# Patient Record
Sex: FEMALE | Race: WHITE | NOT HISPANIC OR LATINO | Employment: OTHER | URBAN - METROPOLITAN AREA
[De-identification: names, ages, dates, MRNs, and addresses within clinical notes are randomized per-mention and may not be internally consistent; named-entity substitution may affect disease eponyms.]

---

## 2017-01-04 ENCOUNTER — LAB REQUISITION (OUTPATIENT)
Dept: LAB | Facility: HOSPITAL | Age: 77
End: 2017-01-04

## 2017-01-04 DIAGNOSIS — N18.9 CHRONIC KIDNEY DISEASE: ICD-10-CM

## 2017-01-04 DIAGNOSIS — Z79.899 OTHER LONG TERM (CURRENT) DRUG THERAPY: ICD-10-CM

## 2017-01-04 LAB
ANION GAP SERPL CALCULATED.3IONS-SCNC: 12.9 MMOL/L
BUN BLD-MCNC: 30 MG/DL (ref 8–23)
BUN/CREAT SERPL: 25.4 (ref 7–25)
CALCIUM SPEC-SCNC: 9.6 MG/DL (ref 8.8–10.5)
CHLORIDE SERPL-SCNC: 102 MMOL/L (ref 98–107)
CO2 SERPL-SCNC: 27.1 MMOL/L (ref 22–29)
CREAT BLD-MCNC: 1.18 MG/DL (ref 0.57–1)
GFR SERPL CREATININE-BSD FRML MDRD: 45 ML/MIN/1.73
GLUCOSE BLD-MCNC: 89 MG/DL (ref 65–99)
POTASSIUM BLD-SCNC: 4.9 MMOL/L (ref 3.5–5.2)
SODIUM BLD-SCNC: 142 MMOL/L (ref 136–145)

## 2017-01-04 PROCEDURE — 80048 BASIC METABOLIC PNL TOTAL CA: CPT | Performed by: FAMILY MEDICINE

## 2017-01-04 PROCEDURE — 36415 COLL VENOUS BLD VENIPUNCTURE: CPT | Performed by: FAMILY MEDICINE

## 2017-01-12 ENCOUNTER — OFFICE VISIT (OUTPATIENT)
Dept: PAIN MEDICINE | Facility: CLINIC | Age: 77
End: 2017-01-12

## 2017-01-12 VITALS
HEART RATE: 78 BPM | TEMPERATURE: 96 F | DIASTOLIC BLOOD PRESSURE: 75 MMHG | BODY MASS INDEX: 24.99 KG/M2 | OXYGEN SATURATION: 96 % | HEIGHT: 65 IN | WEIGHT: 150 LBS | SYSTOLIC BLOOD PRESSURE: 140 MMHG | RESPIRATION RATE: 16 BRPM

## 2017-01-12 DIAGNOSIS — R52 NONVERBAL SIGNS OF PAIN: ICD-10-CM

## 2017-01-12 DIAGNOSIS — G89.29 OTHER CHRONIC PAIN: Primary | ICD-10-CM

## 2017-01-12 DIAGNOSIS — M41.9 SCOLIOSIS, UNSPECIFIED SCOLIOSIS TYPE, UNSPECIFIED SPINAL REGION: ICD-10-CM

## 2017-01-12 PROCEDURE — 99212 OFFICE O/P EST SF 10 MIN: CPT | Performed by: NURSE PRACTITIONER

## 2017-01-12 RX ORDER — TRAMADOL HYDROCHLORIDE 300 MG/1
1 CAPSULE ORAL DAILY
Qty: 30 CAPSULE | Refills: 2 | Status: SHIPPED | OUTPATIENT
Start: 2017-01-12 | End: 2017-02-21 | Stop reason: DRUGHIGH

## 2017-01-12 RX ORDER — OMEPRAZOLE 20 MG/1
40 CAPSULE, DELAYED RELEASE ORAL DAILY
COMMUNITY

## 2017-01-12 NOTE — PROGRESS NOTES
"CHIEF COMPLAINT    Pt's caregiver with her and states pt is doing well. Seems to be handling the gabapentin trial well. No changes.    Subjective   Erica Crockett is a 76 y.o. female  who presents to the office for follow-up.She has a history of chronic pain due to scoliosis and PHN/Shingles. She is a non-verbal resident of [facility]. At her last office visit, She was trialed on gabapentin 100 mg HS.  Her rash under her breast is improved. Patient is unable to verbalize a pain score today.  Her behavior is improved since last office visit.  Continues with Conzip 300 mg daily and gabapentin 100 mg HS. No obvious side effects noted from staff. It appears the regimen helps decrease her pain. Unable to perform ADL's by self.    History of Present Illness     The following portions of the patient's history were reviewed and updated as appropriate: allergies, current medications, past family history, past medical history, past social history, past surgical history and problem list.    Review of Systems   Constitutional: Negative for activity change, appetite change, chills and fever.   HENT: Negative for ear pain.    Eyes: Negative for pain.   Respiratory: Negative for cough and shortness of breath.    Cardiovascular: Negative for chest pain and palpitations.   Gastrointestinal: Negative for abdominal pain, constipation, diarrhea and vomiting.   Genitourinary: Negative for difficulty urinating.   Musculoskeletal: Positive for back pain.   Neurological: Negative for dizziness, weakness, light-headedness, numbness and headaches.   Psychiatric/Behavioral: Negative for agitation, confusion, sleep disturbance and suicidal ideas. The patient is not nervous/anxious.      Vitals:    01/12/17 0913   BP: 140/75   Pulse: 78   Resp: 16   Temp: 96 °F (35.6 °C)   SpO2: 96%   Weight: 150 lb (68 kg)   Height: 65\" (165.1 cm)   PainSc: Comment: pt nonverbal   PainLoc: Back     Objective   Physical Exam   Constitutional: She appears " well-developed and well-nourished.   HENT:   Head: Normocephalic and atraumatic.   Nose: Nose normal.   Eyes: Conjunctivae are normal.   Cardiovascular: Normal rate, regular rhythm and normal heart sounds.    Pulmonary/Chest: Effort normal and breath sounds normal. No respiratory distress.   Musculoskeletal:        Lumbar back: She exhibits tenderness.   Slight left posturing while sitting in wheelchair noted.   Neurological: She is alert. Gait (in wheelchair) abnormal.   Psychiatric: She has a normal mood and affect. Her behavior is normal. She is noncommunicative.   Mood/affect/behavior normal for patient. Patient is able to follow simple commands.       Assessment/Plan   Erica was seen today for back pain.    Diagnoses and all orders for this visit:    Other chronic pain    Nonverbal signs of pain    Scoliosis, unspecified scoliosis type, unspecified spinal region    Other orders  -     TraMADol HCl ER (CONZIP) 300 MG capsule sustained-release 24 hr; Take 1 tablet by mouth Daily.      --- The oral drug screen confirmation from 6-2-16 has been reviewed and the result is appropriate based on patient history and JOSE report  --- Refill Conzip ER with 2 additional refills. Patient appears stable with current regimen. No adverse effects. Regarding continuation of opioids, there is no evidence of aberrant behavior or any red flags.  The patient continues with appropriate response to opioid therapy.   --- Continue with gabapentin.   --- Follow-up 3 months or sooner if needed.       JOSE REPORT    As part of the patient's treatment plan, I am prescribing controlled substances. The patient has been made aware of appropriate use of such medications, including potential risk of somnolence, limited ability to drive and/or work safely, and the potential for dependence or overdose. It has also bee made clear that these medications are for use by this patient only, without concomitant use of alcohol or other substances  unless prescribed.     Patient has completed prescribing agreement detailing terms of continued prescribing of controlled substances, including monitoring JOSE reports, urine drug screening, and pill counts if necessary. The patient is aware that inappropriate use will results in cessation of prescribing such medications.    JOSE report has been reviewed and scanned into the patient's chart.    Date of last JOSE : 1-9-17.    History and physical exam exhibit continued safe and appropriate use of controlled substances.      EMR Dragon/Transcription disclaimer:   Much of this encounter note is an electronic transcription/translation of spoken language to printed text. The electronic translation of spoken language may permit erroneous, or at times, nonsensical words or phrases to be inadvertently transcribed; Although I have reviewed the note for such errors, some may still exist.

## 2017-01-12 NOTE — MR AVS SNAPSHOT
Erica OLGA Bon   1/12/2017 9:00 AM   Office Visit    Dept Phone:  [phone]   Encounter #:  [id number]    Provider:  LASHAE Garnica   Department:  [facility] PAIN MANAGEMENT                Your Full Care Plan              Where to Get Your Medications      You can get these medications from any pharmacy     Bring a paper prescription for each of these medications     TraMADol HCl  MG capsule sustained-release 24 hr            Your Updated Medication List          This list is accurate as of: 1/12/17  9:37 AM.  Always use your most recent med list.                acetaminophen 325 MG tablet   Commonly known as:  TYLENOL       alendronate 70 MG tablet   Commonly known as:  FOSAMAX       BENEFIBER powder       bisacodyl 10 MG suppository   Commonly known as:  DULCOLAX       cetirizine 10 MG tablet   Commonly known as:  zyrTEC       dextromethorphan-guaifenesin  MG/5ML syrup   Commonly known as:  ROBITUSSIN-DM       diazepam 20 MG rectal kit   Commonly known as:  DIASTAT ACUDIAL       diltiazem  MG 24 hr capsule   Commonly known as:  DILACOR XR       diphenhydrAMINE 25 mg capsule   Commonly known as:  BENADRYL       gabapentin 100 MG capsule   Commonly known as:  NEURONTIN   Take 1 capsule by mouth Every Night.       lamoTRIgine 200 MG tablet   Commonly known as:  LaMICtal       levETIRAcetam 500 MG tablet   Commonly known as:  KEPPRA       LORazepam 0.5 MG tablet   Commonly known as:  ATIVAN       lubiprostone 8 MCG capsule   Commonly known as:  AMITIZA       magnesium hydroxide 400 MG/5ML suspension   Commonly known as:  MILK OF MAGNESIA       MULTI VITAMIN DAILY PO       omeprazole 20 MG capsule   Commonly known as:  priLOSEC       polyethylene glycol powder   Commonly known as:  MIRALAX       TraMADol HCl  MG capsule sustained-release 24 hr   Commonly known as:  CONZIP   Take 1 tablet by mouth Daily.       Vitamin D (Cholecalciferol)  "1000 UNITS tablet               You Were Diagnosed With        Codes Comments    Other chronic pain    -  Primary ICD-10-CM: G89.29  ICD-9-CM: 338.29     Nonverbal signs of pain     ICD-10-CM: Z78.9  ICD-9-CM: V49.89     Scoliosis, unspecified scoliosis type, unspecified spinal region     ICD-10-CM: M41.9  ICD-9-CM: 737.30       Instructions     None    Patient Instructions History      Upcoming Appointments     Visit Type Date Time Department    OFFICE VISIT 1/12/2017  9:00 AM [facility]      [employer] Signup     Our records indicate that your [employer] account has been deactivated. If you would like to reactivate your account, please email [email or web address] or call [phone] to talk to our [employer] staff.             Other Info from Your Visit           Allergies     Bee Venom      Iodinated Diagnostic Agents      Nsaids        Reason for Visit     Back Pain           Vital Signs     Blood Pressure Pulse Temperature Respirations Height Weight    140/75 78 96 °F (35.6 °C) 16 65\" (165.1 cm) 150 lb (68 kg)    Oxygen Saturation Body Mass Index Smoking Status             96% 24.96 kg/m2 Never Smoker         Problems and Diagnoses Noted     Chronic pain    Nonverbal signs of pain    Scoliosis        "

## 2017-01-27 ENCOUNTER — LAB REQUISITION (OUTPATIENT)
Dept: LAB | Facility: HOSPITAL | Age: 77
End: 2017-01-27

## 2017-01-27 DIAGNOSIS — N39.0 URINARY TRACT INFECTION: ICD-10-CM

## 2017-01-27 LAB
BACTERIA UR QL AUTO: ABNORMAL /HPF
BILIRUB UR QL STRIP: NEGATIVE
CLARITY UR: CLEAR
COLOR UR: YELLOW
GLUCOSE UR STRIP-MCNC: NEGATIVE MG/DL
HGB UR QL STRIP.AUTO: NEGATIVE
HYALINE CASTS UR QL AUTO: ABNORMAL /LPF
KETONES UR QL STRIP: NEGATIVE
LEUKOCYTE ESTERASE UR QL STRIP.AUTO: ABNORMAL
NITRITE UR QL STRIP: POSITIVE
PH UR STRIP.AUTO: 7 [PH] (ref 4.5–8)
PROT UR QL STRIP: NEGATIVE
RBC # UR: ABNORMAL /HPF
REF LAB TEST METHOD: ABNORMAL
SP GR UR STRIP: 1.01 (ref 1–1.03)
SQUAMOUS #/AREA URNS HPF: ABNORMAL /HPF
UROBILINOGEN UR QL STRIP: ABNORMAL
WBC CLUMPS # UR AUTO: ABNORMAL /HPF
WBC UR QL AUTO: ABNORMAL /HPF

## 2017-01-27 PROCEDURE — 87186 SC STD MICRODIL/AGAR DIL: CPT | Performed by: FAMILY MEDICINE

## 2017-01-27 PROCEDURE — 87086 URINE CULTURE/COLONY COUNT: CPT | Performed by: FAMILY MEDICINE

## 2017-01-27 PROCEDURE — 81001 URINALYSIS AUTO W/SCOPE: CPT | Performed by: FAMILY MEDICINE

## 2017-01-29 LAB — BACTERIA SPEC AEROBE CULT: ABNORMAL

## 2017-02-01 ENCOUNTER — LAB REQUISITION (OUTPATIENT)
Dept: LAB | Facility: HOSPITAL | Age: 77
End: 2017-02-01

## 2017-02-01 DIAGNOSIS — R94.5 ABNORMAL RESULTS OF LIVER FUNCTION STUDIES: ICD-10-CM

## 2017-02-01 DIAGNOSIS — R79.89 OTHER SPECIFIED ABNORMAL FINDINGS OF BLOOD CHEMISTRY: ICD-10-CM

## 2017-02-01 DIAGNOSIS — Z79.899 OTHER LONG TERM (CURRENT) DRUG THERAPY: ICD-10-CM

## 2017-02-01 LAB
ANION GAP SERPL CALCULATED.3IONS-SCNC: 7.3 MMOL/L
BUN BLD-MCNC: 40 MG/DL (ref 8–23)
BUN/CREAT SERPL: 27.2 (ref 7–25)
CALCIUM SPEC-SCNC: 9.3 MG/DL (ref 8.8–10.5)
CHLORIDE SERPL-SCNC: 107 MMOL/L (ref 98–107)
CO2 SERPL-SCNC: 27.7 MMOL/L (ref 22–29)
CREAT BLD-MCNC: 1.47 MG/DL (ref 0.57–1)
GFR SERPL CREATININE-BSD FRML MDRD: 35 ML/MIN/1.73
GLUCOSE BLD-MCNC: 92 MG/DL (ref 65–99)
POTASSIUM BLD-SCNC: 5.3 MMOL/L (ref 3.5–5.2)
SODIUM BLD-SCNC: 142 MMOL/L (ref 136–145)

## 2017-02-01 PROCEDURE — 36415 COLL VENOUS BLD VENIPUNCTURE: CPT | Performed by: FAMILY MEDICINE

## 2017-02-01 PROCEDURE — 80048 BASIC METABOLIC PNL TOTAL CA: CPT | Performed by: FAMILY MEDICINE

## 2017-02-21 ENCOUNTER — OFFICE VISIT (OUTPATIENT)
Dept: PAIN MEDICINE | Facility: CLINIC | Age: 77
End: 2017-02-21

## 2017-02-21 VITALS
BODY MASS INDEX: 25.16 KG/M2 | HEIGHT: 65 IN | OXYGEN SATURATION: 96 % | SYSTOLIC BLOOD PRESSURE: 124 MMHG | TEMPERATURE: 96.4 F | WEIGHT: 151 LBS | DIASTOLIC BLOOD PRESSURE: 73 MMHG | HEART RATE: 69 BPM | RESPIRATION RATE: 16 BRPM

## 2017-02-21 DIAGNOSIS — M41.9 SCOLIOSIS, UNSPECIFIED SCOLIOSIS TYPE, UNSPECIFIED SPINAL REGION: ICD-10-CM

## 2017-02-21 DIAGNOSIS — R52 NONVERBAL SIGNS OF PAIN: ICD-10-CM

## 2017-02-21 DIAGNOSIS — M16.9 OSTEOARTHRITIS OF HIP, UNSPECIFIED LATERALITY, UNSPECIFIED OSTEOARTHRITIS TYPE: ICD-10-CM

## 2017-02-21 DIAGNOSIS — M70.72 BURSITIS OF HIP, LEFT: ICD-10-CM

## 2017-02-21 DIAGNOSIS — G89.29 OTHER CHRONIC PAIN: Primary | ICD-10-CM

## 2017-02-21 DIAGNOSIS — M51.36 DEGENERATION OF INTERVERTEBRAL DISC OF LUMBAR REGION: ICD-10-CM

## 2017-02-21 PROCEDURE — 99214 OFFICE O/P EST MOD 30 MIN: CPT | Performed by: NURSE PRACTITIONER

## 2017-02-21 RX ORDER — TRAMADOL HYDROCHLORIDE 50 MG/1
100 TABLET ORAL EVERY 8 HOURS
Qty: 180 TABLET | Refills: 2 | Status: SHIPPED | OUTPATIENT
Start: 2017-02-21 | End: 2017-05-24 | Stop reason: SDUPTHER

## 2017-02-21 NOTE — PROGRESS NOTES
CHIEF COMPLAINT  Since 1/12/17 office visit, Pt reportedly is having significant increase of L hip pain    Subjective   Erica Crockett is a 76 y.o. female  who presents to the office for follow-up.She has a history of low back and hip pain. Is a resident from [facility]. Has difficulty with verbal skills.  Her nurse from [facility] is here today. Is noticing increased pain in her left hip. Having difficulty with walking. Had PCP changed orders to Tylenol 1000 mg BID. Also receives Voltaren gel as well.  Has been having increased combativeness as well. They notice her holding her left hip as well. Denies any recent injury.    Her nurse reports that the pain seems to be coming from her left hip. Back pain seems stable. Continues with ConZip 300 mg daily and Tylenol 1000 mg BID. No known side effects from the regimen.     Cannot tolerate Tylox, Hydrocodone. Has had side effects from this previously.     She is non-verbal but is able to vocalize painful palpations.  Unable to verbalize a pain level in office today.      History of Present Illness     PEG Assessment   What number best describes your pain on average in the past week?5  What number best describes how, during the past week, pain has interfered with your enjoyment of life?6  What number best describes how, during the past week, pain has interfered with your general activity?  6      The following portions of the patient's history were reviewed and updated as appropriate: allergies, current medications, past family history, past medical history, past social history, past surgical history and problem list.    Review of Systems   Constitutional: Negative for activity change and appetite change.   Respiratory: Negative for apnea, chest tightness and shortness of breath.    Cardiovascular: Negative for chest pain.   Gastrointestinal: Positive for constipation. Negative for diarrhea and nausea.   Genitourinary: Negative for difficulty urinating and dysuria.  "  Musculoskeletal: Positive for back pain.   Neurological: Negative for dizziness, light-headedness and numbness.   Psychiatric/Behavioral: Positive for sleep disturbance (unchanged). Negative for suicidal ideas. The patient is nervous/anxious.        Vitals:    02/21/17 0853   BP: 124/73   Pulse: 69   Resp: 16   Temp: 96.4 °F (35.8 °C)   SpO2: 96%   Weight: 151 lb (68.5 kg)   Height: 65\" (165.1 cm)   PainSc: 4  Comment: L hip pain reportedly ranges from 4-8/10   PainLoc: Hip     Objective   Physical Exam   Constitutional: She appears well-developed and well-nourished.   HENT:   Head: Normocephalic and atraumatic.   Nose: Nose normal.   Eyes: Conjunctivae are normal.   Cardiovascular: Normal rate, regular rhythm and normal heart sounds.    Pulmonary/Chest: Effort normal and breath sounds normal. No respiratory distress.   Musculoskeletal:        Lumbar back: She exhibits tenderness.   Slight left posturing while sitting in wheelchair noted.    Moderate tenderness to palpation of left greater trochanteric bursa   Neurological: She is alert. Gait (in wheelchair) abnormal.   Psychiatric: She has a normal mood and affect. Her behavior is normal. She is noncommunicative.   Mood/affect/behavior normal for patient. Patient is able to follow simple commands.       Assessment/Plan   Erica was seen today for hip pain.    Diagnoses and all orders for this visit:    Other chronic pain    Scoliosis, unspecified scoliosis type, unspecified spinal region    Degeneration of intervertebral disc of lumbar region    Nonverbal signs of pain    Osteoarthritis of hip, unspecified laterality, unspecified osteoarthritis type    Bursitis of hip, left  -     Case Request    Other orders  -     traMADol (ULTRAM) 50 MG tablet; Take 2 tablets by mouth Every 8 (Eight) Hours.      --- The oral drug screen confirmation from 6-2-16 has been reviewed and the result is appropriate based on patient history and JOSE report  --- Discontinue to " ConZip.  --- Tramadol 50 mg 2 q8hrs. Discussed medication with the patient/caregivers.  Included in this discussion was the potential for side effects and adverse events.  Patient verbalized understanding and wished to proceed.  Prescription will be given to nurse.  --- Take with Tylenol.  --- Considered NSAIDS but patient cannot tolerate due to CRI.  --- Left hip bursa injection in surgery center with Dr. Agosto. Will obtain POA.  Discussed at length with caregivers. Will need to obtain consent for procedure from POA. Also need to consider sedation. The caregivers do not believe she would tolerate the procedure in office.   --- Follow-up after procedure.          This was an extended office visit. Over 25 minutes was spent in face to face contact with the patient. Over half of the time was spent in education and counseling. The education and counseling was discussed with caregiver/nurse of patient. DIscussed not increasing opioid at this time. Discussed returning to previous regimen of Tramadol 50 mg 2 q8hrs. Also addition of Tylenol with Tramadol dosing. Reviewed left hip bursa injection. Reviewed the procedure at length with the caregiver/nurse.  Included in the review was expectations, complications, risk and benefits.The procedure was described in detail and the risks, benefits and alternatives were discussed with the patient (including but not limited to: bleeding, infection, nerve damage, worsening of pain, inability to perform injection, paralysis, seizures, and death) who agreed to proceed.  Discussed the potential for sedation if warranted/wanted.   This intervention will be ordered. Will obtain consent from POA.         JOSE REPORT    As part of the patient's treatment plan, I am prescribing controlled substances. The patient has been made aware of appropriate use of such medications, including potential risk of somnolence, limited ability to drive and/or work safely, and the potential for dependence  or overdose. It has also bee made clear that these medications are for use by this patient only, without concomitant use of alcohol or other substances unless prescribed.     Patient has completed prescribing agreement detailing terms of continued prescribing of controlled substances, including monitoring JOSE reports, urine drug screening, and pill counts if necessary. The patient is aware that inappropriate use will results in cessation of prescribing such medications.    JOSE report has been reviewed and scanned into the patient's chart.    Date of last JOSE : 2-17-17    History and physical exam exhibit continued safe and appropriate use of controlled substances.      EMR Dragon/Transcription disclaimer:   Much of this encounter note is an electronic transcription/translation of spoken language to printed text. The electronic translation of spoken language may permit erroneous, or at times, nonsensical words or phrases to be inadvertently transcribed; Although I have reviewed the note for such errors, some may still exist.

## 2017-02-24 ENCOUNTER — TRANSCRIBE ORDERS (OUTPATIENT)
Dept: OBSTETRICS AND GYNECOLOGY | Facility: CLINIC | Age: 77
End: 2017-02-24

## 2017-02-24 DIAGNOSIS — M85.80 OSTEOPENIA: Primary | ICD-10-CM

## 2017-02-24 DIAGNOSIS — Z13.9 SCREENING: Primary | ICD-10-CM

## 2017-03-01 ENCOUNTER — LAB REQUISITION (OUTPATIENT)
Dept: LAB | Facility: HOSPITAL | Age: 77
End: 2017-03-01

## 2017-03-01 DIAGNOSIS — N18.9 CHRONIC KIDNEY DISEASE: ICD-10-CM

## 2017-03-01 DIAGNOSIS — Z79.899 OTHER LONG TERM (CURRENT) DRUG THERAPY: ICD-10-CM

## 2017-03-01 LAB
ANION GAP SERPL CALCULATED.3IONS-SCNC: 12.2 MMOL/L
BUN BLD-MCNC: 41 MG/DL (ref 8–23)
BUN/CREAT SERPL: 33.9 (ref 7–25)
CALCIUM SPEC-SCNC: 9.7 MG/DL (ref 8.8–10.5)
CHLORIDE SERPL-SCNC: 102 MMOL/L (ref 98–107)
CO2 SERPL-SCNC: 25.8 MMOL/L (ref 22–29)
CREAT BLD-MCNC: 1.21 MG/DL (ref 0.57–1)
GFR SERPL CREATININE-BSD FRML MDRD: 43 ML/MIN/1.73
GLUCOSE BLD-MCNC: 101 MG/DL (ref 65–99)
POTASSIUM BLD-SCNC: 4.4 MMOL/L (ref 3.5–5.2)
SODIUM BLD-SCNC: 140 MMOL/L (ref 136–145)

## 2017-03-01 PROCEDURE — 36415 COLL VENOUS BLD VENIPUNCTURE: CPT | Performed by: FAMILY MEDICINE

## 2017-03-01 PROCEDURE — 80048 BASIC METABOLIC PNL TOTAL CA: CPT | Performed by: FAMILY MEDICINE

## 2017-03-03 ENCOUNTER — APPOINTMENT (OUTPATIENT)
Dept: BONE DENSITY | Facility: HOSPITAL | Age: 77
End: 2017-03-03
Attending: OBSTETRICS & GYNECOLOGY

## 2017-03-12 ENCOUNTER — LAB REQUISITION (OUTPATIENT)
Dept: LAB | Facility: HOSPITAL | Age: 77
End: 2017-03-12

## 2017-03-12 DIAGNOSIS — N39.0 URINARY TRACT INFECTION: ICD-10-CM

## 2017-03-12 PROCEDURE — 87086 URINE CULTURE/COLONY COUNT: CPT | Performed by: FAMILY MEDICINE

## 2017-03-12 PROCEDURE — 87186 SC STD MICRODIL/AGAR DIL: CPT | Performed by: FAMILY MEDICINE

## 2017-03-15 LAB — BACTERIA SPEC AEROBE CULT: ABNORMAL

## 2017-03-21 ENCOUNTER — OUTSIDE FACILITY SERVICE (OUTPATIENT)
Dept: PAIN MEDICINE | Facility: CLINIC | Age: 77
End: 2017-03-21

## 2017-03-21 PROCEDURE — 77002 NEEDLE LOCALIZATION BY XRAY: CPT | Performed by: ANESTHESIOLOGY

## 2017-03-21 PROCEDURE — 20610 DRAIN/INJ JOINT/BURSA W/O US: CPT | Performed by: ANESTHESIOLOGY

## 2017-04-05 ENCOUNTER — LAB REQUISITION (OUTPATIENT)
Dept: LAB | Facility: HOSPITAL | Age: 77
End: 2017-04-05

## 2017-04-05 DIAGNOSIS — R79.89 OTHER SPECIFIED ABNORMAL FINDINGS OF BLOOD CHEMISTRY: ICD-10-CM

## 2017-04-05 DIAGNOSIS — I10 ESSENTIAL (PRIMARY) HYPERTENSION: ICD-10-CM

## 2017-04-05 DIAGNOSIS — D72.819 DECREASED WHITE BLOOD CELL COUNT: ICD-10-CM

## 2017-04-05 DIAGNOSIS — Z79.899 OTHER LONG TERM (CURRENT) DRUG THERAPY: ICD-10-CM

## 2017-04-05 DIAGNOSIS — E55.9 VITAMIN D DEFICIENCY: ICD-10-CM

## 2017-04-05 DIAGNOSIS — E03.9 HYPOTHYROIDISM: ICD-10-CM

## 2017-04-05 DIAGNOSIS — R56.9 CONVULSIONS (HCC): ICD-10-CM

## 2017-04-05 DIAGNOSIS — R73.09 OTHER ABNORMAL GLUCOSE: ICD-10-CM

## 2017-04-05 LAB
25(OH)D3 SERPL-MCNC: 59.1 NG/ML
ALBUMIN SERPL-MCNC: 3.7 G/DL (ref 3.5–5.2)
ALBUMIN/GLOB SERPL: 1.2 G/DL
ALP SERPL-CCNC: 66 U/L (ref 40–129)
ALT SERPL W P-5'-P-CCNC: 12 U/L (ref 5–33)
ANION GAP SERPL CALCULATED.3IONS-SCNC: 9.4 MMOL/L
AST SERPL-CCNC: 15 U/L (ref 5–32)
BASOPHILS # BLD AUTO: 0.03 10*3/MM3 (ref 0–0.2)
BASOPHILS NFR BLD AUTO: 0.5 % (ref 0–2)
BILIRUB SERPL-MCNC: 0.2 MG/DL (ref 0.2–1.2)
BUN BLD-MCNC: 35 MG/DL (ref 8–23)
BUN/CREAT SERPL: 26.3 (ref 7–25)
CALCIUM SPEC-SCNC: 9.7 MG/DL (ref 8.8–10.5)
CHLORIDE SERPL-SCNC: 101 MMOL/L (ref 98–107)
CHOLEST SERPL-MCNC: 211 MG/DL (ref 0–200)
CO2 SERPL-SCNC: 28.6 MMOL/L (ref 22–29)
CREAT BLD-MCNC: 1.33 MG/DL (ref 0.57–1)
DEPRECATED RDW RBC AUTO: 44.2 FL (ref 37–54)
EOSINOPHIL # BLD AUTO: 0.15 10*3/MM3 (ref 0.1–0.3)
EOSINOPHIL NFR BLD AUTO: 2.3 % (ref 0–4)
ERYTHROCYTE [DISTWIDTH] IN BLOOD BY AUTOMATED COUNT: 12.8 % (ref 11.5–14.5)
GFR SERPL CREATININE-BSD FRML MDRD: 39 ML/MIN/1.73
GLOBULIN UR ELPH-MCNC: 3.1 GM/DL
GLUCOSE BLD-MCNC: 89 MG/DL (ref 65–99)
HCT VFR BLD AUTO: 39.6 % (ref 37–47)
HDLC SERPL-MCNC: 99 MG/DL (ref 40–60)
HGB BLD-MCNC: 12.5 G/DL (ref 12–16)
IMM GRANULOCYTES # BLD: 0.02 10*3/MM3 (ref 0–0.03)
IMM GRANULOCYTES NFR BLD: 0.3 % (ref 0–0.5)
LDLC SERPL CALC-MCNC: 100 MG/DL (ref 0–100)
LDLC/HDLC SERPL: 1.01 {RATIO}
LYMPHOCYTES # BLD AUTO: 2.08 10*3/MM3 (ref 0.6–4.8)
LYMPHOCYTES NFR BLD AUTO: 31.3 % (ref 20–45)
MCH RBC QN AUTO: 29.7 PG (ref 27–31)
MCHC RBC AUTO-ENTMCNC: 31.6 G/DL (ref 31–37)
MCV RBC AUTO: 94.1 FL (ref 81–99)
MONOCYTES # BLD AUTO: 0.43 10*3/MM3 (ref 0–1)
MONOCYTES NFR BLD AUTO: 6.5 % (ref 3–8)
NEUTROPHILS # BLD AUTO: 3.94 10*3/MM3 (ref 1.5–8.3)
NEUTROPHILS NFR BLD AUTO: 59.1 % (ref 45–70)
NRBC BLD MANUAL-RTO: 0 /100 WBC (ref 0–0)
PLATELET # BLD AUTO: 319 10*3/MM3 (ref 140–500)
PMV BLD AUTO: 10 FL (ref 7.4–10.4)
POTASSIUM BLD-SCNC: 5.1 MMOL/L (ref 3.5–5.2)
PROT SERPL-MCNC: 6.8 G/DL (ref 6–8.5)
RBC # BLD AUTO: 4.21 10*6/MM3 (ref 4.2–5.4)
SODIUM BLD-SCNC: 139 MMOL/L (ref 136–145)
T4 FREE SERPL-MCNC: 0.98 NG/DL (ref 0.93–1.7)
TRIGL SERPL-MCNC: 58 MG/DL (ref 0–150)
TSH SERPL DL<=0.05 MIU/L-ACNC: 3.37 MIU/ML (ref 0.27–4.2)
VLDLC SERPL-MCNC: 11.6 MG/DL (ref 7–27)
WBC NRBC COR # BLD: 6.65 10*3/MM3 (ref 4.8–10.8)

## 2017-04-05 PROCEDURE — 85025 COMPLETE CBC W/AUTO DIFF WBC: CPT | Performed by: FAMILY MEDICINE

## 2017-04-05 PROCEDURE — 80061 LIPID PANEL: CPT | Performed by: FAMILY MEDICINE

## 2017-04-05 PROCEDURE — 80053 COMPREHEN METABOLIC PANEL: CPT | Performed by: FAMILY MEDICINE

## 2017-04-05 PROCEDURE — 84439 ASSAY OF FREE THYROXINE: CPT | Performed by: FAMILY MEDICINE

## 2017-04-05 PROCEDURE — 36415 COLL VENOUS BLD VENIPUNCTURE: CPT | Performed by: FAMILY MEDICINE

## 2017-04-05 PROCEDURE — 80177 DRUG SCRN QUAN LEVETIRACETAM: CPT | Performed by: FAMILY MEDICINE

## 2017-04-05 PROCEDURE — 84443 ASSAY THYROID STIM HORMONE: CPT | Performed by: FAMILY MEDICINE

## 2017-04-05 PROCEDURE — 82306 VITAMIN D 25 HYDROXY: CPT | Performed by: FAMILY MEDICINE

## 2017-04-05 PROCEDURE — 80175 DRUG SCREEN QUAN LAMOTRIGINE: CPT | Performed by: FAMILY MEDICINE

## 2017-04-07 LAB
LAMOTRIGINE SERPL-MCNC: 5.8 UG/ML (ref 2–20)
LEVETIRACETAM SERPL-MCNC: 34.3 UG/ML (ref 10–40)

## 2017-04-12 ENCOUNTER — OFFICE VISIT (OUTPATIENT)
Dept: PAIN MEDICINE | Facility: CLINIC | Age: 77
End: 2017-04-12

## 2017-04-12 VITALS
OXYGEN SATURATION: 96 % | WEIGHT: 151 LBS | HEART RATE: 76 BPM | TEMPERATURE: 97.2 F | HEIGHT: 65 IN | RESPIRATION RATE: 16 BRPM | BODY MASS INDEX: 25.16 KG/M2 | DIASTOLIC BLOOD PRESSURE: 85 MMHG | SYSTOLIC BLOOD PRESSURE: 134 MMHG

## 2017-04-12 DIAGNOSIS — R52 NONVERBAL SIGNS OF PAIN: ICD-10-CM

## 2017-04-12 DIAGNOSIS — G89.29 OTHER CHRONIC PAIN: Primary | ICD-10-CM

## 2017-04-12 DIAGNOSIS — M70.72 BURSITIS OF LEFT HIP: ICD-10-CM

## 2017-04-12 DIAGNOSIS — M41.9 SCOLIOSIS, UNSPECIFIED SCOLIOSIS TYPE, UNSPECIFIED SPINAL REGION: ICD-10-CM

## 2017-04-12 PROCEDURE — 99212 OFFICE O/P EST SF 10 MIN: CPT | Performed by: NURSE PRACTITIONER

## 2017-04-12 NOTE — PROGRESS NOTES
"CHIEF COMPLAINT    Pt had left hip bursa injection for left hip pain on 3/21/17. Pt is nonverbal from [facility], however caregivers state the nurse has said the injection has helped the pt. She is less agitated as well.     Subjective   Erica Crockett is a 76 y.o. female  who presents to the office for follow-up of procedure.  She completed a left hip bursa injection   on  3-21-17 performed by Dr. Agosto for management of hip pain. Patient is unable to verbalize the amount of relief she received from the injection. Her caregiver is present and they have noticed an improvement in her general signs of pain and less agitation. \"She's doing great, so much better.\"    AT her last office visit, her medication regimen was changed. It was changed from Tramadol  mg daily to Tramadol 50 mg 2 q8hrs with Tylenol. Cannot tolerate NSAIDS due to CRI.    Patient is not agitated in office today.     Continues with current medication regimen. No obvious side effects noted by caregivers. Unable to perform ADL.s      History of Present Illness     The following portions of the patient's history were reviewed and updated as appropriate: allergies, current medications, past family history, past medical history, past social history, past surgical history and problem list.    Review of Systems   Constitutional: Negative for activity change, appetite change, chills and fever.   Respiratory: Negative for apnea, chest tightness and shortness of breath.    Cardiovascular: Negative for chest pain.   Gastrointestinal: Negative for constipation, diarrhea and nausea.   Genitourinary: Negative for difficulty urinating and dysuria.   Musculoskeletal: Positive for back pain.   Neurological: Negative for dizziness, weakness, light-headedness and numbness.   Psychiatric/Behavioral: Negative for agitation, sleep disturbance and suicidal ideas. The patient is nervous/anxious.        Vitals:    04/12/17 1027   BP: 134/85   Pulse: 76 " "  Resp: 16   Temp: 97.2 °F (36.2 °C)   SpO2: 96%   Weight: 151 lb (68.5 kg)   Height: 65\" (165.1 cm)   PainSc: Comment: [place] nonvberbal [facility] [place] [facility]     Objective   Physical Exam   Constitutional: She appears well-developed and well-nourished.   HENT:   Head: Normocephalic and atraumatic.   Nose: Nose normal.   Eyes: Conjunctivae are normal.   Cardiovascular: Normal rate, regular rhythm and normal heart sounds.    Pulmonary/Chest: Effort normal and breath sounds normal. No respiratory distress.   Musculoskeletal:   Slight left posturing while sitting in wheelchair noted.   Neurological: She is alert. Gait (in wheelchair) abnormal.   Non-focal   Psychiatric: She has a normal mood and affect. Her behavior is normal. She is noncommunicative.   Mood/affect/behavior normal for patient. Patient is able to follow simple commands.       Assessment/Plan   Erica was seen today for pain.    Diagnoses and all orders for this visit:    Other chronic pain    Scoliosis, unspecified scoliosis type, unspecified spinal region    Nonverbal signs of pain    Bursitis of left hip        --- The oral drug screen confirmation from 6-2-16 has been reviewed and the result is appropriate based on patient history and JOSE report  --- Continue with regimen. No changes at this time.   --- Can repeat Left greater trochanteric bursa injection after 6-21-17 PRN.  --- Follow-up 3 months or sooner if needed.         JOSE REPORT    As part of the patient's treatment plan, I am prescribing controlled substances. The patient has been made aware of appropriate use of such medications, including potential risk of somnolence, limited ability to drive and/or work safely, and the potential for dependence or overdose. It has also bee made clear that these medications are for use by this patient only, without concomitant use of alcohol or other substances unless prescribed.     Patient has completed prescribing agreement detailing terms of continued " prescribing of controlled substances, including monitoring JOSE reports, urine drug screening, and pill counts if necessary. The patient is aware that inappropriate use will results in cessation of prescribing such medications.    JOSE report has been reviewed and scanned into the patient's chart.    Date of last JOSE : 4-10-17    History and physical exam exhibit continued safe and appropriate use of controlled substances.       EMR Dragon/Transcription disclaimer:   Much of this encounter note is an electronic transcription/translation of spoken language to printed text. The electronic translation of spoken language may permit erroneous, or at times, nonsensical words or phrases to be inadvertently transcribed; Although I have reviewed the note for such errors, some may still exist.

## 2017-05-03 ENCOUNTER — LAB REQUISITION (OUTPATIENT)
Dept: LAB | Facility: HOSPITAL | Age: 77
End: 2017-05-03

## 2017-05-03 DIAGNOSIS — Z79.899 OTHER LONG TERM (CURRENT) DRUG THERAPY: ICD-10-CM

## 2017-05-03 DIAGNOSIS — R94.5 ABNORMAL RESULTS OF LIVER FUNCTION STUDIES: ICD-10-CM

## 2017-05-03 LAB
ANION GAP SERPL CALCULATED.3IONS-SCNC: 10.3 MMOL/L
BUN BLD-MCNC: 28 MG/DL (ref 8–23)
BUN/CREAT SERPL: 26.7 (ref 7–25)
CALCIUM SPEC-SCNC: 9.1 MG/DL (ref 8.8–10.5)
CHLORIDE SERPL-SCNC: 103 MMOL/L (ref 98–107)
CO2 SERPL-SCNC: 28.7 MMOL/L (ref 22–29)
CREAT BLD-MCNC: 1.05 MG/DL (ref 0.57–1)
GFR SERPL CREATININE-BSD FRML MDRD: 51 ML/MIN/1.73
GLUCOSE BLD-MCNC: 102 MG/DL (ref 65–99)
POTASSIUM BLD-SCNC: 4.7 MMOL/L (ref 3.5–5.2)
SODIUM BLD-SCNC: 142 MMOL/L (ref 136–145)

## 2017-05-03 PROCEDURE — 80048 BASIC METABOLIC PNL TOTAL CA: CPT | Performed by: FAMILY MEDICINE

## 2017-05-03 PROCEDURE — 36415 COLL VENOUS BLD VENIPUNCTURE: CPT | Performed by: FAMILY MEDICINE

## 2017-05-24 RX ORDER — TRAMADOL HYDROCHLORIDE 50 MG/1
100 TABLET ORAL EVERY 8 HOURS
Qty: 60 TABLET | Refills: 1 | OUTPATIENT
Start: 2017-05-24 | End: 2017-06-15 | Stop reason: SDUPTHER

## 2017-05-30 ENCOUNTER — HOSPITAL ENCOUNTER (OUTPATIENT)
Dept: MAMMOGRAPHY | Facility: HOSPITAL | Age: 77
Discharge: HOME | End: 2017-05-30
Attending: OBSTETRICS & GYNECOLOGY | Admitting: OBSTETRICS & GYNECOLOGY

## 2017-05-30 DIAGNOSIS — Z12.31 ENCOUNTER FOR SCREENING MAMMOGRAM FOR BREAST CANCER: ICD-10-CM

## 2017-05-30 DIAGNOSIS — Z13.9 SCREENING: ICD-10-CM

## 2017-05-30 PROCEDURE — G0202 SCR MAMMO BI INCL CAD: HCPCS

## 2017-05-31 ENCOUNTER — LAB REQUISITION (OUTPATIENT)
Dept: LAB | Facility: HOSPITAL | Age: 77
End: 2017-05-31

## 2017-05-31 DIAGNOSIS — N18.9 CHRONIC KIDNEY DISEASE: ICD-10-CM

## 2017-05-31 DIAGNOSIS — R73.09 OTHER ABNORMAL GLUCOSE: ICD-10-CM

## 2017-05-31 DIAGNOSIS — Z79.899 OTHER LONG TERM (CURRENT) DRUG THERAPY: ICD-10-CM

## 2017-05-31 DIAGNOSIS — E03.9 HYPOTHYROIDISM: ICD-10-CM

## 2017-05-31 LAB
ANION GAP SERPL CALCULATED.3IONS-SCNC: 8.4 MMOL/L
BUN BLD-MCNC: 30 MG/DL (ref 8–23)
BUN/CREAT SERPL: 30 (ref 7–25)
CALCIUM SPEC-SCNC: 9.6 MG/DL (ref 8.8–10.5)
CHLORIDE SERPL-SCNC: 103 MMOL/L (ref 98–107)
CO2 SERPL-SCNC: 29.6 MMOL/L (ref 22–29)
CREAT BLD-MCNC: 1 MG/DL (ref 0.57–1)
GFR SERPL CREATININE-BSD FRML MDRD: 54 ML/MIN/1.73
GLUCOSE BLD-MCNC: 95 MG/DL (ref 65–99)
HBA1C MFR BLD: 5.5 % (ref 4.8–5.6)
POTASSIUM BLD-SCNC: 4.8 MMOL/L (ref 3.5–5.2)
SODIUM BLD-SCNC: 141 MMOL/L (ref 136–145)
TSH SERPL DL<=0.05 MIU/L-ACNC: 2.6 MIU/ML (ref 0.27–4.2)

## 2017-05-31 PROCEDURE — 36415 COLL VENOUS BLD VENIPUNCTURE: CPT | Performed by: FAMILY MEDICINE

## 2017-05-31 PROCEDURE — 83036 HEMOGLOBIN GLYCOSYLATED A1C: CPT | Performed by: FAMILY MEDICINE

## 2017-05-31 PROCEDURE — 84443 ASSAY THYROID STIM HORMONE: CPT | Performed by: FAMILY MEDICINE

## 2017-05-31 PROCEDURE — 80048 BASIC METABOLIC PNL TOTAL CA: CPT | Performed by: FAMILY MEDICINE

## 2017-06-15 ENCOUNTER — OFFICE VISIT (OUTPATIENT)
Dept: PAIN MEDICINE | Facility: CLINIC | Age: 77
End: 2017-06-15

## 2017-06-15 VITALS
DIASTOLIC BLOOD PRESSURE: 81 MMHG | TEMPERATURE: 98.4 F | BODY MASS INDEX: 25.16 KG/M2 | OXYGEN SATURATION: 97 % | HEIGHT: 65 IN | HEART RATE: 94 BPM | WEIGHT: 151 LBS | SYSTOLIC BLOOD PRESSURE: 132 MMHG | RESPIRATION RATE: 18 BRPM

## 2017-06-15 DIAGNOSIS — M41.9 SCOLIOSIS, UNSPECIFIED SCOLIOSIS TYPE, UNSPECIFIED SPINAL REGION: ICD-10-CM

## 2017-06-15 DIAGNOSIS — G89.29 OTHER CHRONIC PAIN: Primary | ICD-10-CM

## 2017-06-15 DIAGNOSIS — R52 NONVERBAL SIGNS OF PAIN: ICD-10-CM

## 2017-06-15 DIAGNOSIS — M16.9 OSTEOARTHRITIS OF HIP, UNSPECIFIED LATERALITY, UNSPECIFIED OSTEOARTHRITIS TYPE: ICD-10-CM

## 2017-06-15 PROCEDURE — 99212 OFFICE O/P EST SF 10 MIN: CPT | Performed by: NURSE PRACTITIONER

## 2017-06-15 RX ORDER — TRAMADOL HYDROCHLORIDE 50 MG/1
100 TABLET ORAL EVERY 8 HOURS
Qty: 180 TABLET | Refills: 2 | Status: SHIPPED | OUTPATIENT
Start: 2017-06-15 | End: 2017-08-21 | Stop reason: SDUPTHER

## 2017-06-15 RX ORDER — TRAMADOL HYDROCHLORIDE 50 MG/1
100 TABLET ORAL EVERY 8 HOURS
Qty: 60 TABLET | Refills: 2 | Status: SHIPPED | OUTPATIENT
Start: 2017-06-15 | End: 2017-06-15 | Stop reason: SDUPTHER

## 2017-06-15 NOTE — PROGRESS NOTES
CHIEF COMPLAINT  [place] [facility] patient here today with left hip pain that is unchanged from last visit per caregiver.    Subjective   Erica Crockett is a 76 y.o. female  who presents to the office for follow-up.She has a history of chronic hip pain. She completed a left hip bursa injection on 3-21-17 performed by Dr. Agosto for management of hip pain.  Also has a history of scoliosis.    Continue with Tramadol 50 mg 2 q8hrs with Tylenol. Cannot tolerate NSAIDS due to CRI. No noted side effects from the regimen.      Patient is not agitated in office today.      Continues with current medication regimen. No obvious side effects noted by caregivers. Unable to perform ADL.s      History of Present Illness     PEG Assessment   What number best describes your pain on average in the past week?5  What number best describes how, during the past week, pain has interfered with your enjoyment of life?4  What number best describes how, during the past week, pain has interfered with your general activity?  4      The following portions of the patient's history were reviewed and updated as appropriate: allergies, current medications, past family history, past medical history, past social history, past surgical history and problem list.    Review of Systems   Constitutional: Negative for chills and fever.   Respiratory: Negative for shortness of breath.    Cardiovascular: Negative for chest pain.   Gastrointestinal: Negative for constipation, diarrhea, nausea and vomiting.   Genitourinary: Negative for difficulty urinating and dyspareunia.   Musculoskeletal:        Left hip   Neurological: Negative for dizziness, weakness, light-headedness, numbness and headaches.   Psychiatric/Behavioral: Negative for confusion, hallucinations, self-injury, sleep disturbance and suicidal ideas. The patient is not nervous/anxious.        Vitals:    06/15/17 0922   BP: 132/81   Pulse: 94   Resp: 18   Temp: 98.4 °F (36.9 °C)   SpO2: 97%  "  Weight: 151 lb (68.5 kg)   Height: 65\" (165.1 cm)   PainSc:   3   PainLoc: Hip     Objective   Physical Exam   Constitutional: She appears well-developed and well-nourished.   HENT:   Head: Normocephalic and atraumatic.   Nose: Nose normal.   Eyes: Conjunctivae are normal.   Cardiovascular: Normal rate, regular rhythm and normal heart sounds.    Pulmonary/Chest: Effort normal and breath sounds normal. No respiratory distress.   Musculoskeletal:   Slight left posturing while sitting in wheelchair noted.   Neurological: She is alert. Gait (in wheelchair) abnormal.   Non-focal   Psychiatric: She has a normal mood and affect. Her behavior is normal. She is noncommunicative.   Mood/affect/behavior normal for patient. Patient is able to follow simple commands.     Assessment/Plan   Erica was seen today for hip pain.    Diagnoses and all orders for this visit:    Other chronic pain    Nonverbal signs of pain    Scoliosis, unspecified scoliosis type, unspecified spinal region    Osteoarthritis of hip, unspecified laterality, unspecified osteoarthritis type    Other orders  -     Discontinue: traMADol (ULTRAM) 50 MG tablet; Take 2 tablets by mouth Every 8 (Eight) Hours.  -     traMADol (ULTRAM) 50 MG tablet; Take 2 tablets by mouth Every 8 (Eight) Hours.      --- The oraldrug screen confirmation from 6-2-16 has been reviewed and the result is appropriate based on patient history and JOSE report  --- Refill Tramadol with 2 additional refills. Patient appears stable with current regimen. No adverse effects. Regarding continuation of opioids, there is no evidence of aberrant behavior or any red flags.  The patient continues with appropriate response to opioid therapy.   --- Can repeat hip bursa injection PRN.  --- Follow-up 3 months or sooner if needed.         JOSE REPORT    As part of the patient's treatment plan, I am prescribing controlled substances. The patient has been made aware of appropriate use of such " medications, including potential risk of somnolence, limited ability to drive and/or work safely, and the potential for dependence or overdose. It has also bee made clear that these medications are for use by this patient only, without concomitant use of alcohol or other substances unless prescribed.     Patient has completed prescribing agreement detailing terms of continued prescribing of controlled substances, including monitoring JOSE reports, urine drug screening, and pill counts if necessary. The patient is aware that inappropriate use will results in cessation of prescribing such medications.    JOSE report has been reviewed and scanned into the patient's chart.    Date of last JOSE : 6-14-17    History and physical exam exhibit continued safe and appropriate use of controlled substances.      EMR Dragon/Transcription disclaimer:   Much of this encounter note is an electronic transcription/translation of spoken language to printed text. The electronic translation of spoken language may permit erroneous, or at times, nonsensical words or phrases to be inadvertently transcribed; Although I have reviewed the note for such errors, some may still exist.

## 2017-06-21 RX ORDER — GABAPENTIN 100 MG/1
100 CAPSULE ORAL NIGHTLY
Qty: 30 CAPSULE | Refills: 2 | OUTPATIENT
Start: 2017-06-21 | End: 2017-08-21 | Stop reason: SDUPTHER

## 2017-07-05 ENCOUNTER — LAB REQUISITION (OUTPATIENT)
Dept: LAB | Facility: HOSPITAL | Age: 77
End: 2017-07-05

## 2017-07-05 DIAGNOSIS — N39.0 URINARY TRACT INFECTION: ICD-10-CM

## 2017-07-05 LAB
BACTERIA UR QL AUTO: ABNORMAL /HPF
BILIRUB UR QL STRIP: NEGATIVE
CLARITY UR: CLEAR
COLOR UR: YELLOW
GLUCOSE UR STRIP-MCNC: NEGATIVE MG/DL
HGB UR QL STRIP.AUTO: NEGATIVE
HYALINE CASTS UR QL AUTO: ABNORMAL /LPF
KETONES UR QL STRIP: NEGATIVE
LEUKOCYTE ESTERASE UR QL STRIP.AUTO: NEGATIVE
NITRITE UR QL STRIP: POSITIVE
PH UR STRIP.AUTO: 5.5 [PH] (ref 4.5–8)
PROT UR QL STRIP: NEGATIVE
RBC # UR: ABNORMAL /HPF
REF LAB TEST METHOD: ABNORMAL
SP GR UR STRIP: 1.02 (ref 1–1.03)
SQUAMOUS #/AREA URNS HPF: ABNORMAL /HPF
UROBILINOGEN UR QL STRIP: ABNORMAL
WBC UR QL AUTO: ABNORMAL /HPF

## 2017-07-05 PROCEDURE — 87147 CULTURE TYPE IMMUNOLOGIC: CPT | Performed by: FAMILY MEDICINE

## 2017-07-05 PROCEDURE — 81001 URINALYSIS AUTO W/SCOPE: CPT | Performed by: FAMILY MEDICINE

## 2017-07-05 PROCEDURE — 87186 SC STD MICRODIL/AGAR DIL: CPT | Performed by: FAMILY MEDICINE

## 2017-07-05 PROCEDURE — 87086 URINE CULTURE/COLONY COUNT: CPT | Performed by: FAMILY MEDICINE

## 2017-07-09 LAB
BACTERIA SPEC AEROBE CULT: ABNORMAL
BACTERIA SPEC AEROBE CULT: ABNORMAL
STREP GROUPING: ABNORMAL

## 2017-07-12 ENCOUNTER — LAB REQUISITION (OUTPATIENT)
Dept: LAB | Facility: HOSPITAL | Age: 77
End: 2017-07-12

## 2017-07-12 DIAGNOSIS — R60.9 EDEMA: ICD-10-CM

## 2017-07-12 DIAGNOSIS — Z79.899 OTHER LONG TERM (CURRENT) DRUG THERAPY: ICD-10-CM

## 2017-07-12 DIAGNOSIS — N18.9 CHRONIC KIDNEY DISEASE: ICD-10-CM

## 2017-07-12 LAB
ANION GAP SERPL CALCULATED.3IONS-SCNC: 12.2 MMOL/L
BUN BLD-MCNC: 37 MG/DL (ref 8–23)
BUN/CREAT SERPL: 28.7 (ref 7–25)
CALCIUM SPEC-SCNC: 9.5 MG/DL (ref 8.8–10.5)
CHLORIDE SERPL-SCNC: 105 MMOL/L (ref 98–107)
CO2 SERPL-SCNC: 26.8 MMOL/L (ref 22–29)
CREAT BLD-MCNC: 1.29 MG/DL (ref 0.57–1)
GFR SERPL CREATININE-BSD FRML MDRD: 40 ML/MIN/1.73
GLUCOSE BLD-MCNC: 82 MG/DL (ref 65–99)
POTASSIUM BLD-SCNC: 4.7 MMOL/L (ref 3.5–5.2)
SODIUM BLD-SCNC: 144 MMOL/L (ref 136–145)

## 2017-07-12 PROCEDURE — 80048 BASIC METABOLIC PNL TOTAL CA: CPT | Performed by: FAMILY MEDICINE

## 2017-07-12 PROCEDURE — 36415 COLL VENOUS BLD VENIPUNCTURE: CPT | Performed by: FAMILY MEDICINE

## 2017-08-16 ENCOUNTER — LAB REQUISITION (OUTPATIENT)
Dept: LAB | Facility: HOSPITAL | Age: 77
End: 2017-08-16

## 2017-08-16 DIAGNOSIS — Z79.899 OTHER LONG TERM (CURRENT) DRUG THERAPY: ICD-10-CM

## 2017-08-16 DIAGNOSIS — R79.89 OTHER SPECIFIED ABNORMAL FINDINGS OF BLOOD CHEMISTRY: ICD-10-CM

## 2017-08-16 DIAGNOSIS — N18.9 CHRONIC KIDNEY DISEASE: ICD-10-CM

## 2017-08-16 LAB
ANION GAP SERPL CALCULATED.3IONS-SCNC: 11.6 MMOL/L
BUN BLD-MCNC: 39 MG/DL (ref 8–23)
BUN/CREAT SERPL: 33.6 (ref 7–25)
CALCIUM SPEC-SCNC: 9.9 MG/DL (ref 8.8–10.5)
CHLORIDE SERPL-SCNC: 102 MMOL/L (ref 98–107)
CO2 SERPL-SCNC: 27.4 MMOL/L (ref 22–29)
CREAT BLD-MCNC: 1.16 MG/DL (ref 0.57–1)
GFR SERPL CREATININE-BSD FRML MDRD: 45 ML/MIN/1.73
GLUCOSE BLD-MCNC: 93 MG/DL (ref 65–99)
POTASSIUM BLD-SCNC: 4.7 MMOL/L (ref 3.5–5.2)
SODIUM BLD-SCNC: 141 MMOL/L (ref 136–145)

## 2017-08-16 PROCEDURE — 36415 COLL VENOUS BLD VENIPUNCTURE: CPT | Performed by: FAMILY MEDICINE

## 2017-08-16 PROCEDURE — 80048 BASIC METABOLIC PNL TOTAL CA: CPT | Performed by: FAMILY MEDICINE

## 2017-08-21 RX ORDER — GABAPENTIN 100 MG/1
100 CAPSULE ORAL NIGHTLY
Qty: 30 CAPSULE | Refills: 3 | OUTPATIENT
Start: 2017-08-21 | End: 2018-01-03 | Stop reason: SDUPTHER

## 2017-08-21 RX ORDER — TRAMADOL HYDROCHLORIDE 50 MG/1
100 TABLET ORAL EVERY 8 HOURS
Qty: 180 TABLET | Refills: 1 | OUTPATIENT
Start: 2017-08-21 | End: 2017-09-14 | Stop reason: SDUPTHER

## 2017-09-06 ENCOUNTER — LAB REQUISITION (OUTPATIENT)
Dept: LAB | Facility: HOSPITAL | Age: 77
End: 2017-09-06

## 2017-09-06 DIAGNOSIS — R79.89 OTHER SPECIFIED ABNORMAL FINDINGS OF BLOOD CHEMISTRY: ICD-10-CM

## 2017-09-06 DIAGNOSIS — Z79.899 OTHER LONG TERM (CURRENT) DRUG THERAPY: ICD-10-CM

## 2017-09-06 DIAGNOSIS — N18.9 CHRONIC KIDNEY DISEASE: ICD-10-CM

## 2017-09-06 LAB
ANION GAP SERPL CALCULATED.3IONS-SCNC: 11.4 MMOL/L
BUN BLD-MCNC: 30 MG/DL (ref 8–23)
BUN/CREAT SERPL: 26.3 (ref 7–25)
CALCIUM SPEC-SCNC: 9.5 MG/DL (ref 8.8–10.5)
CHLORIDE SERPL-SCNC: 106 MMOL/L (ref 98–107)
CO2 SERPL-SCNC: 26.6 MMOL/L (ref 22–29)
CREAT BLD-MCNC: 1.14 MG/DL (ref 0.57–1)
GFR SERPL CREATININE-BSD FRML MDRD: 46 ML/MIN/1.73
GLUCOSE BLD-MCNC: 96 MG/DL (ref 65–99)
POTASSIUM BLD-SCNC: 4.8 MMOL/L (ref 3.5–5.2)
SODIUM BLD-SCNC: 144 MMOL/L (ref 136–145)

## 2017-09-06 PROCEDURE — 36415 COLL VENOUS BLD VENIPUNCTURE: CPT | Performed by: FAMILY MEDICINE

## 2017-09-06 PROCEDURE — 80048 BASIC METABOLIC PNL TOTAL CA: CPT | Performed by: FAMILY MEDICINE

## 2017-09-14 ENCOUNTER — OFFICE VISIT (OUTPATIENT)
Dept: PAIN MEDICINE | Facility: CLINIC | Age: 77
End: 2017-09-14

## 2017-09-14 VITALS
TEMPERATURE: 97.8 F | DIASTOLIC BLOOD PRESSURE: 103 MMHG | SYSTOLIC BLOOD PRESSURE: 154 MMHG | HEIGHT: 65 IN | RESPIRATION RATE: 20 BRPM | HEART RATE: 106 BPM | OXYGEN SATURATION: 94 %

## 2017-09-14 DIAGNOSIS — M51.36 DEGENERATION OF INTERVERTEBRAL DISC OF LUMBAR REGION: ICD-10-CM

## 2017-09-14 DIAGNOSIS — R52 NONVERBAL SIGNS OF PAIN: ICD-10-CM

## 2017-09-14 DIAGNOSIS — M70.72 BURSITIS OF LEFT HIP: ICD-10-CM

## 2017-09-14 DIAGNOSIS — G89.29 OTHER CHRONIC PAIN: Primary | ICD-10-CM

## 2017-09-14 DIAGNOSIS — M41.9 SCOLIOSIS, UNSPECIFIED SCOLIOSIS TYPE, UNSPECIFIED SPINAL REGION: ICD-10-CM

## 2017-09-14 PROCEDURE — 99214 OFFICE O/P EST MOD 30 MIN: CPT | Performed by: NURSE PRACTITIONER

## 2017-09-14 RX ORDER — TRAMADOL HYDROCHLORIDE 50 MG/1
100 TABLET ORAL EVERY 8 HOURS
Qty: 180 TABLET | Refills: 1 | Status: SHIPPED | OUTPATIENT
Start: 2017-09-14 | End: 2017-10-26 | Stop reason: SDUPTHER

## 2017-09-14 NOTE — PROGRESS NOTES
CHIEF COMPLAINT   Follow-up for hip pain.    Subjective   Erica Crockett is a 76 y.o. female  who presents to the office for follow-up.She has a history of chronic pain. She has a history of scoliosis and back pain, as well as hip pain.  She previously had a left hip bursa injection performed by Dr Agosto on 3-21-17. The staff at [facility] had noticed significant improvement with this. She is now starting to show more signs of pain and the nursing staff are wondering if she can have another shot. They are noticing symptoms of discomfort at around 12 noon.  Caregiver notices she favors left hip and will not walk as far as usual. Previously had to obtain consent from [facility] for patient to receive injection.    Continue with Tramadol 50 mg 2 q8hrs with Tylenol. Cannot tolerate NSAIDS due to CRI. No noted side effects from the regimen. Continues with current medication regimen. No obvious side effects noted by caregivers. Unable to perform ADLs.    Presented with caregiver. Able to help with current history.     In terms of the left hip burisits, this is a chronic problem with an acute exacerbation. It has worsened since last office visit. ACcording to caregiver, her pain appears to be worse at around 12 noon.  Caregiver also notes it impacts her activity and walking.  When it is bothering her, the patient will sit down.  History of Present Illness     The following portions of the patient's history were reviewed and updated as appropriate: allergies, current medications, past family history, past medical history, past social history, past surgical history and problem list.    Review of Systems   Constitutional: Negative for fever.   HENT: Negative for congestion.    Eyes: Negative for redness.   Respiratory: Negative for cough.    Cardiovascular: Negative for leg swelling.   Genitourinary:        Incontinent   Musculoskeletal: Positive for arthralgias (hip pain).   Psychiatric/Behavioral: Positive for sleep disturbance.  "      Vitals:    09/14/17 0953   BP: (!) 154/103   Pulse: 106   Resp: 20   Temp: 97.8 °F (36.6 °C)   TempSrc: Axillary   SpO2: 94%   Height: 65\" (165.1 cm)     Objective   Physical Exam   Constitutional: She appears well-developed and well-nourished.   HENT:   Head: Normocephalic and atraumatic.   Nose: Nose normal.   Eyes: Conjunctivae are normal.   Cardiovascular: Normal rate, regular rhythm and normal heart sounds.    Pulmonary/Chest: Effort normal and breath sounds normal. No respiratory distress.   Musculoskeletal:        Lumbar back: She exhibits tenderness.   Slight left posturing while sitting in wheelchair noted.    Moderate tenderness to palpation of left greater trochanteric bursa   Neurological: She is alert. Gait (in wheelchair) abnormal.   Psychiatric: She has a normal mood and affect. She is agitated. She is noncommunicative.   Mood/affect normal for patient. Patient is able to follow simple commands.    Moderately agitated in office today       Assessment/Plan   Erica was seen today for hip pain.    Diagnoses and all orders for this visit:    Other chronic pain  -     Oral Fluid Drug Screen; Future    Nonverbal signs of pain  -     Oral Fluid Drug Screen; Future    Bursitis of left hip  -     Case Request  -     Oral Fluid Drug Screen; Future    Degeneration of intervertebral disc of lumbar region  -     Oral Fluid Drug Screen; Future    Scoliosis, unspecified scoliosis type, unspecified spinal region  -     Oral Fluid Drug Screen; Future    Other orders  -     traMADol (ULTRAM) 50 MG tablet; Take 2 tablets by mouth Every 8 (Eight) Hours.      --- Routine oral drug screen in office today as part of monitoring requirements for controlled substances.   This specimen will be sent to [facility] for confirmation.     --- Refill Tramadol. Patient appears stable with current regimen. No adverse effects. Regarding continuation of opioids, there is no evidence of aberrant behavior or any red " flags.  The patient continues with appropriate response to opioid therapy.   --- Discussed with care-giver and after reviewing nurse's notes. Plan will be to repeat left hip bursa injection as previously performed. Will need to obtain consent from POA.  Case request placed. Unable to have patient consent. Will require sedation, as previously performed.  --- Discussed if no improvement after injection, will re-evaluate medication regimen. Patient caregiver verbalized understanding.  --- Follow-up 3 months or sooner if needed.       JOSE REPORT    As part of the patient's treatment plan, I am prescribing controlled substances. The patient has been made aware of appropriate use of such medications, including potential risk of somnolence, limited ability to drive and/or work safely, and the potential for dependence or overdose. It has also bee made clear that these medications are for use by this patient only, without concomitant use of alcohol or other substances unless prescribed.     Patient has completed prescribing agreement detailing terms of continued prescribing of controlled substances, including monitoring JOSE reports, urine drug screening, and pill counts if necessary. The patient is aware that inappropriate use will results in cessation of prescribing such medications.    JOSE report has been reviewed and scanned into the patient's chart.    Date of last JOSE : 9-12-17    History and physical exam exhibit continued safe and appropriate use of controlled substances.      EMR Dragon/Transcription disclaimer:   Much of this encounter note is an electronic transcription/translation of spoken language to printed text. The electronic translation of spoken language may permit erroneous, or at times, nonsensical words or phrases to be inadvertently transcribed; Although I have reviewed the note for such errors, some may still exist.

## 2017-09-26 RX ORDER — DIAZEPAM 20 MG/4ML
20 GEL RECTAL AS NEEDED
Qty: 20 MG | Refills: 3 | Status: SHIPPED | OUTPATIENT
Start: 2017-09-26 | End: 2017-10-26 | Stop reason: SDUPTHER

## 2017-10-11 ENCOUNTER — LAB REQUISITION (OUTPATIENT)
Dept: LAB | Facility: HOSPITAL | Age: 77
End: 2017-10-11

## 2017-10-11 DIAGNOSIS — D72.819 DECREASED WHITE BLOOD CELL COUNT: ICD-10-CM

## 2017-10-11 DIAGNOSIS — E03.9 HYPOTHYROIDISM: ICD-10-CM

## 2017-10-11 DIAGNOSIS — E55.9 VITAMIN D DEFICIENCY: ICD-10-CM

## 2017-10-11 DIAGNOSIS — R79.89 OTHER SPECIFIED ABNORMAL FINDINGS OF BLOOD CHEMISTRY: ICD-10-CM

## 2017-10-11 DIAGNOSIS — Z79.899 OTHER LONG TERM (CURRENT) DRUG THERAPY: ICD-10-CM

## 2017-10-11 DIAGNOSIS — R73.09 OTHER ABNORMAL GLUCOSE: ICD-10-CM

## 2017-10-11 DIAGNOSIS — R56.9 CONVULSIONS (HCC): ICD-10-CM

## 2017-10-11 LAB
25(OH)D3 SERPL-MCNC: 52 NG/ML
ALBUMIN SERPL-MCNC: 3.6 G/DL (ref 3.5–5.2)
ALBUMIN/GLOB SERPL: 1 G/DL
ALP SERPL-CCNC: 82 U/L (ref 40–129)
ALT SERPL W P-5'-P-CCNC: 13 U/L (ref 5–33)
ANION GAP SERPL CALCULATED.3IONS-SCNC: 10 MMOL/L
AST SERPL-CCNC: 19 U/L (ref 5–32)
BASOPHILS # BLD AUTO: 0.03 10*3/MM3 (ref 0–0.2)
BASOPHILS NFR BLD AUTO: 0.5 % (ref 0–2)
BILIRUB SERPL-MCNC: 0.2 MG/DL (ref 0.2–1.2)
BUN BLD-MCNC: 34 MG/DL (ref 8–23)
BUN/CREAT SERPL: 30.1 (ref 7–25)
CALCIUM SPEC-SCNC: 9.7 MG/DL (ref 8.8–10.5)
CHLORIDE SERPL-SCNC: 103 MMOL/L (ref 98–107)
CHOLEST SERPL-MCNC: 205 MG/DL (ref 0–200)
CO2 SERPL-SCNC: 28 MMOL/L (ref 22–29)
CREAT BLD-MCNC: 1.13 MG/DL (ref 0.57–1)
DEPRECATED RDW RBC AUTO: 42.4 FL (ref 37–54)
EOSINOPHIL # BLD AUTO: 0.27 10*3/MM3 (ref 0.1–0.3)
EOSINOPHIL NFR BLD AUTO: 4.5 % (ref 0–4)
ERYTHROCYTE [DISTWIDTH] IN BLOOD BY AUTOMATED COUNT: 12.1 % (ref 11.5–14.5)
GFR SERPL CREATININE-BSD FRML MDRD: 47 ML/MIN/1.73
GLOBULIN UR ELPH-MCNC: 3.5 GM/DL
GLUCOSE BLD-MCNC: 90 MG/DL (ref 65–99)
HBA1C MFR BLD: 5.5 % (ref 4.8–5.6)
HCT VFR BLD AUTO: 37.7 % (ref 37–47)
HDLC SERPL-MCNC: 84 MG/DL (ref 40–60)
HGB BLD-MCNC: 12.1 G/DL (ref 12–16)
IMM GRANULOCYTES # BLD: 0.01 10*3/MM3 (ref 0–0.03)
IMM GRANULOCYTES NFR BLD: 0.2 % (ref 0–0.5)
LDLC SERPL CALC-MCNC: 110 MG/DL (ref 0–100)
LDLC/HDLC SERPL: 1.31 {RATIO}
LYMPHOCYTES # BLD AUTO: 2.45 10*3/MM3 (ref 0.6–4.8)
LYMPHOCYTES NFR BLD AUTO: 41.2 % (ref 20–45)
MCH RBC QN AUTO: 30.6 PG (ref 27–31)
MCHC RBC AUTO-ENTMCNC: 32.1 G/DL (ref 31–37)
MCV RBC AUTO: 95.2 FL (ref 81–99)
MONOCYTES # BLD AUTO: 0.45 10*3/MM3 (ref 0–1)
MONOCYTES NFR BLD AUTO: 7.6 % (ref 3–8)
NEUTROPHILS # BLD AUTO: 2.74 10*3/MM3 (ref 1.5–8.3)
NEUTROPHILS NFR BLD AUTO: 46 % (ref 45–70)
NRBC BLD MANUAL-RTO: 0 /100 WBC (ref 0–0)
PLATELET # BLD AUTO: 310 10*3/MM3 (ref 140–500)
PMV BLD AUTO: 10.2 FL (ref 7.4–10.4)
POTASSIUM BLD-SCNC: 5 MMOL/L (ref 3.5–5.2)
PROT SERPL-MCNC: 7.1 G/DL (ref 6–8.5)
RBC # BLD AUTO: 3.96 10*6/MM3 (ref 4.2–5.4)
SODIUM BLD-SCNC: 141 MMOL/L (ref 136–145)
T4 FREE SERPL-MCNC: 1.01 NG/DL (ref 0.93–1.7)
TRIGL SERPL-MCNC: 56 MG/DL (ref 0–150)
TSH SERPL DL<=0.05 MIU/L-ACNC: 3.16 MIU/ML (ref 0.27–4.2)
VLDLC SERPL-MCNC: 11.2 MG/DL (ref 7–27)
WBC NRBC COR # BLD: 5.95 10*3/MM3 (ref 4.8–10.8)

## 2017-10-11 PROCEDURE — 80061 LIPID PANEL: CPT | Performed by: FAMILY MEDICINE

## 2017-10-11 PROCEDURE — 80053 COMPREHEN METABOLIC PANEL: CPT | Performed by: FAMILY MEDICINE

## 2017-10-11 PROCEDURE — 82306 VITAMIN D 25 HYDROXY: CPT | Performed by: FAMILY MEDICINE

## 2017-10-11 PROCEDURE — 84439 ASSAY OF FREE THYROXINE: CPT | Performed by: FAMILY MEDICINE

## 2017-10-11 PROCEDURE — 36415 COLL VENOUS BLD VENIPUNCTURE: CPT | Performed by: FAMILY MEDICINE

## 2017-10-11 PROCEDURE — 83036 HEMOGLOBIN GLYCOSYLATED A1C: CPT | Performed by: FAMILY MEDICINE

## 2017-10-11 PROCEDURE — 80175 DRUG SCREEN QUAN LAMOTRIGINE: CPT | Performed by: FAMILY MEDICINE

## 2017-10-11 PROCEDURE — 80177 DRUG SCRN QUAN LEVETIRACETAM: CPT | Performed by: FAMILY MEDICINE

## 2017-10-11 PROCEDURE — 84443 ASSAY THYROID STIM HORMONE: CPT | Performed by: FAMILY MEDICINE

## 2017-10-11 PROCEDURE — 85025 COMPLETE CBC W/AUTO DIFF WBC: CPT | Performed by: FAMILY MEDICINE

## 2017-10-12 ENCOUNTER — DOCUMENTATION (OUTPATIENT)
Dept: PAIN MEDICINE | Facility: CLINIC | Age: 77
End: 2017-10-12

## 2017-10-12 ENCOUNTER — OUTSIDE FACILITY SERVICE (OUTPATIENT)
Dept: PAIN MEDICINE | Facility: CLINIC | Age: 77
End: 2017-10-12

## 2017-10-12 PROCEDURE — 20610 DRAIN/INJ JOINT/BURSA W/O US: CPT | Performed by: ANESTHESIOLOGY

## 2017-10-12 PROCEDURE — 77002 NEEDLE LOCALIZATION BY XRAY: CPT | Performed by: ANESTHESIOLOGY

## 2017-10-14 LAB
LAMOTRIGINE SERPL-MCNC: 5.3 UG/ML (ref 2–20)
LEVETIRACETAM SERPL-MCNC: 34.5 UG/ML (ref 10–40)

## 2017-10-16 NOTE — PROGRESS NOTES
Left greater trochanteric bursa injection with sedation and fluoro guidance    [facility]      PREOPERATIVE DIAGNOSIS:  Left hip GT bursitis.  Nonverbal signs of pain    POSTOPERATIVE DIAGNOSIS:  Same as preop diagnosis    PROCEDURE:   Left greater trochanteric bursa injection with fluroscopic guidance      PRE-PROCEDURE DISCUSSION WITH PATIENT:    Risks and complications were discussed with the patient prior to starting the procedure and informed consent was obtained.  We discussed various topics including but not limited to bleeding, infection, injury, nerve injury, paralysis, coma, death, postprocedural painful flare-up, postprocedural site soreness, and a lack of pain relief.        SURGEON:  Benedict Agosto MD    REASON FOR PROCEDURE:    Previous therapeutic benefit was realised... She had less outward behaviors / acting out from pain, and this was sustained for months.    SEDATION:  Versed 4mg & Fentanyl 100 mcg IV  ANESTHETIC:  Marcaine 0.5%  STEROID:  Methylprednisolone (DEPO MEDROL) 40mg/ml    DESCRIPTON OF PROCEDURE:  After obtaining informed consent, an I.V. was started in the preoperative area. The patient taken to the operating room and was placed in the supine  position.  All pressure points were well padded.  EKG, blood pressure, and pulse oximeter were monitored.  The appropriate area was prepped with Chloraprep and draped in a sterile fashion.     Area over the left greater trochanter was palpated and marked on the skin and then cleansed with Hibiclens ×2. A 25-gauge needle was inserted about 1 & 1/2 inches in to the skin and into the bursa, with periosteum contact over the greater trochanter and the needle was withdrawn about 2 mm into the bursa. Aspiration was negative, and then 1ml of 1:4 diluted omnipaque was injected and spread was in the distribution of the bursa, and slowly the contents of the injectate syringe were injected into the hip bursa.      The needle was  removed intact and bleeding was minimal. The insertion site was dressed with a Band-Aid.  There were no apparent complications.     Vital signs stable throughout    ESTIMATED BLOOD LOSS:  <5 mL  SPECIMENS:  none    COMPLICATIONS:   No complications were noted., There was no indication of vascular uptake on live injection of contrast dye. and of note, she tolerated small amt of contrast on prior injection with no difficulty. We are avoiding large doses of IV injection.    TOLERANCE & DISCHARGE CONDITION:    The patient tolerated the procedure well.  The patient was transported to the recovery area without difficulties.  The patient was discharged to home under the care of family in stable and satisfactory condition.    PLAN OF CARE:  1. The patient was given our standard instruction sheet.  2. The patient will Return to clinic in 8 wks.  3. The patient will resume all medications as per the medication reconciliation sheet.

## 2017-10-26 RX ORDER — TRAMADOL HYDROCHLORIDE 50 MG/1
100 TABLET ORAL EVERY 8 HOURS
Qty: 180 TABLET | Refills: 1 | Status: SHIPPED | OUTPATIENT
Start: 2017-10-26 | End: 2018-01-11 | Stop reason: SDUPTHER

## 2017-10-26 RX ORDER — DIAZEPAM 20 MG/4ML
20 GEL RECTAL AS NEEDED
Qty: 20 MG | Refills: 5 | Status: SHIPPED | OUTPATIENT
Start: 2017-10-26 | End: 2018-02-28

## 2017-10-26 NOTE — TELEPHONE ENCOUNTER
Medication Refill Request    Date of phone call: 10/26/17    Medication being requested: Tramadol HCL 50 mg si tablets q8hrs  Qty: 180    Date of last visit: 17    Date of last refill: 17    JOSE up to date?: 17    Next Follow up?: 17    Any new pertinent information? (i.e, new medication allergies, new use of medications, change in patient's health or condition, non-compliance or inconsistency with prescribing agreement?): n/a

## 2017-11-08 ENCOUNTER — LAB REQUISITION (OUTPATIENT)
Dept: LAB | Facility: HOSPITAL | Age: 77
End: 2017-11-08

## 2017-11-08 DIAGNOSIS — R94.5 ABNORMAL RESULTS OF LIVER FUNCTION STUDIES: ICD-10-CM

## 2017-11-08 DIAGNOSIS — R79.89 OTHER SPECIFIED ABNORMAL FINDINGS OF BLOOD CHEMISTRY: ICD-10-CM

## 2017-11-08 DIAGNOSIS — Z79.899 OTHER LONG TERM (CURRENT) DRUG THERAPY: ICD-10-CM

## 2017-11-08 LAB
ANION GAP SERPL CALCULATED.3IONS-SCNC: 6.7 MMOL/L
BUN BLD-MCNC: 32 MG/DL (ref 8–23)
BUN/CREAT SERPL: 28.6 (ref 7–25)
CALCIUM SPEC-SCNC: 9.5 MG/DL (ref 8.8–10.5)
CHLORIDE SERPL-SCNC: 102 MMOL/L (ref 98–107)
CO2 SERPL-SCNC: 31.3 MMOL/L (ref 22–29)
CREAT BLD-MCNC: 1.12 MG/DL (ref 0.57–1)
GFR SERPL CREATININE-BSD FRML MDRD: 47 ML/MIN/1.73
GLUCOSE BLD-MCNC: 94 MG/DL (ref 65–99)
POTASSIUM BLD-SCNC: 4.7 MMOL/L (ref 3.5–5.2)
SODIUM BLD-SCNC: 140 MMOL/L (ref 136–145)

## 2017-11-08 PROCEDURE — 80048 BASIC METABOLIC PNL TOTAL CA: CPT | Performed by: FAMILY MEDICINE

## 2017-11-08 PROCEDURE — 36415 COLL VENOUS BLD VENIPUNCTURE: CPT | Performed by: FAMILY MEDICINE

## 2017-11-14 ENCOUNTER — TRANSCRIBE ORDERS (OUTPATIENT)
Dept: ADMINISTRATIVE | Facility: HOSPITAL | Age: 77
End: 2017-11-14

## 2017-11-14 DIAGNOSIS — M89.9 BONE DISEASE: Primary | ICD-10-CM

## 2017-12-06 ENCOUNTER — LAB REQUISITION (OUTPATIENT)
Dept: LAB | Facility: HOSPITAL | Age: 77
End: 2017-12-06

## 2017-12-06 DIAGNOSIS — N18.9 CHRONIC KIDNEY DISEASE: ICD-10-CM

## 2017-12-06 DIAGNOSIS — Z79.899 OTHER LONG TERM (CURRENT) DRUG THERAPY: ICD-10-CM

## 2017-12-06 DIAGNOSIS — R79.89 OTHER SPECIFIED ABNORMAL FINDINGS OF BLOOD CHEMISTRY: ICD-10-CM

## 2017-12-06 LAB
ANION GAP SERPL CALCULATED.3IONS-SCNC: 11.5 MMOL/L
BUN BLD-MCNC: 32 MG/DL (ref 8–23)
BUN/CREAT SERPL: 28.6 (ref 7–25)
CALCIUM SPEC-SCNC: 9.5 MG/DL (ref 8.8–10.5)
CHLORIDE SERPL-SCNC: 103 MMOL/L (ref 98–107)
CO2 SERPL-SCNC: 28.5 MMOL/L (ref 22–29)
CREAT BLD-MCNC: 1.12 MG/DL (ref 0.57–1)
GFR SERPL CREATININE-BSD FRML MDRD: 47 ML/MIN/1.73
GLUCOSE BLD-MCNC: 91 MG/DL (ref 65–99)
POTASSIUM BLD-SCNC: 5.3 MMOL/L (ref 3.5–5.2)
SODIUM BLD-SCNC: 143 MMOL/L (ref 136–145)

## 2017-12-06 PROCEDURE — 80048 BASIC METABOLIC PNL TOTAL CA: CPT | Performed by: FAMILY MEDICINE

## 2017-12-06 PROCEDURE — 36415 COLL VENOUS BLD VENIPUNCTURE: CPT | Performed by: FAMILY MEDICINE

## 2017-12-12 ENCOUNTER — OFFICE VISIT (OUTPATIENT)
Dept: PAIN MEDICINE | Facility: CLINIC | Age: 77
End: 2017-12-12

## 2017-12-12 VITALS
HEIGHT: 65 IN | OXYGEN SATURATION: 97 % | RESPIRATION RATE: 14 BRPM | WEIGHT: 168.1 LBS | SYSTOLIC BLOOD PRESSURE: 144 MMHG | DIASTOLIC BLOOD PRESSURE: 83 MMHG | HEART RATE: 68 BPM | BODY MASS INDEX: 28.01 KG/M2 | TEMPERATURE: 95.8 F

## 2017-12-12 DIAGNOSIS — G89.29 OTHER CHRONIC PAIN: Primary | ICD-10-CM

## 2017-12-12 DIAGNOSIS — M70.72 BURSITIS OF LEFT HIP, UNSPECIFIED BURSA: ICD-10-CM

## 2017-12-12 DIAGNOSIS — R52 NONVERBAL SIGNS OF PAIN: ICD-10-CM

## 2017-12-12 DIAGNOSIS — M41.9 SCOLIOSIS, UNSPECIFIED SCOLIOSIS TYPE, UNSPECIFIED SPINAL REGION: ICD-10-CM

## 2017-12-12 PROCEDURE — 99213 OFFICE O/P EST LOW 20 MIN: CPT | Performed by: NURSE PRACTITIONER

## 2017-12-12 RX ORDER — ATORVASTATIN CALCIUM 10 MG/1
10 TABLET, FILM COATED ORAL DAILY
COMMUNITY
End: 2022-02-10 | Stop reason: ALTCHOICE

## 2017-12-12 NOTE — PROGRESS NOTES
"CHIEF COMPLAINT    F/U hip pain. Pt is from [facility] and here with caregiver. She states the nurse has told her the pt is having more breakthrough pain, and since the tylenol is PRN some nurses have not been giving it. They are asking the tylenol to be scheduled and not PRN.     Subjective   Erica Crockett is a 77 y.o. female  who presents to the office for follow-up.She has a history of chronic pain. Had a LEFT greater trochanteric bursa injection with Dr. Agosto on 10-12-17. The staff did notice that it seemed to improve her pain.    Caregiver and nurse are reporting that patient is having more \"break through pain.\" Seems more uncomfortable at [facility]. Unable to give pain score. Is scheduled for Tramadol 50 mg q8hrs with Tylenol 650 mg q8hrs. Also has PRN dosing of 650 mg NTE-3000mg/day. Wondering about increasing Tylenol. Only one nurse is really giving the PRN dosing.  NO obvious side effects from the regimen. Caregiver reports they can tell her pain is less after taking medication because she is less agitated.     Presents with caregiver who helps with history.      History of Present Illness     The following portions of the patient's history were reviewed and updated as appropriate: allergies, current medications, past family history, past medical history, past social history, past surgical history and problem list.    Review of Systems   Constitutional: Negative for fever.   HENT: Negative for congestion.    Eyes: Negative for redness.   Respiratory: Negative for cough.    Cardiovascular: Negative for chest pain and leg swelling.   Gastrointestinal: Negative for constipation, nausea and vomiting.   Genitourinary: Difficulty urinating: pt is incontinent.        Incontinent   Musculoskeletal: Positive for arthralgias (hip pain).   Neurological: Negative for dizziness.   Psychiatric/Behavioral: Positive for agitation and sleep disturbance.       Vitals:    12/12/17 1008   BP: 144/83   Pulse: 68 " "  Resp: 14   Temp: 95.8 °F (35.4 °C)   SpO2: 97%   Weight: 76.2 kg (168 lb 1.6 oz)   Height: 165.1 cm (65\")   PainLoc: Comment: pt nonverbal [place], appears agitated     Objective   Physical Exam   Constitutional: She appears well-developed and well-nourished.   HENT:   Head: Normocephalic and atraumatic.   Nose: Nose normal.   Eyes: Conjunctivae are normal.   Cardiovascular: Normal rate.    Pulmonary/Chest: Effort normal. No respiratory distress.   Musculoskeletal:        Lumbar back: She exhibits tenderness.   Slight left posturing while sitting in wheelchair noted.    MILD tenderness to palpation of left greater trochanteric bursa   Neurological: She is alert. Gait (in wheelchair) abnormal.   Psychiatric: She has a normal mood and affect. She is agitated. She is noncommunicative.   Mood/affect normal for patient. Patient is able to follow simple commands.    Moderately agitated in office today       Assessment/Plan   Erica was seen today for pain.    Diagnoses and all orders for this visit:    Other chronic pain    Scoliosis, unspecified scoliosis type, unspecified spinal region    Bursitis of left hip, unspecified bursa    Nonverbal signs of pain      --- The urine drug screen confirmation from 9-14-17 has been reviewed and the result is appropriate based on patient history and JOSE report  --- Continue with tramadol as previously prescribed. NO changes at this time.   --- Change Tylenol to 650mg q6hrs. Orders noted.  --- Follow-up 3 months or sooner if needed.       JOSE REPORT    As part of the patient's treatment plan, I am prescribing controlled substances. The patient has been made aware of appropriate use of such medications, including potential risk of somnolence, limited ability to drive and/or work safely, and the potential for dependence or overdose. It has also bee made clear that these medications are for use by this patient only, without concomitant use of alcohol or other " substances unless prescribed.     Patient has completed prescribing agreement detailing terms of continued prescribing of controlled substances, including monitoring JOSE reports, urine drug screening, and pill counts if necessary. The patient is aware that inappropriate use will results in cessation of prescribing such medications.    JOSE report has been reviewed and scanned into the patient's chart.    Date of last JOSE : 12-11-17    History and physical exam exhibit continued safe and appropriate use of controlled substances.      EMR Dragon/Transcription disclaimer:   Much of this encounter note is an electronic transcription/translation of spoken language to printed text. The electronic translation of spoken language may permit erroneous, or at times, nonsensical words or phrases to be inadvertently transcribed; Although I have reviewed the note for such errors, some may still exist.

## 2018-01-03 RX ORDER — GABAPENTIN 100 MG/1
100 CAPSULE ORAL NIGHTLY
Qty: 30 CAPSULE | Refills: 2 | OUTPATIENT
Start: 2018-01-03 | End: 2018-04-04 | Stop reason: SDUPTHER

## 2018-01-03 NOTE — TELEPHONE ENCOUNTER
Medication Refill Request    Date of phone call: 1/3/18    Medication being requested: Gabapentin 100 mg si at night  Qty: 30    Date of last visit: 17    Date of last refill: 12/3/17    JOSE up to date?: 17    Next Follow up?: 3/8/18    Any new pertinent information? (i.e, new medication allergies, new use of medications, change in patient's health or condition, non-compliance or inconsistency with prescribing agreement?): Per [place] patient doesn't have any medication for tonight at bedtime and wants this called in ASAP

## 2018-01-11 RX ORDER — TRAMADOL HYDROCHLORIDE 50 MG/1
100 TABLET ORAL EVERY 8 HOURS
Qty: 180 TABLET | Refills: 0 | OUTPATIENT
Start: 2018-01-11 | End: 2018-01-11 | Stop reason: SDUPTHER

## 2018-01-11 RX ORDER — TRAMADOL HYDROCHLORIDE 50 MG/1
100 TABLET ORAL EVERY 8 HOURS
Qty: 180 TABLET | Refills: 1 | OUTPATIENT
Start: 2018-01-11 | End: 2018-02-21 | Stop reason: SDUPTHER

## 2018-01-24 ENCOUNTER — LAB REQUISITION (OUTPATIENT)
Dept: LAB | Facility: HOSPITAL | Age: 78
End: 2018-01-24

## 2018-01-24 DIAGNOSIS — Z79.899 OTHER LONG TERM (CURRENT) DRUG THERAPY: ICD-10-CM

## 2018-01-24 DIAGNOSIS — N18.9 CHRONIC KIDNEY DISEASE: ICD-10-CM

## 2018-01-24 DIAGNOSIS — R79.89 OTHER SPECIFIED ABNORMAL FINDINGS OF BLOOD CHEMISTRY: ICD-10-CM

## 2018-01-24 LAB
ALBUMIN SERPL-MCNC: 3.8 G/DL (ref 3.5–5.2)
ALBUMIN/GLOB SERPL: 1.3 G/DL
ALP SERPL-CCNC: 81 U/L (ref 40–129)
ALT SERPL W P-5'-P-CCNC: 16 U/L (ref 5–33)
ANION GAP SERPL CALCULATED.3IONS-SCNC: 9.5 MMOL/L
AST SERPL-CCNC: 20 U/L (ref 5–32)
BILIRUB SERPL-MCNC: <0.2 MG/DL (ref 0.2–1.2)
BUN BLD-MCNC: 35 MG/DL (ref 8–23)
BUN/CREAT SERPL: 29.2 (ref 7–25)
CALCIUM SPEC-SCNC: 9.5 MG/DL (ref 8.8–10.5)
CHLORIDE SERPL-SCNC: 107 MMOL/L (ref 98–107)
CHOLEST SERPL-MCNC: 202 MG/DL (ref 0–200)
CO2 SERPL-SCNC: 29.5 MMOL/L (ref 22–29)
CREAT BLD-MCNC: 1.2 MG/DL (ref 0.57–1)
GFR SERPL CREATININE-BSD FRML MDRD: 44 ML/MIN/1.73
GLOBULIN UR ELPH-MCNC: 3 GM/DL
GLUCOSE BLD-MCNC: 96 MG/DL (ref 65–99)
HDLC SERPL-MCNC: 74 MG/DL (ref 40–60)
LDLC SERPL CALC-MCNC: 116 MG/DL (ref 0–100)
LDLC/HDLC SERPL: 1.56 {RATIO}
POTASSIUM BLD-SCNC: 5 MMOL/L (ref 3.5–5.2)
PROT SERPL-MCNC: 6.8 G/DL (ref 6–8.5)
SODIUM BLD-SCNC: 146 MMOL/L (ref 136–145)
TRIGL SERPL-MCNC: 61 MG/DL (ref 0–150)
VLDLC SERPL-MCNC: 12.2 MG/DL (ref 7–27)

## 2018-01-24 PROCEDURE — 80053 COMPREHEN METABOLIC PANEL: CPT | Performed by: FAMILY MEDICINE

## 2018-01-24 PROCEDURE — 36415 COLL VENOUS BLD VENIPUNCTURE: CPT | Performed by: FAMILY MEDICINE

## 2018-01-24 PROCEDURE — 80061 LIPID PANEL: CPT | Performed by: FAMILY MEDICINE

## 2018-02-14 ENCOUNTER — LAB REQUISITION (OUTPATIENT)
Dept: LAB | Facility: HOSPITAL | Age: 78
End: 2018-02-14

## 2018-02-14 DIAGNOSIS — R79.89 OTHER SPECIFIED ABNORMAL FINDINGS OF BLOOD CHEMISTRY: ICD-10-CM

## 2018-02-14 DIAGNOSIS — N18.9 CHRONIC KIDNEY DISEASE: ICD-10-CM

## 2018-02-14 DIAGNOSIS — Z79.899 OTHER LONG TERM (CURRENT) DRUG THERAPY: ICD-10-CM

## 2018-02-21 ENCOUNTER — LAB REQUISITION (OUTPATIENT)
Dept: LAB | Facility: HOSPITAL | Age: 78
End: 2018-02-21

## 2018-02-21 DIAGNOSIS — R79.89 OTHER SPECIFIED ABNORMAL FINDINGS OF BLOOD CHEMISTRY: ICD-10-CM

## 2018-02-21 DIAGNOSIS — N18.9 CHRONIC KIDNEY DISEASE: ICD-10-CM

## 2018-02-21 LAB
ANION GAP SERPL CALCULATED.3IONS-SCNC: 9.1 MMOL/L
BUN BLD-MCNC: 35 MG/DL (ref 8–23)
BUN/CREAT SERPL: 31.8 (ref 7–25)
CALCIUM SPEC-SCNC: 9.2 MG/DL (ref 8.8–10.5)
CHLORIDE SERPL-SCNC: 106 MMOL/L (ref 98–107)
CO2 SERPL-SCNC: 27.9 MMOL/L (ref 22–29)
CREAT BLD-MCNC: 1.1 MG/DL (ref 0.57–1)
GFR SERPL CREATININE-BSD FRML MDRD: 48 ML/MIN/1.73
GLUCOSE BLD-MCNC: 93 MG/DL (ref 65–99)
POTASSIUM BLD-SCNC: 4.9 MMOL/L (ref 3.5–5.2)
SODIUM BLD-SCNC: 143 MMOL/L (ref 136–145)

## 2018-02-21 PROCEDURE — 36415 COLL VENOUS BLD VENIPUNCTURE: CPT | Performed by: FAMILY MEDICINE

## 2018-02-21 PROCEDURE — 80048 BASIC METABOLIC PNL TOTAL CA: CPT | Performed by: FAMILY MEDICINE

## 2018-02-21 RX ORDER — TRAMADOL HYDROCHLORIDE 50 MG/1
100 TABLET ORAL EVERY 8 HOURS
Qty: 180 TABLET | Refills: 1 | OUTPATIENT
Start: 2018-02-21 | End: 2018-04-27 | Stop reason: SDUPTHER

## 2018-02-21 NOTE — TELEPHONE ENCOUNTER
Medication Refill Request    Date of phone call: 18    Medication being requested: Tramadol 50 mg si tablets q8hrs  Qty: 180    Date of last visit: 17    Date of last refill: 18    JOSE up to date?: 17    Next Follow up?: 3/8/18    Any new pertinent information? (i.e, new medication allergies, new use of medications, change in patient's health or condition, non-compliance or inconsistency with prescribing agreement?): [facility]

## 2018-02-28 ENCOUNTER — TELEPHONE (OUTPATIENT)
Dept: NEUROLOGY | Facility: CLINIC | Age: 78
End: 2018-02-28

## 2018-02-28 ENCOUNTER — HOSPITAL ENCOUNTER (EMERGENCY)
Facility: HOSPITAL | Age: 78
Discharge: HOME | End: 2018-02-28
Attending: EMERGENCY MEDICINE | Admitting: EMERGENCY MEDICINE

## 2018-02-28 ENCOUNTER — APPOINTMENT (OUTPATIENT)
Dept: CT IMAGING | Facility: HOSPITAL | Age: 78
End: 2018-02-28
Attending: EMERGENCY MEDICINE

## 2018-02-28 VITALS
WEIGHT: 167.25 LBS | HEART RATE: 53 BPM | TEMPERATURE: 97.3 F | BODY MASS INDEX: 26.88 KG/M2 | DIASTOLIC BLOOD PRESSURE: 49 MMHG | OXYGEN SATURATION: 97 % | RESPIRATION RATE: 16 BRPM | SYSTOLIC BLOOD PRESSURE: 103 MMHG | HEIGHT: 66 IN

## 2018-02-28 DIAGNOSIS — G40.909 SEIZURE DISORDER (HCC): ICD-10-CM

## 2018-02-28 DIAGNOSIS — N39.0 URINARY TRACT INFECTION IN FEMALE: Primary | ICD-10-CM

## 2018-02-28 DIAGNOSIS — N18.9 CHRONIC RENAL FAILURE, UNSPECIFIED CKD STAGE: ICD-10-CM

## 2018-02-28 LAB
ALBUMIN SERPL-MCNC: 3.9 G/DL (ref 3.5–5.2)
ALBUMIN/GLOB SERPL: 1.1 G/DL
ALP SERPL-CCNC: 85 U/L (ref 40–129)
ALT SERPL W P-5'-P-CCNC: 19 U/L (ref 5–33)
ANION GAP SERPL CALCULATED.3IONS-SCNC: 11.7 MMOL/L
AST SERPL-CCNC: 19 U/L (ref 5–32)
BACTERIA UR QL AUTO: ABNORMAL /HPF
BASOPHILS # BLD AUTO: 0.05 10*3/MM3 (ref 0–0.2)
BASOPHILS NFR BLD AUTO: 1 % (ref 0–2)
BILIRUB SERPL-MCNC: 0.2 MG/DL (ref 0.2–1.2)
BILIRUB UR QL STRIP: NEGATIVE
BUN BLD-MCNC: 29 MG/DL (ref 8–23)
BUN/CREAT SERPL: 25.4 (ref 7–25)
CALCIUM SPEC-SCNC: 9.4 MG/DL (ref 8.8–10.5)
CHLORIDE SERPL-SCNC: 101 MMOL/L (ref 98–107)
CLARITY UR: CLEAR
CO2 SERPL-SCNC: 27.3 MMOL/L (ref 22–29)
COLOR UR: YELLOW
CREAT BLD-MCNC: 1.14 MG/DL (ref 0.57–1)
DEPRECATED RDW RBC AUTO: 41.8 FL (ref 37–54)
EOSINOPHIL # BLD AUTO: 0.39 10*3/MM3 (ref 0.1–0.3)
EOSINOPHIL NFR BLD AUTO: 7.9 % (ref 0–4)
ERYTHROCYTE [DISTWIDTH] IN BLOOD BY AUTOMATED COUNT: 12 % (ref 11.5–14.5)
GFR SERPL CREATININE-BSD FRML MDRD: 46 ML/MIN/1.73
GLOBULIN UR ELPH-MCNC: 3.5 GM/DL
GLUCOSE BLD-MCNC: 106 MG/DL (ref 65–99)
GLUCOSE UR STRIP-MCNC: NEGATIVE MG/DL
HCT VFR BLD AUTO: 38.7 % (ref 37–47)
HGB BLD-MCNC: 12.5 G/DL (ref 12–16)
HGB UR QL STRIP.AUTO: NEGATIVE
HYALINE CASTS UR QL AUTO: ABNORMAL /LPF
IMM GRANULOCYTES # BLD: 0.01 10*3/MM3 (ref 0–0.03)
IMM GRANULOCYTES NFR BLD: 0.2 % (ref 0–0.5)
KETONES UR QL STRIP: NEGATIVE
LEUKOCYTE ESTERASE UR QL STRIP.AUTO: NEGATIVE
LYMPHOCYTES # BLD AUTO: 1.24 10*3/MM3 (ref 0.6–4.8)
LYMPHOCYTES NFR BLD AUTO: 25 % (ref 20–45)
MCH RBC QN AUTO: 30.3 PG (ref 27–31)
MCHC RBC AUTO-ENTMCNC: 32.3 G/DL (ref 31–37)
MCV RBC AUTO: 93.7 FL (ref 81–99)
MONOCYTES # BLD AUTO: 0.39 10*3/MM3 (ref 0–1)
MONOCYTES NFR BLD AUTO: 7.9 % (ref 3–8)
NEUTROPHILS # BLD AUTO: 2.88 10*3/MM3 (ref 1.5–8.3)
NEUTROPHILS NFR BLD AUTO: 58 % (ref 45–70)
NITRITE UR QL STRIP: POSITIVE
NRBC BLD MANUAL-RTO: 0 /100 WBC (ref 0–0)
PH UR STRIP.AUTO: 7 [PH] (ref 4.5–8)
PLATELET # BLD AUTO: 310 10*3/MM3 (ref 140–500)
PMV BLD AUTO: 10.8 FL (ref 7.4–10.4)
POTASSIUM BLD-SCNC: 4.4 MMOL/L (ref 3.5–5.2)
PROT SERPL-MCNC: 7.4 G/DL (ref 6–8.5)
PROT UR QL STRIP: NEGATIVE
RBC # BLD AUTO: 4.13 10*6/MM3 (ref 4.2–5.4)
RBC # UR: ABNORMAL /HPF
REF LAB TEST METHOD: ABNORMAL
SODIUM BLD-SCNC: 140 MMOL/L (ref 136–145)
SP GR UR STRIP: 1.01 (ref 1–1.03)
SQUAMOUS #/AREA URNS HPF: ABNORMAL /HPF
UROBILINOGEN UR QL STRIP: ABNORMAL
WBC NRBC COR # BLD: 4.96 10*3/MM3 (ref 4.8–10.8)
WBC UR QL AUTO: ABNORMAL /HPF

## 2018-02-28 PROCEDURE — 87086 URINE CULTURE/COLONY COUNT: CPT | Performed by: EMERGENCY MEDICINE

## 2018-02-28 PROCEDURE — 99285 EMERGENCY DEPT VISIT HI MDM: CPT

## 2018-02-28 PROCEDURE — 70450 CT HEAD/BRAIN W/O DYE: CPT

## 2018-02-28 PROCEDURE — 99284 EMERGENCY DEPT VISIT MOD MDM: CPT | Performed by: EMERGENCY MEDICINE

## 2018-02-28 PROCEDURE — 87186 SC STD MICRODIL/AGAR DIL: CPT | Performed by: EMERGENCY MEDICINE

## 2018-02-28 PROCEDURE — 80053 COMPREHEN METABOLIC PANEL: CPT | Performed by: EMERGENCY MEDICINE

## 2018-02-28 PROCEDURE — 85025 COMPLETE CBC W/AUTO DIFF WBC: CPT | Performed by: EMERGENCY MEDICINE

## 2018-02-28 PROCEDURE — 81001 URINALYSIS AUTO W/SCOPE: CPT | Performed by: EMERGENCY MEDICINE

## 2018-02-28 RX ORDER — LORAZEPAM 2 MG/ML
2 INJECTION INTRAMUSCULAR EVERY 6 HOURS PRN
COMMUNITY
End: 2018-03-12 | Stop reason: SDUPTHER

## 2018-02-28 RX ORDER — CEFUROXIME AXETIL 250 MG/1
500 TABLET ORAL ONCE
Status: COMPLETED | OUTPATIENT
Start: 2018-02-28 | End: 2018-02-28

## 2018-02-28 RX ORDER — LEVETIRACETAM 500 MG/1
750 TABLET ORAL EVERY 12 HOURS SCHEDULED
Qty: 90 TABLET | Refills: 0 | Status: SHIPPED | OUTPATIENT
Start: 2018-02-28 | End: 2018-03-30

## 2018-02-28 RX ORDER — DIAZEPAM 20 MG/4ML
20 GEL RECTAL AS NEEDED
Qty: 20 MG | Refills: 5 | Status: ON HOLD | OUTPATIENT
Start: 2018-02-28 | End: 2019-12-12 | Stop reason: SDUPTHER

## 2018-02-28 RX ORDER — PRENATAL VIT 91/IRON/FOLIC/DHA 28-975-200
COMBINATION PACKAGE (EA) ORAL 3 TIMES DAILY
COMMUNITY
End: 2019-03-08

## 2018-02-28 RX ORDER — LEVETIRACETAM 500 MG/1
500 TABLET ORAL ONCE
Status: COMPLETED | OUTPATIENT
Start: 2018-02-28 | End: 2018-02-28

## 2018-02-28 RX ORDER — SODIUM CHLORIDE 0.9 % (FLUSH) 0.9 %
10 SYRINGE (ML) INJECTION AS NEEDED
Status: DISCONTINUED | OUTPATIENT
Start: 2018-02-28 | End: 2018-02-28 | Stop reason: HOSPADM

## 2018-02-28 RX ORDER — CEFUROXIME AXETIL 500 MG/1
500 TABLET ORAL 2 TIMES DAILY
Qty: 10 TABLET | Refills: 0 | Status: SHIPPED | OUTPATIENT
Start: 2018-02-28 | End: 2018-03-05

## 2018-02-28 RX ORDER — UREA 40 %
1 CREAM (GRAM) TOPICAL
COMMUNITY
End: 2019-03-08

## 2018-02-28 RX ADMIN — CEFUROXIME AXETIL 500 MG: 250 TABLET ORAL at 10:57

## 2018-02-28 RX ADMIN — LEVETIRACETAM 500 MG: 500 TABLET ORAL at 10:56

## 2018-02-28 NOTE — TELEPHONE ENCOUNTER
No increase needed. The sz was due to UTI.  Continue same dose of keppra.   I sent in new rx for diastat, which they asked for last night.

## 2018-02-28 NOTE — TELEPHONE ENCOUNTER
----- Message from Kerlien Gonzalez sent at 2/28/2018  1:30 PM EST -----  Contact: [phone]  Patient has returned back to [place] from the hospital. They want to know if you want to increase patients Keppra at all.

## 2018-03-02 LAB
BACTERIA SPEC AEROBE CULT: ABNORMAL
BACTERIA SPEC AEROBE CULT: ABNORMAL

## 2018-03-07 ENCOUNTER — LAB REQUISITION (OUTPATIENT)
Dept: LAB | Facility: HOSPITAL | Age: 78
End: 2018-03-07

## 2018-03-07 DIAGNOSIS — N18.9 CHRONIC KIDNEY DISEASE: ICD-10-CM

## 2018-03-07 DIAGNOSIS — R79.89 OTHER SPECIFIED ABNORMAL FINDINGS OF BLOOD CHEMISTRY: ICD-10-CM

## 2018-03-07 LAB
ANION GAP SERPL CALCULATED.3IONS-SCNC: 10.2 MMOL/L
BUN BLD-MCNC: 35 MG/DL (ref 8–23)
BUN/CREAT SERPL: 28.9 (ref 7–25)
CALCIUM SPEC-SCNC: 10.2 MG/DL (ref 8.8–10.5)
CHLORIDE SERPL-SCNC: 103 MMOL/L (ref 98–107)
CO2 SERPL-SCNC: 28.8 MMOL/L (ref 22–29)
CREAT BLD-MCNC: 1.21 MG/DL (ref 0.57–1)
GFR SERPL CREATININE-BSD FRML MDRD: 43 ML/MIN/1.73
GLUCOSE BLD-MCNC: 96 MG/DL (ref 65–99)
POTASSIUM BLD-SCNC: 5.5 MMOL/L (ref 3.5–5.2)
SODIUM BLD-SCNC: 142 MMOL/L (ref 136–145)

## 2018-03-07 PROCEDURE — 80048 BASIC METABOLIC PNL TOTAL CA: CPT | Performed by: FAMILY MEDICINE

## 2018-03-07 PROCEDURE — 36415 COLL VENOUS BLD VENIPUNCTURE: CPT | Performed by: FAMILY MEDICINE

## 2018-03-08 ENCOUNTER — OFFICE VISIT (OUTPATIENT)
Dept: PAIN MEDICINE | Facility: CLINIC | Age: 78
End: 2018-03-08

## 2018-03-08 VITALS
OXYGEN SATURATION: 95 % | WEIGHT: 168 LBS | TEMPERATURE: 98.3 F | SYSTOLIC BLOOD PRESSURE: 125 MMHG | BODY MASS INDEX: 27.99 KG/M2 | RESPIRATION RATE: 18 BRPM | HEIGHT: 65 IN | DIASTOLIC BLOOD PRESSURE: 67 MMHG | HEART RATE: 60 BPM

## 2018-03-08 DIAGNOSIS — G89.29 OTHER CHRONIC PAIN: Primary | ICD-10-CM

## 2018-03-08 DIAGNOSIS — M70.72 BURSITIS OF LEFT HIP, UNSPECIFIED BURSA: ICD-10-CM

## 2018-03-08 DIAGNOSIS — M16.9 OSTEOARTHRITIS OF HIP, UNSPECIFIED LATERALITY, UNSPECIFIED OSTEOARTHRITIS TYPE: ICD-10-CM

## 2018-03-08 DIAGNOSIS — M41.9 SCOLIOSIS, UNSPECIFIED SCOLIOSIS TYPE, UNSPECIFIED SPINAL REGION: ICD-10-CM

## 2018-03-08 DIAGNOSIS — M51.36 DEGENERATION OF INTERVERTEBRAL DISC OF LUMBAR REGION: ICD-10-CM

## 2018-03-08 PROCEDURE — 99213 OFFICE O/P EST LOW 20 MIN: CPT | Performed by: NURSE PRACTITIONER

## 2018-03-08 NOTE — PROGRESS NOTES
CHIEF COMPLAINT  Hip pain is unchanged [facility] elast visit.    Subjective   Erica Crockett is a 77 y.o. female  who presents to the office for follow-up.She has a history of chronic hip pain. She remains a long-term resident of [facility]. Presents with caregiver who provides history. ALso has Nurse Report which reports no new symptoms related to pain.  Reports that patient appears stable.  No complaints of increased hip pain(signs of pain). Staff has not noticed her favoring her hip at this time. Is mobile and walks between rooms at [facility].  Continues with Tramadol 50 mg q8hrs and Tylenol 650 mg q6hrs. No obvious signs effects. Still able to participate in activity in [facility].    Her pain score is given as 5/10VAS.      Reviewed labs from [facility]--- CMP 12-6-17-- ALT-16, AST-20, Alk Phos-81,bilirubin <.2  History of Present Illness     PEG Assessment   What number best describes your pain on average in the past week?5  What number best describes how, during the past week, pain has interfered with your enjoyment of life?6  What number best describes how, during the past week, pain has interfered with your general activity?  6    The following portions of the patient's history were reviewed and updated as appropriate: allergies, current medications, past family history, past medical history, past social history, past surgical history and problem list.    Review of Systems   Constitutional: Negative for chills and fever.   Respiratory: Negative for shortness of breath.    Cardiovascular: Negative for chest pain.   Gastrointestinal: Negative for constipation, diarrhea, nausea and vomiting.   Genitourinary: Negative for difficulty urinating, dyspareunia and dysuria.   Musculoskeletal: Positive for back pain.   Neurological: Negative for dizziness, weakness, light-headedness, numbness and headaches.   Psychiatric/Behavioral: Negative for confusion, hallucinations, self-injury, sleep disturbance and suicidal ideas. The patient is not  "nervous/anxious.        Vitals:    03/08/18 1035   BP: 125/67   Pulse: 60   Resp: 18   Temp: 98.3 °F (36.8 °C)   SpO2: 95%   Weight: 76.2 kg (168 lb)   Height: 165.1 cm (65\")   PainSc:   5   PainLoc: Hip     Objective   Physical Exam   Constitutional: She appears well-developed and well-nourished.   HENT:   Head: Normocephalic and atraumatic.   Nose: Nose normal.   Eyes: Conjunctivae are normal.   Cardiovascular: Normal rate.    Pulmonary/Chest: Effort normal.   Musculoskeletal:        Lumbar back: She exhibits tenderness.   Slight left posturing while sitting in wheelchair noted.    MILD tenderness to palpation of left greater trochanteric bursa   Neurological: She is alert. Gait (in wheelchair) abnormal.   Psychiatric: She has a normal mood and affect. Her behavior is normal. She is noncommunicative.   Mood/affect normal for patient. Patient is able to follow simple commands.    No agitation in office today       Assessment/Plan   Erica was seen today for hip pain.    Diagnoses and all orders for this visit:    Other chronic pain    Degeneration of intervertebral disc of lumbar region    Bursitis of left hip, unspecified bursa    Osteoarthritis of hip, unspecified laterality, unspecified osteoarthritis type    Scoliosis, unspecified scoliosis type, unspecified spinal region    --- The urine drug screen confirmation from 9-14-17 has been reviewed and the result is APPROPRIATE based on patient history and JOSE report  --- Continue with regimen. No changes at this time. Patient appears stable with current regimen. No adverse effects. Regarding continuation of opioids, there is no evidence of aberrant behavior or any red flags.  The patient continues with appropriate response to opioid therapy.   --- Can repeat GTB injections in future PRN.  --- Follow-up 3 months or sooner if needed.       JOSE REPORT    As part of the patient's treatment plan, I am prescribing controlled substances. The patient has been made " aware of appropriate use of such medications, including potential risk of somnolence, limited ability to drive and/or work safely, and the potential for dependence or overdose. It has also bee made clear that these medications are for use by this patient only, without concomitant use of alcohol or other substances unless prescribed.     Patient has completed prescribing agreement detailing terms of continued prescribing of controlled substances, including monitoring JOSE reports, urine drug screening, and pill counts if necessary. The patient is aware that inappropriate use will results in cessation of prescribing such medications.    JOSE report has been reviewed and scanned into the patient's chart.    Date of last JOSE : 3-7-18    History and physical exam exhibit continued safe and appropriate use of controlled substances.      EMR Dragon/Transcription disclaimer:   Much of this encounter note is an electronic transcription/translation of spoken language to printed text. The electronic translation of spoken language may permit erroneous, or at times, nonsensical words or phrases to be inadvertently transcribed; Although I have reviewed the note for such errors, some may still exist.

## 2018-03-12 RX ORDER — LORAZEPAM 2 MG/ML
2 INJECTION INTRAMUSCULAR EVERY 6 HOURS PRN
Qty: 10 ML | Refills: 5 | Status: SHIPPED | OUTPATIENT
Start: 2018-03-12 | End: 2019-02-25 | Stop reason: SDUPTHER

## 2018-03-28 ENCOUNTER — LAB REQUISITION (OUTPATIENT)
Dept: LAB | Facility: HOSPITAL | Age: 78
End: 2018-03-28

## 2018-03-28 DIAGNOSIS — Z79.899 OTHER LONG TERM (CURRENT) DRUG THERAPY: ICD-10-CM

## 2018-03-28 DIAGNOSIS — R79.89 OTHER SPECIFIED ABNORMAL FINDINGS OF BLOOD CHEMISTRY: ICD-10-CM

## 2018-03-28 DIAGNOSIS — N18.9 CHRONIC KIDNEY DISEASE: ICD-10-CM

## 2018-03-28 LAB
ANION GAP SERPL CALCULATED.3IONS-SCNC: 7 MMOL/L
BUN BLD-MCNC: 32 MG/DL (ref 8–23)
BUN/CREAT SERPL: 25.2 (ref 7–25)
CALCIUM SPEC-SCNC: 9.4 MG/DL (ref 8.8–10.5)
CHLORIDE SERPL-SCNC: 106 MMOL/L (ref 98–107)
CO2 SERPL-SCNC: 30 MMOL/L (ref 22–29)
CREAT BLD-MCNC: 1.27 MG/DL (ref 0.57–1)
GFR SERPL CREATININE-BSD FRML MDRD: 41 ML/MIN/1.73
GLUCOSE BLD-MCNC: 95 MG/DL (ref 65–99)
POTASSIUM BLD-SCNC: 5.7 MMOL/L (ref 3.5–5.2)
SODIUM BLD-SCNC: 143 MMOL/L (ref 136–145)

## 2018-03-28 PROCEDURE — 36415 COLL VENOUS BLD VENIPUNCTURE: CPT | Performed by: FAMILY MEDICINE

## 2018-03-28 PROCEDURE — 80048 BASIC METABOLIC PNL TOTAL CA: CPT | Performed by: FAMILY MEDICINE

## 2018-03-29 NOTE — PROGRESS NOTES
Wt and VSS    [facility] performed a dental exam and cleaning 2-7-18    Lab Results   Component Value Date    GLUCOSE 95 03/28/2018    CALCIUM 9.4 03/28/2018     03/28/2018    K 5.7 (H) 03/28/2018    CO2 30.0 (H) 03/28/2018     03/28/2018    BUN 32 (H) 03/28/2018    CREATININE 1.27 (H) 03/28/2018    EGFRIFAFRI  09/21/2016      Comment:      <15 Indicative of kidney failure.    EGFRIFNONA 41 (L) 03/28/2018    BCR 25.2 (H) 03/28/2018    ANIONGAP 7.0 03/28/2018     Sees Dr EDMUND Farmer MD for nephrology care.  Labs to monitor renal functions monitored closely   Had BMP repeated 3-9-18 d/t elevated K+---Dr Farmer sent results.      Dr. Harris monitors Epilepsy, medications, and care closely.    Sent to ER [facility] 2-28-18     Narrative: The patient is a 77-year-old white female who presents from [facility] as noted above.  The caregiver states that the patient had 2 seizures yesterday and at least 2 seizures this morning.  Foul-smelling urine has been noted at the institution.  Reported history of seizures in the past that the patient's MAR is not show any antiepileptics.  Patient is non-verbal and cannot help with ROS or history of present illness     Case and findings discussed with the neurologist, Dr. Negrete, who suggested an extra 500 mg of Keppra now and increase the dose to 750 mg twice a day.  Urinary tract infection will also be treated. Jose Antonio Robb MD 02/28 1027   all findings discussed with the caregiver and specific instructions will be written for the nursing staff. Jose Antonio Robb MD 02/28 1043    Dr. Harris contacted and resumed prior dose Keppra.    Also ER Dx UTI and Rx Ceftin.  Here is culture:      Culture           >100,000 CFU/mL Klebsiella pneumoniae ssp pneumoniae        Resulting Agency: [facility]   Susceptibility      Klebsiella pneumoniae ssp pneumoniae     MONICA     Ampicillin >=32 ug/ml Resistant     Ampicillin + Sulbactam 8 ug/ml Susceptible     Cefazolin  "<=4 ug/ml\"><=4 ug/ml Susceptible     Cefepime <=1 ug/ml\"><=1 ug/ml Susceptible     Ceftriaxone <=1 ug/ml\"><=1 ug/ml Susceptible     Ciprofloxacin <=0.25 ug/ml\"><=0.25 ug/ml Susceptible     Ertapenem <=0.5 ug/ml\"><=0.5 ug/ml Susceptible     Gentamicin <=1 ug/ml\"><=1 ug/ml Susceptible     Levofloxacin <=0.12 ug/ml\"><=0.12 ug/ml Susceptible     Nitrofurantoin 32 ug/ml Susceptible     Piperacillin + Tazobactam 8 ug/ml Susceptible     Tetracycline <=1 ug/ml\"><=1 ug/ml Susceptible     Trimethoprim + Sulfamethoxazole                 This resolved with Ceftin.    Dr. Sanchez, psychiatrist, monitors behaviors and medications closely and reviewed orders 3-22-18    Working on sleep log per nursing measure d/t lethargy and daytime sleeping.    Sees LASHAE Rodriguez Q 3 mos for pain management.  Saw her 3-8-18.    Enjoys [facility] outings often and visits with guardian.  "

## 2018-04-02 ENCOUNTER — LAB REQUISITION (OUTPATIENT)
Dept: LAB | Facility: HOSPITAL | Age: 78
End: 2018-04-02

## 2018-04-02 DIAGNOSIS — E87.5 HYPERKALEMIA: ICD-10-CM

## 2018-04-02 LAB
ANION GAP SERPL CALCULATED.3IONS-SCNC: 10.8 MMOL/L
BUN BLD-MCNC: 30 MG/DL (ref 8–23)
BUN/CREAT SERPL: 28 (ref 7–25)
CALCIUM SPEC-SCNC: 10.3 MG/DL (ref 8.8–10.5)
CHLORIDE SERPL-SCNC: 104 MMOL/L (ref 98–107)
CO2 SERPL-SCNC: 30.2 MMOL/L (ref 22–29)
CREAT BLD-MCNC: 1.07 MG/DL (ref 0.57–1)
GFR SERPL CREATININE-BSD FRML MDRD: 50 ML/MIN/1.73
GLUCOSE BLD-MCNC: 89 MG/DL (ref 65–99)
POTASSIUM BLD-SCNC: 4.5 MMOL/L (ref 3.5–5.2)
SODIUM BLD-SCNC: 145 MMOL/L (ref 136–145)

## 2018-04-02 PROCEDURE — 80048 BASIC METABOLIC PNL TOTAL CA: CPT | Performed by: FAMILY MEDICINE

## 2018-04-04 RX ORDER — GABAPENTIN 100 MG/1
100 CAPSULE ORAL NIGHTLY
Qty: 30 CAPSULE | Refills: 5 | OUTPATIENT
Start: 2018-04-04 | End: 2018-10-01 | Stop reason: SDUPTHER

## 2018-04-10 ENCOUNTER — TRANSCRIBE ORDERS (OUTPATIENT)
Dept: FAMILY MEDICINE CLINIC | Facility: CLINIC | Age: 78
End: 2018-04-10

## 2018-04-10 DIAGNOSIS — M89.9 BONE DISEASE: ICD-10-CM

## 2018-04-10 DIAGNOSIS — M81.0 OSTEOPOROSIS, UNSPECIFIED OSTEOPOROSIS TYPE, UNSPECIFIED PATHOLOGICAL FRACTURE PRESENCE: Primary | ICD-10-CM

## 2018-04-11 ENCOUNTER — LAB REQUISITION (OUTPATIENT)
Dept: LAB | Facility: HOSPITAL | Age: 78
End: 2018-04-11

## 2018-04-11 DIAGNOSIS — R73.09 OTHER ABNORMAL GLUCOSE: ICD-10-CM

## 2018-04-11 DIAGNOSIS — R79.89 OTHER SPECIFIED ABNORMAL FINDINGS OF BLOOD CHEMISTRY: ICD-10-CM

## 2018-04-11 DIAGNOSIS — E55.9 VITAMIN D DEFICIENCY: ICD-10-CM

## 2018-04-11 DIAGNOSIS — D72.819 DECREASED WHITE BLOOD CELL COUNT: ICD-10-CM

## 2018-04-11 DIAGNOSIS — Z79.899 OTHER LONG TERM (CURRENT) DRUG THERAPY: ICD-10-CM

## 2018-04-11 DIAGNOSIS — R94.6 ABNORMAL RESULTS OF THYROID FUNCTION STUDIES: ICD-10-CM

## 2018-04-11 DIAGNOSIS — R56.9 CONVULSIONS (HCC): ICD-10-CM

## 2018-04-11 DIAGNOSIS — E03.9 HYPOTHYROIDISM: ICD-10-CM

## 2018-04-11 LAB
25(OH)D3 SERPL-MCNC: 57.6 NG/ML
ALBUMIN SERPL-MCNC: 3.8 G/DL (ref 3.5–5.2)
ALBUMIN/GLOB SERPL: 1 G/DL
ALP SERPL-CCNC: 78 U/L (ref 40–129)
ALT SERPL W P-5'-P-CCNC: 15 U/L (ref 5–33)
ANION GAP SERPL CALCULATED.3IONS-SCNC: 10.1 MMOL/L
AST SERPL-CCNC: 16 U/L (ref 5–32)
BASOPHILS # BLD AUTO: 0.05 10*3/MM3 (ref 0–0.2)
BASOPHILS NFR BLD AUTO: 0.8 % (ref 0–2)
BILIRUB SERPL-MCNC: <0.2 MG/DL (ref 0.2–1.2)
BUN BLD-MCNC: 32 MG/DL (ref 8–23)
BUN/CREAT SERPL: 25.4 (ref 7–25)
CALCIUM SPEC-SCNC: 10 MG/DL (ref 8.8–10.5)
CHLORIDE SERPL-SCNC: 104 MMOL/L (ref 98–107)
CHOLEST SERPL-MCNC: 206 MG/DL (ref 0–200)
CO2 SERPL-SCNC: 29.9 MMOL/L (ref 22–29)
CREAT BLD-MCNC: 1.26 MG/DL (ref 0.57–1)
DEPRECATED RDW RBC AUTO: 43.3 FL (ref 37–54)
EOSINOPHIL # BLD AUTO: 0.59 10*3/MM3 (ref 0.1–0.3)
EOSINOPHIL NFR BLD AUTO: 9.4 % (ref 0–4)
ERYTHROCYTE [DISTWIDTH] IN BLOOD BY AUTOMATED COUNT: 12.4 % (ref 11.5–14.5)
GFR SERPL CREATININE-BSD FRML MDRD: 41 ML/MIN/1.73
GLOBULIN UR ELPH-MCNC: 3.7 GM/DL
GLUCOSE BLD-MCNC: 95 MG/DL (ref 65–99)
HBA1C MFR BLD: 5.8 % (ref 4.8–5.6)
HCT VFR BLD AUTO: 39.1 % (ref 37–47)
HDLC SERPL-MCNC: 76 MG/DL (ref 40–60)
HGB BLD-MCNC: 12.4 G/DL (ref 12–16)
IMM GRANULOCYTES # BLD: 0.02 10*3/MM3 (ref 0–0.03)
IMM GRANULOCYTES NFR BLD: 0.3 % (ref 0–0.5)
LDLC SERPL CALC-MCNC: 117 MG/DL (ref 0–100)
LDLC/HDLC SERPL: 1.54 {RATIO}
LYMPHOCYTES # BLD AUTO: 2.02 10*3/MM3 (ref 0.6–4.8)
LYMPHOCYTES NFR BLD AUTO: 32.2 % (ref 20–45)
MCH RBC QN AUTO: 30.3 PG (ref 27–31)
MCHC RBC AUTO-ENTMCNC: 31.7 G/DL (ref 31–37)
MCV RBC AUTO: 95.6 FL (ref 81–99)
MONOCYTES # BLD AUTO: 0.63 10*3/MM3 (ref 0–1)
MONOCYTES NFR BLD AUTO: 10 % (ref 3–8)
NEUTROPHILS # BLD AUTO: 2.96 10*3/MM3 (ref 1.5–8.3)
NEUTROPHILS NFR BLD AUTO: 47.3 % (ref 45–70)
NRBC BLD MANUAL-RTO: 0 /100 WBC (ref 0–0)
PLATELET # BLD AUTO: 305 10*3/MM3 (ref 140–500)
PMV BLD AUTO: 10.4 FL (ref 7.4–10.4)
POTASSIUM BLD-SCNC: 4.5 MMOL/L (ref 3.5–5.2)
PROT SERPL-MCNC: 7.5 G/DL (ref 6–8.5)
RBC # BLD AUTO: 4.09 10*6/MM3 (ref 4.2–5.4)
SODIUM BLD-SCNC: 144 MMOL/L (ref 136–145)
T4 FREE SERPL-MCNC: 1.04 NG/DL (ref 0.93–1.7)
TRIGL SERPL-MCNC: 64 MG/DL (ref 0–150)
TSH SERPL DL<=0.05 MIU/L-ACNC: 2.88 MIU/ML (ref 0.27–4.2)
VLDLC SERPL-MCNC: 12.8 MG/DL (ref 7–27)
WBC NRBC COR # BLD: 6.27 10*3/MM3 (ref 4.8–10.8)

## 2018-04-11 PROCEDURE — 83036 HEMOGLOBIN GLYCOSYLATED A1C: CPT | Performed by: FAMILY MEDICINE

## 2018-04-11 PROCEDURE — 80053 COMPREHEN METABOLIC PANEL: CPT | Performed by: FAMILY MEDICINE

## 2018-04-11 PROCEDURE — 84439 ASSAY OF FREE THYROXINE: CPT | Performed by: FAMILY MEDICINE

## 2018-04-11 PROCEDURE — 82306 VITAMIN D 25 HYDROXY: CPT | Performed by: FAMILY MEDICINE

## 2018-04-11 PROCEDURE — 80175 DRUG SCREEN QUAN LAMOTRIGINE: CPT | Performed by: FAMILY MEDICINE

## 2018-04-11 PROCEDURE — 85025 COMPLETE CBC W/AUTO DIFF WBC: CPT | Performed by: FAMILY MEDICINE

## 2018-04-11 PROCEDURE — 84443 ASSAY THYROID STIM HORMONE: CPT | Performed by: FAMILY MEDICINE

## 2018-04-11 PROCEDURE — 80177 DRUG SCRN QUAN LEVETIRACETAM: CPT | Performed by: FAMILY MEDICINE

## 2018-04-11 PROCEDURE — 80061 LIPID PANEL: CPT | Performed by: FAMILY MEDICINE

## 2018-04-11 PROCEDURE — 36415 COLL VENOUS BLD VENIPUNCTURE: CPT | Performed by: FAMILY MEDICINE

## 2018-04-13 LAB
LAMOTRIGINE SERPL-MCNC: 4.1 UG/ML (ref 2–20)
LEVETIRACETAM SERPL-MCNC: 35.8 UG/ML (ref 10–40)

## 2018-04-23 ENCOUNTER — TRANSCRIBE ORDERS (OUTPATIENT)
Dept: OBSTETRICS AND GYNECOLOGY | Facility: CLINIC | Age: 78
End: 2018-04-23

## 2018-04-23 DIAGNOSIS — Z12.31 SCREENING MAMMOGRAM, ENCOUNTER FOR: Primary | ICD-10-CM

## 2018-04-27 RX ORDER — TRAMADOL HYDROCHLORIDE 50 MG/1
100 TABLET ORAL EVERY 8 HOURS
Qty: 180 TABLET | Refills: 1 | Status: SHIPPED | OUTPATIENT
Start: 2018-04-27 | End: 2018-06-12 | Stop reason: SDUPTHER

## 2018-04-27 NOTE — TELEPHONE ENCOUNTER
Medication Refill Request    Date of phone call: 18    Medication being requested: Tramadol 50 mg HCL si tablets every 8 hours  Qty: 180    Date of last visit: 3/8/18    Date of last refill: 3/27/18    JOSE up to date?: 3/7/18    Next Follow up?: 18    Any new pertinent information? (i.e, new medication allergies, new use of medications, change in patient's health or condition, non-compliance or inconsistency with prescribing agreement?): n/a

## 2018-05-09 ENCOUNTER — LAB REQUISITION (OUTPATIENT)
Dept: LAB | Facility: HOSPITAL | Age: 78
End: 2018-05-09

## 2018-05-09 DIAGNOSIS — Z79.899 OTHER LONG TERM (CURRENT) DRUG THERAPY: ICD-10-CM

## 2018-05-09 DIAGNOSIS — R94.5 ABNORMAL RESULTS OF LIVER FUNCTION STUDIES: ICD-10-CM

## 2018-05-09 DIAGNOSIS — R79.89 OTHER SPECIFIED ABNORMAL FINDINGS OF BLOOD CHEMISTRY: ICD-10-CM

## 2018-05-09 LAB
ANION GAP SERPL CALCULATED.3IONS-SCNC: 8.8 MMOL/L
BUN BLD-MCNC: 32 MG/DL (ref 8–23)
BUN/CREAT SERPL: 27.4 (ref 7–25)
CALCIUM SPEC-SCNC: 9.6 MG/DL (ref 8.8–10.5)
CHLORIDE SERPL-SCNC: 103 MMOL/L (ref 98–107)
CO2 SERPL-SCNC: 30.2 MMOL/L (ref 22–29)
CREAT BLD-MCNC: 1.17 MG/DL (ref 0.57–1)
GFR SERPL CREATININE-BSD FRML MDRD: 45 ML/MIN/1.73
GLUCOSE BLD-MCNC: 91 MG/DL (ref 65–99)
POTASSIUM BLD-SCNC: 5.3 MMOL/L (ref 3.5–5.2)
SODIUM BLD-SCNC: 142 MMOL/L (ref 136–145)

## 2018-05-09 PROCEDURE — 80048 BASIC METABOLIC PNL TOTAL CA: CPT | Performed by: FAMILY MEDICINE

## 2018-05-09 PROCEDURE — 36415 COLL VENOUS BLD VENIPUNCTURE: CPT | Performed by: FAMILY MEDICINE

## 2018-05-23 ENCOUNTER — LAB REQUISITION (OUTPATIENT)
Dept: LAB | Facility: HOSPITAL | Age: 78
End: 2018-05-23

## 2018-05-23 DIAGNOSIS — E87.5 HYPERKALEMIA: ICD-10-CM

## 2018-05-23 DIAGNOSIS — R79.89 OTHER SPECIFIED ABNORMAL FINDINGS OF BLOOD CHEMISTRY: ICD-10-CM

## 2018-05-23 DIAGNOSIS — N18.9 CHRONIC KIDNEY DISEASE: ICD-10-CM

## 2018-05-23 DIAGNOSIS — Z79.899 OTHER LONG TERM (CURRENT) DRUG THERAPY: ICD-10-CM

## 2018-05-23 LAB — POTASSIUM BLD-SCNC: 5.2 MMOL/L (ref 3.5–5.2)

## 2018-05-23 PROCEDURE — 84132 ASSAY OF SERUM POTASSIUM: CPT | Performed by: FAMILY MEDICINE

## 2018-05-23 PROCEDURE — 36415 COLL VENOUS BLD VENIPUNCTURE: CPT | Performed by: FAMILY MEDICINE

## 2018-05-24 NOTE — PROGRESS NOTES
Wt and VSS    [facility] performed a dental exam and cleaning 4-11-18    Lab Results   Component Value Date    CHOL 206 (H) 04/11/2018    CHLPL 238 (H) 11/18/2015    TRIG 64 04/11/2018    HDL 76 (H) 04/11/2018     (H) 04/11/2018     Started Atorvastatin 20mg daily  And f/u labs ordered    Dr. Sanchez, psychiatrist, monitors behaviors and medications closely and reviewed her 4-19-18    Annual eye exam Dr Aceves 4-30-18 and f/u one year    Saw nephrologist, Dr Sullivan 5-16-18 and ordered repeat K+ one week    Dr. Cruz, podiatrist, trimmed toenails 5-21-18    Dr. Sanchez, psychiatrist, monitors behaviors and medications closely.    Enjoys [facility] outings often

## 2018-06-01 ENCOUNTER — APPOINTMENT (OUTPATIENT)
Dept: MAMMOGRAPHY | Facility: HOSPITAL | Age: 78
End: 2018-06-01
Attending: OBSTETRICS & GYNECOLOGY

## 2018-06-07 ENCOUNTER — LAB REQUISITION (OUTPATIENT)
Dept: LAB | Facility: HOSPITAL | Age: 78
End: 2018-06-07

## 2018-06-07 DIAGNOSIS — N39.0 URINARY TRACT INFECTION: ICD-10-CM

## 2018-06-07 PROCEDURE — 87186 SC STD MICRODIL/AGAR DIL: CPT | Performed by: FAMILY MEDICINE

## 2018-06-07 PROCEDURE — 87086 URINE CULTURE/COLONY COUNT: CPT | Performed by: FAMILY MEDICINE

## 2018-06-08 ENCOUNTER — HOSPITAL ENCOUNTER (OUTPATIENT)
Dept: MAMMOGRAPHY | Facility: HOSPITAL | Age: 78
Discharge: HOME | End: 2018-06-08
Attending: OBSTETRICS & GYNECOLOGY | Admitting: OBSTETRICS & GYNECOLOGY

## 2018-06-08 DIAGNOSIS — Z12.31 SCREENING MAMMOGRAM, ENCOUNTER FOR: ICD-10-CM

## 2018-06-08 PROCEDURE — 77067 SCR MAMMO BI INCL CAD: CPT

## 2018-06-09 LAB — BACTERIA SPEC AEROBE CULT: ABNORMAL

## 2018-06-12 ENCOUNTER — OFFICE VISIT (OUTPATIENT)
Dept: PAIN MEDICINE | Facility: CLINIC | Age: 78
End: 2018-06-12

## 2018-06-12 VITALS
HEART RATE: 75 BPM | BODY MASS INDEX: 27.77 KG/M2 | RESPIRATION RATE: 16 BRPM | TEMPERATURE: 96 F | DIASTOLIC BLOOD PRESSURE: 57 MMHG | SYSTOLIC BLOOD PRESSURE: 145 MMHG | OXYGEN SATURATION: 93 % | WEIGHT: 166.7 LBS | HEIGHT: 65 IN

## 2018-06-12 DIAGNOSIS — Z79.891 ENCOUNTER FOR MONITORING OPIOID MAINTENANCE THERAPY: ICD-10-CM

## 2018-06-12 DIAGNOSIS — G89.29 OTHER CHRONIC PAIN: Primary | ICD-10-CM

## 2018-06-12 DIAGNOSIS — M16.9 OSTEOARTHRITIS OF HIP, UNSPECIFIED LATERALITY, UNSPECIFIED OSTEOARTHRITIS TYPE: ICD-10-CM

## 2018-06-12 DIAGNOSIS — Z51.81 ENCOUNTER FOR MONITORING OPIOID MAINTENANCE THERAPY: ICD-10-CM

## 2018-06-12 DIAGNOSIS — M51.36 DEGENERATION OF INTERVERTEBRAL DISC OF LUMBAR REGION: ICD-10-CM

## 2018-06-12 PROCEDURE — 99213 OFFICE O/P EST LOW 20 MIN: CPT | Performed by: NURSE PRACTITIONER

## 2018-06-12 RX ORDER — TRAMADOL HYDROCHLORIDE 50 MG/1
100 TABLET ORAL EVERY 8 HOURS
Qty: 180 TABLET | Refills: 1 | Status: SHIPPED | OUTPATIENT
Start: 2018-06-12 | End: 2018-08-22 | Stop reason: SDUPTHER

## 2018-06-12 RX ORDER — LEVETIRACETAM 250 MG/1
750 TABLET ORAL 2 TIMES DAILY
COMMUNITY
End: 2021-09-14

## 2018-06-12 RX ORDER — DOXYCYCLINE HYCLATE 100 MG/1
100 CAPSULE ORAL 2 TIMES DAILY
COMMUNITY
End: 2019-03-08

## 2018-06-12 NOTE — PROGRESS NOTES
"CHIEF COMPLAINT    F/U hip pain. Pt is a [employer] pt. Caregiver states current pain medicine regimen is working well for the pt and pt's pain seems to be controlled.     Subjective   Erica Crockett is a 77 y.o. female  who presents to the office for follow-up.She has a history of chronic back and hip pain.    Patient presents today with caregiver from [facility].  Report that her pain seems to be well controlled with current medication regimen in addition to intermittent hip bursa injections . She continues with Tramadol 100 mg TID and Gabapentin 100 mg HS.  They do not report any side effects or issues with the medication. She seems comfortable with no evidence of distress or discomfort.      History of Present Illness       The following portions of the patient's history were reviewed and updated as appropriate: allergies, current medications, past family history, past medical history, past social history, past surgical history and problem list.    Review of Systems   Constitutional: Negative for activity change, chills and fever.   Respiratory: Negative for shortness of breath.    Cardiovascular: Negative for chest pain.   Gastrointestinal: Negative for constipation, diarrhea, nausea and vomiting.   Genitourinary: Negative for difficulty urinating, dyspareunia and dysuria.   Musculoskeletal: Positive for back pain.   Skin: Negative for color change.   Neurological: Negative for dizziness, weakness, light-headedness, numbness and headaches.   Psychiatric/Behavioral: Negative for agitation, self-injury, sleep disturbance and suicidal ideas. The patient is not nervous/anxious.        Vitals:    06/12/18 0830   BP: 145/57   Pulse: 75   Resp: 16   Temp: 96 °F (35.6 °C)   SpO2: 93%   Weight: 75.6 kg (166 lb 11.2 oz)   Height: 165.1 cm (65\")   PainSc: 0-No pain   PainLoc: Hip  Comment: Pt nonverbal [place], appears stable.         Objective   Physical Exam   Constitutional: She appears well-developed and " well-nourished.   HENT:   Head: Normocephalic.   Eyes: Conjunctivae and EOM are normal.   Cardiovascular: Normal rate.    Pulmonary/Chest: Effort normal. No respiratory distress.   Musculoskeletal:        Right hip: She exhibits no tenderness.        Left hip: She exhibits no tenderness.        Lumbar back: She exhibits no tenderness.   Neurological: She is alert. She exhibits abnormal muscle tone. Gait (wheelchair) abnormal.   Skin: Skin is warm and dry.   Psychiatric: She is agitated (mild). She is noncommunicative.   Non verbal    Nursing note and vitals reviewed.    Assessment/Plan   Erica was seen today for hip pain.    Diagnoses and all orders for this visit:    Other chronic pain    Degeneration of intervertebral disc of lumbar region    Osteoarthritis of hip, unspecified laterality, unspecified osteoarthritis type    Encounter for monitoring opioid maintenance therapy      --- Continue Tramadol. Dated refill sent to [facility]. Patient appears stable with current regimen. No adverse effects. Regarding continuation of opioids, there is no evidence of aberrant behavior or any red flags.  The patient continues with appropriate response to opioid therapy. ADL's remain intact by self.   --- The urine drug screen confirmation from 9/14/17 has been reviewed and the result is appropriate based on patient history and JOSE report  --- Follow-up 3 months          JOSE REPORT    As part of the patient's treatment plan, I am prescribing controlled substances. The patient has been made aware of appropriate use of such medications, including potential risk of somnolence, limited ability to drive and/or work safely, and the potential for dependence or overdose. It has also bee made clear that these medications are for use by this patient only, without concomitant use of alcohol or other substances unless prescribed.     Patient has completed prescribing agreement detailing terms of continued prescribing of  controlled substances, including monitoring JOSE reports, urine drug screening, and pill counts if necessary. The patient is aware that inappropriate use will results in cessation of prescribing such medications.    JOSE report has been reviewed and scanned into the patient's chart.    As the clinician, I personally reviewed the JOSE from 6/11/2018 while the patient was in the office today.    History and physical exam exhibit continued safe and appropriate use of controlled substances.    EMR Dragon/Transcription disclaimer:   Much of this encounter note is an electronic transcription/translation of spoken language to printed text. The electronic translation of spoken language may permit erroneous, or at times, nonsensical words or phrases to be inadvertently transcribed; Although I have reviewed the note for such errors, some may still exist.

## 2018-06-13 ENCOUNTER — LAB REQUISITION (OUTPATIENT)
Dept: LAB | Facility: HOSPITAL | Age: 78
End: 2018-06-13

## 2018-06-13 DIAGNOSIS — Z79.899 OTHER LONG TERM (CURRENT) DRUG THERAPY: ICD-10-CM

## 2018-06-13 DIAGNOSIS — R79.89 OTHER SPECIFIED ABNORMAL FINDINGS OF BLOOD CHEMISTRY: ICD-10-CM

## 2018-06-13 DIAGNOSIS — N18.9 CHRONIC KIDNEY DISEASE: ICD-10-CM

## 2018-06-13 LAB
ANION GAP SERPL CALCULATED.3IONS-SCNC: 9.3 MMOL/L
BUN BLD-MCNC: 29 MG/DL (ref 8–23)
BUN/CREAT SERPL: 27.1 (ref 7–25)
CALCIUM SPEC-SCNC: 10 MG/DL (ref 8.8–10.5)
CHLORIDE SERPL-SCNC: 102 MMOL/L (ref 98–107)
CO2 SERPL-SCNC: 30.7 MMOL/L (ref 22–29)
CREAT BLD-MCNC: 1.07 MG/DL (ref 0.57–1)
GFR SERPL CREATININE-BSD FRML MDRD: 50 ML/MIN/1.73
GLUCOSE BLD-MCNC: 91 MG/DL (ref 65–99)
POTASSIUM BLD-SCNC: 5.1 MMOL/L (ref 3.5–5.2)
SODIUM BLD-SCNC: 142 MMOL/L (ref 136–145)

## 2018-06-13 PROCEDURE — 36415 COLL VENOUS BLD VENIPUNCTURE: CPT | Performed by: FAMILY MEDICINE

## 2018-06-13 PROCEDURE — 80048 BASIC METABOLIC PNL TOTAL CA: CPT | Performed by: FAMILY MEDICINE

## 2018-07-03 ENCOUNTER — LAB REQUISITION (OUTPATIENT)
Dept: LAB | Facility: HOSPITAL | Age: 78
End: 2018-07-03

## 2018-07-03 DIAGNOSIS — R79.89 OTHER SPECIFIED ABNORMAL FINDINGS OF BLOOD CHEMISTRY: ICD-10-CM

## 2018-07-03 DIAGNOSIS — E78.00 PURE HYPERCHOLESTEROLEMIA: ICD-10-CM

## 2018-07-03 DIAGNOSIS — N18.9 CHRONIC KIDNEY DISEASE: ICD-10-CM

## 2018-07-03 DIAGNOSIS — Z79.899 OTHER LONG TERM (CURRENT) DRUG THERAPY: ICD-10-CM

## 2018-07-03 LAB
ALBUMIN SERPL-MCNC: 3.9 G/DL (ref 3.5–5.2)
ALBUMIN/GLOB SERPL: 1.4 G/DL
ALP SERPL-CCNC: 87 U/L (ref 40–129)
ALT SERPL W P-5'-P-CCNC: 15 U/L (ref 5–33)
ANION GAP SERPL CALCULATED.3IONS-SCNC: 11.4 MMOL/L
AST SERPL-CCNC: 20 U/L (ref 5–32)
BILIRUB SERPL-MCNC: <0.2 MG/DL (ref 0.2–1.2)
BUN BLD-MCNC: 29 MG/DL (ref 8–23)
BUN/CREAT SERPL: 24.6 (ref 7–25)
CALCIUM SPEC-SCNC: 9.5 MG/DL (ref 8.8–10.5)
CHLORIDE SERPL-SCNC: 103 MMOL/L (ref 98–107)
CHOLEST SERPL-MCNC: 169 MG/DL (ref 0–200)
CO2 SERPL-SCNC: 28.6 MMOL/L (ref 22–29)
CREAT BLD-MCNC: 1.18 MG/DL (ref 0.57–1)
GFR SERPL CREATININE-BSD FRML MDRD: 44 ML/MIN/1.73
GLOBULIN UR ELPH-MCNC: 2.7 GM/DL
GLUCOSE BLD-MCNC: 94 MG/DL (ref 65–99)
HDLC SERPL-MCNC: 85 MG/DL (ref 40–60)
LDLC SERPL CALC-MCNC: 73 MG/DL (ref 0–100)
LDLC/HDLC SERPL: 0.85 {RATIO}
POTASSIUM BLD-SCNC: 4.8 MMOL/L (ref 3.5–5.2)
PROT SERPL-MCNC: 6.6 G/DL (ref 6–8.5)
SODIUM BLD-SCNC: 143 MMOL/L (ref 136–145)
TRIGL SERPL-MCNC: 57 MG/DL (ref 0–150)
VLDLC SERPL-MCNC: 11.4 MG/DL (ref 7–27)

## 2018-07-03 PROCEDURE — 80053 COMPREHEN METABOLIC PANEL: CPT | Performed by: FAMILY MEDICINE

## 2018-07-03 PROCEDURE — 80061 LIPID PANEL: CPT | Performed by: FAMILY MEDICINE

## 2018-08-08 ENCOUNTER — LAB REQUISITION (OUTPATIENT)
Dept: LAB | Facility: HOSPITAL | Age: 78
End: 2018-08-08

## 2018-08-08 DIAGNOSIS — R94.5 ABNORMAL RESULTS OF LIVER FUNCTION STUDIES: ICD-10-CM

## 2018-08-08 DIAGNOSIS — N18.9 CHRONIC KIDNEY DISEASE: ICD-10-CM

## 2018-08-08 DIAGNOSIS — R79.89 OTHER SPECIFIED ABNORMAL FINDINGS OF BLOOD CHEMISTRY: ICD-10-CM

## 2018-08-08 PROCEDURE — 36415 COLL VENOUS BLD VENIPUNCTURE: CPT | Performed by: FAMILY MEDICINE

## 2018-08-15 ENCOUNTER — LAB REQUISITION (OUTPATIENT)
Dept: LAB | Facility: HOSPITAL | Age: 78
End: 2018-08-15

## 2018-08-15 DIAGNOSIS — R79.89 OTHER SPECIFIED ABNORMAL FINDINGS OF BLOOD CHEMISTRY: ICD-10-CM

## 2018-08-15 DIAGNOSIS — R94.5 ABNORMAL RESULTS OF LIVER FUNCTION STUDIES: ICD-10-CM

## 2018-08-15 LAB
ANION GAP SERPL CALCULATED.3IONS-SCNC: 7.9 MMOL/L
BUN BLD-MCNC: 32 MG/DL (ref 8–23)
BUN/CREAT SERPL: 29.4 (ref 7–25)
CALCIUM SPEC-SCNC: 9.6 MG/DL (ref 8.8–10.5)
CHLORIDE SERPL-SCNC: 104 MMOL/L (ref 98–107)
CO2 SERPL-SCNC: 30.1 MMOL/L (ref 22–29)
CREAT BLD-MCNC: 1.09 MG/DL (ref 0.57–1)
GFR SERPL CREATININE-BSD FRML MDRD: 49 ML/MIN/1.73
GLUCOSE BLD-MCNC: 107 MG/DL (ref 65–99)
POTASSIUM BLD-SCNC: 5.5 MMOL/L (ref 3.5–5.2)
SODIUM BLD-SCNC: 142 MMOL/L (ref 136–145)

## 2018-08-15 PROCEDURE — 36415 COLL VENOUS BLD VENIPUNCTURE: CPT | Performed by: FAMILY MEDICINE

## 2018-08-15 PROCEDURE — 80048 BASIC METABOLIC PNL TOTAL CA: CPT | Performed by: FAMILY MEDICINE

## 2018-08-21 ENCOUNTER — TRANSCRIBE ORDERS (OUTPATIENT)
Dept: FAMILY MEDICINE CLINIC | Facility: CLINIC | Age: 78
End: 2018-08-21

## 2018-08-21 DIAGNOSIS — M79.606 PAIN OF LOWER EXTREMITY, UNSPECIFIED LATERALITY: Primary | ICD-10-CM

## 2018-08-22 ENCOUNTER — HOSPITAL ENCOUNTER (OUTPATIENT)
Dept: GENERAL RADIOLOGY | Facility: HOSPITAL | Age: 78
Discharge: HOME | End: 2018-08-22

## 2018-08-22 ENCOUNTER — HOSPITAL ENCOUNTER (OUTPATIENT)
Dept: ULTRASOUND IMAGING | Facility: HOSPITAL | Age: 78
Discharge: HOME | End: 2018-08-22
Attending: FAMILY MEDICINE | Admitting: FAMILY MEDICINE

## 2018-08-22 DIAGNOSIS — M79.606 PAIN OF LOWER EXTREMITY, UNSPECIFIED LATERALITY: ICD-10-CM

## 2018-08-22 PROCEDURE — 73560 X-RAY EXAM OF KNEE 1 OR 2: CPT

## 2018-08-22 PROCEDURE — 73502 X-RAY EXAM HIP UNI 2-3 VIEWS: CPT

## 2018-08-22 PROCEDURE — 93971 EXTREMITY STUDY: CPT

## 2018-08-22 RX ORDER — TRAMADOL HYDROCHLORIDE 50 MG/1
100 TABLET ORAL EVERY 8 HOURS
Qty: 180 TABLET | Refills: 1 | Status: SHIPPED | OUTPATIENT
Start: 2018-08-22 | End: 2018-09-12 | Stop reason: SDUPTHER

## 2018-08-22 NOTE — TELEPHONE ENCOUNTER
Medication Refill Request    Date of phone call: 18    Medication being requested: tramadol 50 mg si tabs q 8 hrs  Qty: 180    Date of last visit: 18    Date of last refill: 18    JOSE up to date?: yes    Next Follow up?: 18    Any new pertinent information? (i.e, new medication allergies, new use of medications, change in patient's health or condition, non-compliance or inconsistency with prescribing agreement?):

## 2018-09-12 ENCOUNTER — LAB REQUISITION (OUTPATIENT)
Dept: LAB | Facility: HOSPITAL | Age: 78
End: 2018-09-12

## 2018-09-12 ENCOUNTER — OFFICE VISIT (OUTPATIENT)
Dept: PAIN MEDICINE | Facility: CLINIC | Age: 78
End: 2018-09-12

## 2018-09-12 VITALS
SYSTOLIC BLOOD PRESSURE: 128 MMHG | TEMPERATURE: 97.8 F | WEIGHT: 166.5 LBS | OXYGEN SATURATION: 93 % | DIASTOLIC BLOOD PRESSURE: 74 MMHG | HEIGHT: 60 IN | BODY MASS INDEX: 32.69 KG/M2 | RESPIRATION RATE: 18 BRPM | HEART RATE: 82 BPM

## 2018-09-12 DIAGNOSIS — G89.29 OTHER CHRONIC PAIN: Primary | ICD-10-CM

## 2018-09-12 DIAGNOSIS — R79.89 OTHER SPECIFIED ABNORMAL FINDINGS OF BLOOD CHEMISTRY: ICD-10-CM

## 2018-09-12 DIAGNOSIS — Z79.891 ENCOUNTER FOR MONITORING OPIOID MAINTENANCE THERAPY: ICD-10-CM

## 2018-09-12 DIAGNOSIS — M16.9 OSTEOARTHRITIS OF HIP, UNSPECIFIED LATERALITY, UNSPECIFIED OSTEOARTHRITIS TYPE: ICD-10-CM

## 2018-09-12 DIAGNOSIS — R94.5 ABNORMAL RESULTS OF LIVER FUNCTION STUDIES: ICD-10-CM

## 2018-09-12 DIAGNOSIS — Z51.81 ENCOUNTER FOR MONITORING OPIOID MAINTENANCE THERAPY: ICD-10-CM

## 2018-09-12 DIAGNOSIS — M51.36 DEGENERATION OF INTERVERTEBRAL DISC OF LUMBAR REGION: ICD-10-CM

## 2018-09-12 LAB
ANION GAP SERPL CALCULATED.3IONS-SCNC: 11.2 MMOL/L
BUN BLD-MCNC: 35 MG/DL (ref 8–23)
BUN/CREAT SERPL: 30.2 (ref 7–25)
CALCIUM SPEC-SCNC: 9.6 MG/DL (ref 8.8–10.5)
CHLORIDE SERPL-SCNC: 106 MMOL/L (ref 98–107)
CO2 SERPL-SCNC: 27.8 MMOL/L (ref 22–29)
CREAT BLD-MCNC: 1.16 MG/DL (ref 0.57–1)
GFR SERPL CREATININE-BSD FRML MDRD: 45 ML/MIN/1.73
GLUCOSE BLD-MCNC: 99 MG/DL (ref 65–99)
POTASSIUM BLD-SCNC: 5.3 MMOL/L (ref 3.5–5.2)
SODIUM BLD-SCNC: 145 MMOL/L (ref 136–145)

## 2018-09-12 PROCEDURE — 36415 COLL VENOUS BLD VENIPUNCTURE: CPT | Performed by: FAMILY MEDICINE

## 2018-09-12 PROCEDURE — 80048 BASIC METABOLIC PNL TOTAL CA: CPT | Performed by: FAMILY MEDICINE

## 2018-09-12 PROCEDURE — 99213 OFFICE O/P EST LOW 20 MIN: CPT | Performed by: NURSE PRACTITIONER

## 2018-09-12 RX ORDER — TRAMADOL HYDROCHLORIDE 50 MG/1
100 TABLET ORAL EVERY 8 HOURS
Qty: 180 TABLET | Refills: 1 | Status: SHIPPED | OUTPATIENT
Start: 2018-09-12 | End: 2018-10-30 | Stop reason: SDUPTHER

## 2018-09-12 NOTE — PROGRESS NOTES
"CHIEF COMPLAINT  Per caregiver with patient from [facility] her pain is unchanged.    Subjective   Erica Crockett is a 77 y.o. female  who presents to the office for follow-up.She has a history of hip and back pain.    Patient presents today with caregiver from [facility].  Report that her pain seems to be well controlled with current medication regimen in addition to intermittent hip bursa injections . She continues with Tramadol 100 mg TID and Gabapentin 100 mg HS.  They do not report any side effects or issues with the medication. She seems comfortable with no evidence of distress or discomfort.      Last hip (left) injection 10/2017 and did seem to help.      History of Present Illness     The following portions of the patient's history were reviewed and updated as appropriate: allergies, current medications, past family history, past medical history, past social history, past surgical history and problem list.    Review of Systems   Constitutional: Negative for chills and fever.   Respiratory: Negative for shortness of breath.    Cardiovascular: Negative for chest pain.   Gastrointestinal: Negative for constipation, diarrhea, nausea and vomiting.   Genitourinary: Negative for difficulty urinating, dyspareunia and dysuria.   Musculoskeletal:        Hip   Neurological: Negative for dizziness, weakness, light-headedness, numbness and headaches.   Psychiatric/Behavioral: Negative for confusion, hallucinations, self-injury, sleep disturbance and suicidal ideas. The patient is not nervous/anxious.        Vitals:    09/12/18 1253   BP: 128/74   Pulse: 82   Resp: 18   Temp: 97.8 °F (36.6 °C)   SpO2: 93%   Weight: 75.5 kg (166 lb 8 oz)   Height: 152.4 cm (60\")       Objective   Physical Exam   Constitutional: She appears well-developed and well-nourished.   HENT:   Head: Normocephalic.   Eyes: Conjunctivae and EOM are normal.   Cardiovascular: Normal rate.    Pulmonary/Chest: Effort normal. No respiratory distress. "   Musculoskeletal:        Right hip: She exhibits no tenderness.        Left hip: She exhibits no tenderness.        Lumbar back: She exhibits no tenderness.   Neurological: She is alert. She exhibits abnormal muscle tone. Gait (wheelchair) abnormal.   Skin: Skin is warm and dry.   Psychiatric: She is agitated (mild). She is noncommunicative.   Non verbal    Nursing note and vitals reviewed.      I HAVE PERFORMED THE PHYSICAL EXAMINATION AS DOCUMENTED ABOVE TODAY, 09/12/2018.  THE EXAM IS UNCHANGED FROM PREVIOUS VISIT.      Assessment/Plan   Erica was seen today for hip pain.    Diagnoses and all orders for this visit:    Other chronic pain    Osteoarthritis of hip, unspecified laterality, unspecified osteoarthritis type    Degeneration of intervertebral disc of lumbar region    Encounter for monitoring opioid maintenance therapy      --- Refill Tramadol (dated). Patient appears stable with current regimen. No adverse effects. Regarding continuation of opioids, there is no evidence of aberrant behavior or any red flags.  The patient continues with appropriate response to opioid therapy. ADL's remain intact by self.   --- The urine drug screen confirmation from 9/14/17 has been reviewed and the result is appropriate based on patient history and JOSE report  --- Could repeat left hip bursa injection as needed   --- Follow-up 3 months          JOSE REPORT  As part of the patient's treatment plan, I am prescribing controlled substances. The patient has been made aware of appropriate use of such medications, including potential risk of somnolence, limited ability to drive and/or work safely, and the potential for dependence or overdose. It has also bee made clear that these medications are for use by this patient only, without concomitant use of alcohol or other substances unless prescribed.     Patient has completed prescribing agreement detailing terms of continued prescribing of controlled substances,  including monitoring JOSE reports, urine drug screening, and pill counts if necessary. The patient is aware that inappropriate use will results in cessation of prescribing such medications.    JOSE report has been reviewed and scanned into the patient's chart.    As the clinician, I personally reviewed the JOSE from 9/11/2018 while the patient was in the office today.    History and physical exam exhibit continued safe and appropriate use of controlled substances.    EMR Dragon/Transcription disclaimer:   Much of this encounter note is an electronic transcription/translation of spoken language to printed text. The electronic translation of spoken language may permit erroneous, or at times, nonsensical words or phrases to be inadvertently transcribed; Although I have reviewed the note for such errors, some may still exist.

## 2018-10-01 NOTE — TELEPHONE ENCOUNTER
Medication Refill Request    Date of phone call: 10/1/18    Medication being requested: Gabapentin 100 mg si at bedtime  Qty: 30    Date of last visit: 18    Date of last refill: 18    JOSE up to date?: 18    Next Follow up?: 18    Any new pertinent information? (i.e, new medication allergies, new use of medications, change in patient's health or condition, non-compliance or inconsistency with prescribing agreement?): n/a

## 2018-10-02 ENCOUNTER — TELEPHONE (OUTPATIENT)
Dept: PAIN MEDICINE | Facility: CLINIC | Age: 78
End: 2018-10-02

## 2018-10-02 RX ORDER — GABAPENTIN 100 MG/1
100 CAPSULE ORAL NIGHTLY
Qty: 30 CAPSULE | Refills: 5 | OUTPATIENT
Start: 2018-10-02 | End: 2018-10-02 | Stop reason: SDUPTHER

## 2018-10-02 RX ORDER — GABAPENTIN 100 MG/1
100 CAPSULE ORAL NIGHTLY
Qty: 30 CAPSULE | Refills: 5 | Status: SHIPPED | OUTPATIENT
Start: 2018-10-02 | End: 2019-01-29 | Stop reason: SDUPTHER

## 2018-10-02 NOTE — TELEPHONE ENCOUNTER
[facility] called and states that they would like to have a compound cream for the patient for her hips, knees and leg pain. It would have to be sent to [facility]

## 2018-10-03 ENCOUNTER — LAB REQUISITION (OUTPATIENT)
Dept: LAB | Facility: HOSPITAL | Age: 78
End: 2018-10-03

## 2018-10-03 DIAGNOSIS — Z79.899 OTHER LONG TERM (CURRENT) DRUG THERAPY: ICD-10-CM

## 2018-10-03 DIAGNOSIS — R79.89 OTHER SPECIFIED ABNORMAL FINDINGS OF BLOOD CHEMISTRY: ICD-10-CM

## 2018-10-03 DIAGNOSIS — E55.9 VITAMIN D DEFICIENCY: ICD-10-CM

## 2018-10-03 DIAGNOSIS — R73.09 OTHER ABNORMAL GLUCOSE: ICD-10-CM

## 2018-10-03 DIAGNOSIS — R94.6 ABNORMAL RESULTS OF THYROID FUNCTION STUDIES: ICD-10-CM

## 2018-10-03 DIAGNOSIS — R56.9 CONVULSIONS (HCC): ICD-10-CM

## 2018-10-03 DIAGNOSIS — D72.819 DECREASED WHITE BLOOD CELL COUNT: ICD-10-CM

## 2018-10-03 LAB
25(OH)D3 SERPL-MCNC: >60 NG/ML
ALBUMIN SERPL-MCNC: 3.8 G/DL (ref 3.5–5.2)
ALBUMIN/GLOB SERPL: 1.1 G/DL
ALP SERPL-CCNC: 84 U/L (ref 40–129)
ALT SERPL W P-5'-P-CCNC: 14 U/L (ref 5–33)
ANION GAP SERPL CALCULATED.3IONS-SCNC: 9.1 MMOL/L
AST SERPL-CCNC: 17 U/L (ref 5–32)
BASOPHILS # BLD AUTO: 0.05 10*3/MM3 (ref 0–0.2)
BASOPHILS NFR BLD AUTO: 0.7 % (ref 0–2)
BILIRUB SERPL-MCNC: <0.2 MG/DL (ref 0.2–1.2)
BUN BLD-MCNC: 30 MG/DL (ref 8–23)
BUN/CREAT SERPL: 25 (ref 7–25)
CALCIUM SPEC-SCNC: 9.6 MG/DL (ref 8.8–10.5)
CHLORIDE SERPL-SCNC: 109 MMOL/L (ref 98–107)
CHOLEST SERPL-MCNC: 151 MG/DL (ref 0–200)
CO2 SERPL-SCNC: 27.9 MMOL/L (ref 22–29)
CREAT BLD-MCNC: 1.2 MG/DL (ref 0.57–1)
DEPRECATED RDW RBC AUTO: 44.3 FL (ref 37–54)
EOSINOPHIL # BLD AUTO: 0.43 10*3/MM3 (ref 0.1–0.3)
EOSINOPHIL NFR BLD AUTO: 6 % (ref 0–4)
ERYTHROCYTE [DISTWIDTH] IN BLOOD BY AUTOMATED COUNT: 12.4 % (ref 11.5–14.5)
GFR SERPL CREATININE-BSD FRML MDRD: 43 ML/MIN/1.73
GLOBULIN UR ELPH-MCNC: 3.5 GM/DL
GLUCOSE BLD-MCNC: 97 MG/DL (ref 65–99)
HBA1C MFR BLD: 5.8 % (ref 4.8–5.6)
HCT VFR BLD AUTO: 37.8 % (ref 37–47)
HDLC SERPL-MCNC: 87 MG/DL (ref 40–60)
HGB BLD-MCNC: 11.9 G/DL (ref 12–16)
IMM GRANULOCYTES # BLD: 0.02 10*3/MM3 (ref 0–0.03)
IMM GRANULOCYTES NFR BLD: 0.3 % (ref 0–0.5)
LDLC SERPL CALC-MCNC: 55 MG/DL (ref 0–100)
LDLC/HDLC SERPL: 0.64 {RATIO}
LYMPHOCYTES # BLD AUTO: 2.24 10*3/MM3 (ref 0.6–4.8)
LYMPHOCYTES NFR BLD AUTO: 31.1 % (ref 20–45)
MCH RBC QN AUTO: 30.5 PG (ref 27–31)
MCHC RBC AUTO-ENTMCNC: 31.5 G/DL (ref 31–37)
MCV RBC AUTO: 96.9 FL (ref 81–99)
MONOCYTES # BLD AUTO: 0.68 10*3/MM3 (ref 0–1)
MONOCYTES NFR BLD AUTO: 9.4 % (ref 3–8)
NEUTROPHILS # BLD AUTO: 3.78 10*3/MM3 (ref 1.5–8.3)
NEUTROPHILS NFR BLD AUTO: 52.5 % (ref 45–70)
NRBC BLD MANUAL-RTO: 0 /100 WBC (ref 0–0)
PLATELET # BLD AUTO: 304 10*3/MM3 (ref 140–500)
PMV BLD AUTO: 10.1 FL (ref 7.4–10.4)
POTASSIUM BLD-SCNC: 4.9 MMOL/L (ref 3.5–5.2)
PROT SERPL-MCNC: 7.3 G/DL (ref 6–8.5)
RBC # BLD AUTO: 3.9 10*6/MM3 (ref 4.2–5.4)
SODIUM BLD-SCNC: 146 MMOL/L (ref 136–145)
TRIGL SERPL-MCNC: 43 MG/DL (ref 0–150)
TSH SERPL DL<=0.05 MIU/L-ACNC: 3.26 MIU/ML (ref 0.27–4.2)
VLDLC SERPL-MCNC: 8.6 MG/DL (ref 7–27)
WBC NRBC COR # BLD: 7.2 10*3/MM3 (ref 4.8–10.8)

## 2018-10-03 PROCEDURE — 80053 COMPREHEN METABOLIC PANEL: CPT | Performed by: FAMILY MEDICINE

## 2018-10-03 PROCEDURE — 85025 COMPLETE CBC W/AUTO DIFF WBC: CPT | Performed by: FAMILY MEDICINE

## 2018-10-03 PROCEDURE — 82306 VITAMIN D 25 HYDROXY: CPT | Performed by: FAMILY MEDICINE

## 2018-10-03 PROCEDURE — 80175 DRUG SCREEN QUAN LAMOTRIGINE: CPT | Performed by: FAMILY MEDICINE

## 2018-10-03 PROCEDURE — 80177 DRUG SCRN QUAN LEVETIRACETAM: CPT | Performed by: FAMILY MEDICINE

## 2018-10-03 PROCEDURE — 83036 HEMOGLOBIN GLYCOSYLATED A1C: CPT | Performed by: FAMILY MEDICINE

## 2018-10-03 PROCEDURE — 84443 ASSAY THYROID STIM HORMONE: CPT | Performed by: FAMILY MEDICINE

## 2018-10-03 PROCEDURE — 80061 LIPID PANEL: CPT | Performed by: FAMILY MEDICINE

## 2018-10-03 PROCEDURE — 36415 COLL VENOUS BLD VENIPUNCTURE: CPT | Performed by: FAMILY MEDICINE

## 2018-10-05 LAB
LAMOTRIGINE SERPL-MCNC: 4.9 UG/ML (ref 2–20)
LEVETIRACETAM SERPL-MCNC: 28.4 UG/ML (ref 10–40)

## 2018-10-17 ENCOUNTER — LAB REQUISITION (OUTPATIENT)
Dept: LAB | Facility: HOSPITAL | Age: 78
End: 2018-10-17

## 2018-10-17 DIAGNOSIS — R79.89 OTHER SPECIFIED ABNORMAL FINDINGS OF BLOOD CHEMISTRY: ICD-10-CM

## 2018-10-17 DIAGNOSIS — N18.9 CHRONIC KIDNEY DISEASE: ICD-10-CM

## 2018-10-17 LAB
ANION GAP SERPL CALCULATED.3IONS-SCNC: 6.7 MMOL/L
BUN BLD-MCNC: 35 MG/DL (ref 8–23)
BUN/CREAT SERPL: 28.5 (ref 7–25)
CALCIUM SPEC-SCNC: 9.7 MG/DL (ref 8.8–10.5)
CHLORIDE SERPL-SCNC: 104 MMOL/L (ref 98–107)
CO2 SERPL-SCNC: 31.3 MMOL/L (ref 22–29)
CREAT BLD-MCNC: 1.23 MG/DL (ref 0.57–1)
GFR SERPL CREATININE-BSD FRML MDRD: 42 ML/MIN/1.73
GLUCOSE BLD-MCNC: 92 MG/DL (ref 65–99)
POTASSIUM BLD-SCNC: 5.5 MMOL/L (ref 3.5–5.2)
SODIUM BLD-SCNC: 142 MMOL/L (ref 136–145)

## 2018-10-17 PROCEDURE — 36415 COLL VENOUS BLD VENIPUNCTURE: CPT | Performed by: FAMILY MEDICINE

## 2018-10-17 PROCEDURE — 84439 ASSAY OF FREE THYROXINE: CPT | Performed by: FAMILY MEDICINE

## 2018-10-17 PROCEDURE — 80048 BASIC METABOLIC PNL TOTAL CA: CPT | Performed by: FAMILY MEDICINE

## 2018-10-22 LAB — T4 FREE SERPL-MCNC: 1.04 NG/DL (ref 0.93–1.7)

## 2018-10-24 ENCOUNTER — LAB REQUISITION (OUTPATIENT)
Dept: LAB | Facility: HOSPITAL | Age: 78
End: 2018-10-24

## 2018-10-24 DIAGNOSIS — R79.89 OTHER SPECIFIED ABNORMAL FINDINGS OF BLOOD CHEMISTRY: ICD-10-CM

## 2018-10-24 DIAGNOSIS — N18.9 CHRONIC KIDNEY DISEASE: ICD-10-CM

## 2018-10-24 DIAGNOSIS — Z79.899 OTHER LONG TERM (CURRENT) DRUG THERAPY: ICD-10-CM

## 2018-10-24 LAB
ANION GAP SERPL CALCULATED.3IONS-SCNC: 7.6 MMOL/L
BUN BLD-MCNC: 34 MG/DL (ref 8–23)
BUN/CREAT SERPL: 28.1 (ref 7–25)
CALCIUM SPEC-SCNC: 10.1 MG/DL (ref 8.8–10.5)
CHLORIDE SERPL-SCNC: 105 MMOL/L (ref 98–107)
CO2 SERPL-SCNC: 30.4 MMOL/L (ref 22–29)
CREAT BLD-MCNC: 1.21 MG/DL (ref 0.57–1)
GFR SERPL CREATININE-BSD FRML MDRD: 43 ML/MIN/1.73
GLUCOSE BLD-MCNC: 88 MG/DL (ref 65–99)
POTASSIUM BLD-SCNC: 5.5 MMOL/L (ref 3.5–5.2)
SODIUM BLD-SCNC: 143 MMOL/L (ref 136–145)

## 2018-10-24 PROCEDURE — 80048 BASIC METABOLIC PNL TOTAL CA: CPT | Performed by: FAMILY MEDICINE

## 2018-10-24 PROCEDURE — 36415 COLL VENOUS BLD VENIPUNCTURE: CPT | Performed by: FAMILY MEDICINE

## 2018-10-30 RX ORDER — TRAMADOL HYDROCHLORIDE 50 MG/1
100 TABLET ORAL EVERY 8 HOURS
Qty: 180 TABLET | Refills: 1 | Status: SHIPPED | OUTPATIENT
Start: 2018-10-30 | End: 2018-12-11 | Stop reason: SDUPTHER

## 2018-10-30 NOTE — TELEPHONE ENCOUNTER
Medication Refill Request    Date of phone call: 10-29-18    Medication being requested: Tramadol 50 mg sig: Take 2 tablets by mouth every 8 hours  Qty: 180 tablets    Date of last visit: 9-12-18    Date of last refill: 9-21-18    JOSE up to date?: 9-12-18    Next Follow up?: 12-11-18    Any new pertinent information? (i.e, new medication allergies, new use of medications, change in patient's health or condition, non-compliance or inconsistency with prescribing agreement?): n/a

## 2018-11-14 ENCOUNTER — LAB REQUISITION (OUTPATIENT)
Dept: LAB | Facility: HOSPITAL | Age: 78
End: 2018-11-14

## 2018-11-14 DIAGNOSIS — N18.9 CHRONIC KIDNEY DISEASE: ICD-10-CM

## 2018-11-14 DIAGNOSIS — R79.89 OTHER SPECIFIED ABNORMAL FINDINGS OF BLOOD CHEMISTRY: ICD-10-CM

## 2018-11-14 LAB
ANION GAP SERPL CALCULATED.3IONS-SCNC: 8 MMOL/L
BUN BLD-MCNC: 32 MG/DL (ref 8–23)
BUN/CREAT SERPL: 25.8 (ref 7–25)
CALCIUM SPEC-SCNC: 9.7 MG/DL (ref 8.8–10.5)
CHLORIDE SERPL-SCNC: 104 MMOL/L (ref 98–107)
CO2 SERPL-SCNC: 30 MMOL/L (ref 22–29)
CREAT BLD-MCNC: 1.24 MG/DL (ref 0.57–1)
GFR SERPL CREATININE-BSD FRML MDRD: 42 ML/MIN/1.73
GLUCOSE BLD-MCNC: 90 MG/DL (ref 65–99)
POTASSIUM BLD-SCNC: 5.4 MMOL/L (ref 3.5–5.2)
SODIUM BLD-SCNC: 142 MMOL/L (ref 136–145)

## 2018-11-14 PROCEDURE — 36415 COLL VENOUS BLD VENIPUNCTURE: CPT | Performed by: FAMILY MEDICINE

## 2018-11-14 PROCEDURE — 80048 BASIC METABOLIC PNL TOTAL CA: CPT | Performed by: FAMILY MEDICINE

## 2018-12-05 ENCOUNTER — LAB REQUISITION (OUTPATIENT)
Dept: LAB | Facility: HOSPITAL | Age: 78
End: 2018-12-05

## 2018-12-05 DIAGNOSIS — R79.9 ABNORMAL FINDING OF BLOOD CHEMISTRY: ICD-10-CM

## 2018-12-05 DIAGNOSIS — Z79.899 OTHER LONG TERM (CURRENT) DRUG THERAPY: ICD-10-CM

## 2018-12-05 DIAGNOSIS — N19 KIDNEY FAILURE: ICD-10-CM

## 2018-12-05 DIAGNOSIS — N18.30 CHRONIC KIDNEY DISEASE, STAGE III (MODERATE) (HCC): ICD-10-CM

## 2018-12-05 LAB
ANION GAP SERPL CALCULATED.3IONS-SCNC: 10.2 MMOL/L
BUN BLD-MCNC: 30 MG/DL (ref 8–23)
BUN/CREAT SERPL: 22.6 (ref 7–25)
CALCIUM SPEC-SCNC: 9.7 MG/DL (ref 8.8–10.5)
CHLORIDE SERPL-SCNC: 102 MMOL/L (ref 98–107)
CO2 SERPL-SCNC: 29.8 MMOL/L (ref 22–29)
CREAT BLD-MCNC: 1.33 MG/DL (ref 0.57–1)
GFR SERPL CREATININE-BSD FRML MDRD: 39 ML/MIN/1.73
GLUCOSE BLD-MCNC: 88 MG/DL (ref 65–99)
POTASSIUM BLD-SCNC: 5.2 MMOL/L (ref 3.5–5.2)
SODIUM BLD-SCNC: 142 MMOL/L (ref 136–145)

## 2018-12-05 PROCEDURE — 36415 COLL VENOUS BLD VENIPUNCTURE: CPT | Performed by: FAMILY MEDICINE

## 2018-12-05 PROCEDURE — 80048 BASIC METABOLIC PNL TOTAL CA: CPT | Performed by: FAMILY MEDICINE

## 2018-12-11 ENCOUNTER — RESULTS ENCOUNTER (OUTPATIENT)
Dept: PAIN MEDICINE | Facility: CLINIC | Age: 78
End: 2018-12-11

## 2018-12-11 ENCOUNTER — OFFICE VISIT (OUTPATIENT)
Dept: PAIN MEDICINE | Facility: CLINIC | Age: 78
End: 2018-12-11

## 2018-12-11 VITALS
BODY MASS INDEX: 33.22 KG/M2 | HEART RATE: 91 BPM | HEIGHT: 60 IN | SYSTOLIC BLOOD PRESSURE: 168 MMHG | WEIGHT: 169.2 LBS | DIASTOLIC BLOOD PRESSURE: 88 MMHG | RESPIRATION RATE: 15 BRPM | TEMPERATURE: 98 F | OXYGEN SATURATION: 92 %

## 2018-12-11 DIAGNOSIS — M51.36 DEGENERATION OF INTERVERTEBRAL DISC OF LUMBAR REGION: ICD-10-CM

## 2018-12-11 DIAGNOSIS — Z51.81 ENCOUNTER FOR MONITORING OPIOID MAINTENANCE THERAPY: ICD-10-CM

## 2018-12-11 DIAGNOSIS — Z79.891 ENCOUNTER FOR MONITORING OPIOID MAINTENANCE THERAPY: ICD-10-CM

## 2018-12-11 DIAGNOSIS — M16.9 OSTEOARTHRITIS OF HIP, UNSPECIFIED LATERALITY, UNSPECIFIED OSTEOARTHRITIS TYPE: ICD-10-CM

## 2018-12-11 DIAGNOSIS — G89.29 OTHER CHRONIC PAIN: Primary | ICD-10-CM

## 2018-12-11 DIAGNOSIS — G89.29 OTHER CHRONIC PAIN: ICD-10-CM

## 2018-12-11 PROCEDURE — 99213 OFFICE O/P EST LOW 20 MIN: CPT | Performed by: NURSE PRACTITIONER

## 2018-12-11 RX ORDER — TRAMADOL HYDROCHLORIDE 50 MG/1
100 TABLET ORAL EVERY 8 HOURS
Qty: 180 TABLET | Refills: 1 | Status: SHIPPED | OUTPATIENT
Start: 2018-12-11 | End: 2019-03-04 | Stop reason: SDUPTHER

## 2018-12-11 NOTE — PROGRESS NOTES
"CHIEF COMPLAINT    F/U hip pain, pt from [facility] and care giver states pain is being controlled.    Subjective   Erica Crockett is a 78 y.o. female  who presents to the office for follow-up.She has a history of back and hip pain.    Patient presents today with caregiver from [facility].  Report that her pain seems to be well controlled with current medication regimen in addition to intermittent hip bursa injections . She continues with Tramadol 100 mg TID and Gabapentin 100 mg HS.  They do not report any side effects or issues with the medication. She seems comfortable with no evidence of distress or discomfort.       Last hip (left) injection 10/2017 and did seem to help.      History of Present Illness     The following portions of the patient's history were reviewed and updated as appropriate: allergies, current medications, past family history, past medical history, past social history, past surgical history and problem list.    Review of Systems   Constitutional: Negative for chills and fever.   Respiratory: Negative for shortness of breath.    Cardiovascular: Negative for chest pain.   Gastrointestinal: Negative for constipation, diarrhea, nausea and vomiting.   Genitourinary: Negative for difficulty urinating, dyspareunia and dysuria.   Musculoskeletal:        Hip   Neurological: Negative for dizziness, weakness, light-headedness, numbness and headaches.   Psychiatric/Behavioral: Positive for agitation. Negative for confusion, hallucinations, self-injury, sleep disturbance and suicidal ideas. The patient is nervous/anxious.        Vitals:    12/11/18 1258   BP: 168/88   Pulse: 91   Resp: 15   Temp: 98 °F (36.7 °C)   SpO2: 92%   Weight: 76.7 kg (169 lb 3.2 oz)   Height: 152.4 cm (60\")   PainSc: Comment: Pt nonverbal [facility] pt         Objective   Physical Exam   Constitutional: She appears well-developed and well-nourished.   HENT:   Head: Normocephalic.   Eyes: Conjunctivae and EOM are normal. "   Cardiovascular: Normal rate.   Pulmonary/Chest: Effort normal. No respiratory distress.   Musculoskeletal:        Right hip: She exhibits tenderness.        Left hip: She exhibits tenderness.        Lumbar back: She exhibits tenderness.   Neurological: She is alert. She exhibits abnormal muscle tone. Gait (wheelchair) abnormal.   Skin: Skin is warm and dry.   Psychiatric: She is agitated. She is noncommunicative.   Non verbal   Tearful/upset today    Nursing note and vitals reviewed.      Assessment/Plan   Erica was seen today for hip pain.    Diagnoses and all orders for this visit:    Other chronic pain    Degeneration of intervertebral disc of lumbar region    Osteoarthritis of hip, unspecified laterality, unspecified osteoarthritis type    Encounter for monitoring opioid maintenance therapy    Other orders  -     traMADol (ULTRAM) 50 MG tablet; Take 2 tablets by mouth Every 8 (Eight) Hours.      --- Continue Tramadol. Patient appears stable with current regimen. No adverse effects. Regarding continuation of opioids, there is no evidence of aberrant behavior or any red flags.  The patient continues with appropriate response to opioid therapy. ADL's remain intact by self.   --- The urine drug screen confirmation from 9/14/17 has been reviewed and the result is appropriate based on patient history and JOSE report  --- Routine ODT in office today as part of monitoring requirements for controlled substances.  This specimen will be sent to [facility] laboratory for confirmation.     --- Follow-up 3 months          JOSE REPORT  As part of the patient's treatment plan, I am prescribing controlled substances. The patient has been made aware of appropriate use of such medications, including potential risk of somnolence, limited ability to drive and/or work safely, and the potential for dependence or overdose. It has also bee made clear that these medications are for use by this patient only, without concomitant  use of alcohol or other substances unless prescribed.     Patient has completed prescribing agreement detailing terms of continued prescribing of controlled substances, including monitoring JOSE reports, urine drug screening, and pill counts if necessary. The patient is aware that inappropriate use will results in cessation of prescribing such medications.    JOSE report has been reviewed and scanned into the patient's chart.    As the clinician, I personally reviewed the JOSE from 12/10/2018 while the patient was in the office today.    History and physical exam exhibit continued safe and appropriate use of controlled substances.    EMR Dragon/Transcription disclaimer:   Much of this encounter note is an electronic transcription/translation of spoken language to printed text. The electronic translation of spoken language may permit erroneous, or at times, nonsensical words or phrases to be inadvertently transcribed; Although I have reviewed the note for such errors, some may still exist.

## 2018-12-12 ENCOUNTER — APPOINTMENT (OUTPATIENT)
Dept: BONE DENSITY | Facility: HOSPITAL | Age: 78
End: 2018-12-12
Attending: FAMILY MEDICINE

## 2018-12-20 ENCOUNTER — LAB REQUISITION (OUTPATIENT)
Dept: LAB | Facility: HOSPITAL | Age: 78
End: 2018-12-20

## 2018-12-20 DIAGNOSIS — N39.0 URINARY TRACT INFECTION: ICD-10-CM

## 2018-12-20 LAB
BACTERIA UR QL AUTO: ABNORMAL /HPF
BILIRUB UR QL STRIP: NEGATIVE
CLARITY UR: ABNORMAL
COLOR UR: YELLOW
GLUCOSE UR STRIP-MCNC: NEGATIVE MG/DL
HGB UR QL STRIP.AUTO: NEGATIVE
HYALINE CASTS UR QL AUTO: ABNORMAL /LPF
KETONES UR QL STRIP: NEGATIVE
LEUKOCYTE ESTERASE UR QL STRIP.AUTO: ABNORMAL
NITRITE UR QL STRIP: POSITIVE
PH UR STRIP.AUTO: 6.5 [PH] (ref 4.5–8)
PROT UR QL STRIP: NEGATIVE
RBC # UR: ABNORMAL /HPF
REF LAB TEST METHOD: ABNORMAL
SP GR UR STRIP: 1.01 (ref 1–1.03)
SQUAMOUS #/AREA URNS HPF: ABNORMAL /HPF
UROBILINOGEN UR QL STRIP: ABNORMAL
WBC UR QL AUTO: ABNORMAL /HPF

## 2018-12-20 PROCEDURE — 87086 URINE CULTURE/COLONY COUNT: CPT | Performed by: FAMILY MEDICINE

## 2018-12-20 PROCEDURE — 87077 CULTURE AEROBIC IDENTIFY: CPT | Performed by: FAMILY MEDICINE

## 2018-12-20 PROCEDURE — 81001 URINALYSIS AUTO W/SCOPE: CPT | Performed by: FAMILY MEDICINE

## 2018-12-20 PROCEDURE — 87186 SC STD MICRODIL/AGAR DIL: CPT | Performed by: FAMILY MEDICINE

## 2018-12-23 LAB — BACTERIA SPEC AEROBE CULT: ABNORMAL

## 2018-12-28 ENCOUNTER — TRANSCRIBE ORDERS (OUTPATIENT)
Dept: ADMINISTRATIVE | Facility: HOSPITAL | Age: 78
End: 2018-12-28

## 2018-12-28 DIAGNOSIS — M89.9 BONE DISORDER: Primary | ICD-10-CM

## 2019-01-10 ENCOUNTER — APPOINTMENT (OUTPATIENT)
Dept: BONE DENSITY | Facility: HOSPITAL | Age: 79
End: 2019-01-10
Attending: FAMILY MEDICINE

## 2019-01-29 RX ORDER — GABAPENTIN 100 MG/1
100 CAPSULE ORAL NIGHTLY
Qty: 30 CAPSULE | Refills: 5 | Status: SHIPPED | OUTPATIENT
Start: 2019-01-29 | End: 2021-03-22 | Stop reason: SDUPTHER

## 2019-02-14 ENCOUNTER — OUTSIDE FACILITY SERVICE (OUTPATIENT)
Dept: PAIN MEDICINE | Facility: CLINIC | Age: 79
End: 2019-02-14

## 2019-02-14 ENCOUNTER — DOCUMENTATION (OUTPATIENT)
Dept: PAIN MEDICINE | Facility: CLINIC | Age: 79
End: 2019-02-14

## 2019-02-14 PROCEDURE — 20610 DRAIN/INJ JOINT/BURSA W/O US: CPT | Performed by: ANESTHESIOLOGY

## 2019-02-14 PROCEDURE — 77002 NEEDLE LOCALIZATION BY XRAY: CPT | Performed by: ANESTHESIOLOGY

## 2019-02-15 NOTE — PROGRESS NOTES
Left greater trochanteric bursa injection with sedation and fluoro guidance     [facility]        PREOPERATIVE DIAGNOSIS:  Left hip GT bursitis.  Nonverbal signs of pain     POSTOPERATIVE DIAGNOSIS:  Same as preop diagnosis     PROCEDURE:   Left greater trochanteric bursa injection with fluroscopic guidance        PRE-PROCEDURE DISCUSSION WITH PATIENT:    Risks and complications were discussed with the patient prior to starting the procedure and informed consent was obtained.  We discussed various topics including but not limited to bleeding, infection, injury, nerve injury, paralysis, coma, death, postprocedural painful flare-up, postprocedural site soreness, and a lack of pain relief.          SURGEON:  Benedict Agosto MD     REASON FOR PROCEDURE:    Previous therapeutic benefit was realized on multiple occasions... She had less outward behaviors / acting out from pain, and this was sustained for months.     SEDATION:  Versed 4mg & Fentanyl 50 mcg IV  ANESTHETIC:  Marcaine 0.5%  STEROID:  Methylprednisolone (DEPO MEDROL) 40mg/ml     DESCRIPTON OF PROCEDURE:  After obtaining informed consent, an I.V. was started in the preoperative area. The patient taken to the operating room and was placed in the right lateral decubitus position.  All pressure points were well padded.  EKG, blood pressure, and pulse oximeter were monitored.  The appropriate area was prepped with Chloraprep and draped in a sterile fashion.      Area over the left greater trochanter was palpated and marked on the skin and then cleansed with Hibiclens ×2. A 25-gauge needle was inserted about 1 & 1/2 inches in to the skin and into the bursa, with periosteum contact over the greater trochanter and the needle was withdrawn about 2 mm into the bursa. Aspiration was negative, and then 1ml of 1:4 diluted omnipaque was injected and spread was in the distribution of the bursa, and slowly the contents of the injectate syringe were  injected into the hip bursa.       The needle was removed intact and bleeding was minimal. The insertion site was dressed with a Band-Aid.  There were no apparent complications.      Vital signs stable throughout     ESTIMATED BLOOD LOSS:  <5 mL  SPECIMENS:  none     COMPLICATIONS:   No complications were noted., There was no indication of vascular uptake on live injection of contrast dye. and of note, she tolerated small amt of contrast on prior injection with no difficulty. We are avoiding large doses of IV injection.     TOLERANCE & DISCHARGE CONDITION:    The patient tolerated the procedure well.  The patient was transported to the recovery area without difficulties.  The patient was discharged to home under the care of family in stable and satisfactory condition.     PLAN OF CARE:  1. The patient was given our standard instruction sheet.  2. The patient will Return to clinic in 4 wks.  3. The patient will resume all medications as per the medication reconciliation sheet.

## 2019-02-20 ENCOUNTER — LAB REQUISITION (OUTPATIENT)
Dept: LAB | Facility: HOSPITAL | Age: 79
End: 2019-02-20

## 2019-02-20 DIAGNOSIS — Z79.899 OTHER LONG TERM (CURRENT) DRUG THERAPY: ICD-10-CM

## 2019-02-20 DIAGNOSIS — R79.9 ABNORMAL FINDING OF BLOOD CHEMISTRY: ICD-10-CM

## 2019-02-20 LAB
ANION GAP SERPL CALCULATED.3IONS-SCNC: 9.8 MMOL/L
BUN BLD-MCNC: 29 MG/DL (ref 8–23)
BUN/CREAT SERPL: 25.4 (ref 7–25)
CALCIUM SPEC-SCNC: 9.6 MG/DL (ref 8.8–10.5)
CHLORIDE SERPL-SCNC: 102 MMOL/L (ref 98–107)
CO2 SERPL-SCNC: 29.2 MMOL/L (ref 22–29)
CREAT BLD-MCNC: 1.14 MG/DL (ref 0.57–1)
GFR SERPL CREATININE-BSD FRML MDRD: 46 ML/MIN/1.73
GLUCOSE BLD-MCNC: 89 MG/DL (ref 65–99)
POTASSIUM BLD-SCNC: 4.7 MMOL/L (ref 3.5–5.2)
SODIUM BLD-SCNC: 141 MMOL/L (ref 136–145)

## 2019-02-20 PROCEDURE — 80048 BASIC METABOLIC PNL TOTAL CA: CPT | Performed by: FAMILY MEDICINE

## 2019-02-25 ENCOUNTER — LAB REQUISITION (OUTPATIENT)
Dept: LAB | Facility: HOSPITAL | Age: 79
End: 2019-02-25

## 2019-02-25 DIAGNOSIS — R33.0 DRUG-INDUCED RETENTION OF URINE: ICD-10-CM

## 2019-02-25 DIAGNOSIS — R82.998 OTHER ABNORMAL FINDINGS IN URINE: ICD-10-CM

## 2019-02-25 DIAGNOSIS — R45.1 RESTLESSNESS AND AGITATION: ICD-10-CM

## 2019-02-25 LAB
AMORPH URATE CRY URNS QL MICRO: ABNORMAL /HPF
BACTERIA UR QL AUTO: ABNORMAL /HPF
BILIRUB UR QL STRIP: NEGATIVE
CLARITY UR: ABNORMAL
COLOR UR: YELLOW
GLUCOSE UR STRIP-MCNC: NEGATIVE MG/DL
HGB UR QL STRIP.AUTO: NEGATIVE
HYALINE CASTS UR QL AUTO: ABNORMAL /LPF
KETONES UR QL STRIP: ABNORMAL
LEUKOCYTE ESTERASE UR QL STRIP.AUTO: ABNORMAL
NITRITE UR QL STRIP: NEGATIVE
PH UR STRIP.AUTO: 5.5 [PH] (ref 4.5–8)
PROT UR QL STRIP: ABNORMAL
RBC # UR: ABNORMAL /HPF
REF LAB TEST METHOD: ABNORMAL
SP GR UR STRIP: 1.02 (ref 1–1.03)
SQUAMOUS #/AREA URNS HPF: ABNORMAL /HPF
UROBILINOGEN UR QL STRIP: ABNORMAL
WBC UR QL AUTO: ABNORMAL /HPF

## 2019-02-25 PROCEDURE — 87086 URINE CULTURE/COLONY COUNT: CPT | Performed by: FAMILY MEDICINE

## 2019-02-25 PROCEDURE — 81001 URINALYSIS AUTO W/SCOPE: CPT | Performed by: FAMILY MEDICINE

## 2019-02-25 RX ORDER — LORAZEPAM 2 MG/ML
2 INJECTION INTRAMUSCULAR EVERY 6 HOURS PRN
Qty: 10 ML | Refills: 5 | Status: ON HOLD | OUTPATIENT
Start: 2019-02-25 | End: 2022-02-10

## 2019-02-25 RX ORDER — LORAZEPAM 2 MG/ML
2 INJECTION INTRAMUSCULAR EVERY 6 HOURS PRN
Qty: 10 ML | Refills: 5 | Status: SHIPPED | OUTPATIENT
Start: 2019-02-25 | End: 2019-02-25 | Stop reason: SDUPTHER

## 2019-02-25 NOTE — TELEPHONE ENCOUNTER
----- Message from Kerline Gonzalez sent at 2019  4:06 PM EST -----  Contact: [phone]  Patient needs a new script for Lorazepam2 MG/ML injection, its about to .

## 2019-02-28 LAB — BACTERIA SPEC AEROBE CULT: ABNORMAL

## 2019-03-04 ENCOUNTER — TELEPHONE (OUTPATIENT)
Dept: PAIN MEDICINE | Facility: CLINIC | Age: 79
End: 2019-03-04

## 2019-03-04 RX ORDER — TRAMADOL HYDROCHLORIDE 50 MG/1
TABLET ORAL
Qty: 180 TABLET | Refills: 0 | Status: SHIPPED | OUTPATIENT
Start: 2019-03-04 | End: 2019-03-08 | Stop reason: ALTCHOICE

## 2019-03-04 NOTE — TELEPHONE ENCOUNTER
Refill request    Please fill since EVERARDO out of office and [facility] states pt is going to run out of medicine today.. They just requested this.

## 2019-03-05 NOTE — TELEPHONE ENCOUNTER
It says the pharmacy received the prescription yesterday.  I do not understand why they cannot get her the medication.  Tell them to call the pharmacy.

## 2019-03-05 NOTE — TELEPHONE ENCOUNTER
Dr. Agosto refilled Tramadol yesterday. [place] has not received yet, wants to know what she can take till meds are delivered.

## 2019-03-08 ENCOUNTER — OFFICE VISIT (OUTPATIENT)
Dept: PAIN MEDICINE | Facility: CLINIC | Age: 79
End: 2019-03-08

## 2019-03-08 VITALS
HEIGHT: 60 IN | HEART RATE: 100 BPM | RESPIRATION RATE: 16 BRPM | WEIGHT: 166.5 LBS | DIASTOLIC BLOOD PRESSURE: 95 MMHG | TEMPERATURE: 97.6 F | OXYGEN SATURATION: 94 % | SYSTOLIC BLOOD PRESSURE: 164 MMHG | BODY MASS INDEX: 32.69 KG/M2

## 2019-03-08 DIAGNOSIS — G89.29 OTHER CHRONIC PAIN: Primary | ICD-10-CM

## 2019-03-08 DIAGNOSIS — Z79.891 ENCOUNTER FOR MONITORING OPIOID MAINTENANCE THERAPY: ICD-10-CM

## 2019-03-08 DIAGNOSIS — Z51.81 ENCOUNTER FOR MONITORING OPIOID MAINTENANCE THERAPY: ICD-10-CM

## 2019-03-08 DIAGNOSIS — M51.36 DEGENERATION OF INTERVERTEBRAL DISC OF LUMBAR REGION: ICD-10-CM

## 2019-03-08 DIAGNOSIS — M16.9 OSTEOARTHRITIS OF HIP, UNSPECIFIED LATERALITY, UNSPECIFIED OSTEOARTHRITIS TYPE: ICD-10-CM

## 2019-03-08 PROCEDURE — 99214 OFFICE O/P EST MOD 30 MIN: CPT | Performed by: NURSE PRACTITIONER

## 2019-03-08 RX ORDER — HYDROCODONE BITARTRATE AND ACETAMINOPHEN 5; 325 MG/1; MG/1
1 TABLET ORAL EVERY 6 HOURS
Qty: 120 TABLET | Refills: 0 | Status: SHIPPED | OUTPATIENT
Start: 2019-03-08 | End: 2019-04-02 | Stop reason: ALTCHOICE

## 2019-03-08 NOTE — PROGRESS NOTES
"CHIEF COMPLAINT    F/U hip pain. Pt [facility] patient, nonverbal. Nurse has given us a note stating patient has been showing moments of increased pain. She is asking for something PRN pain or increase in tramadol.     Subjective   Erica Crockett is a 78 y.o. female  who presents to the office for follow-up.She has a history of chronic back and hip pain.    Left GT bursa injection 2/14/19 - did not seem to help    Nurse note reviewed - patient exhibiting symptoms of increased pain -says patient will be walking and then act as if she is in pain and cannot take another step and will sit on the floor.  Frequent agitation, tearfulness, and grabbing at her hip and legs.  Bending over while toileting and grabbing/rubbing/smacking her leg and lower extremity.  She is requesting a change in her medication as the Tylenol as needed in addition to tramadol do not seem to be effective.    History of Present Illness     The following portions of the patient's history were reviewed and updated as appropriate: allergies, current medications, past family history, past medical history, past social history, past surgical history and problem list.    Review of Systems   Constitutional: Negative for chills and fever.   Respiratory: Negative for shortness of breath.    Cardiovascular: Negative for chest pain.   Gastrointestinal: Negative for constipation, diarrhea, nausea and vomiting.   Genitourinary: Negative for difficulty urinating, dyspareunia and dysuria.   Musculoskeletal:        Hip   Neurological: Negative for dizziness, weakness, light-headedness, numbness and headaches.   Psychiatric/Behavioral: Positive for agitation. Negative for confusion, hallucinations, self-injury, sleep disturbance and suicidal ideas. The patient is nervous/anxious.      Vitals:    03/08/19 1311   BP: 164/95   Pulse: 100   Resp: 16   Temp: 97.6 °F (36.4 °C)   SpO2: 94%   Weight: 75.5 kg (166 lb 8 oz)   Height: 152.4 cm (60\")   PainLoc: Comment: Pt is " nonverbal [facility] patient     Objective   Physical Exam   Constitutional: She appears well-developed and well-nourished. No distress.   HENT:   Head: Normocephalic.   Eyes: Conjunctivae and EOM are normal.   Cardiovascular: Normal rate.   Pulmonary/Chest: Effort normal. No respiratory distress.   Musculoskeletal:        Right hip: She exhibits tenderness.        Left hip: She exhibits tenderness.        Lumbar back: She exhibits tenderness.   Neurological: She is alert. She exhibits abnormal muscle tone. Gait (wheelchair) abnormal.   Skin: Skin is warm and dry. She is not diaphoretic.   Psychiatric: She is agitated. She is noncommunicative.   Non verbal    Nursing note and vitals reviewed.    Assessment/Plan   Erica was seen today for hip pain.    Diagnoses and all orders for this visit:    Other chronic pain    Degeneration of intervertebral disc of lumbar region    Osteoarthritis of hip, unspecified laterality, unspecified osteoarthritis type    Encounter for monitoring opioid maintenance therapy    Other orders  -     HYDROcodone-acetaminophen (NORCO) 5-325 MG per tablet; Take 1 tablet by mouth Every 6 (Six) Hours.      --- D/c Tramadol  --- Trial Hydrocodone 5/325 qid  --- The urine drug screen confirmation from 12/11/18 has been reviewed and the result is appropriate based on patient history and JOSE report  --- Follow-up 1 month          JOSE REPORT    As part of the patient's treatment plan, I am prescribing controlled substances. The patient has been made aware of appropriate use of such medications, including potential risk of somnolence, limited ability to drive and/or work safely, and the potential for dependence or overdose. It has also bee made clear that these medications are for use by this patient only, without concomitant use of alcohol or other substances unless prescribed.     Patient has completed prescribing agreement detailing terms of continued prescribing of controlled substances,  including monitoring JOSE reports, urine drug screening, and pill counts if necessary. The patient is aware that inappropriate use will results in cessation of prescribing such medications.    JOSE report has been reviewed and scanned into the patient's chart.    As the clinician, I personally reviewed the JOSE from 3/6/19 while the patient was in the office today.    History and physical exam exhibit continued safe and appropriate use of controlled substances.      EMR Dragon/Transcription disclaimer:   Much of this encounter note is an electronic transcription/translation of spoken language to printed text. The electronic translation of spoken language may permit erroneous, or at times, nonsensical words or phrases to be inadvertently transcribed; Although I have reviewed the note for such errors, some may still exist.

## 2019-03-20 ENCOUNTER — LAB REQUISITION (OUTPATIENT)
Dept: LAB | Facility: HOSPITAL | Age: 79
End: 2019-03-20

## 2019-03-20 DIAGNOSIS — R79.9 ABNORMAL FINDING OF BLOOD CHEMISTRY: ICD-10-CM

## 2019-03-20 DIAGNOSIS — N18.30 CHRONIC KIDNEY DISEASE, STAGE III (MODERATE) (HCC): ICD-10-CM

## 2019-03-20 DIAGNOSIS — Z79.899 OTHER LONG TERM (CURRENT) DRUG THERAPY: ICD-10-CM

## 2019-03-20 LAB
ANION GAP SERPL CALCULATED.3IONS-SCNC: 11.1 MMOL/L
BUN BLD-MCNC: 31 MG/DL (ref 8–23)
BUN/CREAT SERPL: 27.2 (ref 7–25)
CALCIUM SPEC-SCNC: 9.7 MG/DL (ref 8.6–10.5)
CHLORIDE SERPL-SCNC: 103 MMOL/L (ref 98–107)
CO2 SERPL-SCNC: 29.9 MMOL/L (ref 22–29)
CREAT BLD-MCNC: 1.14 MG/DL (ref 0.57–1)
GFR SERPL CREATININE-BSD FRML MDRD: 46 ML/MIN/1.73
GLUCOSE BLD-MCNC: 90 MG/DL (ref 65–99)
POTASSIUM BLD-SCNC: 5 MMOL/L (ref 3.5–5.2)
SODIUM BLD-SCNC: 144 MMOL/L (ref 136–145)

## 2019-03-20 PROCEDURE — 80048 BASIC METABOLIC PNL TOTAL CA: CPT | Performed by: FAMILY MEDICINE

## 2019-04-01 NOTE — TELEPHONE ENCOUNTER
Patient was rescheduled from 4/9 to 4/16 and will need a refill on NORCO. They also asked if we could prescribe her something for her breakthrough pain.

## 2019-04-02 RX ORDER — TRAMADOL HYDROCHLORIDE 50 MG/1
100 TABLET ORAL EVERY 8 HOURS
Qty: 180 TABLET | Refills: 0 | Status: SHIPPED | OUTPATIENT
Start: 2019-04-02 | End: 2019-05-02 | Stop reason: SDUPTHER

## 2019-04-02 RX ORDER — HYDROCODONE BITARTRATE AND ACETAMINOPHEN 5; 325 MG/1; MG/1
1 TABLET ORAL EVERY 6 HOURS
Qty: 120 TABLET | Refills: 0 | OUTPATIENT
Start: 2019-04-02

## 2019-04-02 NOTE — TELEPHONE ENCOUNTER
Medication Refill Request    Date of phone call: 19    Medication being requested: hydro/apap 5-325 mg si tab q 6 hrs  Qty: 120    Date of last visit: 3/8/19    Date of last refill: 3/8/19    JOSE up to date?: yes    Next Follow up?: 19    Any new pertinent information? (i.e, new medication allergies, new use of medications, change in patient's health or condition, non-compliance or inconsistency with prescribing agreement?):

## 2019-04-02 NOTE — TELEPHONE ENCOUNTER
They stated that the patient is agitated and staying awake all night. They are wanting to try a different medication so they will keep the appt 4/16. But they do need a refill for the med until her next appt.

## 2019-04-02 NOTE — TELEPHONE ENCOUNTER
Medication changed last visit. Can we please call CLL to see how she is doing?  If she is stable/doing well we can go ahead and see her at the regular 3 month interval. Otherwise, have her keep appointment.

## 2019-04-10 ENCOUNTER — OFFICE (AMBULATORY)
Dept: URBAN - METROPOLITAN AREA CLINIC 6 | Facility: CLINIC | Age: 79
End: 2019-04-10
Payer: MEDICAID

## 2019-04-10 DIAGNOSIS — D12.4 BENIGN NEOPLASM OF DESCENDING COLON: ICD-10-CM

## 2019-04-10 DIAGNOSIS — K57.30 DIVERTICULOSIS OF LARGE INTESTINE WITHOUT PERFORATION OR ABS: ICD-10-CM

## 2019-04-10 DIAGNOSIS — R19.4 CHANGE IN BOWEL HABIT: ICD-10-CM

## 2019-04-10 PROCEDURE — 99213 OFFICE O/P EST LOW 20 MIN: CPT | Performed by: INTERNAL MEDICINE

## 2019-04-14 VITALS
HEART RATE: 77 BPM | WEIGHT: 166 LBS | RESPIRATION RATE: 18 BRPM | DIASTOLIC BLOOD PRESSURE: 71 MMHG | HEIGHT: 65 IN | SYSTOLIC BLOOD PRESSURE: 124 MMHG

## 2019-04-16 ENCOUNTER — OFFICE VISIT (OUTPATIENT)
Dept: PAIN MEDICINE | Facility: CLINIC | Age: 79
End: 2019-04-16

## 2019-04-16 VITALS
TEMPERATURE: 98.4 F | RESPIRATION RATE: 18 BRPM | SYSTOLIC BLOOD PRESSURE: 134 MMHG | HEART RATE: 75 BPM | OXYGEN SATURATION: 92 % | DIASTOLIC BLOOD PRESSURE: 82 MMHG

## 2019-04-16 DIAGNOSIS — M16.9 OSTEOARTHRITIS OF HIP, UNSPECIFIED LATERALITY, UNSPECIFIED OSTEOARTHRITIS TYPE: ICD-10-CM

## 2019-04-16 DIAGNOSIS — G89.29 OTHER CHRONIC PAIN: Primary | ICD-10-CM

## 2019-04-16 DIAGNOSIS — R52 NONVERBAL SIGNS OF PAIN: ICD-10-CM

## 2019-04-16 DIAGNOSIS — M70.72 BURSITIS OF LEFT HIP, UNSPECIFIED BURSA: ICD-10-CM

## 2019-04-16 DIAGNOSIS — M51.36 DEGENERATION OF INTERVERTEBRAL DISC OF LUMBAR REGION: ICD-10-CM

## 2019-04-16 DIAGNOSIS — Z51.81 ENCOUNTER FOR MONITORING OPIOID MAINTENANCE THERAPY: ICD-10-CM

## 2019-04-16 DIAGNOSIS — Z79.891 ENCOUNTER FOR MONITORING OPIOID MAINTENANCE THERAPY: ICD-10-CM

## 2019-04-16 PROCEDURE — 99214 OFFICE O/P EST MOD 30 MIN: CPT | Performed by: NURSE PRACTITIONER

## 2019-04-16 NOTE — PROGRESS NOTES
CHIEF COMPLAINT  F/U hip pain. Ginnaetn non verbal. [facility] Patient. No new notes from nurse. Nurse would like to know when her next set of injections should be scheduled?     Subjective   Erica Crockett is a 78 y.o. female  who presents to the office for follow-up.She has a history of chronic pain. The staff called office and reported her pain is worse. Also request PGT. However, after discussion with caregiver, her pain is now somewhat improved. At her last office visit, they had stated she was having increased pain. Her regimen was changed from Tramadol 50 mg 2 TID to Hydrocodone. The staff states she did not tolerate the Hydrocodone well. The Hydrocodone was discontinued and the patient was returned to previous regimen of Tramadol. SInce she has returned to that regimen, they report her pain is better controlled since then. They do want to know when her next hip injection can be scheduled as well. Patient remains non-verbal long-term resident of [facility]. Overall, since switching back, they report her pain is better controlled and have noticed improvement in the patient.  Patient unable to give pain score. She is not moaning or groaning. She is smiling and responds positively with her name is spoken. She remains wheelchair bound but is sitting appropriately.  Presents with Mateo, caregiver from [facility]. NO other changes to report.    History of Present Illness     The following portions of the patient's history were reviewed and updated as appropriate: allergies, current medications, past family history, past medical history, past social history, past surgical history and problem list.    Review of Systems   Constitutional: Negative for chills and fever.   Respiratory: Negative for shortness of breath.    Cardiovascular: Negative for chest pain.   Gastrointestinal: Negative for constipation, diarrhea, nausea and vomiting.   Genitourinary: Negative for difficulty urinating, dyspareunia and dysuria.    Musculoskeletal:        Hip   Neurological: Negative for dizziness, weakness, light-headedness, numbness and headaches.   Psychiatric/Behavioral: Positive for agitation. Negative for confusion, hallucinations, self-injury, sleep disturbance and suicidal ideas. The patient is nervous/anxious.        Vitals:    04/16/19 1314   BP: 134/82   Pulse: 75   Resp: 18   Temp: 98.4 °F (36.9 °C)   SpO2: 92%       Objective   Physical Exam   Constitutional: She appears well-developed and well-nourished. No distress.   HENT:   Head: Normocephalic.   Eyes: Conjunctivae are normal.   Cardiovascular: Normal rate.   Pulmonary/Chest: Effort normal.   Musculoskeletal:        Right hip: She exhibits tenderness.        Left hip: She exhibits tenderness.        Lumbar back: She exhibits tenderness.   Neurological: She is alert. She exhibits abnormal muscle tone. Gait (wheelchair) abnormal.   Skin: Skin is warm and dry.   Psychiatric: She has a normal mood and affect. Her behavior is normal. She is noncommunicative.   Non verbal   Behavior and mood are normal for patient   Nursing note and vitals reviewed.      Assessment/Plan   Erica was seen today for hip pain.    Diagnoses and all orders for this visit:    Other chronic pain    Nonverbal signs of pain    Degeneration of intervertebral disc of lumbar region    Osteoarthritis of hip, unspecified laterality, unspecified osteoarthritis type  -     Case Request    Encounter for monitoring opioid maintenance therapy    Bursitis of left hip, unspecified bursa  -     Case Request      --- The oral drug screen confirmation from 12-11-18 has been reviewed and the result is APPROPRIATE based on patient history and JOSE report  --- PGT in office today. Will await results.   --- Repeat left GTB injection with Dr Agosto after 5-14-19. Reviewed the procedure at length with the patient.  Included in the review was expectations, complications, risk and benefits.The procedure was described in  detail and the risks, benefits and alternatives were discussed with the patient (including but not limited to: bleeding, infection, nerve damage, worsening of pain, inability to perform injection, paralysis, seizures, and death) who agreed to proceed.  Discussed the potential for sedation if warranted/wanted.  The procedure will plan to be performed at [facility] with fluoroscopic guidance(unless ultrasound is indicated). Questions were answered and in a way the patient could understand.  Patient verbalized understanding and wishes to proceed.  This intervention will be ordered.  --- Continue with Tramadol in place of hydrocodone. Patient appears stable with current regimen. No adverse effects. Regarding continuation of opioids, there is no evidence of aberrant behavior or any red flags.  The patient continues with appropriate response to opioid therapy as per nursing report.   --- Follow-up 3 months or sooner if needed       JOSE REPORT    As part of the patient's treatment plan, I am prescribing controlled substances. The patient has been made aware of appropriate use of such medications, including potential risk of somnolence, limited ability to drive and/or work safely, and the potential for dependence or overdose. It has also bee made clear that these medications are for use by this patient only, without concomitant use of alcohol or other substances unless prescribed.     Patient has completed prescribing agreement detailing terms of continued prescribing of controlled substances, including monitoring JOSE reports, urine drug screening, and pill counts if necessary. The patient is aware that inappropriate use will results in cessation of prescribing such medications.    JOSE report has been reviewed and scanned into the patient's chart.    As the clinician, I personally reviewed the JOSE from 4-12-19 while the patient was in the office today.    History and physical exam exhibit  continued safe and appropriate use of controlled substances.      EMR Dragon/Transcription disclaimer:   Much of this encounter note is an electronic transcription/translation of spoken language to printed text. The electronic translation of spoken language may permit erroneous, or at times, nonsensical words or phrases to be inadvertently transcribed; Although I have reviewed the note for such errors, some may still exist.

## 2019-04-17 ENCOUNTER — LAB REQUISITION (OUTPATIENT)
Dept: LAB | Facility: HOSPITAL | Age: 79
End: 2019-04-17

## 2019-04-17 DIAGNOSIS — Z79.899 OTHER LONG TERM (CURRENT) DRUG THERAPY: ICD-10-CM

## 2019-04-17 LAB
ANION GAP SERPL CALCULATED.3IONS-SCNC: 10.5 MMOL/L
BUN BLD-MCNC: 31 MG/DL (ref 8–23)
BUN/CREAT SERPL: 28.2 (ref 7–25)
CALCIUM SPEC-SCNC: 9.5 MG/DL (ref 8.6–10.5)
CHLORIDE SERPL-SCNC: 105 MMOL/L (ref 98–107)
CO2 SERPL-SCNC: 28.5 MMOL/L (ref 22–29)
CREAT BLD-MCNC: 1.1 MG/DL (ref 0.57–1)
GFR SERPL CREATININE-BSD FRML MDRD: 48 ML/MIN/1.73
GLUCOSE BLD-MCNC: 96 MG/DL (ref 65–99)
POTASSIUM BLD-SCNC: 4.6 MMOL/L (ref 3.5–5.2)
SODIUM BLD-SCNC: 144 MMOL/L (ref 136–145)

## 2019-04-17 PROCEDURE — 80048 BASIC METABOLIC PNL TOTAL CA: CPT | Performed by: FAMILY MEDICINE

## 2019-04-24 ENCOUNTER — APPOINTMENT (OUTPATIENT)
Dept: BONE DENSITY | Facility: HOSPITAL | Age: 79
End: 2019-04-24
Attending: FAMILY MEDICINE

## 2019-04-25 ENCOUNTER — TELEPHONE (OUTPATIENT)
Dept: PAIN MEDICINE | Facility: CLINIC | Age: 79
End: 2019-04-25

## 2019-04-29 ENCOUNTER — APPOINTMENT (OUTPATIENT)
Dept: BONE DENSITY | Facility: HOSPITAL | Age: 79
End: 2019-04-29
Attending: FAMILY MEDICINE

## 2019-04-29 DIAGNOSIS — M89.9 BONE DISORDER: ICD-10-CM

## 2019-04-29 PROCEDURE — 77080 DXA BONE DENSITY AXIAL: CPT

## 2019-05-01 ENCOUNTER — TRANSCRIBE ORDERS (OUTPATIENT)
Dept: ADMINISTRATIVE | Facility: HOSPITAL | Age: 79
End: 2019-05-01

## 2019-05-01 DIAGNOSIS — Z12.39 SCREENING BREAST EXAMINATION: Primary | ICD-10-CM

## 2019-05-02 RX ORDER — TRAMADOL HYDROCHLORIDE 50 MG/1
100 TABLET ORAL EVERY 8 HOURS
Qty: 30 TABLET | Refills: 0 | Status: SHIPPED | OUTPATIENT
Start: 2019-05-02 | End: 2019-05-06 | Stop reason: SDUPTHER

## 2019-05-03 ENCOUNTER — LAB REQUISITION (OUTPATIENT)
Dept: LAB | Facility: HOSPITAL | Age: 79
End: 2019-05-03

## 2019-05-03 DIAGNOSIS — R82.90 ABNORMAL FINDING IN URINE: ICD-10-CM

## 2019-05-03 LAB
AMORPH URATE CRY URNS QL MICRO: ABNORMAL /HPF
BACTERIA UR QL AUTO: ABNORMAL /HPF
BILIRUB UR QL STRIP: NEGATIVE
CLARITY UR: ABNORMAL
COLOR UR: ABNORMAL
GLUCOSE UR STRIP-MCNC: NEGATIVE MG/DL
HGB UR QL STRIP.AUTO: NEGATIVE
HYALINE CASTS UR QL AUTO: ABNORMAL /LPF
KETONES UR QL STRIP: NEGATIVE
LEUKOCYTE ESTERASE UR QL STRIP.AUTO: ABNORMAL
NITRITE UR QL STRIP: NEGATIVE
PH UR STRIP.AUTO: 7 [PH] (ref 4.5–8)
PROT UR QL STRIP: ABNORMAL
RBC # UR: ABNORMAL /HPF
REF LAB TEST METHOD: ABNORMAL
SP GR UR STRIP: 1.02 (ref 1–1.03)
SQUAMOUS #/AREA URNS HPF: ABNORMAL /HPF
UROBILINOGEN UR QL STRIP: ABNORMAL
WBC UR QL AUTO: ABNORMAL /HPF

## 2019-05-03 PROCEDURE — 87086 URINE CULTURE/COLONY COUNT: CPT | Performed by: FAMILY MEDICINE

## 2019-05-03 PROCEDURE — 81001 URINALYSIS AUTO W/SCOPE: CPT | Performed by: FAMILY MEDICINE

## 2019-05-04 LAB — BACTERIA SPEC AEROBE CULT: NORMAL

## 2019-05-06 ENCOUNTER — LAB REQUISITION (OUTPATIENT)
Dept: LAB | Facility: HOSPITAL | Age: 79
End: 2019-05-06

## 2019-05-06 DIAGNOSIS — N39.0 URINARY TRACT INFECTION: ICD-10-CM

## 2019-05-06 LAB

## 2019-05-06 PROCEDURE — 81001 URINALYSIS AUTO W/SCOPE: CPT | Performed by: FAMILY MEDICINE

## 2019-05-06 RX ORDER — TRAMADOL HYDROCHLORIDE 50 MG/1
100 TABLET ORAL EVERY 8 HOURS
Qty: 180 TABLET | Refills: 1 | Status: SHIPPED | OUTPATIENT
Start: 2019-05-06 | End: 2019-07-07 | Stop reason: SDUPTHER

## 2019-05-06 RX ORDER — TRAMADOL HYDROCHLORIDE 50 MG/1
TABLET ORAL
Qty: 30 TABLET | Refills: 0 | OUTPATIENT
Start: 2019-05-06

## 2019-05-16 ENCOUNTER — LAB REQUISITION (OUTPATIENT)
Dept: LAB | Facility: HOSPITAL | Age: 79
End: 2019-05-16

## 2019-05-16 DIAGNOSIS — N39.0 URINARY TRACT INFECTION: ICD-10-CM

## 2019-05-16 LAB
BACTERIA UR QL AUTO: ABNORMAL /HPF
BILIRUB UR QL STRIP: NEGATIVE
CLARITY UR: ABNORMAL
COLOR UR: YELLOW
GLUCOSE UR STRIP-MCNC: NEGATIVE MG/DL
HGB UR QL STRIP.AUTO: ABNORMAL
HYALINE CASTS UR QL AUTO: ABNORMAL /LPF
KETONES UR QL STRIP: NEGATIVE
LEUKOCYTE ESTERASE UR QL STRIP.AUTO: ABNORMAL
NITRITE UR QL STRIP: NEGATIVE
PH UR STRIP.AUTO: 6.5 [PH] (ref 4.5–8)
PROT UR QL STRIP: ABNORMAL
RBC # UR: ABNORMAL /HPF
REF LAB TEST METHOD: ABNORMAL
SP GR UR STRIP: 1.01 (ref 1–1.03)
SQUAMOUS #/AREA URNS HPF: ABNORMAL /HPF
UROBILINOGEN UR QL STRIP: ABNORMAL
WBC UR QL AUTO: ABNORMAL /HPF

## 2019-05-16 PROCEDURE — 87086 URINE CULTURE/COLONY COUNT: CPT | Performed by: FAMILY MEDICINE

## 2019-05-16 PROCEDURE — 87088 URINE BACTERIA CULTURE: CPT | Performed by: FAMILY MEDICINE

## 2019-05-16 PROCEDURE — 81001 URINALYSIS AUTO W/SCOPE: CPT | Performed by: FAMILY MEDICINE

## 2019-05-16 PROCEDURE — 87186 SC STD MICRODIL/AGAR DIL: CPT | Performed by: FAMILY MEDICINE

## 2019-05-18 LAB — BACTERIA SPEC AEROBE CULT: ABNORMAL

## 2019-05-22 ENCOUNTER — LAB REQUISITION (OUTPATIENT)
Dept: LAB | Facility: HOSPITAL | Age: 79
End: 2019-05-22

## 2019-05-22 DIAGNOSIS — Z79.899 OTHER LONG TERM (CURRENT) DRUG THERAPY: ICD-10-CM

## 2019-05-22 LAB
ANION GAP SERPL CALCULATED.3IONS-SCNC: 10.5 MMOL/L
BUN BLD-MCNC: 31 MG/DL (ref 8–23)
BUN/CREAT SERPL: 24 (ref 7–25)
CALCIUM SPEC-SCNC: 10.1 MG/DL (ref 8.6–10.5)
CHLORIDE SERPL-SCNC: 104 MMOL/L (ref 98–107)
CO2 SERPL-SCNC: 25.5 MMOL/L (ref 22–29)
CREAT BLD-MCNC: 1.29 MG/DL (ref 0.57–1)
GFR SERPL CREATININE-BSD FRML MDRD: 40 ML/MIN/1.73
GLUCOSE BLD-MCNC: 83 MG/DL (ref 65–99)
POTASSIUM BLD-SCNC: 5 MMOL/L (ref 3.5–5.2)
SODIUM BLD-SCNC: 140 MMOL/L (ref 136–145)

## 2019-05-22 PROCEDURE — 80048 BASIC METABOLIC PNL TOTAL CA: CPT | Performed by: FAMILY MEDICINE

## 2019-05-23 ENCOUNTER — DOCUMENTATION (OUTPATIENT)
Dept: PAIN MEDICINE | Facility: CLINIC | Age: 79
End: 2019-05-23

## 2019-05-23 ENCOUNTER — OUTSIDE FACILITY SERVICE (OUTPATIENT)
Dept: PAIN MEDICINE | Facility: CLINIC | Age: 79
End: 2019-05-23

## 2019-05-23 PROCEDURE — 77002 NEEDLE LOCALIZATION BY XRAY: CPT | Performed by: ANESTHESIOLOGY

## 2019-05-23 PROCEDURE — 20610 DRAIN/INJ JOINT/BURSA W/O US: CPT | Performed by: ANESTHESIOLOGY

## 2019-05-24 NOTE — PROGRESS NOTES
Greater Trochanteric Hip Bursa injection under fluoroscopic guidance    [facility]        PREOPERATIVE DIAGNOSIS:   Greater trochanteric hip bursitis on the Left     POSTOPERATIVE DIAGNOSIS:  Same as preop     PROCEDURE:  Greater trochanteric bursa injection, under fluoroscopic guidance on the aforementioned laterality      PRE-PROCEDURE DISCUSSION WITH PATIENT:    Risks and complications were discussed with the patient prior to starting the procedure and informed consent was obtained.  We discussed various topics including but not limited to bleeding, infection, injury, postprocedural site soreness, painful flareup, worsening of clinical picture, paralysis, coma, and death.      SURGEON:  Benedict Agosto MD     REASON FOR PROCEDURE:     Hip bursitis is flared up with tenderness of the GT Bursa on the aforementioned side.       SEDATION:  Versed 2mg & Fentanyl 50 mcg IV  ANESTHETIC AGENT:  Marcaine 0.5%  STEROID AGENT:  Methylprednisolone (DEPO MEDROL) 80mg/ml  Total volume of injected solution:   4 mL     DESCRIPTON OF PROCEDURE:    After obtaining informed consent, IV access was obtained in the preoperative area.  The patient was transported to the operative suite and placed in the prone position with a pillow under the pelvic area. EKG, blood pressure, and pulse oximeter were monitored.  Injectate solution prepared as above    Area over the greater trochanter on the aforementioned side was palpated and marked on the skin and then cleansed with Hibiclens ×2. A sterile needle was inserted about 1 & 1/2 inches in to the skin and into the bursa, under fluoro guidance, with periosteum contact over the greater trochanter and the needle was withdrawn about 2 mm into the bursa. Aspiration was negative and slowly after confirming bursa spread with omnipaque on each side, the rest of the injectate syringe was injected into the hip bursa.  The needle was removed intact on each side and bleeding was  minimal. The insertion site was dressed with a Band-Aid.  There were no apparent complications.         ESTIMATED BLOOD LOSS:  None  SPECIMENS:  None  COMPLICATIONS:   No complications were noted. and The patient did not have any signs of postprocedure numbness nor weakness.       TOLERANCE & DISCHARGE CONDITION:    The patient tolerated the procedure well.  The patient was transported to the recovery area without difficulties.  The patient was discharged to home under the care of family in stable and satisfactory condition.     PLAN OF CARE:  1. The patient was given our standard instruction sheet and will resume all medications as per the medication reconciliation sheet.  2. The patient will Repeat injection in 3 months PRN  3. The patient is instructed to keep a pain log hourly for 8 hours after the procedure.

## 2019-05-29 ENCOUNTER — LAB REQUISITION (OUTPATIENT)
Dept: LAB | Facility: HOSPITAL | Age: 79
End: 2019-05-29

## 2019-05-29 DIAGNOSIS — Z79.899 OTHER LONG TERM (CURRENT) DRUG THERAPY: ICD-10-CM

## 2019-05-29 DIAGNOSIS — R79.9 ABNORMAL FINDING OF BLOOD CHEMISTRY: ICD-10-CM

## 2019-05-29 LAB
ALBUMIN SERPL-MCNC: 3.7 G/DL (ref 3.5–5.2)
ALBUMIN/GLOB SERPL: 1.1 G/DL
ALP SERPL-CCNC: 89 U/L (ref 39–117)
ALT SERPL W P-5'-P-CCNC: 19 U/L (ref 1–33)
ANION GAP SERPL CALCULATED.3IONS-SCNC: 11 MMOL/L
AST SERPL-CCNC: 17 U/L (ref 1–32)
BILIRUB SERPL-MCNC: 0.2 MG/DL (ref 0.2–1.2)
BUN BLD-MCNC: 31 MG/DL (ref 8–23)
BUN/CREAT SERPL: 29.2 (ref 7–25)
CALCIUM SPEC-SCNC: 9.3 MG/DL (ref 8.6–10.5)
CHLORIDE SERPL-SCNC: 103 MMOL/L (ref 98–107)
CO2 SERPL-SCNC: 27 MMOL/L (ref 22–29)
CREAT BLD-MCNC: 1.06 MG/DL (ref 0.57–1)
GFR SERPL CREATININE-BSD FRML MDRD: 50 ML/MIN/1.73
GLOBULIN UR ELPH-MCNC: 3.3 GM/DL
GLUCOSE BLD-MCNC: 96 MG/DL (ref 65–99)
MAGNESIUM SERPL-MCNC: 2.5 MG/DL (ref 1.6–2.4)
PHOSPHATE SERPL-MCNC: 2.7 MG/DL (ref 2.5–4.5)
POTASSIUM BLD-SCNC: 5.3 MMOL/L (ref 3.5–5.2)
PROT SERPL-MCNC: 7 G/DL (ref 6–8.5)
SODIUM BLD-SCNC: 141 MMOL/L (ref 136–145)

## 2019-05-29 PROCEDURE — 83735 ASSAY OF MAGNESIUM: CPT | Performed by: FAMILY MEDICINE

## 2019-05-29 PROCEDURE — 84100 ASSAY OF PHOSPHORUS: CPT | Performed by: FAMILY MEDICINE

## 2019-05-29 PROCEDURE — 80053 COMPREHEN METABOLIC PANEL: CPT | Performed by: FAMILY MEDICINE

## 2019-05-31 VITALS
HEART RATE: 71 BPM | DIASTOLIC BLOOD PRESSURE: 56 MMHG | DIASTOLIC BLOOD PRESSURE: 73 MMHG | SYSTOLIC BLOOD PRESSURE: 143 MMHG | SYSTOLIC BLOOD PRESSURE: 158 MMHG | DIASTOLIC BLOOD PRESSURE: 54 MMHG | RESPIRATION RATE: 18 BRPM | OXYGEN SATURATION: 97 % | SYSTOLIC BLOOD PRESSURE: 109 MMHG | OXYGEN SATURATION: 95 % | OXYGEN SATURATION: 98 % | SYSTOLIC BLOOD PRESSURE: 200 MMHG | DIASTOLIC BLOOD PRESSURE: 76 MMHG | HEART RATE: 85 BPM | SYSTOLIC BLOOD PRESSURE: 122 MMHG | OXYGEN SATURATION: 93 % | OXYGEN SATURATION: 92 % | SYSTOLIC BLOOD PRESSURE: 133 MMHG | SYSTOLIC BLOOD PRESSURE: 127 MMHG | OXYGEN SATURATION: 94 % | DIASTOLIC BLOOD PRESSURE: 59 MMHG | RESPIRATION RATE: 16 BRPM | HEART RATE: 78 BPM | HEART RATE: 88 BPM | HEART RATE: 73 BPM | HEART RATE: 94 BPM | HEART RATE: 95 BPM | DIASTOLIC BLOOD PRESSURE: 70 MMHG | OXYGEN SATURATION: 96 % | DIASTOLIC BLOOD PRESSURE: 64 MMHG | SYSTOLIC BLOOD PRESSURE: 132 MMHG | DIASTOLIC BLOOD PRESSURE: 53 MMHG | SYSTOLIC BLOOD PRESSURE: 119 MMHG | SYSTOLIC BLOOD PRESSURE: 120 MMHG | HEART RATE: 81 BPM | HEART RATE: 84 BPM | DIASTOLIC BLOOD PRESSURE: 81 MMHG | HEART RATE: 77 BPM | HEART RATE: 70 BPM | HEART RATE: 75 BPM | DIASTOLIC BLOOD PRESSURE: 90 MMHG | WEIGHT: 166.5 LBS | DIASTOLIC BLOOD PRESSURE: 48 MMHG | SYSTOLIC BLOOD PRESSURE: 107 MMHG | SYSTOLIC BLOOD PRESSURE: 113 MMHG | DIASTOLIC BLOOD PRESSURE: 67 MMHG | TEMPERATURE: 96.6 F | TEMPERATURE: 97.5 F | DIASTOLIC BLOOD PRESSURE: 45 MMHG | HEIGHT: 65 IN

## 2019-06-01 ENCOUNTER — LAB REQUISITION (OUTPATIENT)
Dept: LAB | Facility: HOSPITAL | Age: 79
End: 2019-06-01

## 2019-06-01 DIAGNOSIS — R79.9 ABNORMAL FINDING OF BLOOD CHEMISTRY: ICD-10-CM

## 2019-06-01 DIAGNOSIS — Z79.899 OTHER LONG TERM (CURRENT) DRUG THERAPY: ICD-10-CM

## 2019-06-01 LAB
ALBUMIN SERPL-MCNC: 4.2 G/DL (ref 3.5–5.2)
ALBUMIN/GLOB SERPL: 1.3 G/DL
ALP SERPL-CCNC: 88 U/L (ref 39–117)
ALT SERPL W P-5'-P-CCNC: 20 U/L (ref 1–33)
ANION GAP SERPL CALCULATED.3IONS-SCNC: 12.6 MMOL/L
AST SERPL-CCNC: 19 U/L (ref 1–32)
BASOPHILS # BLD AUTO: 0.03 10*3/MM3 (ref 0–0.2)
BASOPHILS NFR BLD AUTO: 0.3 % (ref 0–1.5)
BILIRUB SERPL-MCNC: 0.2 MG/DL (ref 0.2–1.2)
BUN BLD-MCNC: 30 MG/DL (ref 8–23)
BUN/CREAT SERPL: 21.9 (ref 7–25)
CALCIUM SPEC-SCNC: 10.8 MG/DL (ref 8.6–10.5)
CHLORIDE SERPL-SCNC: 99 MMOL/L (ref 98–107)
CHOLEST SERPL-MCNC: 147 MG/DL (ref 0–200)
CO2 SERPL-SCNC: 30.4 MMOL/L (ref 22–29)
CREAT BLD-MCNC: 1.37 MG/DL (ref 0.57–1)
DEPRECATED RDW RBC AUTO: 42.1 FL (ref 37–54)
EOSINOPHIL # BLD AUTO: 0.13 10*3/MM3 (ref 0–0.4)
EOSINOPHIL NFR BLD AUTO: 1.4 % (ref 0.3–6.2)
ERYTHROCYTE [DISTWIDTH] IN BLOOD BY AUTOMATED COUNT: 12.1 % (ref 12.3–15.4)
GFR SERPL CREATININE-BSD FRML MDRD: 37 ML/MIN/1.73
GLOBULIN UR ELPH-MCNC: 3.3 GM/DL
GLUCOSE BLD-MCNC: 109 MG/DL (ref 65–99)
HCT VFR BLD AUTO: 39.3 % (ref 34–46.6)
HDLC SERPL-MCNC: 77 MG/DL (ref 40–60)
HGB BLD-MCNC: 12.7 G/DL (ref 12–15.9)
IMM GRANULOCYTES # BLD AUTO: 0.07 10*3/MM3 (ref 0–0.05)
IMM GRANULOCYTES NFR BLD AUTO: 0.8 % (ref 0–0.5)
LDLC SERPL CALC-MCNC: 59 MG/DL (ref 0–100)
LDLC/HDLC SERPL: 0.76 {RATIO}
LYMPHOCYTES # BLD AUTO: 1.81 10*3/MM3 (ref 0.7–3.1)
LYMPHOCYTES NFR BLD AUTO: 19.9 % (ref 19.6–45.3)
MCH RBC QN AUTO: 30.7 PG (ref 26.6–33)
MCHC RBC AUTO-ENTMCNC: 32.3 G/DL (ref 31.5–35.7)
MCV RBC AUTO: 94.9 FL (ref 79–97)
MONOCYTES # BLD AUTO: 0.65 10*3/MM3 (ref 0.1–0.9)
MONOCYTES NFR BLD AUTO: 7.2 % (ref 5–12)
NEUTROPHILS # BLD AUTO: 6.4 10*3/MM3 (ref 1.7–7)
NEUTROPHILS NFR BLD AUTO: 70.4 % (ref 42.7–76)
NRBC BLD AUTO-RTO: 0 /100 WBC (ref 0–0.2)
PLAT MORPH BLD: NORMAL
PLATELET # BLD AUTO: 385 10*3/MM3 (ref 140–450)
PMV BLD AUTO: 10.4 FL (ref 6–12)
POTASSIUM BLD-SCNC: 4.6 MMOL/L (ref 3.5–5.2)
PROT SERPL-MCNC: 7.5 G/DL (ref 6–8.5)
RBC # BLD AUTO: 4.14 10*6/MM3 (ref 3.77–5.28)
RBC MORPH BLD: NORMAL
SODIUM BLD-SCNC: 142 MMOL/L (ref 136–145)
TRIGL SERPL-MCNC: 56 MG/DL (ref 0–150)
TSH SERPL DL<=0.05 MIU/L-ACNC: 1.65 MIU/ML (ref 0.27–4.2)
VLDLC SERPL-MCNC: 11.2 MG/DL (ref 7–27)
WBC MORPH BLD: NORMAL
WBC NRBC COR # BLD: 9.09 10*3/MM3 (ref 3.4–10.8)

## 2019-06-01 PROCEDURE — 85007 BL SMEAR W/DIFF WBC COUNT: CPT | Performed by: FAMILY MEDICINE

## 2019-06-01 PROCEDURE — 84443 ASSAY THYROID STIM HORMONE: CPT | Performed by: FAMILY MEDICINE

## 2019-06-01 PROCEDURE — 80061 LIPID PANEL: CPT | Performed by: FAMILY MEDICINE

## 2019-06-01 PROCEDURE — 85025 COMPLETE CBC W/AUTO DIFF WBC: CPT | Performed by: FAMILY MEDICINE

## 2019-06-01 PROCEDURE — 80053 COMPREHEN METABOLIC PANEL: CPT | Performed by: FAMILY MEDICINE

## 2019-06-02 ENCOUNTER — LAB REQUISITION (OUTPATIENT)
Dept: LAB | Facility: HOSPITAL | Age: 79
End: 2019-06-02

## 2019-06-02 DIAGNOSIS — Z79.899 OTHER LONG TERM (CURRENT) DRUG THERAPY: ICD-10-CM

## 2019-06-02 DIAGNOSIS — R79.9 ABNORMAL FINDING OF BLOOD CHEMISTRY: ICD-10-CM

## 2019-06-02 PROCEDURE — 80177 DRUG SCRN QUAN LEVETIRACETAM: CPT | Performed by: FAMILY MEDICINE

## 2019-06-03 ENCOUNTER — AMBULATORY SURGICAL CENTER (AMBULATORY)
Dept: URBAN - METROPOLITAN AREA SURGERY 17 | Facility: SURGERY | Age: 79
End: 2019-06-03

## 2019-06-03 ENCOUNTER — LAB REQUISITION (OUTPATIENT)
Dept: LAB | Facility: HOSPITAL | Age: 79
End: 2019-06-03

## 2019-06-03 DIAGNOSIS — Z79.899 OTHER LONG TERM (CURRENT) DRUG THERAPY: ICD-10-CM

## 2019-06-03 DIAGNOSIS — K57.30 DIVERTICULOSIS OF LARGE INTESTINE WITHOUT PERFORATION OR ABS: ICD-10-CM

## 2019-06-03 DIAGNOSIS — R79.9 ABNORMAL FINDING OF BLOOD CHEMISTRY: ICD-10-CM

## 2019-06-03 DIAGNOSIS — Z86.010 PERSONAL HISTORY OF COLONIC POLYPS: ICD-10-CM

## 2019-06-03 DIAGNOSIS — R19.4 CHANGE IN BOWEL HABIT: ICD-10-CM

## 2019-06-03 PROCEDURE — 80175 DRUG SCREEN QUAN LAMOTRIGINE: CPT | Performed by: FAMILY MEDICINE

## 2019-06-03 PROCEDURE — 45378 DIAGNOSTIC COLONOSCOPY: CPT | Performed by: INTERNAL MEDICINE

## 2019-06-05 LAB — LAMOTRIGINE SERPL-MCNC: 10.4 UG/ML (ref 2–20)

## 2019-06-07 LAB — LEVETIRACETAM SERPL-MCNC: 24.8 UG/ML (ref 10–40)

## 2019-06-18 ENCOUNTER — HOSPITAL ENCOUNTER (OUTPATIENT)
Dept: MAMMOGRAPHY | Facility: HOSPITAL | Age: 79
Discharge: HOME | End: 2019-06-18
Admitting: OBSTETRICS & GYNECOLOGY

## 2019-06-18 DIAGNOSIS — Z12.39 SCREENING BREAST EXAMINATION: ICD-10-CM

## 2019-06-18 PROCEDURE — 77067 SCR MAMMO BI INCL CAD: CPT

## 2019-06-18 PROCEDURE — 77063 BREAST TOMOSYNTHESIS BI: CPT

## 2019-06-19 ENCOUNTER — LAB REQUISITION (OUTPATIENT)
Dept: LAB | Facility: HOSPITAL | Age: 79
End: 2019-06-19

## 2019-06-19 DIAGNOSIS — Z79.899 OTHER LONG TERM (CURRENT) DRUG THERAPY: ICD-10-CM

## 2019-06-19 DIAGNOSIS — R79.9 ABNORMAL FINDING OF BLOOD CHEMISTRY: ICD-10-CM

## 2019-06-19 LAB
ANION GAP SERPL CALCULATED.3IONS-SCNC: 10.5 MMOL/L
BUN BLD-MCNC: 24 MG/DL (ref 8–23)
BUN/CREAT SERPL: 21.6 (ref 7–25)
CALCIUM SPEC-SCNC: 9.1 MG/DL (ref 8.6–10.5)
CHLORIDE SERPL-SCNC: 108 MMOL/L (ref 98–107)
CO2 SERPL-SCNC: 24.5 MMOL/L (ref 22–29)
CREAT BLD-MCNC: 1.11 MG/DL (ref 0.57–1)
GFR SERPL CREATININE-BSD FRML MDRD: 48 ML/MIN/1.73
GLUCOSE BLD-MCNC: 88 MG/DL (ref 65–99)
POTASSIUM BLD-SCNC: 4.9 MMOL/L (ref 3.5–5.2)
SODIUM BLD-SCNC: 143 MMOL/L (ref 136–145)

## 2019-06-19 PROCEDURE — 80048 BASIC METABOLIC PNL TOTAL CA: CPT | Performed by: FAMILY MEDICINE

## 2019-07-07 RX ORDER — TRAMADOL HYDROCHLORIDE 50 MG/1
100 TABLET ORAL EVERY 8 HOURS
Qty: 180 TABLET | Refills: 0 | Status: SHIPPED | OUTPATIENT
Start: 2019-07-07 | End: 2020-06-08 | Stop reason: SDUPTHER

## 2019-07-17 ENCOUNTER — LAB REQUISITION (OUTPATIENT)
Dept: LAB | Facility: HOSPITAL | Age: 79
End: 2019-07-17

## 2019-07-17 DIAGNOSIS — R79.9 ABNORMAL FINDING OF BLOOD CHEMISTRY: ICD-10-CM

## 2019-07-17 DIAGNOSIS — Z79.899 OTHER LONG TERM (CURRENT) DRUG THERAPY: ICD-10-CM

## 2019-07-17 LAB
ANION GAP SERPL CALCULATED.3IONS-SCNC: 11.5 MMOL/L (ref 5–15)
BUN BLD-MCNC: 26 MG/DL (ref 8–23)
BUN/CREAT SERPL: 21.1 (ref 7–25)
CALCIUM SPEC-SCNC: 9.1 MG/DL (ref 8.6–10.5)
CHLORIDE SERPL-SCNC: 103 MMOL/L (ref 98–107)
CO2 SERPL-SCNC: 26.5 MMOL/L (ref 22–29)
CREAT BLD-MCNC: 1.23 MG/DL (ref 0.57–1)
GFR SERPL CREATININE-BSD FRML MDRD: 42 ML/MIN/1.73
GLUCOSE BLD-MCNC: 85 MG/DL (ref 65–99)
POTASSIUM BLD-SCNC: 4.8 MMOL/L (ref 3.5–5.2)
SODIUM BLD-SCNC: 141 MMOL/L (ref 136–145)

## 2019-07-17 PROCEDURE — 80048 BASIC METABOLIC PNL TOTAL CA: CPT | Performed by: FAMILY MEDICINE

## 2019-07-24 ENCOUNTER — LAB REQUISITION (OUTPATIENT)
Dept: LAB | Facility: HOSPITAL | Age: 79
End: 2019-07-24

## 2019-07-24 DIAGNOSIS — N39.0 URINARY TRACT INFECTION: ICD-10-CM

## 2019-07-24 LAB
BACTERIA UR QL AUTO: ABNORMAL /HPF
BILIRUB UR QL STRIP: NEGATIVE
CLARITY UR: ABNORMAL
COLOR UR: YELLOW
GLUCOSE UR STRIP-MCNC: NEGATIVE MG/DL
HGB UR QL STRIP.AUTO: ABNORMAL
HYALINE CASTS UR QL AUTO: ABNORMAL /LPF
KETONES UR QL STRIP: ABNORMAL
LEUKOCYTE ESTERASE UR QL STRIP.AUTO: ABNORMAL
NITRITE UR QL STRIP: POSITIVE
PH UR STRIP.AUTO: 6 [PH] (ref 4.5–8)
PROT UR QL STRIP: ABNORMAL
RBC # UR: ABNORMAL /HPF
REF LAB TEST METHOD: ABNORMAL
SP GR UR STRIP: 1.02 (ref 1–1.03)
SQUAMOUS #/AREA URNS HPF: ABNORMAL /HPF
UROBILINOGEN UR QL STRIP: ABNORMAL
WBC UR QL AUTO: ABNORMAL /HPF

## 2019-07-24 PROCEDURE — 87186 SC STD MICRODIL/AGAR DIL: CPT | Performed by: FAMILY MEDICINE

## 2019-07-24 PROCEDURE — 81001 URINALYSIS AUTO W/SCOPE: CPT | Performed by: FAMILY MEDICINE

## 2019-07-24 PROCEDURE — 87077 CULTURE AEROBIC IDENTIFY: CPT | Performed by: FAMILY MEDICINE

## 2019-07-24 PROCEDURE — 87086 URINE CULTURE/COLONY COUNT: CPT | Performed by: FAMILY MEDICINE

## 2019-07-26 LAB — BACTERIA SPEC AEROBE CULT: ABNORMAL

## 2019-08-14 ENCOUNTER — LAB REQUISITION (OUTPATIENT)
Dept: LAB | Facility: HOSPITAL | Age: 79
End: 2019-08-14

## 2019-08-14 DIAGNOSIS — Z79.899 OTHER LONG TERM (CURRENT) DRUG THERAPY: ICD-10-CM

## 2019-08-14 DIAGNOSIS — R79.9 ABNORMAL FINDING OF BLOOD CHEMISTRY: ICD-10-CM

## 2019-08-14 LAB
ANION GAP SERPL CALCULATED.3IONS-SCNC: 13.6 MMOL/L (ref 5–15)
BUN BLD-MCNC: 25 MG/DL (ref 8–23)
BUN/CREAT SERPL: 23.8 (ref 7–25)
CALCIUM SPEC-SCNC: 9.2 MG/DL (ref 8.6–10.5)
CHLORIDE SERPL-SCNC: 103 MMOL/L (ref 98–107)
CO2 SERPL-SCNC: 24.4 MMOL/L (ref 22–29)
CREAT BLD-MCNC: 1.05 MG/DL (ref 0.57–1)
GFR SERPL CREATININE-BSD FRML MDRD: 51 ML/MIN/1.73
GLUCOSE BLD-MCNC: 88 MG/DL (ref 65–99)
POTASSIUM BLD-SCNC: 5 MMOL/L (ref 3.5–5.2)
SODIUM BLD-SCNC: 141 MMOL/L (ref 136–145)

## 2019-08-14 PROCEDURE — 80048 BASIC METABOLIC PNL TOTAL CA: CPT | Performed by: FAMILY MEDICINE

## 2019-08-20 RX ORDER — GABAPENTIN 100 MG/1
CAPSULE ORAL
Qty: 30 CAPSULE | Refills: 0 | OUTPATIENT
Start: 2019-08-20

## 2019-09-12 ENCOUNTER — LAB REQUISITION (OUTPATIENT)
Dept: LAB | Facility: HOSPITAL | Age: 79
End: 2019-09-12

## 2019-09-12 DIAGNOSIS — R79.9 ABNORMAL FINDING OF BLOOD CHEMISTRY: ICD-10-CM

## 2019-09-12 DIAGNOSIS — Z79.899 OTHER LONG TERM (CURRENT) DRUG THERAPY: ICD-10-CM

## 2019-09-12 LAB
ALBUMIN SERPL-MCNC: 3.9 G/DL (ref 3.5–5.2)
ALBUMIN/GLOB SERPL: 1.1 G/DL
ALP SERPL-CCNC: 92 U/L (ref 39–117)
ALT SERPL W P-5'-P-CCNC: 14 U/L (ref 1–33)
ANION GAP SERPL CALCULATED.3IONS-SCNC: 12.6 MMOL/L (ref 5–15)
AST SERPL-CCNC: 18 U/L (ref 1–32)
BASOPHILS # BLD AUTO: 0.01 10*3/MM3 (ref 0–0.2)
BASOPHILS NFR BLD AUTO: 0.1 % (ref 0–1.5)
BILIRUB SERPL-MCNC: 0.2 MG/DL (ref 0.2–1.2)
BUN BLD-MCNC: 24 MG/DL (ref 8–23)
BUN/CREAT SERPL: 25.5 (ref 7–25)
CALCIUM SPEC-SCNC: 10 MG/DL (ref 8.6–10.5)
CHLORIDE SERPL-SCNC: 107 MMOL/L (ref 98–107)
CHOLEST SERPL-MCNC: 171 MG/DL (ref 0–200)
CO2 SERPL-SCNC: 23.4 MMOL/L (ref 22–29)
CREAT BLD-MCNC: 0.94 MG/DL (ref 0.57–1)
DEPRECATED RDW RBC AUTO: 43.1 FL (ref 37–54)
EOSINOPHIL # BLD AUTO: 0 10*3/MM3 (ref 0–0.4)
EOSINOPHIL NFR BLD AUTO: 0 % (ref 0.3–6.2)
ERYTHROCYTE [DISTWIDTH] IN BLOOD BY AUTOMATED COUNT: 12.5 % (ref 12.3–15.4)
GFR SERPL CREATININE-BSD FRML MDRD: 58 ML/MIN/1.73
GLOBULIN UR ELPH-MCNC: 3.4 GM/DL
GLUCOSE BLD-MCNC: 126 MG/DL (ref 65–99)
HCT VFR BLD AUTO: 39.2 % (ref 34–46.6)
HDLC SERPL-MCNC: 80 MG/DL (ref 40–60)
HGB BLD-MCNC: 12.4 G/DL (ref 12–15.9)
IMM GRANULOCYTES # BLD AUTO: 0.03 10*3/MM3 (ref 0–0.05)
IMM GRANULOCYTES NFR BLD AUTO: 0.4 % (ref 0–0.5)
LDLC SERPL CALC-MCNC: 83 MG/DL (ref 0–100)
LDLC/HDLC SERPL: 1.04 {RATIO}
LYMPHOCYTES # BLD AUTO: 0.97 10*3/MM3 (ref 0.7–3.1)
LYMPHOCYTES NFR BLD AUTO: 13.9 % (ref 19.6–45.3)
MCH RBC QN AUTO: 29.7 PG (ref 26.6–33)
MCHC RBC AUTO-ENTMCNC: 31.6 G/DL (ref 31.5–35.7)
MCV RBC AUTO: 93.8 FL (ref 79–97)
MONOCYTES # BLD AUTO: 0.42 10*3/MM3 (ref 0.1–0.9)
MONOCYTES NFR BLD AUTO: 6 % (ref 5–12)
NEUTROPHILS # BLD AUTO: 5.55 10*3/MM3 (ref 1.7–7)
NEUTROPHILS NFR BLD AUTO: 79.6 % (ref 42.7–76)
NRBC BLD AUTO-RTO: 0 /100 WBC (ref 0–0.2)
PLATELET # BLD AUTO: 397 10*3/MM3 (ref 140–450)
PMV BLD AUTO: 10.6 FL (ref 6–12)
POTASSIUM BLD-SCNC: 4.5 MMOL/L (ref 3.5–5.2)
PROLACTIN SERPL-MCNC: 13.5 NG/ML (ref 4.79–23.3)
PROT SERPL-MCNC: 7.3 G/DL (ref 6–8.5)
RBC # BLD AUTO: 4.18 10*6/MM3 (ref 3.77–5.28)
SODIUM BLD-SCNC: 143 MMOL/L (ref 136–145)
TRIGL SERPL-MCNC: 39 MG/DL (ref 0–150)
TSH SERPL DL<=0.05 MIU/L-ACNC: 0.62 UIU/ML (ref 0.27–4.2)
VLDLC SERPL-MCNC: 7.8 MG/DL (ref 7–27)
WBC NRBC COR # BLD: 6.98 10*3/MM3 (ref 3.4–10.8)

## 2019-09-12 PROCEDURE — 80061 LIPID PANEL: CPT | Performed by: FAMILY MEDICINE

## 2019-09-12 PROCEDURE — 85025 COMPLETE CBC W/AUTO DIFF WBC: CPT | Performed by: FAMILY MEDICINE

## 2019-09-12 PROCEDURE — 80053 COMPREHEN METABOLIC PANEL: CPT | Performed by: FAMILY MEDICINE

## 2019-09-12 PROCEDURE — 84146 ASSAY OF PROLACTIN: CPT | Performed by: FAMILY MEDICINE

## 2019-09-12 PROCEDURE — 84443 ASSAY THYROID STIM HORMONE: CPT | Performed by: FAMILY MEDICINE

## 2019-09-12 PROCEDURE — 80175 DRUG SCREEN QUAN LAMOTRIGINE: CPT | Performed by: FAMILY MEDICINE

## 2019-09-16 LAB — LAMOTRIGINE SERPL-MCNC: 5.4 UG/ML (ref 2–20)

## 2019-10-22 ENCOUNTER — DOCUMENTATION (OUTPATIENT)
Dept: NEUROLOGY | Facility: CLINIC | Age: 79
End: 2019-10-22

## 2019-10-22 ENCOUNTER — TREATMENT (OUTPATIENT)
Dept: NEUROLOGY | Facility: CLINIC | Age: 79
End: 2019-10-22

## 2019-10-22 DIAGNOSIS — F41.9 ANXIETY: ICD-10-CM

## 2019-10-22 DIAGNOSIS — G40.909 NON-REFRACTORY EPILEPSY (HCC): ICD-10-CM

## 2019-10-22 DIAGNOSIS — F88 DEVELOPMENTAL MENTAL DISORDER: Primary | ICD-10-CM

## 2019-10-22 PROBLEM — F73: Status: RESOLVED | Noted: 2019-10-22 | Resolved: 2019-10-22

## 2019-10-22 PROBLEM — M81.0 OSTEOPOROSIS: Status: ACTIVE | Noted: 2019-10-22

## 2019-10-22 PROBLEM — F63.9 IMPULSE CONTROL DISORDER: Status: ACTIVE | Noted: 2019-10-22

## 2019-10-22 PROBLEM — E55.9 VITAMIN D DEFICIENCY: Status: ACTIVE | Noted: 2019-10-22

## 2019-10-22 PROBLEM — E83.52 HYPERCALCEMIA: Status: ACTIVE | Noted: 2019-10-22

## 2019-10-22 PROBLEM — F73: Status: ACTIVE | Noted: 2019-10-22

## 2019-10-22 PROCEDURE — 99308 SBSQ NF CARE LOW MDM 20: CPT | Performed by: PSYCHIATRY & NEUROLOGY

## 2019-10-22 NOTE — PROGRESS NOTES
Subjective:      Patient ID: Erica Crockett is a 79 y.o. female.     History of Present Illness  This is a 79-year-old woman with chronic intellectual disability anxiety and epilepsy.  She was admitted to [facility] about 4 years ago on Keppra Lamictal lorazepam as needed.  She continues to do well on this combination having had no recent seizures.    She has a medical history of allergic rhinitis bursitis and cataract surgery.  She is being evaluated by PCP intermittently for osteoarthritis and scoliosis.    There is a family history of diabetes and renal failure    She does not smoke or use excessive alcohol  Neuro Hx: Typically no seizures.     Seizure Count  July - 0 August - 0  September - 0        Review of Systems           Allergies   Allergen Reactions   • Bee Venom     • Iodinated Diagnostic Agents     • Nsaids           Objective:     Neurologic Exam  Daniel  Is able to ambulate  Strength appears normal throughout with mild increased tone in all 4 limbs.  She does not follow commands.  She has severe cognitive dysfunction.  Memory cannot be tested  Cranial nerve II is normal  Eye movements are full  I see no facial droop but she does not allow facial sensory testing  Cranial nerve XI appears normal by observation  Reflexes are grade 1 for the upper extremities  She uses a gait belt to help with ambulation but did not seem significantly ataxic today  Psychiatric exam shows abnormal mood and affect due to her underlying congenital disorder  She is well-developed well-nourished with normal range of motion of the neck.  Heart rate is normal.  Respiratory rate is normal.  There are no limb lesions noted.    Physical Exam   Constitutional: She appears well-developed and well-nourished.   Vitals reviewed.  Wt: 163.8 lb  BP: 150/86  T: 97.2F  R: 18  O2 Sat: 96%  HR: 80     Assessment: Her seizure control is excellent.  She has profound intellectual disability which is also stable.  No medication changes  are needed at this time

## 2019-10-30 ENCOUNTER — LAB REQUISITION (OUTPATIENT)
Dept: LAB | Facility: HOSPITAL | Age: 79
End: 2019-10-30

## 2019-10-30 DIAGNOSIS — R79.9 ABNORMAL FINDING OF BLOOD CHEMISTRY, UNSPECIFIED: ICD-10-CM

## 2019-10-30 DIAGNOSIS — N39.0 URINARY TRACT INFECTION, SITE NOT SPECIFIED: ICD-10-CM

## 2019-10-30 DIAGNOSIS — Z79.899 OTHER LONG TERM (CURRENT) DRUG THERAPY: ICD-10-CM

## 2019-10-30 LAB
ALBUMIN SERPL-MCNC: 3.8 G/DL (ref 3.5–5.2)
ALBUMIN/GLOB SERPL: 1.4 G/DL
ALP SERPL-CCNC: 89 U/L (ref 39–117)
ALT SERPL W P-5'-P-CCNC: 14 U/L (ref 1–33)
ANION GAP SERPL CALCULATED.3IONS-SCNC: 9.5 MMOL/L (ref 5–15)
AST SERPL-CCNC: 17 U/L (ref 1–32)
BACTERIA UR QL AUTO: ABNORMAL /HPF
BASOPHILS # BLD AUTO: 0.05 10*3/MM3 (ref 0–0.2)
BASOPHILS NFR BLD AUTO: 0.8 % (ref 0–1.5)
BILIRUB SERPL-MCNC: 0.2 MG/DL (ref 0.2–1.2)
BILIRUB UR QL STRIP: NEGATIVE
BUN BLD-MCNC: 28 MG/DL (ref 8–23)
BUN/CREAT SERPL: 21.9 (ref 7–25)
CALCIUM SPEC-SCNC: 9.6 MG/DL (ref 8.6–10.5)
CHLORIDE SERPL-SCNC: 106 MMOL/L (ref 98–107)
CLARITY UR: ABNORMAL
CO2 SERPL-SCNC: 28.5 MMOL/L (ref 22–29)
COLOR UR: YELLOW
CREAT BLD-MCNC: 1.28 MG/DL (ref 0.57–1)
DEPRECATED RDW RBC AUTO: 44.1 FL (ref 37–54)
EOSINOPHIL # BLD AUTO: 0.2 10*3/MM3 (ref 0–0.4)
EOSINOPHIL NFR BLD AUTO: 3.3 % (ref 0.3–6.2)
ERYTHROCYTE [DISTWIDTH] IN BLOOD BY AUTOMATED COUNT: 12.8 % (ref 12.3–15.4)
GFR SERPL CREATININE-BSD FRML MDRD: 40 ML/MIN/1.73
GLOBULIN UR ELPH-MCNC: 2.7 GM/DL
GLUCOSE BLD-MCNC: 95 MG/DL (ref 65–99)
GLUCOSE UR STRIP-MCNC: NEGATIVE MG/DL
HCT VFR BLD AUTO: 37.5 % (ref 34–46.6)
HGB BLD-MCNC: 11.9 G/DL (ref 12–15.9)
HGB UR QL STRIP.AUTO: NEGATIVE
HYALINE CASTS UR QL AUTO: ABNORMAL /LPF
IMM GRANULOCYTES # BLD AUTO: 0.02 10*3/MM3 (ref 0–0.05)
IMM GRANULOCYTES NFR BLD AUTO: 0.3 % (ref 0–0.5)
KETONES UR QL STRIP: NEGATIVE
LEUKOCYTE ESTERASE UR QL STRIP.AUTO: ABNORMAL
LYMPHOCYTES # BLD AUTO: 2.23 10*3/MM3 (ref 0.7–3.1)
LYMPHOCYTES NFR BLD AUTO: 37.1 % (ref 19.6–45.3)
MAGNESIUM SERPL-MCNC: 2.4 MG/DL (ref 1.6–2.4)
MCH RBC QN AUTO: 30 PG (ref 26.6–33)
MCHC RBC AUTO-ENTMCNC: 31.7 G/DL (ref 31.5–35.7)
MCV RBC AUTO: 94.5 FL (ref 79–97)
MONOCYTES # BLD AUTO: 0.56 10*3/MM3 (ref 0.1–0.9)
MONOCYTES NFR BLD AUTO: 9.3 % (ref 5–12)
NEUTROPHILS # BLD AUTO: 2.95 10*3/MM3 (ref 1.7–7)
NEUTROPHILS NFR BLD AUTO: 49.2 % (ref 42.7–76)
NITRITE UR QL STRIP: NEGATIVE
NRBC BLD AUTO-RTO: 0 /100 WBC (ref 0–0.2)
PH UR STRIP.AUTO: 7 [PH] (ref 4.5–8)
PHOSPHATE SERPL-MCNC: 4.1 MG/DL (ref 2.5–4.5)
PLATELET # BLD AUTO: 330 10*3/MM3 (ref 140–450)
PMV BLD AUTO: 10.8 FL (ref 6–12)
POTASSIUM BLD-SCNC: 4.6 MMOL/L (ref 3.5–5.2)
PROT SERPL-MCNC: 6.5 G/DL (ref 6–8.5)
PROT UR QL STRIP: NEGATIVE
RBC # BLD AUTO: 3.97 10*6/MM3 (ref 3.77–5.28)
RBC # UR: ABNORMAL /HPF
REF LAB TEST METHOD: ABNORMAL
SODIUM BLD-SCNC: 144 MMOL/L (ref 136–145)
SP GR UR STRIP: 1.01 (ref 1–1.03)
SQUAMOUS #/AREA URNS HPF: ABNORMAL /HPF
UROBILINOGEN UR QL STRIP: ABNORMAL
WBC NRBC COR # BLD: 6.01 10*3/MM3 (ref 3.4–10.8)
WBC UR QL AUTO: ABNORMAL /HPF

## 2019-10-30 PROCEDURE — 81001 URINALYSIS AUTO W/SCOPE: CPT | Performed by: FAMILY MEDICINE

## 2019-10-30 PROCEDURE — 87186 SC STD MICRODIL/AGAR DIL: CPT | Performed by: FAMILY MEDICINE

## 2019-10-30 PROCEDURE — 85025 COMPLETE CBC W/AUTO DIFF WBC: CPT | Performed by: FAMILY MEDICINE

## 2019-10-30 PROCEDURE — 87086 URINE CULTURE/COLONY COUNT: CPT | Performed by: FAMILY MEDICINE

## 2019-10-30 PROCEDURE — 80053 COMPREHEN METABOLIC PANEL: CPT | Performed by: FAMILY MEDICINE

## 2019-10-30 PROCEDURE — 84100 ASSAY OF PHOSPHORUS: CPT | Performed by: FAMILY MEDICINE

## 2019-10-30 PROCEDURE — 83735 ASSAY OF MAGNESIUM: CPT | Performed by: FAMILY MEDICINE

## 2019-10-30 PROCEDURE — 87077 CULTURE AEROBIC IDENTIFY: CPT | Performed by: FAMILY MEDICINE

## 2019-11-01 LAB — BACTERIA SPEC AEROBE CULT: ABNORMAL

## 2019-11-22 ENCOUNTER — LAB REQUISITION (OUTPATIENT)
Dept: LAB | Facility: HOSPITAL | Age: 79
End: 2019-11-22

## 2019-11-22 DIAGNOSIS — R79.9 ABNORMAL FINDING OF BLOOD CHEMISTRY, UNSPECIFIED: ICD-10-CM

## 2019-11-22 DIAGNOSIS — Z79.899 OTHER LONG TERM (CURRENT) DRUG THERAPY: ICD-10-CM

## 2019-11-22 LAB
ANION GAP SERPL CALCULATED.3IONS-SCNC: 8.9 MMOL/L (ref 5–15)
BUN BLD-MCNC: 23 MG/DL (ref 8–23)
BUN/CREAT SERPL: 19.7 (ref 7–25)
CALCIUM SPEC-SCNC: 9.7 MG/DL (ref 8.6–10.5)
CHLORIDE SERPL-SCNC: 104 MMOL/L (ref 98–107)
CO2 SERPL-SCNC: 29.1 MMOL/L (ref 22–29)
CREAT BLD-MCNC: 1.17 MG/DL (ref 0.57–1)
GFR SERPL CREATININE-BSD FRML MDRD: 45 ML/MIN/1.73
GLUCOSE BLD-MCNC: 88 MG/DL (ref 65–99)
POTASSIUM BLD-SCNC: 5.1 MMOL/L (ref 3.5–5.2)
SODIUM BLD-SCNC: 142 MMOL/L (ref 136–145)

## 2019-11-22 PROCEDURE — 80048 BASIC METABOLIC PNL TOTAL CA: CPT | Performed by: FAMILY MEDICINE

## 2019-12-11 ENCOUNTER — APPOINTMENT (OUTPATIENT)
Dept: GENERAL RADIOLOGY | Facility: HOSPITAL | Age: 79
End: 2019-12-11

## 2019-12-11 ENCOUNTER — HOSPITAL ENCOUNTER (INPATIENT)
Facility: HOSPITAL | Age: 79
LOS: 6 days | Discharge: NURSING, REHAB OR LONG-TERM CARE | End: 2019-12-17
Attending: EMERGENCY MEDICINE | Admitting: HOSPITALIST

## 2019-12-11 DIAGNOSIS — J96.01 SEPSIS WITH ACUTE HYPOXIC RESPIRATORY FAILURE WITHOUT SEPTIC SHOCK, DUE TO UNSPECIFIED ORGANISM (HCC): ICD-10-CM

## 2019-12-11 DIAGNOSIS — J18.9 PNEUMONIA OF BOTH LOWER LOBES DUE TO INFECTIOUS ORGANISM: Primary | ICD-10-CM

## 2019-12-11 DIAGNOSIS — N17.9 AKI (ACUTE KIDNEY INJURY) (HCC): ICD-10-CM

## 2019-12-11 DIAGNOSIS — M41.34 THORACOGENIC SCOLIOSIS OF THORACIC REGION: ICD-10-CM

## 2019-12-11 DIAGNOSIS — A41.9 SEPSIS WITH ACUTE HYPOXIC RESPIRATORY FAILURE WITHOUT SEPTIC SHOCK, DUE TO UNSPECIFIED ORGANISM (HCC): ICD-10-CM

## 2019-12-11 DIAGNOSIS — R65.20 SEPSIS WITH ACUTE HYPOXIC RESPIRATORY FAILURE WITHOUT SEPTIC SHOCK, DUE TO UNSPECIFIED ORGANISM (HCC): ICD-10-CM

## 2019-12-11 LAB
ALBUMIN SERPL-MCNC: 3.8 G/DL (ref 3.5–5.2)
ALBUMIN/GLOB SERPL: 1.1 G/DL
ALP SERPL-CCNC: 68 U/L (ref 39–117)
ALT SERPL W P-5'-P-CCNC: 14 U/L (ref 1–33)
ANION GAP SERPL CALCULATED.3IONS-SCNC: 11.9 MMOL/L (ref 5–15)
AST SERPL-CCNC: 21 U/L (ref 1–32)
B PARAPERT DNA SPEC QL NAA+PROBE: NOT DETECTED
B PERT DNA SPEC QL NAA+PROBE: NOT DETECTED
BASOPHILS # BLD AUTO: 0.06 10*3/MM3 (ref 0–0.2)
BASOPHILS NFR BLD AUTO: 0.3 % (ref 0–1.5)
BILIRUB SERPL-MCNC: 0.3 MG/DL (ref 0.2–1.2)
BILIRUB UR QL STRIP: NEGATIVE
BUN BLD-MCNC: 43 MG/DL (ref 8–23)
BUN/CREAT SERPL: 26.7 (ref 7–25)
C PNEUM DNA NPH QL NAA+NON-PROBE: NOT DETECTED
CALCIUM SPEC-SCNC: 9.4 MG/DL (ref 8.6–10.5)
CHLORIDE SERPL-SCNC: 99 MMOL/L (ref 98–107)
CLARITY UR: CLEAR
CO2 SERPL-SCNC: 26.1 MMOL/L (ref 22–29)
COLOR UR: YELLOW
CREAT BLD-MCNC: 1.61 MG/DL (ref 0.57–1)
D-LACTATE SERPL-SCNC: 1.3 MMOL/L (ref 0.5–2)
D-LACTATE SERPL-SCNC: 2.3 MMOL/L (ref 0.5–2)
DEPRECATED RDW RBC AUTO: 46.2 FL (ref 37–54)
EOSINOPHIL # BLD AUTO: 0.02 10*3/MM3 (ref 0–0.4)
EOSINOPHIL NFR BLD AUTO: 0.1 % (ref 0.3–6.2)
ERYTHROCYTE [DISTWIDTH] IN BLOOD BY AUTOMATED COUNT: 13.2 % (ref 12.3–15.4)
FLUAV H1 2009 PAND RNA NPH QL NAA+PROBE: NOT DETECTED
FLUAV H1 HA GENE NPH QL NAA+PROBE: NOT DETECTED
FLUAV H3 RNA NPH QL NAA+PROBE: NOT DETECTED
FLUAV SUBTYP SPEC NAA+PROBE: NOT DETECTED
FLUBV RNA ISLT QL NAA+PROBE: NOT DETECTED
GFR SERPL CREATININE-BSD FRML MDRD: 31 ML/MIN/1.73
GLOBULIN UR ELPH-MCNC: 3.4 GM/DL
GLUCOSE BLD-MCNC: 147 MG/DL (ref 65–99)
GLUCOSE UR STRIP-MCNC: NEGATIVE MG/DL
HADV DNA SPEC NAA+PROBE: NOT DETECTED
HCOV 229E RNA SPEC QL NAA+PROBE: NOT DETECTED
HCOV HKU1 RNA SPEC QL NAA+PROBE: NOT DETECTED
HCOV NL63 RNA SPEC QL NAA+PROBE: NOT DETECTED
HCOV OC43 RNA SPEC QL NAA+PROBE: NOT DETECTED
HCT VFR BLD AUTO: 36.1 % (ref 34–46.6)
HGB BLD-MCNC: 11.6 G/DL (ref 12–15.9)
HGB UR QL STRIP.AUTO: NEGATIVE
HMPV RNA NPH QL NAA+NON-PROBE: NOT DETECTED
HOLD SPECIMEN: NORMAL
HPIV1 RNA SPEC QL NAA+PROBE: NOT DETECTED
HPIV2 RNA SPEC QL NAA+PROBE: NOT DETECTED
HPIV3 RNA NPH QL NAA+PROBE: NOT DETECTED
HPIV4 P GENE NPH QL NAA+PROBE: NOT DETECTED
IMM GRANULOCYTES # BLD AUTO: 0.24 10*3/MM3 (ref 0–0.05)
IMM GRANULOCYTES NFR BLD AUTO: 1.2 % (ref 0–0.5)
KETONES UR QL STRIP: NEGATIVE
L PNEUMO1 AG UR QL IA: NEGATIVE
LEUKOCYTE ESTERASE UR QL STRIP.AUTO: NEGATIVE
LYMPHOCYTES # BLD AUTO: 1.27 10*3/MM3 (ref 0.7–3.1)
LYMPHOCYTES NFR BLD AUTO: 6.2 % (ref 19.6–45.3)
M PNEUMO IGG SER IA-ACNC: NOT DETECTED
MAGNESIUM SERPL-MCNC: 1.9 MG/DL (ref 1.6–2.4)
MAGNESIUM SERPL-MCNC: 2 MG/DL (ref 1.6–2.4)
MCH RBC QN AUTO: 30.5 PG (ref 26.6–33)
MCHC RBC AUTO-ENTMCNC: 32.1 G/DL (ref 31.5–35.7)
MCV RBC AUTO: 95 FL (ref 79–97)
MONOCYTES # BLD AUTO: 0.88 10*3/MM3 (ref 0.1–0.9)
MONOCYTES NFR BLD AUTO: 4.3 % (ref 5–12)
NEUTROPHILS # BLD AUTO: 18.13 10*3/MM3 (ref 1.7–7)
NEUTROPHILS NFR BLD AUTO: 87.9 % (ref 42.7–76)
NITRITE UR QL STRIP: NEGATIVE
NRBC BLD AUTO-RTO: 0 /100 WBC (ref 0–0.2)
PH UR STRIP.AUTO: 5.5 [PH] (ref 4.5–8)
PHOSPHATE SERPL-MCNC: 2.7 MG/DL (ref 2.5–4.5)
PLAT MORPH BLD: NORMAL
PLATELET # BLD AUTO: 288 10*3/MM3 (ref 140–450)
PMV BLD AUTO: 11 FL (ref 6–12)
POTASSIUM BLD-SCNC: 3.9 MMOL/L (ref 3.5–5.2)
PROCALCITONIN SERPL-MCNC: 32.54 NG/ML (ref 0.1–0.25)
PROT SERPL-MCNC: 7.2 G/DL (ref 6–8.5)
PROT UR QL STRIP: NEGATIVE
RBC # BLD AUTO: 3.8 10*6/MM3 (ref 3.77–5.28)
RBC MORPH BLD: NORMAL
RHINOVIRUS RNA SPEC NAA+PROBE: NOT DETECTED
RSV RNA NPH QL NAA+NON-PROBE: NOT DETECTED
S PNEUM AG SPEC QL LA: NEGATIVE
SODIUM BLD-SCNC: 137 MMOL/L (ref 136–145)
SP GR UR STRIP: 1.02 (ref 1–1.03)
TROPONIN T SERPL-MCNC: <0.01 NG/ML (ref 0–0.03)
UROBILINOGEN UR QL STRIP: NORMAL
WBC MORPH BLD: NORMAL
WBC NRBC COR # BLD: 20.6 10*3/MM3 (ref 3.4–10.8)

## 2019-12-11 PROCEDURE — 93005 ELECTROCARDIOGRAM TRACING: CPT | Performed by: PHYSICIAN ASSISTANT

## 2019-12-11 PROCEDURE — 83735 ASSAY OF MAGNESIUM: CPT | Performed by: HOSPITALIST

## 2019-12-11 PROCEDURE — 25010000002 ENOXAPARIN PER 10 MG: Performed by: HOSPITALIST

## 2019-12-11 PROCEDURE — 25010000002 CEFEPIME PER 500 MG: Performed by: PHYSICIAN ASSISTANT

## 2019-12-11 PROCEDURE — 93010 ELECTROCARDIOGRAM REPORT: CPT | Performed by: INTERNAL MEDICINE

## 2019-12-11 PROCEDURE — 84145 PROCALCITONIN (PCT): CPT | Performed by: PHYSICIAN ASSISTANT

## 2019-12-11 PROCEDURE — 80053 COMPREHEN METABOLIC PANEL: CPT | Performed by: PHYSICIAN ASSISTANT

## 2019-12-11 PROCEDURE — 81003 URINALYSIS AUTO W/O SCOPE: CPT | Performed by: PHYSICIAN ASSISTANT

## 2019-12-11 PROCEDURE — 84484 ASSAY OF TROPONIN QUANT: CPT | Performed by: PHYSICIAN ASSISTANT

## 2019-12-11 PROCEDURE — 99285 EMERGENCY DEPT VISIT HI MDM: CPT | Performed by: PHYSICIAN ASSISTANT

## 2019-12-11 PROCEDURE — 36415 COLL VENOUS BLD VENIPUNCTURE: CPT

## 2019-12-11 PROCEDURE — 83735 ASSAY OF MAGNESIUM: CPT | Performed by: PHYSICIAN ASSISTANT

## 2019-12-11 PROCEDURE — 0100U HC BIOFIRE FILMARRAY RESP PANEL 2: CPT | Performed by: PHYSICIAN ASSISTANT

## 2019-12-11 PROCEDURE — 87040 BLOOD CULTURE FOR BACTERIA: CPT | Performed by: PHYSICIAN ASSISTANT

## 2019-12-11 PROCEDURE — 25010000002 AZITHROMYCIN: Performed by: HOSPITALIST

## 2019-12-11 PROCEDURE — 71045 X-RAY EXAM CHEST 1 VIEW: CPT

## 2019-12-11 PROCEDURE — 25010000002 AZITHROMYCIN PER 500 MG: Performed by: HOSPITALIST

## 2019-12-11 PROCEDURE — 83605 ASSAY OF LACTIC ACID: CPT | Performed by: PHYSICIAN ASSISTANT

## 2019-12-11 PROCEDURE — 84100 ASSAY OF PHOSPHORUS: CPT | Performed by: HOSPITALIST

## 2019-12-11 PROCEDURE — 87899 AGENT NOS ASSAY W/OPTIC: CPT | Performed by: HOSPITALIST

## 2019-12-11 PROCEDURE — 99285 EMERGENCY DEPT VISIT HI MDM: CPT

## 2019-12-11 PROCEDURE — P9612 CATHETERIZE FOR URINE SPEC: HCPCS

## 2019-12-11 PROCEDURE — 85007 BL SMEAR W/DIFF WBC COUNT: CPT | Performed by: PHYSICIAN ASSISTANT

## 2019-12-11 PROCEDURE — 85025 COMPLETE CBC W/AUTO DIFF WBC: CPT | Performed by: PHYSICIAN ASSISTANT

## 2019-12-11 PROCEDURE — 99223 1ST HOSP IP/OBS HIGH 75: CPT | Performed by: HOSPITALIST

## 2019-12-11 RX ORDER — CEFTRIAXONE 1 G/50ML
1 INJECTION, SOLUTION INTRAVENOUS ONCE
Status: DISCONTINUED | OUTPATIENT
Start: 2019-12-11 | End: 2019-12-11

## 2019-12-11 RX ORDER — ATORVASTATIN CALCIUM 10 MG/1
10 TABLET, FILM COATED ORAL DAILY
Status: DISCONTINUED | OUTPATIENT
Start: 2019-12-12 | End: 2019-12-17 | Stop reason: HOSPADM

## 2019-12-11 RX ORDER — AZITHROMYCIN 500 MG/1
INJECTION, POWDER, LYOPHILIZED, FOR SOLUTION INTRAVENOUS
Status: DISPENSED
Start: 2019-12-11 | End: 2019-12-12

## 2019-12-11 RX ORDER — NITROGLYCERIN 0.4 MG/1
0.4 TABLET SUBLINGUAL
Status: DISCONTINUED | OUTPATIENT
Start: 2019-12-11 | End: 2019-12-17

## 2019-12-11 RX ORDER — MAGNESIUM HYDROXIDE/ALUMINUM HYDROXICE/SIMETHICONE 120; 1200; 1200 MG/30ML; MG/30ML; MG/30ML
10 SUSPENSION ORAL DAILY PRN
Status: DISCONTINUED | OUTPATIENT
Start: 2019-12-11 | End: 2019-12-17 | Stop reason: HOSPADM

## 2019-12-11 RX ORDER — LORAZEPAM 0.5 MG/1
0.5 TABLET ORAL 3 TIMES DAILY
Status: DISCONTINUED | OUTPATIENT
Start: 2019-12-11 | End: 2019-12-17 | Stop reason: HOSPADM

## 2019-12-11 RX ORDER — SODIUM CHLORIDE 0.9 % (FLUSH) 0.9 %
10 SYRINGE (ML) INJECTION EVERY 12 HOURS SCHEDULED
Status: DISCONTINUED | OUTPATIENT
Start: 2019-12-11 | End: 2019-12-17 | Stop reason: HOSPADM

## 2019-12-11 RX ORDER — L.ACID,PARA/B.BIFIDUM/S.THERM 8B CELL
1 CAPSULE ORAL DAILY
Status: DISCONTINUED | OUTPATIENT
Start: 2019-12-12 | End: 2019-12-17 | Stop reason: HOSPADM

## 2019-12-11 RX ORDER — ACETAMINOPHEN 160 MG/5ML
650 SOLUTION ORAL EVERY 4 HOURS PRN
Status: DISCONTINUED | OUTPATIENT
Start: 2019-12-11 | End: 2019-12-11

## 2019-12-11 RX ORDER — TRAMADOL HYDROCHLORIDE 50 MG/1
100 TABLET ORAL EVERY 8 HOURS
Status: DISCONTINUED | OUTPATIENT
Start: 2019-12-11 | End: 2019-12-17 | Stop reason: HOSPADM

## 2019-12-11 RX ORDER — DILTIAZEM HYDROCHLORIDE 120 MG/1
240 CAPSULE, COATED, EXTENDED RELEASE ORAL
Status: DISCONTINUED | OUTPATIENT
Start: 2019-12-12 | End: 2019-12-11

## 2019-12-11 RX ORDER — ACETAMINOPHEN 650 MG/1
650 SUPPOSITORY RECTAL EVERY 4 HOURS PRN
Status: DISCONTINUED | OUTPATIENT
Start: 2019-12-11 | End: 2019-12-11

## 2019-12-11 RX ORDER — DIPHENHYDRAMINE HCL 25 MG
25 CAPSULE ORAL EVERY 6 HOURS PRN
Status: DISCONTINUED | OUTPATIENT
Start: 2019-12-11 | End: 2019-12-17 | Stop reason: HOSPADM

## 2019-12-11 RX ORDER — GABAPENTIN 100 MG/1
100 CAPSULE ORAL NIGHTLY
Status: DISCONTINUED | OUTPATIENT
Start: 2019-12-11 | End: 2019-12-17 | Stop reason: HOSPADM

## 2019-12-11 RX ORDER — MULTIPLE VITAMINS W/ MINERALS TAB 9MG-400MCG
1 TAB ORAL DAILY
Status: DISCONTINUED | OUTPATIENT
Start: 2019-12-12 | End: 2019-12-17 | Stop reason: HOSPADM

## 2019-12-11 RX ORDER — SODIUM CHLORIDE 9 MG/ML
INJECTION, SOLUTION INTRAVENOUS
Status: DISPENSED
Start: 2019-12-11 | End: 2019-12-12

## 2019-12-11 RX ORDER — ONDANSETRON 2 MG/ML
4 INJECTION INTRAMUSCULAR; INTRAVENOUS EVERY 6 HOURS PRN
Status: DISCONTINUED | OUTPATIENT
Start: 2019-12-11 | End: 2019-12-17 | Stop reason: HOSPADM

## 2019-12-11 RX ORDER — LEVETIRACETAM 500 MG/1
750 TABLET ORAL EVERY 12 HOURS SCHEDULED
Status: DISCONTINUED | OUTPATIENT
Start: 2019-12-11 | End: 2019-12-17 | Stop reason: HOSPADM

## 2019-12-11 RX ORDER — LAMOTRIGINE 100 MG/1
200 TABLET ORAL EVERY 12 HOURS SCHEDULED
Status: DISCONTINUED | OUTPATIENT
Start: 2019-12-11 | End: 2019-12-17 | Stop reason: HOSPADM

## 2019-12-11 RX ORDER — SODIUM CHLORIDE 0.9 % (FLUSH) 0.9 %
10 SYRINGE (ML) INJECTION AS NEEDED
Status: DISCONTINUED | OUTPATIENT
Start: 2019-12-11 | End: 2019-12-17 | Stop reason: HOSPADM

## 2019-12-11 RX ORDER — SODIUM CHLORIDE 9 MG/ML
100 INJECTION, SOLUTION INTRAVENOUS CONTINUOUS
Status: DISCONTINUED | OUTPATIENT
Start: 2019-12-11 | End: 2019-12-17

## 2019-12-11 RX ORDER — ACETAMINOPHEN 325 MG/1
650 TABLET ORAL EVERY 4 HOURS PRN
Status: DISCONTINUED | OUTPATIENT
Start: 2019-12-11 | End: 2019-12-11

## 2019-12-11 RX ORDER — PANTOPRAZOLE SODIUM 40 MG/1
40 TABLET, DELAYED RELEASE ORAL EVERY MORNING
Status: DISCONTINUED | OUTPATIENT
Start: 2019-12-12 | End: 2019-12-17 | Stop reason: HOSPADM

## 2019-12-11 RX ORDER — SODIUM CHLORIDE 9 MG/ML
40 INJECTION, SOLUTION INTRAVENOUS AS NEEDED
Status: DISCONTINUED | OUTPATIENT
Start: 2019-12-11 | End: 2019-12-17 | Stop reason: HOSPADM

## 2019-12-11 RX ORDER — LUBIPROSTONE 8 UG/1
8 CAPSULE ORAL 2 TIMES DAILY WITH MEALS
Status: DISCONTINUED | OUTPATIENT
Start: 2019-12-11 | End: 2019-12-17 | Stop reason: HOSPADM

## 2019-12-11 RX ORDER — ACETAMINOPHEN 325 MG/1
650 TABLET ORAL EVERY 6 HOURS PRN
Status: DISCONTINUED | OUTPATIENT
Start: 2019-12-11 | End: 2019-12-17 | Stop reason: HOSPADM

## 2019-12-11 RX ORDER — CETIRIZINE HYDROCHLORIDE 10 MG/1
10 TABLET ORAL DAILY
Status: DISCONTINUED | OUTPATIENT
Start: 2019-12-12 | End: 2019-12-17 | Stop reason: HOSPADM

## 2019-12-11 RX ORDER — BISACODYL 10 MG
10 SUPPOSITORY, RECTAL RECTAL DAILY PRN
Status: DISCONTINUED | OUTPATIENT
Start: 2019-12-11 | End: 2019-12-17 | Stop reason: HOSPADM

## 2019-12-11 RX ADMIN — SODIUM CHLORIDE, PRESERVATIVE FREE 10 ML: 5 INJECTION INTRAVENOUS at 21:50

## 2019-12-11 RX ADMIN — ENOXAPARIN SODIUM 30 MG: 30 INJECTION SUBCUTANEOUS at 22:20

## 2019-12-11 RX ADMIN — MAGNESIUM HYDROXIDE 5 ML: 400 SUSPENSION ORAL at 22:21

## 2019-12-11 RX ADMIN — TRAMADOL HYDROCHLORIDE 100 MG: 50 TABLET, FILM COATED ORAL at 22:19

## 2019-12-11 RX ADMIN — LORAZEPAM 0.5 MG: 0.5 TABLET ORAL at 22:19

## 2019-12-11 RX ADMIN — GABAPENTIN 100 MG: 100 CAPSULE ORAL at 22:20

## 2019-12-11 RX ADMIN — SODIUM CHLORIDE, POTASSIUM CHLORIDE, SODIUM LACTATE AND CALCIUM CHLORIDE 1000 ML: 600; 310; 30; 20 INJECTION, SOLUTION INTRAVENOUS at 15:32

## 2019-12-11 RX ADMIN — LAMOTRIGINE 200 MG: 100 TABLET ORAL at 22:46

## 2019-12-11 RX ADMIN — AZITHROMYCIN MONOHYDRATE 500 MG: 500 INJECTION, POWDER, LYOPHILIZED, FOR SOLUTION INTRAVENOUS at 22:18

## 2019-12-11 RX ADMIN — SODIUM CHLORIDE 100 ML/HR: 9 INJECTION, SOLUTION INTRAVENOUS at 21:50

## 2019-12-11 RX ADMIN — CEFEPIME 2 G: 2 INJECTION, POWDER, FOR SOLUTION INTRAVENOUS at 16:59

## 2019-12-11 RX ADMIN — LEVETIRACETAM 750 MG: 500 TABLET ORAL at 22:19

## 2019-12-11 NOTE — ED PROVIDER NOTES
"Subjective   History of Present Illness  History of Present Illness    Chief complaint: Hypotension    Location: Generalized    Quality/Severity: 90/60.  Moderate    Timing/Duration: Just prior to arrival    Modifying Factors: Nothing makes worse or better    Associated Symptoms: Positive diarrhea x1 day.  Denies fevers or chills.  Denies cough.  Positive seizure yesterday.  Denies nausea or vomiting. Positive \"not acting right.\"     Narrative: 79-year-old female presents from [facility] for hypotension as above.  She is accompanied by a caregiver who has limited history.  EMS is also helping provide limited history.  Denies injury during sz yesterday.  History is extremely limited, as patient is nonverbal and caregiver information is very limited. In reviewing records that came with the patient, she did receive 2 doses of MiraLAX yesterday, but it was held this morning.    Review of Systems   Constitutional: Negative.  Negative for chills and fever.   HENT: Negative.  Negative for drooling and rhinorrhea.    Respiratory: Negative.  Negative for cough.    Cardiovascular: Negative.    Gastrointestinal: Positive for diarrhea. Negative for nausea and vomiting.   Genitourinary: Negative.  Negative for difficulty urinating.   Musculoskeletal: Negative.    Skin: Negative.    Neurological: Positive for seizures.   Psychiatric/Behavioral: Positive for behavioral problems.   All other systems reviewed and are negative.      Past Medical History:   Diagnosis Date   • Anemia    • Anxiety    • Bursitis    • Bursitis    • DJD (degenerative joint disease), lumbar    • Hyperkalemia    • Hypoglycemia    • Intellectual disability    • Low back pain    • Osteoarthritis    • Osteopenia    • Pneumonia    • Renal disorder    • Renal insufficiency    • Scoliosis    • Seizure disorder (CMS/[facility])        Allergies   Allergen Reactions   • Bee Venom    • Iodinated Diagnostic Agents    • Nsaids        No past surgical history on " file.    Family History   Family history unknown: Yes       Social History     Socioeconomic History   • Marital status: Single     Spouse name: Not on file   • Number of children: Not on file   • Years of education: Not on file   • Highest education level: Not on file   Tobacco Use   • Smoking status: Never Smoker   Substance and Sexual Activity   • Alcohol use: No   • Drug use: No   • Sexual activity: Defer       Current Facility-Administered Medications:   •  lactated ringers bolus 1,000 mL, 1,000 mL, Intravenous, Once, Eileen Kelly PA-C  •  [COMPLETED] Insert peripheral IV, , , Once **AND** sodium chloride 0.9 % flush 10 mL, 10 mL, Intravenous, PRN, Eileen Kelly PA-C    Current Outpatient Medications:   •  acetaminophen (TYLENOL) 325 MG tablet, Take  by mouth daily., Disp: , Rfl:   •  atorvastatin (LIPITOR) 10 MG tablet, Take 10 mg by mouth Daily., Disp: , Rfl:   •  bisacodyl (DULCOLAX) 10 MG suppository, Dulcolax 10 MG Rectal Suppository; Patient Sig: Dulcolax 10 MG Rectal Suppository ; 0; 04-Feb-2014; Active, Disp: , Rfl:   •  Calcium & Magnesium Carbonates (MYLANTA PO), Take  by mouth Daily As Needed., Disp: , Rfl:   •  cetirizine (ZyrTEC) 10 MG tablet, Take  by mouth., Disp: , Rfl:   •  denosumab (PROLIA) 60 MG/ML solution syringe, Inject  under the skin into the appropriate area as directed 1 (One) Time., Disp: , Rfl:   •  diazepam (DIASTAT ACUDIAL) 20 MG rectal kit, Insert 20 mg into the rectum As Needed for Seizures. Seizure Lasting longer than 5 minutes, or 2 seizures within 12 hours, Disp: 20 mg, Rfl: 5  •  diclofenac (VOLTAREN) 1 % gel gel, Apply 4 g topically 4 (Four) Times a Day As Needed., Disp: , Rfl:   •  diltiazem XR (DILACOR XR) 240 MG 24 hr capsule, Take  by mouth., Disp: , Rfl:   •  diphenhydrAMINE (BENADRYL) 25 mg capsule, Take 25 mg by mouth every 6 (six) hours as needed for itching., Disp: , Rfl:   •  gabapentin (NEURONTIN) 100 MG capsule, Take 1 capsule by mouth Every Night.,  Disp: 30 capsule, Rfl: 5  •  lamoTRIgine (LaMICtal) 200 MG tablet, Take  by mouth 2 (two) times a day., Disp: , Rfl:   •  levETIRAcetam (KEPPRA) 250 MG tablet, Take 750 mg by mouth 2 (Two) Times a Day., Disp: , Rfl:   •  Loperamide HCl (IMODIUM PO), Take  by mouth., Disp: , Rfl:   •  LORazepam (ATIVAN) 0.5 MG tablet, Take  by mouth 3 (three) times a day., Disp: , Rfl:   •  LORazepam (ATIVAN) 2 MG/ML injection, Inject 1 mL into the appropriate muscle as directed by prescriber Every 6 (Six) Hours As Needed for Anxiety., Disp: 10 mL, Rfl: 5  •  lubiprostone (AMITIZA) 8 MCG capsule, Take 8 mcg by mouth 2 (two) times a day with meals., Disp: , Rfl:   •  magnesium hydroxide (MILK OF MAGNESIA) 400 MG/5ML suspension, Take  by mouth 2 (two) times a day., Disp: , Rfl:   •  Multiple Vitamin (MULTI VITAMIN DAILY PO), Take  by mouth., Disp: , Rfl:   •  omeprazole (priLOSEC) 20 MG capsule, Take 20 mg by mouth 2 (Two) Times a Day., Disp: , Rfl:   •  polyethylene glycol (MIRALAX) powder, Take 17 g by mouth 2 (Two) Times a Day., Disp: , Rfl:   •  Probiotic Product (PROBIOTIC PO), Take  by mouth., Disp: , Rfl:   •  traMADol (ULTRAM) 50 MG tablet, Take 2 tablets by mouth Every 8 (Eight) Hours., Disp: 180 tablet, Rfl: 0  •  Wheat Dextrin (BENEFIBER) powder, Take  by mouth daily., Disp: , Rfl:         Objective   Physical Exam  Vitals:    12/11/19 1506   BP: 131/64   Pulse: 95   Resp: 22   Temp: 98 °F (36.7 °C)   SpO2: 91%     GENERAL:NAD. Non verbal.  SKIN: Warm pink and dry   HEENT:  PERRLA, EOM intact, conjunctiva normal, sclera clear  NECK: supple  LUNGS: Clear to auscultation bilaterally without wheezes, rales or rhonchi.  No accessory muscle use and no nasal flaring.  CARDIAC:  Regular rate and rhythm, S1-S2.  No murmurs, rubs or gallops.  No peripheral edema.  Equal pulses bilaterally.  ABDOMEN: Soft, nontender, nondistended.  No guarding or rebound tenderness.  Normal bowel sounds.  MUSCULOSKELETAL: Moves all extremities well.   No deformity.  NEURO: Cranial nerves II through XII grossly intact.  No gross focal deficits.  Alert.  Normal  motor.  PSYCH: Normal mood and affect      Procedures           ED Course  ED Course as of Dec 11 1703   Wed Dec 11, 2019   1543 2 weeks ago was 1.17   Creatinine(!): 1.61 [[place]]   1619 RN informed me that pt has oxygen 85%, placed on 2 L nc.  ABG ordered.    [KY]   1703 RT unable to get ABG. Pt fighting    [KY]      ED Course User Index  [KY] Eileen Kelly, ELOINA      1 L LR given here.     EKG         EKG time / Interpretation time: 1544 / 1547  Rhythm/Rate: NSR 95   NV: 216  QRS, axis: -17   QTc 472  ST and T waves: artifact.  No elevation or depression   EKG Tracing Interpreted Contemporaneously by me, independently viewed by me and MD.  No prior      CONSULT  Time 1708  Discussed case with Dr Viveros  Reviewed history, exam, results and treatments.  Discussed concerns and plan of care. Dr SOLIS accepts pt to be admitted to [facility].      Results for orders placed or performed during the hospital encounter of 12/11/19   Comprehensive Metabolic Panel   Result Value Ref Range    Glucose 147 (H) 65 - 99 mg/dL    BUN 43 (H) 8 - 23 mg/dL    Creatinine 1.61 (H) 0.57 - 1.00 mg/dL    Sodium 137 136 - 145 mmol/L    Potassium 3.9 3.5 - 5.2 mmol/L    Chloride 99 98 - 107 mmol/L    CO2 26.1 22.0 - 29.0 mmol/L    Calcium 9.4 8.6 - 10.5 mg/dL    Total Protein 7.2 6.0 - 8.5 g/dL    Albumin 3.80 3.50 - 5.20 g/dL    ALT (SGPT) 14 1 - 33 U/L    AST (SGOT) 21 1 - 32 U/L    Alkaline Phosphatase 68 39 - 117 U/L    Total Bilirubin 0.3 0.2 - 1.2 mg/dL    eGFR Non African Amer 31 (L) >60 mL/min/1.73    Globulin 3.4 gm/dL    A/G Ratio 1.1 g/dL    BUN/Creatinine Ratio 26.7 (H) 7.0 - 25.0    Anion Gap 11.9 5.0 - 15.0 mmol/L   Urinalysis With Culture If Indicated - Urine, Catheter   Result Value Ref Range    Color, UA Yellow Yellow, Straw    Appearance, UA Clear Clear    pH, UA 5.5 4.5 - 8.0    Specific Gravity, UA 1.020 1.003 - 1.030     Glucose, UA Negative Negative    Ketones, UA Negative Negative    Bilirubin, UA Negative Negative    Blood, UA Negative Negative    Protein, UA Negative Negative    Leuk Esterase, UA Negative Negative    Nitrite, UA Negative Negative    Urobilinogen, UA 0.2 E.U./dL 0.2 - 1.0 E.U./dL   Magnesium   Result Value Ref Range    Magnesium 2.0 1.6 - 2.4 mg/dL   Troponin   Result Value Ref Range    Troponin T <0.010 0.000-<0.030 ng/mL   CBC Auto Differential   Result Value Ref Range    WBC 20.60 (H) 3.40 - 10.80 10*3/mm3    RBC 3.80 3.77 - 5.28 10*6/mm3    Hemoglobin 11.6 (L) 12.0 - 15.9 g/dL    Hematocrit 36.1 34.0 - 46.6 %    MCV 95.0 79.0 - 97.0 fL    MCH 30.5 26.6 - 33.0 pg    MCHC 32.1 31.5 - 35.7 g/dL    RDW 13.2 12.3 - 15.4 %    RDW-SD 46.2 37.0 - 54.0 fl    MPV 11.0 6.0 - 12.0 fL    Platelets 288 140 - 450 10*3/mm3    Neutrophil % 87.9 (H) 42.7 - 76.0 %    Lymphocyte % 6.2 (L) 19.6 - 45.3 %    Monocyte % 4.3 (L) 5.0 - 12.0 %    Eosinophil % 0.1 (L) 0.3 - 6.2 %    Basophil % 0.3 0.0 - 1.5 %    Immature Grans % 1.2 (H) 0.0 - 0.5 %    Neutrophils, Absolute 18.13 (H) 1.70 - 7.00 10*3/mm3    Lymphocytes, Absolute 1.27 0.70 - 3.10 10*3/mm3    Monocytes, Absolute 0.88 0.10 - 0.90 10*3/mm3    Eosinophils, Absolute 0.02 0.00 - 0.40 10*3/mm3    Basophils, Absolute 0.06 0.00 - 0.20 10*3/mm3    Immature Grans, Absolute 0.24 (H) 0.00 - 0.05 10*3/mm3    nRBC 0.0 0.0 - 0.2 /100 WBC   Lactic Acid, Plasma   Result Value Ref Range    Lactate 2.3 (C) 0.5 - 2.0 mmol/L   Scan Slide   Result Value Ref Range    RBC Morphology Normal Normal    WBC Morphology Normal Normal    Platelet Morphology Normal Normal   Procalcitonin   Result Value Ref Range    Procalcitonin 32.54 (H) 0.10 - 0.25 ng/mL     Reviewed CXR. Independently viewed by me. Interpreted by radiologist. Discussed with pt and caregiver.  Xr Chest 1 View    Result Date: 12/11/2019  Narrative: CHEST X-RAY, 12/11/2019     HISTORY: 79-year-old female [facility] resident in  the ED status post seizure. Low blood pressure.  TECHNIQUE: AP portable chest x-ray.  COMPARISON: *  Chest x-ray, 03/20/2014.  FINDINGS: Mild patchy infiltrates in both lung bases. These are nonspecific, but early aspiration pneumonitis is a consideration in the current clinical setting.  There is no dense airspace consolidation or pleural effusion. Lung volumes are chronically low. Heart size and pulmonary vascularity are within normal limits.      Impression: Mild patchy bibasilar pulmonary infiltrates. See above.  This report was finalized on 12/11/2019 4:10 PM by Dr. Lionel Ye MD.                          No data recorded                        MDM  Number of Diagnoses or Management Options  KEILY (acute kidney injury) (CMS/HCC): new and requires workup  Pneumonia of both lower lobes due to infectious organism (CMS/HCC): new and requires workup  Sepsis with acute hypoxic respiratory failure without septic shock, due to unspecified organism (CMS/HCC): new and requires workup     Amount and/or Complexity of Data Reviewed  Clinical lab tests: reviewed and ordered  Tests in the radiology section of CPT®: reviewed and ordered  Tests in the medicine section of CPT®: reviewed and ordered  Decide to obtain previous medical records or to obtain history from someone other than the patient: yes  Obtain history from someone other than the patient: yes  Review and summarize past medical records: yes  Discuss the patient with other providers: yes  Independent visualization of images, tracings, or specimens: yes    Risk of Complications, Morbidity, and/or Mortality  Presenting problems: high  Diagnostic procedures: high  Management options: high    Patient Progress  Patient progress: improved      Final diagnoses:   Pneumonia of both lower lobes due to infectious organism (CMS/HCC)   Sepsis with acute hypoxic respiratory failure without septic shock, due to unspecified organism (CMS/HCC)   KEILY (acute kidney injury)  (CMS/[facility])     Dictated utilizing Dragon dictation         Eileen Kelly, ELOINA  12/11/19 9401

## 2019-12-12 DIAGNOSIS — G40.909 NON-REFRACTORY EPILEPSY (HCC): Primary | ICD-10-CM

## 2019-12-12 DIAGNOSIS — G40.909 NON-REFRACTORY EPILEPSY (HCC): ICD-10-CM

## 2019-12-12 LAB
ANION GAP SERPL CALCULATED.3IONS-SCNC: 11.1 MMOL/L (ref 5–15)
BASOPHILS # BLD AUTO: 0.03 10*3/MM3 (ref 0–0.2)
BASOPHILS NFR BLD AUTO: 0.2 % (ref 0–1.5)
BUN BLD-MCNC: 39 MG/DL (ref 8–23)
BUN/CREAT SERPL: 29.1 (ref 7–25)
CALCIUM SPEC-SCNC: 8.5 MG/DL (ref 8.6–10.5)
CHLORIDE SERPL-SCNC: 106 MMOL/L (ref 98–107)
CO2 SERPL-SCNC: 22.9 MMOL/L (ref 22–29)
CREAT BLD-MCNC: 1.34 MG/DL (ref 0.57–1)
DEPRECATED RDW RBC AUTO: 47.4 FL (ref 37–54)
EOSINOPHIL # BLD AUTO: 0.02 10*3/MM3 (ref 0–0.4)
EOSINOPHIL NFR BLD AUTO: 0.1 % (ref 0.3–6.2)
ERYTHROCYTE [DISTWIDTH] IN BLOOD BY AUTOMATED COUNT: 13.4 % (ref 12.3–15.4)
GFR SERPL CREATININE-BSD FRML MDRD: 38 ML/MIN/1.73
GLUCOSE BLD-MCNC: 111 MG/DL (ref 65–99)
HCT VFR BLD AUTO: 31.1 % (ref 34–46.6)
HGB BLD-MCNC: 9.8 G/DL (ref 12–15.9)
IMM GRANULOCYTES # BLD AUTO: 0.24 10*3/MM3 (ref 0–0.05)
IMM GRANULOCYTES NFR BLD AUTO: 1.6 % (ref 0–0.5)
LYMPHOCYTES # BLD AUTO: 0.84 10*3/MM3 (ref 0.7–3.1)
LYMPHOCYTES NFR BLD AUTO: 5.6 % (ref 19.6–45.3)
MAGNESIUM SERPL-MCNC: 2 MG/DL (ref 1.6–2.4)
MCH RBC QN AUTO: 30.2 PG (ref 26.6–33)
MCHC RBC AUTO-ENTMCNC: 31.5 G/DL (ref 31.5–35.7)
MCV RBC AUTO: 95.7 FL (ref 79–97)
MONOCYTES # BLD AUTO: 0.7 10*3/MM3 (ref 0.1–0.9)
MONOCYTES NFR BLD AUTO: 4.7 % (ref 5–12)
NEUTROPHILS # BLD AUTO: 13.22 10*3/MM3 (ref 1.7–7)
NEUTROPHILS NFR BLD AUTO: 87.8 % (ref 42.7–76)
NRBC BLD AUTO-RTO: 0 /100 WBC (ref 0–0.2)
PHOSPHATE SERPL-MCNC: 2.9 MG/DL (ref 2.5–4.5)
PLATELET # BLD AUTO: 253 10*3/MM3 (ref 140–450)
PMV BLD AUTO: 11.5 FL (ref 6–12)
POTASSIUM BLD-SCNC: 3.9 MMOL/L (ref 3.5–5.2)
PROCALCITONIN SERPL-MCNC: 17.28 NG/ML (ref 0.1–0.25)
RBC # BLD AUTO: 3.25 10*6/MM3 (ref 3.77–5.28)
SODIUM BLD-SCNC: 140 MMOL/L (ref 136–145)
TROPONIN T SERPL-MCNC: <0.01 NG/ML (ref 0–0.03)
WBC NRBC COR # BLD: 15.05 10*3/MM3 (ref 3.4–10.8)

## 2019-12-12 PROCEDURE — 94799 UNLISTED PULMONARY SVC/PX: CPT

## 2019-12-12 PROCEDURE — 99232 SBSQ HOSP IP/OBS MODERATE 35: CPT | Performed by: HOSPITALIST

## 2019-12-12 PROCEDURE — 25010000002 AZITHROMYCIN PER 500 MG: Performed by: HOSPITALIST

## 2019-12-12 PROCEDURE — 87081 CULTURE SCREEN ONLY: CPT | Performed by: HOSPITALIST

## 2019-12-12 PROCEDURE — 83735 ASSAY OF MAGNESIUM: CPT | Performed by: HOSPITALIST

## 2019-12-12 PROCEDURE — 84484 ASSAY OF TROPONIN QUANT: CPT | Performed by: HOSPITALIST

## 2019-12-12 PROCEDURE — 25010000002 ENOXAPARIN PER 10 MG: Performed by: HOSPITALIST

## 2019-12-12 PROCEDURE — 92610 EVALUATE SWALLOWING FUNCTION: CPT

## 2019-12-12 PROCEDURE — 85025 COMPLETE CBC W/AUTO DIFF WBC: CPT | Performed by: HOSPITALIST

## 2019-12-12 PROCEDURE — 84100 ASSAY OF PHOSPHORUS: CPT | Performed by: HOSPITALIST

## 2019-12-12 PROCEDURE — 63710000001 DIPHENHYDRAMINE PER 50 MG: Performed by: HOSPITALIST

## 2019-12-12 PROCEDURE — 25010000002 CEFEPIME PER 500 MG: Performed by: HOSPITALIST

## 2019-12-12 PROCEDURE — 84145 PROCALCITONIN (PCT): CPT | Performed by: HOSPITALIST

## 2019-12-12 PROCEDURE — 80048 BASIC METABOLIC PNL TOTAL CA: CPT | Performed by: HOSPITALIST

## 2019-12-12 RX ORDER — GUAIFENESIN AND DEXTROMETHORPHAN HYDROBROMIDE 100; 10 MG/5ML; MG/5ML
10 SOLUTION ORAL EVERY 4 HOURS PRN
COMMUNITY

## 2019-12-12 RX ORDER — DIAZEPAM 20 MG/4ML
20 GEL RECTAL AS NEEDED
Qty: 20 MG | Refills: 5 | Status: SHIPPED | OUTPATIENT
Start: 2019-12-12 | End: 2019-12-13 | Stop reason: SDUPTHER

## 2019-12-12 RX ORDER — PROMETHAZINE HYDROCHLORIDE 25 MG/1
25 TABLET ORAL EVERY 6 HOURS PRN
COMMUNITY
Start: 2019-12-10 | End: 2022-12-26 | Stop reason: HOSPADM

## 2019-12-12 RX ORDER — DIAZEPAM 20 MG/4ML
20 GEL RECTAL AS NEEDED
Qty: 20 MG | Refills: 5 | Status: SHIPPED | OUTPATIENT
Start: 2019-12-12 | End: 2019-12-12 | Stop reason: SDUPTHER

## 2019-12-12 RX ADMIN — TRAMADOL HYDROCHLORIDE 100 MG: 50 TABLET, FILM COATED ORAL at 20:32

## 2019-12-12 RX ADMIN — Medication 1 CAPSULE: at 10:38

## 2019-12-12 RX ADMIN — TRAMADOL HYDROCHLORIDE 100 MG: 50 TABLET, FILM COATED ORAL at 14:06

## 2019-12-12 RX ADMIN — LAMOTRIGINE 200 MG: 100 TABLET ORAL at 10:38

## 2019-12-12 RX ADMIN — SODIUM CHLORIDE 100 ML/HR: 9 INJECTION, SOLUTION INTRAVENOUS at 06:54

## 2019-12-12 RX ADMIN — LEVETIRACETAM 750 MG: 500 TABLET ORAL at 21:27

## 2019-12-12 RX ADMIN — SODIUM CHLORIDE, PRESERVATIVE FREE 10 ML: 5 INJECTION INTRAVENOUS at 10:40

## 2019-12-12 RX ADMIN — DIPHENHYDRAMINE HYDROCHLORIDE 25 MG: 25 CAPSULE ORAL at 20:36

## 2019-12-12 RX ADMIN — LORAZEPAM 0.5 MG: 0.5 TABLET ORAL at 10:36

## 2019-12-12 RX ADMIN — GABAPENTIN 100 MG: 100 CAPSULE ORAL at 20:32

## 2019-12-12 RX ADMIN — LAMOTRIGINE 200 MG: 100 TABLET ORAL at 21:52

## 2019-12-12 RX ADMIN — Medication 1 TABLET: at 10:37

## 2019-12-12 RX ADMIN — ATORVASTATIN CALCIUM 10 MG: 10 TABLET, FILM COATED ORAL at 10:36

## 2019-12-12 RX ADMIN — CEFEPIME 2 G: 2 INJECTION, POWDER, FOR SOLUTION INTRAVENOUS at 17:20

## 2019-12-12 RX ADMIN — AZITHROMYCIN MONOHYDRATE 500 MG: 500 INJECTION, POWDER, LYOPHILIZED, FOR SOLUTION INTRAVENOUS at 20:32

## 2019-12-12 RX ADMIN — TRAMADOL HYDROCHLORIDE 100 MG: 50 TABLET, FILM COATED ORAL at 06:50

## 2019-12-12 RX ADMIN — ENOXAPARIN SODIUM 30 MG: 30 INJECTION SUBCUTANEOUS at 20:32

## 2019-12-12 RX ADMIN — CETIRIZINE HYDROCHLORIDE 10 MG: 10 TABLET, FILM COATED ORAL at 10:36

## 2019-12-12 RX ADMIN — LORAZEPAM 0.5 MG: 0.5 TABLET ORAL at 20:32

## 2019-12-12 RX ADMIN — PSYLLIUM HUSK 1 PACKET: 3.4 POWDER ORAL at 10:39

## 2019-12-12 RX ADMIN — LEVETIRACETAM 750 MG: 500 TABLET ORAL at 10:36

## 2019-12-12 RX ADMIN — PANTOPRAZOLE SODIUM 40 MG: 40 TABLET, DELAYED RELEASE ORAL at 06:50

## 2019-12-12 RX ADMIN — LORAZEPAM 0.5 MG: 0.5 TABLET ORAL at 17:19

## 2019-12-12 NOTE — PROGRESS NOTES
"Hospitalist Team      Patient Care Team:  Tulio Carlson MD as PCP - General (Family Medicine)  Arianne Harkins APRN as PCP - Claims Attributed  Aleksandra Kent APRN as Nurse Practitioner (Pain Medicine)  Ja Harris MD as Consulting Physician (Neurology)  Magen Farmer MD as Consulting Physician (Nephrology)  Mikey Sanchez MD (Psychiatry)  Nelsy Butcher DPM as Consulting Physician (Podiatry)        Chief Complaint:  Pneumonia and suspect aspiration/ with sepsis/ hypotension    Subjective    Interval History and ROS:     Patient with profound intellectual disability and is non verbal/ resident of [facility].  Upon entering the room her caretaker from [facility] is helping her eat.  Her appetite is good and she is excepting her food.  She does not talk but she does make grunting noises to let you know she hears what you are saying.  The caregiver says this is typical of what she usually does.  She is diagnosed with pneumonia and on proper antibiotics.  She was hypotensive on admission and hypoxic and diagnosed with sepsis.  She is improved today.  Speech saw the patient today and is advised us to order a modified barium swallow for tomorrow.        Objective    Vital Signs  Temp:  [97.4 °F (36.3 °C)-99.5 °F (37.5 °C)] 99.5 °F (37.5 °C)  Heart Rate:  [] 94  Resp:  [20-24] 22  BP: (110-149)/(60-88) 110/62  Oxygen Therapy  SpO2: 91 %  Pulse Oximetry Type: Continuous  Device (Oxygen Therapy): nasal cannula  Flow (L/min): 4}  Flowsheet Rows      First Filed Value   Admission Height  152.4 cm (60\") Documented at 12/11/2019 1815   Admission Weight  78.7 kg (173 lb 8 oz) Documented at 12/11/2019 1506        Body mass index is 32.99 kg/m².      Physical Exam:    Physical Exam   Constitutional:   Grunting noises. Eating her dinner. Fed by [facility] care giver.     Cardiovascular: Normal rate and regular rhythm.   Pulmonary/Chest: Effort normal and breath sounds normal. "   Abdominal: Soft. Bowel sounds are normal.   Musculoskeletal:   Contractures and confined to bed or chair with assistance   Neurological: She is alert.   Profound intellectual disability   Skin: Skin is warm and dry.   Nursing note and vitals reviewed.      Results Review:     I reviewed the patient's new clinical results.    Lab Results (last 24 hours)     Procedure Component Value Units Date/Time    Procalcitonin [[id number]]  (Abnormal) Collected:  12/12/19 0312    Specimen:  Blood Updated:  12/12/19 0615     Procalcitonin 17.28 ng/mL     Basic Metabolic Panel [[id number]]  (Abnormal) Collected:  12/12/19 0312    Specimen:  Blood Updated:  12/12/19 0559     Glucose 111 mg/dL      BUN 39 mg/dL      Creatinine 1.34 mg/dL      Sodium 140 mmol/L      Potassium 3.9 mmol/L      Chloride 106 mmol/L      CO2 22.9 mmol/L      Calcium 8.5 mg/dL      eGFR Non African Amer 38 mL/min/1.73      BUN/Creatinine Ratio 29.1     Anion Gap 11.1 mmol/L     Narrative:       GFR Normal >60  Chronic Kidney Disease <60  Kidney Failure <15      Phosphorus [[id number]]  (Normal) Collected:  12/12/19 0312    Specimen:  Blood Updated:  12/12/19 0558     Phosphorus 2.9 mg/dL     Magnesium [[id number]]  (Normal) Collected:  12/12/19 0312    Specimen:  Blood Updated:  12/12/19 0558     Magnesium 2.0 mg/dL     CBC Auto Differential [[id number]]  (Abnormal) Collected:  12/12/19 0312    Specimen:  Blood Updated:  12/12/19 0538     WBC 15.05 10*3/mm3      RBC 3.25 10*6/mm3      Hemoglobin 9.8 g/dL      Hematocrit 31.1 %      MCV 95.7 fL      MCH 30.2 pg      MCHC 31.5 g/dL      RDW 13.4 %      RDW-SD 47.4 fl      MPV 11.5 fL      Platelets 253 10*3/mm3      Neutrophil % 87.8 %      Lymphocyte % 5.6 %      Monocyte % 4.7 %      Eosinophil % 0.1 %      Basophil % 0.2 %      Immature Grans % 1.6 %      Neutrophils, Absolute 13.22 10*3/mm3      Lymphocytes, Absolute 0.84 10*3/mm3      Monocytes, Absolute 0.70 10*3/mm3      Eosinophils, Absolute 0.02  10*3/mm3      Basophils, Absolute 0.03 10*3/mm3      Immature Grans, Absolute 0.24 10*3/mm3      nRBC 0.0 /100 WBC     MRSA Screen Culture - Swab, Nares [[id number]] Collected:  12/12/19 0022    Specimen:  Swab from Nares Updated:  12/12/19 0028    Legionella Antigen, Urine - Urine, Urine, Clean Catch [[id number]]  (Normal) Collected:  12/11/19 1559    Specimen:  Urine, Clean Catch Updated:  12/11/19 2212     LEGIONELLA ANTIGEN, URINE Negative    S. Pneumo Ag Urine or CSF - Urine, Urine, Clean Catch [[id number]]  (Normal) Collected:  12/11/19 1559    Specimen:  Urine, Clean Catch Updated:  12/11/19 2212     Strep Pneumo Ag Negative    Respiratory Panel, PCR - Swab, Nasopharynx [[id number]]  (Normal) Collected:  12/11/19 1715    Specimen:  Swab from Nasopharynx Updated:  12/11/19 2115     ADENOVIRUS, PCR Not Detected     Coronavirus 229E Not Detected     Coronavirus HKU1 Not Detected     Coronavirus NL63 Not Detected     Coronavirus OC43 Not Detected     Human Metapneumovirus Not Detected     Human Rhinovirus/Enterovirus Not Detected     Influenza B PCR Not Detected     Parainfluenza Virus 1 Not Detected     Parainfluenza Virus 2 Not Detected     Parainfluenza Virus 3 Not Detected     Parainfluenza Virus 4 Not Detected     Bordetella pertussis pcr Not Detected     Influenza A H1 2009 PCR Not Detected     Chlamydophila pneumoniae PCR Not Detected     Mycoplasma pneumo by PCR Not Detected     Influenza A PCR Not Detected     Influenza A H3 Not Detected     Influenza A H1 Not Detected     RSV, PCR Not Detected     Bordetella parapertussis PCR Not Detected    Phosphorus [[id number]]  (Normal) Collected:  12/1940    Specimen:  Blood Updated:  12/11/19 2039     Phosphorus 2.7 mg/dL     Magnesium [[id number]]  (Normal) Collected:  12/1940    Specimen:  Blood Updated:  12/11/19 2039     Magnesium 1.9 mg/dL     Lactic Acid, Reflex [[id number]]  (Normal) Collected:  12/1940    Specimen:  Blood Updated:  12/11/19  2000     Lactate 1.3 mmol/L     Lactic Acid, Reflex Timer (This will reflex a repeat order 3-3:15 hours after ordered.) [[id number]] Collected:  12/11/19 1559    Specimen:  Blood Updated:  12/11/19 1930     Extra Tube Hold for add-ons.     Comment: Auto resulted.       Procalcitonin [[id number]]  (Abnormal) Collected:  12/11/19 1512    Specimen:  Blood Updated:  12/11/19 1713     Procalcitonin 32.54 ng/mL     Lactic Acid, Plasma [[id number]]  (Abnormal) Collected:  12/11/19 1559    Specimen:  Blood Updated:  12/11/19 1625     Lactate 2.3 mmol/L     Scan Slide [[id number]]  (Normal) Collected:  12/11/19 1512    Specimen:  Blood Updated:  12/11/19 1613     RBC Morphology Normal     WBC Morphology Normal     Platelet Morphology Normal    Blood Culture - Blood, Arm, Right [[id number]] Collected:  12/11/19 1559    Specimen:  Blood from Arm, Right Updated:  12/11/19 1608    Blood Culture - Blood, Arm, Left [[id number]] Collected:  12/11/19 1559    Specimen:  Blood from Arm, Left Updated:  12/11/19 1608    Urinalysis With Culture If Indicated - Urine, Catheter [[id number]]  (Normal) Collected:  12/11/19 1559    Specimen:  Urine, Catheter Updated:  12/11/19 1605     Color, UA Yellow     Appearance, UA Clear     pH, UA 5.5     Specific Gravity, UA 1.020     Glucose, UA Negative     Ketones, UA Negative     Bilirubin, UA Negative     Blood, UA Negative     Protein, UA Negative     Leuk Esterase, UA Negative     Nitrite, UA Negative     Urobilinogen, UA 0.2 E.U./dL    Narrative:       Urine microscopic not indicated.    Comprehensive Metabolic Panel [[id number]]  (Abnormal) Collected:  12/11/19 1512    Specimen:  Blood Updated:  12/11/19 1542     Glucose 147 mg/dL      BUN 43 mg/dL      Creatinine 1.61 mg/dL      Sodium 137 mmol/L      Potassium 3.9 mmol/L      Chloride 99 mmol/L      CO2 26.1 mmol/L      Calcium 9.4 mg/dL      Total Protein 7.2 g/dL      Albumin 3.80 g/dL      ALT (SGPT) 14 U/L      AST (SGOT) 21 U/L      Alkaline  Phosphatase 68 U/L      Total Bilirubin 0.3 mg/dL      eGFR Non African Amer 31 mL/min/1.73      Globulin 3.4 gm/dL      A/G Ratio 1.1 g/dL      BUN/Creatinine Ratio 26.7     Anion Gap 11.9 mmol/L     Narrative:       GFR Normal >60  Chronic Kidney Disease <60  Kidney Failure <15      Magnesium [[id number]]  (Normal) Collected:  12/11/19 1512    Specimen:  Blood Updated:  12/11/19 1542     Magnesium 2.0 mg/dL     Troponin [[id number]]  (Normal) Collected:  12/11/19 1512    Specimen:  Blood Updated:  12/11/19 1540     Troponin T <0.010 ng/mL     Narrative:       Troponin T Reference Range:  <= 0.03 ng/mL-   Negative for AMI  >0.03 ng/mL-     Abnormal for myocardial necrosis.  Clinicians would have to utilize clinical acumen, EKG, Troponin and serial changes to determine if it is an Acute Myocardial Infarction or myocardial injury due to an underlying chronic condition.     CBC & Differential [[id number]] Collected:  12/11/19 1512    Specimen:  Blood Updated:  12/11/19 1525    Narrative:       The following orders were created for panel order CBC & Differential.  Procedure                               Abnormality         Status                     ---------                               -----------         ------                     CBC Auto Differential[[id number]]        Abnormal            Final result                 Please view results for these tests on the individual orders.    CBC Auto Differential [[id number]]  (Abnormal) Collected:  12/11/19 1512    Specimen:  Blood Updated:  12/11/19 1525     WBC 20.60 10*3/mm3      RBC 3.80 10*6/mm3      Hemoglobin 11.6 g/dL      Hematocrit 36.1 %      MCV 95.0 fL      MCH 30.5 pg      MCHC 32.1 g/dL      RDW 13.2 %      RDW-SD 46.2 fl      MPV 11.0 fL      Platelets 288 10*3/mm3      Neutrophil % 87.9 %      Lymphocyte % 6.2 %      Monocyte % 4.3 %      Eosinophil % 0.1 %      Basophil % 0.3 %      Immature Grans % 1.2 %      Neutrophils, Absolute 18.13 10*3/mm3       Lymphocytes, Absolute 1.27 10*3/mm3      Monocytes, Absolute 0.88 10*3/mm3      Eosinophils, Absolute 0.02 10*3/mm3      Basophils, Absolute 0.06 10*3/mm3      Immature Grans, Absolute 0.24 10*3/mm3      nRBC 0.0 /100 WBC           Imaging Results (Last 24 Hours)     Procedure Component Value Units Date/Time    XR Chest 1 View [[id number]] Collected:  12/11/19 1608     Updated:  12/11/19 1612    Narrative:       CHEST X-RAY, 12/11/2019         HISTORY:  79-year-old female [facility] resident in the ED status post  seizure. Low blood pressure.     TECHNIQUE:  AP portable chest x-ray.     COMPARISON:  *  Chest x-ray, 03/20/2014.     FINDINGS:  Mild patchy infiltrates in both lung bases. These are nonspecific, but  early aspiration pneumonitis is a consideration in the current clinical  setting.     There is no dense airspace consolidation or pleural effusion. Lung  volumes are chronically low. Heart size and pulmonary vascularity are  within normal limits.       Impression:       Mild patchy bibasilar pulmonary infiltrates. See above.     This report was finalized on 12/11/2019 4:10 PM by Dr. Lionel Ye MD.             Xray not reviewed personally by physician.      ECG not reviewed personally by physician  ECG/EMG Results (most recent)     Procedure Component Value Units Date/Time    ECG 12 Lead [[id number]] Collected:  12/11/19 1544     Updated:  12/11/19 1839    Narrative:       HEART RATE= 95  bpm  RR Interval= 632  ms  IN Interval= 216  ms  P Horizontal Axis= -2  deg  P Front Axis= 34  deg  QRSD Interval= 97  ms  QT Interval= 375  ms  QRS Axis= -17  deg  T Wave Axis= 40  deg  - ABNORMAL ECG -  Sinus rhythm  Prolonged IN interval  Borderline left axis deviation  NO PRIOR TRACING AVAILABLE FOR COMPARISON  Electronically Signed By: [facility]) 11-Dec-2019 18:39:02  Date and Time of Study: 2019-12-11 15:44:50          Medication Review:   I have reviewed the patient's current medication  list    Current Facility-Administered Medications:   •  acetaminophen (TYLENOL) tablet 650 mg, 650 mg, Oral, Q6H PRN, Cinthya Santos MD  •  aluminum-magnesium hydroxide-simethicone (MAALOX/MYLANTA) suspension 10 mL, 10 mL, Oral, Daily PRN, Cinthya Santos MD  •  atorvastatin (LIPITOR) tablet 10 mg, 10 mg, Oral, Daily, Cinthya Santos MD  •  azithromycin (ZITHROMAX) 500 MG injection  - [facility], , , ,   •  azithromycin 500 MG/250 ML 0.9% NS IVPB (MBP), 500 mg, Intravenous, Q24H, Cinthya Santos MD, 500 mg at 12/11/19 2218  •  bisacodyl (DULCOLAX) suppository 10 mg, 10 mg, Rectal, Daily PRN, Cinthya Santos MD  •  cefepime 2 g in 100 mL NS, 2 g, Intravenous, Q24H, Cinthya Santos MD  •  cetirizine (zyrTEC) tablet 10 mg, 10 mg, Oral, Daily, Cinthya Santos MD  •  diclofenac (VOLTAREN) 1 % gel 4 g, 4 g, Topical, 4x Daily PRN, Cinthya Santos MD  •  diphenhydrAMINE (BENADRYL) capsule 25 mg, 25 mg, Oral, Q6H PRN, Cinthya Santos MD  •  enoxaparin (LOVENOX) syringe 30 mg, 30 mg, Subcutaneous, Q24H, Cinthya Santos MD, 30 mg at 12/11/19 2220  •  gabapentin (NEURONTIN) capsule 100 mg, 100 mg, Oral, Nightly, Cinthya Santos MD, 100 mg at 12/11/19 2220  •  lactobacillus acidophilus (RISAQUAD) capsule 1 capsule, 1 capsule, Oral, Daily, Cinthya Santos MD  •  lamoTRIgine (LaMICtal) tablet 200 mg, 200 mg, Oral, Q12H, Cinthya Santos MD, 200 mg at 12/11/19 2246  •  levETIRAcetam (KEPPRA) tablet 750 mg, 750 mg, Oral, Q12H, Cinthya Santos MD, 750 mg at 12/11/19 2219  •  LORazepam (ATIVAN) tablet 0.5 mg, 0.5 mg, Oral, TID, Cinthya Santos MD, 0.5 mg at 12/11/19 2219  •  lubiprostone (AMITIZA) capsule 8 mcg, 8 mcg, Oral, BID With Meals, Cinthya Santos MD  •  magnesium hydroxide (MILK OF MAGNESIA) 400  MG/5ML suspension 5 mL, 5 mL, Oral, BID PRN, Cinthya Santos MD  •  multivitamin with minerals 1 tablet, 1 tablet, Oral, Daily, Cinthya Santos MD  •  nitroglycerin (NITROSTAT) SL tablet 0.4 mg, 0.4 mg, Sublingual, Q5 Min PRN, Cinthya Santos MD  •  ondansetron (ZOFRAN) injection 4 mg, 4 mg, Intravenous, Q6H PRN, Cinthya Santos MD  •  pantoprazole (PROTONIX) EC tablet 40 mg, 40 mg, Oral, QAM, Cinthya Santos MD, 40 mg at 12/12/19 0650  •  Pharmacy Consult - Pharmacy to dose, , Does not apply, Continuous PRN, Cinthya Santos MD  •  polyethylene glycol 3350 powder (packet), 17 g, Oral, BID, Cinthya Santos MD  •  psyllium (METAMUCIL MULTIHEALTH FIBER) 58.12 % packet 1 packet, 1 packet, Oral, Daily, Cinthya Santos MD  •  [COMPLETED] Insert peripheral IV, , , Once **AND** sodium chloride 0.9 % flush 10 mL, 10 mL, Intravenous, PRN, Cinthya Santos MD  •  sodium chloride 0.9 % flush 10 mL, 10 mL, Intravenous, PRN, Cinthya Santos MD  •  sodium chloride 0.9 % flush 10 mL, 10 mL, Intravenous, Q12H, Cinthya Santos MD, 10 mL at 12/11/19 2150  •  sodium chloride 0.9 % infusion 40 mL, 40 mL, Intravenous, PRN, Cinthya Santos MD  •  sodium chloride 0.9 % infusion, 100 mL/hr, Intravenous, Continuous, Cinthya Santos MD, Last Rate: 100 mL/hr at 12/12/19 0654, 100 mL/hr at 12/12/19 0654  •  traMADol (ULTRAM) tablet 100 mg, 100 mg, Oral, Q8H, Cinthya Santos MD, 100 mg at 12/12/19 0650      Assessment/Plan     Sepsis secondary to bibasilar pneumonia with acute hypoxic respiratory failure concerned about aspiration.   Speech evaluation:  Recommend mechanical soft diet with chopped meats and thins. Recommend supervision with all meals, meds whole in puree as per [place], and may benefit from MBS tomorrow to furthr assess pharyngeal  phase of swallowing.    Acute kidney injury on chronic kidney disease stage III    Profound intellectual disability    Chronic back pain with degenerative disc disease and scoliosis    History of seizure disorder and reportedly had a seizure the day before admission with no injuries    Essential hypertension    Osteoporosis    Chronic constipation    DVT prophylaxis with Lovenox and SCDs        Plan for disposition:  Back to [facility] when able.    Angelic Viveros DO  12/12/19  7:38 AM      Time: 30 min

## 2019-12-12 NOTE — PLAN OF CARE
Pt with personal sitter from [facility]. Pt with am labs elevated, but trending down, see results. Pt was visibly agitated with hospital staff, seemed calmed by more familiar [facility] staff, as evidenced by HR decrease overnight; HR at 11pm 114, HR at 4am 87. Several labs pending. Pt now in isolation pending MRSA nasal swab results.

## 2019-12-12 NOTE — NURSING NOTE
Discharge Planning Assessment   [place]     Patient Name: Erica Crockett  MRN: [id number]  Today's Date: 12/12/2019    Admit Date: 12/11/2019    Discharge Needs Assessment     Row Name 12/12/19 1601       Living Environment    Lives With  facility resident [facility]    Provides Primary Care For  no one, unable/limited ability to care for self    Able to Return to Prior Arrangements  yes       Transition Planning    Transportation Anticipated  agency       Discharge Needs Assessment    Readmission Within the Last 30 Days  no previous admission in last 30 days        Discharge Plan     Row Name 12/12/19 1608       Plan    Plan  plan return to [facility]    Plan Comments  Spoke with patients care giver, Jannette, at bedside. Patient is sleeping. Face sheet verified. Patient is a resident of [facility]. She is non verbal at baseline. She ambulates with CGA/gait belt and recently has needed assist of 2. She also has a wc to use if needed. She does not use oxygen or additional DME at the facility. Patients Nicola gonzalez is her contact (unsure if he is guardian or POA - no documents noted in chart.)  Plan for patient to return to [facility] to [facility]. Call placed to patients Nicola gonzalez [facility] for return call to discuss IMM and plan of care. CM will continue to follow.        Destination      Coordination has not been started for this encounter.      Durable Medical Equipment      Coordination has not been started for this encounter.      Dialysis/Infusion      Coordination has not been started for this encounter.      Home Medical Care      Coordination has not been started for this encounter.      Therapy      Coordination has not been started for this encounter.      Community Resources      Coordination has not been started for this encounter.          Demographic Summary     Row Name 12/12/19 1601       General Information     Arrived From  long term care    Referral Source  admission list    Reason for Consult  discharge planning     Used During This Interaction  no       Contact Information    Permission Granted to Share Info With          Functional Status    No documentation.       Psychosocial    No documentation.       Abuse/Neglect    No documentation.       Legal    No documentation.       Substance Abuse    No documentation.       Patient Forms    No documentation.           Lee Us RN

## 2019-12-12 NOTE — PLAN OF CARE
Problem: Patient Care Overview  Goal: Plan of Care Review  Outcome: Ongoing (interventions implemented as appropriate)  Flowsheets (Taken 12/12/2019 7971)  Plan of Care Reviewed With: caregiver ([facility] caregiver)  Outcome Summary: Clinical Swallow Eval: Pt with mildl oropharyngeal dysphagia with some increased prep time on solids due to limited dentition. Pt with mild oral holding of puree and delay of swallow. Immediate swallow on thins. No clinical s/s of aspiration on thins from straw, puree or solids. Mild bite reflex noted. Able to clear solid with puree or liquid wash. Previously on bite size, regular diet with use of Phil cup at the [place]. Recommend mechanical soft diet with chopped meats and thins. Recommend supervision with all meals, meds whole in puree as per [place], and may benefit from MBS tomorrow to furthr assess pharyngeal phase of swallowing.

## 2019-12-12 NOTE — THERAPY EVALUATION
Acute Care - Speech Language Pathology   Swallow Initial Evaluation [place]     Patient Name: Erica Crockett  : 1940  MRN: [id number]  Today's Date: 2019               Admit Date: 2019    Visit Dx:     ICD-10-CM ICD-9-CM   1. Pneumonia of both lower lobes due to infectious organism (CMS/Formerly [facility]) J18.1 483.8   2. Sepsis with acute hypoxic respiratory failure without septic shock, due to unspecified organism (CMS/[facility]) A41.9 038.9    R65.20 995.91    J96.01 518.81   3. KEILY (acute kidney injury) (CMS/[facility]) N17.9 584.9     Patient Active Problem List   Diagnosis   • Degeneration of intervertebral disc of lumbar region   • Developmental mental disorder   • Osteoarthritis of hip   • Scoliosis   • Chronic pain   • Nonverbal signs of pain   • Bursitis of left hip   • Encounter for monitoring opioid maintenance therapy   • Anxiety   • Constipation   • Hypercalcemia   • Non-refractory epilepsy (CMS/HCC)   • Osteoporosis   • Impulse control disorder   • Vitamin D deficiency   • Pneumonia of both lower lobes due to infectious organism (CMS/HCC)     Past Medical History:   Diagnosis Date   • Anemia    • Anxiety    • Bursitis    • Bursitis    • DJD (degenerative joint disease), lumbar    • Hyperkalemia    • Hypoglycemia    • Intellectual disability    • Low back pain    • Osteoarthritis    • Osteopenia    • Pneumonia    • Renal disorder    • Renal insufficiency    • Scoliosis    • Seizure disorder (CMS/HCC)      History reviewed. No pertinent surgical history.     SWALLOW EVALUATION (last 72 hours)      SLP Adult Swallow Evaluation     Row Name 19 0952       Rehab Evaluation    Document Type  evaluation  -AD    Total Evaluation Minutes, SLP  47  -AD    Subjective Information  -- nonverbal  -AD    Patient Observations  alert;cooperative;unable to respond nonverbal  -AD    Patient/Family Observations  PT seen upright in bed at 90 degrees with caregiver present. Alert and willing to take po from this  therapist.   -AD    Patient Effort  adequate  -AD    Symptoms Noted During/After Treatment  none  -AD       General Information    Patient Profile Reviewed  yes  -AD    Pertinent History Of Current Problem  Pt is a 78 y/o resident of [facility] who presents to the hospital with diarrhea, seizure 12/10/19 and not acting like herself. Diagnosed with bilateral LL pneumonia, spesis, Acute hypoxic respiratory failure and KEILY. Pt has a history of seizure d/o and intellectual disability. She is reported to be on a regular, bite size diet with thin liquids at the [facility] Currently NPO. Swallow eval ordered due to concerns for early aspiration pneumonitis.   -AD    Current Method of Nutrition  NPO  -AD    Precautions/Limitations, Vision  WFL;for purposes of eval  -AD    Precautions/Limitations, Hearing  other (see comments) appears WFL as will localize to voice  -AD    Prior Level of Function-Communication  cognitive-linguistic impairment;motor speech impairment  -AD    Prior Level of Function-Swallowing  no diet consistency restrictions;safe, efficient swallowing in all situations;regular textures;thin liquids;other (see comments) bite size pieces; assisted by staff; use of Phil cup  -AD    Plans/Goals Discussed with  other (see comments) caregiver and RN  -AD    Barriers to Rehab  cognitive status  -AD    Patient's Goals for Discharge  patient could not state  -AD    Family Goals for Discharge  other (see comments) to return to the [facility] when medically stable  -AD       Pain Assessment    Additional Documentation  -- no pain indicated  -AD       Oral Motor and Function    Oral Lesions or Structural Abnormalities and/or variants  none  -AD    Dentition Assessment  missing teeth;other (see comments) multiple missing teeth both upper and lower  -AD    Secretion Management  WNL/WFL  -AD    Mucosal Quality  moist, healthy  -AD    Volitional Swallow  unable to elicit  -AD    Volitional Cough  unable to elicit  -AD       Oral  Musculature and Cranial Nerve Assessment    Oral Motor General Assessment  WFL  -AD    Oral Motor, Comment  Grossly WFL with no deviations or asymmetry noted. Unable to assess otherwise as pt unable to follow directions for the exam.  -AD       General Eating/Swallowing Observations    Respiratory Support Currently in Use  nasal cannula  -AD    Eating/Swallowing Skills  fed by SLP  -AD    Positioning During Eating  upright 90 degree;upright in bed  -AD    Utensils Used  spoon;straw  -AD    Consistencies Trialed  regular textures;pureed;thin liquids  -AD       Respiratory    Respiratory Status  WFL;during swallowing/eating  -AD       Clinical Swallow Eval    Oral Prep Phase  impaired  -AD    Oral Transit  impaired  -AD    Oral Residue  impaired  -AD    Pharyngeal Phase  no overt signs/symptoms of pharyngeal impairment  -AD    Esophageal Phase  unremarkable  -AD    Clinical Swallow Evaluation Summary  Pt presents with a mild oral dysphagia with decreased oral prep  and manipulation of solid bolus. Pt with limited dentition and caregiver reports her food si cut into smaller bite size pieces. Pt with increased prep and increased transit of solids. Mild lingual and right lateral residue. Able to clear w/bite of applesauce and/or drink of liquid. No clinical s/s of aspiration noted on thins, puree, or solids. Some concerns for silent aspiration given hx and nonverbal. Recommend to f/u with MBS to further assess pharyngeal phase of swallowing.   -AD       Oral Prep Concerns    Oral Prep Concerns  prolonged mastication;inefficient mastication;increased prep time  -AD    Prolonged Mastication  regular consistencies  -AD    Inefficient Mastication  regular consistencies  -AD    Increased Prep Time  regular consistencies  -AD       Oral Transit Concerns    Oral Transit Concerns  increased oral transit time  -AD    Increased Oral Transit Time  regular consistencies  -AD       Oral Residue Concerns    Oral Residue Concerns   lateral sulcus residue, right;other (see comments) on the lingual surface  -AD    Lateral Sulcus Residue, Right  regular consistencies  -AD    Oral Residue Concerns, Comment  Able to clear w/liquid or puree wash.   -AD       Clinical Impression    SLP Swallowing Diagnosis  mild;oral dysfunction;functional pharyngeal phase;other (see comments) r/o silent aspiration given nonverbal and bilateral LL PNA  -AD    Functional Impact  risk of aspiration/pneumonia  -AD       Recommendations    SLP Diet Recommendation  soft textures;ground;thin liquids  -AD    Recommended Diagnostics  VFSS (MBS)  -AD    Recommended Precautions and Strategies  upright posture during/after eating;small bites of food and sips of liquid;other (see comments) ok to use straw  -AD    SLP Rec. for Method of Medication Administration  meds whole;with pudding or applesauce;as tolerated  -AD    Monitor for Signs of Aspiration  no;notify SLP if any concerns  -AD    Anticipated Dischage Disposition  [facility] ([facility])  -AD      User Key  (r) = Recorded By, (t) = Taken By, (c) = Cosigned By    Initials Name Effective Dates    Nelsy Marques MS CCC-SLP 02/28/19 -           EDUCATION  The patient has been educated in the following areas:   Dysphagia (Swallowing Impairment) Modified Diet Instruction. Caregiver from [facility] and RN verbalize understanding of recommendations and diet.     SLP Recommendation and Plan  SLP Swallowing Diagnosis: mild, oral dysfunction, functional pharyngeal phase, other (see comments)(r/o silent aspiration given nonverbal and bilateral LL PNA)  SLP Diet Recommendation: soft textures, ground, thin liquids  Recommended Precautions and Strategies: upright posture during/after eating, small bites of food and sips of liquid, other (see comments)(ok to use straw)  SLP Rec. for Method of Medication Administration: meds whole, with pudding or applesauce, as tolerated  Monitor for Signs of Aspiration: no, notify  SLP if any concerns  Recommended Diagnostics: VFSS (MBS)  Anticipated Dischage Disposition: community-based residential facility ([facility])      Plan of Care Reviewed With: caregiver([facility] caregiver)  Outcome Summary: Clinical Swallow Eval: Pt with mildl oropharyngeal dysphagia with some increased prep time on solids due to limited dentition. Pt with mild oral holding of puree and delay of swallow. Immediate swallow on thins. No clinical s/s of aspiration on thins from straw, puree or solids. Mild bite reflex noted. Able to clear solid with puree or liquid wash. Previously on bite size, regular diet with use of Phil cup at the [facility]. Recommend mechanical soft diet with chopped meats and thins. Recommend supervision with all meals, meds whole in puree as per [place], and may benefit from MBS tomorrow to furthr assess pharyngeal phase of swallowing.           Time Calculation:   Time Calculation- SLP     Row Name 12/12/19 1700             Time Calculation- SLP    SLP Start Time  0905  -AD      SLP Stop Time  0952  -AD      SLP Time Calculation (min)  47 min  -AD      Total Timed Code Minutes- SLP  0 minute(s)  -AD      SLP Non-Billable Time (min)  0 min  -AD      TCU Minutes- SLP  0 min  -AD      SLP Received On  12/12/19  -AD        User Key  (r) = Recorded By, (t) = Taken By, (c) = Cosigned By    Initials Name Provider Type    Nelsy Marques MS CCC-SLP Speech and Language Pathologist          Therapy Charges for Today     Code Description Service Date Service Provider Modifiers Qty    [id number] HC ST EVAL ORAL PHARYNG SWALLOW 3 12/12/2019 Nelsy Morrell MS CCC-SLP GN 1        Nelsy Morrell MS CCC-SLP  12/12/2019

## 2019-12-12 NOTE — NURSING NOTE
Pt received to floor at 1830. Pt vss stable. O2 sats between %, pt on 4L NC. [place] personal care giver to remain at bedside. Began admission assessment, handed off to next oncoming staff.

## 2019-12-12 NOTE — H&P
"[facility] HOSPITALIST     Tulio Carlson MD    CHIEF COMPLAINT: Low BP (sent from [facility])    HISTORY OF PRESENT ILLNESS:    79-year-old  female with profound intellectual disability, non-verbal (resident of [facility]), Anxiety, Chronic back pain with DDDz and scoliosis, OA of the hip with bursitis left hip, seizure d/o, hypertension, CKD stage II/III, Osteoporosis, Vitamin D deficiency, h/o aspiration PNA in the past and h/o hyperkalemia was sent to the ER from [facility] today secondary to low blood pressure (90s/60s) at their facility. She was also noted as \"not acting like herself\". The patient is non-verbal an unable to provide any Hx and the patient's caregiver from [facility] was not in her room at the time of my interview so her Hx was obtained via review of records in our chart and from [facility]. The patient apparently had an episode of diarrhea yesterday and also had a seizure yesterday at [facility] (unclear how frequently she has seizures). The patient is very restless and agitated at the time of my exam. Patient's BP here has been WNL. Tmax since admission is 99.5. She was noted to have low O2 sats in the ER and was placed on NC. CXR c/w PNA.      Past Medical History:   Diagnosis Date   • Anemia    • Anxiety    • Bursitis    • Bursitis    • DJD (degenerative joint disease), lumbar    • Hyperkalemia    • Hypoglycemia    • Intellectual disability    • Low back pain    • Osteoarthritis    • Osteopenia    • Pneumonia    • Renal disorder    • Renal insufficiency    • Scoliosis    • Seizure disorder (CMS/[facility])      History reviewed. No pertinent surgical history.  Family History   Family history unknown: Yes     Social History     Tobacco Use   • Smoking status: Never Smoker   Substance Use Topics   • Alcohol use: No   • Drug use: No     Medications Prior to Admission   Medication Sig Dispense Refill Last Dose   • acetaminophen (TYLENOL) " 325 MG tablet Take  by mouth daily.   Taking   • atorvastatin (LIPITOR) 10 MG tablet Take 10 mg by mouth Daily.   Taking   • bisacodyl (DULCOLAX) 10 MG suppository Dulcolax 10 MG Rectal Suppository; Patient Sig: Dulcolax 10 MG Rectal Suppository ; 0; 04-Feb-2014; Active   Taking   • Calcium & Magnesium Carbonates (MYLANTA PO) Take  by mouth Daily As Needed.   Taking   • cetirizine (ZyrTEC) 10 MG tablet Take  by mouth.   Taking   • denosumab (PROLIA) 60 MG/ML solution syringe Inject  under the skin into the appropriate area as directed 1 (One) Time.   Taking   • diazepam (DIASTAT ACUDIAL) 20 MG rectal kit Insert 20 mg into the rectum As Needed for Seizures. Seizure Lasting longer than 5 minutes, or 2 seizures within 12 hours 20 mg 5 Taking   • diclofenac (VOLTAREN) 1 % gel gel Apply 4 g topically 4 (Four) Times a Day As Needed.   Taking   • diltiazem XR (DILACOR XR) 240 MG 24 hr capsule Take  by mouth.   Taking   • diphenhydrAMINE (BENADRYL) 25 mg capsule Take 25 mg by mouth every 6 (six) hours as needed for itching.   Taking   • gabapentin (NEURONTIN) 100 MG capsule Take 1 capsule by mouth Every Night. 30 capsule 5 Taking   • lamoTRIgine (LaMICtal) 200 MG tablet Take  by mouth 2 (two) times a day.   Taking   • levETIRAcetam (KEPPRA) 250 MG tablet Take 750 mg by mouth 2 (Two) Times a Day.   Taking   • Loperamide HCl (IMODIUM PO) Take  by mouth.   Taking   • LORazepam (ATIVAN) 0.5 MG tablet Take  by mouth 3 (three) times a day.   Taking   • LORazepam (ATIVAN) 2 MG/ML injection Inject 1 mL into the appropriate muscle as directed by prescriber Every 6 (Six) Hours As Needed for Anxiety. 10 mL 5 Taking   • lubiprostone (AMITIZA) 8 MCG capsule Take 8 mcg by mouth 2 (two) times a day with meals.   Taking   • magnesium hydroxide (MILK OF MAGNESIA) 400 MG/5ML suspension Take  by mouth 2 (two) times a day.   Taking   • Multiple Vitamin (MULTI VITAMIN DAILY PO) Take  by mouth.   Taking   • omeprazole (priLOSEC) 20 MG capsule  Take 20 mg by mouth 2 (Two) Times a Day.   Taking   • polyethylene glycol (MIRALAX) powder Take 17 g by mouth 2 (Two) Times a Day.   Taking   • Probiotic Product (PROBIOTIC PO) Take  by mouth.   Taking   • traMADol (ULTRAM) 50 MG tablet Take 2 tablets by mouth Every 8 (Eight) Hours. 180 tablet 0    • Wheat Dextrin (BENEFIBER) powder Take  by mouth daily.   Taking     Allergies:  Bee venom; Iodinated diagnostic agents; and Nsaids      There is no immunization history on file for this patient.    REVIEW OF SYSTEMS:  Please see the above history of present illness for pertinent positives and negatives that were obtained via chart review.  A full review of systems was not obtainable as the patient is completely nonverbal.    Vital Signs  Temp:  [97.4 °F (36.3 °C)-99.5 °F (37.5 °C)] 98.5 °F (36.9 °C)  Heart Rate:  [] 114  Resp:  [20-24] 24  BP: (112-149)/(60-88) 128/61   Body mass index is 32.99 kg/m².     Flowsheet Rows      First Filed Value   Admission Height  --   Admission Weight  78.7 kg (173 lb 8 oz) Documented at 12/11/2019 1506           Physical Exam   Constitutional: She appears well-developed. No distress.   Obese   HENT:   Head: Normocephalic and atraumatic.   Poor dentition, dry mucus membranes   Eyes: Pupils are equal, round, and reactive to light. Conjunctivae and EOM are normal. No scleral icterus.   Neck: Neck supple. No JVD present.   Cardiovascular: Normal rate, regular rhythm, normal heart sounds and intact distal pulses. Exam reveals no gallop and no friction rub.   No murmur heard.  Pulmonary/Chest: Effort normal. No respiratory distress. She has no wheezes. She has no rales.   Diminished breath sounds at the bilateral bases   Abdominal: Soft. Bowel sounds are normal. She exhibits no mass. There is no tenderness.   Musculoskeletal: She exhibits no edema.   Decreased muscle bulk   Lymphadenopathy:     She has no cervical adenopathy.   Neurological:   Non-verbal, moves all extremities, unable  to fully assess cranial nerves due to patient's inability to cooperate with exam, no focal deficits noted.   Skin: Skin is warm and dry. No rash noted.   Psychiatric:   Appears restless and agitated at the time of my exam.    Vitals reviewed.      Emotional Behavior:    Judgement and Insight: Unable to assess   Mental Status:  Alertness at baseline   Memory: Unable to assess   Mood and Affect:         Depression able to assess               Anxiety present    Debilities:   Physical Weakness able to assess   Handicaps profound intellectual disability   Disabilities profound intellectual disability   Agitation present       Results Review:    I reviewed the patient's new clinical results.  Lab Results (most recent)     Procedure Component Value Units Date/Time    Lactic Acid, Reflex [[id number]]  (Normal) Collected:  12/1940    Specimen:  Blood Updated:  12/11/19 2000     Lactate 1.3 mmol/L     Lactic Acid, Reflex Timer (This will reflex a repeat order 3-3:15 hours after ordered.) [[id number]] Collected:  12/11/19 1559    Specimen:  Blood Updated:  12/11/19 1930     Extra Tube Hold for add-ons.     Comment: Auto resulted.       Respiratory Panel, PCR - Swab, Nasopharynx [[id number]] Collected:  12/11/19 1715    Specimen:  Swab from Nasopharynx Updated:  12/11/19 1726    Procalcitonin [[id number]]  (Abnormal) Collected:  12/11/19 1512    Specimen:  Blood Updated:  12/11/19 1713     Procalcitonin 32.54 ng/mL     Lactic Acid, Plasma [[id number]]  (Abnormal) Collected:  12/11/19 1559    Specimen:  Blood Updated:  12/11/19 1625     Lactate 2.3 mmol/L     Scan Slide [[id number]]  (Normal) Collected:  12/11/19 1512    Specimen:  Blood Updated:  12/11/19 1613     RBC Morphology Normal     WBC Morphology Normal     Platelet Morphology Normal    Blood Culture - Blood, Arm, Right [[id number]] Collected:  12/11/19 1559    Specimen:  Blood from Arm, Right Updated:  12/11/19 1608    Blood Culture - Blood, Arm, Left [[id number]]  Collected:  12/11/19 1559    Specimen:  Blood from Arm, Left Updated:  12/11/19 1608    Urinalysis With Culture If Indicated - Urine, Catheter [[id number]]  (Normal) Collected:  12/11/19 1559    Specimen:  Urine, Catheter Updated:  12/11/19 1605     Color, UA Yellow     Appearance, UA Clear     pH, UA 5.5     Specific Gravity, UA 1.020     Glucose, UA Negative     Ketones, UA Negative     Bilirubin, UA Negative     Blood, UA Negative     Protein, UA Negative     Leuk Esterase, UA Negative     Nitrite, UA Negative     Urobilinogen, UA 0.2 E.U./dL    Narrative:       Urine microscopic not indicated.    Comprehensive Metabolic Panel [[id number]]  (Abnormal) Collected:  12/11/19 1512    Specimen:  Blood Updated:  12/11/19 1542     Glucose 147 mg/dL      BUN 43 mg/dL      Creatinine 1.61 mg/dL      Sodium 137 mmol/L      Potassium 3.9 mmol/L      Chloride 99 mmol/L      CO2 26.1 mmol/L      Calcium 9.4 mg/dL      Total Protein 7.2 g/dL      Albumin 3.80 g/dL      ALT (SGPT) 14 U/L      AST (SGOT) 21 U/L      Alkaline Phosphatase 68 U/L      Total Bilirubin 0.3 mg/dL      eGFR Non African Amer 31 mL/min/1.73      Globulin 3.4 gm/dL      A/G Ratio 1.1 g/dL      BUN/Creatinine Ratio 26.7     Anion Gap 11.9 mmol/L     Narrative:       GFR Normal >60  Chronic Kidney Disease <60  Kidney Failure <15      Magnesium [[id number]]  (Normal) Collected:  12/11/19 1512    Specimen:  Blood Updated:  12/11/19 1542     Magnesium 2.0 mg/dL     Troponin [[id number]]  (Normal) Collected:  12/11/19 1512    Specimen:  Blood Updated:  12/11/19 1540     Troponin T <0.010 ng/mL     Narrative:       Troponin T Reference Range:  <= 0.03 ng/mL-   Negative for AMI  >0.03 ng/mL-     Abnormal for myocardial necrosis.  Clinicians would have to utilize clinical acumen, EKG, Troponin and serial changes to determine if it is an Acute Myocardial Infarction or myocardial injury due to an underlying chronic condition.     CBC & Differential [[id number]]  Collected:  12/11/19 1512    Specimen:  Blood Updated:  12/11/19 1525    Narrative:       The following orders were created for panel order CBC & Differential.  Procedure                               Abnormality         Status                     ---------                               -----------         ------                     CBC Auto Differential[[id number]]        Abnormal            Final result                 Please view results for these tests on the individual orders.    CBC Auto Differential [[id number]]  (Abnormal) Collected:  12/11/19 1512    Specimen:  Blood Updated:  12/11/19 1525     WBC 20.60 10*3/mm3      RBC 3.80 10*6/mm3      Hemoglobin 11.6 g/dL      Hematocrit 36.1 %      MCV 95.0 fL      MCH 30.5 pg      MCHC 32.1 g/dL      RDW 13.2 %      RDW-SD 46.2 fl      MPV 11.0 fL      Platelets 288 10*3/mm3      Neutrophil % 87.9 %      Lymphocyte % 6.2 %      Monocyte % 4.3 %      Eosinophil % 0.1 %      Basophil % 0.3 %      Immature Grans % 1.2 %      Neutrophils, Absolute 18.13 10*3/mm3      Lymphocytes, Absolute 1.27 10*3/mm3      Monocytes, Absolute 0.88 10*3/mm3      Eosinophils, Absolute 0.02 10*3/mm3      Basophils, Absolute 0.06 10*3/mm3      Immature Grans, Absolute 0.24 10*3/mm3      nRBC 0.0 /100 WBC           Imaging Results (Most Recent)     Procedure Component Value Units Date/Time    XR Chest 1 View [[id number]] Collected:  12/11/19 1608     Updated:  12/11/19 1612    Narrative:       CHEST X-RAY, 12/11/2019         HISTORY:  79-year-old female [facility] resident in the ED status post  seizure. Low blood pressure.     TECHNIQUE:  AP portable chest x-ray.     COMPARISON:  *  Chest x-ray, 03/20/2014.     FINDINGS:  Mild patchy infiltrates in both lung bases. These are nonspecific, but  early aspiration pneumonitis is a consideration in the current clinical  setting.     There is no dense airspace consolidation or pleural effusion. Lung  volumes are chronically low. Heart size and  pulmonary vascularity are  within normal limits.       Impression:       Mild patchy bibasilar pulmonary infiltrates. See above.     This report was finalized on 12/11/2019 4:10 PM by Dr. Lionel Ye MD.             ECG/EMG Results (most recent)     Procedure Component Value Units Date/Time    ECG 12 Lead [[id number]] Collected:  12/11/19 1544     Updated:  12/11/19 1839    Narrative:       HEART RATE= 95  bpm  RR Interval= 632  ms  NC Interval= 216  ms  P Horizontal Axis= -2  deg  P Front Axis= 34  deg  QRSD Interval= 97  ms  QT Interval= 375  ms  QRS Axis= -17  deg  T Wave Axis= 40  deg  - ABNORMAL ECG -  Sinus rhythm  Prolonged NC interval  Borderline left axis deviation  NO PRIOR TRACING AVAILABLE FOR COMPARISON  Electronically Signed By: Obie Diop ([facility]) 11-Dec-2019 18:39:02  Date and Time of Study: 2019-12-11 15:44:50          Assessment/Plan     -Acute hypoxic respiratory failure:  Secondary to pneumonia.  Patient now on 4 L nasal cannula, will titrate down as tolerated.    -Sepsis secondary to bibasilar pneumonia (?  Aspiration):  Patient has a significant leukocytosis and her procalcitonin and lactic acid are also elevated.  With IV fluids and antibiotics the patient's lactate has returned to within normal limits.  The patient here is afebrile and blood pressure currently is within normal limits.  Blood cultures were sent in the emergency department.  I will send a viral panel, sputum culture Legionella and strep antigens.  Given the patient's history of aspiration pneumonia I will ask speech therapy to see the patient here and keep her n.p.o. tonight and on IV fluids.  Patient received a dose of IV cefepime in the emergency department.  I am going to continue this for now and add azithromycin as it is unclear if this is aspiration versus community-acquired infection.  An MRSA screen as the patient is a resident of a local long-term facility.  Will repeat CBC and procalcitonin in the a.m.    -Acute  kidney injury on chronic kidney disease stage II/III:  The patient follows with Dr. Patircia as an outpatient.  Her baseline creatinine is approximately 1.1-1.2.  Creatinine is 1.61 here on admission.  This is likely secondary to dehydration and sepsis.  The patient was given a 1 L bolus in the emergency department and I will continue IV fluids here.  We will repeat a BMP in the a.m.    -Profound intellectual disability:  -Non-verbal (resident of [facility]):  -Anxiety:  Going to continue the patient's home dose of Ativan here.  She has a caregiver from [facility] who is staying with her during this hospitalization.    -Chronic back pain with DDDz and scoliosis:  -OA of the hip with bursitis left hip:  I am going to continue the patient's home scheduled dose of tramadol and Neurontin at night.  I will also continue Tylenol PRN.  No acute issues here currently.    -Seizure d/o:  The patient reportedly had a seizure yesterday with no injuries.  It is unclear how frequently she has seizures as an outpatient.  I will continue her home doses of Keppra and Lamictal.  As an outpatient she uses Diastat rectal PRN for seizures.  This is not on formulary at this facility so we will monitor and if needed could use Ativan IV for any acute seizure activity during this hospital stay.  Currently no acute issues here.      Hypertension:   Patient's blood pressure is within normal limits here currently.  She reportedly was hypotensive her to admission today.  I will hold her home dose of diltiazem CD for now.  Continue to monitor.      -Osteoporosis:  Patient receives Prolia as an outpatient.  No acute issues here currently.    -Chronic constipation:  There was a report that the patient had one episode of diarrhea yesterday.  She has had no stools today.  For now I am going to continue the patient's home bowel regimen which includes Amitiza, MiraLAX, and fiber supplementation.  Will monitor.    -DVT prophylaxis: Lovenox and  SCDs.    I discussed the patients findings and my recommendations with patient and her nurse at bedside.     Cinthya Santos MD  12/11/19  11:58 PM

## 2019-12-12 NOTE — SIGNIFICANT NOTE
12/12/19 1320   Rehab Treatment   Discipline speech language pathologist   Reason Treatment Not Performed other (see comments)  (New order for Swallow Eval. Clinical Swallow Eval completed this am. See Care Plan Note for brief summary and recommendations. Full evaluation report to follow.)

## 2019-12-13 ENCOUNTER — APPOINTMENT (OUTPATIENT)
Dept: GENERAL RADIOLOGY | Facility: HOSPITAL | Age: 79
End: 2019-12-13

## 2019-12-13 ENCOUNTER — APPOINTMENT (OUTPATIENT)
Dept: CT IMAGING | Facility: HOSPITAL | Age: 79
End: 2019-12-13

## 2019-12-13 DIAGNOSIS — G40.909 NON-REFRACTORY EPILEPSY (HCC): ICD-10-CM

## 2019-12-13 LAB
ALBUMIN SERPL-MCNC: 3 G/DL (ref 3.5–5.2)
ALBUMIN/GLOB SERPL: 0.9 G/DL
ALP SERPL-CCNC: 70 U/L (ref 39–117)
ALT SERPL W P-5'-P-CCNC: 7 U/L (ref 1–33)
ANION GAP SERPL CALCULATED.3IONS-SCNC: 9.1 MMOL/L (ref 5–15)
AST SERPL-CCNC: 21 U/L (ref 1–32)
BASOPHILS # BLD AUTO: 0.03 10*3/MM3 (ref 0–0.2)
BASOPHILS NFR BLD AUTO: 0.2 % (ref 0–1.5)
BILIRUB SERPL-MCNC: 0.2 MG/DL (ref 0.2–1.2)
BUN BLD-MCNC: 24 MG/DL (ref 8–23)
BUN/CREAT SERPL: 24 (ref 7–25)
CALCIUM SPEC-SCNC: 8.1 MG/DL (ref 8.6–10.5)
CHLORIDE SERPL-SCNC: 110 MMOL/L (ref 98–107)
CO2 SERPL-SCNC: 21.9 MMOL/L (ref 22–29)
CREAT BLD-MCNC: 1 MG/DL (ref 0.57–1)
DEPRECATED RDW RBC AUTO: 46.5 FL (ref 37–54)
EOSINOPHIL # BLD AUTO: 0.23 10*3/MM3 (ref 0–0.4)
EOSINOPHIL NFR BLD AUTO: 1.8 % (ref 0.3–6.2)
ERYTHROCYTE [DISTWIDTH] IN BLOOD BY AUTOMATED COUNT: 13.1 % (ref 12.3–15.4)
GFR SERPL CREATININE-BSD FRML MDRD: 53 ML/MIN/1.73
GLOBULIN UR ELPH-MCNC: 3.2 GM/DL
GLUCOSE BLD-MCNC: 105 MG/DL (ref 65–99)
HCT VFR BLD AUTO: 31.8 % (ref 34–46.6)
HGB BLD-MCNC: 10.1 G/DL (ref 12–15.9)
IMM GRANULOCYTES # BLD AUTO: 0.07 10*3/MM3 (ref 0–0.05)
IMM GRANULOCYTES NFR BLD AUTO: 0.6 % (ref 0–0.5)
LYMPHOCYTES # BLD AUTO: 1.4 10*3/MM3 (ref 0.7–3.1)
LYMPHOCYTES NFR BLD AUTO: 11 % (ref 19.6–45.3)
MCH RBC QN AUTO: 30.6 PG (ref 26.6–33)
MCHC RBC AUTO-ENTMCNC: 31.8 G/DL (ref 31.5–35.7)
MCV RBC AUTO: 96.4 FL (ref 79–97)
MONOCYTES # BLD AUTO: 0.61 10*3/MM3 (ref 0.1–0.9)
MONOCYTES NFR BLD AUTO: 4.8 % (ref 5–12)
MRSA SPEC QL CULT: NORMAL
NEUTROPHILS # BLD AUTO: 10.37 10*3/MM3 (ref 1.7–7)
NEUTROPHILS NFR BLD AUTO: 81.6 % (ref 42.7–76)
NRBC BLD AUTO-RTO: 0 /100 WBC (ref 0–0.2)
PLATELET # BLD AUTO: 237 10*3/MM3 (ref 140–450)
PMV BLD AUTO: 10.6 FL (ref 6–12)
POTASSIUM BLD-SCNC: 3.9 MMOL/L (ref 3.5–5.2)
PROT SERPL-MCNC: 6.2 G/DL (ref 6–8.5)
RBC # BLD AUTO: 3.3 10*6/MM3 (ref 3.77–5.28)
SODIUM BLD-SCNC: 141 MMOL/L (ref 136–145)
WBC NRBC COR # BLD: 12.71 10*3/MM3 (ref 3.4–10.8)

## 2019-12-13 PROCEDURE — 25010000002 ENOXAPARIN PER 10 MG: Performed by: HOSPITALIST

## 2019-12-13 PROCEDURE — 94799 UNLISTED PULMONARY SVC/PX: CPT

## 2019-12-13 PROCEDURE — 71250 CT THORAX DX C-: CPT

## 2019-12-13 PROCEDURE — 85025 COMPLETE CBC W/AUTO DIFF WBC: CPT | Performed by: HOSPITALIST

## 2019-12-13 PROCEDURE — 99232 SBSQ HOSP IP/OBS MODERATE 35: CPT | Performed by: HOSPITALIST

## 2019-12-13 PROCEDURE — 80053 COMPREHEN METABOLIC PANEL: CPT | Performed by: HOSPITALIST

## 2019-12-13 PROCEDURE — 92611 MOTION FLUOROSCOPY/SWALLOW: CPT

## 2019-12-13 PROCEDURE — 74230 X-RAY XM SWLNG FUNCJ C+: CPT

## 2019-12-13 PROCEDURE — 25010000002 PIPERACILLIN-TAZOBACTAM: Performed by: HOSPITALIST

## 2019-12-13 RX ORDER — DIAZEPAM 20 MG/4ML
20 GEL RECTAL AS NEEDED
Qty: 20 MG | Refills: 5 | Status: SHIPPED | OUTPATIENT
Start: 2019-12-13 | End: 2022-04-15 | Stop reason: SDUPTHER

## 2019-12-13 RX ADMIN — SODIUM CHLORIDE, PRESERVATIVE FREE 10 ML: 5 INJECTION INTRAVENOUS at 21:04

## 2019-12-13 RX ADMIN — ENOXAPARIN SODIUM 30 MG: 30 INJECTION SUBCUTANEOUS at 21:05

## 2019-12-13 RX ADMIN — PSYLLIUM HUSK 1 PACKET: 3.4 POWDER ORAL at 09:19

## 2019-12-13 RX ADMIN — Medication 1 TABLET: at 09:18

## 2019-12-13 RX ADMIN — Medication 1 CAPSULE: at 09:18

## 2019-12-13 RX ADMIN — ATORVASTATIN CALCIUM 10 MG: 10 TABLET, FILM COATED ORAL at 09:19

## 2019-12-13 RX ADMIN — POLYETHYLENE GLYCOL 3350 17 G: 17 POWDER, FOR SOLUTION ORAL at 21:06

## 2019-12-13 RX ADMIN — LEVETIRACETAM 750 MG: 500 TABLET ORAL at 09:17

## 2019-12-13 RX ADMIN — LAMOTRIGINE 200 MG: 100 TABLET ORAL at 09:18

## 2019-12-13 RX ADMIN — SODIUM CHLORIDE, PRESERVATIVE FREE 10 ML: 5 INJECTION INTRAVENOUS at 09:19

## 2019-12-13 RX ADMIN — PANTOPRAZOLE SODIUM 40 MG: 40 TABLET, DELAYED RELEASE ORAL at 05:05

## 2019-12-13 RX ADMIN — LEVETIRACETAM 750 MG: 500 TABLET ORAL at 21:05

## 2019-12-13 RX ADMIN — SODIUM CHLORIDE 100 ML/HR: 9 INJECTION, SOLUTION INTRAVENOUS at 21:02

## 2019-12-13 RX ADMIN — TRAMADOL HYDROCHLORIDE 100 MG: 50 TABLET, FILM COATED ORAL at 05:05

## 2019-12-13 RX ADMIN — ACETAMINOPHEN 650 MG: 325 TABLET, FILM COATED ORAL at 03:26

## 2019-12-13 RX ADMIN — GABAPENTIN 100 MG: 100 CAPSULE ORAL at 21:11

## 2019-12-13 RX ADMIN — LAMOTRIGINE 200 MG: 100 TABLET ORAL at 21:06

## 2019-12-13 RX ADMIN — CETIRIZINE HYDROCHLORIDE 10 MG: 10 TABLET, FILM COATED ORAL at 09:18

## 2019-12-13 RX ADMIN — PIPERACILLIN SODIUM,TAZOBACTAM SODIUM 3.38 G: 3; .375 INJECTION, POWDER, FOR SOLUTION INTRAVENOUS at 17:05

## 2019-12-13 RX ADMIN — TRAMADOL HYDROCHLORIDE 100 MG: 50 TABLET, FILM COATED ORAL at 13:44

## 2019-12-13 RX ADMIN — LORAZEPAM 0.5 MG: 0.5 TABLET ORAL at 09:17

## 2019-12-13 RX ADMIN — TRAMADOL HYDROCHLORIDE 100 MG: 50 TABLET, FILM COATED ORAL at 21:11

## 2019-12-13 RX ADMIN — LORAZEPAM 0.5 MG: 0.5 TABLET ORAL at 21:11

## 2019-12-13 RX ADMIN — LORAZEPAM 0.5 MG: 0.5 TABLET ORAL at 17:03

## 2019-12-13 RX ADMIN — POLYETHYLENE GLYCOL 3350 17 G: 17 POWDER, FOR SOLUTION ORAL at 09:18

## 2019-12-13 NOTE — PROGRESS NOTES
"Hospitalist Team      Patient Care Team:  Tulio Carlson MD as PCP - General (Family Medicine)  Arianne Harkins APRN as PCP - Claims Attributed  Aleksandra Kent APRN as Nurse Practitioner (Pain Medicine)  Ja Harris MD as Consulting Physician (Neurology)  Magen Farmer MD as Consulting Physician (Nephrology)  Mikey Sanchez MD (Psychiatry)  Nelsy Butcher DPM as Consulting Physician (Podiatry)        Chief Complaint: Follow-up Acute Hypoxic Respiratory Failure    Subjective    No acute events overnight.  Nursing reports she had to increase supplemental O2 this morning.  [facility] caregiver at bedside reports Ms. Crockett took her breakfast w/o choking or coughing.    Objective    Vital Signs  Temp:  [98.3 °F (36.8 °C)-100.5 °F (38.1 °C)] 98.3 °F (36.8 °C)  Heart Rate:  [] 84  Resp:  [18-22] 20  BP: (127-152)/(63-94) 141/75  Oxygen Therapy  SpO2: 91 %  Pulse Oximetry Type: Continuous  Device (Oxygen Therapy): nasal cannula, humidified  Flow (L/min): 6}    Flowsheet Rows      First Filed Value   Admission Height  152.4 cm (60\") Documented at 12/11/2019 1815   Admission Weight  78.7 kg (173 lb 8 oz) Documented at 12/11/2019 1506          Physical Exam:    General: Appears stated age in NAD  Lungs: Diminished at the bases, otherwise clear.  CV: Regular w/o murmur.  Radial pulses are 2+ and symmetric.  Abdomen: Soft and non-tender w/ active bowel sounds.  MSK: (Not cooperative w/ exam, but no asymmetric edema noted.  Neuro: CN II-XII grossly intact  Psych: Non-verbal    Results Review:     I reviewed the patient's new clinical results.    Lab Results (last 24 hours)     Procedure Component Value Units Date/Time    Comprehensive Metabolic Panel [[id number]]  (Abnormal) Collected:  12/13/19 0855    Specimen:  Blood Updated:  12/13/19 0919     Glucose 105 mg/dL      BUN 24 mg/dL      Creatinine 1.00 mg/dL      Sodium 141 mmol/L      Potassium 3.9 mmol/L      Chloride 110 mmol/L      CO2 21.9 " mmol/L      Calcium 8.1 mg/dL      Total Protein 6.2 g/dL      Albumin 3.00 g/dL      ALT (SGPT) 7 U/L      AST (SGOT) 21 U/L      Alkaline Phosphatase 70 U/L      Total Bilirubin 0.2 mg/dL      eGFR Non African Amer 53 mL/min/1.73      Globulin 3.2 gm/dL      A/G Ratio 0.9 g/dL      BUN/Creatinine Ratio 24.0     Anion Gap 9.1 mmol/L     Narrative:       GFR Normal >60  Chronic Kidney Disease <60  Kidney Failure <15      CBC & Differential [[id number]] Collected:  12/13/19 0855    Specimen:  Blood Updated:  12/13/19 0903    Narrative:       The following orders were created for panel order CBC & Differential.  Procedure                               Abnormality         Status                     ---------                               -----------         ------                     CBC Auto Differential[[id number]]        Abnormal            Final result                 Please view results for these tests on the individual orders.    CBC Auto Differential [[id number]]  (Abnormal) Collected:  12/13/19 0855    Specimen:  Blood Updated:  12/13/19 0903     WBC 12.71 10*3/mm3      RBC 3.30 10*6/mm3      Hemoglobin 10.1 g/dL      Hematocrit 31.8 %      MCV 96.4 fL      MCH 30.6 pg      MCHC 31.8 g/dL      RDW 13.1 %      RDW-SD 46.5 fl      MPV 10.6 fL      Platelets 237 10*3/mm3      Neutrophil % 81.6 %      Lymphocyte % 11.0 %      Monocyte % 4.8 %      Eosinophil % 1.8 %      Basophil % 0.2 %      Immature Grans % 0.6 %      Neutrophils, Absolute 10.37 10*3/mm3      Lymphocytes, Absolute 1.40 10*3/mm3      Monocytes, Absolute 0.61 10*3/mm3      Eosinophils, Absolute 0.23 10*3/mm3      Basophils, Absolute 0.03 10*3/mm3      Immature Grans, Absolute 0.07 10*3/mm3      nRBC 0.0 /100 WBC     Blood Culture - Blood, Arm, Left [[id number]] Collected:  12/11/19 1559    Specimen:  Blood from Arm, Left Updated:  12/12/19 1612     Blood Culture No growth at 24 hours    Blood Culture - Blood, Arm, Right [[id number]] Collected:  12/11/19  1559    Specimen:  Blood from Arm, Right Updated:  12/12/19 1615     Blood Culture No growth at 24 hours          Imaging Results (Last 24 Hours)     Procedure Component Value Units Date/Time    FL Video Swallow With Speech [[id number]] Collected:  12/13/19 1134     Updated:  12/13/19 1137    Narrative:       VIDEO SWALLOWING STUDY, 12/13/2019     HISTORY:  Abnormal chest x-ray demonstrating patchy infiltrates in the lung bases  concerning for aspiration pneumonia.     TECHNIQUE:  Video swallowing study was conducted by the speech pathologist in my  presence and recorded on digital video disc.     Fluoroscopy time 1.7 minutes. A single spot image was recorded for  documentation purposes.     FINDINGS:  There is premature spillage and pooling of all barium coated  consistencies. However, no distinct penetration or aspiration  identified. Please see the full report of the speech pathologist for  further details.       Impression:       1. No penetration or aspiration. Significant premature spillage and  pooling as described.     This report was finalized on 12/13/2019 11:35 AM by Dr. Anthony Hsu MD.               Medication Review:   I have reviewed the patient's current medication list    Current Facility-Administered Medications:   •  acetaminophen (TYLENOL) tablet 650 mg, 650 mg, Oral, Q6H PRN, Cinthya Santos MD, 650 mg at 12/13/19 0326  •  aluminum-magnesium hydroxide-simethicone (MAALOX/MYLANTA) suspension 10 mL, 10 mL, Oral, Daily PRN, Cinthya Santos MD  •  atorvastatin (LIPITOR) tablet 10 mg, 10 mg, Oral, Daily, Cinthya Santos MD, 10 mg at 12/13/19 0919  •  azithromycin 500 MG/250 ML 0.9% NS IVPB (MBP), 500 mg, Intravenous, Q24H, Cinthya Santos MD, 500 mg at 12/12/19 2032  •  bisacodyl (DULCOLAX) suppository 10 mg, 10 mg, Rectal, Daily PRN, Cinthya Santos MD  •  cefepime 2 g in 100 mL NS, 2 g, Intravenous, Q24H, Cinthya Santos  MD Karolina, 2 g at 12/12/19 1720  •  cetirizine (zyrTEC) tablet 10 mg, 10 mg, Oral, Daily, Cinthya Santos MD, 10 mg at 12/13/19 0918  •  diclofenac (VOLTAREN) 1 % gel 4 g, 4 g, Topical, 4x Daily PRN, Cinthya Santos MD  •  diphenhydrAMINE (BENADRYL) capsule 25 mg, 25 mg, Oral, Q6H PRN, Cinthya Santos MD, 25 mg at 12/12/19 2036  •  enoxaparin (LOVENOX) syringe 30 mg, 30 mg, Subcutaneous, Q24H, Cinthya Santos MD, 30 mg at 12/12/19 2032  •  gabapentin (NEURONTIN) capsule 100 mg, 100 mg, Oral, Nightly, Cinthya Santos MD, 100 mg at 12/12/19 2032  •  lactobacillus acidophilus (RISAQUAD) capsule 1 capsule, 1 capsule, Oral, Daily, Cinthya Santos MD, 1 capsule at 12/13/19 0918  •  lamoTRIgine (LaMICtal) tablet 200 mg, 200 mg, Oral, Q12H, Cinthya Santos MD, 200 mg at 12/13/19 0918  •  levETIRAcetam (KEPPRA) tablet 750 mg, 750 mg, Oral, Q12H, Cinthya Santos MD, 750 mg at 12/13/19 0917  •  LORazepam (ATIVAN) tablet 0.5 mg, 0.5 mg, Oral, TID, Cinthya Santos MD, 0.5 mg at 12/13/19 0917  •  lubiprostone (AMITIZA) capsule 8 mcg, 8 mcg, Oral, BID With Meals, Cinthya Santos MD  •  magnesium hydroxide (MILK OF MAGNESIA) 400 MG/5ML suspension 5 mL, 5 mL, Oral, BID PRN, Cinthya Santos MD  •  multivitamin with minerals 1 tablet, 1 tablet, Oral, Daily, Cinthya Santos MD, 1 tablet at 12/13/19 0904  •  nitroglycerin (NITROSTAT) SL tablet 0.4 mg, 0.4 mg, Sublingual, Q5 Min PRN, Cinthya Santos MD  •  ondansetron (ZOFRAN) injection 4 mg, 4 mg, Intravenous, Q6H PRN, Cinthya Santos MD  •  pantoprazole (PROTONIX) EC tablet 40 mg, 40 mg, Oral, QAM, Cinthya Santos MD, 40 mg at 12/13/19 0502  •  Pharmacy Consult - Pharmacy to dose, , Does not apply, Continuous PRN, Cinthya Santos MD  •  polyethylene glycol 3350  powder (packet), 17 g, Oral, BID, Cinthya Santos MD, 17 g at 12/13/19 0918  •  psyllium (METAMUCIL MULTIHEALTH FIBER) 58.12 % packet 1 packet, 1 packet, Oral, Daily, Cinthya Santos MD, 1 packet at 12/13/19 0919  •  [COMPLETED] Insert peripheral IV, , , Once **AND** sodium chloride 0.9 % flush 10 mL, 10 mL, Intravenous, PRN, Cinthya Santos MD  •  sodium chloride 0.9 % flush 10 mL, 10 mL, Intravenous, PRN, Cinthya Santos MD  •  sodium chloride 0.9 % flush 10 mL, 10 mL, Intravenous, Q12H, Cinthya Santos MD, 10 mL at 12/13/19 0919  •  sodium chloride 0.9 % infusion 40 mL, 40 mL, Intravenous, PRN, Cinthya Santos MD  •  sodium chloride 0.9 % infusion, 100 mL/hr, Intravenous, Continuous, Cinthya Santos MD, Last Rate: 100 mL/hr at 12/12/19 0654, 100 mL/hr at 12/12/19 0654  •  traMADol (ULTRAM) tablet 100 mg, 100 mg, Oral, Q8H, Cinthya Santos MD, 100 mg at 12/13/19 0505      Assessment/Plan     1.  Acute Hypoxic Respiratory Failure: SaO2 noted 85% on RA in ER.  Asked staff to place a mask as patient is mouth breathing.  Chest x-ray and exam do not explain degree of hypoxia.  Will check CT chest.    2.  Pneumonia: Culture data negative.  Procalcitonin trending down as is leukocytosis.  Continue to monitor on current regimen.  No signs of aspiration noted on study.    3.  Seizure D/O: No further events.  Continue current regimen.    4.  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  Continue to monitor.    5.  HTN: Near goal on exam, but trend at goal.  Continue to monitor off of Diltiazem as she was hypotensive in the ER.    6.  Osteoporosis: No acute issues.  On Prolia as an outpatient.    Plan for disposition: Return [facility]    Carmelo Van MD  12/13/19  11:43 AM

## 2019-12-13 NOTE — PROGRESS NOTES
CT report reviewed.  I verified drug allergies w/ [facility] as I'm going to transition her to Zosyn for broader coverage of aspiration injury described on CT.

## 2019-12-13 NOTE — PLAN OF CARE
Problem: Patient Care Overview  Goal: Plan of Care Review  Outcome: Ongoing (interventions implemented as appropriate)  Flowsheets (Taken 12/13/2019 1442)  Plan of Care Reviewed With: patient  Outcome Summary: MBS: PT with moderate oropharyngeal dysphagia with significant weakness in AP transit and bolus control. Pt with SEVERE spill of greater than half the bolus of all consistencies to the valleculae and thins/nectar to the pyriforms. Bolus size did not improve spill on thins or nectar. Pt w/no penetration or aspiration viewed during the study but felt to be at high risk on thins and nectar due to SEVERE spill. Pt also noted to take small piece of raya cracker and swallow whole with little effort to masticate. Recommend honey thick liquids by spoon size trials and soft ground diet for all solids. Supervision with meals and crush pills in applesauce.

## 2019-12-13 NOTE — NURSING NOTE
Continued Stay Note  MILANA Medel     Patient Name: Erica Crockett  MRN: [id number]  Today's Date: 12/13/2019    Admit Date: 12/11/2019    Discharge Plan     Row Name 12/13/19 0720       Plan    Plan Comments  Updated IMM with patient's uncle Nicola Kapadia, legal guardian. Nicola states that patient has been at [facility] for 10 years and will return there when stable. Nicola had no other questions or concerns regarding discharge plans at this time. CM will continue to follow for needs.        Discharge Codes    No documentation.             Viviana Baez RN

## 2019-12-13 NOTE — MBS/VFSS/FEES
Acute Care - Speech Language Pathology   Swallow Modified Barium Swallow Study MILANA Medel     Patient Name: Erica Crockett  : 1940  MRN: [id number]  Today's Date: 2019               Admit Date: 2019    Visit Dx:     ICD-10-CM ICD-9-CM   1. Pneumonia of both lower lobes due to infectious organism (CMS/[facility]) J18.1 483.8   2. Sepsis with acute hypoxic respiratory failure without septic shock, due to unspecified organism (CMS/[facility]) A41.9 038.9    R65.20 995.91    J96.01 518.81   3. KEILY (acute kidney injury) (CMS/[facility]) N17.9 584.9     Patient Active Problem List   Diagnosis   • Degeneration of intervertebral disc of lumbar region   • Developmental mental disorder   • Osteoarthritis of hip   • Scoliosis   • Chronic pain   • Nonverbal signs of pain   • Bursitis of left hip   • Encounter for monitoring opioid maintenance therapy   • Anxiety   • Constipation   • Hypercalcemia   • Non-refractory epilepsy (CMS/HCC)   • Osteoporosis   • Impulse control disorder   • Vitamin D deficiency   • Pneumonia of both lower lobes due to infectious organism (CMS/HCC)     Past Medical History:   Diagnosis Date   • Anemia    • Anxiety    • Bursitis    • Bursitis    • DJD (degenerative joint disease), lumbar    • Hyperkalemia    • Hypoglycemia    • Intellectual disability    • Low back pain    • Osteoarthritis    • Osteopenia    • Pneumonia    • Renal disorder    • Renal insufficiency    • Scoliosis    • Seizure disorder (CMS/HCC)      History reviewed. No pertinent surgical history.     SWALLOW EVALUATION (last 72 hours)      SLP Adult Swallow Evaluation     Row Name 19 1000       Rehab Evaluation    Document Type  evaluation  -AD    Subjective Information  -- moans at times  -AD    Patient Observations  alert;cooperative;unable to respond non verbal  -AD    Patient/Family Observations  Pt seen sitting upright for MBS in video imaging chair. Difficulty maintaining upright position due to pt fidgeting.   -AD     Patient Effort  adequate  -AD    Symptoms Noted During/After Treatment  none  -AD       General Information    Patient Profile Reviewed  yes  -AD    Pertinent History Of Current Problem  MBS orderded due to concerns for possible silent aspiration per imaging results and nonverbal. Hx per initial clinical eval as follows: Pt is a 78 y/o resident of [facility] who presents to the hospital with diarrhea, seizure 12/10/19 and not acting like herself. Diagnosed with bilateral LL pneumonia, spesis, Acute hypoxic respiratory failure and KEILY. Pt has a history of seizure d/o and intellectual disability. She is reported to be on a regular, bite size diet with thin liquids at the [facility] Currently NPO. Swallow eval ordered due to concerns for early aspiration pneumonitis.  -AD    Current Method of Nutrition  regular textures;thin liquids;other (see comments) soft, ground with thins recommended  -AD    Precautions/Limitations, Vision  WFL;for purposes of eval  -AD    Precautions/Limitations, Hearing  other (see comments) appears WFL as localizes and tracks voices  -AD    Prior Level of Function-Communication  cognitive-linguistic impairment;motor speech impairment  -AD    Prior Level of Function-Swallowing  no diet consistency restrictions;safe, efficient swallowing in all situations;regular textures;thin liquids;other (see comments) bite size pieces; assisted by staff; use of Phil cup  -AD    Plans/Goals Discussed with  other (see comments) caregiver and RN  -AD    Barriers to Rehab  cognitive status  -AD    Patient's Goals for Discharge  patient could not state  -AD    Family Goals for Discharge  -- return to previous level of care at [facility]  -AD       Pain Assessment    Additional Documentation  -- no pain indicated  -AD       MBS/VFSS    Utensils Used  spoon;straw  -AD    Consistencies Trialed  regular textures;pureed;thin liquids;nectar/syrup-thick liquids;honey-thick liquids  -AD       MBS/VFSS Interpretation    Oral Prep Phase   impaired oral phase of swallowing  -AD    Oral Transit Phase  impaired  -AD    Oral Residue  WFL  -AD    VFSS Summary  PT with moderate oropharyngeal dysphagia with significant weakness in AP transit and bolus control. Pt with SEVERE spill of greater than half the bolus of all consistencies to the valleculae and thins/nectar to the pyriforms. Bolus size did not improve spill on thins or nectar. Pt w/no penetration or aspiration viewed during the study but felt to be at high risk on thins and nectar due to SEVERE spill. Pt also noted to take small piece of raya cracker and swallow whole with little effort to masticate.Caregiver reports she may have thought it was medicine that she swallows whole in pudding as raya cracker had a barium coating on it. Deficits of decreased mastication, decreased AP transit, piecemeal swallow, decreased base of tongue retraction noted resulting in decreased bolus control, transit and SEVERE premature spill.   -AD    Oral Phase, Comment  Pt with moderate oral phase deficit with decreased mastication of solids, lingual weakness resulting in increased AP transit, tongue pumping, piecemeal swallow and premature spill.   -AD       Oral Preparatory Phase    Oral Preparatory Phase  inadequate manipulation;poor rotary chew  -AD    Inadequate Manipulation  regular textures;secondary to impaired cognitive status  -AD    Poor Rotary Chew  regular textures;secondary to impaired cognitive status  -AD       Oral Transit Phase    Impaired Oral Transit Phase  increased A-P transit time;tongue pumping;piecemeal oral transit;premature spillage of liquids into pharynx  -AD    Increased A-P Transit Time  thin liquids;nectar-thick liquids;honey-thick liquids;pudding/puree;secondary to reduced lingual control;secondary to impaired cognitive status  -AD    Tongue Pumping  thin liquids;nectar-thick liquids;honey-thick liquids;pudding/puree;regular textures;all consistencies tested;secondary to reduced  lingual control;secondary to impaired cognitive status  -AD    Piecemeal Oral Transit  honey-thick liquids;pudding/puree;secondary to reduced lingual control;secondary to impaired cognitive status  -AD    Premature Spillage of Liquids into Pharynx  thin liquids;nectar-thick liquids;honey-thick liquids;pudding/puree;secondary to reduced lingual control;secondary to impaired cognitive status  -AD       Initiation of Pharyngeal Swallow    Initiation of Pharyngeal Swallow  bolus in valleculae;bolus in pyriform sinuses  -AD    Pharyngeal Phase  impaired pharyngeal phase of swallowing  -AD    Rosenbek's Scale  thin:;nectar:;honey:;pudding/puree:;regular textures:;1--->level 1  -AD    Pharyngeal Phase, Comment  Pt with moderate pharyngeal dysphagia with severe spill of greater than half of the thin and nectar thick liquids given by straw and nectar from spoon to the level of the pyriforms. Spill to the valleculae noted on honey thick, puree and soilds. No penetration or aspiration was viewed; however, pt at risk of aspiration due to the severity and amount of spill before the swallow is triggered filling both the valleculae and pyriforms with straw drinks.   -AD       Clinical Impression    SLP Swallowing Diagnosis  moderate;oral dysfunction;pharyngeal dysfunction  -AD    Functional Impact  risk of aspiration/pneumonia  -AD    Rehab Potential/Prognosis, Swallowing  re-evaluate goals as necessary  -AD    Swallow Criteria for Skilled Therapeutic Interventions Met  demonstrates skilled criteria  -AD       Recommendations    Therapy Frequency (Swallow)  PRN  -AD    Predicted Duration Therapy Intervention (Days)  until discharge  -AD    SLP Diet Recommendation  soft textures;ground;honey thick liquids;other (see comments) all foods ground  -AD    Recommended Diagnostics  reassess via VFSS (MBS) as outpatient when pt is back to baseline status  -AD    Recommended Precautions and Strategies  upright posture during/after  eating;small bites of food and sips of liquid;other (see comments) spoon size trials, no greater  -AD    SLP Rec. for Method of Medication Administration  meds whole;meds crushed;with pudding or applesauce;as tolerated  -AD    Monitor for Signs of Aspiration  no;notify SLP if any concerns  -AD    [place]  [facility]  -AD       Swallow Goals (SLP)    Oral Nutrition/Hydration Goal Selection (SLP)  oral nutrition/hydration, SLP goal 1  -AD       Oral Nutrition/Hydration Goal 1 (SLP)    Oral Nutrition/Hydration Goal 1, SLP  Pt will demonstrate tolerance of a ground diet with honey thick liquids without complications such as aspiration pneumonia by discharge.   -AD    Time Frame (Oral Nutrition/Hydration Goal 1, SLP)  short term goal (STG);by discharge  -AD    Barriers (Oral Nutrition/Hydration Goal 1, SLP)  intellectual disability  -AD    Progress/Outcomes (Oral Nutrition/Hydration Goal 1, SLP)  other (see comments) new  -AD      User Key  (r) = Recorded By, (t) = Taken By, (c) = Cosigned By    Initials Name Effective Dates    Nelsy Marques, MS CCC-SLP 02/28/19 -           EDUCATION  The patient has been educated in the following areas:   Dysphagia (Swallowing Impairment) Modified Diet Instruction. Caregiver from [facility] verbalizes understanding of MBS results, recommendations and modified diet.     SLP Recommendation and Plan  SLP Swallowing Diagnosis: moderate, oral dysfunction, pharyngeal dysfunction  SLP Diet Recommendation: soft textures, ground, honey thick liquids, other (see comments)(all foods ground)  Recommended Precautions and Strategies: upright posture during/after eating, small bites of food and sips of liquid, other (see comments)(spoon size trials, no greater)  SLP Rec. for Method of Medication Administration: meds whole, meds crushed, with pudding or applesauce, as tolerated  Monitor for Signs of Aspiration: no, notify SLP if any  concerns  Recommended Diagnostics: reassess via VFSS (MBS)(as outpatient when pt is back to baseline status)  Swallow Criteria for Skilled Therapeutic Interventions Met: demonstrates skilled criteria  Anticipated Dischage Disposition: community-based residential facility ([facility])  Rehab Potential/Prognosis, Swallowing: re-evaluate goals as necessary  Therapy Frequency (Swallow): PRN  Predicted Duration Therapy Intervention (Days): until discharge       Plan of Care Reviewed With: patient  Outcome Summary: MBS: PT with moderate oropharyngeal dysphagia with significant weakness in AP transit and bolus control. Pt with SEVERE spill of greater than half the bolus of all consistencies to the valleculae and thins/nectar to the pyriforms. Bolus size did not improve spill on thins or nectar. Pt w/no penetration or aspiration viewed during the study but felt to be at high risk on thins and nectar due to SEVERE spill. Pt also noted to take small piece of raya cracker and swallow whole with little effort to masticate. Recommend honey thick liquids by spoon size trials and soft ground diet for all solids. Supervision with meals and crush pills in applesauce.    SLP GOALS     Row Name 12/13/19 1000             Oral Nutrition/Hydration Goal 1 (SLP)    Oral Nutrition/Hydration Goal 1, SLP  Pt will demonstrate tolerance of a ground diet with honey thick liquids without complications such as aspiration pneumonia by discharge.   -AD      Time Frame (Oral Nutrition/Hydration Goal 1, SLP)  short term goal (STG);by discharge  -AD      Barriers (Oral Nutrition/Hydration Goal 1, SLP)  intellectual disability  -AD      Progress/Outcomes (Oral Nutrition/Hydration Goal 1, SLP)  other (see comments) new  -AD        User Key  (r) = Recorded By, (t) = Taken By, (c) = Cosigned By    Initials Name Provider Type    AD Nelsy Morrell, MS CCC-SLP Speech and Language Pathologist             Time Calculation:   Time Calculation- SLP     Row Name  12/13/19 1817             Time Calculation- SLP    SLP Start Time  1000  -AD      Total Timed Code Minutes- SLP  0 minute(s)  -AD      SLP Received On  12/13/19  -AD        User Key  (r) = Recorded By, (t) = Taken By, (c) = Cosigned By    Initials Name Provider Type    AD Nelsy Morrell, MS CCC-SLP Speech and Language Pathologist          Therapy Charges for Today     Code Description Service Date Service Provider Modifiers Qty    [id number] HC ST MOTION FLUORO EVAL SWALLOW 4 12/13/2019 Nelsy Morrell, MS CCC-SLP GN 1        Nelsy Morrell, MS CCC-SLP  12/13/2019

## 2019-12-14 LAB
ALBUMIN SERPL-MCNC: 2.8 G/DL (ref 3.5–5.2)
ALBUMIN/GLOB SERPL: 0.8 G/DL
ALP SERPL-CCNC: 77 U/L (ref 39–117)
ALT SERPL W P-5'-P-CCNC: 16 U/L (ref 1–33)
ANION GAP SERPL CALCULATED.3IONS-SCNC: 10.4 MMOL/L (ref 5–15)
AST SERPL-CCNC: 23 U/L (ref 1–32)
BASOPHILS # BLD AUTO: 0.03 10*3/MM3 (ref 0–0.2)
BASOPHILS NFR BLD AUTO: 0.3 % (ref 0–1.5)
BILIRUB SERPL-MCNC: 0.3 MG/DL (ref 0.2–1.2)
BUN BLD-MCNC: 18 MG/DL (ref 8–23)
BUN/CREAT SERPL: 20.7 (ref 7–25)
CALCIUM SPEC-SCNC: 8.4 MG/DL (ref 8.6–10.5)
CHLORIDE SERPL-SCNC: 108 MMOL/L (ref 98–107)
CO2 SERPL-SCNC: 21.6 MMOL/L (ref 22–29)
CREAT BLD-MCNC: 0.87 MG/DL (ref 0.57–1)
DEPRECATED RDW RBC AUTO: 45.5 FL (ref 37–54)
EOSINOPHIL # BLD AUTO: 0.37 10*3/MM3 (ref 0–0.4)
EOSINOPHIL NFR BLD AUTO: 3.7 % (ref 0.3–6.2)
ERYTHROCYTE [DISTWIDTH] IN BLOOD BY AUTOMATED COUNT: 13.1 % (ref 12.3–15.4)
GFR SERPL CREATININE-BSD FRML MDRD: 63 ML/MIN/1.73
GLOBULIN UR ELPH-MCNC: 3.6 GM/DL
GLUCOSE BLD-MCNC: 97 MG/DL (ref 65–99)
HCT VFR BLD AUTO: 31.2 % (ref 34–46.6)
HGB BLD-MCNC: 9.9 G/DL (ref 12–15.9)
IMM GRANULOCYTES # BLD AUTO: 0.08 10*3/MM3 (ref 0–0.05)
IMM GRANULOCYTES NFR BLD AUTO: 0.8 % (ref 0–0.5)
LYMPHOCYTES # BLD AUTO: 1.76 10*3/MM3 (ref 0.7–3.1)
LYMPHOCYTES NFR BLD AUTO: 17.8 % (ref 19.6–45.3)
MCH RBC QN AUTO: 30.1 PG (ref 26.6–33)
MCHC RBC AUTO-ENTMCNC: 31.7 G/DL (ref 31.5–35.7)
MCV RBC AUTO: 94.8 FL (ref 79–97)
MONOCYTES # BLD AUTO: 0.62 10*3/MM3 (ref 0.1–0.9)
MONOCYTES NFR BLD AUTO: 6.3 % (ref 5–12)
NEUTROPHILS # BLD AUTO: 7.04 10*3/MM3 (ref 1.7–7)
NEUTROPHILS NFR BLD AUTO: 71.1 % (ref 42.7–76)
NRBC BLD AUTO-RTO: 0 /100 WBC (ref 0–0.2)
PLAT MORPH BLD: NORMAL
PLATELET # BLD AUTO: 211 10*3/MM3 (ref 140–450)
PMV BLD AUTO: 11.5 FL (ref 6–12)
POTASSIUM BLD-SCNC: 3.9 MMOL/L (ref 3.5–5.2)
PROCALCITONIN SERPL-MCNC: 2.98 NG/ML (ref 0.1–0.25)
PROT SERPL-MCNC: 6.4 G/DL (ref 6–8.5)
RBC # BLD AUTO: 3.29 10*6/MM3 (ref 3.77–5.28)
RBC MORPH BLD: NORMAL
SODIUM BLD-SCNC: 140 MMOL/L (ref 136–145)
WBC MORPH BLD: NORMAL
WBC NRBC COR # BLD: 9.9 10*3/MM3 (ref 3.4–10.8)

## 2019-12-14 PROCEDURE — 25010000002 PIPERACILLIN-TAZOBACTAM: Performed by: HOSPITALIST

## 2019-12-14 PROCEDURE — 25010000002 ENOXAPARIN PER 10 MG: Performed by: HOSPITALIST

## 2019-12-14 PROCEDURE — 25010000002 PIPERACILLIN SOD-TAZOBACTAM PER 1 G: Performed by: HOSPITALIST

## 2019-12-14 PROCEDURE — 85025 COMPLETE CBC W/AUTO DIFF WBC: CPT | Performed by: HOSPITALIST

## 2019-12-14 PROCEDURE — 99231 SBSQ HOSP IP/OBS SF/LOW 25: CPT | Performed by: HOSPITALIST

## 2019-12-14 PROCEDURE — 84145 PROCALCITONIN (PCT): CPT | Performed by: HOSPITALIST

## 2019-12-14 PROCEDURE — 80053 COMPREHEN METABOLIC PANEL: CPT | Performed by: HOSPITALIST

## 2019-12-14 PROCEDURE — 85007 BL SMEAR W/DIFF WBC COUNT: CPT | Performed by: HOSPITALIST

## 2019-12-14 RX ADMIN — PSYLLIUM HUSK 1 PACKET: 3.4 POWDER ORAL at 09:24

## 2019-12-14 RX ADMIN — LORAZEPAM 0.5 MG: 0.5 TABLET ORAL at 21:38

## 2019-12-14 RX ADMIN — TRAMADOL HYDROCHLORIDE 100 MG: 50 TABLET, FILM COATED ORAL at 21:38

## 2019-12-14 RX ADMIN — ATORVASTATIN CALCIUM 10 MG: 10 TABLET, FILM COATED ORAL at 09:23

## 2019-12-14 RX ADMIN — PIPERACILLIN SODIUM AND TAZOBACTAM SODIUM 3.38 G: 3; .375 INJECTION, POWDER, LYOPHILIZED, FOR SOLUTION INTRAVENOUS at 02:15

## 2019-12-14 RX ADMIN — PANTOPRAZOLE SODIUM 40 MG: 40 TABLET, DELAYED RELEASE ORAL at 06:36

## 2019-12-14 RX ADMIN — SODIUM CHLORIDE, PRESERVATIVE FREE 10 ML: 5 INJECTION INTRAVENOUS at 21:39

## 2019-12-14 RX ADMIN — LEVETIRACETAM 750 MG: 500 TABLET ORAL at 21:38

## 2019-12-14 RX ADMIN — LAMOTRIGINE 200 MG: 100 TABLET ORAL at 21:38

## 2019-12-14 RX ADMIN — Medication 1 TABLET: at 09:23

## 2019-12-14 RX ADMIN — ENOXAPARIN SODIUM 30 MG: 30 INJECTION SUBCUTANEOUS at 21:38

## 2019-12-14 RX ADMIN — GABAPENTIN 100 MG: 100 CAPSULE ORAL at 21:38

## 2019-12-14 RX ADMIN — Medication 1 CAPSULE: at 09:22

## 2019-12-14 RX ADMIN — LORAZEPAM 0.5 MG: 0.5 TABLET ORAL at 09:20

## 2019-12-14 RX ADMIN — TRAMADOL HYDROCHLORIDE 100 MG: 50 TABLET, FILM COATED ORAL at 06:36

## 2019-12-14 RX ADMIN — CETIRIZINE HYDROCHLORIDE 10 MG: 10 TABLET, FILM COATED ORAL at 09:21

## 2019-12-14 RX ADMIN — LORAZEPAM 0.5 MG: 0.5 TABLET ORAL at 17:56

## 2019-12-14 RX ADMIN — POLYETHYLENE GLYCOL 3350 17 G: 17 POWDER, FOR SOLUTION ORAL at 09:21

## 2019-12-14 RX ADMIN — LAMOTRIGINE 200 MG: 100 TABLET ORAL at 09:23

## 2019-12-14 RX ADMIN — TRAMADOL HYDROCHLORIDE 100 MG: 50 TABLET, FILM COATED ORAL at 13:00

## 2019-12-14 RX ADMIN — LEVETIRACETAM 750 MG: 500 TABLET ORAL at 09:20

## 2019-12-14 RX ADMIN — PIPERACILLIN SODIUM AND TAZOBACTAM SODIUM 3.38 G: 3; .375 INJECTION, POWDER, LYOPHILIZED, FOR SOLUTION INTRAVENOUS at 16:45

## 2019-12-14 RX ADMIN — PIPERACILLIN SODIUM AND TAZOBACTAM SODIUM 3.38 G: 3; .375 INJECTION, POWDER, LYOPHILIZED, FOR SOLUTION INTRAVENOUS at 06:36

## 2019-12-14 RX ADMIN — SODIUM CHLORIDE, PRESERVATIVE FREE 10 ML: 5 INJECTION INTRAVENOUS at 09:24

## 2019-12-14 RX ADMIN — PIPERACILLIN SODIUM AND TAZOBACTAM SODIUM 3.38 G: 3; .375 INJECTION, POWDER, LYOPHILIZED, FOR SOLUTION INTRAVENOUS at 22:56

## 2019-12-14 NOTE — PLAN OF CARE
Pt had uneventful night, sitter from [facility] facility at bedside. Pt takes medication in applesauce, nonverbal. Will continue to monitor

## 2019-12-14 NOTE — PROGRESS NOTES
"Hospitalist Team      Patient Care Team:  Tulio Carlson MD as PCP - General (Family Medicine)  Arianne Harkins APRN as PCP - Claims Attributed  Aleksandra Kent APRN as Nurse Practitioner (Pain Medicine)  Ja Harris MD as Consulting Physician (Neurology)  Magen Farmer MD as Consulting Physician (Nephrology)  Mikey Sanchez MD (Psychiatry)  Nelsy Butcher DPM as Consulting Physician (Podiatry)        Chief Complaint:  Acute hypoxic respiratory failure and aspiration pneumonia.     Subjective    Interval History and ROS:     [facility]  On Cefapime and Azithromycin. No positive cultures at this time.         Objective    Vital Signs  Temp:  [97.9 °F (36.6 °C)-99.2 °F (37.3 °C)] 98.6 °F (37 °C)  Heart Rate:  [] 100  Resp:  [20] 20  BP: (141-158)/(75-88) 146/83  Oxygen Therapy  SpO2: 92 %  Pulse Oximetry Type: Continuous  Device (Oxygen Therapy): nasal cannula  Flow (L/min): 4}  Flowsheet Rows      First Filed Value   Admission Height  152.4 cm (60\") Documented at 12/11/2019 1815   Admission Weight  78.7 kg (173 lb 8 oz) Documented at 12/11/2019 1506            Physical Exam:    Physical Exam    Results Review:     I reviewed the patient's new clinical results.    Lab Results (last 24 hours)     Procedure Component Value Units Date/Time    Procalcitonin [[id number]]  (Abnormal) Collected:  12/14/19 0536    Specimen:  Blood Updated:  12/14/19 0634     Procalcitonin 2.98 ng/mL     Comprehensive Metabolic Panel [[id number]]  (Abnormal) Collected:  12/14/19 0536    Specimen:  Blood Updated:  12/14/19 0624     Glucose 97 mg/dL      BUN 18 mg/dL      Creatinine 0.87 mg/dL      Sodium 140 mmol/L      Potassium 3.9 mmol/L      Chloride 108 mmol/L      CO2 21.6 mmol/L      Calcium 8.4 mg/dL      Total Protein 6.4 g/dL      Albumin 2.80 g/dL      ALT (SGPT) 16 U/L      AST (SGOT) 23 U/L      Alkaline Phosphatase 77 U/L      Total Bilirubin 0.3 mg/dL      eGFR Non  " Amer 63 mL/min/1.73      Globulin 3.6 gm/dL      A/G Ratio 0.8 g/dL      BUN/Creatinine Ratio 20.7     Anion Gap 10.4 mmol/L     Narrative:       GFR Normal >60  Chronic Kidney Disease <60  Kidney Failure <15      Scan Slide [[id number]]  (Normal) Collected:  12/14/19 0536    Specimen:  Blood Updated:  12/14/19 0615     RBC Morphology Normal     WBC Morphology Normal     Platelet Morphology Normal    CBC & Differential [[id number]] Collected:  12/14/19 0536    Specimen:  Blood Updated:  12/14/19 0603    Narrative:       The following orders were created for panel order CBC & Differential.  Procedure                               Abnormality         Status                     ---------                               -----------         ------                     CBC Auto Differential[[id number]]        Abnormal            Final result                 Please view results for these tests on the individual orders.    CBC Auto Differential [[id number]]  (Abnormal) Collected:  12/14/19 0536    Specimen:  Blood Updated:  12/14/19 0603     WBC 9.90 10*3/mm3      RBC 3.29 10*6/mm3      Hemoglobin 9.9 g/dL      Hematocrit 31.2 %      MCV 94.8 fL      MCH 30.1 pg      MCHC 31.7 g/dL      RDW 13.1 %      RDW-SD 45.5 fl      MPV 11.5 fL      Platelets 211 10*3/mm3      Neutrophil % 71.1 %      Lymphocyte % 17.8 %      Monocyte % 6.3 %      Eosinophil % 3.7 %      Basophil % 0.3 %      Immature Grans % 0.8 %      Neutrophils, Absolute 7.04 10*3/mm3      Lymphocytes, Absolute 1.76 10*3/mm3      Monocytes, Absolute 0.62 10*3/mm3      Eosinophils, Absolute 0.37 10*3/mm3      Basophils, Absolute 0.03 10*3/mm3      Immature Grans, Absolute 0.08 10*3/mm3      nRBC 0.0 /100 WBC     Blood Culture - Blood, Arm, Left [[id number]] Collected:  12/11/19 1559    Specimen:  Blood from Arm, Left Updated:  12/13/19 1615     Blood Culture No growth at 2 days    Blood Culture - Blood, Arm, Right [[id number]] Collected:  12/11/19 1559    Specimen:  Blood  from Arm, Right Updated:  12/13/19 1615     Blood Culture No growth at 2 days    MRSA Screen Culture - Swab, Nares [[id number]]  (Normal) Collected:  12/12/19 0022    Specimen:  Swab from Nares Updated:  12/13/19 1303     MRSA SCREEN CX No Methicillin Resistant Staphylococcus aureus isolated    Comprehensive Metabolic Panel [[id number]]  (Abnormal) Collected:  12/13/19 0855    Specimen:  Blood Updated:  12/13/19 0919     Glucose 105 mg/dL      BUN 24 mg/dL      Creatinine 1.00 mg/dL      Sodium 141 mmol/L      Potassium 3.9 mmol/L      Chloride 110 mmol/L      CO2 21.9 mmol/L      Calcium 8.1 mg/dL      Total Protein 6.2 g/dL      Albumin 3.00 g/dL      ALT (SGPT) 7 U/L      AST (SGOT) 21 U/L      Alkaline Phosphatase 70 U/L      Total Bilirubin 0.2 mg/dL      eGFR Non African Amer 53 mL/min/1.73      Globulin 3.2 gm/dL      A/G Ratio 0.9 g/dL      BUN/Creatinine Ratio 24.0     Anion Gap 9.1 mmol/L     Narrative:       GFR Normal >60  Chronic Kidney Disease <60  Kidney Failure <15            Imaging Results (Last 24 Hours)     Procedure Component Value Units Date/Time    CT Chest Without Contrast [[id number]] Collected:  12/13/19 1536     Updated:  12/13/19 1543    Narrative:       NONCONTRAST CT CHEST 12/19/2019     HISTORY:  Lower lobe pneumonia bilaterally. Coughing and choking episodes when  eating and drinking. Had a barium swallow study this morning.     TECHNIQUE:    Noncontrast CT chest was performed. No comparisons. Radiation dose  reduction techniques included automated exposure control or exposure  modulation based on body size. Radiation audit for CT and nuclear  cardiology exams in the last 12 months: 0.      FINDINGS:    There is motion degradation and these were the best images possible.  Trace amount of pleural fluid left greater than right. There is patchy  consolidation in the right lower lobe and more confluent consolidation  involving the left lower lobe extending into the superior segment.  There  is also patchy consolidation in the posterior right upper lobe and to a  lesser extent the posterior left upper lobe. Imaging findings are most  characteristic of multifocal bilateral pneumonia. Follow-up to clearing  is recommended. Although some of the areas of consolidation has somewhat  nodular features, there is no suspicious pulmonary nodule. Incidental 5  mm nodule in the left lung apex may be inflammatory or infectious as  well. A follow-up CT chest in one year can be performed to reassess  stability.     There is no pneumothorax. No pericardial effusion and no threshold  adenopathy. Aorta demonstrates normal caliber and mild atherosclerotic  change. Included upper abdomen demonstrates dense barium within the  stomach related to prior video speech swallow earlier today and there  are granulomatous calcifications in the spleen. Equivocal  cholelithiasis. No suspicious bone lesion.         Impression:       1. Imaging findings most characteristic of multifocal bilateral  pneumonia with a lower lobe predominance and posterior segment  predominance. These findings can be associated with aspiration  pneumonia. Trace effusions. Follow-up to clearing recommended.  2. 5 mm noncalcified nodule in the left upper lobe may also be  inflammatory or infectious. CT chest follow-up in one year recommended  to reassess stability.  3. Equivocal uncomplicated cholelithiasis.     This report was finalized on 12/13/2019 3:41 PM by Dr. Anthony Hsu MD.       FL Video Swallow With Speech [[id number]] Collected:  12/13/19 1134     Updated:  12/13/19 1137    Narrative:       VIDEO SWALLOWING STUDY, 12/13/2019     HISTORY:  Abnormal chest x-ray demonstrating patchy infiltrates in the lung bases  concerning for aspiration pneumonia.     TECHNIQUE:  Video swallowing study was conducted by the speech pathologist in my  presence and recorded on digital video disc.     Fluoroscopy time 1.7 minutes. A single spot image was recorded  for  documentation purposes.     FINDINGS:  There is premature spillage and pooling of all barium coated  consistencies. However, no distinct penetration or aspiration  identified. Please see the full report of the speech pathologist for  further details.       Impression:       1. No penetration or aspiration. Significant premature spillage and  pooling as described.     This report was finalized on 12/13/2019 11:35 AM by Dr. Anthony Hsu MD.             Xray not reviewed personally by physician.      ECG not reviewed personally by physician  ECG/EMG Results (most recent)     Procedure Component Value Units Date/Time    ECG 12 Lead [[id number]] Collected:  12/11/19 1544     Updated:  12/11/19 1839    Narrative:       HEART RATE= 95  bpm  RR Interval= 632  ms  [place] Interval= 216  ms  P Horizontal Axis= -2  deg  P Front Axis= 34  deg  QRSD Interval= 97  ms  QT Interval= 375  ms  QRS Axis= -17  deg  T Wave Axis= 40  deg  - ABNORMAL ECG -  Sinus rhythm  Prolonged [place] interval  Borderline left axis deviation  NO PRIOR TRACING AVAILABLE FOR COMPARISON  Electronically Signed By: Obie Diop ([facility]) 11-Dec-2019 18:39:02  Date and Time of Study: 2019-12-11 15:44:50          Medication Review:   I have reviewed the patient's current medication list    Current Facility-Administered Medications:   •  acetaminophen (TYLENOL) tablet 650 mg, 650 mg, Oral, Q6H PRN, Cinthya Santos MD, 650 mg at 12/13/19 0326  •  aluminum-magnesium hydroxide-simethicone (MAALOX/MYLANTA) suspension 10 mL, 10 mL, Oral, Daily PRN, Cinthya Santos MD  •  atorvastatin (LIPITOR) tablet 10 mg, 10 mg, Oral, Daily, Cinthya Santos MD, 10 mg at 12/13/19 0919  •  bisacodyl (DULCOLAX) suppository 10 mg, 10 mg, Rectal, Daily PRN, Cinthya Santos MD  •  cetirizine (zyrTEC) tablet 10 mg, 10 mg, Oral, Daily, Cinthya Santos MD, 10 mg at 12/13/19 0918  •  diclofenac (VOLTAREN) 1 % gel 4 g, 4 g,  Topical, 4x Daily PRN, Cinthya Santos MD  •  diphenhydrAMINE (BENADRYL) capsule 25 mg, 25 mg, Oral, Q6H PRN, Cinthya Santos MD, 25 mg at 12/12/19 2036  •  enoxaparin (LOVENOX) syringe 30 mg, 30 mg, Subcutaneous, Q24H, Cinthya Santos MD, 30 mg at 12/13/19 2105  •  gabapentin (NEURONTIN) capsule 100 mg, 100 mg, Oral, Nightly, Cinthya Santos MD, 100 mg at 12/13/19 2111  •  lactobacillus acidophilus (RISAQUAD) capsule 1 capsule, 1 capsule, Oral, Daily, Cinthya Santos MD, 1 capsule at 12/13/19 0918  •  lamoTRIgine (LaMICtal) tablet 200 mg, 200 mg, Oral, Q12H, Cinthya Santos MD, 200 mg at 12/13/19 2106  •  levETIRAcetam (KEPPRA) tablet 750 mg, 750 mg, Oral, Q12H, Cinthya Santos MD, 750 mg at 12/13/19 2105  •  LORazepam (ATIVAN) tablet 0.5 mg, 0.5 mg, Oral, TID, Cinthya Santos MD, 0.5 mg at 12/13/19 2111  •  lubiprostone (AMITIZA) capsule 8 mcg, 8 mcg, Oral, BID With Meals, Cinthya Santos MD  •  magnesium hydroxide (MILK OF MAGNESIA) 400 MG/5ML suspension 5 mL, 5 mL, Oral, BID PRN, Cinthya Santos MD  •  multivitamin with minerals 1 tablet, 1 tablet, Oral, Daily, Cinthya Santos MD, 1 tablet at 12/13/19 0918  •  nitroglycerin (NITROSTAT) SL tablet 0.4 mg, 0.4 mg, Sublingual, Q5 Min PRN, Cinthya Santos MD  •  ondansetron (ZOFRAN) injection 4 mg, 4 mg, Intravenous, Q6H PRN, Cinthya Santos MD  •  pantoprazole (PROTONIX) EC tablet 40 mg, 40 mg, Oral, QAM, Cinthya Santos MD, 40 mg at 12/14/19 0636  •  Pharmacy Consult - Pharmacy to dose, , Does not apply, Continuous PRN, Cinthya Santos MD  •  piperacillin-tazobactam (ZOSYN) 3.375 g/100 mL 0.9% NS IVPB (mbp), 3.375 g, Intravenous, Q8H, Carmelo Van MD, 3.375 g at 12/14/19 0636  •  polyethylene glycol 3350 powder (packet), 17 g, Oral, BID, Tom,  Cinthya Turcios MD, 17 g at 12/13/19 2106  •  psyllium (METAMUCIL MULTIHEALTH FIBER) 58.12 % packet 1 packet, 1 packet, Oral, Daily, Cinthya Santos MD, 1 packet at 12/13/19 0919  •  [COMPLETED] Insert peripheral IV, , , Once **AND** sodium chloride 0.9 % flush 10 mL, 10 mL, Intravenous, PRN, Cinhtya Santos MD  •  sodium chloride 0.9 % flush 10 mL, 10 mL, Intravenous, PRN, Cinthya Santos MD  •  sodium chloride 0.9 % flush 10 mL, 10 mL, Intravenous, Q12H, Cinthya Santos MD, 10 mL at 12/13/19 2104  •  sodium chloride 0.9 % infusion 40 mL, 40 mL, Intravenous, PRN, Cinthya Santos MD  •  sodium chloride 0.9 % infusion, 100 mL/hr, Intravenous, Continuous, Cinthya Santos MD, Last Rate: 100 mL/hr at 12/13/19 2102, 100 mL/hr at 12/13/19 2102  •  traMADol (ULTRAM) tablet 100 mg, 100 mg, Oral, Q8H, Cinthya Santos MD, 100 mg at 12/14/19 0636      Assessment/Plan     Sepsis secondary to basilar pneumonia with acute hypoxic respiratory failure    Probable aspiration with dysphasia   Speech evaluation:   SLP Swallowing Diagnosis: moderate, oral dysfunction, pharyngeal dysfunction  SLP Diet Recommendation: soft textures, ground, honey thick liquids, other (see comments)(all foods ground)  Recommended Precautions and Strategies: upright posture during/after eating, small bites of food and sips of liquid, other (see comments)(spoon size trials, no greater)  SLP Rec. for Method of Medication Administration: meds whole, meds crushed, with pudding or applesauce, as tolerated  Monitor for Signs of Aspiration: no, notify SLP if any concerns  Recommended Diagnostics: reassess via VFSS (MBS)(as outpatient when pt is back to baseline status)  Swallow Criteria for Skilled Therapeutic Interventions Met: demonstrates skilled criteria  Anticipated Dischage Disposition: community-based residential facility (CBRF)  Rehab Potential/Prognosis,  Swallowing: re-evaluate goals as necessary  Therapy Frequency (Swallow): PRN  Predicted Duration Therapy Intervention (Days): until discharge    Acute kidney injury on chronic kidney disease stage III    Found intellectual disability    Back pain with degenerative disc disease and scoliosis    History of seizure disorder and reportedly had a seizure the day before admission with no injuries    Essential hypertension    Osteoporosis    Chronic constipation    DVT prophylaxis with Lovenox and SCDs        Plan for disposition:Home to [facility] is the plan.    Angelic Viveros DO  12/14/19  9:17 AM      Time: 15 min

## 2019-12-15 ENCOUNTER — APPOINTMENT (OUTPATIENT)
Dept: GENERAL RADIOLOGY | Facility: HOSPITAL | Age: 79
End: 2019-12-15

## 2019-12-15 PROCEDURE — 25010000002 ENOXAPARIN PER 10 MG: Performed by: HOSPITALIST

## 2019-12-15 PROCEDURE — 71045 X-RAY EXAM CHEST 1 VIEW: CPT

## 2019-12-15 PROCEDURE — 99232 SBSQ HOSP IP/OBS MODERATE 35: CPT | Performed by: HOSPITALIST

## 2019-12-15 PROCEDURE — 25010000002 PIPERACILLIN-TAZOBACTAM: Performed by: HOSPITALIST

## 2019-12-15 PROCEDURE — 25010000002 PIPERACILLIN SOD-TAZOBACTAM PER 1 G: Performed by: HOSPITALIST

## 2019-12-15 RX ADMIN — PIPERACILLIN SODIUM AND TAZOBACTAM SODIUM 3.38 G: 3; .375 INJECTION, POWDER, LYOPHILIZED, FOR SOLUTION INTRAVENOUS at 06:30

## 2019-12-15 RX ADMIN — TRAMADOL HYDROCHLORIDE 100 MG: 50 TABLET, FILM COATED ORAL at 06:30

## 2019-12-15 RX ADMIN — SODIUM CHLORIDE, PRESERVATIVE FREE 10 ML: 5 INJECTION INTRAVENOUS at 09:22

## 2019-12-15 RX ADMIN — PIPERACILLIN SODIUM AND TAZOBACTAM SODIUM 3.38 G: 3; .375 INJECTION, POWDER, LYOPHILIZED, FOR SOLUTION INTRAVENOUS at 16:01

## 2019-12-15 RX ADMIN — LEVETIRACETAM 750 MG: 500 TABLET ORAL at 09:10

## 2019-12-15 RX ADMIN — ENOXAPARIN SODIUM 30 MG: 30 INJECTION SUBCUTANEOUS at 21:48

## 2019-12-15 RX ADMIN — TRAMADOL HYDROCHLORIDE 100 MG: 50 TABLET, FILM COATED ORAL at 14:14

## 2019-12-15 RX ADMIN — LORAZEPAM 0.5 MG: 0.5 TABLET ORAL at 09:16

## 2019-12-15 RX ADMIN — LORAZEPAM 0.5 MG: 0.5 TABLET ORAL at 21:48

## 2019-12-15 RX ADMIN — Medication 1 TABLET: at 09:10

## 2019-12-15 RX ADMIN — ATORVASTATIN CALCIUM 10 MG: 10 TABLET, FILM COATED ORAL at 09:10

## 2019-12-15 RX ADMIN — POLYETHYLENE GLYCOL 3350 17 G: 17 POWDER, FOR SOLUTION ORAL at 21:49

## 2019-12-15 RX ADMIN — TRAMADOL HYDROCHLORIDE 100 MG: 50 TABLET, FILM COATED ORAL at 21:48

## 2019-12-15 RX ADMIN — LAMOTRIGINE 200 MG: 100 TABLET ORAL at 21:47

## 2019-12-15 RX ADMIN — PSYLLIUM HUSK 1 PACKET: 3.4 POWDER ORAL at 09:12

## 2019-12-15 RX ADMIN — PANTOPRAZOLE SODIUM 40 MG: 40 TABLET, DELAYED RELEASE ORAL at 06:30

## 2019-12-15 RX ADMIN — SODIUM CHLORIDE, PRESERVATIVE FREE 10 ML: 5 INJECTION INTRAVENOUS at 21:49

## 2019-12-15 RX ADMIN — GABAPENTIN 100 MG: 100 CAPSULE ORAL at 21:48

## 2019-12-15 RX ADMIN — CETIRIZINE HYDROCHLORIDE 10 MG: 10 TABLET, FILM COATED ORAL at 09:11

## 2019-12-15 RX ADMIN — LAMOTRIGINE 200 MG: 100 TABLET ORAL at 09:10

## 2019-12-15 RX ADMIN — PIPERACILLIN SODIUM AND TAZOBACTAM SODIUM 3.38 G: 3; .375 INJECTION, POWDER, LYOPHILIZED, FOR SOLUTION INTRAVENOUS at 23:16

## 2019-12-15 RX ADMIN — LORAZEPAM 0.5 MG: 0.5 TABLET ORAL at 16:01

## 2019-12-15 RX ADMIN — POLYETHYLENE GLYCOL 3350 17 G: 17 POWDER, FOR SOLUTION ORAL at 09:10

## 2019-12-15 RX ADMIN — LEVETIRACETAM 750 MG: 500 TABLET ORAL at 21:48

## 2019-12-15 RX ADMIN — Medication 1 CAPSULE: at 09:10

## 2019-12-15 NOTE — PROGRESS NOTES
"Hospitalist Team      Patient Care Team:  Tulio Carlson MD as PCP - General (Family Medicine)  Arianne Harkins APRN as PCP - Claims Attributed  Aleksandra Kent APRN as Nurse Practitioner (Pain Medicine)  Ja Harris MD as Consulting Physician (Neurology)  Magen Farmer MD as Consulting Physician (Nephrology)  Mikey Sanchez MD (Psychiatry)  Nelsy Butcher DPM as Consulting Physician (Podiatry)        Chief Complaint:  Follow up for  Pneumonia worsening/ aspiration    Subjective    Interval History and ROS:       Patient is from [facility] and has acute hypoxic respiratory failure and aspiration pneumonia she saw the speech therapist and was evaluated and was placed on what we thought was the appropriate diet.  Last night she had another choking episode so this morning she was found to be hypoxic and another chest x-ray was performed.  She is now on oxygen and the chest x-ray is found to be worse.  We have changed her to a puréed diet and she seems to be managing that better and we have asked speech to see her again tomorrow.  I have explained all this to the [facility] sitter that is with her today.    Objective    Vital Signs  Temp:  [97.4 °F (36.3 °C)-99.1 °F (37.3 °C)] 97.4 °F (36.3 °C)  Heart Rate:  [] 103  Resp:  [20-22] 22  BP: (153-173)/(77-92) 173/92  Oxygen Therapy  SpO2: (!) 89 %(rn notified)  Pulse Oximetry Type: Intermittent  Device (Oxygen Therapy): nasal cannula  Flow (L/min): 5}  Flowsheet Rows      First Filed Value   Admission Height  152.4 cm (60\") Documented at 12/11/2019 1815   Admission Weight  78.7 kg (173 lb 8 oz) Documented at 12/11/2019 1506        Body mass index is 32.99 kg/m².      Physical Exam:    Physical Exam   Cardiovascular: Normal rate and regular rhythm.   Pulmonary/Chest:   Scattered rhonchi today and on oxygen   Abdominal: Soft. Bowel sounds are normal.   Skin: Skin is warm and dry.   Nursing note and vitals reviewed.      Results " Review:     I reviewed the patient's new clinical results.    Lab Results (last 24 hours)     Procedure Component Value Units Date/Time    Blood Culture - Blood, Arm, Left [[id number]] Collected:  12/11/19 1559    Specimen:  Blood from Arm, Left Updated:  12/14/19 1615     Blood Culture No growth at 3 days    Blood Culture - Blood, Arm, Right [[id number]] Collected:  12/11/19 1559    Specimen:  Blood from Arm, Right Updated:  12/14/19 1615     Blood Culture No growth at 3 days          Imaging Results (Last 24 Hours)     Procedure Component Value Units Date/Time    XR Chest 1 View [[id number]] Collected:  12/15/19 0929     Updated:  12/15/19 0931    Narrative:       CR Chest 1 Vw    INDICATION:   Patient coughing with her dinner. Concern for aspiration. Increasing O2 requirements.     COMPARISON:    Chest x-ray 12/11/2019    FINDINGS:  3 portable AP view(s) of the chest.    Cardiac silhouette size is within normal limits. Lung volumes are low with persistent elevation of the right hemidiaphragm. There is patchy bilateral lower lobe airspace disease which is similar to prior. There is new airspace disease in the right mid  lung and possibly right upper lung. Please correlate for clinical concern for aspiration in light of history. Follow-up to clearing recommended. There is mild increase in interstitial markings in general. Please correlate for clinical evidence of mild  volume overload    There is no evidence for pneumothorax. No definite pleural effusion.       Impression:         1. Interval worsening in the appearance the chest with new airspace disease right mid and likely upper lung. In light of history, please correlate for clinical evidence of aspiration. Follow-up to clearing is recommended.  2. There is mild increase in interstitial markings in general. This correlate for any clinical concern for mild volume overload as well.    Signer Name: Beth Gates MD   Signed: 12/15/2019 9:29 AM   Workstation Name:  [id number]    [facility]          Xray reviewed personally by physician.  Agree with worsening      ECG not reviewed personally by physician  ECG/EMG Results (most recent)     Procedure Component Value Units Date/Time    ECG 12 Lead [[id number]] Collected:  12/11/19 1544     Updated:  12/11/19 1839    Narrative:       HEART RATE= 95  bpm  RR Interval= 632  ms  [place] Interval= 216  ms  P Horizontal Axis= -2  deg  P Front Axis= 34  deg  QRSD Interval= 97  ms  QT Interval= 375  ms  QRS Axis= -17  deg  T Wave Axis= 40  deg  - ABNORMAL ECG -  Sinus rhythm  Prolonged [place] interval  Borderline left axis deviation  NO PRIOR TRACING AVAILABLE FOR COMPARISON  Electronically Signed By: Obie Diop ([facility]) 11-Dec-2019 18:39:02  Date and Time of Study: 2019-12-11 15:44:50          Medication Review:   I have reviewed the patient's current medication list    Current Facility-Administered Medications:   •  acetaminophen (TYLENOL) tablet 650 mg, 650 mg, Oral, Q6H PRN, Cinthya Santos MD, 650 mg at 12/13/19 0326  •  aluminum-magnesium hydroxide-simethicone (MAALOX/MYLANTA) suspension 10 mL, 10 mL, Oral, Daily PRN, Cinthya Santos MD  •  atorvastatin (LIPITOR) tablet 10 mg, 10 mg, Oral, Daily, Cinthya Santos MD, 10 mg at 12/15/19 0910  •  bisacodyl (DULCOLAX) suppository 10 mg, 10 mg, Rectal, Daily PRN, Cinthya Santos MD  •  cetirizine (zyrTEC) tablet 10 mg, 10 mg, Oral, Daily, Cinthya Santos MD, 10 mg at 12/15/19 0911  •  diclofenac (VOLTAREN) 1 % gel 4 g, 4 g, Topical, 4x Daily PRN, Cinthya Santos MD  •  diphenhydrAMINE (BENADRYL) capsule 25 mg, 25 mg, Oral, Q6H PRN, Cinthya Santos MD, 25 mg at 12/12/19 2036  •  enoxaparin (LOVENOX) syringe 30 mg, 30 mg, Subcutaneous, Q24H, Cinthya Santos MD, 30 mg at 12/14/19 2138  •  gabapentin (NEURONTIN) capsule 100 mg, 100 mg, Oral, Nightly, Cinthya Santos  MD Karolina, 100 mg at 12/14/19 2138  •  lactobacillus acidophilus (RISAQUAD) capsule 1 capsule, 1 capsule, Oral, Daily, Cinthya Santos MD, 1 capsule at 12/15/19 0910  •  lamoTRIgine (LaMICtal) tablet 200 mg, 200 mg, Oral, Q12H, Cinthya Santos MD, 200 mg at 12/15/19 0910  •  levETIRAcetam (KEPPRA) tablet 750 mg, 750 mg, Oral, Q12H, Cinthya Santos MD, 750 mg at 12/15/19 0910  •  LORazepam (ATIVAN) tablet 0.5 mg, 0.5 mg, Oral, TID, Cinthya Santos MD, 0.5 mg at 12/15/19 0916  •  lubiprostone (AMITIZA) capsule 8 mcg, 8 mcg, Oral, BID With Meals, Cinthya Santos MD, Stopped at 12/15/19 0831  •  magnesium hydroxide (MILK OF MAGNESIA) 400 MG/5ML suspension 5 mL, 5 mL, Oral, BID PRN, Cinthya Santos MD  •  multivitamin with minerals 1 tablet, 1 tablet, Oral, Daily, Cinthya Santos MD, 1 tablet at 12/15/19 0910  •  nitroglycerin (NITROSTAT) SL tablet 0.4 mg, 0.4 mg, Sublingual, Q5 Min PRN, Cinthya Santos MD  •  ondansetron (ZOFRAN) injection 4 mg, 4 mg, Intravenous, Q6H PRN, Cinthya Santos MD  •  pantoprazole (PROTONIX) EC tablet 40 mg, 40 mg, Oral, QAM, Cinthya Santos MD, 40 mg at 12/15/19 0630  •  Pharmacy Consult - Pharmacy to dose, , Does not apply, Continuous PRN, Cinthya Santos MD  •  piperacillin-tazobactam (ZOSYN) 3.375 g/100 mL 0.9% NS IVPB (mbp), 3.375 g, Intravenous, Q8H, Carmelo Van MD, Last Rate: 0 mL/hr at 12/15/19 0300, 3.375 g at 12/15/19 0630  •  polyethylene glycol 3350 powder (packet), 17 g, Oral, BID, Cinthya Santos MD, 17 g at 12/15/19 0910  •  psyllium (METAMUCIL MULTIHEALTH FIBER) 58.12 % packet 1 packet, 1 packet, Oral, Daily, Cinthya Santos MD, 1 packet at 12/15/19 0912  •  [COMPLETED] Insert peripheral IV, , , Once **AND** sodium chloride 0.9 % flush 10 mL, 10 mL, Intravenous, PRN, Cinthya Santos  MD Karolina  •  sodium chloride 0.9 % flush 10 mL, 10 mL, Intravenous, PRN, Cinthya Santos MD  •  sodium chloride 0.9 % flush 10 mL, 10 mL, Intravenous, Q12H, Cinthya Santos MD, 10 mL at 12/15/19 0922  •  sodium chloride 0.9 % infusion 40 mL, 40 mL, Intravenous, PRN, Cinthya Santos MD  •  sodium chloride 0.9 % infusion, 100 mL/hr, Intravenous, Continuous, Cinthya Santos MD, Last Rate: 100 mL/hr at 12/13/19 2102, 100 mL/hr at 12/13/19 2102  •  traMADol (ULTRAM) tablet 100 mg, 100 mg, Oral, Q8H, Cinthya Santos MD, 100 mg at 12/15/19 1414      Assessment/Plan     Sepsis secondary to basilar pneumonia with acute hypoxic respiratory failure     Probable aspiration with dysphasia              Speech evaluation:              SLP Swallowing Diagnosis: moderate, oral dysfunction, pharyngeal dysfunction  SLP Diet Recommendation: soft textures, ground, honey thick liquids, other (see comments)(all foods ground)  Recommended Precautions and Strategies: upright posture during/after eating, small bites of food and sips of liquid, other (see comments)(spoon size trials, no greater)  SLP Rec. for Method of Medication Administration: meds whole, meds crushed, with pudding or applesauce, as tolerated  Monitor for Signs of Aspiration: no, notify SLP if any concerns  Recommended Diagnostics: reassess via VFSS (MBS)(as outpatient when pt is back to baseline status)  Swallow Criteria for Skilled Therapeutic Interventions Met: demonstrates skilled criteria  Anticipated Dischage Disposition: community-based residential facility (CBRF)  Rehab Potential/Prognosis, Swallowing: re-evaluate goals as necessary  Therapy Frequency (Swallow): PRN  Predicted Duration Therapy Intervention (Days): until discharge    She choked last night on chopped consistency and hypoxic this am.  We have changed to pureed and consulted speech again for tomorrow.   CXR is worse in right mid  and upper lung///probably due to aspiration.      Acute kidney injury on chronic kidney disease stage III     Found intellectual disability     Back pain with degenerative disc disease and scoliosis     History of seizure disorder and reportedly had a seizure the day before admission with no injuries     Essential hypertension     Osteoporosis     Chronic constipation     DVT prophylaxis with Lovenox and SCDs       Plan for disposition:Home to [facility] when well    Angelic Viveros,   12/15/19  2:33 PM      Time: 30 min

## 2019-12-15 NOTE — NURSING NOTE
Patient's caregiver notified this RN that the patient began coughing on her dinner- ground beef, with noticed desaturation to 82% after coughing.  She was repositioned and her dinner was held oxygen did come up to 92%. This morning her oxygen requirements have increased to 4 to 5 NC.

## 2019-12-16 ENCOUNTER — APPOINTMENT (OUTPATIENT)
Dept: CT IMAGING | Facility: HOSPITAL | Age: 79
End: 2019-12-16

## 2019-12-16 LAB
ALBUMIN SERPL-MCNC: 2.8 G/DL (ref 3.5–5.2)
ANION GAP SERPL CALCULATED.3IONS-SCNC: 13 MMOL/L (ref 5–15)
BACTERIA SPEC AEROBE CULT: NORMAL
BACTERIA SPEC AEROBE CULT: NORMAL
BASOPHILS # BLD AUTO: 0.03 10*3/MM3 (ref 0–0.2)
BASOPHILS NFR BLD AUTO: 0.4 % (ref 0–1.5)
BUN BLD-MCNC: 17 MG/DL (ref 8–23)
BUN/CREAT SERPL: 20.5 (ref 7–25)
CALCIUM SPEC-SCNC: 8.6 MG/DL (ref 8.6–10.5)
CHLORIDE SERPL-SCNC: 102 MMOL/L (ref 98–107)
CO2 SERPL-SCNC: 20 MMOL/L (ref 22–29)
CREAT BLD-MCNC: 0.83 MG/DL (ref 0.57–1)
DEPRECATED RDW RBC AUTO: 43.2 FL (ref 37–54)
EOSINOPHIL # BLD AUTO: 0.45 10*3/MM3 (ref 0–0.4)
EOSINOPHIL NFR BLD AUTO: 5.5 % (ref 0.3–6.2)
ERYTHROCYTE [DISTWIDTH] IN BLOOD BY AUTOMATED COUNT: 12.6 % (ref 12.3–15.4)
GFR SERPL CREATININE-BSD FRML MDRD: 66 ML/MIN/1.73
GLUCOSE BLD-MCNC: 97 MG/DL (ref 65–99)
HCT VFR BLD AUTO: 34.2 % (ref 34–46.6)
HGB BLD-MCNC: 10.9 G/DL (ref 12–15.9)
IMM GRANULOCYTES # BLD AUTO: 0.18 10*3/MM3 (ref 0–0.05)
IMM GRANULOCYTES NFR BLD AUTO: 2.2 % (ref 0–0.5)
LYMPHOCYTES # BLD AUTO: 1.99 10*3/MM3 (ref 0.7–3.1)
LYMPHOCYTES NFR BLD AUTO: 24.3 % (ref 19.6–45.3)
MCH RBC QN AUTO: 29.7 PG (ref 26.6–33)
MCHC RBC AUTO-ENTMCNC: 31.9 G/DL (ref 31.5–35.7)
MCV RBC AUTO: 93.2 FL (ref 79–97)
MONOCYTES # BLD AUTO: 0.89 10*3/MM3 (ref 0.1–0.9)
MONOCYTES NFR BLD AUTO: 10.9 % (ref 5–12)
NEUTROPHILS # BLD AUTO: 4.66 10*3/MM3 (ref 1.7–7)
NEUTROPHILS NFR BLD AUTO: 56.7 % (ref 42.7–76)
NRBC BLD AUTO-RTO: 0 /100 WBC (ref 0–0.2)
PHOSPHATE SERPL-MCNC: 3.2 MG/DL (ref 2.5–4.5)
PLATELET # BLD AUTO: 329 10*3/MM3 (ref 140–450)
PMV BLD AUTO: 10 FL (ref 6–12)
POTASSIUM BLD-SCNC: 4.2 MMOL/L (ref 3.5–5.2)
PROCALCITONIN SERPL-MCNC: 1.14 NG/ML (ref 0.1–0.25)
RBC # BLD AUTO: 3.67 10*6/MM3 (ref 3.77–5.28)
SODIUM BLD-SCNC: 135 MMOL/L (ref 136–145)
WBC NRBC COR # BLD: 8.2 10*3/MM3 (ref 3.4–10.8)

## 2019-12-16 PROCEDURE — 25010000002 PIPERACILLIN-TAZOBACTAM: Performed by: HOSPITALIST

## 2019-12-16 PROCEDURE — 71275 CT ANGIOGRAPHY CHEST: CPT

## 2019-12-16 PROCEDURE — 0 IOPAMIDOL PER 1 ML: Performed by: HOSPITALIST

## 2019-12-16 PROCEDURE — 84145 PROCALCITONIN (PCT): CPT | Performed by: INTERNAL MEDICINE

## 2019-12-16 PROCEDURE — 25010000002 PIPERACILLIN SOD-TAZOBACTAM PER 1 G: Performed by: HOSPITALIST

## 2019-12-16 PROCEDURE — 25010000002 ENOXAPARIN PER 10 MG: Performed by: HOSPITALIST

## 2019-12-16 PROCEDURE — 85025 COMPLETE CBC W/AUTO DIFF WBC: CPT | Performed by: INTERNAL MEDICINE

## 2019-12-16 PROCEDURE — 94799 UNLISTED PULMONARY SVC/PX: CPT

## 2019-12-16 PROCEDURE — 80069 RENAL FUNCTION PANEL: CPT | Performed by: INTERNAL MEDICINE

## 2019-12-16 PROCEDURE — 99233 SBSQ HOSP IP/OBS HIGH 50: CPT | Performed by: INTERNAL MEDICINE

## 2019-12-16 PROCEDURE — 92526 ORAL FUNCTION THERAPY: CPT

## 2019-12-16 RX ADMIN — PIPERACILLIN SODIUM AND TAZOBACTAM SODIUM 3.38 G: 3; .375 INJECTION, POWDER, LYOPHILIZED, FOR SOLUTION INTRAVENOUS at 16:05

## 2019-12-16 RX ADMIN — POLYETHYLENE GLYCOL 3350 17 G: 17 POWDER, FOR SOLUTION ORAL at 08:22

## 2019-12-16 RX ADMIN — TRAMADOL HYDROCHLORIDE 100 MG: 50 TABLET, FILM COATED ORAL at 06:01

## 2019-12-16 RX ADMIN — ENOXAPARIN SODIUM 30 MG: 30 INJECTION SUBCUTANEOUS at 23:01

## 2019-12-16 RX ADMIN — CETIRIZINE HYDROCHLORIDE 10 MG: 10 TABLET, FILM COATED ORAL at 08:21

## 2019-12-16 RX ADMIN — PSYLLIUM HUSK 1 PACKET: 3.4 POWDER ORAL at 08:23

## 2019-12-16 RX ADMIN — ATORVASTATIN CALCIUM 10 MG: 10 TABLET, FILM COATED ORAL at 08:21

## 2019-12-16 RX ADMIN — Medication 1 CAPSULE: at 08:21

## 2019-12-16 RX ADMIN — LORAZEPAM 0.5 MG: 0.5 TABLET ORAL at 08:30

## 2019-12-16 RX ADMIN — LAMOTRIGINE 200 MG: 100 TABLET ORAL at 08:21

## 2019-12-16 RX ADMIN — SODIUM CHLORIDE 100 ML/HR: 9 INJECTION, SOLUTION INTRAVENOUS at 08:17

## 2019-12-16 RX ADMIN — LEVETIRACETAM 750 MG: 500 TABLET ORAL at 08:21

## 2019-12-16 RX ADMIN — LAMOTRIGINE 200 MG: 100 TABLET ORAL at 23:00

## 2019-12-16 RX ADMIN — PIPERACILLIN SODIUM AND TAZOBACTAM SODIUM 3.38 G: 3; .375 INJECTION, POWDER, LYOPHILIZED, FOR SOLUTION INTRAVENOUS at 08:17

## 2019-12-16 RX ADMIN — TRAMADOL HYDROCHLORIDE 100 MG: 50 TABLET, FILM COATED ORAL at 23:01

## 2019-12-16 RX ADMIN — GABAPENTIN 100 MG: 100 CAPSULE ORAL at 23:00

## 2019-12-16 RX ADMIN — PIPERACILLIN SODIUM AND TAZOBACTAM SODIUM 3.38 G: 3; .375 INJECTION, POWDER, LYOPHILIZED, FOR SOLUTION INTRAVENOUS at 23:02

## 2019-12-16 RX ADMIN — PANTOPRAZOLE SODIUM 40 MG: 40 TABLET, DELAYED RELEASE ORAL at 06:01

## 2019-12-16 RX ADMIN — IOPAMIDOL 100 ML: 755 INJECTION, SOLUTION INTRAVENOUS at 14:30

## 2019-12-16 RX ADMIN — Medication 1 TABLET: at 08:21

## 2019-12-16 RX ADMIN — TRAMADOL HYDROCHLORIDE 100 MG: 50 TABLET, FILM COATED ORAL at 12:38

## 2019-12-16 RX ADMIN — LORAZEPAM 0.5 MG: 0.5 TABLET ORAL at 16:05

## 2019-12-16 RX ADMIN — LEVETIRACETAM 750 MG: 500 TABLET ORAL at 23:01

## 2019-12-16 RX ADMIN — SODIUM CHLORIDE 100 ML/HR: 9 INJECTION, SOLUTION INTRAVENOUS at 20:40

## 2019-12-16 RX ADMIN — LORAZEPAM 0.5 MG: 0.5 TABLET ORAL at 23:00

## 2019-12-16 RX ADMIN — SODIUM CHLORIDE, PRESERVATIVE FREE 10 ML: 5 INJECTION INTRAVENOUS at 21:00

## 2019-12-16 NOTE — PROGRESS NOTES
"SERVICE: [facility] HOSPITALIST      CHIEF COMPLAINT: Hypoxic Resp Failure    SUBJECTIVE:     Audie feels pt is more agitated than she was on Friday.    Pt has pretty much remained on 5L NC. Has good appetite, is sometimes attempting to get out of bed.    Pt got CT PE protocol today due to hypoxia and tachycardia, which was negative.    History limited due to pt's congential baseline MS , obtained form chart, caregiver and nursing, unable to get ROS    OBJECTIVE:    /86 (BP Location: Right arm, Patient Position: Lying)   Pulse 112   Temp 99 °F (37.2 °C) (Oral)   Resp 16   Ht 152.4 cm (60\")   Wt 76.6 kg (168 lb 14.4 oz)   SpO2 90%   BMI 32.99 kg/m²     MEDS/LABS REVIEWED AND ORDERED      atorvastatin 10 mg Oral Daily   cetirizine 10 mg Oral Daily   enoxaparin 30 mg Subcutaneous Q24H   gabapentin 100 mg Oral Nightly   lactobacillus acidophilus 1 capsule Oral Daily   lamoTRIgine 200 mg Oral Q12H   levETIRAcetam 750 mg Oral Q12H   LORazepam 0.5 mg Oral TID   lubiprostone 8 mcg Oral BID With Meals   multivitamin with minerals 1 tablet Oral Daily   pantoprazole 40 mg Oral QAM   piperacillin-tazobactam 3.375 g Intravenous Q8H   polyethylene glycol 17 g Oral BID   psyllium 1 packet Oral Daily   sodium chloride 10 mL Intravenous Q12H   traMADol 100 mg Oral Q8H       Physical Exam   Constitutional: No distress.   Pt interacting with caregiver, making verbalizations, eye contact, and gestures that seemed to indicate the desire for more pudding.   Eyes: Pupils are equal, round, and reactive to light.   Cardiovascular: Regular rhythm and normal heart sounds. Exam reveals no friction rub.   No murmur heard.  Tachycardiac   Pulmonary/Chest: No stridor. No respiratory distress. She has no wheezes. She has no rales.   Diminished in all fields   Abdominal: Soft. Bowel sounds are normal. She exhibits no distension. There is no tenderness.   Musculoskeletal: She exhibits no edema.   Neurological: She " is alert.   Baseline abnormal tone and cogniative defects   Skin: She is not diaphoretic. No erythema. No pallor.   Psychiatric: She has a normal mood and affect. Her behavior is normal.   Nursing note and vitals reviewed.      LAB/DIAGNOSTICS:    Lab Results (last 24 hours)     Procedure Component Value Units Date/Time    Procalcitonin [[id number]]  (Abnormal) Collected:  12/16/19 0310    Specimen:  Blood Updated:  12/16/19 0413     Procalcitonin 1.14 ng/mL     Renal Function Panel [[id number]]  (Abnormal) Collected:  12/16/19 0310    Specimen:  Blood Updated:  12/16/19 0409     Glucose 97 mg/dL      BUN 17 mg/dL      Creatinine 0.83 mg/dL      Sodium 135 mmol/L      Potassium 4.2 mmol/L      Chloride 102 mmol/L      CO2 20.0 mmol/L      Calcium 8.6 mg/dL      Albumin 2.80 g/dL      Phosphorus 3.2 mg/dL      Anion Gap 13.0 mmol/L      BUN/Creatinine Ratio 20.5     eGFR Non African Amer 66 mL/min/1.73     Narrative:       GFR Normal >60  Chronic Kidney Disease <60  Kidney Failure <15      CBC & Differential [[id number]] Collected:  12/16/19 0310    Specimen:  Blood Updated:  12/16/19 0340    Narrative:       The following orders were created for panel order CBC & Differential.  Procedure                               Abnormality         Status                     ---------                               -----------         ------                     CBC Auto Differential[[id number]]        Abnormal            Final result                 Please view results for these tests on the individual orders.    CBC Auto Differential [[id number]]  (Abnormal) Collected:  12/16/19 0310    Specimen:  Blood Updated:  12/16/19 0340     WBC 8.20 10*3/mm3      RBC 3.67 10*6/mm3      Hemoglobin 10.9 g/dL      Hematocrit 34.2 %      MCV 93.2 fL      MCH 29.7 pg      MCHC 31.9 g/dL      RDW 12.6 %      RDW-SD 43.2 fl      MPV 10.0 fL      Platelets 329 10*3/mm3      Neutrophil % 56.7 %      Lymphocyte % 24.3 %      Monocyte % 10.9 %       Eosinophil % 5.5 %      Basophil % 0.4 %      Immature Grans % 2.2 %      Neutrophils, Absolute 4.66 10*3/mm3      Lymphocytes, Absolute 1.99 10*3/mm3      Monocytes, Absolute 0.89 10*3/mm3      Eosinophils, Absolute 0.45 10*3/mm3      Basophils, Absolute 0.03 10*3/mm3      Immature Grans, Absolute 0.18 10*3/mm3      nRBC 0.0 /100 WBC     Blood Culture - Blood, Arm, Left [[id number]] Collected:  12/11/19 1559    Specimen:  Blood from Arm, Left Updated:  12/15/19 1615     Blood Culture No growth at 4 days    Blood Culture - Blood, Arm, Right [[id number]] Collected:  12/11/19 1559    Specimen:  Blood from Arm, Right Updated:  12/15/19 1615     Blood Culture No growth at 4 days           Xr Chest 1 View    Result Date: 12/15/2019  1. Interval worsening in the appearance the chest with new airspace disease right mid and likely upper lung. In light of history, please correlate for clinical evidence of aspiration. Follow-up to clearing is recommended. 2. There is mild increase in interstitial markings in general. This correlate for any clinical concern for mild volume overload as well. Signer Name: Beth Gates MD  Signed: 12/15/2019 9:29 AM  Workstation Name: [id number]  [facility]        ASSESSMENT/PLAN:    80 yo [place], Seizures, HTN, CKDII/III, who p/w low bp x1 after seizure, found to have acute hypoxic resp failure due to asp, seen by SLP w/ diet modifications and was moving towards DC, however on 12/14 evening had repeat choking episode w/ ?worsening of hypoxia. Placed on pureed diet yesterday, speech to see today      Acute hypoxic Resp Failure due to aspiration pna  - SLP placed pt on ground / honey thick initially, now on pureed and honey thick after repeat choking episode on chopped  - CXR worse yesterday AM, new vs evolving from prior episodes?, ?Hypoxia worsened last night  - Already on Zosyn, started 12/13, 10 - 14days? ID feels pt should be changed to augmentin 875mg, po in  "1-2 days for 7 days after that.  - SLP's note today: Will continue for diet tolerance. Pt tolerating soft diet with puree meats and honey thick liquids w/o clinical s/s of aspiration. Caregiver able to demonstrate use of spoon size drinks given by spoon after verbal instruction. 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 Believe pt is at high risk for readmission due to aspiration    "

## 2019-12-16 NOTE — PLAN OF CARE
Problem: Patient Care Overview  Goal: Plan of Care Review  Outcome: Ongoing (interventions implemented as appropriate)  Flowsheets (Taken 12/16/2019 8326)  Progress: improving  Plan of Care Reviewed With: caregiver  Note:   Zosyn IV continues; diminished breath sounds; walk ox ordered for am; ON 5l nc; Pulmonary consult ordered; non-verbal indication of pain absent; sitter at bedside

## 2019-12-16 NOTE — CONSULTS
"     LOS: 5 days   Patient Care Team:  Tulio Carlson MD as PCP - General (Family Medicine)  Arianne Harkins APRN as PCP - Claims Attributed  Aleksandra Kent APRN as Nurse Practitioner (Pain Medicine)  Ja Harris MD as Consulting Physician (Neurology)  Magen Farmer MD as Consulting Physician (Nephrology)  Mikey Sanchez MD (Psychiatry)  Nelsy Butcher DPM as Consulting Physician (Podiatry)        Subjective       Attending MD : Angelic Viveros DO    Patient Complaints: SOB         History of Present Illness  :79-year-old  female with profound intellectual disability, non-verbal (resident of [facility]), Anxiety, Chronic back pain with DDDz and scoliosis, OA of the hip with bursitis left hip, seizure d/o, hypertension, CKD stage II/III, Osteoporosis, Vitamin D deficiency, h/o aspiration PNA in the past and h/o hyperkalemia was sent to the ER from [facility] today secondary to low blood pressure (90s/60s) at their facility. She was also noted as \"not acting like herself\". The patient is non-verbal an unable to provide any Hx and the patient's caregiver from [facility] was not in her room at the time of my interview so her Hx was obtained via review of records in our chart and from [facility]. The patient apparently had an episode of diarrhea yesterday and also had a seizure yesterday at [facility] (unclear how frequently she has seizures). The patient is very restless and agitated at the time of my exam. Patient's BP here has been WNL. Tmax since admission is 99.5. She was noted to have low O2 sats in the ER and was placed on NC. CXR c/w PNA.        Patient Denies:  NV    PMH :   Pneumonia of both lower lobes due to infectious organism (CMS/HCC)      Review of Systems:    SOB    Objective     Vital Signs  Temp:  [98.7 °F (37.1 °C)-100 °F (37.8 °C)] 99 °F (37.2 °C)  Heart Rate:  [] 112  Resp:  [16-18] 16  BP: (135-153)/(74-94) " 136/86    Physical Exam:     General Appearance:    Alert, cooperative, in no acute distress   Head:    Normocephalic, without obvious abnormality, atraumatic   Eyes:            Lids and lashes normal, conjunctivae and sclerae normal, no   icterus, no pallor, corneas clear, PERRLA   Ears:    Ears appear intact with no abnormalities noted   Throat:   No oral lesions, no thrush, oral mucosa moist   Neck:   No adenopathy, supple, trachea midline, no thyromegaly, no     carotid bruit, no JVD   Back:     No kyphosis present, no scoliosis present, no skin lesions,       erythema or scars, no tenderness to percussion or                   palpation,   range of motion normal   Lungs:     Clear to auscultation,respirations regular, even and                   unlabored    Heart:    Regular rhythm and normal rate, normal S1 and S2, no            murmur, no gallop, no rub, no click   Breast Exam:    Deferred   Abdomen:     Normal bowel sounds, no masses, no organomegaly, soft        non-tender, non-distended, no guarding, no rebound                 tenderness   Genitalia:    Deferred   Extremities:   Moves all extremities well, no edema, no cyanosis, no              redness   Pulses:   Pulses palpable and equal bilaterally   Skin:   No bleeding, bruising or rash   Lymph nodes:   No palpable adenopathy   Neurologic:   Cranial nerves 2 - 12 grossly intact, sensation intact, DTR        present and equal bilaterally          Results Review:    Lab Results (last 72 hours)     Procedure Component Value Units Date/Time    Procalcitonin [[id number]]  (Abnormal) Collected:  12/16/19 0310    Specimen:  Blood Updated:  12/16/19 0413     Procalcitonin 1.14 ng/mL     Renal Function Panel [[id number]]  (Abnormal) Collected:  12/16/19 0310    Specimen:  Blood Updated:  12/16/19 0409     Glucose 97 mg/dL      BUN 17 mg/dL      Creatinine 0.83 mg/dL      Sodium 135 mmol/L      Potassium 4.2 mmol/L      Chloride 102 mmol/L      CO2 20.0 mmol/L       Calcium 8.6 mg/dL      Albumin 2.80 g/dL      Phosphorus 3.2 mg/dL      Anion Gap 13.0 mmol/L      BUN/Creatinine Ratio 20.5     eGFR Non African Amer 66 mL/min/1.73     Narrative:       GFR Normal >60  Chronic Kidney Disease <60  Kidney Failure <15      CBC & Differential [[id number]] Collected:  12/16/19 0310    Specimen:  Blood Updated:  12/16/19 0340    Narrative:       The following orders were created for panel order CBC & Differential.  Procedure                               Abnormality         Status                     ---------                               -----------         ------                     CBC Auto Differential[[id number]]        Abnormal            Final result                 Please view results for these tests on the individual orders.    CBC Auto Differential [[id number]]  (Abnormal) Collected:  12/16/19 0310    Specimen:  Blood Updated:  12/16/19 0340     WBC 8.20 10*3/mm3      RBC 3.67 10*6/mm3      Hemoglobin 10.9 g/dL      Hematocrit 34.2 %      MCV 93.2 fL      MCH 29.7 pg      MCHC 31.9 g/dL      RDW 12.6 %      RDW-SD 43.2 fl      MPV 10.0 fL      Platelets 329 10*3/mm3      Neutrophil % 56.7 %      Lymphocyte % 24.3 %      Monocyte % 10.9 %      Eosinophil % 5.5 %      Basophil % 0.4 %      Immature Grans % 2.2 %      Neutrophils, Absolute 4.66 10*3/mm3      Lymphocytes, Absolute 1.99 10*3/mm3      Monocytes, Absolute 0.89 10*3/mm3      Eosinophils, Absolute 0.45 10*3/mm3      Basophils, Absolute 0.03 10*3/mm3      Immature Grans, Absolute 0.18 10*3/mm3      nRBC 0.0 /100 WBC     Blood Culture - Blood, Arm, Left [[id number]] Collected:  12/11/19 1559    Specimen:  Blood from Arm, Left Updated:  12/15/19 1615     Blood Culture No growth at 4 days    Blood Culture - Blood, Arm, Right [[id number]] Collected:  12/11/19 1559    Specimen:  Blood from Arm, Right Updated:  12/15/19 1615     Blood Culture No growth at 4 days    Procalcitonin [[id number]]  (Abnormal) Collected:  12/14/19 0567     Specimen:  Blood Updated:  12/14/19 0634     Procalcitonin 2.98 ng/mL     Comprehensive Metabolic Panel [[id number]]  (Abnormal) Collected:  12/14/19 0536    Specimen:  Blood Updated:  12/14/19 0624     Glucose 97 mg/dL      BUN 18 mg/dL      Creatinine 0.87 mg/dL      Sodium 140 mmol/L      Potassium 3.9 mmol/L      Chloride 108 mmol/L      CO2 21.6 mmol/L      Calcium 8.4 mg/dL      Total Protein 6.4 g/dL      Albumin 2.80 g/dL      ALT (SGPT) 16 U/L      AST (SGOT) 23 U/L      Alkaline Phosphatase 77 U/L      Total Bilirubin 0.3 mg/dL      eGFR Non African Amer 63 mL/min/1.73      Globulin 3.6 gm/dL      A/G Ratio 0.8 g/dL      BUN/Creatinine Ratio 20.7     Anion Gap 10.4 mmol/L     Narrative:       GFR Normal >60  Chronic Kidney Disease <60  Kidney Failure <15      Scan Slide [[id number]]  (Normal) Collected:  12/14/19 0536    Specimen:  Blood Updated:  12/14/19 0615     RBC Morphology Normal     WBC Morphology Normal     Platelet Morphology Normal    CBC & Differential [[id number]] Collected:  12/14/19 0536    Specimen:  Blood Updated:  12/14/19 0603    Narrative:       The following orders were created for panel order CBC & Differential.  Procedure                               Abnormality         Status                     ---------                               -----------         ------                     CBC Auto Differential[[id number]]        Abnormal            Final result                 Please view results for these tests on the individual orders.    CBC Auto Differential [[id number]]  (Abnormal) Collected:  12/14/19 0536    Specimen:  Blood Updated:  12/14/19 0603     WBC 9.90 10*3/mm3      RBC 3.29 10*6/mm3      Hemoglobin 9.9 g/dL      Hematocrit 31.2 %      MCV 94.8 fL      MCH 30.1 pg      MCHC 31.7 g/dL      RDW 13.1 %      RDW-SD 45.5 fl      MPV 11.5 fL      Platelets 211 10*3/mm3      Neutrophil % 71.1 %      Lymphocyte % 17.8 %      Monocyte % 6.3 %      Eosinophil % 3.7 %      Basophil % 0.3 %       Immature Grans % 0.8 %      Neutrophils, Absolute 7.04 10*3/mm3      Lymphocytes, Absolute 1.76 10*3/mm3      Monocytes, Absolute 0.62 10*3/mm3      Eosinophils, Absolute 0.37 10*3/mm3      Basophils, Absolute 0.03 10*3/mm3      Immature Grans, Absolute 0.08 10*3/mm3      nRBC 0.0 /100 WBC     MRSA Screen Culture - Swab, Nares [[id number]]  (Normal) Collected:  12/12/19 0022    Specimen:  Swab from Nares Updated:  12/13/19 1303     MRSA SCREEN CX No Methicillin Resistant Staphylococcus aureus isolated              Imaging Results (Last 72 Hours)     Procedure Component Value Units Date/Time    XR Chest 1 View [[id number]] Collected:  12/15/19 0929     Updated:  12/15/19 0931    Narrative:       CR Chest 1 Vw    INDICATION:   Patient coughing with her dinner. Concern for aspiration. Increasing O2 requirements.     COMPARISON:    Chest x-ray 12/11/2019    FINDINGS:  3 portable AP view(s) of the chest.    Cardiac silhouette size is within normal limits. Lung volumes are low with persistent elevation of the right hemidiaphragm. There is patchy bilateral lower lobe airspace disease which is similar to prior. There is new airspace disease in the right mid  lung and possibly right upper lung. Please correlate for clinical concern for aspiration in light of history. Follow-up to clearing recommended. There is mild increase in interstitial markings in general. Please correlate for clinical evidence of mild  volume overload    There is no evidence for pneumothorax. No definite pleural effusion.       Impression:         1. Interval worsening in the appearance the chest with new airspace disease right mid and likely upper lung. In light of history, please correlate for clinical evidence of aspiration. Follow-up to clearing is recommended.  2. There is mild increase in interstitial markings in general. This correlate for any clinical concern for mild volume overload as well.    Signer Name: Beth Gates MD   Signed:  12/15/2019 9:29 AM   Workstation Name: [facility]    [facility]    CT Chest Without Contrast [[id number]] Collected:  12/13/19 1536     Updated:  12/13/19 1543    Narrative:       NONCONTRAST CT CHEST 12/19/2019     HISTORY:  Lower lobe pneumonia bilaterally. Coughing and choking episodes when  eating and drinking. Had a barium swallow study this morning.     TECHNIQUE:    Noncontrast CT chest was performed. No comparisons. Radiation dose  reduction techniques included automated exposure control or exposure  modulation based on body size. Radiation audit for CT and nuclear  cardiology exams in the last 12 months: 0.      FINDINGS:    There is motion degradation and these were the best images possible.  Trace amount of pleural fluid left greater than right. There is patchy  consolidation in the right lower lobe and more confluent consolidation  involving the left lower lobe extending into the superior segment. There  is also patchy consolidation in the posterior right upper lobe and to a  lesser extent the posterior left upper lobe. Imaging findings are most  characteristic of multifocal bilateral pneumonia. Follow-up to clearing  is recommended. Although some of the areas of consolidation has somewhat  nodular features, there is no suspicious pulmonary nodule. Incidental 5  mm nodule in the left lung apex may be inflammatory or infectious as  well. A follow-up CT chest in one year can be performed to reassess  stability.     There is no pneumothorax. No pericardial effusion and no threshold  adenopathy. Aorta demonstrates normal caliber and mild atherosclerotic  change. Included upper abdomen demonstrates dense barium within the  stomach related to prior video speech swallow earlier today and there  are granulomatous calcifications in the spleen. Equivocal  cholelithiasis. No suspicious bone lesion.         Impression:       1. Imaging findings most characteristic of multifocal  bilateral  pneumonia with a lower lobe predominance and posterior segment  predominance. These findings can be associated with aspiration  pneumonia. Trace effusions. Follow-up to clearing recommended.  2. 5 mm noncalcified nodule in the left upper lobe may also be  inflammatory or infectious. CT chest follow-up in one year recommended  to reassess stability.  3. Equivocal uncomplicated cholelithiasis.     This report was finalized on 12/13/2019 3:41 PM by Dr. Anthony Hsu MD.               Medication Review:      Hospital Medications (active)       Dose Frequency Start End    acetaminophen (TYLENOL) tablet 650 mg 650 mg Every 6 Hours PRN 12/11/2019     Admin Instructions: Do not exceed 4 grams of acetaminophen in a 24 hr period.<BR><BR>If given for pain, use the following pain scale: <BR>Mild Pain = Pain Score of 1-3, CPOT 1-2<BR>Moderate Pain = Pain Score of 4-6, CPOT 3-4<BR>Severe Pain = Pain Score of 7-10, CPOT 5-8    Route: Oral    aluminum-magnesium hydroxide-simethicone (MAALOX/MYLANTA) suspension 10 mL 10 mL Daily PRN 12/11/2019     Route: Oral    atorvastatin (LIPITOR) tablet 10 mg 10 mg Daily 12/12/2019     Admin Instructions: Avoid grapefruit juice.    Route: Oral    bisacodyl (DULCOLAX) suppository 10 mg 10 mg Daily PRN 12/11/2019     Route: Rectal    cetirizine (zyrTEC) tablet 10 mg 10 mg Daily 12/12/2019     Route: Oral    diclofenac (VOLTAREN) 1 % gel 4 g 4 g 4 Times Daily PRN 12/11/2019     Admin Instructions: Apply to affected area.<BR> Do not cover area with occlusive dressing or apply any other med to affected area. Do not wash affected area for 1 hr after applying. Use dosing card for correct dose measurement.    Route: Topical    diphenhydrAMINE (BENADRYL) capsule 25 mg 25 mg Every 6 Hours PRN 12/11/2019     Admin Instructions: Caution: Look alike/sound alike drug alert. This med may be ordered in other forms and routes. Before giving verify the last time the drug was given by any  route/form.<BR>    Route: Oral    enoxaparin (LOVENOX) syringe 30 mg 30 mg Every 24 Hours 12/11/2019     Admin Instructions: Give subcutaneous in abdomen only. Do not massage site after injection.    Route: Subcutaneous    gabapentin (NEURONTIN) capsule 100 mg 100 mg Nightly 12/11/2019     Admin Instructions:     Route: Oral    lactobacillus acidophilus (RISAQUAD) capsule 1 capsule 1 capsule Daily 12/12/2019     Route: Oral    lamoTRIgine (LaMICtal) tablet 200 mg 200 mg Every 12 Hours Scheduled 12/11/2019     Admin Instructions: Caution: Look alike/sound alike drug alert    Route: Oral    levETIRAcetam (KEPPRA) tablet 750 mg 750 mg Every 12 Hours Scheduled 12/11/2019     Admin Instructions: Caution: Look alike/sound alike drug alert. Swallow whole; do not crush, chew, or split tablet.    Route: Oral    LORazepam (ATIVAN) tablet 0.5 mg 0.5 mg 3 Times Daily 12/11/2019     Admin Instructions:  Caution: Look alike/sound alike drug alert    Route: Oral    lubiprostone (AMITIZA) capsule 8 mcg 8 mcg 2 Times Daily With Meals 12/11/2019     Admin Instructions: Do not crush, split, or chew.    Route: Oral    magnesium hydroxide (MILK OF MAGNESIA) 400 MG/5ML suspension 5 mL 5 mL 2 Times Daily PRN 12/11/2019     Route: Oral    multivitamin with minerals 1 tablet 1 tablet Daily 12/12/2019     Admin Instructions:     Route: Oral    nitroglycerin (NITROSTAT) SL tablet 0.4 mg 0.4 mg Every 5 Minutes PRN 12/11/2019     Admin Instructions: If Pain Unrelieved After 3 Doses Notify MD    Route: Sublingual    ondansetron (ZOFRAN) injection 4 mg 4 mg Every 6 Hours PRN 12/11/2019     Admin Instructions: If BOTH ondansetron (ZOFRAN) and promethazine (PHENERGAN) are ordered use ondansetron first and THEN promethazine IF ondansetron is ineffective.    Route: Intravenous    pantoprazole (PROTONIX) EC tablet 40 mg 40 mg Every Morning 12/12/2019     Admin Instructions: Swallow whole; do not crush, split, or chew.    Route: Oral    Pharmacy  "Consult - Pharmacy to dose  Continuous PRN 12/11/2019     Route: Does not apply    piperacillin-tazobactam (ZOSYN) 3.375 g/100 mL 0.9% NS IVPB (mbp) 3.375 g Every 8 Hours 12/13/2019 12/20/2019    Admin Instructions: Break seal and mix to activate vial before use    Notes to Pharmacy: PLEASE CONFIRM DOSING.    Route: Intravenous    polyethylene glycol 3350 powder (packet) 17 g 2 Times Daily 12/11/2019     Route: Oral    psyllium (METAMUCIL MULTIHEALTH FIBER) 58.12 % packet 1 packet 1 packet Daily 12/12/2019     Route: Oral    sodium chloride 0.9 % flush 10 mL 10 mL As Needed 12/11/2019     Route: Intravenous    Cosign for Ordering: Accepted by Jose Antonio Robb MD on 12/11/2019  4:01 PM    Linked Group 1:  \"And\" Linked Group Details        sodium chloride 0.9 % flush 10 mL 10 mL As Needed 12/11/2019     Route: Intravenous    sodium chloride 0.9 % flush 10 mL 10 mL Every 12 Hours Scheduled 12/11/2019     Route: Intravenous    sodium chloride 0.9 % infusion 40 mL 40 mL As Needed 12/11/2019     Admin Instructions: Following administration of an IV intermittent medication, flush line with 40mL NS at 100mL/hr from a 250mL bag.    Route: Intravenous    sodium chloride 0.9 % infusion 100 mL/hr Continuous 12/11/2019     Route: Intravenous    traMADol (ULTRAM) tablet 100 mg 100 mg Every 8 Hours 12/11/2019 12/21/2019    Admin Instructions: <BR><BR>Caution: Look alike/sound alike drug alert<BR><BR>If given for pain, use the following pain scale:<BR>Mild Pain = Pain Score of 1-3, CPOT 1-2<BR>Moderate Pain = Pain Score of 4-6, CPOT 3-4<BR>Severe Pain = Pain Score of 7-10, CPOT 5-8    Route: Oral          Assessment/Plan         Pneumonia of both lower lobes due to infectious organism (CMS/HCC)    Sepsis secondary to bibasilar pneumonia  ?  Aspiration     KEILY      Plan :    Cont zosyn  supp acre  Change to PO augmentin 875 mg BID for 7 days in 1-2 days  Will follow  Thank  you    Ashley Zuniga MD  12/16/19  12:30 PM        "

## 2019-12-16 NOTE — NURSING NOTE
Continued Stay Note  MILANA Medel     Patient Name: Erica Crockett  MRN: [id number]  Today's Date: 12/16/2019    Admit Date: 12/11/2019    Discharge Plan     Row Name 12/16/19 1510       Plan    Plan  plan return to [facility] bldg @ dc    Plan Comments  Follow up visit to room, patient is doozing, no caregiver at bedside, although jacket/belongings noted in chair. CM will continue to follow for dc needs.        Discharge Codes    No documentation.             Lee Us RN

## 2019-12-16 NOTE — PLAN OF CARE
Problem: Patient Care Overview  Goal: Plan of Care Review  Outcome: Ongoing (interventions implemented as appropriate)  Flowsheets  Taken 12/16/2019 0546  Progress: no change  Taken 12/15/2019 2100  Plan of Care Reviewed With: caregiver;patient  Note:   Pt changed to pureed food and on honey thickened liquids - non verbal with facility sitter at bedside - speech therapy to see pt today - slept through the night - on 5L O2 to keep sats >90 - IV fluids and rocephin - will continue to monitor

## 2019-12-17 VITALS
BODY MASS INDEX: 33.16 KG/M2 | SYSTOLIC BLOOD PRESSURE: 151 MMHG | HEIGHT: 60 IN | TEMPERATURE: 98.2 F | HEART RATE: 92 BPM | WEIGHT: 168.9 LBS | RESPIRATION RATE: 18 BRPM | OXYGEN SATURATION: 91 % | DIASTOLIC BLOOD PRESSURE: 88 MMHG

## 2019-12-17 PROBLEM — A41.9 SEPSIS WITH ACUTE HYPOXIC RESPIRATORY FAILURE WITHOUT SEPTIC SHOCK: Status: ACTIVE | Noted: 2019-12-17

## 2019-12-17 PROBLEM — J96.01 SEPSIS WITH ACUTE HYPOXIC RESPIRATORY FAILURE WITHOUT SEPTIC SHOCK (HCC): Status: ACTIVE | Noted: 2019-12-17

## 2019-12-17 PROBLEM — N17.9 AKI (ACUTE KIDNEY INJURY): Status: ACTIVE | Noted: 2019-12-17

## 2019-12-17 PROBLEM — R65.20 SEPSIS WITH ACUTE HYPOXIC RESPIRATORY FAILURE WITHOUT SEPTIC SHOCK: Status: ACTIVE | Noted: 2019-12-17

## 2019-12-17 PROCEDURE — 99239 HOSP IP/OBS DSCHRG MGMT >30: CPT | Performed by: INTERNAL MEDICINE

## 2019-12-17 PROCEDURE — 94799 UNLISTED PULMONARY SVC/PX: CPT

## 2019-12-17 PROCEDURE — 25010000002 PIPERACILLIN SOD-TAZOBACTAM PER 1 G: Performed by: HOSPITALIST

## 2019-12-17 RX ORDER — IPRATROPIUM BROMIDE AND ALBUTEROL SULFATE 2.5; .5 MG/3ML; MG/3ML
3 SOLUTION RESPIRATORY (INHALATION)
Status: DISCONTINUED | OUTPATIENT
Start: 2019-12-17 | End: 2019-12-17 | Stop reason: HOSPADM

## 2019-12-17 RX ORDER — IPRATROPIUM BROMIDE AND ALBUTEROL SULFATE 2.5; .5 MG/3ML; MG/3ML
3 SOLUTION RESPIRATORY (INHALATION)
Qty: 360 ML | Status: ON HOLD
Start: 2019-12-17 | End: 2022-10-25 | Stop reason: SDUPTHER

## 2019-12-17 RX ORDER — AMOXICILLIN AND CLAVULANATE POTASSIUM 875; 125 MG/1; MG/1
1 TABLET, FILM COATED ORAL 2 TIMES DAILY
Qty: 14 TABLET | Refills: 0 | Status: SHIPPED | OUTPATIENT
Start: 2019-12-17 | End: 2019-12-24

## 2019-12-17 RX ADMIN — Medication 1 TABLET: at 09:00

## 2019-12-17 RX ADMIN — POLYETHYLENE GLYCOL 3350 17 G: 17 POWDER, FOR SOLUTION ORAL at 08:59

## 2019-12-17 RX ADMIN — ATORVASTATIN CALCIUM 10 MG: 10 TABLET, FILM COATED ORAL at 08:59

## 2019-12-17 RX ADMIN — PSYLLIUM HUSK 1 PACKET: 3.4 POWDER ORAL at 09:00

## 2019-12-17 RX ADMIN — TRAMADOL HYDROCHLORIDE 100 MG: 50 TABLET, FILM COATED ORAL at 06:39

## 2019-12-17 RX ADMIN — PANTOPRAZOLE SODIUM 40 MG: 40 TABLET, DELAYED RELEASE ORAL at 06:39

## 2019-12-17 RX ADMIN — LAMOTRIGINE 200 MG: 100 TABLET ORAL at 08:59

## 2019-12-17 RX ADMIN — SODIUM CHLORIDE 100 ML/HR: 9 INJECTION, SOLUTION INTRAVENOUS at 07:15

## 2019-12-17 RX ADMIN — Medication 1 CAPSULE: at 08:59

## 2019-12-17 RX ADMIN — CETIRIZINE HYDROCHLORIDE 10 MG: 10 TABLET, FILM COATED ORAL at 08:59

## 2019-12-17 RX ADMIN — LORAZEPAM 0.5 MG: 0.5 TABLET ORAL at 08:59

## 2019-12-17 RX ADMIN — PIPERACILLIN SODIUM AND TAZOBACTAM SODIUM 3.38 G: 3; .375 INJECTION, POWDER, LYOPHILIZED, FOR SOLUTION INTRAVENOUS at 07:18

## 2019-12-17 RX ADMIN — LEVETIRACETAM 750 MG: 500 TABLET ORAL at 08:59

## 2019-12-17 NOTE — CONSULTS
Group: [facility]         CONSULT NOTE    Patient Identification:  Erica Crockett  79 y.o.  female  1940  [id number]            Requesting physician: Hospitalist    Reason for Consultation: Acute respiratory failure    CC:     History of Present Illness:  Very unfortunate 79-year-old female with profound intellectual disability nonverbal multiple medical problems including aspiration pneumonia in the past scoliosis admitted to the hospitalist service from [facility] on 12/11/2019 with a diagnosis of increased restlessness agitation increased oxygen requirement and aspiration pneumonia.  Patient is nonverbal unable to give me any meaningful history.  Caregiver at bedside denies any ongoing aspiration with the current diet.  Looking at the records her oxygen requirement has been slowly getting worse she is up to 5 L high flow oxygen nasal cannula.  Also noted worsening chest x-ray.      Review of Systems  Unable to get with the patient's present current mental status  Past Medical History:  Past Medical History:   Diagnosis Date   • Anemia    • Anxiety    • Bursitis    • Bursitis    • DJD (degenerative joint disease), lumbar    • Hyperkalemia    • Hypoglycemia    • Intellectual disability    • Low back pain    • Osteoarthritis    • Osteopenia    • Pneumonia    • Renal disorder    • Renal insufficiency    • Scoliosis    • Seizure disorder (CMS/HCC)        Past Surgical History:  History reviewed. No pertinent surgical history.     Home Meds:  Medications Prior to Admission   Medication Sig Dispense Refill Last Dose   • acetaminophen (TYLENOL) 325 MG tablet Take  by mouth Every 6 (Six) Hours As Needed.   12/10/2019 at Unknown time   • atorvastatin (LIPITOR) 10 MG tablet Take 10 mg by mouth Daily.   12/11/2019 at Unknown time   • bisacodyl (DULCOLAX) 10 MG suppository As Needed. Dulcolax 10 MG Rectal Suppository; Patient Sig: Dulcolax 10 MG Rectal Suppository ; 0; 04-Feb-2014; Active   Past  Month at Unknown time   • cetirizine (ZyrTEC) 10 MG tablet Take  by mouth.   12/10/2019 at Unknown time   • dextromethorphan-guaifenesin (ROBITUSSIN-DM)  MG/5ML syrup Take 5 mL by mouth Every 4 (Four) Hours As Needed.   Past Month at Unknown time   • diltiazem XR (DILACOR XR) 240 MG 24 hr capsule Take  by mouth.   12/11/2019 at Unknown time   • gabapentin (NEURONTIN) 100 MG capsule Take 1 capsule by mouth Every Night. 30 capsule 5 12/10/2019 at Unknown time   • lamoTRIgine (LaMICtal) 200 MG tablet Take  by mouth 2 (two) times a day.   12/11/2019 at Unknown time   • levETIRAcetam (KEPPRA) 250 MG tablet Take 750 mg by mouth 2 (Two) Times a Day.   12/11/2019 at Unknown time   • LORazepam (ATIVAN) 0.5 MG tablet Take  by mouth 3 (three) times a day.   12/11/2019 at Unknown time   • lubiprostone (AMITIZA) 8 MCG capsule Take 8 mcg by mouth 2 (two) times a day with meals.   12/11/2019 at Unknown time   • Multiple Vitamin (MULTI VITAMIN DAILY PO) Take  by mouth.   12/11/2019 at Unknown time   • omeprazole (priLOSEC) 20 MG capsule Take 20 mg by mouth 2 (Two) Times a Day.   12/11/2019 at Unknown time   • polyethylene glycol (MIRALAX) powder Take 17 g by mouth 2 (Two) Times a Day.   12/11/2019 at Unknown time   • Probiotic Product (PROBIOTIC PO) Take  by mouth.   12/10/2019 at Unknown time   • promethazine (PHENERGAN) 25 MG tablet Take 25 mg by mouth Every 6 (Six) Hours As Needed for Nausea or Vomiting (PO/IM/OK PRN nausea, vomiting, x72 hrs, started med 12/10/19). Per [facility] med record   12/11/2019 at Unknown time   • traMADol (ULTRAM) 50 MG tablet Take 2 tablets by mouth Every 8 (Eight) Hours. 180 tablet 0 12/11/2019 at Unknown time   • Wheat Dextrin (BENEFIBER) powder Take  by mouth daily.   12/11/2019 at Unknown time   • Calcium & Magnesium Carbonates (MYLANTA PO) Take  by mouth Daily As Needed.   Taking   • denosumab (PROLIA) 60 MG/ML solution syringe Inject  under the skin into the appropriate area as  "directed 1 (One) Time.   More than a month at Unknown time   • diclofenac (VOLTAREN) 1 % gel gel Apply 4 g topically 4 (Four) Times a Day As Needed.   Taking   • diphenhydrAMINE (BENADRYL) 25 mg capsule Take 25 mg by mouth every 6 (six) hours as needed for itching.   More than a month at Unknown time   • Loperamide HCl (IMODIUM PO) Take  by mouth.   Taking   • LORazepam (ATIVAN) 2 MG/ML injection Inject 1 mL into the appropriate muscle as directed by prescriber Every 6 (Six) Hours As Needed for Anxiety. 10 mL 5 Taking   • magnesium hydroxide (MILK OF MAGNESIA) 400 MG/5ML suspension Take  by mouth As Needed.   Taking       Allergies:  Allergies   Allergen Reactions   • Bee Venom    • Iodinated Diagnostic Agents    • Nsaids        Social History:   Social History     Socioeconomic History   • Marital status: Single     Spouse name: Not on file   • Number of children: Not on file   • Years of education: Not on file   • Highest education level: Not on file   Tobacco Use   • Smoking status: Never Smoker   Substance and Sexual Activity   • Alcohol use: No   • Drug use: No   • Sexual activity: Defer       Family History:  Family History   Family history unknown: Yes       Physical Exam:  /83 (BP Location: Right arm, Patient Position: Lying)   Pulse 91   Temp 97.9 °F (36.6 °C) (Axillary)   Resp 18   Ht 152.4 cm (60\")   Wt 76.6 kg (168 lb 14.4 oz)   SpO2 91%   BMI 32.99 kg/m²  Body mass index is 32.99 kg/m². 91% 76.6 kg (168 lb 14.4 oz)  Physical Exam  Early female currently no raspy distress anxious and agitated  Well-developed obese body habitus  Eyes normal conjunctiva pupils reactive to light  ENT unable to assess with patient's agitation.  Normal nasal exam  Neck midline trachea no thyromegaly  Chest diminished breath sounds bilateral crackles with no labored breathing but more agitation  CVs regular rate and rhythm no lower extremity edema  Abdomen soft nontender no hepatosplenomegaly  CNS intact moving " all extremities unable to assess sensory exam  Skin no obvious rashes or nodules  Psych agitated and severe intellectual and mental disability  Musculoskeletal no cyanosis no clubbing chronic joint deformities in the upper extremities noted    LABS:  Lab Results   Component Value Date    CALCIUM 8.6 12/16/2019    PHOS 3.2 12/16/2019     Results from last 7 days   Lab Units 12/16/19  0310 12/14/19  0536 12/13/19  0855 12/12/19  0312 12/11/19  1940/19  1512   MAGNESIUM mg/dL  --   --   --  2.0 1.9 2.0   SODIUM mmol/L 135* 140 141 140  --  137   POTASSIUM mmol/L 4.2 3.9 3.9 3.9  --  3.9   CHLORIDE mmol/L 102 108* 110* 106  --  99   CO2 mmol/L 20.0* 21.6* 21.9* 22.9  --  26.1   BUN mg/dL 17 18 24* 39*  --  43*   CREATININE mg/dL 0.83 0.87 1.00 1.34*  --  1.61*   GLUCOSE mg/dL 97 97 105* 111*  --  147*   CALCIUM mg/dL 8.6 8.4* 8.1* 8.5*  --  9.4   WBC 10*3/mm3 8.20 9.90 12.71* 15.05*  --  20.60*   HEMOGLOBIN g/dL 10.9* 9.9* 10.1* 9.8*  --  11.6*   PLATELETS 10*3/mm3 329 211 237 253  --  288   ALT (SGPT) U/L  --  16 7  --   --  14   AST (SGOT) U/L  --  23 21  --   --  21   PROCALCITONIN ng/mL 1.14* 2.98*  --  17.28*  --  32.54*     Lab Results   Component Value Date    TROPONINT <0.010 12/12/2019     Results from last 7 days   Lab Units 12/12/19 0312 12/11/19  1512   TROPONIN T ng/mL <0.010 <0.010     Results from last 7 days   Lab Units 12/12/19  0022 12/11/19  1559   BLOODCX   --  No growth at 5 days  No growth at 5 days   MRSA SCREEN CX  No Methicillin Resistant Staphylococcus aureus isolated  --      Results from last 7 days   Lab Units 12/16/19  0310 12/14/19  0536 12/12/19  0312 12/11/19  1940/19  1559 12/11/19  1512   PROCALCITONIN ng/mL 1.14* 2.98* 17.28*  --   --  32.54*   LACTATE mmol/L  --   --   --  1.3 2.3*  --          Results from last 7 days   Lab Units 12/11/19  1715   ADENOVIRUS DETECTION BY PCR  Not Detected   CORONAVIRUS 229E  Not Detected   CORONAVIRUS HKU1  Not Detected   CORONAVIRUS  NL63  Not Detected   CORONAVIRUS OC43  Not Detected   HUMAN METAPNEUMOVIRUS  Not Detected   HUMAN RHINOVIRUS/ENTEROVIRUS  Not Detected   INFLUENZA B PCR  Not Detected   PARAINFLUENZA 1  Not Detected   PARAINFLUENZA VIRUS 2  Not Detected   PARAINFLUENZA VIRUS 3  Not Detected   PARAINFLUENZA VIRUS 4  Not Detected   BORDETELLA PERTUSSIS PCR  Not Detected   CHLAMYDOPHILA PNEUMONIAE PCR  Not Detected   MYCOPLAMA PNEUMO PCR  Not Detected   INFLUENZA A PCR  Not Detected   INFLUENZA A H3  Not Detected   INFLUENZA A H1  Not Detected   RSV, PCR  Not Detected         Results from last 7 days   Lab Units 12/12/19  0022 12/11/19  1559   BLOODCX   --  No growth at 5 days  No growth at 5 days   MRSA SCREEN CX  No Methicillin Resistant Staphylococcus aureus isolated  --      Lab Results   Component Value Date    TSH 0.617 09/12/2019     Estimated Creatinine Clearance: 50.2 mL/min (by C-G formula based on SCr of 0.83 mg/dL).  Results from last 7 days   Lab Units 12/11/19  1559   NITRITE UA  Negative        Imaging: I personally visualized the images of scans/x-rays performed within last 3 days.      Assessment:  Acute hypoxemic respiratory failure  Bilateral aspiration pneumonia  Seizure disorder  Sepsis  Mental retardation disability    Recommendations:  At this point agree with antibiotics as per infectious diseases.  The question is whether she has ongoing aspiration.  Difficult to  at this time.  Would try to prevent as much aspiration as possible.  No real good options available at this time.  If needed NT suctioning can be done to help clear secretions.  Wean down oxygen as tolerated  If needed chest PT can help.  I will add some bronchodilators to see if it helps and pulmonary toilet  We will follow along          Tremaine Smith MD  12/17/2019  11:04 AM      Much of this encounter note is an electronic transcription/translation of spoken language to printed text using Dragon Software.

## 2019-12-17 NOTE — DISCHARGE SUMMARY
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  Seizure Disorder  Essential HTN  Chronic back pain/DDD/Scoliosis  Congential cognitive defiects    PCP  Patient Care Team:  Tulio Carlson MD as PCP - General (Family Medicine)  Arianne Harkins APRN as PCP - Claims Attributed  Aleksandra Kent APRN as Nurse Practitioner (Pain Medicine)  Ja Harris MD as Consulting Physician (Neurology)  Magen Farmer MD as Consulting Physician (Nephrology)  Mikey Sanchez MD (Psychiatry)  Nelsy Butcher DPM as Consulting Physician (Podiatry)    Consults:   Consults     Date and Time Order Name Status Description    12/16/2019 1807 Inpatient Pulmonology Consult Completed     12/16/2019 0030 Inpatient Infectious Diseases Consult Completed           CC:  Low BP    History of Present Illness:  As per H&P: \"79-year-old  female with profound intellectual disability, non-verbal (resident of [facility]), Anxiety, Chronic back pain with DDDz and scoliosis, OA of the hip with bursitis left hip, seizure d/o, hypertension, CKD stage II/III, Osteoporosis, Vitamin D deficiency, h/o aspiration PNA in the past and h/o hyperkalemia was sent to the ER from [facility] today secondary to low blood pressure (90s/60s) at their facility. She was also noted as \"not acting like herself\". The patient is non-verbal an unable to provide any Hx and the patient's caregiver from [facility] was not in her room at the time of my interview so her Hx was obtained via review of records in our chart and from [facility]. The patient apparently had an episode of diarrhea yesterday and also had a seizure yesterday at [place] [facility] (unclear how frequently she has seizures). The patient is very restless and agitated at the time of my exam. Patient's BP here has been WNL. Tmax since admission is 99.5. She was noted to have low O2 sats in the ER and was placed on NC. CXR c/w PNA.\"    Hospital Course    Aspiration - Was improving on ground / honey thick, On night of 12/14 had episode of choking, and had some worsening of her hypoxia (Charted as going from 4L to 5L), CXR on AM of 12/15 showed worsening, but chest xray changes are delayed in aspiration pna, so unclear if worsening was due to event on 12/14 or still resolving from events prior to admission.  She was then changed to pureed and honey thick. Discussed with our speech, felt she has been tolerating ground, and didn't need pureed. Caregiver at bedside stated her diet provided this AM was not pureed, despite order still in place for pureed, and felt she did very well. From caregiver's, understanding is that pt will be followed by Speech at [place].     Feel pt is at risk for aspiration despite interventions taken, due to congential cognitive issues, and age related decline.     Switched from IV zosyn to augmentin for 7 more days on dc, per ID recs.    Hypoxia - Has been on 4-5L high flow NC this admission, have been unable to wean her off. Desated quickly to 87% on RA this afternoon. Switched to plain NC, pt is sating well on 3L NC. Have placed 'home' o2 order for 4L continuous. Expect pt to titrate off O2 as she continues to improve. Pulm recs NT suctioning and chest physiotherapy to help break up secretions, along with duonebs. Recommend keeping them scheduled for next few days, and changing to PRN for soa, wheezing. Can down titrate and remove oxygen as pt improves, maintain o2 sats between 90-95%.    CKD - follow up with Dr. Sullivan, jan resolved.        Physical Exam at Discharge  Vital Signs  Temp:  [97.9 °F (36.6 °C)-98.9 °F (37.2 °C)] 98.2 °F (36.8 °C)  Heart Rate:  " [] 93  Resp:  [18-20] 18  BP: (135-178)/(81-95) 151/88    Physical Exam:  Physical Exam   Constitutional: No distress.   Caregiver at bedside pt is becoming more herself today   Eyes: Pupils are equal, round, and reactive to light.   Cardiovascular: Normal rate, regular rhythm and normal heart sounds.   Pulmonary/Chest: Effort normal.   Minimal air movement  No wheezes or ronchi appreciated   Abdominal: Soft. Bowel sounds are normal. She exhibits no distension. There is no tenderness. There is no guarding.   Musculoskeletal: She exhibits no edema.   Neurological: She is alert.   Skin: Skin is warm and dry. She is not diaphoretic.   Psychiatric: She has a normal mood and affect. Her behavior is normal.       Operations and Procedures Performed:        Allergies:  is allergic to bee venom; iodinated diagnostic agents; and nsaids.    Jose  not reviewed    Discharge Medications:     Discharge Medications      New Medications      Instructions Start Date   amoxicillin-clavulanate 875-125 MG per tablet  Commonly known as:  AUGMENTIN   1 tablet, Oral, 2 Times Daily      diazePAM 20 MG rectal kit  Commonly known as:  DIASTAT ACUDIAL   20 mg, Rectal, As Needed, Seizure Lasting longer than 5 minutes, or 2 seizures within 12 hours      ipratropium-albuterol 0.5-2.5 mg/3 ml nebulizer  Commonly known as:  DUO-NEB   3 mL, Nebulization, 4 Times Daily - RT         Continue These Medications      Instructions Start Date   acetaminophen 325 MG tablet  Commonly known as:  TYLENOL   Oral, Every 6 Hours PRN      atorvastatin 10 MG tablet  Commonly known as:  LIPITOR   10 mg, Oral, Daily      BENEFIBER powder   Oral, Daily      bisacodyl 10 MG suppository  Commonly known as:  DULCOLAX   As Needed, Dulcolax 10 MG Rectal Suppository; Patient Sig: Dulcolax 10 MG Rectal Suppository ; 0; 04-Feb-2014; Active      cetirizine 10 MG tablet  Commonly known as:  zyrTEC   Oral      dextromethorphan-guaifenesin  MG/5ML syrup  Commonly  known as:  ROBITUSSIN-DM   5 mL, Oral, Every 4 Hours PRN      diclofenac 1 % gel gel  Commonly known as:  VOLTAREN   4 g, Topical, 4 Times Daily PRN      dilTIAZem  MG 24 hr capsule  Commonly known as:  DILACOR XR   Oral      diphenhydrAMINE 25 mg capsule  Commonly known as:  BENADRYL   25 mg, Oral, Every 6 Hours PRN      gabapentin 100 MG capsule  Commonly known as:  NEURONTIN   100 mg, Oral, Nightly      IMODIUM PO   Oral      lamoTRIgine 200 MG tablet  Commonly known as:  LaMICtal   Oral, 2 Times Daily      levETIRAcetam 250 MG tablet  Commonly known as:  KEPPRA   750 mg, Oral, 2 Times Daily      LORazepam 0.5 MG tablet  Commonly known as:  ATIVAN   Oral, 3 Times Daily      LORazepam 2 MG/ML injection  Commonly known as:  ATIVAN   2 mg, Intramuscular, Every 6 Hours PRN      lubiprostone 8 MCG capsule  Commonly known as:  AMITIZA   8 mcg, Oral, 2 Times Daily With Meals      magnesium hydroxide 400 MG/5ML suspension  Commonly known as:  MILK OF MAGNESIA   Oral, As Needed      MULTI VITAMIN DAILY PO   Oral      MYLANTA PO   Oral, Daily PRN      omeprazole 20 MG capsule  Commonly known as:  priLOSEC   20 mg, Oral, 2 Times Daily      polyethylene glycol powder  Commonly known as:  MIRALAX   17 g, Oral, 2 Times Daily      PROBIOTIC PO   Oral      PROLIA 60 MG/ML solution syringe  Generic drug:  denosumab   Subcutaneous, Once      promethazine 25 MG tablet  Commonly known as:  PHENERGAN   25 mg, Oral, Every 6 Hours PRN, Per [facility] med record       traMADol 50 MG tablet  Commonly known as:  ULTRAM   100 mg, Oral, Every 8 Hours             Last Lab Results:   Lab Results (last 72 hours)     Procedure Component Value Units Date/Time    Blood Culture - Blood, Arm, Left [[id number]] Collected:  12/11/19 1559    Specimen:  Blood from Arm, Left Updated:  12/16/19 1615     Blood Culture No growth at 5 days    Blood Culture - Blood, Arm, Right [[id number]] Collected:  12/11/19 1559    Specimen:  Blood from Arm,  Right Updated:  12/16/19 1615     Blood Culture No growth at 5 days    Procalcitonin [[id number]]  (Abnormal) Collected:  12/16/19 0310    Specimen:  Blood Updated:  12/16/19 0413     Procalcitonin 1.14 ng/mL     Renal Function Panel [[id number]]  (Abnormal) Collected:  12/16/19 0310    Specimen:  Blood Updated:  12/16/19 0409     Glucose 97 mg/dL      BUN 17 mg/dL      Creatinine 0.83 mg/dL      Sodium 135 mmol/L      Potassium 4.2 mmol/L      Chloride 102 mmol/L      CO2 20.0 mmol/L      Calcium 8.6 mg/dL      Albumin 2.80 g/dL      Phosphorus 3.2 mg/dL      Anion Gap 13.0 mmol/L      BUN/Creatinine Ratio 20.5     eGFR Non African Amer 66 mL/min/1.73     Narrative:       GFR Normal >60  Chronic Kidney Disease <60  Kidney Failure <15      CBC & Differential [[id number]] Collected:  12/16/19 0310    Specimen:  Blood Updated:  12/16/19 0340    Narrative:       The following orders were created for panel order CBC & Differential.  Procedure                               Abnormality         Status                     ---------                               -----------         ------                     CBC Auto Differential[[id number]]        Abnormal            Final result                 Please view results for these tests on the individual orders.    CBC Auto Differential [[id number]]  (Abnormal) Collected:  12/16/19 0310    Specimen:  Blood Updated:  12/16/19 0340     WBC 8.20 10*3/mm3      RBC 3.67 10*6/mm3      Hemoglobin 10.9 g/dL      Hematocrit 34.2 %      MCV 93.2 fL      MCH 29.7 pg      MCHC 31.9 g/dL      RDW 12.6 %      RDW-SD 43.2 fl      MPV 10.0 fL      Platelets 329 10*3/mm3      Neutrophil % 56.7 %      Lymphocyte % 24.3 %      Monocyte % 10.9 %      Eosinophil % 5.5 %      Basophil % 0.4 %      Immature Grans % 2.2 %      Neutrophils, Absolute 4.66 10*3/mm3      Lymphocytes, Absolute 1.99 10*3/mm3      Monocytes, Absolute 0.89 10*3/mm3      Eosinophils, Absolute 0.45 10*3/mm3      Basophils, Absolute  0.03 10*3/mm3      Immature Grans, Absolute 0.18 10*3/mm3      nRBC 0.0 /100 WBC         Ct Chest Without Contrast    Result Date: 12/13/2019  Narrative: NONCONTRAST CT CHEST 12/19/2019  HISTORY: Lower lobe pneumonia bilaterally. Coughing and choking episodes when eating and drinking. Had a barium swallow study this morning.  TECHNIQUE:  Noncontrast CT chest was performed. No comparisons. Radiation dose reduction techniques included automated exposure control or exposure modulation based on body size. Radiation audit for CT and nuclear cardiology exams in the last 12 months: 0.  FINDINGS:  There is motion degradation and these were the best images possible. Trace amount of pleural fluid left greater than right. There is patchy consolidation in the right lower lobe and more confluent consolidation involving the left lower lobe extending into the superior segment. There is also patchy consolidation in the posterior right upper lobe and to a lesser extent the posterior left upper lobe. Imaging findings are most characteristic of multifocal bilateral pneumonia. Follow-up to clearing is recommended. Although some of the areas of consolidation has somewhat nodular features, there is no suspicious pulmonary nodule. Incidental 5 mm nodule in the left lung apex may be inflammatory or infectious as well. A follow-up CT chest in one year can be performed to reassess stability.  There is no pneumothorax. No pericardial effusion and no threshold adenopathy. Aorta demonstrates normal caliber and mild atherosclerotic change. Included upper abdomen demonstrates dense barium within the stomach related to prior video speech swallow earlier today and there are granulomatous calcifications in the spleen. Equivocal cholelithiasis. No suspicious bone lesion.       Impression: 1. Imaging findings most characteristic of multifocal bilateral pneumonia with a lower lobe predominance and posterior segment predominance. These findings can be  associated with aspiration pneumonia. Trace effusions. Follow-up to clearing recommended. 2. 5 mm noncalcified nodule in the left upper lobe may also be inflammatory or infectious. CT chest follow-up in one year recommended to reassess stability. 3. Equivocal uncomplicated cholelithiasis.  This report was finalized on 12/13/2019 3:41 PM by Dr. Anthony Hsu MD.      Ct Angiogram Chest With & Without Contrast    Result Date: 12/16/2019  Narrative: CT ANGIOGRAM OF THE CHEST WITH AND WITHOUT CONTRAST 12/16/2019  HISTORY: Cough and shortness of breath for one week with increasing oxygen requirements. Chest pain today. Evaluate for pulmonary embolus.  TECHNIQUE: Spiral CT angiogram was performed through the chest following intravenous contrast administration. 3-D reconstructions of the chest were then performed following intravenous contrast administration. Radiation dose reduction techniques included automated exposure control or exposure modulation based on body size. Radiation audit for CT and nuclear cardiology exams in the last 12 months: 1.  FINDINGS: There is no CT evidence for pulmonary embolus. The heart is normal in size. There is no significant thoracic lymphadenopathy. Small bilateral pleural effusions with bibasilar atelectasis. Lung windows demonstrate stable 5 mm noncalcified nodule in the medial aspect of the left lung apex compared with 12/13/2019. Management recommendation: Based on current published guidelines, uncomplicated pulmonary nodules less than 6 mm do not require CT follow-up in low risk patients (Fleischner Society guidelines, 2017). Multifocal bilateral infiltrates have decreased slightly compared with 12/13/2019. No new infiltrates are identified. There are bilateral thyroid nodules. Consider nonemergent correlation with thyroid ultrasound.      Impression: 1. No CT evidence of pulmonary embolus. 2. Multifocal infiltrates involving both lungs have decreased slightly compared with the  previous chest CT 12/13/2019. No new infiltrates identified. 3. Small bilateral pleural effusions with bibasilar atelectasis. 4. Stable noncalcified 5 mm nodule in the medial aspect of the left lung apex. Management recommendation: Based on current published guidelines, uncomplicated pulmonary nodules less than 6 mm do not require CT follow-up in low risk patients (Fleischner Society guidelines, 2017). 5. Bilateral thyroid nodules. Consider nonemergent correlation with thyroid ultrasound.  This report was finalized on 12/16/2019 2:31 PM by Dr. Jono Elias MD.      Fl Video Swallow With Speech    Result Date: 12/13/2019  Narrative: VIDEO SWALLOWING STUDY, 12/13/2019  HISTORY: Abnormal chest x-ray demonstrating patchy infiltrates in the lung bases concerning for aspiration pneumonia.  TECHNIQUE: Video swallowing study was conducted by the speech pathologist in my presence and recorded on digital video disc.  Fluoroscopy time 1.7 minutes. A single spot image was recorded for documentation purposes.  FINDINGS: There is premature spillage and pooling of all barium coated consistencies. However, no distinct penetration or aspiration identified. Please see the full report of the speech pathologist for further details.      Impression: 1. No penetration or aspiration. Significant premature spillage and pooling as described.  This report was finalized on 12/13/2019 11:35 AM by Dr. Anthony Hsu MD.      Xr Chest 1 View    Result Date: 12/15/2019  Narrative: CR Chest 1 Vw INDICATION: Patient coughing with her dinner. Concern for aspiration. Increasing O2 requirements. COMPARISON:  Chest x-ray 12/11/2019 FINDINGS: 3 portable AP view(s) of the chest. Cardiac silhouette size is within normal limits. Lung volumes are low with persistent elevation of the right hemidiaphragm. There is patchy bilateral lower lobe airspace disease which is similar to prior. There is new airspace disease in the right mid lung and possibly right  upper lung. Please correlate for clinical concern for aspiration in light of history. Follow-up to clearing recommended. There is mild increase in interstitial markings in general. Please correlate for clinical evidence of mild volume overload There is no evidence for pneumothorax. No definite pleural effusion.     Impression: 1. Interval worsening in the appearance the chest with new airspace disease right mid and likely upper lung. In light of history, please correlate for clinical evidence of aspiration. Follow-up to clearing is recommended. 2. There is mild increase in interstitial markings in general. This correlate for any clinical concern for mild volume overload as well. Signer Name: Beth Gates MD  Signed: 12/15/2019 9:29 AM  Workstation Name: [facility]  [facility]    Xr Chest 1 View    Result Date: 12/11/2019  Narrative: CHEST X-RAY, 12/11/2019     HISTORY: 79-year-old female [facility] resident in the ED status post seizure. Low blood pressure.  TECHNIQUE: AP portable chest x-ray.  COMPARISON: *  Chest x-ray, 03/20/2014.  FINDINGS: Mild patchy infiltrates in both lung bases. These are nonspecific, but early aspiration pneumonitis is a consideration in the current clinical setting.  There is no dense airspace consolidation or pleural effusion. Lung volumes are chronically low. Heart size and pulmonary vascularity are within normal limits.      Impression: Mild patchy bibasilar pulmonary infiltrates. See above.  This report was finalized on 12/11/2019 4:10 PM by Dr. Lionel Ye MD.        Condition on Discharge:  Near her baseline    Discharge Disposition  [place]    Visiting Nurse:    No     Home PT/OT:  No     Home Safety Evaluation:  No     DME  None, Facility has concentrators    Discharge Diet:      Dietary Orders (From admission, onward)     Start     Ordered    12/15/19 0831  Diet Soft Texture; Pureed Meat; Honey Thick  Diet Effective Now     Question  Answer Comment   Diet Texture / Consistency Soft Texture    Select Texture: Pureed Meat    Fluid Consistency Honey Thick        12/15/19 4520                Activity at Discharge:  activity as tolerated      Pre-discharge education      Follow-up Appointments  No future appointments.      Test Results Pending at Discharge       Malachi Sebastian MD  12/17/19  12:01 PM    Time: Discharge > 30 min (if over 30 minutes give explanation as to why it took greater than 30 minutes) Coordination with pulm, RT, case management, ID, review of records, discussion of plan with caregiver at bedside

## 2019-12-18 NOTE — NURSING NOTE
Case Management Discharge Note      Final Note: dc to [facility]         Destination - Selection Complete      Service Provider Request Status Selected Services Address Phone Number Fax Number    [facility] [address] [place] [id number] [phone]      Durable Medical Equipment      No service has been selected for the patient.      Dialysis/Infusion      No service has been selected for the patient.      Home Medical Care      No service has been selected for the patient.      Therapy      No service has been selected for the patient.      Community Resources      No service has been selected for the patient.             Final Discharge Disposition Code: 03 - skilled nursing facility (SNF)

## 2019-12-27 ENCOUNTER — LAB REQUISITION (OUTPATIENT)
Dept: LAB | Facility: HOSPITAL | Age: 79
End: 2019-12-27

## 2019-12-27 DIAGNOSIS — Z79.899 OTHER LONG TERM (CURRENT) DRUG THERAPY: ICD-10-CM

## 2019-12-27 DIAGNOSIS — R79.9 ABNORMAL FINDING OF BLOOD CHEMISTRY, UNSPECIFIED: ICD-10-CM

## 2019-12-27 LAB
ALBUMIN SERPL-MCNC: 3.8 G/DL (ref 3.5–5.2)
ALBUMIN/GLOB SERPL: 1.2 G/DL
ALP SERPL-CCNC: 74 U/L (ref 39–117)
ALT SERPL W P-5'-P-CCNC: 11 U/L (ref 1–33)
ANION GAP SERPL CALCULATED.3IONS-SCNC: 12.2 MMOL/L (ref 5–15)
AST SERPL-CCNC: 15 U/L (ref 1–32)
BASOPHILS # BLD AUTO: 0.08 10*3/MM3 (ref 0–0.2)
BASOPHILS NFR BLD AUTO: 1.1 % (ref 0–1.5)
BILIRUB SERPL-MCNC: 0.2 MG/DL (ref 0.2–1.2)
BUN BLD-MCNC: 16 MG/DL (ref 8–23)
BUN/CREAT SERPL: 12.9 (ref 7–25)
CALCIUM SPEC-SCNC: 10.1 MG/DL (ref 8.6–10.5)
CHLORIDE SERPL-SCNC: 103 MMOL/L (ref 98–107)
CHOLEST SERPL-MCNC: 135 MG/DL (ref 0–200)
CO2 SERPL-SCNC: 27.8 MMOL/L (ref 22–29)
CREAT BLD-MCNC: 1.24 MG/DL (ref 0.57–1)
DEPRECATED RDW RBC AUTO: 45.4 FL (ref 37–54)
EOSINOPHIL # BLD AUTO: 0.32 10*3/MM3 (ref 0–0.4)
EOSINOPHIL NFR BLD AUTO: 4.3 % (ref 0.3–6.2)
ERYTHROCYTE [DISTWIDTH] IN BLOOD BY AUTOMATED COUNT: 13.2 % (ref 12.3–15.4)
GFR SERPL CREATININE-BSD FRML MDRD: 42 ML/MIN/1.73
GLOBULIN UR ELPH-MCNC: 3.2 GM/DL
GLUCOSE BLD-MCNC: 84 MG/DL (ref 65–99)
HCT VFR BLD AUTO: 35.8 % (ref 34–46.6)
HDLC SERPL-MCNC: 64 MG/DL (ref 40–60)
HGB BLD-MCNC: 11.3 G/DL (ref 12–15.9)
IMM GRANULOCYTES # BLD AUTO: 0.03 10*3/MM3 (ref 0–0.05)
IMM GRANULOCYTES NFR BLD AUTO: 0.4 % (ref 0–0.5)
LDLC SERPL CALC-MCNC: 59 MG/DL (ref 0–100)
LDLC/HDLC SERPL: 0.93 {RATIO}
LYMPHOCYTES # BLD AUTO: 2.75 10*3/MM3 (ref 0.7–3.1)
LYMPHOCYTES NFR BLD AUTO: 37.1 % (ref 19.6–45.3)
MCH RBC QN AUTO: 29.9 PG (ref 26.6–33)
MCHC RBC AUTO-ENTMCNC: 31.6 G/DL (ref 31.5–35.7)
MCV RBC AUTO: 94.7 FL (ref 79–97)
MONOCYTES # BLD AUTO: 0.6 10*3/MM3 (ref 0.1–0.9)
MONOCYTES NFR BLD AUTO: 8.1 % (ref 5–12)
NEUTROPHILS # BLD AUTO: 3.63 10*3/MM3 (ref 1.7–7)
NEUTROPHILS NFR BLD AUTO: 49 % (ref 42.7–76)
NRBC BLD AUTO-RTO: 0 /100 WBC (ref 0–0.2)
PLATELET # BLD AUTO: 496 10*3/MM3 (ref 140–450)
PMV BLD AUTO: 9.8 FL (ref 6–12)
POTASSIUM BLD-SCNC: 4.2 MMOL/L (ref 3.5–5.2)
PROT SERPL-MCNC: 7 G/DL (ref 6–8.5)
RBC # BLD AUTO: 3.78 10*6/MM3 (ref 3.77–5.28)
SODIUM BLD-SCNC: 143 MMOL/L (ref 136–145)
T4 FREE SERPL-MCNC: 1.05 NG/DL (ref 0.93–1.7)
TRIGL SERPL-MCNC: 58 MG/DL (ref 0–150)
TSH SERPL DL<=0.05 MIU/L-ACNC: 3.62 UIU/ML (ref 0.27–4.2)
VLDLC SERPL-MCNC: 11.6 MG/DL (ref 7–27)
WBC NRBC COR # BLD: 7.41 10*3/MM3 (ref 3.4–10.8)

## 2019-12-27 PROCEDURE — 84439 ASSAY OF FREE THYROXINE: CPT | Performed by: FAMILY MEDICINE

## 2019-12-27 PROCEDURE — 80053 COMPREHEN METABOLIC PANEL: CPT | Performed by: FAMILY MEDICINE

## 2019-12-27 PROCEDURE — 85025 COMPLETE CBC W/AUTO DIFF WBC: CPT | Performed by: FAMILY MEDICINE

## 2019-12-27 PROCEDURE — 80061 LIPID PANEL: CPT | Performed by: FAMILY MEDICINE

## 2019-12-27 PROCEDURE — 84443 ASSAY THYROID STIM HORMONE: CPT | Performed by: FAMILY MEDICINE

## 2019-12-30 ENCOUNTER — TRANSCRIBE ORDERS (OUTPATIENT)
Dept: ADMINISTRATIVE | Facility: HOSPITAL | Age: 79
End: 2019-12-30

## 2019-12-30 DIAGNOSIS — E04.2 MULTIPLE THYROID NODULES: Primary | ICD-10-CM

## 2020-01-03 ENCOUNTER — LAB REQUISITION (OUTPATIENT)
Dept: LAB | Facility: HOSPITAL | Age: 80
End: 2020-01-03

## 2020-01-03 DIAGNOSIS — R79.9 ABNORMAL FINDING OF BLOOD CHEMISTRY, UNSPECIFIED: ICD-10-CM

## 2020-01-03 DIAGNOSIS — Z79.899 OTHER LONG TERM (CURRENT) DRUG THERAPY: ICD-10-CM

## 2020-01-03 LAB
BASOPHILS # BLD AUTO: 0.05 10*3/MM3 (ref 0–0.2)
BASOPHILS NFR BLD AUTO: 0.6 % (ref 0–1.5)
DEPRECATED RDW RBC AUTO: 46.5 FL (ref 37–54)
EOSINOPHIL # BLD AUTO: 0.36 10*3/MM3 (ref 0–0.4)
EOSINOPHIL NFR BLD AUTO: 4.3 % (ref 0.3–6.2)
ERYTHROCYTE [DISTWIDTH] IN BLOOD BY AUTOMATED COUNT: 13.2 % (ref 12.3–15.4)
HCT VFR BLD AUTO: 35.5 % (ref 34–46.6)
HGB BLD-MCNC: 11.2 G/DL (ref 12–15.9)
IMM GRANULOCYTES # BLD AUTO: 0.05 10*3/MM3 (ref 0–0.05)
IMM GRANULOCYTES NFR BLD AUTO: 0.6 % (ref 0–0.5)
LYMPHOCYTES # BLD AUTO: 2.75 10*3/MM3 (ref 0.7–3.1)
LYMPHOCYTES NFR BLD AUTO: 33 % (ref 19.6–45.3)
MCH RBC QN AUTO: 30.2 PG (ref 26.6–33)
MCHC RBC AUTO-ENTMCNC: 31.5 G/DL (ref 31.5–35.7)
MCV RBC AUTO: 95.7 FL (ref 79–97)
MONOCYTES # BLD AUTO: 0.68 10*3/MM3 (ref 0.1–0.9)
MONOCYTES NFR BLD AUTO: 8.2 % (ref 5–12)
NEUTROPHILS # BLD AUTO: 4.44 10*3/MM3 (ref 1.7–7)
NEUTROPHILS NFR BLD AUTO: 53.3 % (ref 42.7–76)
NRBC BLD AUTO-RTO: 0 /100 WBC (ref 0–0.2)
PLATELET # BLD AUTO: 338 10*3/MM3 (ref 140–450)
PMV BLD AUTO: 10.7 FL (ref 6–12)
RBC # BLD AUTO: 3.71 10*6/MM3 (ref 3.77–5.28)
WBC NRBC COR # BLD: 8.33 10*3/MM3 (ref 3.4–10.8)

## 2020-01-03 PROCEDURE — 85025 COMPLETE CBC W/AUTO DIFF WBC: CPT | Performed by: FAMILY MEDICINE

## 2020-01-08 ENCOUNTER — LAB REQUISITION (OUTPATIENT)
Dept: LAB | Facility: HOSPITAL | Age: 80
End: 2020-01-08

## 2020-01-08 DIAGNOSIS — R79.9 ABNORMAL FINDING OF BLOOD CHEMISTRY, UNSPECIFIED: ICD-10-CM

## 2020-01-08 DIAGNOSIS — Z79.899 OTHER LONG TERM (CURRENT) DRUG THERAPY: ICD-10-CM

## 2020-01-08 LAB
ALBUMIN SERPL-MCNC: 3.8 G/DL (ref 3.5–5.2)
ALBUMIN/GLOB SERPL: 1.2 G/DL
ALP SERPL-CCNC: 73 U/L (ref 39–117)
ALT SERPL W P-5'-P-CCNC: 12 U/L (ref 1–33)
ANION GAP SERPL CALCULATED.3IONS-SCNC: 10.4 MMOL/L (ref 5–15)
AST SERPL-CCNC: 16 U/L (ref 1–32)
BASOPHILS # BLD AUTO: 0.06 10*3/MM3 (ref 0–0.2)
BASOPHILS NFR BLD AUTO: 0.7 % (ref 0–1.5)
BILIRUB SERPL-MCNC: 0.2 MG/DL (ref 0.2–1.2)
BUN BLD-MCNC: 25 MG/DL (ref 8–23)
BUN/CREAT SERPL: 16.4 (ref 7–25)
CALCIUM SPEC-SCNC: 9.9 MG/DL (ref 8.6–10.5)
CHLORIDE SERPL-SCNC: 102 MMOL/L (ref 98–107)
CHOLEST SERPL-MCNC: 131 MG/DL (ref 0–200)
CO2 SERPL-SCNC: 28.6 MMOL/L (ref 22–29)
CREAT BLD-MCNC: 1.52 MG/DL (ref 0.57–1)
DEPRECATED RDW RBC AUTO: 45.9 FL (ref 37–54)
EOSINOPHIL # BLD AUTO: 0.37 10*3/MM3 (ref 0–0.4)
EOSINOPHIL NFR BLD AUTO: 4.3 % (ref 0.3–6.2)
ERYTHROCYTE [DISTWIDTH] IN BLOOD BY AUTOMATED COUNT: 13.2 % (ref 12.3–15.4)
GFR SERPL CREATININE-BSD FRML MDRD: 33 ML/MIN/1.73
GLOBULIN UR ELPH-MCNC: 3.3 GM/DL
GLUCOSE BLD-MCNC: 82 MG/DL (ref 65–99)
HCT VFR BLD AUTO: 35.8 % (ref 34–46.6)
HDLC SERPL-MCNC: 77 MG/DL (ref 40–60)
HGB BLD-MCNC: 11.4 G/DL (ref 12–15.9)
IMM GRANULOCYTES # BLD AUTO: 0.03 10*3/MM3 (ref 0–0.05)
IMM GRANULOCYTES NFR BLD AUTO: 0.4 % (ref 0–0.5)
LDLC SERPL CALC-MCNC: 43 MG/DL (ref 0–100)
LDLC/HDLC SERPL: 0.56 {RATIO}
LYMPHOCYTES # BLD AUTO: 2.93 10*3/MM3 (ref 0.7–3.1)
LYMPHOCYTES NFR BLD AUTO: 34.3 % (ref 19.6–45.3)
MCH RBC QN AUTO: 30.2 PG (ref 26.6–33)
MCHC RBC AUTO-ENTMCNC: 31.8 G/DL (ref 31.5–35.7)
MCV RBC AUTO: 94.7 FL (ref 79–97)
MONOCYTES # BLD AUTO: 0.76 10*3/MM3 (ref 0.1–0.9)
MONOCYTES NFR BLD AUTO: 8.9 % (ref 5–12)
NEUTROPHILS # BLD AUTO: 4.4 10*3/MM3 (ref 1.7–7)
NEUTROPHILS NFR BLD AUTO: 51.4 % (ref 42.7–76)
NRBC BLD AUTO-RTO: 0 /100 WBC (ref 0–0.2)
PLATELET # BLD AUTO: 317 10*3/MM3 (ref 140–450)
PMV BLD AUTO: 10.7 FL (ref 6–12)
POTASSIUM BLD-SCNC: 4.7 MMOL/L (ref 3.5–5.2)
PROLACTIN SERPL-MCNC: 22.2 NG/ML (ref 4.79–23.3)
PROT SERPL-MCNC: 7.1 G/DL (ref 6–8.5)
RBC # BLD AUTO: 3.78 10*6/MM3 (ref 3.77–5.28)
SODIUM BLD-SCNC: 141 MMOL/L (ref 136–145)
T4 FREE SERPL-MCNC: 1.2 NG/DL (ref 0.93–1.7)
TRIGL SERPL-MCNC: 54 MG/DL (ref 0–150)
TSH SERPL DL<=0.05 MIU/L-ACNC: 2.86 UIU/ML (ref 0.27–4.2)
VLDLC SERPL-MCNC: 10.8 MG/DL (ref 7–27)
WBC NRBC COR # BLD: 8.55 10*3/MM3 (ref 3.4–10.8)

## 2020-01-08 PROCEDURE — 84146 ASSAY OF PROLACTIN: CPT | Performed by: FAMILY MEDICINE

## 2020-01-08 PROCEDURE — 80053 COMPREHEN METABOLIC PANEL: CPT | Performed by: FAMILY MEDICINE

## 2020-01-08 PROCEDURE — 80061 LIPID PANEL: CPT | Performed by: FAMILY MEDICINE

## 2020-01-08 PROCEDURE — 84439 ASSAY OF FREE THYROXINE: CPT | Performed by: FAMILY MEDICINE

## 2020-01-08 PROCEDURE — 80175 DRUG SCREEN QUAN LAMOTRIGINE: CPT | Performed by: FAMILY MEDICINE

## 2020-01-08 PROCEDURE — 85025 COMPLETE CBC W/AUTO DIFF WBC: CPT | Performed by: FAMILY MEDICINE

## 2020-01-08 PROCEDURE — 84443 ASSAY THYROID STIM HORMONE: CPT | Performed by: FAMILY MEDICINE

## 2020-01-10 ENCOUNTER — HOSPITAL ENCOUNTER (OUTPATIENT)
Dept: ULTRASOUND IMAGING | Facility: HOSPITAL | Age: 80
Discharge: HOME | End: 2020-01-10
Admitting: FAMILY MEDICINE

## 2020-01-10 DIAGNOSIS — E04.2 MULTIPLE THYROID NODULES: ICD-10-CM

## 2020-01-10 LAB — LAMOTRIGINE SERPL-MCNC: 8.5 UG/ML (ref 2–20)

## 2020-01-10 PROCEDURE — 76536 US EXAM OF HEAD AND NECK: CPT

## 2020-03-04 ENCOUNTER — LAB REQUISITION (OUTPATIENT)
Dept: LAB | Facility: HOSPITAL | Age: 80
End: 2020-03-04

## 2020-03-04 DIAGNOSIS — Z79.899 OTHER LONG TERM (CURRENT) DRUG THERAPY: ICD-10-CM

## 2020-03-04 DIAGNOSIS — R79.9 ABNORMAL FINDING OF BLOOD CHEMISTRY, UNSPECIFIED: ICD-10-CM

## 2020-03-04 LAB
ANION GAP SERPL CALCULATED.3IONS-SCNC: 10.1 MMOL/L (ref 5–15)
BUN BLD-MCNC: 26 MG/DL (ref 8–23)
BUN/CREAT SERPL: 22.6 (ref 7–25)
CALCIUM SPEC-SCNC: 9.4 MG/DL (ref 8.6–10.5)
CHLORIDE SERPL-SCNC: 105 MMOL/L (ref 98–107)
CO2 SERPL-SCNC: 27.9 MMOL/L (ref 22–29)
CREAT BLD-MCNC: 1.15 MG/DL (ref 0.57–1)
GFR SERPL CREATININE-BSD FRML MDRD: 46 ML/MIN/1.73
GLUCOSE BLD-MCNC: 95 MG/DL (ref 65–99)
POTASSIUM BLD-SCNC: 4.5 MMOL/L (ref 3.5–5.2)
SODIUM BLD-SCNC: 143 MMOL/L (ref 136–145)

## 2020-03-04 PROCEDURE — 80048 BASIC METABOLIC PNL TOTAL CA: CPT | Performed by: FAMILY MEDICINE

## 2020-03-11 ENCOUNTER — LAB REQUISITION (OUTPATIENT)
Dept: LAB | Facility: HOSPITAL | Age: 80
End: 2020-03-11

## 2020-03-11 DIAGNOSIS — Z79.899 OTHER LONG TERM (CURRENT) DRUG THERAPY: ICD-10-CM

## 2020-03-11 DIAGNOSIS — R79.9 ABNORMAL FINDING OF BLOOD CHEMISTRY, UNSPECIFIED: ICD-10-CM

## 2020-03-11 PROCEDURE — 80175 DRUG SCREEN QUAN LAMOTRIGINE: CPT | Performed by: FAMILY MEDICINE

## 2020-03-11 PROCEDURE — 80177 DRUG SCRN QUAN LEVETIRACETAM: CPT | Performed by: FAMILY MEDICINE

## 2020-03-13 LAB
LAMOTRIGINE SERPL-MCNC: 4.5 UG/ML (ref 2–20)
LEVETIRACETAM SERPL-MCNC: 37.6 UG/ML (ref 10–40)

## 2020-04-13 ENCOUNTER — LAB (OUTPATIENT)
Dept: LAB | Facility: HOSPITAL | Age: 80
End: 2020-04-13

## 2020-04-13 ENCOUNTER — TRANSCRIBE ORDERS (OUTPATIENT)
Dept: ADMINISTRATIVE | Facility: HOSPITAL | Age: 80
End: 2020-04-13

## 2020-04-13 ENCOUNTER — HOSPITAL ENCOUNTER (OUTPATIENT)
Dept: GENERAL RADIOLOGY | Facility: HOSPITAL | Age: 80
Discharge: HOME | End: 2020-04-13
Admitting: FAMILY MEDICINE

## 2020-04-13 DIAGNOSIS — R09.02 HYPOXEMIA: ICD-10-CM

## 2020-04-13 DIAGNOSIS — R06.2 WHEEZING: ICD-10-CM

## 2020-04-13 DIAGNOSIS — R06.2 WHEEZING: Primary | ICD-10-CM

## 2020-04-13 LAB
ALBUMIN SERPL-MCNC: 4.2 G/DL (ref 3.5–5.2)
ALBUMIN/GLOB SERPL: 1.2 G/DL
ALP SERPL-CCNC: 109 U/L (ref 39–117)
ALT SERPL W P-5'-P-CCNC: 15 U/L (ref 1–33)
ANION GAP SERPL CALCULATED.3IONS-SCNC: 12.4 MMOL/L (ref 5–15)
AST SERPL-CCNC: 16 U/L (ref 1–32)
BASOPHILS # BLD AUTO: 0.03 10*3/MM3 (ref 0–0.2)
BASOPHILS NFR BLD AUTO: 0.5 % (ref 0–1.5)
BILIRUB SERPL-MCNC: 0.2 MG/DL (ref 0.2–1.2)
BUN BLD-MCNC: 26 MG/DL (ref 8–23)
BUN/CREAT SERPL: 21.1 (ref 7–25)
CALCIUM SPEC-SCNC: 10.4 MG/DL (ref 8.6–10.5)
CHLORIDE SERPL-SCNC: 98 MMOL/L (ref 98–107)
CO2 SERPL-SCNC: 26.6 MMOL/L (ref 22–29)
CREAT BLD-MCNC: 1.23 MG/DL (ref 0.57–1)
DEPRECATED RDW RBC AUTO: 38.1 FL (ref 37–54)
EOSINOPHIL # BLD AUTO: 0.37 10*3/MM3 (ref 0–0.4)
EOSINOPHIL NFR BLD AUTO: 6.1 % (ref 0.3–6.2)
ERYTHROCYTE [DISTWIDTH] IN BLOOD BY AUTOMATED COUNT: 11.8 % (ref 12.3–15.4)
GFR SERPL CREATININE-BSD FRML MDRD: 42 ML/MIN/1.73
GLOBULIN UR ELPH-MCNC: 3.4 GM/DL
GLUCOSE BLD-MCNC: 109 MG/DL (ref 65–99)
HCT VFR BLD AUTO: 39.1 % (ref 34–46.6)
HGB BLD-MCNC: 13.3 G/DL (ref 12–15.9)
IMM GRANULOCYTES # BLD AUTO: 0.02 10*3/MM3 (ref 0–0.05)
IMM GRANULOCYTES NFR BLD AUTO: 0.3 % (ref 0–0.5)
LYMPHOCYTES # BLD AUTO: 1.73 10*3/MM3 (ref 0.7–3.1)
LYMPHOCYTES NFR BLD AUTO: 28.7 % (ref 19.6–45.3)
MCH RBC QN AUTO: 30.6 PG (ref 26.6–33)
MCHC RBC AUTO-ENTMCNC: 34 G/DL (ref 31.5–35.7)
MCV RBC AUTO: 89.9 FL (ref 79–97)
MONOCYTES # BLD AUTO: 0.58 10*3/MM3 (ref 0.1–0.9)
MONOCYTES NFR BLD AUTO: 9.6 % (ref 5–12)
NEUTROPHILS # BLD AUTO: 3.29 10*3/MM3 (ref 1.7–7)
NEUTROPHILS NFR BLD AUTO: 54.8 % (ref 42.7–76)
NRBC BLD AUTO-RTO: 0 /100 WBC (ref 0–0.2)
PLATELET # BLD AUTO: 369 10*3/MM3 (ref 140–450)
PMV BLD AUTO: 11.6 FL (ref 6–12)
POTASSIUM BLD-SCNC: 4.8 MMOL/L (ref 3.5–5.2)
PROT SERPL-MCNC: 7.6 G/DL (ref 6–8.5)
RBC # BLD AUTO: 4.35 10*6/MM3 (ref 3.77–5.28)
SODIUM BLD-SCNC: 137 MMOL/L (ref 136–145)
WBC NRBC COR # BLD: 6.02 10*3/MM3 (ref 3.4–10.8)

## 2020-04-13 PROCEDURE — 80053 COMPREHEN METABOLIC PANEL: CPT

## 2020-04-13 PROCEDURE — 71046 X-RAY EXAM CHEST 2 VIEWS: CPT

## 2020-04-13 PROCEDURE — 36415 COLL VENOUS BLD VENIPUNCTURE: CPT

## 2020-04-13 PROCEDURE — 85025 COMPLETE CBC W/AUTO DIFF WBC: CPT

## 2020-04-16 ENCOUNTER — LAB REQUISITION (OUTPATIENT)
Dept: LAB | Facility: HOSPITAL | Age: 80
End: 2020-04-16

## 2020-04-16 DIAGNOSIS — R33.9 RETENTION OF URINE, UNSPECIFIED: ICD-10-CM

## 2020-04-16 DIAGNOSIS — N39.0 URINARY TRACT INFECTION, SITE NOT SPECIFIED: ICD-10-CM

## 2020-04-16 PROCEDURE — 87186 SC STD MICRODIL/AGAR DIL: CPT | Performed by: FAMILY MEDICINE

## 2020-04-16 PROCEDURE — 87077 CULTURE AEROBIC IDENTIFY: CPT | Performed by: FAMILY MEDICINE

## 2020-04-16 PROCEDURE — 87086 URINE CULTURE/COLONY COUNT: CPT | Performed by: FAMILY MEDICINE

## 2020-04-18 LAB — BACTERIA SPEC AEROBE CULT: ABNORMAL

## 2020-06-08 RX ORDER — TRAMADOL HYDROCHLORIDE 50 MG/1
100 TABLET ORAL EVERY 8 HOURS
Qty: 30 TABLET | Refills: 0 | Status: SHIPPED | OUTPATIENT
Start: 2020-06-08 | End: 2020-12-11 | Stop reason: SDUPTHER

## 2020-06-12 ENCOUNTER — TRANSCRIBE ORDERS (OUTPATIENT)
Dept: ADMINISTRATIVE | Facility: HOSPITAL | Age: 80
End: 2020-06-12

## 2020-06-12 DIAGNOSIS — E04.1 THYROID NODULE: Primary | ICD-10-CM

## 2020-07-01 ENCOUNTER — LAB REQUISITION (OUTPATIENT)
Dept: LAB | Facility: HOSPITAL | Age: 80
End: 2020-07-01

## 2020-07-01 DIAGNOSIS — R79.9 ABNORMAL FINDING OF BLOOD CHEMISTRY, UNSPECIFIED: ICD-10-CM

## 2020-07-01 DIAGNOSIS — Z79.899 OTHER LONG TERM (CURRENT) DRUG THERAPY: ICD-10-CM

## 2020-07-01 LAB
ALBUMIN SERPL-MCNC: 3.8 G/DL (ref 3.5–5.2)
ALBUMIN/GLOB SERPL: 1.3 G/DL
ALP SERPL-CCNC: 87 U/L (ref 39–117)
ALT SERPL W P-5'-P-CCNC: 14 U/L (ref 1–33)
ANION GAP SERPL CALCULATED.3IONS-SCNC: 11.9 MMOL/L (ref 5–15)
AST SERPL-CCNC: 14 U/L (ref 1–32)
BASOPHILS # BLD AUTO: 0.04 10*3/MM3 (ref 0–0.2)
BASOPHILS NFR BLD AUTO: 0.5 % (ref 0–1.5)
BILIRUB SERPL-MCNC: 0.2 MG/DL (ref 0.2–1.2)
BUN SERPL-MCNC: 36 MG/DL (ref 8–23)
BUN/CREAT SERPL: 29.3 (ref 7–25)
CALCIUM SPEC-SCNC: 9.2 MG/DL (ref 8.6–10.5)
CHLORIDE SERPL-SCNC: 102 MMOL/L (ref 98–107)
CHOLEST SERPL-MCNC: 165 MG/DL (ref 0–200)
CO2 SERPL-SCNC: 28.1 MMOL/L (ref 22–29)
CREAT SERPL-MCNC: 1.23 MG/DL (ref 0.57–1)
DEPRECATED RDW RBC AUTO: 44.2 FL (ref 37–54)
EOSINOPHIL # BLD AUTO: 0.28 10*3/MM3 (ref 0–0.4)
EOSINOPHIL NFR BLD AUTO: 3.5 % (ref 0.3–6.2)
ERYTHROCYTE [DISTWIDTH] IN BLOOD BY AUTOMATED COUNT: 12.8 % (ref 12.3–15.4)
GFR SERPL CREATININE-BSD FRML MDRD: 42 ML/MIN/1.73
GLOBULIN UR ELPH-MCNC: 3 GM/DL
GLUCOSE SERPL-MCNC: 93 MG/DL (ref 65–99)
HCT VFR BLD AUTO: 41.4 % (ref 34–46.6)
HDLC SERPL-MCNC: 87 MG/DL (ref 40–60)
HGB BLD-MCNC: 13 G/DL (ref 12–15.9)
IMM GRANULOCYTES # BLD AUTO: 0.06 10*3/MM3 (ref 0–0.05)
IMM GRANULOCYTES NFR BLD AUTO: 0.8 % (ref 0–0.5)
LDLC SERPL CALC-MCNC: 70 MG/DL (ref 0–100)
LDLC/HDLC SERPL: 0.81 {RATIO}
LYMPHOCYTES # BLD AUTO: 2.37 10*3/MM3 (ref 0.7–3.1)
LYMPHOCYTES NFR BLD AUTO: 29.6 % (ref 19.6–45.3)
MCH RBC QN AUTO: 29.7 PG (ref 26.6–33)
MCHC RBC AUTO-ENTMCNC: 31.4 G/DL (ref 31.5–35.7)
MCV RBC AUTO: 94.7 FL (ref 79–97)
MONOCYTES # BLD AUTO: 0.62 10*3/MM3 (ref 0.1–0.9)
MONOCYTES NFR BLD AUTO: 7.8 % (ref 5–12)
NEUTROPHILS NFR BLD AUTO: 4.63 10*3/MM3 (ref 1.7–7)
NEUTROPHILS NFR BLD AUTO: 57.8 % (ref 42.7–76)
NRBC BLD AUTO-RTO: 0 /100 WBC (ref 0–0.2)
PLATELET # BLD AUTO: 336 10*3/MM3 (ref 140–450)
PMV BLD AUTO: 10.8 FL (ref 6–12)
POTASSIUM SERPL-SCNC: 4.9 MMOL/L (ref 3.5–5.2)
PROCALCITONIN SERPL-MCNC: 0.03 NG/ML (ref 0.1–0.25)
PROT SERPL-MCNC: 6.8 G/DL (ref 6–8.5)
RBC # BLD AUTO: 4.37 10*6/MM3 (ref 3.77–5.28)
SODIUM SERPL-SCNC: 142 MMOL/L (ref 136–145)
T4 FREE SERPL-MCNC: 0.99 NG/DL (ref 0.93–1.7)
TRIGL SERPL-MCNC: 38 MG/DL (ref 0–150)
TSH SERPL DL<=0.05 MIU/L-ACNC: 2.12 UIU/ML (ref 0.27–4.2)
VLDLC SERPL-MCNC: 7.6 MG/DL (ref 7–27)
WBC # BLD AUTO: 8 10*3/MM3 (ref 3.4–10.8)

## 2020-07-01 PROCEDURE — 85025 COMPLETE CBC W/AUTO DIFF WBC: CPT | Performed by: FAMILY MEDICINE

## 2020-07-01 PROCEDURE — 84146 ASSAY OF PROLACTIN: CPT | Performed by: FAMILY MEDICINE

## 2020-07-01 PROCEDURE — 84439 ASSAY OF FREE THYROXINE: CPT | Performed by: FAMILY MEDICINE

## 2020-07-01 PROCEDURE — 80061 LIPID PANEL: CPT | Performed by: FAMILY MEDICINE

## 2020-07-01 PROCEDURE — 80175 DRUG SCREEN QUAN LAMOTRIGINE: CPT | Performed by: FAMILY MEDICINE

## 2020-07-01 PROCEDURE — 84145 PROCALCITONIN (PCT): CPT | Performed by: FAMILY MEDICINE

## 2020-07-01 PROCEDURE — 80177 DRUG SCRN QUAN LEVETIRACETAM: CPT | Performed by: FAMILY MEDICINE

## 2020-07-01 PROCEDURE — 80053 COMPREHEN METABOLIC PANEL: CPT | Performed by: FAMILY MEDICINE

## 2020-07-01 PROCEDURE — 84443 ASSAY THYROID STIM HORMONE: CPT | Performed by: FAMILY MEDICINE

## 2020-07-04 LAB — LAMOTRIGINE SERPL-MCNC: 6 UG/ML (ref 2–20)

## 2020-07-05 LAB — LEVETIRACETAM SERPL-MCNC: 37.9 UG/ML (ref 10–40)

## 2020-07-07 LAB — PROLACTIN SERPL-MCNC: 20 NG/ML (ref 4.79–23.3)

## 2020-07-24 ENCOUNTER — LAB REQUISITION (OUTPATIENT)
Dept: LAB | Facility: HOSPITAL | Age: 80
End: 2020-07-24

## 2020-07-24 DIAGNOSIS — Z03.818 ENCOUNTER FOR OBSERVATION FOR SUSPECTED EXPOSURE TO OTHER BIOLOGICAL AGENTS RULED OUT: ICD-10-CM

## 2020-07-24 PROCEDURE — U0002 COVID-19 LAB TEST NON-CDC: HCPCS | Performed by: FAMILY MEDICINE

## 2020-07-28 LAB
REF LAB TEST METHOD: NORMAL
SARS-COV-2 RNA RESP QL NAA+PROBE: NOT DETECTED

## 2020-08-05 ENCOUNTER — LAB REQUISITION (OUTPATIENT)
Dept: LAB | Facility: HOSPITAL | Age: 80
End: 2020-08-05

## 2020-08-05 DIAGNOSIS — Z79.899 OTHER LONG TERM (CURRENT) DRUG THERAPY: ICD-10-CM

## 2020-08-05 DIAGNOSIS — R79.9 ABNORMAL FINDING OF BLOOD CHEMISTRY, UNSPECIFIED: ICD-10-CM

## 2020-08-05 LAB
ALBUMIN SERPL-MCNC: 3.7 G/DL (ref 3.5–5.2)
ALBUMIN/GLOB SERPL: 1.2 G/DL
ALP SERPL-CCNC: 106 U/L (ref 39–117)
ALT SERPL W P-5'-P-CCNC: 15 U/L (ref 1–33)
ANION GAP SERPL CALCULATED.3IONS-SCNC: 9.3 MMOL/L (ref 5–15)
AST SERPL-CCNC: 16 U/L (ref 1–32)
BILIRUB SERPL-MCNC: <0.2 MG/DL (ref 0–1.2)
BUN SERPL-MCNC: 30 MG/DL (ref 8–23)
BUN/CREAT SERPL: 25.6 (ref 7–25)
CALCIUM SPEC-SCNC: 9.3 MG/DL (ref 8.6–10.5)
CHLORIDE SERPL-SCNC: 106 MMOL/L (ref 98–107)
CO2 SERPL-SCNC: 27.7 MMOL/L (ref 22–29)
CREAT SERPL-MCNC: 1.17 MG/DL (ref 0.57–1)
GFR SERPL CREATININE-BSD FRML MDRD: 45 ML/MIN/1.73
GLOBULIN UR ELPH-MCNC: 3.1 GM/DL
GLUCOSE SERPL-MCNC: 87 MG/DL (ref 65–99)
POTASSIUM SERPL-SCNC: 4.7 MMOL/L (ref 3.5–5.2)
PROT SERPL-MCNC: 6.8 G/DL (ref 6–8.5)
SODIUM SERPL-SCNC: 143 MMOL/L (ref 136–145)

## 2020-08-05 PROCEDURE — 80053 COMPREHEN METABOLIC PANEL: CPT | Performed by: FAMILY MEDICINE

## 2020-09-29 ENCOUNTER — LAB REQUISITION (OUTPATIENT)
Dept: LAB | Facility: HOSPITAL | Age: 80
End: 2020-09-29

## 2020-09-29 DIAGNOSIS — Z79.899 OTHER LONG TERM (CURRENT) DRUG THERAPY: ICD-10-CM

## 2020-09-29 DIAGNOSIS — R79.9 ABNORMAL FINDING OF BLOOD CHEMISTRY, UNSPECIFIED: ICD-10-CM

## 2020-09-29 LAB
ANION GAP SERPL CALCULATED.3IONS-SCNC: 11.7 MMOL/L (ref 5–15)
BUN SERPL-MCNC: 29 MG/DL (ref 8–23)
BUN/CREAT SERPL: 27.4 (ref 7–25)
CALCIUM SPEC-SCNC: 9.9 MG/DL (ref 8.6–10.5)
CHLORIDE SERPL-SCNC: 104 MMOL/L (ref 98–107)
CO2 SERPL-SCNC: 26.3 MMOL/L (ref 22–29)
CREAT SERPL-MCNC: 1.06 MG/DL (ref 0.57–1)
GFR SERPL CREATININE-BSD FRML MDRD: 50 ML/MIN/1.73
GLUCOSE SERPL-MCNC: 84 MG/DL (ref 65–99)
POTASSIUM SERPL-SCNC: 4.5 MMOL/L (ref 3.5–5.2)
SODIUM SERPL-SCNC: 142 MMOL/L (ref 136–145)

## 2020-09-29 PROCEDURE — 80048 BASIC METABOLIC PNL TOTAL CA: CPT | Performed by: FAMILY MEDICINE

## 2020-10-06 ENCOUNTER — LAB REQUISITION (OUTPATIENT)
Dept: LAB | Facility: HOSPITAL | Age: 80
End: 2020-10-06

## 2020-10-06 ENCOUNTER — OFFICE VISIT (OUTPATIENT)
Dept: NEUROLOGY | Facility: CLINIC | Age: 80
End: 2020-10-06

## 2020-10-06 DIAGNOSIS — N18.9 CHRONIC KIDNEY DISEASE, UNSPECIFIED: ICD-10-CM

## 2020-10-06 DIAGNOSIS — G40.909 NON-REFRACTORY EPILEPSY (HCC): Primary | ICD-10-CM

## 2020-10-06 DIAGNOSIS — R79.9 ABNORMAL FINDING OF BLOOD CHEMISTRY, UNSPECIFIED: ICD-10-CM

## 2020-10-06 DIAGNOSIS — Z79.899 OTHER LONG TERM (CURRENT) DRUG THERAPY: ICD-10-CM

## 2020-10-06 DIAGNOSIS — F88 DEVELOPMENTAL MENTAL DISORDER: ICD-10-CM

## 2020-10-06 LAB
ANION GAP SERPL CALCULATED.3IONS-SCNC: 9.3 MMOL/L (ref 5–15)
BUN SERPL-MCNC: 26 MG/DL (ref 8–23)
BUN/CREAT SERPL: 21.3 (ref 7–25)
CALCIUM SPEC-SCNC: 9.8 MG/DL (ref 8.6–10.5)
CHLORIDE SERPL-SCNC: 103 MMOL/L (ref 98–107)
CO2 SERPL-SCNC: 27.7 MMOL/L (ref 22–29)
CREAT SERPL-MCNC: 1.22 MG/DL (ref 0.57–1)
GFR SERPL CREATININE-BSD FRML MDRD: 42 ML/MIN/1.73
GLUCOSE SERPL-MCNC: 94 MG/DL (ref 65–99)
POTASSIUM SERPL-SCNC: 4.3 MMOL/L (ref 3.5–5.2)
SODIUM SERPL-SCNC: 140 MMOL/L (ref 136–145)

## 2020-10-06 PROCEDURE — 99441 PR PHYS/QHP TELEPHONE EVALUATION 5-10 MIN: CPT | Performed by: PSYCHIATRY & NEUROLOGY

## 2020-10-06 PROCEDURE — 80048 BASIC METABOLIC PNL TOTAL CA: CPT | Performed by: FAMILY MEDICINE

## 2020-10-06 NOTE — PROGRESS NOTES
"Chief Complaint   Patient presents with   • Seizures       Patient ID: Erica Crockett is a 80 y.o. female.    HPI:  You have chosen to receive care through a telephone visit. Do you consent to use a telephone visit for your medical care today?   \"yes\".  The patient's caregiver gives the history.  They are at [facility]'s place of residence and I am here at the neurology clinic.  It was a pleasure speaking to Erica's caregiver today.  She is an 80-year-old female with a history of seizures and intellectual disability.  She has been on the same regimen of seizure medication for many years.  She typically will have a couple of seizures per year.  She has had 3 seizures within the last year.  2 of them were secondary to urinary tract infection.  She has not had any new onset neurological symptoms.  Her Lamictal level was last checked in July which was within normal limits.    The following portions of the patient's history were reviewed and updated as appropriate: allergies, current medications, past family history, past medical history, past social history, past surgical history and problem list.    Review of Systems   Constitutional: Negative for chills, fatigue and fever.   HENT: Negative for hearing loss, tinnitus and trouble swallowing.    Eyes: Negative for pain, redness and itching.   Respiratory: Negative for cough, shortness of breath and wheezing.    Cardiovascular: Negative for chest pain, palpitations and leg swelling.   Gastrointestinal: Negative for diarrhea, nausea and vomiting.   Endocrine: Negative for cold intolerance, heat intolerance and polydipsia.   Genitourinary: Negative for decreased urine volume, difficulty urinating and urgency.   Musculoskeletal: Negative for gait problem, neck pain and neck stiffness.   Skin: Negative for color change, rash and wound.   Allergic/Immunologic: Negative for environmental allergies, food allergies and immunocompromised state.   Neurological: Positive for " seizures (3 a year). Negative for dizziness, tremors, syncope, facial asymmetry, speech difficulty, weakness, light-headedness, numbness and headaches.   Hematological: Negative for adenopathy. Does not bruise/bleed easily.   Psychiatric/Behavioral: Negative for confusion, self-injury and sleep disturbance.      I have reviewed the review of systems above performed by my medical assistant.      There were no vitals filed for this visit.    Neurologic Exam    Physical Exam    Procedures    Assessment/Plan: We are going to continue the current dose of antiepileptic drug therapy.  We will see her here in 1 year or sooner if needed.  A total of 10 minutes was spent during this visit with the patient's caregiver today discussing signs and symptoms of seizures, patient education, plan of care and prognosis.       Erica was seen today for seizures.    Diagnoses and all orders for this visit:    Non-refractory epilepsy (CMS/HCC)    Developmental mental disorder           Alex Peralta II, MD

## 2020-11-06 ENCOUNTER — LAB REQUISITION (OUTPATIENT)
Dept: LAB | Facility: HOSPITAL | Age: 80
End: 2020-11-06

## 2020-11-06 DIAGNOSIS — R94.6 ABNORMAL RESULTS OF THYROID FUNCTION STUDIES: ICD-10-CM

## 2020-11-06 DIAGNOSIS — R79.89 OTHER SPECIFIED ABNORMAL FINDINGS OF BLOOD CHEMISTRY: ICD-10-CM

## 2020-11-06 LAB
ALBUMIN SERPL-MCNC: 3.8 G/DL (ref 3.5–5.2)
ALBUMIN/GLOB SERPL: 1.2 G/DL
ALP SERPL-CCNC: 100 U/L (ref 39–117)
ALT SERPL W P-5'-P-CCNC: 16 U/L (ref 1–33)
ANION GAP SERPL CALCULATED.3IONS-SCNC: 8.4 MMOL/L (ref 5–15)
AST SERPL-CCNC: 19 U/L (ref 1–32)
BASOPHILS # BLD AUTO: 0.04 10*3/MM3 (ref 0–0.2)
BASOPHILS NFR BLD AUTO: 0.7 % (ref 0–1.5)
BILIRUB SERPL-MCNC: 0.2 MG/DL (ref 0–1.2)
BUN SERPL-MCNC: 27 MG/DL (ref 8–23)
BUN/CREAT SERPL: 22.3 (ref 7–25)
CALCIUM SPEC-SCNC: 10.3 MG/DL (ref 8.6–10.5)
CHLORIDE SERPL-SCNC: 104 MMOL/L (ref 98–107)
CO2 SERPL-SCNC: 27.6 MMOL/L (ref 22–29)
CREAT SERPL-MCNC: 1.21 MG/DL (ref 0.57–1)
DEPRECATED RDW RBC AUTO: 42.6 FL (ref 37–54)
EOSINOPHIL # BLD AUTO: 0.44 10*3/MM3 (ref 0–0.4)
EOSINOPHIL NFR BLD AUTO: 7.3 % (ref 0.3–6.2)
ERYTHROCYTE [DISTWIDTH] IN BLOOD BY AUTOMATED COUNT: 12.3 % (ref 12.3–15.4)
GFR SERPL CREATININE-BSD FRML MDRD: 43 ML/MIN/1.73
GLOBULIN UR ELPH-MCNC: 3.1 GM/DL
GLUCOSE SERPL-MCNC: 96 MG/DL (ref 65–99)
HCT VFR BLD AUTO: 39.9 % (ref 34–46.6)
HGB BLD-MCNC: 12.5 G/DL (ref 12–15.9)
IMM GRANULOCYTES # BLD AUTO: 0.01 10*3/MM3 (ref 0–0.05)
IMM GRANULOCYTES NFR BLD AUTO: 0.2 % (ref 0–0.5)
LYMPHOCYTES # BLD AUTO: 1.92 10*3/MM3 (ref 0.7–3.1)
LYMPHOCYTES NFR BLD AUTO: 31.8 % (ref 19.6–45.3)
MCH RBC QN AUTO: 29.7 PG (ref 26.6–33)
MCHC RBC AUTO-ENTMCNC: 31.3 G/DL (ref 31.5–35.7)
MCV RBC AUTO: 94.8 FL (ref 79–97)
MONOCYTES # BLD AUTO: 0.5 10*3/MM3 (ref 0.1–0.9)
MONOCYTES NFR BLD AUTO: 8.3 % (ref 5–12)
NEUTROPHILS NFR BLD AUTO: 3.13 10*3/MM3 (ref 1.7–7)
NEUTROPHILS NFR BLD AUTO: 51.7 % (ref 42.7–76)
NRBC BLD AUTO-RTO: 0 /100 WBC (ref 0–0.2)
PLATELET # BLD AUTO: 269 10*3/MM3 (ref 140–450)
PMV BLD AUTO: 10.3 FL (ref 6–12)
POTASSIUM SERPL-SCNC: 4.7 MMOL/L (ref 3.5–5.2)
PROT SERPL-MCNC: 6.9 G/DL (ref 6–8.5)
RBC # BLD AUTO: 4.21 10*6/MM3 (ref 3.77–5.28)
SODIUM SERPL-SCNC: 140 MMOL/L (ref 136–145)
T4 FREE SERPL-MCNC: 1.07 NG/DL (ref 0.93–1.7)
TSH SERPL DL<=0.05 MIU/L-ACNC: 2.63 UIU/ML (ref 0.27–4.2)
WBC # BLD AUTO: 6.04 10*3/MM3 (ref 3.4–10.8)

## 2020-11-06 PROCEDURE — 84439 ASSAY OF FREE THYROXINE: CPT | Performed by: FAMILY MEDICINE

## 2020-11-06 PROCEDURE — 80053 COMPREHEN METABOLIC PANEL: CPT | Performed by: FAMILY MEDICINE

## 2020-11-06 PROCEDURE — 85025 COMPLETE CBC W/AUTO DIFF WBC: CPT | Performed by: FAMILY MEDICINE

## 2020-11-06 PROCEDURE — 84443 ASSAY THYROID STIM HORMONE: CPT | Performed by: FAMILY MEDICINE

## 2020-11-24 ENCOUNTER — LAB REQUISITION (OUTPATIENT)
Dept: LAB | Facility: HOSPITAL | Age: 80
End: 2020-11-24

## 2020-11-24 DIAGNOSIS — E55.9 VITAMIN D DEFICIENCY, UNSPECIFIED: ICD-10-CM

## 2020-11-24 DIAGNOSIS — R79.89 OTHER SPECIFIED ABNORMAL FINDINGS OF BLOOD CHEMISTRY: ICD-10-CM

## 2020-11-24 DIAGNOSIS — R53.83 OTHER FATIGUE: ICD-10-CM

## 2020-11-24 DIAGNOSIS — Z79.899 OTHER LONG TERM (CURRENT) DRUG THERAPY: ICD-10-CM

## 2020-11-24 LAB
ALBUMIN SERPL-MCNC: 4.1 G/DL (ref 3.5–5.2)
ALBUMIN/GLOB SERPL: 1.2 G/DL
ALP SERPL-CCNC: 115 U/L (ref 39–117)
ALT SERPL W P-5'-P-CCNC: 17 U/L (ref 1–33)
ANION GAP SERPL CALCULATED.3IONS-SCNC: 10.6 MMOL/L (ref 5–15)
AST SERPL-CCNC: 26 U/L (ref 1–32)
BILIRUB SERPL-MCNC: 0.2 MG/DL (ref 0–1.2)
BUN SERPL-MCNC: 22 MG/DL (ref 8–23)
BUN/CREAT SERPL: 22 (ref 7–25)
CALCIUM SPEC-SCNC: 9.8 MG/DL (ref 8.6–10.5)
CHLORIDE SERPL-SCNC: 104 MMOL/L (ref 98–107)
CO2 SERPL-SCNC: 27.4 MMOL/L (ref 22–29)
CREAT SERPL-MCNC: 1 MG/DL (ref 0.57–1)
GFR SERPL CREATININE-BSD FRML MDRD: 53 ML/MIN/1.73
GLOBULIN UR ELPH-MCNC: 3.3 GM/DL
GLUCOSE SERPL-MCNC: 90 MG/DL (ref 65–99)
MAGNESIUM SERPL-MCNC: 2.2 MG/DL (ref 1.6–2.4)
PHOSPHATE SERPL-MCNC: 3.3 MG/DL (ref 2.5–4.5)
POTASSIUM SERPL-SCNC: 4.3 MMOL/L (ref 3.5–5.2)
PROT SERPL-MCNC: 7.4 G/DL (ref 6–8.5)
SODIUM SERPL-SCNC: 142 MMOL/L (ref 136–145)

## 2020-11-24 PROCEDURE — 80053 COMPREHEN METABOLIC PANEL: CPT | Performed by: FAMILY MEDICINE

## 2020-11-24 PROCEDURE — 84100 ASSAY OF PHOSPHORUS: CPT | Performed by: FAMILY MEDICINE

## 2020-11-24 PROCEDURE — 83735 ASSAY OF MAGNESIUM: CPT | Performed by: FAMILY MEDICINE

## 2020-11-27 DIAGNOSIS — R91.1 LUNG NODULE: Primary | ICD-10-CM

## 2020-11-27 RX ORDER — DIAZEPAM 5 MG/1
TABLET ORAL
Qty: 2 TABLET | Refills: 0 | Status: ON HOLD | OUTPATIENT
Start: 2020-11-27 | End: 2022-02-10

## 2020-12-03 ENCOUNTER — LAB REQUISITION (OUTPATIENT)
Dept: LAB | Facility: HOSPITAL | Age: 80
End: 2020-12-03

## 2020-12-03 DIAGNOSIS — R73.09 OTHER ABNORMAL GLUCOSE: ICD-10-CM

## 2020-12-03 DIAGNOSIS — D51.8 OTHER VITAMIN B12 DEFICIENCY ANEMIAS: ICD-10-CM

## 2020-12-03 DIAGNOSIS — E55.9 VITAMIN D DEFICIENCY, UNSPECIFIED: ICD-10-CM

## 2020-12-03 DIAGNOSIS — R94.6 ABNORMAL RESULTS OF THYROID FUNCTION STUDIES: ICD-10-CM

## 2020-12-03 DIAGNOSIS — R79.89 OTHER SPECIFIED ABNORMAL FINDINGS OF BLOOD CHEMISTRY: ICD-10-CM

## 2020-12-03 DIAGNOSIS — R53.83 OTHER FATIGUE: ICD-10-CM

## 2020-12-03 DIAGNOSIS — E22.1 HYPERPROLACTINEMIA (HCC): ICD-10-CM

## 2020-12-03 DIAGNOSIS — D64.9 ANEMIA, UNSPECIFIED: ICD-10-CM

## 2020-12-03 DIAGNOSIS — Z79.899 OTHER LONG TERM (CURRENT) DRUG THERAPY: ICD-10-CM

## 2020-12-03 LAB
25(OH)D3 SERPL-MCNC: 39 NG/ML (ref 30–100)
ALBUMIN SERPL-MCNC: 3.5 G/DL (ref 3.5–5.2)
ALBUMIN/GLOB SERPL: 1.3 G/DL
ALP SERPL-CCNC: 99 U/L (ref 39–117)
ALT SERPL W P-5'-P-CCNC: 16 U/L (ref 1–33)
ANION GAP SERPL CALCULATED.3IONS-SCNC: 9 MMOL/L (ref 5–15)
AST SERPL-CCNC: 17 U/L (ref 1–32)
BASOPHILS # BLD AUTO: 0.03 10*3/MM3 (ref 0–0.2)
BASOPHILS NFR BLD AUTO: 0.4 % (ref 0–1.5)
BILIRUB SERPL-MCNC: 0.2 MG/DL (ref 0–1.2)
BUN SERPL-MCNC: 32 MG/DL (ref 8–23)
BUN/CREAT SERPL: 27.1 (ref 7–25)
CALCIUM SPEC-SCNC: 9.4 MG/DL (ref 8.6–10.5)
CHLORIDE SERPL-SCNC: 106 MMOL/L (ref 98–107)
CHOLEST SERPL-MCNC: 154 MG/DL (ref 0–200)
CO2 SERPL-SCNC: 27 MMOL/L (ref 22–29)
CREAT SERPL-MCNC: 1.18 MG/DL (ref 0.57–1)
DEPRECATED RDW RBC AUTO: 45.3 FL (ref 37–54)
EOSINOPHIL # BLD AUTO: 0.28 10*3/MM3 (ref 0–0.4)
EOSINOPHIL NFR BLD AUTO: 3.9 % (ref 0.3–6.2)
ERYTHROCYTE [DISTWIDTH] IN BLOOD BY AUTOMATED COUNT: 12.6 % (ref 12.3–15.4)
FOLATE SERPL-MCNC: >20 NG/ML (ref 4.78–24.2)
GFR SERPL CREATININE-BSD FRML MDRD: 44 ML/MIN/1.73
GLOBULIN UR ELPH-MCNC: 2.8 GM/DL
GLUCOSE SERPL-MCNC: 90 MG/DL (ref 65–99)
HBA1C MFR BLD: 6.1 % (ref 4.8–5.6)
HCT VFR BLD AUTO: 36.5 % (ref 34–46.6)
HDLC SERPL-MCNC: 85 MG/DL (ref 40–60)
HGB BLD-MCNC: 11.5 G/DL (ref 12–15.9)
IMM GRANULOCYTES # BLD AUTO: 0.01 10*3/MM3 (ref 0–0.05)
IMM GRANULOCYTES NFR BLD AUTO: 0.1 % (ref 0–0.5)
LDLC SERPL CALC-MCNC: 58 MG/DL (ref 0–100)
LDLC/HDLC SERPL: 0.69 {RATIO}
LYMPHOCYTES # BLD AUTO: 2.17 10*3/MM3 (ref 0.7–3.1)
LYMPHOCYTES NFR BLD AUTO: 29.9 % (ref 19.6–45.3)
MCH RBC QN AUTO: 30.2 PG (ref 26.6–33)
MCHC RBC AUTO-ENTMCNC: 31.5 G/DL (ref 31.5–35.7)
MCV RBC AUTO: 95.8 FL (ref 79–97)
MONOCYTES # BLD AUTO: 0.66 10*3/MM3 (ref 0.1–0.9)
MONOCYTES NFR BLD AUTO: 9.1 % (ref 5–12)
NEUTROPHILS NFR BLD AUTO: 4.11 10*3/MM3 (ref 1.7–7)
NEUTROPHILS NFR BLD AUTO: 56.6 % (ref 42.7–76)
PLATELET # BLD AUTO: 340 10*3/MM3 (ref 140–450)
PMV BLD AUTO: 10.9 FL (ref 6–12)
POTASSIUM SERPL-SCNC: 4.8 MMOL/L (ref 3.5–5.2)
PROLACTIN SERPL-MCNC: 18.5 NG/ML (ref 4.79–23.3)
PROT SERPL-MCNC: 6.3 G/DL (ref 6–8.5)
RBC # BLD AUTO: 3.81 10*6/MM3 (ref 3.77–5.28)
SODIUM SERPL-SCNC: 142 MMOL/L (ref 136–145)
T4 FREE SERPL-MCNC: 0.93 NG/DL (ref 0.93–1.7)
TRIGL SERPL-MCNC: 51 MG/DL (ref 0–150)
TSH SERPL DL<=0.05 MIU/L-ACNC: 3.1 UIU/ML (ref 0.27–4.2)
VIT B12 BLD-MCNC: 464 PG/ML (ref 211–946)
VLDLC SERPL-MCNC: 11 MG/DL (ref 5–40)
WBC # BLD AUTO: 7.26 10*3/MM3 (ref 3.4–10.8)

## 2020-12-03 PROCEDURE — 84146 ASSAY OF PROLACTIN: CPT | Performed by: FAMILY MEDICINE

## 2020-12-03 PROCEDURE — 82306 VITAMIN D 25 HYDROXY: CPT | Performed by: FAMILY MEDICINE

## 2020-12-03 PROCEDURE — 84443 ASSAY THYROID STIM HORMONE: CPT | Performed by: FAMILY MEDICINE

## 2020-12-03 PROCEDURE — 84439 ASSAY OF FREE THYROXINE: CPT | Performed by: FAMILY MEDICINE

## 2020-12-03 PROCEDURE — 82607 VITAMIN B-12: CPT | Performed by: FAMILY MEDICINE

## 2020-12-03 PROCEDURE — 85025 COMPLETE CBC W/AUTO DIFF WBC: CPT | Performed by: FAMILY MEDICINE

## 2020-12-03 PROCEDURE — 83036 HEMOGLOBIN GLYCOSYLATED A1C: CPT | Performed by: FAMILY MEDICINE

## 2020-12-03 PROCEDURE — 80061 LIPID PANEL: CPT | Performed by: FAMILY MEDICINE

## 2020-12-03 PROCEDURE — 80053 COMPREHEN METABOLIC PANEL: CPT | Performed by: FAMILY MEDICINE

## 2020-12-03 PROCEDURE — 82746 ASSAY OF FOLIC ACID SERUM: CPT | Performed by: FAMILY MEDICINE

## 2020-12-08 ENCOUNTER — APPOINTMENT (OUTPATIENT)
Dept: CT IMAGING | Facility: HOSPITAL | Age: 80
End: 2020-12-08

## 2020-12-09 ENCOUNTER — HOSPITAL ENCOUNTER (OUTPATIENT)
Dept: CT IMAGING | Facility: HOSPITAL | Age: 80
Discharge: HOME | End: 2020-12-09
Admitting: FAMILY MEDICINE

## 2020-12-09 DIAGNOSIS — R91.1 LUNG NODULE: ICD-10-CM

## 2020-12-09 PROCEDURE — 71250 CT THORAX DX C-: CPT

## 2020-12-11 RX ORDER — TRAMADOL HYDROCHLORIDE 50 MG/1
100 TABLET ORAL EVERY 8 HOURS
Qty: 30 TABLET | Refills: 0 | Status: SHIPPED | OUTPATIENT
Start: 2020-12-11 | End: 2020-12-16 | Stop reason: SDUPTHER

## 2020-12-16 DIAGNOSIS — R52 PAIN: Primary | ICD-10-CM

## 2020-12-16 RX ORDER — TRAMADOL HYDROCHLORIDE 50 MG/1
100 TABLET ORAL EVERY 8 HOURS
Qty: 30 TABLET | Refills: 0 | Status: ON HOLD | OUTPATIENT
Start: 2020-12-16 | End: 2022-02-10

## 2021-01-28 ENCOUNTER — LAB REQUISITION (OUTPATIENT)
Dept: LAB | Facility: HOSPITAL | Age: 81
End: 2021-01-28

## 2021-01-28 DIAGNOSIS — N18.9 CHRONIC KIDNEY DISEASE, UNSPECIFIED: ICD-10-CM

## 2021-01-28 DIAGNOSIS — R79.89 OTHER SPECIFIED ABNORMAL FINDINGS OF BLOOD CHEMISTRY: ICD-10-CM

## 2021-01-28 LAB
ANION GAP SERPL CALCULATED.3IONS-SCNC: 7.1 MMOL/L (ref 5–15)
BUN SERPL-MCNC: 28 MG/DL (ref 8–23)
BUN/CREAT SERPL: 21.9 (ref 7–25)
CALCIUM SPEC-SCNC: 9.7 MG/DL (ref 8.6–10.5)
CHLORIDE SERPL-SCNC: 105 MMOL/L (ref 98–107)
CO2 SERPL-SCNC: 30.9 MMOL/L (ref 22–29)
CREAT SERPL-MCNC: 1.28 MG/DL (ref 0.57–1)
GFR SERPL CREATININE-BSD FRML MDRD: 40 ML/MIN/1.73
GLUCOSE SERPL-MCNC: 92 MG/DL (ref 65–99)
POTASSIUM SERPL-SCNC: 4.8 MMOL/L (ref 3.5–5.2)
SODIUM SERPL-SCNC: 143 MMOL/L (ref 136–145)

## 2021-01-28 PROCEDURE — 80048 BASIC METABOLIC PNL TOTAL CA: CPT | Performed by: FAMILY MEDICINE

## 2021-02-25 ENCOUNTER — LAB REQUISITION (OUTPATIENT)
Dept: LAB | Facility: HOSPITAL | Age: 81
End: 2021-02-25

## 2021-02-25 DIAGNOSIS — N18.9 CHRONIC KIDNEY DISEASE, UNSPECIFIED: ICD-10-CM

## 2021-02-25 LAB
ANION GAP SERPL CALCULATED.3IONS-SCNC: 7.7 MMOL/L (ref 5–15)
BUN SERPL-MCNC: 28 MG/DL (ref 8–23)
BUN/CREAT SERPL: 23.5 (ref 7–25)
CALCIUM SPEC-SCNC: 10.1 MG/DL (ref 8.6–10.5)
CHLORIDE SERPL-SCNC: 106 MMOL/L (ref 98–107)
CO2 SERPL-SCNC: 30.3 MMOL/L (ref 22–29)
CREAT SERPL-MCNC: 1.19 MG/DL (ref 0.57–1)
GFR SERPL CREATININE-BSD FRML MDRD: 44 ML/MIN/1.73
GLUCOSE SERPL-MCNC: 103 MG/DL (ref 65–99)
POTASSIUM SERPL-SCNC: 5.2 MMOL/L (ref 3.5–5.2)
SODIUM SERPL-SCNC: 144 MMOL/L (ref 136–145)

## 2021-02-25 PROCEDURE — 80048 BASIC METABOLIC PNL TOTAL CA: CPT | Performed by: FAMILY MEDICINE

## 2021-03-22 RX ORDER — GABAPENTIN 100 MG/1
100 CAPSULE ORAL NIGHTLY
Qty: 10 CAPSULE | Refills: 0 | Status: SHIPPED | OUTPATIENT
Start: 2021-03-22 | End: 2021-09-26 | Stop reason: SDUPTHER

## 2021-03-29 ENCOUNTER — LAB REQUISITION (OUTPATIENT)
Dept: LAB | Facility: HOSPITAL | Age: 81
End: 2021-03-29

## 2021-03-29 DIAGNOSIS — N39.0 URINARY TRACT INFECTION, SITE NOT SPECIFIED: ICD-10-CM

## 2021-03-29 LAB
BACTERIA UR QL AUTO: ABNORMAL /HPF
BILIRUB UR QL STRIP: NEGATIVE
CLARITY UR: ABNORMAL
COLOR UR: ABNORMAL
GLUCOSE UR STRIP-MCNC: NEGATIVE MG/DL
HGB UR QL STRIP.AUTO: NEGATIVE
HYALINE CASTS UR QL AUTO: ABNORMAL /LPF
KETONES UR QL STRIP: NEGATIVE
LEUKOCYTE ESTERASE UR QL STRIP.AUTO: ABNORMAL
NITRITE UR QL STRIP: POSITIVE
PH UR STRIP.AUTO: 7 [PH] (ref 4.5–8)
PROT UR QL STRIP: NEGATIVE
RBC # UR: ABNORMAL /HPF
REF LAB TEST METHOD: ABNORMAL
SP GR UR STRIP: 1.02 (ref 1–1.03)
SQUAMOUS #/AREA URNS HPF: ABNORMAL /HPF
UROBILINOGEN UR QL STRIP: ABNORMAL
WBC UR QL AUTO: ABNORMAL /HPF

## 2021-03-29 PROCEDURE — 87186 SC STD MICRODIL/AGAR DIL: CPT | Performed by: FAMILY MEDICINE

## 2021-03-29 PROCEDURE — 81001 URINALYSIS AUTO W/SCOPE: CPT | Performed by: FAMILY MEDICINE

## 2021-03-29 PROCEDURE — 87088 URINE BACTERIA CULTURE: CPT | Performed by: FAMILY MEDICINE

## 2021-03-29 PROCEDURE — 87086 URINE CULTURE/COLONY COUNT: CPT | Performed by: FAMILY MEDICINE

## 2021-03-31 LAB — BACTERIA SPEC AEROBE CULT: ABNORMAL

## 2021-04-01 ENCOUNTER — TRANSCRIBE ORDERS (OUTPATIENT)
Dept: ADMINISTRATIVE | Facility: HOSPITAL | Age: 81
End: 2021-04-01

## 2021-04-01 ENCOUNTER — LAB REQUISITION (OUTPATIENT)
Dept: LAB | Facility: HOSPITAL | Age: 81
End: 2021-04-01

## 2021-04-01 DIAGNOSIS — Z13.820 OSTEOPOROSIS SCREENING: Primary | ICD-10-CM

## 2021-04-01 DIAGNOSIS — R53.83 OTHER FATIGUE: ICD-10-CM

## 2021-04-01 DIAGNOSIS — R79.89 OTHER SPECIFIED ABNORMAL FINDINGS OF BLOOD CHEMISTRY: ICD-10-CM

## 2021-04-01 LAB
ANION GAP SERPL CALCULATED.3IONS-SCNC: 8 MMOL/L (ref 5–15)
BUN SERPL-MCNC: 32 MG/DL (ref 8–23)
BUN/CREAT SERPL: 23.7 (ref 7–25)
CALCIUM SPEC-SCNC: 10 MG/DL (ref 8.6–10.5)
CHLORIDE SERPL-SCNC: 104 MMOL/L (ref 98–107)
CO2 SERPL-SCNC: 26 MMOL/L (ref 22–29)
CREAT SERPL-MCNC: 1.35 MG/DL (ref 0.57–1)
GFR SERPL CREATININE-BSD FRML MDRD: 38 ML/MIN/1.73
GLUCOSE SERPL-MCNC: 88 MG/DL (ref 65–99)
POTASSIUM SERPL-SCNC: 4.6 MMOL/L (ref 3.5–5.2)
SODIUM SERPL-SCNC: 138 MMOL/L (ref 136–145)

## 2021-04-01 PROCEDURE — 80048 BASIC METABOLIC PNL TOTAL CA: CPT | Performed by: FAMILY MEDICINE

## 2021-04-21 ENCOUNTER — LAB REQUISITION (OUTPATIENT)
Dept: LAB | Facility: HOSPITAL | Age: 81
End: 2021-04-21

## 2021-04-21 DIAGNOSIS — N18.9 CHRONIC KIDNEY DISEASE, UNSPECIFIED: ICD-10-CM

## 2021-04-21 DIAGNOSIS — R53.83 OTHER FATIGUE: ICD-10-CM

## 2021-04-21 LAB
ANION GAP SERPL CALCULATED.3IONS-SCNC: 8.2 MMOL/L (ref 5–15)
BUN SERPL-MCNC: 25 MG/DL (ref 8–23)
BUN/CREAT SERPL: 23.1 (ref 7–25)
CALCIUM SPEC-SCNC: 10 MG/DL (ref 8.6–10.5)
CHLORIDE SERPL-SCNC: 104 MMOL/L (ref 98–107)
CO2 SERPL-SCNC: 27.8 MMOL/L (ref 22–29)
CREAT SERPL-MCNC: 1.08 MG/DL (ref 0.57–1)
GFR SERPL CREATININE-BSD FRML MDRD: 49 ML/MIN/1.73
GLUCOSE SERPL-MCNC: 92 MG/DL (ref 65–99)
POTASSIUM SERPL-SCNC: 4.6 MMOL/L (ref 3.5–5.2)
SODIUM SERPL-SCNC: 140 MMOL/L (ref 136–145)

## 2021-04-21 PROCEDURE — 80048 BASIC METABOLIC PNL TOTAL CA: CPT | Performed by: FAMILY MEDICINE

## 2021-05-06 ENCOUNTER — TELEPHONE (OUTPATIENT)
Dept: FAMILY MEDICINE CLINIC | Facility: CLINIC | Age: 81
End: 2021-05-06

## 2021-05-06 NOTE — TELEPHONE ENCOUNTER
Caller: [facility]         Best call back number: [phone]    What form or medical record are you requesting: DEXA SCAN-NOT SIGNED AND NON COMPLIANT CODE FOR MEDICARE BILLING M81.0 WOULD WORK    Who is requesting this form or medical record from you: PATEL WITH [facility]    How would you like to receive the form or medical records (pick-up, mail, fax): FAX  If fax, what is the fax number: [phone] IF NEEDING TO FAX OR CAN DO IN EPIC      Timeframe paperwork needed: ASAP    Additional notes: NEEDS BACK TODAY.STATES CAN ALSO BE CHANGED IN EPIC

## 2021-05-07 ENCOUNTER — APPOINTMENT (OUTPATIENT)
Dept: BONE DENSITY | Facility: HOSPITAL | Age: 81
End: 2021-05-07

## 2021-05-07 ENCOUNTER — TRANSCRIBE ORDERS (OUTPATIENT)
Dept: ADMINISTRATIVE | Facility: HOSPITAL | Age: 81
End: 2021-05-07

## 2021-05-07 DIAGNOSIS — Z12.31 VISIT FOR SCREENING MAMMOGRAM: Primary | ICD-10-CM

## 2021-05-07 DIAGNOSIS — Z13.820 OSTEOPOROSIS SCREENING: ICD-10-CM

## 2021-05-07 PROCEDURE — 77080 DXA BONE DENSITY AXIAL: CPT

## 2021-05-12 ENCOUNTER — LAB REQUISITION (OUTPATIENT)
Dept: LAB | Facility: HOSPITAL | Age: 81
End: 2021-05-12

## 2021-05-12 DIAGNOSIS — E55.9 VITAMIN D DEFICIENCY, UNSPECIFIED: ICD-10-CM

## 2021-05-12 DIAGNOSIS — Z79.899 OTHER LONG TERM (CURRENT) DRUG THERAPY: ICD-10-CM

## 2021-05-12 DIAGNOSIS — R53.83 OTHER FATIGUE: ICD-10-CM

## 2021-05-12 LAB
ALBUMIN SERPL-MCNC: 4.5 G/DL (ref 3.5–5.2)
ALBUMIN/GLOB SERPL: 1.6 G/DL
ALP SERPL-CCNC: 107 U/L (ref 39–117)
ALT SERPL W P-5'-P-CCNC: 16 U/L (ref 1–33)
ANION GAP SERPL CALCULATED.3IONS-SCNC: 13.7 MMOL/L (ref 5–15)
AST SERPL-CCNC: 20 U/L (ref 1–32)
BILIRUB SERPL-MCNC: 0.2 MG/DL (ref 0–1.2)
BUN SERPL-MCNC: 31 MG/DL (ref 8–23)
BUN/CREAT SERPL: 29 (ref 7–25)
CALCIUM SPEC-SCNC: 10.5 MG/DL (ref 8.6–10.5)
CHLORIDE SERPL-SCNC: 103 MMOL/L (ref 98–107)
CO2 SERPL-SCNC: 24.3 MMOL/L (ref 22–29)
CREAT SERPL-MCNC: 1.07 MG/DL (ref 0.57–1)
GFR SERPL CREATININE-BSD FRML MDRD: 49 ML/MIN/1.73
GLOBULIN UR ELPH-MCNC: 2.8 GM/DL
GLUCOSE SERPL-MCNC: 116 MG/DL (ref 65–99)
MAGNESIUM SERPL-MCNC: 2.3 MG/DL (ref 1.6–2.4)
PHOSPHATE SERPL-MCNC: 4.3 MG/DL (ref 2.5–4.5)
POTASSIUM SERPL-SCNC: 5.1 MMOL/L (ref 3.5–5.2)
PROT SERPL-MCNC: 7.3 G/DL (ref 6–8.5)
SODIUM SERPL-SCNC: 141 MMOL/L (ref 136–145)

## 2021-05-12 PROCEDURE — 83735 ASSAY OF MAGNESIUM: CPT | Performed by: FAMILY MEDICINE

## 2021-05-12 PROCEDURE — 80053 COMPREHEN METABOLIC PANEL: CPT | Performed by: FAMILY MEDICINE

## 2021-05-12 PROCEDURE — 84100 ASSAY OF PHOSPHORUS: CPT | Performed by: FAMILY MEDICINE

## 2021-06-09 ENCOUNTER — HOSPITAL ENCOUNTER (OUTPATIENT)
Dept: MAMMOGRAPHY | Facility: HOSPITAL | Age: 81
Discharge: HOME | End: 2021-06-09
Admitting: FAMILY MEDICINE

## 2021-06-09 DIAGNOSIS — Z12.31 VISIT FOR SCREENING MAMMOGRAM: ICD-10-CM

## 2021-06-09 PROCEDURE — 77063 BREAST TOMOSYNTHESIS BI: CPT

## 2021-06-09 PROCEDURE — 77067 SCR MAMMO BI INCL CAD: CPT

## 2021-06-11 ENCOUNTER — LAB REQUISITION (OUTPATIENT)
Dept: LAB | Facility: HOSPITAL | Age: 81
End: 2021-06-11

## 2021-06-11 DIAGNOSIS — G40.89 OTHER SEIZURES (HCC): ICD-10-CM

## 2021-06-11 DIAGNOSIS — R73.09 OTHER ABNORMAL GLUCOSE: ICD-10-CM

## 2021-06-11 DIAGNOSIS — D51.8 OTHER VITAMIN B12 DEFICIENCY ANEMIAS: ICD-10-CM

## 2021-06-11 DIAGNOSIS — R53.83 OTHER FATIGUE: ICD-10-CM

## 2021-06-11 DIAGNOSIS — E78.9 DISORDER OF LIPOPROTEIN METABOLISM, UNSPECIFIED: ICD-10-CM

## 2021-06-11 DIAGNOSIS — Z79.899 OTHER LONG TERM (CURRENT) DRUG THERAPY: ICD-10-CM

## 2021-06-11 DIAGNOSIS — E55.9 VITAMIN D DEFICIENCY, UNSPECIFIED: ICD-10-CM

## 2021-06-11 DIAGNOSIS — N18.9 CHRONIC KIDNEY DISEASE, UNSPECIFIED: ICD-10-CM

## 2021-06-11 DIAGNOSIS — D52.0 DIETARY FOLATE DEFICIENCY ANEMIA: ICD-10-CM

## 2021-06-11 LAB
25(OH)D3 SERPL-MCNC: 44.8 NG/ML (ref 30–100)
ALBUMIN SERPL-MCNC: 4 G/DL (ref 3.5–5.2)
ALBUMIN/GLOB SERPL: 1.4 G/DL
ALP SERPL-CCNC: 109 U/L (ref 39–117)
ALT SERPL W P-5'-P-CCNC: 16 U/L (ref 1–33)
ANION GAP SERPL CALCULATED.3IONS-SCNC: 7.7 MMOL/L (ref 5–15)
AST SERPL-CCNC: 16 U/L (ref 1–32)
BASOPHILS # BLD AUTO: 0.04 10*3/MM3 (ref 0–0.2)
BASOPHILS NFR BLD AUTO: 0.7 % (ref 0–1.5)
BILIRUB SERPL-MCNC: <0.2 MG/DL (ref 0–1.2)
BUN SERPL-MCNC: 24 MG/DL (ref 8–23)
BUN/CREAT SERPL: 23.5 (ref 7–25)
CALCIUM SPEC-SCNC: 9.4 MG/DL (ref 8.6–10.5)
CHLORIDE SERPL-SCNC: 105 MMOL/L (ref 98–107)
CHOLEST SERPL-MCNC: 174 MG/DL (ref 0–200)
CO2 SERPL-SCNC: 27.3 MMOL/L (ref 22–29)
CREAT SERPL-MCNC: 1.02 MG/DL (ref 0.57–1)
DEPRECATED RDW RBC AUTO: 44.5 FL (ref 37–54)
EOSINOPHIL # BLD AUTO: 0.49 10*3/MM3 (ref 0–0.4)
EOSINOPHIL NFR BLD AUTO: 8.2 % (ref 0.3–6.2)
ERYTHROCYTE [DISTWIDTH] IN BLOOD BY AUTOMATED COUNT: 12.8 % (ref 12.3–15.4)
FOLATE SERPL-MCNC: >20 NG/ML (ref 4.78–24.2)
GFR SERPL CREATININE-BSD FRML MDRD: 52 ML/MIN/1.73
GLOBULIN UR ELPH-MCNC: 2.9 GM/DL
GLUCOSE SERPL-MCNC: 86 MG/DL (ref 65–99)
HBA1C MFR BLD: 5.7 % (ref 4.8–5.6)
HCT VFR BLD AUTO: 38.9 % (ref 34–46.6)
HDLC SERPL-MCNC: 91 MG/DL (ref 40–60)
HGB BLD-MCNC: 12.4 G/DL (ref 12–15.9)
IMM GRANULOCYTES # BLD AUTO: 0.02 10*3/MM3 (ref 0–0.05)
IMM GRANULOCYTES NFR BLD AUTO: 0.3 % (ref 0–0.5)
LDLC SERPL CALC-MCNC: 75 MG/DL (ref 0–100)
LDLC/HDLC SERPL: 0.84 {RATIO}
LYMPHOCYTES # BLD AUTO: 2.42 10*3/MM3 (ref 0.7–3.1)
LYMPHOCYTES NFR BLD AUTO: 40.7 % (ref 19.6–45.3)
MCH RBC QN AUTO: 30.1 PG (ref 26.6–33)
MCHC RBC AUTO-ENTMCNC: 31.9 G/DL (ref 31.5–35.7)
MCV RBC AUTO: 94.4 FL (ref 79–97)
MONOCYTES # BLD AUTO: 0.46 10*3/MM3 (ref 0.1–0.9)
MONOCYTES NFR BLD AUTO: 7.7 % (ref 5–12)
NEUTROPHILS NFR BLD AUTO: 2.52 10*3/MM3 (ref 1.7–7)
NEUTROPHILS NFR BLD AUTO: 42.4 % (ref 42.7–76)
NRBC BLD AUTO-RTO: 0 /100 WBC (ref 0–0.2)
PLATELET # BLD AUTO: 264 10*3/MM3 (ref 140–450)
PMV BLD AUTO: 10.6 FL (ref 6–12)
POTASSIUM SERPL-SCNC: 4.5 MMOL/L (ref 3.5–5.2)
PROT SERPL-MCNC: 6.9 G/DL (ref 6–8.5)
RBC # BLD AUTO: 4.12 10*6/MM3 (ref 3.77–5.28)
SODIUM SERPL-SCNC: 140 MMOL/L (ref 136–145)
T4 FREE SERPL-MCNC: 0.99 NG/DL (ref 0.93–1.7)
TRIGL SERPL-MCNC: 35 MG/DL (ref 0–150)
TSH SERPL DL<=0.05 MIU/L-ACNC: 2.52 UIU/ML (ref 0.27–4.2)
VIT B12 BLD-MCNC: 444 PG/ML (ref 211–946)
VLDLC SERPL-MCNC: 8 MG/DL (ref 5–40)
WBC # BLD AUTO: 5.95 10*3/MM3 (ref 3.4–10.8)

## 2021-06-11 PROCEDURE — 82306 VITAMIN D 25 HYDROXY: CPT | Performed by: FAMILY MEDICINE

## 2021-06-11 PROCEDURE — 85025 COMPLETE CBC W/AUTO DIFF WBC: CPT | Performed by: FAMILY MEDICINE

## 2021-06-11 PROCEDURE — 82746 ASSAY OF FOLIC ACID SERUM: CPT | Performed by: FAMILY MEDICINE

## 2021-06-11 PROCEDURE — 82607 VITAMIN B-12: CPT | Performed by: FAMILY MEDICINE

## 2021-06-11 PROCEDURE — 80061 LIPID PANEL: CPT | Performed by: FAMILY MEDICINE

## 2021-06-11 PROCEDURE — 80053 COMPREHEN METABOLIC PANEL: CPT | Performed by: FAMILY MEDICINE

## 2021-06-11 PROCEDURE — 83036 HEMOGLOBIN GLYCOSYLATED A1C: CPT | Performed by: FAMILY MEDICINE

## 2021-06-11 PROCEDURE — 84443 ASSAY THYROID STIM HORMONE: CPT | Performed by: FAMILY MEDICINE

## 2021-06-11 PROCEDURE — 84439 ASSAY OF FREE THYROXINE: CPT | Performed by: FAMILY MEDICINE

## 2021-07-02 ENCOUNTER — TRANSCRIBE ORDERS (OUTPATIENT)
Dept: ADMINISTRATIVE | Facility: HOSPITAL | Age: 81
End: 2021-07-02

## 2021-07-02 DIAGNOSIS — E04.1 THYROID NODULE: Primary | ICD-10-CM

## 2021-07-16 ENCOUNTER — LAB REQUISITION (OUTPATIENT)
Dept: LAB | Facility: HOSPITAL | Age: 81
End: 2021-07-16

## 2021-07-16 ENCOUNTER — HOSPITAL ENCOUNTER (OUTPATIENT)
Dept: ULTRASOUND IMAGING | Facility: HOSPITAL | Age: 81
Discharge: HOME | End: 2021-07-16
Admitting: FAMILY MEDICINE

## 2021-07-16 DIAGNOSIS — R82.998 OTHER ABNORMAL FINDINGS IN URINE: ICD-10-CM

## 2021-07-16 DIAGNOSIS — E04.1 THYROID NODULE: ICD-10-CM

## 2021-07-16 DIAGNOSIS — N39.0 URINARY TRACT INFECTION, SITE NOT SPECIFIED: ICD-10-CM

## 2021-07-16 LAB
BACTERIA UR QL AUTO: ABNORMAL /HPF
BILIRUB UR QL STRIP: NEGATIVE
CLARITY UR: ABNORMAL
COLOR UR: ABNORMAL
GLUCOSE UR STRIP-MCNC: NEGATIVE MG/DL
HGB UR QL STRIP.AUTO: ABNORMAL
HYALINE CASTS UR QL AUTO: ABNORMAL /LPF
KETONES UR QL STRIP: NEGATIVE
LEUKOCYTE ESTERASE UR QL STRIP.AUTO: ABNORMAL
NITRITE UR QL STRIP: POSITIVE
PH UR STRIP.AUTO: 7 [PH] (ref 4.5–8)
PROT UR QL STRIP: NEGATIVE
RBC # UR: ABNORMAL /HPF
REF LAB TEST METHOD: ABNORMAL
SP GR UR STRIP: 1.01 (ref 1–1.03)
SQUAMOUS #/AREA URNS HPF: ABNORMAL /HPF
UROBILINOGEN UR QL STRIP: ABNORMAL
WBC UR QL AUTO: ABNORMAL /HPF

## 2021-07-16 PROCEDURE — 87077 CULTURE AEROBIC IDENTIFY: CPT | Performed by: FAMILY MEDICINE

## 2021-07-16 PROCEDURE — 81001 URINALYSIS AUTO W/SCOPE: CPT | Performed by: FAMILY MEDICINE

## 2021-07-16 PROCEDURE — 87186 SC STD MICRODIL/AGAR DIL: CPT | Performed by: FAMILY MEDICINE

## 2021-07-16 PROCEDURE — 87086 URINE CULTURE/COLONY COUNT: CPT | Performed by: FAMILY MEDICINE

## 2021-07-16 PROCEDURE — 76536 US EXAM OF HEAD AND NECK: CPT

## 2021-07-18 LAB — BACTERIA SPEC AEROBE CULT: ABNORMAL

## 2021-07-28 ENCOUNTER — LAB REQUISITION (OUTPATIENT)
Dept: LAB | Facility: HOSPITAL | Age: 81
End: 2021-07-28

## 2021-07-28 DIAGNOSIS — D51.8 OTHER VITAMIN B12 DEFICIENCY ANEMIAS: ICD-10-CM

## 2021-07-28 DIAGNOSIS — G40.89 OTHER SEIZURES (HCC): ICD-10-CM

## 2021-07-28 DIAGNOSIS — Z79.899 OTHER LONG TERM (CURRENT) DRUG THERAPY: ICD-10-CM

## 2021-07-28 DIAGNOSIS — R73.09 OTHER ABNORMAL GLUCOSE: ICD-10-CM

## 2021-07-28 DIAGNOSIS — E55.9 VITAMIN D DEFICIENCY, UNSPECIFIED: ICD-10-CM

## 2021-07-28 DIAGNOSIS — R94.5 ABNORMAL RESULTS OF LIVER FUNCTION STUDIES: ICD-10-CM

## 2021-07-28 DIAGNOSIS — Z79.83 LONG TERM (CURRENT) USE OF BISPHOSPHONATES: ICD-10-CM

## 2021-07-28 DIAGNOSIS — R94.6 ABNORMAL RESULTS OF THYROID FUNCTION STUDIES: ICD-10-CM

## 2021-07-28 DIAGNOSIS — R79.89 OTHER SPECIFIED ABNORMAL FINDINGS OF BLOOD CHEMISTRY: ICD-10-CM

## 2021-07-28 LAB
25(OH)D3 SERPL-MCNC: 54.8 NG/ML (ref 30–100)
ALBUMIN SERPL-MCNC: 3.9 G/DL (ref 3.5–5.2)
ALBUMIN/GLOB SERPL: 1.3 G/DL
ALP SERPL-CCNC: 105 U/L (ref 39–117)
ALT SERPL W P-5'-P-CCNC: 17 U/L (ref 1–33)
ANION GAP SERPL CALCULATED.3IONS-SCNC: 4.4 MMOL/L (ref 5–15)
AST SERPL-CCNC: 17 U/L (ref 1–32)
BASOPHILS # BLD AUTO: 0.05 10*3/MM3 (ref 0–0.2)
BASOPHILS NFR BLD AUTO: 0.7 % (ref 0–1.5)
BILIRUB SERPL-MCNC: 0.2 MG/DL (ref 0–1.2)
BUN SERPL-MCNC: 32 MG/DL (ref 8–23)
BUN/CREAT SERPL: 24.8 (ref 7–25)
CALCIUM SPEC-SCNC: 10.5 MG/DL (ref 8.6–10.5)
CHLORIDE SERPL-SCNC: 102 MMOL/L (ref 98–107)
CHOLEST SERPL-MCNC: 170 MG/DL (ref 0–200)
CO2 SERPL-SCNC: 32.6 MMOL/L (ref 22–29)
CREAT SERPL-MCNC: 1.29 MG/DL (ref 0.57–1)
DEPRECATED RDW RBC AUTO: 43.7 FL (ref 37–54)
EOSINOPHIL # BLD AUTO: 0.49 10*3/MM3 (ref 0–0.4)
EOSINOPHIL NFR BLD AUTO: 7.3 % (ref 0.3–6.2)
ERYTHROCYTE [DISTWIDTH] IN BLOOD BY AUTOMATED COUNT: 12.6 % (ref 12.3–15.4)
FOLATE SERPL-MCNC: >20 NG/ML (ref 4.78–24.2)
GFR SERPL CREATININE-BSD FRML MDRD: 40 ML/MIN/1.73
GLOBULIN UR ELPH-MCNC: 2.9 GM/DL
GLUCOSE SERPL-MCNC: 94 MG/DL (ref 65–99)
HBA1C MFR BLD: 5.9 % (ref 4.8–5.6)
HCT VFR BLD AUTO: 39.2 % (ref 34–46.6)
HDLC SERPL-MCNC: 92 MG/DL (ref 40–60)
HGB BLD-MCNC: 12.5 G/DL (ref 12–15.9)
IMM GRANULOCYTES # BLD AUTO: 0.02 10*3/MM3 (ref 0–0.05)
IMM GRANULOCYTES NFR BLD AUTO: 0.3 % (ref 0–0.5)
LDLC SERPL CALC-MCNC: 69 MG/DL (ref 0–100)
LDLC/HDLC SERPL: 0.76 {RATIO}
LYMPHOCYTES # BLD AUTO: 2.73 10*3/MM3 (ref 0.7–3.1)
LYMPHOCYTES NFR BLD AUTO: 40.9 % (ref 19.6–45.3)
MCH RBC QN AUTO: 29.9 PG (ref 26.6–33)
MCHC RBC AUTO-ENTMCNC: 31.9 G/DL (ref 31.5–35.7)
MCV RBC AUTO: 93.8 FL (ref 79–97)
MONOCYTES # BLD AUTO: 0.55 10*3/MM3 (ref 0.1–0.9)
MONOCYTES NFR BLD AUTO: 8.2 % (ref 5–12)
NEUTROPHILS NFR BLD AUTO: 2.83 10*3/MM3 (ref 1.7–7)
NEUTROPHILS NFR BLD AUTO: 42.6 % (ref 42.7–76)
NRBC BLD AUTO-RTO: 0 /100 WBC (ref 0–0.2)
PLATELET # BLD AUTO: 333 10*3/MM3 (ref 140–450)
PMV BLD AUTO: 10.6 FL (ref 6–12)
POTASSIUM SERPL-SCNC: 4.7 MMOL/L (ref 3.5–5.2)
PROT SERPL-MCNC: 6.8 G/DL (ref 6–8.5)
RBC # BLD AUTO: 4.18 10*6/MM3 (ref 3.77–5.28)
SODIUM SERPL-SCNC: 139 MMOL/L (ref 136–145)
T4 FREE SERPL-MCNC: 1.07 NG/DL (ref 0.93–1.7)
TRIGL SERPL-MCNC: 40 MG/DL (ref 0–150)
TSH SERPL DL<=0.05 MIU/L-ACNC: 2.94 UIU/ML (ref 0.27–4.2)
VIT B12 BLD-MCNC: 555 PG/ML (ref 211–946)
VLDLC SERPL-MCNC: 9 MG/DL (ref 5–40)
WBC # BLD AUTO: 6.67 10*3/MM3 (ref 3.4–10.8)

## 2021-07-28 PROCEDURE — 82607 VITAMIN B-12: CPT | Performed by: FAMILY MEDICINE

## 2021-07-28 PROCEDURE — 82306 VITAMIN D 25 HYDROXY: CPT | Performed by: FAMILY MEDICINE

## 2021-07-28 PROCEDURE — 82746 ASSAY OF FOLIC ACID SERUM: CPT | Performed by: FAMILY MEDICINE

## 2021-07-28 PROCEDURE — 80175 DRUG SCREEN QUAN LAMOTRIGINE: CPT | Performed by: FAMILY MEDICINE

## 2021-07-28 PROCEDURE — 80053 COMPREHEN METABOLIC PANEL: CPT | Performed by: FAMILY MEDICINE

## 2021-07-28 PROCEDURE — 85025 COMPLETE CBC W/AUTO DIFF WBC: CPT | Performed by: FAMILY MEDICINE

## 2021-07-28 PROCEDURE — 83036 HEMOGLOBIN GLYCOSYLATED A1C: CPT | Performed by: FAMILY MEDICINE

## 2021-07-28 PROCEDURE — 84443 ASSAY THYROID STIM HORMONE: CPT | Performed by: FAMILY MEDICINE

## 2021-07-28 PROCEDURE — 80177 DRUG SCRN QUAN LEVETIRACETAM: CPT | Performed by: FAMILY MEDICINE

## 2021-07-28 PROCEDURE — 84439 ASSAY OF FREE THYROXINE: CPT | Performed by: FAMILY MEDICINE

## 2021-07-28 PROCEDURE — 80061 LIPID PANEL: CPT | Performed by: FAMILY MEDICINE

## 2021-07-30 LAB — LAMOTRIGINE SERPL-MCNC: 7.9 UG/ML (ref 2–20)

## 2021-08-01 LAB — LEVETIRACETAM SERPL-MCNC: 38.8 UG/ML (ref 10–40)

## 2021-08-21 ENCOUNTER — LAB REQUISITION (OUTPATIENT)
Dept: LAB | Facility: HOSPITAL | Age: 81
End: 2021-08-21

## 2021-08-21 DIAGNOSIS — N39.0 URINARY TRACT INFECTION, SITE NOT SPECIFIED: ICD-10-CM

## 2021-08-21 LAB
BACTERIA UR QL AUTO: ABNORMAL /HPF
BILIRUB UR QL STRIP: NEGATIVE
CLARITY UR: ABNORMAL
COLOR UR: YELLOW
GLUCOSE UR STRIP-MCNC: NEGATIVE MG/DL
HGB UR QL STRIP.AUTO: ABNORMAL
HYALINE CASTS UR QL AUTO: ABNORMAL /LPF
KETONES UR QL STRIP: NEGATIVE
LEUKOCYTE ESTERASE UR QL STRIP.AUTO: ABNORMAL
NITRITE UR QL STRIP: POSITIVE
PH UR STRIP.AUTO: 8 [PH] (ref 4.5–8)
PROT UR QL STRIP: NEGATIVE
RBC # UR: ABNORMAL /HPF
REF LAB TEST METHOD: ABNORMAL
SP GR UR STRIP: 1.01 (ref 1–1.03)
SQUAMOUS #/AREA URNS HPF: ABNORMAL /HPF
UROBILINOGEN UR QL STRIP: ABNORMAL
WBC UR QL AUTO: ABNORMAL /HPF

## 2021-08-21 PROCEDURE — 87077 CULTURE AEROBIC IDENTIFY: CPT | Performed by: FAMILY MEDICINE

## 2021-08-21 PROCEDURE — 87186 SC STD MICRODIL/AGAR DIL: CPT | Performed by: FAMILY MEDICINE

## 2021-08-21 PROCEDURE — 81001 URINALYSIS AUTO W/SCOPE: CPT | Performed by: FAMILY MEDICINE

## 2021-08-21 PROCEDURE — 87086 URINE CULTURE/COLONY COUNT: CPT | Performed by: FAMILY MEDICINE

## 2021-08-23 LAB — BACTERIA SPEC AEROBE CULT: ABNORMAL

## 2021-08-25 ENCOUNTER — LAB REQUISITION (OUTPATIENT)
Dept: LAB | Facility: HOSPITAL | Age: 81
End: 2021-08-25

## 2021-08-25 DIAGNOSIS — R79.89 OTHER SPECIFIED ABNORMAL FINDINGS OF BLOOD CHEMISTRY: ICD-10-CM

## 2021-08-25 DIAGNOSIS — N18.9 CHRONIC KIDNEY DISEASE, UNSPECIFIED: ICD-10-CM

## 2021-08-25 DIAGNOSIS — Z79.899 OTHER LONG TERM (CURRENT) DRUG THERAPY: ICD-10-CM

## 2021-08-25 LAB
ANION GAP SERPL CALCULATED.3IONS-SCNC: 6.3 MMOL/L (ref 5–15)
BUN SERPL-MCNC: 23 MG/DL (ref 8–23)
BUN/CREAT SERPL: 18 (ref 7–25)
CALCIUM SPEC-SCNC: 9.8 MG/DL (ref 8.6–10.5)
CHLORIDE SERPL-SCNC: 104 MMOL/L (ref 98–107)
CO2 SERPL-SCNC: 27.7 MMOL/L (ref 22–29)
CREAT SERPL-MCNC: 1.28 MG/DL (ref 0.57–1)
GFR SERPL CREATININE-BSD FRML MDRD: 40 ML/MIN/1.73
GLUCOSE SERPL-MCNC: 82 MG/DL (ref 65–99)
POTASSIUM SERPL-SCNC: 4.2 MMOL/L (ref 3.5–5.2)
SODIUM SERPL-SCNC: 138 MMOL/L (ref 136–145)

## 2021-08-25 PROCEDURE — 80048 BASIC METABOLIC PNL TOTAL CA: CPT | Performed by: FAMILY MEDICINE

## 2021-09-14 ENCOUNTER — TELEPHONE (OUTPATIENT)
Dept: NEUROLOGY | Facility: CLINIC | Age: 81
End: 2021-09-14

## 2021-09-14 DIAGNOSIS — G40.909 NON-REFRACTORY EPILEPSY (HCC): Primary | ICD-10-CM

## 2021-09-14 RX ORDER — LEVETIRACETAM 1000 MG/1
1000 TABLET ORAL 2 TIMES DAILY
Qty: 60 TABLET | Refills: 11 | Status: ON HOLD | OUTPATIENT
Start: 2021-09-14 | End: 2022-12-26

## 2021-09-15 ENCOUNTER — LAB REQUISITION (OUTPATIENT)
Dept: LAB | Facility: HOSPITAL | Age: 81
End: 2021-09-15

## 2021-09-15 DIAGNOSIS — R53.83 OTHER FATIGUE: ICD-10-CM

## 2021-09-15 DIAGNOSIS — R79.89 OTHER SPECIFIED ABNORMAL FINDINGS OF BLOOD CHEMISTRY: ICD-10-CM

## 2021-09-15 DIAGNOSIS — G40.89 OTHER SEIZURES (HCC): ICD-10-CM

## 2021-09-15 DIAGNOSIS — Z79.899 OTHER LONG TERM (CURRENT) DRUG THERAPY: ICD-10-CM

## 2021-09-15 LAB
ANION GAP SERPL CALCULATED.3IONS-SCNC: 5 MMOL/L (ref 5–15)
BUN SERPL-MCNC: 36 MG/DL (ref 8–23)
BUN/CREAT SERPL: 25.5 (ref 7–25)
CALCIUM SPEC-SCNC: 10.4 MG/DL (ref 8.6–10.5)
CHLORIDE SERPL-SCNC: 103 MMOL/L (ref 98–107)
CO2 SERPL-SCNC: 32 MMOL/L (ref 22–29)
CREAT SERPL-MCNC: 1.41 MG/DL (ref 0.57–1)
GFR SERPL CREATININE-BSD FRML MDRD: 36 ML/MIN/1.73
GLUCOSE SERPL-MCNC: 93 MG/DL (ref 65–99)
POTASSIUM SERPL-SCNC: 5 MMOL/L (ref 3.5–5.2)
SODIUM SERPL-SCNC: 140 MMOL/L (ref 136–145)

## 2021-09-15 PROCEDURE — 80048 BASIC METABOLIC PNL TOTAL CA: CPT | Performed by: FAMILY MEDICINE

## 2021-09-15 PROCEDURE — 80175 DRUG SCREEN QUAN LAMOTRIGINE: CPT | Performed by: FAMILY MEDICINE

## 2021-09-17 LAB — LAMOTRIGINE SERPL-MCNC: 7.8 UG/ML (ref 2–20)

## 2021-09-26 DIAGNOSIS — G40.909 SEIZURE DISORDER (HCC): ICD-10-CM

## 2021-09-26 DIAGNOSIS — G89.29 OTHER CHRONIC PAIN: Primary | ICD-10-CM

## 2021-09-26 RX ORDER — GABAPENTIN 100 MG/1
100 CAPSULE ORAL NIGHTLY
Qty: 30 CAPSULE | Refills: 0 | Status: SHIPPED | OUTPATIENT
Start: 2021-09-26 | End: 2022-02-10

## 2021-09-30 ENCOUNTER — OFFICE VISIT (OUTPATIENT)
Dept: NEUROLOGY | Facility: CLINIC | Age: 81
End: 2021-09-30

## 2021-09-30 DIAGNOSIS — F88 DEVELOPMENTAL MENTAL DISORDER: Primary | ICD-10-CM

## 2021-09-30 DIAGNOSIS — G40.909 NON-REFRACTORY EPILEPSY (HCC): ICD-10-CM

## 2021-09-30 PROCEDURE — 99212 OFFICE O/P EST SF 10 MIN: CPT | Performed by: PSYCHIATRY & NEUROLOGY

## 2021-09-30 RX ORDER — LEVETIRACETAM 750 MG/1
TABLET ORAL
COMMUNITY
Start: 2021-09-06 | End: 2022-02-10 | Stop reason: ALTCHOICE

## 2021-09-30 RX ORDER — ESTRADIOL 0.1 MG/G
CREAM VAGINAL
COMMUNITY
Start: 2021-09-07 | End: 2022-12-26 | Stop reason: HOSPADM

## 2021-09-30 RX ORDER — LEVETIRACETAM 250 MG/1
250 TABLET ORAL 2 TIMES DAILY
Qty: 60 TABLET | Refills: 4 | Status: SHIPPED | OUTPATIENT
Start: 2021-09-30 | End: 2022-02-07

## 2021-09-30 RX ORDER — ATORVASTATIN CALCIUM 20 MG/1
20 TABLET, FILM COATED ORAL DAILY
COMMUNITY
Start: 2021-09-05

## 2021-11-04 ENCOUNTER — LAB REQUISITION (OUTPATIENT)
Dept: LAB | Facility: HOSPITAL | Age: 81
End: 2021-11-04

## 2021-11-04 DIAGNOSIS — N18.9 CHRONIC KIDNEY DISEASE, UNSPECIFIED: ICD-10-CM

## 2021-11-04 DIAGNOSIS — Z79.899 OTHER LONG TERM (CURRENT) DRUG THERAPY: ICD-10-CM

## 2021-11-04 DIAGNOSIS — R79.89 OTHER SPECIFIED ABNORMAL FINDINGS OF BLOOD CHEMISTRY: ICD-10-CM

## 2021-11-04 DIAGNOSIS — R53.83 OTHER FATIGUE: ICD-10-CM

## 2021-11-04 DIAGNOSIS — E55.9 VITAMIN D DEFICIENCY, UNSPECIFIED: ICD-10-CM

## 2021-11-04 LAB
ANION GAP SERPL CALCULATED.3IONS-SCNC: 6.7 MMOL/L (ref 5–15)
BUN SERPL-MCNC: 29 MG/DL (ref 8–23)
BUN/CREAT SERPL: 28.4 (ref 7–25)
CALCIUM SPEC-SCNC: 10.3 MG/DL (ref 8.6–10.5)
CHLORIDE SERPL-SCNC: 103 MMOL/L (ref 98–107)
CO2 SERPL-SCNC: 31.3 MMOL/L (ref 22–29)
CREAT SERPL-MCNC: 1.02 MG/DL (ref 0.57–1)
GFR SERPL CREATININE-BSD FRML MDRD: 52 ML/MIN/1.73
GLUCOSE SERPL-MCNC: 97 MG/DL (ref 65–99)
MAGNESIUM SERPL-MCNC: 2.2 MG/DL (ref 1.6–2.4)
PHOSPHATE SERPL-MCNC: 3.5 MG/DL (ref 2.5–4.5)
POTASSIUM SERPL-SCNC: 4.5 MMOL/L (ref 3.5–5.2)
SODIUM SERPL-SCNC: 141 MMOL/L (ref 136–145)

## 2021-11-04 PROCEDURE — 84100 ASSAY OF PHOSPHORUS: CPT | Performed by: FAMILY MEDICINE

## 2021-11-04 PROCEDURE — 83735 ASSAY OF MAGNESIUM: CPT | Performed by: FAMILY MEDICINE

## 2021-11-04 PROCEDURE — 80048 BASIC METABOLIC PNL TOTAL CA: CPT | Performed by: FAMILY MEDICINE

## 2021-11-11 NOTE — PROGRESS NOTES
"No chief complaint on file.      Patient ID: Erica Crockett is a 81 y.o. female.    HPI:You have chosen to receive care through a telephone visit. Do you consent to use a telephone visit for your medical care today?  \"Yes\".  I have had the pleasure of talking with the caregiver for Erica today.  She is at her place of residence and I am here in the neurology clinic.  As you may know she is an 81-year-old female with a history of seizures.  She has had 5 seizures since January.  The last 1 was on 14th of this month.  Apparently she has had a few of them recently that were lasting longer than usual.  Also she had one that was \"more severe than usual\".  She has not had any new neurological symptoms.  She does take Keppra as well as Lamictal.  She has as needed Ativan as well.    The following portions of the patient's history were reviewed and updated as appropriate: allergies, current medications, past family history, past medical history, past social history, past surgical history and problem list.    Review of Systems   Neurological: Positive for seizures (pt has only had 5 seizure since jan. and last one on 9/14/2021). Negative for dizziness, tremors, syncope, facial asymmetry, speech difficulty, weakness, light-headedness, numbness and headaches.   Hematological: Negative for adenopathy. Does not bruise/bleed easily.   Psychiatric/Behavioral: Negative for agitation, behavioral problems, confusion, decreased concentration, dysphoric mood, hallucinations, self-injury, sleep disturbance and suicidal ideas. The patient is not nervous/anxious and is not hyperactive.       I have reviewed the review of systems above performed by my medical assistant.      There were no vitals filed for this visit.    Neurologic Exam    Physical Exam    Procedures    Assessment/Plan: Her last Lamictal level was within normal limits.  We will increase her Keppra to 1250 mg twice daily.  We will see her back in 6 months or sooner " if needed.  A total of 15 minutes was spent during this visit discussing signs and symptoms of seizures, patient education, plan of care and prognosis.       Diagnoses and all orders for this visit:    1. Developmental mental disorder (Primary)  -     levETIRAcetam (Keppra) 250 MG tablet; Take 1 tablet by mouth 2 (Two) Times a Day for 30 days.  Dispense: 60 tablet; Refill: 4    2. Non-refractory epilepsy (CMS/HCC)  -     levETIRAcetam (Keppra) 250 MG tablet; Take 1 tablet by mouth 2 (Two) Times a Day for 30 days.  Dispense: 60 tablet; Refill: 4           Alex Peralta II, MD           Shelter

## 2021-12-02 ENCOUNTER — LAB REQUISITION (OUTPATIENT)
Dept: LAB | Facility: HOSPITAL | Age: 81
End: 2021-12-02

## 2021-12-02 DIAGNOSIS — Z79.899 OTHER LONG TERM (CURRENT) DRUG THERAPY: ICD-10-CM

## 2021-12-02 DIAGNOSIS — R79.9 ABNORMAL FINDING OF BLOOD CHEMISTRY, UNSPECIFIED: ICD-10-CM

## 2021-12-02 DIAGNOSIS — N18.9 CHRONIC KIDNEY DISEASE, UNSPECIFIED: ICD-10-CM

## 2021-12-02 LAB
ANION GAP SERPL CALCULATED.3IONS-SCNC: 10.2 MMOL/L (ref 5–15)
BUN SERPL-MCNC: 33 MG/DL (ref 8–23)
BUN/CREAT SERPL: 26 (ref 7–25)
CALCIUM SPEC-SCNC: 9.8 MG/DL (ref 8.6–10.5)
CHLORIDE SERPL-SCNC: 103 MMOL/L (ref 98–107)
CO2 SERPL-SCNC: 24.8 MMOL/L (ref 22–29)
CREAT SERPL-MCNC: 1.27 MG/DL (ref 0.57–1)
GFR SERPL CREATININE-BSD FRML MDRD: 40 ML/MIN/1.73
GLUCOSE SERPL-MCNC: 91 MG/DL (ref 65–99)
POTASSIUM SERPL-SCNC: 4.9 MMOL/L (ref 3.5–5.2)
SODIUM SERPL-SCNC: 138 MMOL/L (ref 136–145)

## 2021-12-02 PROCEDURE — 80048 BASIC METABOLIC PNL TOTAL CA: CPT | Performed by: FAMILY MEDICINE

## 2021-12-22 ENCOUNTER — LAB REQUISITION (OUTPATIENT)
Dept: LAB | Facility: HOSPITAL | Age: 81
End: 2021-12-22

## 2021-12-22 DIAGNOSIS — Z79.899 OTHER LONG TERM (CURRENT) DRUG THERAPY: ICD-10-CM

## 2021-12-22 DIAGNOSIS — R79.89 OTHER SPECIFIED ABNORMAL FINDINGS OF BLOOD CHEMISTRY: ICD-10-CM

## 2021-12-22 DIAGNOSIS — E08.9 DIABETES MELLITUS DUE TO UNDERLYING CONDITION WITHOUT COMPLICATIONS (HCC): ICD-10-CM

## 2021-12-22 DIAGNOSIS — R94.6 ABNORMAL RESULTS OF THYROID FUNCTION STUDIES: ICD-10-CM

## 2021-12-22 DIAGNOSIS — R53.83 OTHER FATIGUE: ICD-10-CM

## 2021-12-22 LAB
ALBUMIN SERPL-MCNC: 3.8 G/DL (ref 3.5–5.2)
ALBUMIN/GLOB SERPL: 1.7 G/DL
ALP SERPL-CCNC: 112 U/L (ref 39–117)
ALT SERPL W P-5'-P-CCNC: 15 U/L (ref 1–33)
ANION GAP SERPL CALCULATED.3IONS-SCNC: 10.1 MMOL/L (ref 5–15)
AST SERPL-CCNC: 16 U/L (ref 1–32)
BASOPHILS # BLD AUTO: 0.05 10*3/MM3 (ref 0–0.2)
BASOPHILS NFR BLD AUTO: 0.8 % (ref 0–1.5)
BILIRUB SERPL-MCNC: <0.2 MG/DL (ref 0–1.2)
BUN SERPL-MCNC: 29 MG/DL (ref 8–23)
BUN/CREAT SERPL: 25.9 (ref 7–25)
CALCIUM SPEC-SCNC: 10 MG/DL (ref 8.6–10.5)
CHLORIDE SERPL-SCNC: 105 MMOL/L (ref 98–107)
CO2 SERPL-SCNC: 25.9 MMOL/L (ref 22–29)
CREAT SERPL-MCNC: 1.12 MG/DL (ref 0.57–1)
DEPRECATED RDW RBC AUTO: 44.6 FL (ref 37–54)
EOSINOPHIL # BLD AUTO: 0.5 10*3/MM3 (ref 0–0.4)
EOSINOPHIL NFR BLD AUTO: 7.9 % (ref 0.3–6.2)
ERYTHROCYTE [DISTWIDTH] IN BLOOD BY AUTOMATED COUNT: 12.4 % (ref 12.3–15.4)
GFR SERPL CREATININE-BSD FRML MDRD: 47 ML/MIN/1.73
GLOBULIN UR ELPH-MCNC: 2.3 GM/DL
GLUCOSE SERPL-MCNC: 101 MG/DL (ref 65–99)
HCT VFR BLD AUTO: 38.9 % (ref 34–46.6)
HGB BLD-MCNC: 11.8 G/DL (ref 12–15.9)
IMM GRANULOCYTES # BLD AUTO: 0.02 10*3/MM3 (ref 0–0.05)
IMM GRANULOCYTES NFR BLD AUTO: 0.3 % (ref 0–0.5)
LYMPHOCYTES # BLD AUTO: 2.52 10*3/MM3 (ref 0.7–3.1)
LYMPHOCYTES NFR BLD AUTO: 40.1 % (ref 19.6–45.3)
MCH RBC QN AUTO: 29.9 PG (ref 26.6–33)
MCHC RBC AUTO-ENTMCNC: 30.3 G/DL (ref 31.5–35.7)
MCV RBC AUTO: 98.5 FL (ref 79–97)
MONOCYTES # BLD AUTO: 0.64 10*3/MM3 (ref 0.1–0.9)
MONOCYTES NFR BLD AUTO: 10.2 % (ref 5–12)
NEUTROPHILS NFR BLD AUTO: 2.56 10*3/MM3 (ref 1.7–7)
NEUTROPHILS NFR BLD AUTO: 40.7 % (ref 42.7–76)
NRBC BLD AUTO-RTO: 0 /100 WBC (ref 0–0.2)
PLATELET # BLD AUTO: 278 10*3/MM3 (ref 140–450)
PMV BLD AUTO: 10.6 FL (ref 6–12)
POTASSIUM SERPL-SCNC: 4.5 MMOL/L (ref 3.5–5.2)
PROT SERPL-MCNC: 6.1 G/DL (ref 6–8.5)
RBC # BLD AUTO: 3.95 10*6/MM3 (ref 3.77–5.28)
SODIUM SERPL-SCNC: 141 MMOL/L (ref 136–145)
T3FREE SERPL-MCNC: 3.17 PG/ML (ref 2–4.4)
T4 FREE SERPL-MCNC: 1.06 NG/DL (ref 0.93–1.7)
TSH SERPL DL<=0.05 MIU/L-ACNC: 2.52 UIU/ML (ref 0.27–4.2)
WBC NRBC COR # BLD: 6.29 10*3/MM3 (ref 3.4–10.8)

## 2021-12-22 PROCEDURE — 84443 ASSAY THYROID STIM HORMONE: CPT | Performed by: FAMILY MEDICINE

## 2021-12-22 PROCEDURE — 80053 COMPREHEN METABOLIC PANEL: CPT | Performed by: FAMILY MEDICINE

## 2021-12-22 PROCEDURE — 85025 COMPLETE CBC W/AUTO DIFF WBC: CPT | Performed by: FAMILY MEDICINE

## 2021-12-22 PROCEDURE — 84481 FREE ASSAY (FT-3): CPT | Performed by: FAMILY MEDICINE

## 2021-12-22 PROCEDURE — 84439 ASSAY OF FREE THYROXINE: CPT | Performed by: FAMILY MEDICINE

## 2021-12-30 ENCOUNTER — LAB REQUISITION (OUTPATIENT)
Dept: LAB | Facility: HOSPITAL | Age: 81
End: 2021-12-30

## 2021-12-30 DIAGNOSIS — R94.6 ABNORMAL RESULTS OF THYROID FUNCTION STUDIES: ICD-10-CM

## 2021-12-30 DIAGNOSIS — R79.89 OTHER SPECIFIED ABNORMAL FINDINGS OF BLOOD CHEMISTRY: ICD-10-CM

## 2021-12-30 DIAGNOSIS — E03.9 HYPOTHYROIDISM, UNSPECIFIED: ICD-10-CM

## 2021-12-30 DIAGNOSIS — R53.83 OTHER FATIGUE: ICD-10-CM

## 2021-12-30 DIAGNOSIS — Z79.899 OTHER LONG TERM (CURRENT) DRUG THERAPY: ICD-10-CM

## 2021-12-30 LAB
ALBUMIN SERPL-MCNC: 3.5 G/DL (ref 3.5–5.2)
ALBUMIN/GLOB SERPL: 1.1 G/DL
ALP SERPL-CCNC: 107 U/L (ref 39–117)
ALT SERPL W P-5'-P-CCNC: 15 U/L (ref 1–33)
ANION GAP SERPL CALCULATED.3IONS-SCNC: 12.2 MMOL/L (ref 5–15)
AST SERPL-CCNC: 17 U/L (ref 1–32)
BASOPHILS # BLD AUTO: 0.04 10*3/MM3 (ref 0–0.2)
BASOPHILS NFR BLD AUTO: 0.5 % (ref 0–1.5)
BILIRUB SERPL-MCNC: 0.2 MG/DL (ref 0–1.2)
BUN SERPL-MCNC: 37 MG/DL (ref 8–23)
BUN/CREAT SERPL: 31.6 (ref 7–25)
CALCIUM SPEC-SCNC: 10.2 MG/DL (ref 8.6–10.5)
CHLORIDE SERPL-SCNC: 105 MMOL/L (ref 98–107)
CO2 SERPL-SCNC: 22.8 MMOL/L (ref 22–29)
CREAT SERPL-MCNC: 1.17 MG/DL (ref 0.57–1)
DEPRECATED RDW RBC AUTO: 43.1 FL (ref 37–54)
EOSINOPHIL # BLD AUTO: 0.4 10*3/MM3 (ref 0–0.4)
EOSINOPHIL NFR BLD AUTO: 5.2 % (ref 0.3–6.2)
ERYTHROCYTE [DISTWIDTH] IN BLOOD BY AUTOMATED COUNT: 12.7 % (ref 12.3–15.4)
GFR SERPL CREATININE-BSD FRML MDRD: 44 ML/MIN/1.73
GLOBULIN UR ELPH-MCNC: 3.2 GM/DL
GLUCOSE SERPL-MCNC: 87 MG/DL (ref 65–99)
HCT VFR BLD AUTO: 39.6 % (ref 34–46.6)
HGB BLD-MCNC: 12.6 G/DL (ref 12–15.9)
IMM GRANULOCYTES # BLD AUTO: 0.03 10*3/MM3 (ref 0–0.05)
IMM GRANULOCYTES NFR BLD AUTO: 0.4 % (ref 0–0.5)
LYMPHOCYTES # BLD AUTO: 3.08 10*3/MM3 (ref 0.7–3.1)
LYMPHOCYTES NFR BLD AUTO: 40.2 % (ref 19.6–45.3)
MCH RBC QN AUTO: 29.6 PG (ref 26.6–33)
MCHC RBC AUTO-ENTMCNC: 31.8 G/DL (ref 31.5–35.7)
MCV RBC AUTO: 93.2 FL (ref 79–97)
MONOCYTES # BLD AUTO: 0.7 10*3/MM3 (ref 0.1–0.9)
MONOCYTES NFR BLD AUTO: 9.1 % (ref 5–12)
NEUTROPHILS NFR BLD AUTO: 3.42 10*3/MM3 (ref 1.7–7)
NEUTROPHILS NFR BLD AUTO: 44.6 % (ref 42.7–76)
NRBC BLD AUTO-RTO: 0 /100 WBC (ref 0–0.2)
PLATELET # BLD AUTO: 304 10*3/MM3 (ref 140–450)
PMV BLD AUTO: 11.3 FL (ref 6–12)
POTASSIUM SERPL-SCNC: 4.9 MMOL/L (ref 3.5–5.2)
PROT SERPL-MCNC: 6.7 G/DL (ref 6–8.5)
RBC # BLD AUTO: 4.25 10*6/MM3 (ref 3.77–5.28)
SODIUM SERPL-SCNC: 140 MMOL/L (ref 136–145)
T3 SERPL-MCNC: 122 NG/DL (ref 80–200)
T4 SERPL-MCNC: 6.51 MCG/DL (ref 4.5–11.7)
TSH SERPL DL<=0.05 MIU/L-ACNC: 2.32 UIU/ML (ref 0.27–4.2)
WBC NRBC COR # BLD: 7.67 10*3/MM3 (ref 3.4–10.8)

## 2021-12-30 PROCEDURE — 85025 COMPLETE CBC W/AUTO DIFF WBC: CPT | Performed by: FAMILY MEDICINE

## 2021-12-30 PROCEDURE — 84480 ASSAY TRIIODOTHYRONINE (T3): CPT | Performed by: FAMILY MEDICINE

## 2021-12-30 PROCEDURE — 80053 COMPREHEN METABOLIC PANEL: CPT | Performed by: FAMILY MEDICINE

## 2021-12-30 PROCEDURE — 84436 ASSAY OF TOTAL THYROXINE: CPT | Performed by: FAMILY MEDICINE

## 2021-12-30 PROCEDURE — 84443 ASSAY THYROID STIM HORMONE: CPT | Performed by: FAMILY MEDICINE

## 2022-01-12 ENCOUNTER — LAB REQUISITION (OUTPATIENT)
Dept: LAB | Facility: HOSPITAL | Age: 82
End: 2022-01-12

## 2022-01-12 DIAGNOSIS — N39.0 URINARY TRACT INFECTION, SITE NOT SPECIFIED: ICD-10-CM

## 2022-01-12 LAB
BACTERIA UR QL AUTO: ABNORMAL /HPF
BILIRUB UR QL STRIP: NEGATIVE
CLARITY UR: ABNORMAL
COLOR UR: ABNORMAL
GLUCOSE UR STRIP-MCNC: NEGATIVE MG/DL
HGB UR QL STRIP.AUTO: ABNORMAL
HYALINE CASTS UR QL AUTO: ABNORMAL /LPF
KETONES UR QL STRIP: NEGATIVE
LEUKOCYTE ESTERASE UR QL STRIP.AUTO: ABNORMAL
NITRITE UR QL STRIP: POSITIVE
PH UR STRIP.AUTO: 7 [PH] (ref 4.5–8)
PROT UR QL STRIP: ABNORMAL
RBC # UR STRIP: ABNORMAL /HPF
REF LAB TEST METHOD: ABNORMAL
SP GR UR STRIP: 1.02 (ref 1–1.03)
SQUAMOUS #/AREA URNS HPF: ABNORMAL /HPF
UROBILINOGEN UR QL STRIP: ABNORMAL
WBC # UR STRIP: ABNORMAL /HPF

## 2022-01-12 PROCEDURE — 87186 SC STD MICRODIL/AGAR DIL: CPT | Performed by: FAMILY MEDICINE

## 2022-01-12 PROCEDURE — 81001 URINALYSIS AUTO W/SCOPE: CPT | Performed by: FAMILY MEDICINE

## 2022-01-12 PROCEDURE — 87077 CULTURE AEROBIC IDENTIFY: CPT | Performed by: FAMILY MEDICINE

## 2022-01-12 PROCEDURE — 87086 URINE CULTURE/COLONY COUNT: CPT | Performed by: FAMILY MEDICINE

## 2022-01-12 RX ORDER — CIPROFLOXACIN 250 MG/1
250 TABLET, FILM COATED ORAL 2 TIMES DAILY
Qty: 14 TABLET | Refills: 0 | Status: SHIPPED | OUTPATIENT
Start: 2022-01-12 | End: 2022-02-10

## 2022-01-14 LAB — BACTERIA SPEC AEROBE CULT: ABNORMAL

## 2022-01-18 ENCOUNTER — LAB REQUISITION (OUTPATIENT)
Dept: LAB | Facility: HOSPITAL | Age: 82
End: 2022-01-18

## 2022-01-18 ENCOUNTER — HOSPITAL ENCOUNTER (EMERGENCY)
Facility: HOSPITAL | Age: 82
Discharge: NURSING, REHAB OR LONG-TERM CARE | End: 2022-01-18
Attending: EMERGENCY MEDICINE | Admitting: EMERGENCY MEDICINE

## 2022-01-18 ENCOUNTER — APPOINTMENT (OUTPATIENT)
Dept: GENERAL RADIOLOGY | Facility: HOSPITAL | Age: 82
End: 2022-01-18

## 2022-01-18 VITALS
HEART RATE: 67 BPM | RESPIRATION RATE: 24 BRPM | WEIGHT: 146 LBS | TEMPERATURE: 95.2 F | DIASTOLIC BLOOD PRESSURE: 62 MMHG | BODY MASS INDEX: 22.91 KG/M2 | HEIGHT: 67 IN | OXYGEN SATURATION: 92 % | SYSTOLIC BLOOD PRESSURE: 134 MMHG

## 2022-01-18 DIAGNOSIS — Z79.899 OTHER LONG TERM (CURRENT) DRUG THERAPY: ICD-10-CM

## 2022-01-18 DIAGNOSIS — N18.9 CHRONIC KIDNEY DISEASE, UNSPECIFIED: ICD-10-CM

## 2022-01-18 DIAGNOSIS — R79.9 ABNORMAL FINDING OF BLOOD CHEMISTRY, UNSPECIFIED: ICD-10-CM

## 2022-01-18 DIAGNOSIS — R09.02 HYPOXIA: Primary | ICD-10-CM

## 2022-01-18 LAB
ANION GAP SERPL CALCULATED.3IONS-SCNC: 11.3 MMOL/L (ref 5–15)
ANION GAP SERPL CALCULATED.3IONS-SCNC: 11.9 MMOL/L (ref 5–15)
BASOPHILS # BLD AUTO: 0.03 10*3/MM3 (ref 0–0.2)
BASOPHILS NFR BLD AUTO: 0.3 % (ref 0–1.5)
BILIRUB UR QL STRIP: NEGATIVE
BUN SERPL-MCNC: 30 MG/DL (ref 8–23)
BUN SERPL-MCNC: 32 MG/DL (ref 8–23)
BUN/CREAT SERPL: 25 (ref 7–25)
BUN/CREAT SERPL: 28.3 (ref 7–25)
CALCIUM SPEC-SCNC: 10.4 MG/DL (ref 8.6–10.5)
CALCIUM SPEC-SCNC: 10.6 MG/DL (ref 8.6–10.5)
CHLORIDE SERPL-SCNC: 102 MMOL/L (ref 98–107)
CHLORIDE SERPL-SCNC: 102 MMOL/L (ref 98–107)
CLARITY UR: CLEAR
CO2 SERPL-SCNC: 24.7 MMOL/L (ref 22–29)
CO2 SERPL-SCNC: 26.1 MMOL/L (ref 22–29)
COLOR UR: YELLOW
CREAT SERPL-MCNC: 1.13 MG/DL (ref 0.57–1)
CREAT SERPL-MCNC: 1.2 MG/DL (ref 0.57–1)
D-LACTATE SERPL-SCNC: 1 MMOL/L (ref 0.5–2)
DEPRECATED RDW RBC AUTO: 43.3 FL (ref 37–54)
EOSINOPHIL # BLD AUTO: 0.14 10*3/MM3 (ref 0–0.4)
EOSINOPHIL NFR BLD AUTO: 1.5 % (ref 0.3–6.2)
ERYTHROCYTE [DISTWIDTH] IN BLOOD BY AUTOMATED COUNT: 12.4 % (ref 12.3–15.4)
FLUAV RNA RESP QL NAA+PROBE: NOT DETECTED
FLUBV RNA RESP QL NAA+PROBE: NOT DETECTED
GFR SERPL CREATININE-BSD FRML MDRD: 43 ML/MIN/1.73
GFR SERPL CREATININE-BSD FRML MDRD: 46 ML/MIN/1.73
GLUCOSE SERPL-MCNC: 101 MG/DL (ref 65–99)
GLUCOSE SERPL-MCNC: 86 MG/DL (ref 65–99)
GLUCOSE UR STRIP-MCNC: NEGATIVE MG/DL
HCT VFR BLD AUTO: 41.5 % (ref 34–46.6)
HGB BLD-MCNC: 12.9 G/DL (ref 12–15.9)
HGB UR QL STRIP.AUTO: NEGATIVE
IMM GRANULOCYTES # BLD AUTO: 0.01 10*3/MM3 (ref 0–0.05)
IMM GRANULOCYTES NFR BLD AUTO: 0.1 % (ref 0–0.5)
KETONES UR QL STRIP: NEGATIVE
LEUKOCYTE ESTERASE UR QL STRIP.AUTO: NEGATIVE
LYMPHOCYTES # BLD AUTO: 2.45 10*3/MM3 (ref 0.7–3.1)
LYMPHOCYTES NFR BLD AUTO: 26.1 % (ref 19.6–45.3)
MCH RBC QN AUTO: 29 PG (ref 26.6–33)
MCHC RBC AUTO-ENTMCNC: 31.1 G/DL (ref 31.5–35.7)
MCV RBC AUTO: 93.3 FL (ref 79–97)
MONOCYTES # BLD AUTO: 0.73 10*3/MM3 (ref 0.1–0.9)
MONOCYTES NFR BLD AUTO: 7.8 % (ref 5–12)
NEUTROPHILS NFR BLD AUTO: 6.01 10*3/MM3 (ref 1.7–7)
NEUTROPHILS NFR BLD AUTO: 64.2 % (ref 42.7–76)
NITRITE UR QL STRIP: NEGATIVE
PH UR STRIP.AUTO: 5.5 [PH] (ref 4.5–8)
PLATELET # BLD AUTO: 286 10*3/MM3 (ref 140–450)
PMV BLD AUTO: 10.5 FL (ref 6–12)
POTASSIUM SERPL-SCNC: 4.2 MMOL/L (ref 3.5–5.2)
POTASSIUM SERPL-SCNC: 4.2 MMOL/L (ref 3.5–5.2)
PROCALCITONIN SERPL-MCNC: 0.09 NG/ML (ref 0–0.25)
PROT UR QL STRIP: NEGATIVE
RBC # BLD AUTO: 4.45 10*6/MM3 (ref 3.77–5.28)
SARS-COV-2 RNA RESP QL NAA+PROBE: NOT DETECTED
SODIUM SERPL-SCNC: 138 MMOL/L (ref 136–145)
SODIUM SERPL-SCNC: 140 MMOL/L (ref 136–145)
SP GR UR STRIP: 1.01 (ref 1–1.03)
UROBILINOGEN UR QL STRIP: NORMAL
WBC NRBC COR # BLD: 9.37 10*3/MM3 (ref 3.4–10.8)

## 2022-01-18 PROCEDURE — 99283 EMERGENCY DEPT VISIT LOW MDM: CPT | Performed by: EMERGENCY MEDICINE

## 2022-01-18 PROCEDURE — 71045 X-RAY EXAM CHEST 1 VIEW: CPT

## 2022-01-18 PROCEDURE — 80048 BASIC METABOLIC PNL TOTAL CA: CPT | Performed by: PHYSICIAN ASSISTANT

## 2022-01-18 PROCEDURE — 87636 SARSCOV2 & INF A&B AMP PRB: CPT | Performed by: EMERGENCY MEDICINE

## 2022-01-18 PROCEDURE — 81003 URINALYSIS AUTO W/O SCOPE: CPT | Performed by: PHYSICIAN ASSISTANT

## 2022-01-18 PROCEDURE — P9612 CATHETERIZE FOR URINE SPEC: HCPCS

## 2022-01-18 PROCEDURE — 83605 ASSAY OF LACTIC ACID: CPT | Performed by: PHYSICIAN ASSISTANT

## 2022-01-18 PROCEDURE — 84145 PROCALCITONIN (PCT): CPT | Performed by: PHYSICIAN ASSISTANT

## 2022-01-18 PROCEDURE — 87040 BLOOD CULTURE FOR BACTERIA: CPT | Performed by: PHYSICIAN ASSISTANT

## 2022-01-18 PROCEDURE — 80048 BASIC METABOLIC PNL TOTAL CA: CPT | Performed by: FAMILY MEDICINE

## 2022-01-18 PROCEDURE — 36415 COLL VENOUS BLD VENIPUNCTURE: CPT

## 2022-01-18 PROCEDURE — 85025 COMPLETE CBC W/AUTO DIFF WBC: CPT | Performed by: PHYSICIAN ASSISTANT

## 2022-01-18 PROCEDURE — 99284 EMERGENCY DEPT VISIT MOD MDM: CPT

## 2022-01-18 RX ORDER — SODIUM CHLORIDE 0.9 % (FLUSH) 0.9 %
10 SYRINGE (ML) INJECTION AS NEEDED
Status: DISCONTINUED | OUTPATIENT
Start: 2022-01-18 | End: 2022-01-18 | Stop reason: HOSPADM

## 2022-01-18 NOTE — ED PROVIDER NOTES
EMERGENCY DEPARTMENT ENCOUNTER      Room Number: 01/01    History is provided by the caretaker, no translation services needed    HPI:    Chief complaint: Decreased O2 sats/shortness of breath    Location: [facility]    Quality/Severity: Moderate    Timing/Duration: Today/constant    Modifying Factors: History of exposure to COVID-19    Associated Symptoms: Decreased lung sounds    Narrative: Pt is a 81 y.o. female who presents complaining of decreased O2 sats brought in by [facility] caretaker.  Patient is nonverbal.  Caretaker states that patient is at his baseline.  Caregiver states that patient has been exposed to COVID-19.  Caregiver states that patient is also vaccinated for COVID-19.      PMD: Tulio Carlson MD    REVIEW OF SYSTEMS  Review of Systems   Constitutional: Negative for chills and fever.   HENT: Positive for congestion.    Eyes: Negative for pain and visual disturbance.   Respiratory: Positive for cough and shortness of breath.    Cardiovascular: Negative for chest pain and leg swelling.   Gastrointestinal: Negative for abdominal pain, constipation, diarrhea, nausea and vomiting.   Genitourinary: Negative for dysuria and flank pain.   Musculoskeletal: Negative for arthralgias and myalgias.   Skin: Negative for rash and wound.   Neurological: Negative for dizziness, syncope and headaches.   Psychiatric/Behavioral: Negative for suicidal ideas. The patient is not nervous/anxious.          PAST MEDICAL HISTORY  Active Ambulatory Problems     Diagnosis Date Noted   • Degeneration of intervertebral disc of lumbar region 03/17/2016   • Developmental mental disorder 03/17/2016   • Osteoarthritis of hip 03/17/2016   • Scoliosis 03/17/2016   • Chronic pain 06/02/2016   • Nonverbal signs of pain 06/02/2016   • Bursitis of left hip 04/12/2017   • Encounter for monitoring opioid maintenance therapy 06/12/2018   • Anxiety 10/22/2019   • Constipation 06/25/2013   • Hypercalcemia 10/22/2019   •  Non-refractory epilepsy (HCC) 10/22/2019   • Osteoporosis 10/22/2019   • Impulse control disorder 10/22/2019   • Vitamin D deficiency 10/22/2019   • Pneumonia of both lower lobes due to infectious organism 12/11/2019   • KEILY (acute kidney injury) ([facility]) 12/17/2019   • Sepsis with acute hypoxic respiratory failure without septic shock ([facility]) 12/17/2019     Resolved Ambulatory Problems     Diagnosis Date Noted   • Profound mental retardation in adult 10/22/2019   • Seizure disorder ([facility]) 10/22/2019     Past Medical History:   Diagnosis Date   • Anemia    • Bursitis    • Bursitis    • DJD (degenerative joint disease), lumbar    • Hyperkalemia    • Hypoglycemia    • Intellectual disability    • Low back pain    • Osteoarthritis    • Osteopenia    • Pneumonia    • Renal disorder    • Renal insufficiency        PAST SURGICAL HISTORY  History reviewed. No pertinent surgical history.    FAMILY HISTORY  Family History   Family history unknown: Yes       SOCIAL HISTORY  Social History     Socioeconomic History   • Marital status: Single   Tobacco Use   • Smoking status: Never Smoker   • Smokeless tobacco: Never Used   Substance and Sexual Activity   • Alcohol use: No   • Drug use: No   • Sexual activity: Defer       ALLERGIES  Bee venom, Iodinated diagnostic agents, and Nsaids      Current Facility-Administered Medications:   •  [COMPLETED] Insert peripheral IV, , , Once **AND** sodium chloride 0.9 % flush 10 mL, 10 mL, Intravenous, PRN, Ariel Lopez, PADebC    Current Outpatient Medications:   •  acetaminophen (TYLENOL) 325 MG tablet, Take  by mouth Every 6 (Six) Hours As Needed., Disp: , Rfl:   •  atorvastatin (LIPITOR) 10 MG tablet, Take 10 mg by mouth Daily., Disp: , Rfl:   •  atorvastatin (LIPITOR) 20 MG tablet, , Disp: , Rfl:   •  bisacodyl (DULCOLAX) 10 MG suppository, As Needed. Dulcolax 10 MG Rectal Suppository; Patient Sig: Dulcolax 10 MG Rectal Suppository ; 0; 04-Feb-2014; Active, Disp: , Rfl:   •  Calcium &  Magnesium Carbonates (MYLANTA PO), Take  by mouth Daily As Needed., Disp: , Rfl:   •  cetirizine (ZyrTEC) 10 MG tablet, Take  by mouth., Disp: , Rfl:   •  ciprofloxacin (Cipro) 250 MG tablet, Take 1 tablet by mouth 2 (Two) Times a Day. For UTI, Disp: 14 tablet, Rfl: 0  •  denosumab (PROLIA) 60 MG/ML solution syringe, Inject  under the skin into the appropriate area as directed 1 (One) Time., Disp: , Rfl:   •  dextromethorphan-guaifenesin (ROBITUSSIN-DM)  MG/5ML syrup, Take 5 mL by mouth Every 4 (Four) Hours As Needed., Disp: , Rfl:   •  diazePAM (DIASTAT ACUDIAL) 20 MG rectal kit, Insert 20 mg into the rectum As Needed for Seizures. Seizure Lasting longer than 5 minutes, or 2 seizures within 12 hours, Disp: 20 mg, Rfl: 5  •  diazePAM (Valium) 5 MG tablet, Give 1 tab 30 minutes prior to procedure and may repeat x 1 if need be prn agitation upon arrival., Disp: 2 tablet, Rfl: 0  •  diclofenac (VOLTAREN) 1 % gel gel, Apply 4 g topically 4 (Four) Times a Day As Needed., Disp: , Rfl:   •  diltiazem XR (DILACOR XR) 240 MG 24 hr capsule, Take  by mouth., Disp: , Rfl:   •  diphenhydrAMINE (BENADRYL) 25 mg capsule, Take 25 mg by mouth every 6 (six) hours as needed for itching., Disp: , Rfl:   •  estradiol (ESTRACE) 0.1 MG/GM vaginal cream, , Disp: , Rfl:   •  gabapentin (NEURONTIN) 100 MG capsule, Take 1 capsule by mouth Every Night. For seizure d/o, Disp: 30 capsule, Rfl: 0  •  ipratropium-albuterol (DUO-NEB) 0.5-2.5 mg/3 ml nebulizer, Take 3 mL by nebulization 4 (Four) Times a Day., Disp: 360 mL, Rfl:   •  lamoTRIgine (LaMICtal) 200 MG tablet, Take  by mouth 2 (two) times a day., Disp: , Rfl:   •  levETIRAcetam (KEPPRA) 1000 MG tablet, Take 1 tablet by mouth 2 (Two) Times a Day for 30 days., Disp: 60 tablet, Rfl: 11  •  levETIRAcetam (Keppra) 250 MG tablet, Take 1 tablet by mouth 2 (Two) Times a Day for 30 days., Disp: 60 tablet, Rfl: 4  •  levETIRAcetam (KEPPRA) 750 MG tablet, , Disp: , Rfl:   •  Loperamide HCl  (IMODIUM PO), Take  by mouth., Disp: , Rfl:   •  LORazepam (ATIVAN) 0.5 MG tablet, Take  by mouth 3 (three) times a day., Disp: , Rfl:   •  LORazepam (ATIVAN) 2 MG/ML injection, Inject 1 mL into the appropriate muscle as directed by prescriber Every 6 (Six) Hours As Needed for Anxiety., Disp: 10 mL, Rfl: 5  •  lubiprostone (AMITIZA) 8 MCG capsule, Take 8 mcg by mouth 2 (two) times a day with meals., Disp: , Rfl:   •  magnesium hydroxide (MILK OF MAGNESIA) 400 MG/5ML suspension, Take  by mouth As Needed., Disp: , Rfl:   •  Multiple Vitamin (MULTI VITAMIN DAILY PO), Take  by mouth., Disp: , Rfl:   •  omeprazole (priLOSEC) 20 MG capsule, Take 20 mg by mouth 2 (Two) Times a Day., Disp: , Rfl:   •  polyethylene glycol (MIRALAX) powder, Take 17 g by mouth 2 (Two) Times a Day., Disp: , Rfl:   •  Probiotic Product (PROBIOTIC PO), Take  by mouth., Disp: , Rfl:   •  promethazine (PHENERGAN) 25 MG tablet, Take 25 mg by mouth Every 6 (Six) Hours As Needed for Nausea or Vomiting (PO/IM/GA PRN nausea, vomiting, x72 hrs, started med 12/10/19). Per [facility] med record, Disp: , Rfl:   •  traMADol (ULTRAM) 50 MG tablet, Take 2 tablets by mouth Every 8 (Eight) Hours. PRN pain, Disp: 30 tablet, Rfl: 0  •  Wheat Dextrin (BENEFIBER) powder, Take  by mouth daily., Disp: , Rfl:     PHYSICAL EXAM  ED Triage Vitals [01/18/22 1216]   Temp Heart Rate Resp BP SpO2   95.2 °F (35.1 °C) 67 24 141/77 91 %      Temp src Heart Rate Source Patient Position BP Location FiO2 (%)   Rectal Monitor Lying Right arm --       Physical Exam  Vitals and nursing note reviewed.   Constitutional:       Appearance: She is not ill-appearing.   HENT:      Head: Normocephalic and atraumatic.      Mouth/Throat:      Mouth: Mucous membranes are dry.   Eyes:      Extraocular Movements: Extraocular movements intact.      Conjunctiva/sclera: Conjunctivae normal.   Cardiovascular:      Rate and Rhythm: Normal rate and regular rhythm.      Heart sounds: Normal heart  sounds.   Pulmonary:      Effort: Pulmonary effort is normal. No respiratory distress.      Breath sounds: Normal breath sounds.   Abdominal:      General: Bowel sounds are normal. There is no distension.      Palpations: Abdomen is soft.      Tenderness: There is no abdominal tenderness.   Musculoskeletal:         General: Normal range of motion.      Cervical back: Normal range of motion and neck supple.   Skin:     General: Skin is warm and dry.   Neurological:      Mental Status: She is alert. Mental status is at baseline.   Psychiatric:         Mood and Affect: Mood and affect normal.           LAB RESULTS  Lab Results (last 24 hours)     Procedure Component Value Units Date/Time    Basic Metabolic Panel [[id number]]  (Abnormal) Collected: 01/18/22 0400    Specimen: Blood Updated: 01/18/22 0616     Glucose 86 mg/dL      BUN 32 mg/dL      Creatinine 1.13 mg/dL      Sodium 138 mmol/L      Potassium 4.2 mmol/L      Chloride 102 mmol/L      CO2 24.7 mmol/L      Calcium 10.4 mg/dL      eGFR Non African Amer 46 mL/min/1.73      BUN/Creatinine Ratio 28.3     Anion Gap 11.3 mmol/L     Narrative:      GFR Normal >60  Chronic Kidney Disease <60  Kidney Failure <15      CBC & Differential [[id number]]  (Abnormal) Collected: 01/18/22 1242    Specimen: Blood Updated: 01/18/22 1252    Narrative:      The following orders were created for panel order CBC & Differential.  Procedure                               Abnormality         Status                     ---------                               -----------         ------                     CBC Auto Differential[[id number]]        Abnormal            Final result                 Please view results for these tests on the individual orders.    Basic Metabolic Panel [[id number]]  (Abnormal) Collected: 01/18/22 1242    Specimen: Blood Updated: 01/18/22 1323     Glucose 101 mg/dL      BUN 30 mg/dL      Creatinine 1.20 mg/dL      Sodium 140 mmol/L      Potassium 4.2 mmol/L       Chloride 102 mmol/L      CO2 26.1 mmol/L      Calcium 10.6 mg/dL      eGFR Non African Amer 43 mL/min/1.73      BUN/Creatinine Ratio 25.0     Anion Gap 11.9 mmol/L     Narrative:      GFR Normal >60  Chronic Kidney Disease <60  Kidney Failure <15      Blood Culture - Blood, Arm, Right [[id number]] Collected: 01/18/22 1242    Specimen: Blood from Arm, Right Updated: 01/18/22 1249    Procalcitonin [[id number]]  (Normal) Collected: 01/18/22 1242    Specimen: Blood Updated: 01/18/22 1319     Procalcitonin 0.09 ng/mL     Lactic Acid, Plasma [[id number]]  (Normal) Collected: 01/18/22 1242    Specimen: Blood Updated: 01/18/22 1308     Lactate 1.0 mmol/L     CBC Auto Differential [[id number]]  (Abnormal) Collected: 01/18/22 1242    Specimen: Blood Updated: 01/18/22 1252     WBC 9.37 10*3/mm3      RBC 4.45 10*6/mm3      Hemoglobin 12.9 g/dL      Hematocrit 41.5 %      MCV 93.3 fL      MCH 29.0 pg      MCHC 31.1 g/dL      RDW 12.4 %      RDW-SD 43.3 fl      MPV 10.5 fL      Platelets 286 10*3/mm3      Neutrophil % 64.2 %      Lymphocyte % 26.1 %      Monocyte % 7.8 %      Eosinophil % 1.5 %      Basophil % 0.3 %      Immature Grans % 0.1 %      Neutrophils, Absolute 6.01 10*3/mm3      Lymphocytes, Absolute 2.45 10*3/mm3      Monocytes, Absolute 0.73 10*3/mm3      Eosinophils, Absolute 0.14 10*3/mm3      Basophils, Absolute 0.03 10*3/mm3      Immature Grans, Absolute 0.01 10*3/mm3     Blood Culture - Blood, Arm, Left [[id number]] Collected: 01/18/22 1252    Specimen: Blood from Arm, Left Updated: 01/18/22 1259    Urinalysis With Microscopic If Indicated (No Culture) - Urine, Catheter [[id number]]  (Normal) Collected: 01/18/22 1300    Specimen: Urine, Catheter Updated: 01/18/22 1321     Color, UA Yellow     Appearance, UA Clear     pH, UA 5.5     Specific Gravity, UA 1.015     Glucose, UA Negative     Ketones, UA Negative     Bilirubin, UA Negative     Blood, UA Negative     Protein, UA Negative     Leuk Esterase, UA Negative      Nitrite, UA Negative     Urobilinogen, UA 0.2 E.U./dL    Narrative:      Urine microscopic not indicated.    COVID-19 and FLU A/B PCR - Swab, Nasopharynx [[id number]]  (Normal) Collected: 01/18/22 1320    Specimen: Swab from Nasopharynx Updated: 01/18/22 1410     COVID19 Not Detected     Influenza A PCR Not Detected     Influenza B PCR Not Detected    Narrative:      Fact sheet for providers: https://www.fda.gov/media/315509/download    Fact sheet for patients: https://www.fda.gov/media/109685/download    Test performed by PCR.            I ordered the above labs and reviewed the results    RADIOLOGY  XR Chest 1 View    Result Date: 1/18/2022  CR Chest 1 Vw INDICATION: Shortness of air with low oxygen saturation level today. Seizure-like large patient with recent exposure to COVID-19. COMPARISON:  Chest CT 12/9/2020 FINDINGS: Portable AP view(s) of the chest.  The patient's chin and neck soft tissues obscure the lung apices on this exam. The cardiac, mediastinal aortic contours are normal. Lungs are fairly well inflated without definite evidence of acute pneumonia or infection. No edema or effusion.     No acute cardiopulmonary findings. Note that the patient's chin and neck soft tissues obscure the lung apices. Signer Name: Catina Menendez MD  Signed: 1/18/2022 1:52 PM  Workstation Name: [id number]  [facility]      I ordered the above radiologic testing and reviewed the results    PROCEDURES  Procedures      PROGRESS AND CONSULTS  ED Course as of 01/18/22 1503   Tue Jan 18, 2022   1443 Patient is oxygen saturation dipped into the upper 80s briefly.  Patient did not show any evidence of dyspnea.  However she was placed on oxygen via nasal cannula at 1 L/min.  Sats now in the low 90s.   CBC unremarkable.  CMP shows mild elevation of BUN and creatinine.  This appears to be at baseline when compared to EMR.  UA unremarkable.  COVID and flu swabs negative.  Chest x-ray unremarkable.  I do not  find any evidence of acute disease process.  Will discharge home. [SS]   8423 Discussed at length with caregiver all results, diagnosis, treatment.  Patient has history of asthma.  While lungs are clear to auscultation recommend patient be given Med-Neb treatments 3-4 times a day as needed the next few days. [SS]      ED Course User Index  [SS] Geovanni Phillips MD     Labs Reviewed   BASIC METABOLIC PANEL - Abnormal; Notable for the following components:       Result Value    Glucose 101 (*)     BUN 30 (*)     Creatinine 1.20 (*)     Calcium 10.6 (*)     eGFR Non  Amer 43 (*)     All other components within normal limits    Narrative:     GFR Normal >60  Chronic Kidney Disease <60  Kidney Failure <15     CBC WITH AUTO DIFFERENTIAL - Abnormal; Notable for the following components:    MCHC 31.1 (*)     All other components within normal limits   COVID-19 AND FLU A/B, NP SWAB IN TRANSPORT MEDIA 8-12 HR TAT - Normal    Narrative:     Fact sheet for providers: https://www.fda.gov/media/481749/download    Fact sheet for patients: https://www.fda.gov/media/698959/download    Test performed by PCR.   URINALYSIS W/ MICROSCOPIC IF INDICATED (NO CULTURE) - Normal    Narrative:     Urine microscopic not indicated.   PROCALCITONIN - Normal   LACTIC ACID, PLASMA - Normal   BLOOD CULTURE   BLOOD CULTURE   CBC AND DIFFERENTIAL    Narrative:     The following orders were created for panel order CBC & Differential.  Procedure                               Abnormality         Status                     ---------                               -----------         ------                     CBC Auto Differential[[id number]]        Abnormal            Final result                 Please view results for these tests on the individual orders.     XR Chest 1 View    Result Date: 1/18/2022  Narrative: CR Chest 1 Vw INDICATION: Shortness of air with low oxygen saturation level today. Seizure-like large patient with recent exposure to  COVID-19. COMPARISON:  Chest CT 12/9/2020 FINDINGS: Portable AP view(s) of the chest.  The patient's chin and neck soft tissues obscure the lung apices on this exam. The cardiac, mediastinal aortic contours are normal. Lungs are fairly well inflated without definite evidence of acute pneumonia or infection. No edema or effusion.     Impression: No acute cardiopulmonary findings. Note that the patient's chin and neck soft tissues obscure the lung apices. Signer Name: Catina Menendez MD  Signed: 1/18/2022 1:52 PM  Workstation Name: [id number]  [facility]    My differential diagnosis for dyspnea includes but is not limited to:  Asthma, COPD, COVID-19, pneumonia, pulmonary embolus, acute respiratory distress syndrome, RSV, pneumothorax, pleural effusion, pulmonary fibrosis, congestive heart failure, myocardial infarction, DKA, uremia, acidosis, sepsis, anemia, drug related, hyperventilation, CNS disease        MEDICAL DECISION MAKING    MDM       DIAGNOSIS  Final diagnoses:   Hypoxia       Latest Documented Vital Signs:  As of 15:03 EST  BP- 134/62 HR- 67 Temp- 95.2 °F (35.1 °C) (Rectal) O2 sat- 92%    DISPOSITION  Home        Discussed pertinent findings with the patient/family.  Patient/Family voiced understanding of need to follow-up for recheck and further testing as needed.  Return to the Emergency Department warnings were given.         Medication List      No changes were made to your prescriptions during this visit.              Follow-up Information     Tulio Carlson MD. Schedule an appointment as soon as possible for a visit in 1 week.    Specialty: Family Medicine  Why: for continued or worsened symptoms, Sooner if needed  Contact information:  [address] [id number] [phone]                           Dictated utilizing Dragon dictation     Geovanni Phillips MD  01/18/22 150       Geovanni Phillips MD  01/18/22 1507

## 2022-01-18 NOTE — DISCHARGE INSTRUCTIONS
Oxygen via nasal cannula up to 5 L/min as needed to keep oxygen saturations 90% or above.  Give albuterol treatments every 4 to 6 hours as needed for low oxygen saturation, wheezing, shortness of breath.  Continue current medications.  Follow-up with PCP as above.  Return to ED for medical emergencies.

## 2022-01-18 NOTE — ED NOTES
Spoke with family member Mr. Kapadia notified of patients status, patient returning to [place].      Carol Novoa, RN  01/18/22 6790       Carol Novoa, RN  01/18/22 4798

## 2022-01-18 NOTE — ED NOTES
Called [place], notified AUSTIN Ramirez of patients status and that she is returning to [facility]     Carol Novoa, RN  01/18/22 7610

## 2022-01-18 NOTE — ED NOTES
Called patients POA gave update on patients condition, awaiting CXR and Covid/Flu results. Will update family (Nicola Kang) as needed.     Carol Novoa, AUSTIN  01/18/22 2538       Carol Novoa RN  01/18/22 5615

## 2022-01-23 LAB
BACTERIA SPEC AEROBE CULT: NORMAL
BACTERIA SPEC AEROBE CULT: NORMAL

## 2022-01-24 ENCOUNTER — LAB REQUISITION (OUTPATIENT)
Dept: LAB | Facility: HOSPITAL | Age: 82
End: 2022-01-24

## 2022-01-24 DIAGNOSIS — R53.83 OTHER FATIGUE: ICD-10-CM

## 2022-01-24 DIAGNOSIS — I50.9 HEART FAILURE, UNSPECIFIED: ICD-10-CM

## 2022-01-24 DIAGNOSIS — R09.02 HYPOXEMIA: ICD-10-CM

## 2022-01-24 LAB — NT-PROBNP SERPL-MCNC: 250.8 PG/ML (ref 0–1800)

## 2022-01-24 PROCEDURE — 83880 ASSAY OF NATRIURETIC PEPTIDE: CPT | Performed by: FAMILY MEDICINE

## 2022-01-25 ENCOUNTER — LAB REQUISITION (OUTPATIENT)
Dept: LAB | Facility: HOSPITAL | Age: 82
End: 2022-01-25

## 2022-01-25 DIAGNOSIS — N39.0 URINARY TRACT INFECTION, SITE NOT SPECIFIED: ICD-10-CM

## 2022-01-25 PROCEDURE — 87186 SC STD MICRODIL/AGAR DIL: CPT | Performed by: FAMILY MEDICINE

## 2022-01-25 PROCEDURE — 87086 URINE CULTURE/COLONY COUNT: CPT | Performed by: FAMILY MEDICINE

## 2022-01-25 PROCEDURE — 87077 CULTURE AEROBIC IDENTIFY: CPT | Performed by: FAMILY MEDICINE

## 2022-01-28 LAB
BACTERIA SPEC AEROBE CULT: ABNORMAL
BACTERIA SPEC AEROBE CULT: ABNORMAL

## 2022-02-03 ENCOUNTER — TELEPHONE (OUTPATIENT)
Dept: NEUROLOGY | Facility: CLINIC | Age: 82
End: 2022-02-03

## 2022-02-03 DIAGNOSIS — G40.909 NON-REFRACTORY EPILEPSY: Primary | ICD-10-CM

## 2022-02-03 NOTE — TELEPHONE ENCOUNTER
"DR TING CARUSO CALLED    [phone] EXT [phone]    PATIENT HAD SEIZURE THIS MORNING FOR 2.5 MINS. SEIZURE BEFORE TODAY'S WAS 1-24 AND SHE'S HAD OTHERS. CALLED TO SEE WHAT DR. MAGUIRE SUGGESTS - LABS? NURSE SAYS THEY'VE BEEN TRYING TO GET HER IN WITH PULMONOLOGY BECAUSE SHE'S BEEN HAVING \"ISSUES WITH HER OXYGEN LEVELS, ESPECIALLY AFTER HAVING A SEIZURE\". PLEASE ADVISE  "

## 2022-02-04 ENCOUNTER — LAB REQUISITION (OUTPATIENT)
Dept: LAB | Facility: HOSPITAL | Age: 82
End: 2022-02-04

## 2022-02-04 DIAGNOSIS — R33.9 RETENTION OF URINE, UNSPECIFIED: ICD-10-CM

## 2022-02-04 LAB
BACTERIA UR QL AUTO: ABNORMAL /HPF
BILIRUB UR QL STRIP: NEGATIVE
CLARITY UR: CLEAR
COLOR UR: YELLOW
GLUCOSE UR STRIP-MCNC: NEGATIVE MG/DL
HGB UR QL STRIP.AUTO: ABNORMAL
HYALINE CASTS UR QL AUTO: ABNORMAL /LPF
KETONES UR QL STRIP: NEGATIVE
LEUKOCYTE ESTERASE UR QL STRIP.AUTO: ABNORMAL
NITRITE UR QL STRIP: POSITIVE
PH UR STRIP.AUTO: 6 [PH] (ref 4.5–8)
PROT UR QL STRIP: NEGATIVE
RBC # UR STRIP: ABNORMAL /HPF
REF LAB TEST METHOD: ABNORMAL
SP GR UR STRIP: 1.02 (ref 1–1.03)
SQUAMOUS #/AREA URNS HPF: ABNORMAL /HPF
TRANS CELLS #/AREA URNS HPF: ABNORMAL /HPF
UROBILINOGEN UR QL STRIP: ABNORMAL
WBC # UR STRIP: ABNORMAL /HPF

## 2022-02-04 PROCEDURE — 87086 URINE CULTURE/COLONY COUNT: CPT | Performed by: FAMILY MEDICINE

## 2022-02-04 PROCEDURE — 81001 URINALYSIS AUTO W/SCOPE: CPT | Performed by: FAMILY MEDICINE

## 2022-02-05 LAB — BACTERIA SPEC AEROBE CULT: NORMAL

## 2022-02-07 RX ORDER — LEVETIRACETAM 500 MG/1
500 TABLET ORAL 2 TIMES DAILY
Qty: 60 TABLET | Refills: 6 | Status: SHIPPED | OUTPATIENT
Start: 2022-02-07 | End: 2022-12-26 | Stop reason: HOSPADM

## 2022-02-07 NOTE — TELEPHONE ENCOUNTER
Called and spoke to Ashley. Dr. Peralta added 500mg po bid and d/c 250mg. For total of 1500 mg po bid per dr. Peralta. Please advise thank you.

## 2022-02-07 NOTE — TELEPHONE ENCOUNTER
NURSE GLENROY FROM [place] IS CALLING TO VERIFY PT KEPPRA . CMT STATED DR MAGUIRE SAID HER KEPPRA IS TO BE INCREASED TO 1500 MG BID ? PLEASE CALL SO NURSE CAN VERIFY.     PLEASE CALL ASAP AS SHE NEEDS TO GET RX IN .      GLENROY [phone]  EXT [phone]

## 2022-02-07 NOTE — TELEPHONE ENCOUNTER
"Spoke to patient's caregiver at [facility].  Please fax the prescription to \"Erendira\" there at [facility].  "

## 2022-02-08 DIAGNOSIS — F41.9 ANXIETY: Primary | ICD-10-CM

## 2022-02-08 RX ORDER — LORAZEPAM 0.5 MG/1
0.5 TABLET ORAL 3 TIMES DAILY
Qty: 90 TABLET | Refills: 0 | Status: SHIPPED | OUTPATIENT
Start: 2022-02-08 | End: 2022-02-09

## 2022-02-09 ENCOUNTER — TRANSCRIBE ORDERS (OUTPATIENT)
Dept: ADMINISTRATIVE | Facility: HOSPITAL | Age: 82
End: 2022-02-09

## 2022-02-09 DIAGNOSIS — F41.9 ANXIETY: ICD-10-CM

## 2022-02-09 RX ORDER — LORAZEPAM 0.5 MG/1
0.25 TABLET ORAL 3 TIMES DAILY
Qty: 45 TABLET | Refills: 0 | Status: SHIPPED | OUTPATIENT
Start: 2022-02-09 | End: 2022-02-18 | Stop reason: SDUPTHER

## 2022-02-10 ENCOUNTER — APPOINTMENT (OUTPATIENT)
Dept: GENERAL RADIOLOGY | Facility: HOSPITAL | Age: 82
End: 2022-02-10

## 2022-02-10 ENCOUNTER — APPOINTMENT (OUTPATIENT)
Dept: CARDIOLOGY | Facility: HOSPITAL | Age: 82
End: 2022-02-10

## 2022-02-10 ENCOUNTER — APPOINTMENT (OUTPATIENT)
Dept: CT IMAGING | Facility: HOSPITAL | Age: 82
End: 2022-02-10

## 2022-02-10 ENCOUNTER — OFFICE VISIT (OUTPATIENT)
Dept: CARDIOLOGY | Facility: CLINIC | Age: 82
End: 2022-02-10

## 2022-02-10 ENCOUNTER — TRANSCRIBE ORDERS (OUTPATIENT)
Dept: ADMINISTRATIVE | Facility: HOSPITAL | Age: 82
End: 2022-02-10

## 2022-02-10 ENCOUNTER — HOSPITAL ENCOUNTER (OUTPATIENT)
Facility: HOSPITAL | Age: 82
Setting detail: OBSERVATION
Discharge: NURSING, REHAB OR LONG-TERM CARE | End: 2022-02-11
Attending: EMERGENCY MEDICINE | Admitting: INTERNAL MEDICINE

## 2022-02-10 ENCOUNTER — APPOINTMENT (OUTPATIENT)
Dept: NUCLEAR MEDICINE | Facility: HOSPITAL | Age: 82
End: 2022-02-10

## 2022-02-10 VITALS
RESPIRATION RATE: 16 BRPM | OXYGEN SATURATION: 99 % | HEART RATE: 89 BPM | SYSTOLIC BLOOD PRESSURE: 180 MMHG | WEIGHT: 150.3 LBS | BODY MASS INDEX: 23.59 KG/M2 | DIASTOLIC BLOOD PRESSURE: 90 MMHG | HEIGHT: 67 IN

## 2022-02-10 DIAGNOSIS — N18.9 ACUTE KIDNEY INJURY SUPERIMPOSED ON CHRONIC KIDNEY DISEASE: ICD-10-CM

## 2022-02-10 DIAGNOSIS — R09.02 HYPOXIA: Primary | ICD-10-CM

## 2022-02-10 DIAGNOSIS — R00.1 BRADYCARDIA, SINUS: Primary | ICD-10-CM

## 2022-02-10 DIAGNOSIS — R09.02 HYPOXEMIA: Primary | ICD-10-CM

## 2022-02-10 DIAGNOSIS — N18.31 STAGE 3A CHRONIC KIDNEY DISEASE: ICD-10-CM

## 2022-02-10 DIAGNOSIS — E78.00 HYPERCHOLESTEROLEMIA: ICD-10-CM

## 2022-02-10 DIAGNOSIS — N17.9 ACUTE KIDNEY INJURY SUPERIMPOSED ON CHRONIC KIDNEY DISEASE: ICD-10-CM

## 2022-02-10 PROBLEM — J96.01 ACUTE RESPIRATORY FAILURE WITH HYPOXIA: Status: ACTIVE | Noted: 2022-02-10

## 2022-02-10 PROBLEM — R06.02 SOB (SHORTNESS OF BREATH): Status: RESOLVED | Noted: 2022-02-10 | Resolved: 2022-02-10

## 2022-02-10 PROBLEM — R46.89 UNUSUAL CHANGE IN BEHAVIOR: Status: ACTIVE | Noted: 2022-02-10

## 2022-02-10 PROBLEM — R06.02 SOB (SHORTNESS OF BREATH): Status: ACTIVE | Noted: 2022-02-10

## 2022-02-10 LAB
AMMONIA BLD-SCNC: 17 UMOL/L (ref 11–51)
ANION GAP SERPL CALCULATED.3IONS-SCNC: 13.1 MMOL/L (ref 5–15)
AORTIC DIMENSIONLESS INDEX: 0.58 (DI)
B PARAPERT DNA SPEC QL NAA+PROBE: NOT DETECTED
B PERT DNA SPEC QL NAA+PROBE: NOT DETECTED
BACTERIA UR QL AUTO: ABNORMAL /HPF
BASOPHILS # BLD AUTO: 0.04 10*3/MM3 (ref 0–0.2)
BASOPHILS NFR BLD AUTO: 0.5 % (ref 0–1.5)
BH CV ECHO MEAS - AO MAX PG: 15 MMHG
BH CV ECHO MEAS - AO MEAN PG (FULL): 4.5 MMHG
BH CV ECHO MEAS - AO MEAN PG: 7.5 MMHG
BH CV ECHO MEAS - AO ROOT AREA (BSA CORRECTED): 1.7
BH CV ECHO MEAS - AO ROOT AREA: 7.1 CM^2
BH CV ECHO MEAS - AO ROOT DIAM: 3 CM
BH CV ECHO MEAS - AO V2 MAX: 191 CM/SEC
BH CV ECHO MEAS - AO V2 MEAN: 132 CM/SEC
BH CV ECHO MEAS - AO V2 VTI: 39.9 CM
BH CV ECHO MEAS - AVA(I,A): 1.7 CM^2
BH CV ECHO MEAS - AVA(I,D): 1.7 CM^2
BH CV ECHO MEAS - BSA(HAYCOCK): 1.8 M^2
BH CV ECHO MEAS - BSA: 1.8 M^2
BH CV ECHO MEAS - BZI_BMI: 25.1 KILOGRAMS/M^2
BH CV ECHO MEAS - BZI_METRIC_HEIGHT: 167 CM
BH CV ECHO MEAS - BZI_METRIC_WEIGHT: 70 KG
BH CV ECHO MEAS - EDV(CUBED): 103.2 ML
BH CV ECHO MEAS - EDV(MOD-SP4): 74.8 ML
BH CV ECHO MEAS - EDV(TEICH): 101.9 ML
BH CV ECHO MEAS - EF(CUBED): 73.3 %
BH CV ECHO MEAS - EF(MOD-SP4): 63.8 %
BH CV ECHO MEAS - EF(TEICH): 65.1 %
BH CV ECHO MEAS - ESV(CUBED): 27.5 ML
BH CV ECHO MEAS - ESV(MOD-SP4): 27.1 ML
BH CV ECHO MEAS - ESV(TEICH): 35.6 ML
BH CV ECHO MEAS - FS: 35.6 %
BH CV ECHO MEAS - IVS/LVPW: 1.1
BH CV ECHO MEAS - IVSD: 0.97 CM
BH CV ECHO MEAS - LAT PEAK E' VEL: 12 CM/SEC
BH CV ECHO MEAS - LV DIASTOLIC VOL/BSA (35-75): 41.9 ML/M^2
BH CV ECHO MEAS - LV MASS(C)D: 151 GRAMS
BH CV ECHO MEAS - LV MASS(C)DI: 84.5 GRAMS/M^2
BH CV ECHO MEAS - LV MAX PG: 5 MMHG
BH CV ECHO MEAS - LV MEAN PG: 3 MMHG
BH CV ECHO MEAS - LV SYSTOLIC VOL/BSA (12-30): 15.2 ML/M^2
BH CV ECHO MEAS - LV V1 MAX: 112 CM/SEC
BH CV ECHO MEAS - LV V1 MEAN: 80.5 CM/SEC
BH CV ECHO MEAS - LV V1 VTI: 23.6 CM
BH CV ECHO MEAS - LVIDD: 4.7 CM
BH CV ECHO MEAS - LVIDS: 3 CM
BH CV ECHO MEAS - LVLD AP4: 7.2 CM
BH CV ECHO MEAS - LVLS AP4: 5.8 CM
BH CV ECHO MEAS - LVOT AREA (M): 2.8 CM^2
BH CV ECHO MEAS - LVOT AREA: 2.8 CM^2
BH CV ECHO MEAS - LVOT DIAM: 1.9 CM
BH CV ECHO MEAS - LVPWD: 0.91 CM
BH CV ECHO MEAS - MED PEAK E' VEL: 6.4 CM/SEC
BH CV ECHO MEAS - MV A MAX VEL: 130 CM/SEC
BH CV ECHO MEAS - MV DEC SLOPE: 1185 CM/SEC^2
BH CV ECHO MEAS - MV DEC TIME: 161 SEC
BH CV ECHO MEAS - MV E MAX VEL: 101 CM/SEC
BH CV ECHO MEAS - MV E/A: 0.78
BH CV ECHO MEAS - MV MEAN PG: 4 MMHG
BH CV ECHO MEAS - MV P1/2T MAX VEL: 159 CM/SEC
BH CV ECHO MEAS - MV P1/2T: 39.3 MSEC
BH CV ECHO MEAS - MV V2 MEAN: 81.8 CM/SEC
BH CV ECHO MEAS - MV V2 VTI: 32.3 CM
BH CV ECHO MEAS - MVA P1/2T LCG: 1.4 CM^2
BH CV ECHO MEAS - MVA(P1/2T): 5.6 CM^2
BH CV ECHO MEAS - MVA(VTI): 2.1 CM^2
BH CV ECHO MEAS - PA ACC TIME: 0.14 SEC
BH CV ECHO MEAS - PA PR(ACCEL): 18.3 MMHG
BH CV ECHO MEAS - RAP SYSTOLE: 8 MMHG
BH CV ECHO MEAS - RV MEAN PG: 1 MMHG
BH CV ECHO MEAS - RV V1 MEAN: 56.4 CM/SEC
BH CV ECHO MEAS - RV V1 VTI: 17.1 CM
BH CV ECHO MEAS - RVSP: 32 MMHG
BH CV ECHO MEAS - SI(AO): 157.9 ML/M^2
BH CV ECHO MEAS - SI(CUBED): 42.3 ML/M^2
BH CV ECHO MEAS - SI(LVOT): 37.5 ML/M^2
BH CV ECHO MEAS - SI(MOD-SP4): 26.7 ML/M^2
BH CV ECHO MEAS - SI(TEICH): 37.1 ML/M^2
BH CV ECHO MEAS - SV(AO): 282 ML
BH CV ECHO MEAS - SV(CUBED): 75.6 ML
BH CV ECHO MEAS - SV(LVOT): 66.9 ML
BH CV ECHO MEAS - SV(MOD-SP4): 47.7 ML
BH CV ECHO MEAS - SV(TEICH): 66.3 ML
BH CV ECHO MEAS - TR MAX PG: 24 MMHG
BH CV ECHO MEAS - TR MAX VEL: 246 CM/SEC
BH CV ECHO MEASUREMENTS AVERAGE E/E' RATIO: 10.98
BH CV XLRA - RV BASE: 3.3 CM
BH CV XLRA - RV LENGTH: 6.3 CM
BILIRUB UR QL STRIP: NEGATIVE
BUN SERPL-MCNC: 40 MG/DL (ref 8–23)
BUN/CREAT SERPL: 25.8 (ref 7–25)
C PNEUM DNA NPH QL NAA+NON-PROBE: NOT DETECTED
CALCIUM SPEC-SCNC: 10 MG/DL (ref 8.6–10.5)
CHLORIDE SERPL-SCNC: 106 MMOL/L (ref 98–107)
CLARITY UR: CLEAR
CO2 SERPL-SCNC: 20.9 MMOL/L (ref 22–29)
COLOR UR: YELLOW
CREAT SERPL-MCNC: 1.55 MG/DL (ref 0.57–1)
D DIMER PPP FEU-MCNC: 0.65 MCGFEU/ML (ref 0–0.46)
D-LACTATE SERPL-SCNC: 1.3 MMOL/L (ref 0.5–2)
DEPRECATED RDW RBC AUTO: 45.7 FL (ref 37–54)
EOSINOPHIL # BLD AUTO: 0.08 10*3/MM3 (ref 0–0.4)
EOSINOPHIL NFR BLD AUTO: 1 % (ref 0.3–6.2)
ERYTHROCYTE [DISTWIDTH] IN BLOOD BY AUTOMATED COUNT: 13.4 % (ref 12.3–15.4)
FLUAV RNA RESP QL NAA+PROBE: NOT DETECTED
FLUAV SUBTYP SPEC NAA+PROBE: NOT DETECTED
FLUBV RNA ISLT QL NAA+PROBE: NOT DETECTED
FLUBV RNA RESP QL NAA+PROBE: NOT DETECTED
FOLATE SERPL-MCNC: >20 NG/ML (ref 4.78–24.2)
GFR SERPL CREATININE-BSD FRML MDRD: 32 ML/MIN/1.73
GLUCOSE SERPL-MCNC: 120 MG/DL (ref 65–99)
GLUCOSE UR STRIP-MCNC: NEGATIVE MG/DL
HADV DNA SPEC NAA+PROBE: NOT DETECTED
HCOV 229E RNA SPEC QL NAA+PROBE: NOT DETECTED
HCOV HKU1 RNA SPEC QL NAA+PROBE: NOT DETECTED
HCOV NL63 RNA SPEC QL NAA+PROBE: NOT DETECTED
HCOV OC43 RNA SPEC QL NAA+PROBE: NOT DETECTED
HCT VFR BLD AUTO: 37.8 % (ref 34–46.6)
HGB BLD-MCNC: 11.9 G/DL (ref 12–15.9)
HGB UR QL STRIP.AUTO: ABNORMAL
HMPV RNA NPH QL NAA+NON-PROBE: NOT DETECTED
HPIV1 RNA ISLT QL NAA+PROBE: NOT DETECTED
HPIV2 RNA SPEC QL NAA+PROBE: NOT DETECTED
HPIV3 RNA NPH QL NAA+PROBE: NOT DETECTED
HPIV4 P GENE NPH QL NAA+PROBE: NOT DETECTED
HYALINE CASTS UR QL AUTO: ABNORMAL /LPF
IMM GRANULOCYTES # BLD AUTO: 0.01 10*3/MM3 (ref 0–0.05)
IMM GRANULOCYTES NFR BLD AUTO: 0.1 % (ref 0–0.5)
KETONES UR QL STRIP: ABNORMAL
LEFT ATRIUM VOLUME INDEX: 22 ML/M2
LEUKOCYTE ESTERASE UR QL STRIP.AUTO: ABNORMAL
LYMPHOCYTES # BLD AUTO: 1.96 10*3/MM3 (ref 0.7–3.1)
LYMPHOCYTES NFR BLD AUTO: 25 % (ref 19.6–45.3)
M PNEUMO IGG SER IA-ACNC: NOT DETECTED
MAXIMAL PREDICTED HEART RATE: 139 BPM
MCH RBC QN AUTO: 29.6 PG (ref 26.6–33)
MCHC RBC AUTO-ENTMCNC: 31.5 G/DL (ref 31.5–35.7)
MCV RBC AUTO: 94 FL (ref 79–97)
MONOCYTES # BLD AUTO: 0.41 10*3/MM3 (ref 0.1–0.9)
MONOCYTES NFR BLD AUTO: 5.2 % (ref 5–12)
NEUTROPHILS NFR BLD AUTO: 5.33 10*3/MM3 (ref 1.7–7)
NEUTROPHILS NFR BLD AUTO: 68.2 % (ref 42.7–76)
NITRITE UR QL STRIP: NEGATIVE
NRBC BLD AUTO-RTO: 0 /100 WBC (ref 0–0.2)
NT-PROBNP SERPL-MCNC: 1821 PG/ML (ref 0–1800)
PH UR STRIP.AUTO: 7.5 [PH] (ref 4.5–8)
PLATELET # BLD AUTO: 312 10*3/MM3 (ref 140–450)
PMV BLD AUTO: 11.3 FL (ref 6–12)
POTASSIUM SERPL-SCNC: 5 MMOL/L (ref 3.5–5.2)
PROCALCITONIN SERPL-MCNC: 0.05 NG/ML (ref 0–0.25)
PROT UR QL STRIP: NEGATIVE
QT INTERVAL: 375 MS
QT INTERVAL: 379 MS
RBC # BLD AUTO: 4.02 10*6/MM3 (ref 3.77–5.28)
RBC # UR STRIP: ABNORMAL /HPF
REF LAB TEST METHOD: ABNORMAL
RHINOVIRUS RNA SPEC NAA+PROBE: NOT DETECTED
RSV RNA NPH QL NAA+NON-PROBE: NOT DETECTED
SARS-COV-2 RNA NPH QL NAA+NON-PROBE: NOT DETECTED
SARS-COV-2 RNA RESP QL NAA+PROBE: NOT DETECTED
SODIUM SERPL-SCNC: 140 MMOL/L (ref 136–145)
SP GR UR STRIP: 1.01 (ref 1–1.03)
SQUAMOUS #/AREA URNS HPF: ABNORMAL /HPF
STRESS TARGET HR: 118 BPM
TROPONIN T SERPL-MCNC: <0.01 NG/ML (ref 0–0.03)
TSH SERPL DL<=0.05 MIU/L-ACNC: 2.39 UIU/ML (ref 0.27–4.2)
UROBILINOGEN UR QL STRIP: ABNORMAL
VIT B12 BLD-MCNC: 560 PG/ML (ref 211–946)
WBC # UR STRIP: ABNORMAL /HPF
WBC NRBC COR # BLD: 7.83 10*3/MM3 (ref 3.4–10.8)

## 2022-02-10 PROCEDURE — 99284 EMERGENCY DEPT VISIT MOD MDM: CPT

## 2022-02-10 PROCEDURE — 82607 VITAMIN B-12: CPT | Performed by: INTERNAL MEDICINE

## 2022-02-10 PROCEDURE — 0202U NFCT DS 22 TRGT SARS-COV-2: CPT | Performed by: INTERNAL MEDICINE

## 2022-02-10 PROCEDURE — 71045 X-RAY EXAM CHEST 1 VIEW: CPT

## 2022-02-10 PROCEDURE — 85379 FIBRIN DEGRADATION QUANT: CPT | Performed by: EMERGENCY MEDICINE

## 2022-02-10 PROCEDURE — 96372 THER/PROPH/DIAG INJ SC/IM: CPT

## 2022-02-10 PROCEDURE — 94799 UNLISTED PULMONARY SVC/PX: CPT

## 2022-02-10 PROCEDURE — 84145 PROCALCITONIN (PCT): CPT | Performed by: EMERGENCY MEDICINE

## 2022-02-10 PROCEDURE — 99204 OFFICE O/P NEW MOD 45 MIN: CPT | Performed by: INTERNAL MEDICINE

## 2022-02-10 PROCEDURE — 96361 HYDRATE IV INFUSION ADD-ON: CPT

## 2022-02-10 PROCEDURE — 93306 TTE W/DOPPLER COMPLETE: CPT | Performed by: INTERNAL MEDICINE

## 2022-02-10 PROCEDURE — 80177 DRUG SCRN QUAN LEVETIRACETAM: CPT | Performed by: INTERNAL MEDICINE

## 2022-02-10 PROCEDURE — 83880 ASSAY OF NATRIURETIC PEPTIDE: CPT | Performed by: EMERGENCY MEDICINE

## 2022-02-10 PROCEDURE — G0378 HOSPITAL OBSERVATION PER HR: HCPCS

## 2022-02-10 PROCEDURE — 99284 EMERGENCY DEPT VISIT MOD MDM: CPT | Performed by: EMERGENCY MEDICINE

## 2022-02-10 PROCEDURE — 80048 BASIC METABOLIC PNL TOTAL CA: CPT | Performed by: EMERGENCY MEDICINE

## 2022-02-10 PROCEDURE — 93306 TTE W/DOPPLER COMPLETE: CPT

## 2022-02-10 PROCEDURE — 99220 PR INITIAL OBSERVATION CARE/DAY 70 MINUTES: CPT | Performed by: INTERNAL MEDICINE

## 2022-02-10 PROCEDURE — 70450 CT HEAD/BRAIN W/O DYE: CPT

## 2022-02-10 PROCEDURE — 93005 ELECTROCARDIOGRAM TRACING: CPT | Performed by: EMERGENCY MEDICINE

## 2022-02-10 PROCEDURE — 87636 SARSCOV2 & INF A&B AMP PRB: CPT | Performed by: EMERGENCY MEDICINE

## 2022-02-10 PROCEDURE — 78580 LUNG PERFUSION IMAGING: CPT

## 2022-02-10 PROCEDURE — A9540 TC99M MAA: HCPCS | Performed by: INTERNAL MEDICINE

## 2022-02-10 PROCEDURE — 84443 ASSAY THYROID STIM HORMONE: CPT | Performed by: INTERNAL MEDICINE

## 2022-02-10 PROCEDURE — 93010 ELECTROCARDIOGRAM REPORT: CPT | Performed by: INTERNAL MEDICINE

## 2022-02-10 PROCEDURE — 85025 COMPLETE CBC W/AUTO DIFF WBC: CPT | Performed by: EMERGENCY MEDICINE

## 2022-02-10 PROCEDURE — 93000 ELECTROCARDIOGRAM COMPLETE: CPT | Performed by: INTERNAL MEDICINE

## 2022-02-10 PROCEDURE — 82746 ASSAY OF FOLIC ACID SERUM: CPT | Performed by: INTERNAL MEDICINE

## 2022-02-10 PROCEDURE — 96360 HYDRATION IV INFUSION INIT: CPT

## 2022-02-10 PROCEDURE — 25010000002 ENOXAPARIN PER 10 MG: Performed by: INTERNAL MEDICINE

## 2022-02-10 PROCEDURE — 84484 ASSAY OF TROPONIN QUANT: CPT | Performed by: EMERGENCY MEDICINE

## 2022-02-10 PROCEDURE — 94761 N-INVAS EAR/PLS OXIMETRY MLT: CPT

## 2022-02-10 PROCEDURE — 81001 URINALYSIS AUTO W/SCOPE: CPT | Performed by: INTERNAL MEDICINE

## 2022-02-10 PROCEDURE — 83605 ASSAY OF LACTIC ACID: CPT | Performed by: EMERGENCY MEDICINE

## 2022-02-10 PROCEDURE — 0 TECHNETIUM ALBUMIN AGGREGATED: Performed by: INTERNAL MEDICINE

## 2022-02-10 PROCEDURE — 82140 ASSAY OF AMMONIA: CPT | Performed by: INTERNAL MEDICINE

## 2022-02-10 PROCEDURE — C9803 HOPD COVID-19 SPEC COLLECT: HCPCS

## 2022-02-10 RX ORDER — DILTIAZEM HYDROCHLORIDE 120 MG/1
240 CAPSULE, COATED, EXTENDED RELEASE ORAL
Status: DISCONTINUED | OUTPATIENT
Start: 2022-02-10 | End: 2022-02-11 | Stop reason: HOSPADM

## 2022-02-10 RX ORDER — LORAZEPAM 0.5 MG/1
0.25 TABLET ORAL 3 TIMES DAILY
Status: DISCONTINUED | OUTPATIENT
Start: 2022-02-10 | End: 2022-02-11 | Stop reason: HOSPADM

## 2022-02-10 RX ORDER — LAMOTRIGINE 100 MG/1
200 TABLET ORAL 2 TIMES DAILY
Status: DISCONTINUED | OUTPATIENT
Start: 2022-02-10 | End: 2022-02-11 | Stop reason: HOSPADM

## 2022-02-10 RX ORDER — IPRATROPIUM BROMIDE AND ALBUTEROL SULFATE 2.5; .5 MG/3ML; MG/3ML
3 SOLUTION RESPIRATORY (INHALATION) 4 TIMES DAILY PRN
Status: DISCONTINUED | OUTPATIENT
Start: 2022-02-10 | End: 2022-02-11 | Stop reason: HOSPADM

## 2022-02-10 RX ORDER — SODIUM CHLORIDE 0.9 % (FLUSH) 0.9 %
10 SYRINGE (ML) INJECTION AS NEEDED
Status: DISCONTINUED | OUTPATIENT
Start: 2022-02-10 | End: 2022-02-11 | Stop reason: HOSPADM

## 2022-02-10 RX ORDER — ATORVASTATIN CALCIUM 20 MG/1
20 TABLET, FILM COATED ORAL NIGHTLY
Status: DISCONTINUED | OUTPATIENT
Start: 2022-02-10 | End: 2022-02-11 | Stop reason: HOSPADM

## 2022-02-10 RX ORDER — SODIUM CHLORIDE 9 MG/ML
125 INJECTION, SOLUTION INTRAVENOUS CONTINUOUS
Status: DISCONTINUED | OUTPATIENT
Start: 2022-02-10 | End: 2022-02-11

## 2022-02-10 RX ORDER — LEVETIRACETAM 500 MG/1
1000 TABLET ORAL 2 TIMES DAILY
Status: DISCONTINUED | OUTPATIENT
Start: 2022-02-10 | End: 2022-02-11 | Stop reason: HOSPADM

## 2022-02-10 RX ORDER — PROMETHAZINE HYDROCHLORIDE 25 MG/1
25 TABLET ORAL EVERY 6 HOURS PRN
Status: DISCONTINUED | OUTPATIENT
Start: 2022-02-10 | End: 2022-02-11 | Stop reason: HOSPADM

## 2022-02-10 RX ORDER — LUBIPROSTONE 24 UG/1
24 CAPSULE ORAL
Status: DISCONTINUED | OUTPATIENT
Start: 2022-02-11 | End: 2022-02-11 | Stop reason: HOSPADM

## 2022-02-10 RX ORDER — ACETAMINOPHEN 325 MG/1
650 TABLET ORAL EVERY 6 HOURS PRN
Status: DISCONTINUED | OUTPATIENT
Start: 2022-02-10 | End: 2022-02-11 | Stop reason: HOSPADM

## 2022-02-10 RX ORDER — SODIUM CHLORIDE 9 MG/ML
40 INJECTION, SOLUTION INTRAVENOUS AS NEEDED
Status: DISCONTINUED | OUTPATIENT
Start: 2022-02-10 | End: 2022-02-11 | Stop reason: HOSPADM

## 2022-02-10 RX ORDER — HYDROCODONE BITARTRATE AND ACETAMINOPHEN 5; 325 MG/1; MG/1
1 TABLET ORAL EVERY 8 HOURS PRN
Status: DISCONTINUED | OUTPATIENT
Start: 2022-02-10 | End: 2022-02-11 | Stop reason: HOSPADM

## 2022-02-10 RX ORDER — SODIUM CHLORIDE 0.9 % (FLUSH) 0.9 %
10 SYRINGE (ML) INJECTION EVERY 12 HOURS SCHEDULED
Status: DISCONTINUED | OUTPATIENT
Start: 2022-02-10 | End: 2022-02-11 | Stop reason: HOSPADM

## 2022-02-10 RX ORDER — HYDROCODONE BITARTRATE AND ACETAMINOPHEN 5; 325 MG/1; MG/1
1 TABLET ORAL EVERY 8 HOURS PRN
COMMUNITY
End: 2022-02-11 | Stop reason: HOSPADM

## 2022-02-10 RX ORDER — PANTOPRAZOLE SODIUM 40 MG/1
40 TABLET, DELAYED RELEASE ORAL EVERY MORNING
Status: DISCONTINUED | OUTPATIENT
Start: 2022-02-11 | End: 2022-02-11 | Stop reason: HOSPADM

## 2022-02-10 RX ORDER — LUBIPROSTONE 8 UG/1
8 CAPSULE ORAL 2 TIMES DAILY WITH MEALS
Status: DISCONTINUED | OUTPATIENT
Start: 2022-02-10 | End: 2022-02-10

## 2022-02-10 RX ADMIN — ENOXAPARIN SODIUM 30 MG: 30 INJECTION SUBCUTANEOUS at 15:56

## 2022-02-10 RX ADMIN — LORAZEPAM 0.25 MG: 0.5 TABLET ORAL at 16:04

## 2022-02-10 RX ADMIN — LORAZEPAM 0.25 MG: 0.5 TABLET ORAL at 21:45

## 2022-02-10 RX ADMIN — ATORVASTATIN CALCIUM 20 MG: 20 TABLET, FILM COATED ORAL at 21:46

## 2022-02-10 RX ADMIN — SODIUM CHLORIDE 125 ML/HR: 9 INJECTION, SOLUTION INTRAVENOUS at 15:46

## 2022-02-10 RX ADMIN — LEVETIRACETAM 1000 MG: 500 TABLET, FILM COATED ORAL at 21:46

## 2022-02-10 RX ADMIN — LAMOTRIGINE 200 MG: 100 TABLET ORAL at 21:46

## 2022-02-10 RX ADMIN — SODIUM CHLORIDE, PRESERVATIVE FREE 10 ML: 5 INJECTION INTRAVENOUS at 21:47

## 2022-02-10 RX ADMIN — DILTIAZEM HYDROCHLORIDE 240 MG: 120 CAPSULE, COATED, EXTENDED RELEASE ORAL at 15:56

## 2022-02-10 RX ADMIN — SODIUM CHLORIDE, POTASSIUM CHLORIDE, SODIUM LACTATE AND CALCIUM CHLORIDE 500 ML: 600; 310; 30; 20 INJECTION, SOLUTION INTRAVENOUS at 12:18

## 2022-02-10 RX ADMIN — KIT FOR THE PREPARATION OF TECHNETIUM TC 99M ALBUMIN AGGREGATED 1 DOSE: 2.5 INJECTION, POWDER, FOR SOLUTION INTRAVENOUS at 15:20

## 2022-02-10 NOTE — H&P
[facility] HOSPITALIST     PCP: Tulio Carlson MD    CODE status: Full    CHIEF COMPLAINT: weakness and lethargy as reported by caretaker, patient non verbal    HISTORY OF PRESENT ILLNESS:    Patient is a 81-year-old female with past medical history significant for mental disability, hyperlipidemia, epilepsy, CKD stage IIIa.  She was referred to the ED by Dr. Cortez due to weakness, and change in mentation.  She is a chronic resident of [facility], at baseline she is nonverbal.  For the past week or 2 she is reported to have increasing lethargy, drooling decreased oral intake and low oxygen saturation with episodes of crying.  She was noted to have low oxygen saturation, and has been requiring oxygen at her nursing facility.  She was sent to cardiology for evaluation of bradycardia but she has had normal heart rate there. caretaker also reports that patient had facial droop but then quickly recovered later in the day yesterday .  She also has been to see Rosanna Gonzalez who ordered a chest CTA, however due to her elevated creatinine today they were unable to accomplish this in the ED therefore admission was sought.       Past Medical History:   Diagnosis Date   • Anemia    • Anxiety    • Bursitis    • Bursitis    • DJD (degenerative joint disease), lumbar    • Hyperkalemia    • Hypoglycemia    • Intellectual disability    • Low back pain    • Osteoarthritis    • Osteopenia    • Pneumonia    • Renal disorder    • Renal insufficiency    • Scoliosis    • Seizure disorder (HCC)      History reviewed. No pertinent surgical history.  Family History   Family history unknown: Yes     Social History     Tobacco Use   • Smoking status: Never Smoker   • Smokeless tobacco: Never Used   Substance Use Topics   • Alcohol use: No   • Drug use: No     (Not in a hospital admission)    Allergies:  Bee venom, Iodinated diagnostic agents, and Nsaids      There is no immunization history on file for this  "patient.    REVIEW OF SYSTEMS:  Unable to obtain, non verbal patient.     Vital Signs  Temp:  [97.9 °F (36.6 °C)] 97.9 °F (36.6 °C)  Heart Rate:  [89-94] 94  Resp:  [16-18] 18  BP: (133-180)/(75-90) 133/75  Flowsheet Rows      First Filed Value   Admission Height 167.6 cm (66\") Documented at 02/10/2022 1056   Admission Weight 74.4 kg (164 lb) Documented at 02/10/2022 1056             Physical Exam:  General: Patient is awake and alert.  Thin appearing female.  no acute distress noted. Non verbal baseline  HENT: Head is atraumatic, normocephalic. . Nose is without obvious congestion and appears patent. Neck is supple and trachea is midline.   Eyes:  Pupils appear equal and round.   Cardiovascular: Heart has regular rate and rhythm with no murmurs, rubs or gallops noted.   Respiratory: Lungs are clear to ausculation without wheezes, rhonchi or rales.   Abdominal/GI: Soft, nontender, bowel sounds present. No rebound or guarding present.   Extremities: No peripheral edema noted.   Musculoskeletal: Patient lying in contracted position, joints appear stiffened due to this, resistance to passive range of motion of all 4 extremities noted.   Skin: Warm and dry.   Psych: Cooperative with exam.   Neuro: No facial asymmetry noted. No focal deficits noted.       Results Review:    I reviewed the patient's new clinical results.  Lab Results (most recent)     Procedure Component Value Units Date/Time    BNP [[id number]]  (Abnormal) Collected: 02/10/22 1109    Specimen: Blood Updated: 02/10/22 1202     proBNP 1,821.0 pg/mL     Narrative:      Among patients with dyspnea, NT-proBNP is highly sensitive for the detection of acute congestive heart failure. In addition NT-proBNP of <300 pg/ml effectively rules out acute congestive heart failure with 99% negative predictive value.    Results may be falsely decreased if patient taking Biotin.      Troponin [[id number]]  (Normal) Collected: 02/10/22 1109    Specimen: Blood Updated: 02/10/22 " 1200     Troponin T <0.010 ng/mL     Narrative:      Troponin T Reference Range:  <= 0.03 ng/mL-   Negative for AMI  >0.03 ng/mL-     Abnormal for myocardial necrosis.  Clinicians would have to utilize clinical acumen, EKG, Troponin and serial changes to determine if it is an Acute Myocardial Infarction or myocardial injury due to an underlying chronic condition.       Results may be falsely decreased if patient taking Biotin.      Procalcitonin [[id number]]  (Normal) Collected: 02/10/22 1109    Specimen: Blood Updated: 02/10/22 1200     Procalcitonin 0.05 ng/mL     Basic Metabolic Panel [[id number]]  (Abnormal) Collected: 02/10/22 1109    Specimen: Blood Updated: 02/10/22 1154     Glucose 120 mg/dL      BUN 40 mg/dL      Creatinine 1.55 mg/dL      Sodium 140 mmol/L      Potassium 5.0 mmol/L      Chloride 106 mmol/L      CO2 20.9 mmol/L      Calcium 10.0 mg/dL      eGFR Non African Amer 32 mL/min/1.73      BUN/Creatinine Ratio 25.8     Anion Gap 13.1 mmol/L     Narrative:      GFR Normal >60  Chronic Kidney Disease <60  Kidney Failure <15      D-dimer, Quantitative [[id number]]  (Abnormal) Collected: 02/10/22 1109    Specimen: Blood Updated: 02/10/22 1151     D-Dimer, Quantitative 0.65 MCGFEU/mL     Narrative:      Can be elevated in, but is not diagnostic for deep vein thrombosis (DVT) or pulmonary embolis (PE).  It is also elevated in other medical conditions.  Clinical correlation is required.  The negative cut-off value for the D-Dimer is 0.50 mcg FEU/mL for DVT and PE.      Lactic Acid, Plasma [[id number]]  (Normal) Collected: 02/10/22 1109    Specimen: Blood Updated: 02/10/22 1150     Lactate 1.3 mmol/L     COVID-19 and FLU A/B PCR - Swab, Nasopharynx [[id number]]  (Normal) Collected: 02/10/22 1112    Specimen: Swab from Nasopharynx Updated: 02/10/22 1140     COVID19 Not Detected     Influenza A PCR Not Detected     Influenza B PCR Not Detected    Narrative:      Fact sheet for providers:  https://www.fda.gov/media/771154/download    Fact sheet for patients: https://www.fda.gov/media/833071/download    Test performed by PCR.    CBC & Differential [[id number]]  (Abnormal) Collected: 02/10/22 1109    Specimen: Blood Updated: 02/10/22 1120    Narrative:      The following orders were created for panel order CBC & Differential.  Procedure                               Abnormality         Status                     ---------                               -----------         ------                     CBC Auto Differential[[id number]]        Abnormal            Final result                 Please view results for these tests on the individual orders.    CBC Auto Differential [[id number]]  (Abnormal) Collected: 02/10/22 1109    Specimen: Blood Updated: 02/10/22 1120     WBC 7.83 10*3/mm3      RBC 4.02 10*6/mm3      Hemoglobin 11.9 g/dL      Hematocrit 37.8 %      MCV 94.0 fL      MCH 29.6 pg      MCHC 31.5 g/dL      RDW 13.4 %      RDW-SD 45.7 fl      MPV 11.3 fL      Platelets 312 10*3/mm3      Neutrophil % 68.2 %      Lymphocyte % 25.0 %      Monocyte % 5.2 %      Eosinophil % 1.0 %      Basophil % 0.5 %      Immature Grans % 0.1 %      Neutrophils, Absolute 5.33 10*3/mm3      Lymphocytes, Absolute 1.96 10*3/mm3      Monocytes, Absolute 0.41 10*3/mm3      Eosinophils, Absolute 0.08 10*3/mm3      Basophils, Absolute 0.04 10*3/mm3      Immature Grans, Absolute 0.01 10*3/mm3      nRBC 0.0 /100 WBC           Imaging Results (Most Recent)     Procedure Component Value Units Date/Time    XR Chest 1 View [[id number]] Collected: 02/10/22 1135     Updated: 02/10/22 1138    Narrative:      XR CHEST 1 VW-: 2/10/2022 11:17 AM     INDICATION:   Decreasing oxygen saturation 1 week. Intermittent right side grouping  yesterday.      COMPARISON:    01/18/2022.     FINDINGS:  Single Portable AP view(s) of the chest. There is rotation to the right.  Cardiac silhouette is within normal limits. Shallow lung expansion  with  bronchovascular crowding. Mild elevation of the right hemidiaphragm  persists. There is mild generalized perihilar atelectasis. No effusion  pneumothorax or dense consolidation.       Impression:         1. Shallow lung expansion with bronchovascular crowding. Otherwise  negative chest.     This report was finalized on 2/10/2022 11:36 AM by Dr. Anthony Hsu MD.           reviewed    ECG/EMG Results (most recent)     Procedure Component Value Units Date/Time    ECG 12 Lead [[id number]] Collected: 02/10/22 1100     Updated: 02/10/22 1102     QT Interval 375 ms     Narrative:      HEART RATE= 92  bpm  RR Interval= 652  ms  AK Interval= 252  ms  P Horizontal Axis= 9  deg  P Front Axis= 60  deg  QRSD Interval= 98  ms  QT Interval= 375  ms  QRS Axis= -25  deg  T Wave Axis= 60  deg  - ABNORMAL ECG -  Sinus rhythm  Prolonged AK interval  Prominent P waves, nondiagnostic  Borderline left axis deviation  ST elevation, consider inferior injury  Electronically Signed By:   Date and Time of Study: 2022-02-10 11:00:36    ECG 12 Lead [[id number]] Collected: 02/10/22 1119     Updated: 02/10/22 1125     QT Interval 379 ms     Narrative:      HEART RATE= 88  bpm  RR Interval= 684  ms  AK Interval= 219  ms  P Horizontal Axis= 5  deg  P Front Axis= 56  deg  QRSD Interval= 96  ms  QT Interval= 379  ms  QRS Axis= -24  deg  T Wave Axis= 73  deg  - BORDERLINE ECG -  Sinus rhythm  Borderline prolonged AK interval  Borderline left axis deviation  Electronically Signed By:   Date and Time of Study: 2022-02-10 11:19:39        Reviewed    Assessment/Plan     Acute encephalopathy with generalized weakness  -patient non verbal at baseline, caretaker as per HPI reporting 1 week of worsening lethargy and behavior change  -check CT head, TSH, B12 and folate, and ammonia level.   -non ambulatory at baseline  -patient with KEILY with baseline Cr of 1.-1.2, currently 1.55. BUN is 40. CKD stage 3a history per my chart review. Appears pre-renal and  reported poor intake, place on IVF and monitor.   -check Keppra level     Acute hypoxic respiratory failure, unknown etiology   -No history of required oxygen therapy, noted to be 88% on room air in ED  -Continue supplemental oxygen, attempt to wean as tolerated  -D dimer elevated, age adjusted this is low risk, however, if hypoxia persists without known cause and Cr improves plan for CTA chest tomorrow.   -RVP pending.   -CXR shows no evidence of pneumonia, patient afebrile, normal WBC count  -Check V/Q scan. Check echo.     Chronic stable conditions to be managed with home medications include the following conditions listed below. Also please note: Every effort was made to accurately obtain patient's home medications. This was done utilizing extensive chart review, ED documentation, recent pharmacy records, and where possible thorough discussion with the patient if medications were known by the patient.     HLD: continue atorvastatin    Epilepsy: continue Keppra, diazepam per rectum PRN seizures    HTN: continue diltiazem, monitor for bradycardia as this was reported prior to admission    GERD: continue PPI        DVT PPX: Lovenox        I discussed the patient's findings and my recommendations with patient.    Jose Antonio Boothe DO  02/10/22  12:52 EST    Time: 35 minutes

## 2022-02-10 NOTE — PROGRESS NOTES
PATIENTINFORMATION    Date of Office Visit: 02/10/2022  Encounter Provider: Bob Cortez MD  Place of Service: [facility] CARDIOLOGY  Patient Name: Erica Crockett  : 1940    Subjective:     Encounter Date:02/10/2022      Patient ID: Erica Crockett is a 81 y.o. female.    No chief complaint on file.    HPI  Ms. Crockett is a pleasant 82 yo with past medical history significant for mental disability,  hyperlipidemia, seizure disorder, chronic kidney disease stage III referred to cardiology clinic for evaluation of bradycardia.  She is chronic resident of nursing home and came accompanied by aids.  Patient unable to give any meaningful history.  At baseline also not verbal.  For the past week or 2 she was noted to be increasingly lethargic, drooling, crying intermittently, decreased oral intake, and noted to have decreased oxygen saturation for which she was placed on supplemental oxygen on nasal cannula for the past several days.  She was noted to be bradycardic with heart rate in the 40s and 50s few times.  Blood pressure is usually normal or borderline low.  She is on diltiazem for hypertension.  Otherwise no known prior diagnosed cardiovascular illness.  She had to go to the ER on  last month but no significant abnormalities detected.  She went to see Dr Ruiz yesterday who ordered chest CTA.  Other medical problems mentioned include chronic hip pain for which she takes hydrocodone.  ROS  All systems reviewed and negative except as noted in HPI.    Past Medical History:   Diagnosis Date   • Anemia    • Anxiety    • Bursitis    • Bursitis    • DJD (degenerative joint disease), lumbar    • Hyperkalemia    • Hypoglycemia    • Intellectual disability    • Low back pain    • Osteoarthritis    • Osteopenia    • Pneumonia    • Renal disorder    • Renal insufficiency    • Scoliosis    • Seizure disorder (HCC)        History reviewed. No pertinent surgical  "history.    Social History     Socioeconomic History   • Marital status: Single   Tobacco Use   • Smoking status: Never Smoker   • Smokeless tobacco: Never Used   Substance and Sexual Activity   • Alcohol use: No   • Drug use: No   • Sexual activity: Defer       Family History   Family history unknown: Yes           ECG 12 Lead    Date/Time: 2/10/2022 10:21 AM  Performed by: Bob Cortez MD  Authorized by: Bob Cortez MD   Comparison: compared with previous ECG from 12/11/2019  Similar to previous ECG  Rhythm: sinus rhythm  Rate: normal  Conduction: conduction normal  Q waves: II and III    ST Segments: ST segments normal  T Waves: T waves normal  QRS axis: normal  Other: no other findings    Clinical impression: abnormal EKG and myocardial infarction               Objective:     /90   Pulse 89   Resp 16   Ht 170.2 cm (67\")   Wt 68.2 kg (150 lb 4.8 oz)   SpO2 99%   BMI 23.54 kg/m²  Body mass index is 23.54 kg/m².     Constitutional:       General: Not in acute distress.     Appearance: Well-developed. Not diaphoretic.      Comments: Patient was sitting on wheel chair and moaning and crying    Eyes:      Pupils: Pupils are equal, round, and reactive to light.   HENT:      Head: Normocephalic and atraumatic.   Neck:      Thyroid: No thyromegaly.   Pulmonary:      Effort: Pulmonary effort is normal. No respiratory distress.      Breath sounds: Normal breath sounds. No wheezing. No rales.      Comments: Decreased air entry on the left side   Chest:      Chest wall: Not tender to palpatation.   Cardiovascular:      Normal rate. Regular rhythm.      No gallop.   Pulses:     Intact distal pulses.   Abdominal:      General: Bowel sounds are normal. There is no distension.      Palpations: Abdomen is soft.      Tenderness: There is no guarding.   Musculoskeletal: Normal range of motion.         General: No deformity.      Cervical back: Normal range of motion and neck supple. Skin:     General: " Skin is warm and dry.      Findings: No rash.   Neurological:      Mental Status: Alert. Disoriented.      Cranial Nerves: No cranial nerve deficit.      Deep Tendon Reflexes: Reflexes are normal and symmetric.      Comments: No verbal at baseline          Review Of Data: I have reviewed pertinent recent labs, images and documents and pertinent findings included in HPI or assessment below.  No coronary calcifications noted on CAT scan of the chest.    Lipid Panel    Lipid Panel 6/11/21 7/28/21   Total Cholesterol 174 170   Triglycerides 35 40   HDL Cholesterol 91 (A) 92 (A)   VLDL Cholesterol 8 9   LDL Cholesterol  75 69   LDL/HDL Ratio 0.84 0.76   (A) Abnormal value                Assessment/Plan:         1.  Acute hypoxemic respiratory failure on supplemental oxygen through nasal cannula   2.  Altered mental status from baseline with increased lethargy-suspect metabolic component  -At baseline nonverbal and has significant disability since childhood.  Etiology not mentioned.  3.  Elevated blood pressure during office visit today was blood pressure is 180/90-on diltiazem.  Blood pressure in nursing home normal or borderline low  4. Chronic pain from hip joint problems-gets hydrocodone at nursing home  5.  Seizure disorder  6. EKG -normal sinus rhythm with inferior Q waves-unchanged since 2019 EKG  7. CKD stage 3   8.  Hypercholesterolemia on statin    I will send patient to the ER to rule out any acute pathology like pneumonia.      Diagnosis and plan of care discussed with patient and verbalized understanding.            Your medication list          Accurate as of February 10, 2022 11:11 AM. If you have any questions, ask your nurse or doctor.            CHANGE how you take these medications      Instructions Last Dose Given Next Dose Due   atorvastatin 20 MG tablet  Commonly known as: LIPITOR  What changed: Another medication with the same name was removed. Continue taking this medication, and follow the  directions you see here.  Changed by: Bob Cortez MD           levETIRAcetam 1000 MG tablet  Commonly known as: KEPPRA  What changed: Another medication with the same name was removed. Continue taking this medication, and follow the directions you see here.  Changed by: Bob Cortez MD      Take 1 tablet by mouth 2 (Two) Times a Day for 30 days.       levETIRAcetam 500 MG tablet  Commonly known as: KEPPRA  What changed: Another medication with the same name was removed. Continue taking this medication, and follow the directions you see here.  Changed by: Bob Cortez MD      Take 1 tablet by mouth 2 (Two) Times a Day for 30 days.          CONTINUE taking these medications      Instructions Last Dose Given Next Dose Due   acetaminophen 325 MG tablet  Commonly known as: TYLENOL      Take  by mouth Every 6 (Six) Hours As Needed.       Benefiber powder      Take  by mouth daily.       bisacodyl 10 MG suppository  Commonly known as: DULCOLAX      As Needed. Dulcolax 10 MG Rectal Suppository; Patient Sig: Dulcolax 10 MG Rectal Suppository ; 0; 04-Feb-2014; Active       cetirizine 10 MG tablet  Commonly known as: zyrTEC      Take  by mouth.       dextromethorphan-guaifenesin  MG/5ML syrup  Commonly known as: ROBITUSSIN-DM      Take 5 mL by mouth Every 4 (Four) Hours As Needed.       diazePAM 20 MG rectal kit  Commonly known as: DIASTAT ACUDIAL      Insert 20 mg into the rectum As Needed for Seizures. Seizure Lasting longer than 5 minutes, or 2 seizures within 12 hours       diazePAM 5 MG tablet  Commonly known as: Valium      Give 1 tab 30 minutes prior to procedure and may repeat x 1 if need be prn agitation upon arrival.       diclofenac 1 % gel gel  Commonly known as: VOLTAREN      Apply 4 g topically 4 (Four) Times a Day As Needed.       dilTIAZem  MG 24 hr capsule  Commonly known as: DILACOR XR      Take  by mouth.       diphenhydrAMINE 25 mg capsule  Commonly known as: BENADRYL       Take 25 mg by mouth every 6 (six) hours as needed for itching.       estradiol 0.1 MG/GM vaginal cream  Commonly known as: ESTRACE           IMODIUM PO      Take  by mouth.       ipratropium-albuterol 0.5-2.5 mg/3 ml nebulizer  Commonly known as: DUO-NEB      Take 3 mL by nebulization 4 (Four) Times a Day.       lamoTRIgine 200 MG tablet  Commonly known as: LaMICtal      Take  by mouth 2 (two) times a day.       LORazepam 2 MG/ML injection  Commonly known as: ATIVAN      Inject 1 mL into the appropriate muscle as directed by prescriber Every 6 (Six) Hours As Needed for Anxiety.       LORazepam 0.5 MG tablet  Commonly known as: ATIVAN      Take 0.5 tablets by mouth 3 (Three) Times a Day. For anxiety       lubiprostone 8 MCG capsule  Commonly known as: AMITIZA      Take 8 mcg by mouth 2 (two) times a day with meals.       magnesium hydroxide 400 MG/5ML suspension  Commonly known as: MILK OF MAGNESIA      Take  by mouth As Needed.       multivitamin tablet tablet  Commonly known as: THERAGRAN      Take  by mouth.       MYLANTA PO      Take  by mouth Daily As Needed.       omeprazole 20 MG capsule  Commonly known as: priLOSEC      Take 20 mg by mouth 2 (Two) Times a Day.       polyethylene glycol 17 GM/SCOOP powder  Commonly known as: MIRALAX      Take 17 g by mouth 2 (Two) Times a Day.       PROBIOTIC PO      Take  by mouth.       Prolia 60 MG/ML solution syringe  Generic drug: denosumab      Inject  under the skin into the appropriate area as directed 1 (One) Time.       promethazine 25 MG tablet  Commonly known as: PHENERGAN      Take 25 mg by mouth Every 6 (Six) Hours As Needed for Nausea or Vomiting (PO/IM/NV PRN nausea, vomiting, x72 hrs, started med 12/10/19). Per [facility] med record       traMADol 50 MG tablet  Commonly known as: ULTRAM      Take 2 tablets by mouth Every 8 (Eight) Hours. PRN pain          STOP taking these medications    ciprofloxacin 250 MG tablet  Commonly known as: Cipro  Stopped  by: Bob Cortez MD        gabapentin 100 MG capsule  Commonly known as: NEURONTIN  Stopped by: MD Bob Vidal MD  02/10/22  11:11 EST

## 2022-02-10 NOTE — ED PROVIDER NOTES
Subjective   History of Present Illness  81-year-old female who lives at [facility] and some intellectual disabilities presents to the ER from cardiology clinic for new oxygen requirement that was never present prior.  According to the aide this with her from [facility] she saw Dr. Ruiz yesterday who thought she needed a CTA to evaluate for PE but is unclear why exactly she was seeing Dr. Ruiz.  Review of Systems   Unable to perform ROS: Patient nonverbal       Past Medical History:   Diagnosis Date   • Anemia    • Anxiety    • Bursitis    • Bursitis    • DJD (degenerative joint disease), lumbar    • Hyperkalemia    • Hypoglycemia    • Intellectual disability    • Low back pain    • Osteoarthritis    • Osteopenia    • Pneumonia    • Renal disorder    • Renal insufficiency    • Scoliosis    • Seizure disorder (HCC)        Allergies   Allergen Reactions   • Bee Venom    • Iodinated Diagnostic Agents    • Nsaids        History reviewed. No pertinent surgical history.    Family History   Family history unknown: Yes       Social History     Socioeconomic History   • Marital status: Single   Tobacco Use   • Smoking status: Never Smoker   • Smokeless tobacco: Never Used   Substance and Sexual Activity   • Alcohol use: No   • Drug use: No   • Sexual activity: Defer           Objective   Physical Exam  INITIAL VITAL SIGNS: Reviewed by me.  Pulse ox normal  GENERAL: Alert and interactive. No acute distress.  HEAD: Head is normocephalic.  EYES: EOMI. PERRL. No scleral icterus. No conjunctival injection.  ENT: Moist mucous membranes.   NECK: Supple. Full range of motion.  RESPIRATORY: No tachypnea.  Decreased breath sounds bilaterally no wheezing, no crackles  CV: Regular rate and rhythm.   BACK:  No obvious deformity.  EXTREMITIES: No deformity. No clubbing or cyanosis. No edema.   SKIN: Warm and dry. No diaphoresis. No obvious rashes.   NEUROLOGIC: Alert not speak, is contracted in general.   Results for orders placed  or performed during the hospital encounter of 02/10/22   COVID-19 and FLU A/B PCR - Swab, Nasopharynx    Specimen: Nasopharynx; Swab   Result Value Ref Range    COVID19 Not Detected Not Detected - Ref. Range    Influenza A PCR Not Detected Not Detected    Influenza B PCR Not Detected Not Detected   Basic Metabolic Panel    Specimen: Blood   Result Value Ref Range    Glucose 120 (H) 65 - 99 mg/dL    BUN 40 (H) 8 - 23 mg/dL    Creatinine 1.55 (H) 0.57 - 1.00 mg/dL    Sodium 140 136 - 145 mmol/L    Potassium 5.0 3.5 - 5.2 mmol/L    Chloride 106 98 - 107 mmol/L    CO2 20.9 (L) 22.0 - 29.0 mmol/L    Calcium 10.0 8.6 - 10.5 mg/dL    eGFR Non African Amer 32 (L) >60 mL/min/1.73    BUN/Creatinine Ratio 25.8 (H) 7.0 - 25.0    Anion Gap 13.1 5.0 - 15.0 mmol/L   BNP    Specimen: Blood   Result Value Ref Range    proBNP 1,821.0 (H) 0.0-1,800.0 pg/mL   Troponin    Specimen: Blood   Result Value Ref Range    Troponin T <0.010 0.000 - 0.030 ng/mL   Lactic Acid, Plasma    Specimen: Blood   Result Value Ref Range    Lactate 1.3 0.5 - 2.0 mmol/L   Procalcitonin    Specimen: Blood   Result Value Ref Range    Procalcitonin 0.05 0.00 - 0.25 ng/mL   D-dimer, Quantitative    Specimen: Blood   Result Value Ref Range    D-Dimer, Quantitative 0.65 (H) 0.00 - 0.46 MCGFEU/mL   CBC Auto Differential    Specimen: Blood   Result Value Ref Range    WBC 7.83 3.40 - 10.80 10*3/mm3    RBC 4.02 3.77 - 5.28 10*6/mm3    Hemoglobin 11.9 (L) 12.0 - 15.9 g/dL    Hematocrit 37.8 34.0 - 46.6 %    MCV 94.0 79.0 - 97.0 fL    MCH 29.6 26.6 - 33.0 pg    MCHC 31.5 31.5 - 35.7 g/dL    RDW 13.4 12.3 - 15.4 %    RDW-SD 45.7 37.0 - 54.0 fl    MPV 11.3 6.0 - 12.0 fL    Platelets 312 140 - 450 10*3/mm3    Neutrophil % 68.2 42.7 - 76.0 %    Lymphocyte % 25.0 19.6 - 45.3 %    Monocyte % 5.2 5.0 - 12.0 %    Eosinophil % 1.0 0.3 - 6.2 %    Basophil % 0.5 0.0 - 1.5 %    Immature Grans % 0.1 0.0 - 0.5 %    Neutrophils, Absolute 5.33 1.70 - 7.00 10*3/mm3    Lymphocytes,  Absolute 1.96 0.70 - 3.10 10*3/mm3    Monocytes, Absolute 0.41 0.10 - 0.90 10*3/mm3    Eosinophils, Absolute 0.08 0.00 - 0.40 10*3/mm3    Basophils, Absolute 0.04 0.00 - 0.20 10*3/mm3    Immature Grans, Absolute 0.01 0.00 - 0.05 10*3/mm3    nRBC 0.0 0.0 - 0.2 /100 WBC   ECG 12 Lead   Result Value Ref Range    QT Interval 375 ms   ECG 12 Lead   Result Value Ref Range    QT Interval 379 ms     XR Chest 1 View    Result Date: 2/10/2022  XR CHEST 1 VW-: 2/10/2022 11:17 AM  INDICATION: Decreasing oxygen saturation 1 week. Intermittent right side grouping yesterday.  COMPARISON:  01/18/2022.  FINDINGS: Single Portable AP view(s) of the chest. There is rotation to the right. Cardiac silhouette is within normal limits. Shallow lung expansion with bronchovascular crowding. Mild elevation of the right hemidiaphragm persists. There is mild generalized perihilar atelectasis. No effusion pneumothorax or dense consolidation.       1. Shallow lung expansion with bronchovascular crowding. Otherwise negative chest.  This report was finalized on 2/10/2022 11:36 AM by Dr. Anthony Hsu MD.        Procedures      EKG           EKG time/Interp time: 11:00/1105  Rhythm/Rate: Sinus rhythm rate 92  P waves and WA: Prolonged WA interval  QRS, axis: None left axis deviation  ST and T waves: No ST elevations or depressions concerning for ischemia.  There is some artifact variation isoelectric line    Independently interpreted by me contemporaneously with treatment      EKG           EKG time/Interp time: 1119/1125  Rhythm/Rate: Sinus rhythm rate of 88  P waves and WA: Prolonged WA interval  QRS, axis: Normal QRS duration with left axis deviation  ST and T waves: No ST elevations or depressions no artifact    Independently interpreted by me contemporaneously with treatment    ED Course  ED Course as of 02/10/22 1303   Thu Feb 10, 2022   1302 Patient with new oxygen requirement, Covid negative no pneumonia on x-ray, BNP barely elevated at  eighteen twenty-one with the upper limit of normal being eighteen hundred.  Pro-Zi is negative lactic acid is negative troponin is negative, her D-dimer is elevated at 0.65 she has a documented contrast agent allergy as well as KEILY on CKD.  Discussed with Dr. Boothe who agrees patient can be admitted for her hypoxia for rehydration as well as premedication regimen to get the CTA. [RO]      ED Course User Index  [RO] Ryan Valle MD                                                 [facility]    Final diagnoses:   Hypoxia   Acute kidney injury superimposed on chronic kidney disease (HCC)       ED Disposition  ED Disposition     ED Disposition Condition Comment    Decision to Admit  Level of Care: Med/Surg [1]   Diagnosis: SOB (shortness of breath) [320989]            No follow-up provider specified.       Medication List      No changes were made to your prescriptions during this visit.          Ryan Valle MD  02/10/22 1303       Ryan Valle MD  02/10/22 [id number]

## 2022-02-11 VITALS
RESPIRATION RATE: 18 BRPM | SYSTOLIC BLOOD PRESSURE: 150 MMHG | DIASTOLIC BLOOD PRESSURE: 71 MMHG | TEMPERATURE: 97.5 F | BODY MASS INDEX: 24.8 KG/M2 | HEIGHT: 66 IN | HEART RATE: 97 BPM | OXYGEN SATURATION: 91 % | WEIGHT: 154.32 LBS

## 2022-02-11 PROBLEM — J96.01 ACUTE RESPIRATORY FAILURE WITH HYPOXIA: Status: RESOLVED | Noted: 2022-02-10 | Resolved: 2022-02-11

## 2022-02-11 PROBLEM — R46.89 UNUSUAL CHANGE IN BEHAVIOR: Status: RESOLVED | Noted: 2022-02-10 | Resolved: 2022-02-11

## 2022-02-11 LAB
ANION GAP SERPL CALCULATED.3IONS-SCNC: 12.8 MMOL/L (ref 5–15)
BUN SERPL-MCNC: 24 MG/DL (ref 8–23)
BUN/CREAT SERPL: 19.7 (ref 7–25)
CALCIUM SPEC-SCNC: 9.7 MG/DL (ref 8.6–10.5)
CHLORIDE SERPL-SCNC: 112 MMOL/L (ref 98–107)
CO2 SERPL-SCNC: 20.2 MMOL/L (ref 22–29)
CREAT SERPL-MCNC: 1.22 MG/DL (ref 0.57–1)
GFR SERPL CREATININE-BSD FRML MDRD: 42 ML/MIN/1.73
GLUCOSE SERPL-MCNC: 101 MG/DL (ref 65–99)
POTASSIUM SERPL-SCNC: 4.8 MMOL/L (ref 3.5–5.2)
SODIUM SERPL-SCNC: 145 MMOL/L (ref 136–145)

## 2022-02-11 PROCEDURE — 80048 BASIC METABOLIC PNL TOTAL CA: CPT | Performed by: INTERNAL MEDICINE

## 2022-02-11 PROCEDURE — 99217 PR OBSERVATION CARE DISCHARGE MANAGEMENT: CPT | Performed by: INTERNAL MEDICINE

## 2022-02-11 PROCEDURE — G0378 HOSPITAL OBSERVATION PER HR: HCPCS

## 2022-02-11 RX ADMIN — LEVETIRACETAM 1000 MG: 500 TABLET, FILM COATED ORAL at 09:27

## 2022-02-11 RX ADMIN — LORAZEPAM 0.25 MG: 0.5 TABLET ORAL at 09:27

## 2022-02-11 RX ADMIN — SODIUM CHLORIDE 125 ML/HR: 9 INJECTION, SOLUTION INTRAVENOUS at 00:23

## 2022-02-11 RX ADMIN — DILTIAZEM HYDROCHLORIDE 240 MG: 120 CAPSULE, COATED, EXTENDED RELEASE ORAL at 09:28

## 2022-02-11 RX ADMIN — SODIUM CHLORIDE, PRESERVATIVE FREE 10 ML: 5 INJECTION INTRAVENOUS at 09:39

## 2022-02-11 RX ADMIN — LAMOTRIGINE 200 MG: 100 TABLET ORAL at 09:27

## 2022-02-11 RX ADMIN — PANTOPRAZOLE SODIUM 40 MG: 40 TABLET, DELAYED RELEASE ORAL at 07:10

## 2022-02-11 NOTE — CASE MANAGEMENT/SOCIAL WORK
Continued Stay Note  MILANA Medel     Patient Name: Erica Crockett  MRN: [id number]  Today's Date: 2/11/2022    Admit Date: 2/10/2022     Discharge Plan     Row Name 02/11/22 1029       Plan    Plan Return back to [facility]    Plan Comments Patient is a long time resident at [facility]. She is non verbal and non ambulatory per notes. CM attempted to reach her guardian list on the face sheet, Nicola Kapadia, to review patient's MOON and left a VM for him to return my call. CM called Connie at [facility] on [address] [facility] ext [phone] and informed her that patient would return back to their facility today. CM asked Connie if they can provide ride and she transferred me to [place] who arranges the transportation at ext [phone] and  left for her. Per MD in [place] patient will return back to [facility] today. CM will continue to follow for needs.               Discharge Codes    No documentation.                     Viviana Baez, RN

## 2022-02-11 NOTE — CASE MANAGEMENT/SOCIAL WORK
Case Management Discharge Note      Final Note: Discharged to [facility]         Selected Continued Care - Discharged on 2/11/2022 Admission date: 2/10/2022 - Discharge disposition: Long Term Care (DC - External)    Destination Coordination complete.    Service Provider Selected Services Address Phone Fax Patient Preferred    [facility]  Assisted Living [address], [facility] [address] [phone] --          Durable Medical Equipment    No services have been selected for the patient.              Dialysis/Infusion    No services have been selected for the patient.              Home Medical Care    No services have been selected for the patient.              Therapy    No services have been selected for the patient.              Community Resources    No services have been selected for the patient.              Community & DME    No services have been selected for the patient.                       Final Discharge Disposition Code: 04 - intermediate care facility

## 2022-02-11 NOTE — CASE MANAGEMENT/SOCIAL WORK
Continued Stay Note   Ely Celis     Patient Name: Erica Crockett  MRN: [id number]  Today's Date: 2/11/2022    Admit Date: 2/10/2022     Discharge Plan     Row Name 02/11/22 1119       Plan    Plan Return back to [facility]    Plan Comments CM called Ansley at [facility] to see if they would be providing transportation and she states yes as long as the patient is not on oxygen. CM informed Ansley that patient is currently on room air. CM updated primary nurse that [facility] will be picking patient up today and they would call the nursing unit when they are here and she verbalized understanding. CM will continue to follow for needs.    Row Name 02/11/22 1029       Plan    Plan Return back to [facility]    Plan Comments Patient is a long time resident at [facility]. She is non verbal and non ambulatory per notes. CM attempted to reach her guardian list on the face sheet, Nicola Kapadia, to review patient's MOON and left a VM for him to return my call. CM called Connie at [facility] on [address] ext [phone] and informed her that patient would return back to their facility today. CM asked Connie if they can provide ride and she transferred me to [place] who arranges the transportation at ext [phone] and  left for her. Per MD in [place] patient will return back to [facility] today. CM will continue to follow for needs.               Discharge Codes    No documentation.               Expected Discharge Date and Time     Expected Discharge Date Expected Discharge Time    Feb 11, 2022             Viviana Baez RN

## 2022-02-11 NOTE — DISCHARGE SUMMARY
Date of Admission: 2/10/2022    Date of Discharge:  2/11/2022    Length of stay:  LOS: 0 days     Presenting Problem:   SOB (shortness of breath) [R06.02]  Hypoxia [R09.02]  Acute kidney injury superimposed on chronic kidney disease (HCC) [N17.9, N18.9]      Active Diagnosis During Hospital Stay/Discharge Diagnoses/Course by Diagnoses:    Acute metabolic encephalopathy most likely related to KEILY, generalized weakness  -per caregiver seems to be back to baseline  -check CT head,   -TSH normal, B12 and folate normal, and ammonia level normal  -non ambulatory at baseline     KEILY with   -baseline Cr of 1.-1.2, downtrended to baseline  -encourage fluid at rehab     Acute resp distress  -resolved  -resp failure ruled out as remained on room air for most of hospitalization and was only briefly   -D dimer elevated, age adjusted this is low risk, and given KEILY above and now on room air, since v/q scan normal would avoid CT PE protocol for less contrast  -RVP normal    -CXR shows no evidence of pneumonia, patient afebrile, normal WBC count  -v/Q scan normal   -echo 2/10/22: EF 60% abd RV was noted to be normal      Chronic stable conditions   HLD: continue atorvastatin     Epilepsy: continue Keppra, diazepam per rectum PRN seizures     HTN: continue diltiazem, monitor for bradycardia as this was reported prior to admission     GERD: continue PPI          Active Hospital Problems   No active problems to display.      Resolved Hospital Problems    Diagnosis Date Resolved POA   • **Acute respiratory failure with hypoxia (HCC) [J96.01] 02/11/2022 Yes   • SOB (shortness of breath) [R06.02] 02/10/2022 Yes   • Unusual change in behavior [R46.89] 02/11/2022 Yes         Hospital Course  Patient is a 81 y.o. female presented with use with some mild hypoxia acute kidney injury reported more mental status changes than normal.  She was admitted given IV fluids creatinine improved.  She is a resident of local long-term care [facility] who  provides caregiving caregivers said she seemed to be back at baseline was more interactive tolerated breakfast.  She was weaned to room air.  Given this, work-up was essentially unremarkable and she was felt stable to discharge back to her long-term care facility with close patient follow-up.        Procedures Performed:as noted          Consults:   Consults     No orders found for last 30 day(s).          Pertinent Test Results:     Results from last 7 days   Lab Units 02/10/22  1109   WBC 10*3/mm3 7.83   HEMOGLOBIN g/dL 11.9*   HEMATOCRIT % 37.8   PLATELETS 10*3/mm3 312     Results from last 7 days   Lab Units 02/11/22  0426 02/10/22  1109   SODIUM mmol/L 145 140   POTASSIUM mmol/L 4.8 5.0   CHLORIDE mmol/L 112* 106   CO2 mmol/L 20.2* 20.9*   BUN mg/dL 24* 40*   CREATININE mg/dL 1.22* 1.55*   CALCIUM mg/dL 9.7 10.0   GLUCOSE mg/dL 101* 120*       Microbiology Results (last 10 days)     Procedure Component Value - Date/Time    Respiratory Panel PCR w/COVID-19(SARS-CoV-2) SLY/ERMA/ZEINAB/PAD/COR/MAD/CRISTHIAN In-House, NP Swab in UTM/VTM, 3-4 HR TAT - Swab, Nasopharynx [[id number]]  (Normal) Collected: 02/10/22 1604    Lab Status: Final result Specimen: Swab from Nasopharynx Updated: 02/10/22 2016     ADENOVIRUS, PCR Not Detected     Coronavirus 229E Not Detected     Coronavirus HKU1 Not Detected     Coronavirus NL63 Not Detected     Coronavirus OC43 Not Detected     COVID19 Not Detected     Human Metapneumovirus Not Detected     Human Rhinovirus/Enterovirus Not Detected     Influenza A PCR Not Detected     Influenza B PCR Not Detected     Parainfluenza Virus 1 Not Detected     Parainfluenza Virus 2 Not Detected     Parainfluenza Virus 3 Not Detected     Parainfluenza Virus 4 Not Detected     RSV, PCR Not Detected     Bordetella pertussis pcr Not Detected     Bordetella parapertussis PCR Not Detected     Chlamydophila pneumoniae PCR Not Detected     Mycoplasma pneumo by PCR Not Detected    Narrative:      In the setting of a  positive respiratory panel with a viral infection PLUS a negative procalcitonin without other underlying concern for bacterial infection, consider observing off antibiotics or discontinuation of antibiotics and continue supportive care. If the respiratory panel is positive for atypical bacterial infection (Bordetella pertussis, Chlamydophila pneumoniae, or Mycoplasma pneumoniae), consider antibiotic de-escalation to target atypical bacterial infection.    COVID-19 and FLU A/B PCR - Swab, Nasopharynx [[id number]]  (Normal) Collected: 02/10/22 1112    Lab Status: Final result Specimen: Swab from Nasopharynx Updated: 02/10/22 1140     COVID19 Not Detected     Influenza A PCR Not Detected     Influenza B PCR Not Detected    Narrative:      Fact sheet for providers: https://www.fda.gov/media/628488/download    Fact sheet for patients: https://www.fda.gov/media/335660/download    Test performed by PCR.    Urine Culture - Urine, Urine, Clean Catch [[id number]] Collected: 02/04/22 1100    Lab Status: Final result Specimen: Urine, Clean Catch Updated: 02/05/22 1208     Urine Culture >100,000 CFU/mL Mixed Arslan Isolated    Narrative:      Specimen contains mixed organisms of questionable pathogenicity which indicates contamination with commensal arslan.  Further identification is unlikely to provide clinically useful information.  Suggest recollection.          Results for orders placed during the hospital encounter of 02/10/22    Adult Transthoracic Echo Complete W/ Cont if Necessary Per Protocol    Interpretation Summary  · Left ventricular ejection fraction appears to be 61 - 65%. Normal left ventricular cavity size and wall thickness noted. All left ventricular wall segments contract normally. Left ventricular diastolic function was normal.  · The aortic valve exhibits sclerosis. No significant aortic valve regurgitation is present. Mild aortic valve stenosis is present.      Imaging Results (All)     Procedure Component  Value Units Date/Time    CT Head Without Contrast [[id number]] Collected: 02/10/22 1547     Updated: 02/10/22 1550    Narrative:      CT Head WO    HISTORY:   Generalized weakness, hypoxia, mental status changes    TECHNIQUE:   Routine noncontrast head CT. Radiation dose reduction techniques included automated exposure control or exposure modulation based on body size. Radiation audit for CT and nuclear cardiology exams in the last 12 months: 1.    COMPARISON:   February 28, 2018    FINDINGS:       No acute intracranial hemorrhage, mass lesion, or abnormal extra-axial fluid collection. No midline shift or focal mass effect. Ventricular system is normal in size and configuration. .    The gray-white matter differentiation is preserved. There are scattered ill-defined and patchy hypoattenuating foci within the bilateral cerebral and deep white matter which are nonspecific but most commonly associated with chronic microvascular ischemic  change.    A few small retention cysts and mild mucosal thickening are scattered in the partially visualized paranasal sinuses. Visualized mastoid air cells are clear.    No acute osseous abnormality.        Impression:        1.  Senescent changes without acute process by noncontrast CT.    Signer Name: TIMBO GRANT MD   Signed: 2/10/2022 3:47 PM   Work[facility]    [facility] Lung Scan Perfusion Particulate [[id number]] Collected: 02/10/22 1536     Updated: 02/10/22 1538    Narrative:      Exam: Perfusion study the lungs to 1022    INDICATION: Several episodes of hypoxia after exertion in the last week.    Dose: 5.9 mCi of technetium 99m MAA. Ventilation study was not performed due to Covid precautions    FINDINGS: Anterior, posterior, posterior oblique, lateral and anterior oblique images of the lungs were obtained. There are no perfusion defects. There is a chest x-ray done the same day for correlation      Impression:      Normal  perfusion study of the lungs    Signer Name: Akshat Young MD   Signed: 2/10/2022 3:36 PM   Workstation Name: [id number]    [facility]    XR Chest 1 View [[id number]] Collected: 02/10/22 1135     Updated: 02/10/22 1138    Narrative:      XR CHEST 1 VW-: 2/10/2022 11:17 AM     INDICATION:   Decreasing oxygen saturation 1 week. Intermittent right side grouping  yesterday.      COMPARISON:    01/18/2022.     FINDINGS:  Single Portable AP view(s) of the chest. There is rotation to the right.  Cardiac silhouette is within normal limits. Shallow lung expansion with  bronchovascular crowding. Mild elevation of the right hemidiaphragm  persists. There is mild generalized perihilar atelectasis. No effusion  pneumothorax or dense consolidation.       Impression:         1. Shallow lung expansion with bronchovascular crowding. Otherwise  negative chest.     This report was finalized on 2/10/2022 11:36 AM by Dr. Anthony Hsu MD.               Condition on Discharge:  Chronically illl     Vital Signs  Temp:  [96.9 °F (36.1 °C)-97.9 °F (36.6 °C)] 97.5 °F (36.4 °C)  Heart Rate:  [77-97] 97  Resp:  [18-20] 20  BP: (129-152)/(58-88) 141/64    Physical Exam:  Physical Exam  Constitutional:       General: She is not in acute distress.  Cardiovascular:      Rate and Rhythm: Normal rate.   Pulmonary:      Effort: No respiratory distress.   Abdominal:      General: There is no distension.      Palpations: Abdomen is soft.   Musculoskeletal:      Right lower leg: No edema.      Left lower leg: No edema.   Skin:     General: Skin is warm.   Neurological:      Mental Status: Mental status is at baseline.      Comments: Nonverbal at baseline         Discharge Disposition  Long Term Care (DC - External)    Discharge Medications     Discharge Medications      Changes to Medications      Instructions Start Date   ipratropium-albuterol 0.5-2.5 mg/3 ml nebulizer  Commonly known as: DUO-NEB  What changed:   · when to take  this  · reasons to take this   3 mL, Nebulization, 4 Times Daily - RT      levETIRAcetam 1000 MG tablet  Commonly known as: KEPPRA  What changed: additional instructions   1,000 mg, Oral, 2 Times Daily      levETIRAcetam 500 MG tablet  Commonly known as: KEPPRA  What changed: how much to take   500 mg, Oral, 2 Times Daily         Continue These Medications      Instructions Start Date   acetaminophen 325 MG tablet  Commonly known as: TYLENOL   650 mg, Oral, Every 6 Hours PRN      atorvastatin 20 MG tablet  Commonly known as: LIPITOR   20 mg, Daily      bisacodyl 10 MG suppository  Commonly known as: DULCOLAX   As Needed, Dulcolax 10 MG Rectal Suppository; Patient Sig: Dulcolax 10 MG Rectal Suppository ; 0; 04-Feb-2014; Active      cetirizine 10 MG tablet  Commonly known as: zyrTEC   10 mg, Oral, Nightly      dextromethorphan-guaifenesin  MG/5ML syrup  Commonly known as: ROBITUSSIN-DM   5 mL, Oral, Every 4 Hours PRN      diazePAM 20 MG rectal kit  Commonly known as: DIASTAT ACUDIAL   20 mg, Rectal, As Needed, Seizure Lasting longer than 5 minutes, or 2 seizures within 12 hours      diclofenac 1 % gel gel  Commonly known as: VOLTAREN   4 g, Topical, 4 Times Daily PRN      dilTIAZem  MG 24 hr capsule  Commonly known as: DILACOR XR   240 mg, Oral, Daily      diphenhydrAMINE 25 mg capsule  Commonly known as: BENADRYL   25 mg, Oral, Every 6 Hours PRN      estradiol 0.1 MG/GM vaginal cream  Commonly known as: ESTRACE   Vaginal, Apply 1/2 inch ribbon to urethra twice a week, on Wed and Sat at bedtime      lamoTRIgine 200 MG tablet  Commonly known as: LaMICtal   200 mg, Oral, 2 Times Daily      LORazepam 0.5 MG tablet  Commonly known as: ATIVAN   0.25 mg, Oral, 3 Times Daily, For anxiety      lubiprostone 8 MCG capsule  Commonly known as: AMITIZA   8 mcg, Oral, 2 Times Daily With Meals      magnesium hydroxide 400 MG/5ML suspension  Commonly known as: MILK OF MAGNESIA   30 mL, Oral, 2 Times Daily PRN       multivitamin tablet tablet  Commonly known as: THERAGRAN   Oral      omeprazole 20 MG capsule  Commonly known as: priLOSEC   40 mg, Oral, Daily      polyethylene glycol 17 GM/SCOOP powder  Commonly known as: MIRALAX   17 g, Oral, 2 Times Daily      PROBIOTIC PO   2 Units, Oral, Nightly      promethazine 25 MG tablet  Commonly known as: PHENERGAN   25 mg, Oral, Every 6 Hours PRN, Per [facility] med record          Stop These Medications    HYDROcodone-acetaminophen 5-325 MG per tablet  Commonly known as: NORCO            Discharge Diet:   Diet Instructions     Diet: Regular      Discharge Diet: Regular          Activity at Discharge:     Follow-up Appointments  Future Appointments   Date Time Provider Department Center   3/29/2022 12:40 PM Alex Peralta II, MD [facility]   9/23/2022 11:20 AM Alex Peralta II, MD [facility]     Additional Instructions for the Follow-ups that You Need to Schedule     Discharge Follow-up with PCP   As directed       Currently Documented PCP:    Tulio Carlson MD    PCP Phone Number:    [phone]     Follow Up Details: primary one week               Test Results Pending at Discharge  Pending Labs     Order Current Status    Levetiracetam Level (Keppra) In process           Risk for Readmission (LACE) Score: 4 (2/11/2022  6:01 AM)      This patient has been examined wearing appropriate Personal Protective Equipment . 02/11/22        Electronically signed by Hector Cardenas DO, 02/11/22, 10:56 EST.

## 2022-02-11 NOTE — PLAN OF CARE
Goal Outcome Evaluation:  Plan of Care Reviewed With: patient           Outcome Summary: Patient on RA. Cr improving. VSS.

## 2022-02-14 ENCOUNTER — HOSPITAL ENCOUNTER (OUTPATIENT)
Dept: CT IMAGING | Facility: HOSPITAL | Age: 82
End: 2022-02-14

## 2022-02-17 LAB — LEVETIRACETAM SERPL-MCNC: 129.4 UG/ML (ref 10–40)

## 2022-02-18 DIAGNOSIS — F41.9 ANXIETY: ICD-10-CM

## 2022-02-18 RX ORDER — LORAZEPAM 0.5 MG/1
0.25 TABLET ORAL 3 TIMES DAILY
Qty: 45 TABLET | Refills: 0 | Status: CANCELLED | OUTPATIENT
Start: 2022-02-18

## 2022-02-18 RX ORDER — LORAZEPAM 0.5 MG/1
0.25 TABLET ORAL 3 TIMES DAILY
Qty: 45 TABLET | Refills: 2 | Status: SHIPPED | OUTPATIENT
Start: 2022-02-18 | End: 2022-12-26 | Stop reason: HOSPADM

## 2022-02-18 NOTE — TELEPHONE ENCOUNTER
[facility]er: [facility]Y [facility]  Best call back number: [phone]    Requested Prescriptions: LORazepam (ATIVAN) 0.5 MG tablet  Requested Prescriptions      No prescriptions requested or ordered in this encounter        Pharmacy where request should be sent:  [facility]    Additional details provided by patient: NURSE STATES IT A  LORazepam (ATIVAN) 0.5 MG tablet, SHE STATES THAT IT A 0.25MG THEY JUST DECREASED IT 2-. PT HAS ENOUGH FOR A FEW DAYS.    Does the patient have less than a 3 day supply:  [x] Yes  [] No    Luh Garica Rep   02/18/22 12:20 EST

## 2022-02-18 NOTE — TELEPHONE ENCOUNTER
Spoke to Ashley because of how hub documented and how the request was put in.    After speaking with Ashley they decreased her Atavin to 0.25mg three times a day due to her respiratory issues and breathing was labored. She takes this for anxiety. They are requesting a script for the new dosage that the facility changed too due to our office being closed over the weekend. Please advise and review medication request.

## 2022-03-01 ENCOUNTER — LAB REQUISITION (OUTPATIENT)
Dept: LAB | Facility: HOSPITAL | Age: 82
End: 2022-03-01

## 2022-03-01 DIAGNOSIS — R53.83 OTHER FATIGUE: ICD-10-CM

## 2022-03-01 DIAGNOSIS — Z79.899 OTHER LONG TERM (CURRENT) DRUG THERAPY: ICD-10-CM

## 2022-03-01 DIAGNOSIS — N18.9 CHRONIC KIDNEY DISEASE, UNSPECIFIED: ICD-10-CM

## 2022-03-01 DIAGNOSIS — R79.9 ABNORMAL FINDING OF BLOOD CHEMISTRY, UNSPECIFIED: ICD-10-CM

## 2022-03-01 LAB
ANION GAP SERPL CALCULATED.3IONS-SCNC: 7.8 MMOL/L (ref 5–15)
BUN SERPL-MCNC: 42 MG/DL (ref 8–23)
BUN/CREAT SERPL: 35.6 (ref 7–25)
CALCIUM SPEC-SCNC: 10.2 MG/DL (ref 8.6–10.5)
CHLORIDE SERPL-SCNC: 107 MMOL/L (ref 98–107)
CO2 SERPL-SCNC: 26.2 MMOL/L (ref 22–29)
CREAT SERPL-MCNC: 1.18 MG/DL (ref 0.57–1)
EGFRCR SERPLBLD CKD-EPI 2021: 46.5 ML/MIN/1.73
GLUCOSE SERPL-MCNC: 92 MG/DL (ref 65–99)
POTASSIUM SERPL-SCNC: 5 MMOL/L (ref 3.5–5.2)
SODIUM SERPL-SCNC: 141 MMOL/L (ref 136–145)

## 2022-03-01 PROCEDURE — 80048 BASIC METABOLIC PNL TOTAL CA: CPT | Performed by: FAMILY MEDICINE

## 2022-03-23 ENCOUNTER — TELEPHONE (OUTPATIENT)
Dept: NEUROLOGY | Facility: CLINIC | Age: 82
End: 2022-03-23

## 2022-03-23 NOTE — TELEPHONE ENCOUNTER
Caller: YURIDIA    Relationship: [facility]    Best call back number: [phone]    Who are you requesting to speak with (clinical staff, provider,  specific staff member):   DR MAGUIRE    What was the call regarding: PT HAS APPT 3-29-22 AND THEY ARE WANTING TO KNOW IF IT CAN BE A TELEPHONE VISIT.    Do you require a callback:   PLEASE CALL YURIDIA TO LET HER KNOW IF THE REQUEST IS GRANTED

## 2022-03-24 ENCOUNTER — TELEPHONE (OUTPATIENT)
Dept: NEUROLOGY | Facility: CLINIC | Age: 82
End: 2022-03-24

## 2022-03-24 NOTE — TELEPHONE ENCOUNTER
Provider:  DR MAGUIRE   Caller:  DANIELLA WITH [facility]   Relationship to Patient:  NURSE     Phone Number: [phone]  Reason for Call:  CALLER IS REQUESTING APPT BE  CHANGED TO TELEHEALTH  ON 03/29/2022AS PT IS IN FACILITY UNABLE TO GET TRANSPORTATION

## 2022-03-29 ENCOUNTER — TELEPHONE (OUTPATIENT)
Dept: NEUROLOGY | Facility: CLINIC | Age: 82
End: 2022-03-29

## 2022-03-29 ENCOUNTER — TELEMEDICINE (OUTPATIENT)
Dept: NEUROLOGY | Facility: CLINIC | Age: 82
End: 2022-03-29

## 2022-03-29 DIAGNOSIS — G40.909 NON-REFRACTORY EPILEPSY: Primary | ICD-10-CM

## 2022-03-29 DIAGNOSIS — F88 DEVELOPMENTAL MENTAL DISORDER: ICD-10-CM

## 2022-03-29 PROCEDURE — 99212 OFFICE O/P EST SF 10 MIN: CPT | Performed by: PSYCHIATRY & NEUROLOGY

## 2022-03-29 RX ORDER — GABAPENTIN 300 MG/1
300 CAPSULE ORAL NIGHTLY
Status: ON HOLD | COMMUNITY
Start: 2022-03-28 | End: 2022-12-26 | Stop reason: SDUPTHER

## 2022-03-29 RX ORDER — HYDROCODONE BITARTRATE AND ACETAMINOPHEN 5; 325 MG/1; MG/1
TABLET ORAL
COMMUNITY
Start: 2022-03-18 | End: 2022-10-25 | Stop reason: HOSPADM

## 2022-03-29 NOTE — PROGRESS NOTES
Chief Complaint   Patient presents with   • Seizures       Patient ID: Erica Crockett is a 81 y.o. female.    HPI:Unable to complete visit using a video connection to the patient. A phone visit was used to complete this visits. Total time of discussion was 15 minutes.  I had the pleasure of speaking to the patient's caregiver and nurse today.  She is at her place of residence and I am here in the neurology clinic.  They did consent to this type of visit.  She has been doing well.  No seizures according to the staff there.  She is taking her Keppra and Lamictal as scheduled.  No significant issues.  No new issues neurologically according to hospital staff.    The following portions of the patient's history were reviewed and updated as appropriate: allergies, current medications, past family history, past medical history, past social history, past surgical history and problem list.    Review of Systems   I have reviewed the review of systems above performed by my medical assistant.      There were no vitals filed for this visit.    Neurologic Exam    Physical Exam    Procedures    Assessment: Continue Keppra as scheduled as well as the Ativan.  We will see her back in 6 months or sooner if needed.  A total of 50 minutes was spent during this visit discussing signs and symptoms of seizures, patient education, plan of care and prognosis.       Diagnoses and all orders for this visit:    1. Non-refractory epilepsy (HCC) (Primary)    2. Developmental mental disorder           Alex Peralta II, MD

## 2022-03-29 NOTE — PROGRESS NOTES
Unable to complete visit using a video connection to the patient. A phone visit was used to complete this visits. Total time of discussion was 15 minutes.

## 2022-03-30 NOTE — TELEPHONE ENCOUNTER
Caller: YURIDIA    Relationship: [facility]/ [facility]    Best call back number: [phone]    What was the call regarding: YURIDIA CALLED TO CHECK IF 6 MONTH F/U VISIT HAS BEEN SCHEDULED. ADVISED YES, APPT IS ON 9/23/22 W/ DR. MAGUIRE @ 11:20AM. YURIDIA VERBALIZED UNDERSTANDING.    Do you require a callback: NO    DOCUMENTING PER [facility]UB [employer].

## 2022-04-15 DIAGNOSIS — G40.909 NON-REFRACTORY EPILEPSY: ICD-10-CM

## 2022-04-15 RX ORDER — DIAZEPAM 20 MG/4ML
20 GEL RECTAL AS NEEDED
Qty: 3 EACH | Refills: 3 | Status: SHIPPED | OUTPATIENT
Start: 2022-04-15 | End: 2022-05-15

## 2022-04-15 NOTE — TELEPHONE ENCOUNTER
Caller: GLENROY    Relationship: CAREGIVER/[facility]    Best call back number: [phone]    Requested Prescriptions:   Requested Prescriptions     Pending Prescriptions Disp Refills   • diazePAM (DIASTAT ACUDIAL) 20 MG rectal kit       Sig: Insert 20 mg into the rectum As Needed for Seizures. Seizure Lasting longer than 5 minutes, or 2 seizures within 12 hours        Pharmacy where request should be sent: [facility]    Additional details provided by patient: 1 KIT ON HAND; NEEDS NEW RX    Does the patient have less than a 3 day supply:  [x] Yes  [] No    Luh Castro Rep   04/15/22 10:26 EDT

## 2022-04-27 ENCOUNTER — LAB REQUISITION (OUTPATIENT)
Dept: LAB | Facility: HOSPITAL | Age: 82
End: 2022-04-27

## 2022-04-27 DIAGNOSIS — M16.9 OSTEOARTHRITIS OF HIP, UNSPECIFIED: ICD-10-CM

## 2022-04-27 DIAGNOSIS — M41.9 SCOLIOSIS, UNSPECIFIED: ICD-10-CM

## 2022-04-27 DIAGNOSIS — R53.83 OTHER FATIGUE: ICD-10-CM

## 2022-04-27 DIAGNOSIS — Z79.899 OTHER LONG TERM (CURRENT) DRUG THERAPY: ICD-10-CM

## 2022-04-27 LAB
ALBUMIN SERPL-MCNC: 3.7 G/DL (ref 3.5–5.2)
ALBUMIN/GLOB SERPL: 1.1 G/DL
ALP SERPL-CCNC: 131 U/L (ref 39–117)
ALT SERPL W P-5'-P-CCNC: 20 U/L (ref 1–33)
ANION GAP SERPL CALCULATED.3IONS-SCNC: 6.8 MMOL/L (ref 5–15)
AST SERPL-CCNC: 20 U/L (ref 1–32)
BILIRUB SERPL-MCNC: <0.2 MG/DL (ref 0–1.2)
BUN SERPL-MCNC: 33 MG/DL (ref 8–23)
BUN/CREAT SERPL: 29.5 (ref 7–25)
CALCIUM SPEC-SCNC: 10.6 MG/DL (ref 8.6–10.5)
CHLORIDE SERPL-SCNC: 105 MMOL/L (ref 98–107)
CO2 SERPL-SCNC: 30.2 MMOL/L (ref 22–29)
CREAT SERPL-MCNC: 1.12 MG/DL (ref 0.57–1)
EGFRCR SERPLBLD CKD-EPI 2021: 49.5 ML/MIN/1.73
GLOBULIN UR ELPH-MCNC: 3.4 GM/DL
GLUCOSE SERPL-MCNC: 76 MG/DL (ref 65–99)
MAGNESIUM SERPL-MCNC: 2 MG/DL (ref 1.6–2.4)
PHOSPHATE SERPL-MCNC: 3.3 MG/DL (ref 2.5–4.5)
POTASSIUM SERPL-SCNC: 4.5 MMOL/L (ref 3.5–5.2)
PROT SERPL-MCNC: 7.1 G/DL (ref 6–8.5)
SODIUM SERPL-SCNC: 142 MMOL/L (ref 136–145)

## 2022-04-27 PROCEDURE — 80053 COMPREHEN METABOLIC PANEL: CPT | Performed by: FAMILY MEDICINE

## 2022-04-27 PROCEDURE — 84100 ASSAY OF PHOSPHORUS: CPT | Performed by: FAMILY MEDICINE

## 2022-04-27 PROCEDURE — 83735 ASSAY OF MAGNESIUM: CPT | Performed by: FAMILY MEDICINE

## 2022-04-30 ENCOUNTER — LAB REQUISITION (OUTPATIENT)
Dept: LAB | Facility: HOSPITAL | Age: 82
End: 2022-04-30

## 2022-04-30 DIAGNOSIS — N39.0 URINARY TRACT INFECTION, SITE NOT SPECIFIED: ICD-10-CM

## 2022-04-30 LAB
BACTERIA UR QL AUTO: ABNORMAL /HPF
BILIRUB UR QL STRIP: NEGATIVE
CLARITY UR: CLEAR
COLOR UR: ABNORMAL
GLUCOSE UR STRIP-MCNC: NEGATIVE MG/DL
HGB UR QL STRIP.AUTO: NEGATIVE
HYALINE CASTS UR QL AUTO: ABNORMAL /LPF
KETONES UR QL STRIP: NEGATIVE
LEUKOCYTE ESTERASE UR QL STRIP.AUTO: ABNORMAL
NITRITE UR QL STRIP: NEGATIVE
PH UR STRIP.AUTO: 7 [PH] (ref 4.5–8)
PROT UR QL STRIP: NEGATIVE
RBC # UR STRIP: ABNORMAL /HPF
REF LAB TEST METHOD: ABNORMAL
SP GR UR STRIP: 1.01 (ref 1–1.03)
SQUAMOUS #/AREA URNS HPF: ABNORMAL /HPF
UROBILINOGEN UR QL STRIP: ABNORMAL
WBC # UR STRIP: ABNORMAL /HPF

## 2022-04-30 PROCEDURE — 87086 URINE CULTURE/COLONY COUNT: CPT | Performed by: FAMILY MEDICINE

## 2022-04-30 PROCEDURE — 81001 URINALYSIS AUTO W/SCOPE: CPT | Performed by: FAMILY MEDICINE

## 2022-05-01 LAB — BACTERIA SPEC AEROBE CULT: NO GROWTH

## 2022-05-04 ENCOUNTER — HOSPITAL ENCOUNTER (OUTPATIENT)
Dept: GENERAL RADIOLOGY | Facility: HOSPITAL | Age: 82
Discharge: HOME | End: 2022-05-04
Admitting: FAMILY MEDICINE

## 2022-05-04 ENCOUNTER — TRANSCRIBE ORDERS (OUTPATIENT)
Dept: FAMILY MEDICINE CLINIC | Facility: CLINIC | Age: 82
End: 2022-05-04

## 2022-05-04 DIAGNOSIS — R53.83 LETHARGY: ICD-10-CM

## 2022-05-04 DIAGNOSIS — R09.89 CHEST CONGESTION: ICD-10-CM

## 2022-05-04 DIAGNOSIS — R09.89 CHEST CONGESTION: Primary | ICD-10-CM

## 2022-05-04 DIAGNOSIS — R05.9 COUGH: ICD-10-CM

## 2022-05-04 PROCEDURE — 71046 X-RAY EXAM CHEST 2 VIEWS: CPT

## 2022-05-17 ENCOUNTER — TRANSCRIBE ORDERS (OUTPATIENT)
Dept: ADMINISTRATIVE | Facility: HOSPITAL | Age: 82
End: 2022-05-17

## 2022-05-17 DIAGNOSIS — Z12.31 ENCOUNTER FOR SCREENING MAMMOGRAM FOR MALIGNANT NEOPLASM OF BREAST: Primary | ICD-10-CM

## 2022-05-18 ENCOUNTER — LAB REQUISITION (OUTPATIENT)
Dept: LAB | Facility: HOSPITAL | Age: 82
End: 2022-05-18

## 2022-05-18 DIAGNOSIS — N18.30 CHRONIC KIDNEY DISEASE, STAGE 3 UNSPECIFIED: ICD-10-CM

## 2022-05-18 DIAGNOSIS — I10 ESSENTIAL (PRIMARY) HYPERTENSION: ICD-10-CM

## 2022-05-18 LAB
ANION GAP SERPL CALCULATED.3IONS-SCNC: 7.9 MMOL/L (ref 5–15)
BUN SERPL-MCNC: 42 MG/DL (ref 8–23)
BUN/CREAT SERPL: 27.8 (ref 7–25)
CALCIUM SPEC-SCNC: 10.1 MG/DL (ref 8.6–10.5)
CHLORIDE SERPL-SCNC: 106 MMOL/L (ref 98–107)
CO2 SERPL-SCNC: 27.1 MMOL/L (ref 22–29)
CREAT SERPL-MCNC: 1.51 MG/DL (ref 0.57–1)
EGFRCR SERPLBLD CKD-EPI 2021: 34.6 ML/MIN/1.73
GLUCOSE SERPL-MCNC: 87 MG/DL (ref 65–99)
POTASSIUM SERPL-SCNC: 5.6 MMOL/L (ref 3.5–5.2)
SODIUM SERPL-SCNC: 141 MMOL/L (ref 136–145)

## 2022-05-18 PROCEDURE — 80048 BASIC METABOLIC PNL TOTAL CA: CPT | Performed by: FAMILY MEDICINE

## 2022-05-19 ENCOUNTER — TRANSCRIBE ORDERS (OUTPATIENT)
Dept: ADMINISTRATIVE | Facility: HOSPITAL | Age: 82
End: 2022-05-19

## 2022-05-19 ENCOUNTER — HOSPITAL ENCOUNTER (OUTPATIENT)
Dept: GENERAL RADIOLOGY | Facility: HOSPITAL | Age: 82
Discharge: HOME | End: 2022-05-19
Admitting: FAMILY MEDICINE

## 2022-05-19 DIAGNOSIS — Z87.01 PERSONAL HISTORY OF PNEUMONIA (RECURRENT): ICD-10-CM

## 2022-05-19 DIAGNOSIS — Z87.01 PERSONAL HISTORY OF PNEUMONIA (RECURRENT): Primary | ICD-10-CM

## 2022-05-19 PROCEDURE — 71046 X-RAY EXAM CHEST 2 VIEWS: CPT

## 2022-05-21 ENCOUNTER — LAB REQUISITION (OUTPATIENT)
Dept: LAB | Facility: HOSPITAL | Age: 82
End: 2022-05-21

## 2022-05-21 DIAGNOSIS — E87.5 HYPERKALEMIA: ICD-10-CM

## 2022-05-21 DIAGNOSIS — N18.30 CHRONIC KIDNEY DISEASE, STAGE 3 UNSPECIFIED: ICD-10-CM

## 2022-05-21 LAB
ANION GAP SERPL CALCULATED.3IONS-SCNC: 11.9 MMOL/L (ref 5–15)
BUN SERPL-MCNC: 39 MG/DL (ref 8–23)
BUN/CREAT SERPL: 29.3 (ref 7–25)
CALCIUM SPEC-SCNC: 9.7 MG/DL (ref 8.6–10.5)
CHLORIDE SERPL-SCNC: 107 MMOL/L (ref 98–107)
CO2 SERPL-SCNC: 22.1 MMOL/L (ref 22–29)
CREAT SERPL-MCNC: 1.33 MG/DL (ref 0.57–1)
EGFRCR SERPLBLD CKD-EPI 2021: 40.3 ML/MIN/1.73
GLUCOSE SERPL-MCNC: 82 MG/DL (ref 65–99)
POTASSIUM SERPL-SCNC: 4.5 MMOL/L (ref 3.5–5.2)
SODIUM SERPL-SCNC: 141 MMOL/L (ref 136–145)

## 2022-05-21 PROCEDURE — 80048 BASIC METABOLIC PNL TOTAL CA: CPT | Performed by: FAMILY MEDICINE

## 2022-06-08 ENCOUNTER — TRANSCRIBE ORDERS (OUTPATIENT)
Dept: ULTRASOUND IMAGING | Facility: HOSPITAL | Age: 82
End: 2022-06-08

## 2022-06-08 DIAGNOSIS — E04.1 THYROID NODULE: Primary | ICD-10-CM

## 2022-06-09 ENCOUNTER — HOSPITAL ENCOUNTER (OUTPATIENT)
Dept: CT IMAGING | Facility: HOSPITAL | Age: 82
Discharge: HOME | End: 2022-06-09
Admitting: INTERNAL MEDICINE

## 2022-06-09 ENCOUNTER — TRANSCRIBE ORDERS (OUTPATIENT)
Dept: PULMONOLOGY | Facility: HOSPITAL | Age: 82
End: 2022-06-09

## 2022-06-09 DIAGNOSIS — J18.9 RECURRENT PNEUMONIA: Primary | ICD-10-CM

## 2022-06-09 DIAGNOSIS — J18.9 RECURRENT PNEUMONIA: ICD-10-CM

## 2022-06-09 PROCEDURE — 71250 CT THORAX DX C-: CPT

## 2022-06-13 ENCOUNTER — TRANSCRIBE ORDERS (OUTPATIENT)
Dept: WOUND CARE | Facility: HOSPITAL | Age: 82
End: 2022-06-13

## 2022-06-15 ENCOUNTER — HOSPITAL ENCOUNTER (OUTPATIENT)
Dept: MAMMOGRAPHY | Facility: HOSPITAL | Age: 82
Discharge: HOME | End: 2022-06-15
Admitting: FAMILY MEDICINE

## 2022-06-15 DIAGNOSIS — Z12.31 ENCOUNTER FOR SCREENING MAMMOGRAM FOR MALIGNANT NEOPLASM OF BREAST: ICD-10-CM

## 2022-06-15 PROCEDURE — 77063 BREAST TOMOSYNTHESIS BI: CPT

## 2022-06-15 PROCEDURE — 77067 SCR MAMMO BI INCL CAD: CPT

## 2022-06-22 ENCOUNTER — LAB REQUISITION (OUTPATIENT)
Dept: LAB | Facility: HOSPITAL | Age: 82
End: 2022-06-22

## 2022-06-22 DIAGNOSIS — N18.30 CHRONIC KIDNEY DISEASE, STAGE 3 UNSPECIFIED: ICD-10-CM

## 2022-06-22 DIAGNOSIS — I10 ESSENTIAL (PRIMARY) HYPERTENSION: ICD-10-CM

## 2022-06-22 DIAGNOSIS — E87.5 HYPERKALEMIA: ICD-10-CM

## 2022-06-22 LAB
ANION GAP SERPL CALCULATED.3IONS-SCNC: 8.5 MMOL/L (ref 5–15)
BUN SERPL-MCNC: 43 MG/DL (ref 8–23)
BUN/CREAT SERPL: 35.5 (ref 7–25)
CALCIUM SPEC-SCNC: 9.5 MG/DL (ref 8.6–10.5)
CHLORIDE SERPL-SCNC: 106 MMOL/L (ref 98–107)
CO2 SERPL-SCNC: 24.5 MMOL/L (ref 22–29)
CREAT SERPL-MCNC: 1.21 MG/DL (ref 0.57–1)
EGFRCR SERPLBLD CKD-EPI 2021: 45.1 ML/MIN/1.73
GLUCOSE SERPL-MCNC: 86 MG/DL (ref 65–99)
POTASSIUM SERPL-SCNC: 4.8 MMOL/L (ref 3.5–5.2)
SODIUM SERPL-SCNC: 139 MMOL/L (ref 136–145)

## 2022-06-22 PROCEDURE — 80048 BASIC METABOLIC PNL TOTAL CA: CPT | Performed by: FAMILY MEDICINE

## 2022-07-08 ENCOUNTER — OFFICE VISIT (OUTPATIENT)
Dept: NEUROLOGY | Facility: CLINIC | Age: 82
End: 2022-07-08

## 2022-07-08 VITALS — SYSTOLIC BLOOD PRESSURE: 116 MMHG | DIASTOLIC BLOOD PRESSURE: 64 MMHG

## 2022-07-08 DIAGNOSIS — F88 DEVELOPMENTAL MENTAL DISORDER: Primary | ICD-10-CM

## 2022-07-08 DIAGNOSIS — G40.909 NON-REFRACTORY EPILEPSY: ICD-10-CM

## 2022-07-08 PROCEDURE — 99213 OFFICE O/P EST LOW 20 MIN: CPT | Performed by: PSYCHIATRY & NEUROLOGY

## 2022-07-08 RX ORDER — SULFAMETHOXAZOLE AND TRIMETHOPRIM 800; 160 MG/1; MG/1
TABLET ORAL
COMMUNITY
Start: 2022-04-29 | End: 2022-08-26

## 2022-07-08 RX ORDER — LEVOFLOXACIN 500 MG/1
TABLET, FILM COATED ORAL
COMMUNITY
Start: 2022-06-15 | End: 2022-09-21

## 2022-07-08 RX ORDER — DENOSUMAB 60 MG/ML
60 INJECTION SUBCUTANEOUS
COMMUNITY
Start: 2022-05-10

## 2022-07-08 RX ORDER — NYSTATIN AND TRIAMCINOLONE ACETONIDE 100000; 1 [USP'U]/G; MG/G
OINTMENT TOPICAL
COMMUNITY
Start: 2022-05-12 | End: 2022-12-26 | Stop reason: HOSPADM

## 2022-07-08 RX ORDER — DILTIAZEM HYDROCHLORIDE 120 MG/1
CAPSULE, EXTENDED RELEASE ORAL
COMMUNITY
Start: 2022-06-10 | End: 2022-09-27

## 2022-07-08 NOTE — PROGRESS NOTES
Chief Complaint   Patient presents with   • Seizures       Patient ID: Erica Crockett is a 81 y.o. female.    HPI:  I have had the pleasure of seeing your patient today.  As you may know she is an 81-year-old female with a history of seizures.  She apparently had 3 seizures.  1 June and 2 this month.  This would be a definite increase in seizure activity.  Back in February she had a CT scan of her head that was normal.  Her most recent Keppra level was high, 129.  She is also taking Lamictal.  I do not see a recent clinical level in the chart.  She does take gabapentin and Ativan however those are more for behavioral issues.    The following portions of the patient's history were reviewed and updated as appropriate: allergies, current medications, past family history, past medical history, past social history, past surgical history and problem list.    Review of Systems   Constitutional: Positive for activity change.   Neurological: Positive for seizures, speech difficulty and weakness. Negative for dizziness, tremors, syncope, light-headedness, numbness and headaches.   Hematological: Does not bruise/bleed easily.   Psychiatric/Behavioral: Positive for agitation, confusion and decreased concentration. Negative for behavioral problems, hallucinations, self-injury, sleep disturbance and suicidal ideas. The patient is nervous/anxious.       I have reviewed the review of systems above performed by my medical assistant.      Vitals:    07/08/22 1240   BP: 116/64       Neurologic Exam     Mental Status   Patient has a history of developmental delay and cognitively is not able to verbalize response to questioning appropriately.  She does follow some commands.  She is able to move all 4 extremities spontaneously.  Reflexes are 2+ throughout.     Cranial Nerves     CN III, IV, VI   Pupils are equal, round, and reactive to light.      Physical Exam  Vitals reviewed.   Constitutional:       Appearance: She is  well-developed.   HENT:      Head: Normocephalic and atraumatic.   Eyes:      Pupils: Pupils are equal, round, and reactive to light.   Cardiovascular:      Rate and Rhythm: Normal rate and regular rhythm.   Pulmonary:      Breath sounds: Normal breath sounds.   Musculoskeletal:         General: Normal range of motion.   Skin:     General: Skin is warm.         Procedures    Assessment/Plan: We are going to likely increase the dose of Keppra from 500 mg twice daily to 750 mg twice a day however I would like to verify that her Keppra level is back within normal limits.  Therefore we will hold off on making that change today.  I asked the team members with her today to contact me once her follow-up lab draw has been completed for my review.  Otherwise we will see her back in 3 months or sooner if needed.  A total of 25 minutes was spent face-to-face with the patient today.  Of that greater than 50% of this time was spent discussing signs and symptoms of seizures, patient education, plan of care and prognosis.       Diagnoses and all orders for this visit:    1. Developmental mental disorder (Primary)    2. Non-refractory epilepsy (HCC)           Alex Peralta II, MD

## 2022-07-13 ENCOUNTER — LAB REQUISITION (OUTPATIENT)
Dept: LAB | Facility: HOSPITAL | Age: 82
End: 2022-07-13

## 2022-07-13 DIAGNOSIS — N18.30 CHRONIC KIDNEY DISEASE, STAGE 3 UNSPECIFIED: ICD-10-CM

## 2022-07-13 DIAGNOSIS — G40.89 OTHER SEIZURES: ICD-10-CM

## 2022-07-13 DIAGNOSIS — Z79.899 OTHER LONG TERM (CURRENT) DRUG THERAPY: ICD-10-CM

## 2022-07-15 ENCOUNTER — LAB REQUISITION (OUTPATIENT)
Dept: LAB | Facility: HOSPITAL | Age: 82
End: 2022-07-15

## 2022-07-15 ENCOUNTER — HOSPITAL ENCOUNTER (OUTPATIENT)
Dept: ULTRASOUND IMAGING | Facility: HOSPITAL | Age: 82
Discharge: HOME | End: 2022-07-15
Admitting: FAMILY MEDICINE

## 2022-07-15 DIAGNOSIS — E04.1 THYROID NODULE: ICD-10-CM

## 2022-07-15 DIAGNOSIS — Z79.899 OTHER LONG TERM (CURRENT) DRUG THERAPY: ICD-10-CM

## 2022-07-15 DIAGNOSIS — N18.30 CHRONIC KIDNEY DISEASE, STAGE 3 UNSPECIFIED: ICD-10-CM

## 2022-07-15 DIAGNOSIS — G40.89 OTHER SEIZURES: ICD-10-CM

## 2022-07-15 PROCEDURE — 76536 US EXAM OF HEAD AND NECK: CPT

## 2022-07-15 PROCEDURE — 80177 DRUG SCRN QUAN LEVETIRACETAM: CPT | Performed by: FAMILY MEDICINE

## 2022-07-18 ENCOUNTER — OFFICE VISIT (OUTPATIENT)
Dept: OBSTETRICS AND GYNECOLOGY | Facility: CLINIC | Age: 82
End: 2022-07-18

## 2022-07-18 VITALS
BODY MASS INDEX: 26.76 KG/M2 | WEIGHT: 166.5 LBS | SYSTOLIC BLOOD PRESSURE: 104 MMHG | DIASTOLIC BLOOD PRESSURE: 60 MMHG | HEIGHT: 66 IN

## 2022-07-18 DIAGNOSIS — R39.81 FUNCTIONAL URINARY INCONTINENCE: ICD-10-CM

## 2022-07-18 DIAGNOSIS — Z01.419 ENCOUNTER FOR GYNECOLOGICAL EXAMINATION: Primary | ICD-10-CM

## 2022-07-18 PROCEDURE — G0101 CA SCREEN;PELVIC/BREAST EXAM: HCPCS | Performed by: OBSTETRICS & GYNECOLOGY

## 2022-07-18 NOTE — PROGRESS NOTES
GYN Annual Exam     CC- Here for annual exam.     Erica Crockett is a 81 y.o. female who presents for annual well woman exam. Pt is a new pt to me. Pt is resident of [facility]. 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 She is in wheelchair. Last MMG 2022. Last bone density 2021 with osteoporosis: T score -3.2. Per med list- pt is on Estrace cream 1x per week. Per nursing staff pt will intermittently scratch at her vulva/mons pubis. Pt wears a brief.    OB History             Para        Term   0            AB        Living           SAB        IAB        Ectopic        Molar        Multiple        Live Births                    Current contraception: post menopausal status  History of abnormal Pap smear: no  History of abnormal mammogram: no  Family history of uterine, colon or ovarian cancer: unknown  Family history of breast cancer: unknown    Health Maintenance   Topic Date Due   • URINE MICROALBUMIN  Never done   • Pneumococcal Vaccine 65+ (1 - PCV) Never done   • ZOSTER VACCINE (1 of 2) Never done   • ANNUAL WELLNESS VISIT  Never done   • TDAP/TD VACCINES (2 - Tdap) 2018   • DIABETIC EYE EXAM  2020   • COVID-19 Vaccine (3 - Booster for Pfizer series) 2021   • HEMOGLOBIN A1C  2022   • LIPID PANEL  2022   • INFLUENZA VACCINE  10/01/2022   • DXA SCAN  2023   • MAMMOGRAM  06/15/2024   • COLORECTAL CANCER SCREENING  2029       Past Medical History:   Diagnosis Date   • Anemia    • Anxiety    • Bursitis    • Bursitis    • DJD (degenerative joint disease), lumbar    • Hyperkalemia    • Hypoglycemia    • Intellectual disability    • Low back pain    • Osteoarthritis    • Osteopenia    • Pneumonia    • Renal disorder    • Renal insufficiency    • Scoliosis    • Seizure disorder (HCC)        History reviewed. No pertinent surgical history.      Current Outpatient Medications:   •  acetaminophen (TYLENOL) 325 MG tablet, Take 650 mg by mouth Every 6  (Six) Hours As Needed., Disp: , Rfl:   •  atorvastatin (LIPITOR) 20 MG tablet, 20 mg Daily., Disp: , Rfl:   •  bisacodyl (DULCOLAX) 10 MG suppository, As Needed. Dulcolax 10 MG Rectal Suppository; Patient Sig: Dulcolax 10 MG Rectal Suppository ; 0; 04-Feb-2014; Active, Disp: , Rfl:   •  cetirizine (ZyrTEC) 10 MG tablet, Take 10 mg by mouth Every Night., Disp: , Rfl:   •  dextromethorphan-guaifenesin (ROBITUSSIN-DM)  MG/5ML syrup, Take 5 mL by mouth Every 4 (Four) Hours As Needed., Disp: , Rfl:   •  diclofenac (VOLTAREN) 1 % gel gel, Apply 4 g topically 4 (Four) Times a Day As Needed., Disp: , Rfl:   •  Dilt- MG 24 hr capsule, , Disp: , Rfl:   •  diltiazem XR (DILACOR XR) 240 MG 24 hr capsule, Take 240 mg by mouth Daily., Disp: , Rfl:   •  diphenhydrAMINE (BENADRYL) 25 mg capsule, Take 25 mg by mouth every 6 (six) hours as needed for itching., Disp: , Rfl:   •  estradiol (ESTRACE) 0.1 MG/GM vaginal cream, Insert  into the vagina. Apply 1/2 inch ribbon to urethra twice a week, on Wed and Sat at bedtime, Disp: , Rfl:   •  gabapentin (NEURONTIN) 300 MG capsule, , Disp: , Rfl:   •  HYDROcodone-acetaminophen (NORCO) 5-325 MG per tablet, , Disp: , Rfl:   •  Hydrocortisone (Susan's magic butt cream), Apply 1 application topically to the appropriate area as directed As Needed., Disp: , Rfl:   •  ipratropium-albuterol (DUO-NEB) 0.5-2.5 mg/3 ml nebulizer, Take 3 mL by nebulization 4 (Four) Times a Day. (Patient taking differently: Take 3 mL by nebulization 4 (Four) Times a Day As Needed for Wheezing or Shortness of Air.), Disp: 360 mL, Rfl:   •  lamoTRIgine (LaMICtal) 200 MG tablet, Take 200 mg by mouth 2 (Two) Times a Day., Disp: , Rfl:   •  levETIRAcetam (KEPPRA) 1000 MG tablet, Take 1 tablet by mouth 2 (Two) Times a Day for 30 days. (Patient taking differently: Take 1,000 mg by mouth 2 (Two) Times a Day. Pt takes a total of 1500 mg PO twice daily), Disp: 60 tablet, Rfl: 11  •  levETIRAcetam (KEPPRA) 500 MG  tablet, Take 1 tablet by mouth 2 (Two) Times a Day for 30 days. (Patient taking differently: Take 1,500 mg by mouth 2 (Two) Times a Day.), Disp: 60 tablet, Rfl: 6  •  levoFLOXacin (LEVAQUIN) 500 MG tablet, , Disp: , Rfl:   •  LORazepam (ATIVAN) 0.5 MG tablet, Take 0.5 tablets by mouth 3 (Three) Times a Day. For anxiety, Disp: 45 tablet, Rfl: 2  •  lubiprostone (AMITIZA) 8 MCG capsule, Take 8 mcg by mouth 2 (two) times a day with meals., Disp: , Rfl:   •  magnesium hydroxide (MILK OF MAGNESIA) 400 MG/5ML suspension, Take 30 mL by mouth 2 (Two) Times a Day As Needed for Constipation., Disp: , Rfl:   •  Multiple Vitamin (MULTI VITAMIN DAILY PO), Take  by mouth., Disp: , Rfl:   •  nystatin-triamcinolone (MYCOLOG) 055334-1.1 UNIT/GM-% ointment, , Disp: , Rfl:   •  omeprazole (priLOSEC) 20 MG capsule, Take 40 mg by mouth Daily., Disp: , Rfl:   •  polyethylene glycol (MIRALAX) powder, Take 17 g by mouth 2 (Two) Times a Day., Disp: , Rfl:   •  Probiotic Product (PROBIOTIC PO), Take 2 Units by mouth Every Night., Disp: , Rfl:   •  Prolia 60 MG/ML solution prefilled syringe syringe, , Disp: , Rfl:   •  promethazine (PHENERGAN) 25 MG tablet, Take 25 mg by mouth Every 6 (Six) Hours As Needed for Nausea or Vomiting (PO/IM/UT PRN nausea, vomiting, x72 hrs, started med 12/10/19). Per [facility] med record, Disp: , Rfl:   •  sulfamethoxazole-trimethoprim (BACTRIM DS,SEPTRA DS) 800-160 MG per tablet, , Disp: , Rfl:     Allergies   Allergen Reactions   • Bee Venom    • Iodinated Diagnostic Agents    • Nsaids        Social History     Tobacco Use   • Smoking status: Never Smoker   • Smokeless tobacco: Never Used   Vaping Use   • Vaping Use: Never used   Substance Use Topics   • Alcohol use: No   • Drug use: No       Family History   Family history unknown: Yes       Review of Systems   Constitutional: Negative for appetite change, chills, fatigue, fever and unexpected weight change.   Gastrointestinal: Negative for abdominal  "distention, abdominal pain, anal bleeding, blood in stool, constipation, diarrhea, nausea and vomiting.   Genitourinary: Negative for dyspareunia, dysuria, menstrual problem, pelvic pain, vaginal bleeding, vaginal discharge and vaginal pain.       /60   Ht 167 cm (65.75\")   Wt 75.5 kg (166 lb 8 oz)   Breastfeeding No   BMI 27.08 kg/m²     Physical Exam  Vitals reviewed.   Constitutional:       General: She is not in acute distress.     Appearance: Normal appearance. She is well-developed. She is not ill-appearing, toxic-appearing or diaphoretic.   HENT:      Mouth/Throat:      Dentition: Normal dentition. No dental caries.   Cardiovascular:      Rate and Rhythm: Normal rate and regular rhythm.      Heart sounds: Normal heart sounds.   Pulmonary:      Effort: Pulmonary effort is normal. No respiratory distress.      Breath sounds: Normal breath sounds. No stridor. No wheezing.   Chest:   Breasts:      Right: No inverted nipple, mass, nipple discharge, skin change or tenderness.      Left: No inverted nipple, mass, nipple discharge, skin change or tenderness.       Abdominal:      General: There is no distension.      Palpations: Abdomen is soft. There is no mass.      Tenderness: There is no abdominal tenderness.   Genitourinary:     General: Normal vulva.      Labia:         Right: No rash, tenderness or lesion.         Left: No rash, tenderness or lesion.       Urethra: No prolapse, urethral pain, urethral swelling or urethral lesion.      Vagina: No vaginal discharge, tenderness or bleeding.      Cervix: No cervical motion tenderness, discharge or friability.      Uterus: Not deviated, not enlarged, not fixed and not tender.       Adnexa:         Right: No mass, tenderness or fullness.          Left: No mass, tenderness or fullness.        Rectum: No tenderness or external hemorrhoid.   Musculoskeletal:         General: No tenderness. Normal range of motion.   Skin:     General: Skin is warm.      " Findings: No erythema or rash.   Neurological:      Mental Status: She is alert. Mental status is at baseline.      Cranial Nerves: No cranial nerve deficit.      Motor: Weakness present.      Coordination: Coordination abnormal.      Gait: Gait abnormal.            Assessment/Plan    1) GYN HM: No pap smear needed. MMG 6/2022 negative  2) : No VB. No need for Estrace cream- discontinue  3) Bone health - Weight bearing exercise, dietary calcium recommendations and vitamin D reviewed. Last bone density 5/2021 with osteoporosis. On prolia. Needs another bone density 5/2023  4) Diet and Exercise discussed  5) Vulvar/mons pruitis. Exam normal today. Recommend Mycolog cream bid x 7 days prn- pt already has this on her med list.  6) Social: resident of [facility]. Nonverbal. In wheel chair.  7) Follow up prn and 1 year       Diagnoses and all orders for this visit:    Encounter for gynecological examination    Functional urinary incontinence        Amy Bryan DO  7/18/2022  14:38 EDT

## 2022-07-19 LAB — LEVETIRACETAM SERPL-MCNC: 85.2 UG/ML (ref 10–40)

## 2022-07-20 ENCOUNTER — LAB REQUISITION (OUTPATIENT)
Dept: LAB | Facility: HOSPITAL | Age: 82
End: 2022-07-20

## 2022-07-20 ENCOUNTER — TELEPHONE (OUTPATIENT)
Dept: NEUROLOGY | Facility: CLINIC | Age: 82
End: 2022-07-20

## 2022-07-20 ENCOUNTER — TRANSCRIBE ORDERS (OUTPATIENT)
Dept: ADMINISTRATIVE | Facility: HOSPITAL | Age: 82
End: 2022-07-20

## 2022-07-20 DIAGNOSIS — E87.5 HYPERKALEMIA: ICD-10-CM

## 2022-07-20 DIAGNOSIS — E83.52 HYPERCALCEMIA: ICD-10-CM

## 2022-07-20 DIAGNOSIS — E16.2 HYPOGLYCEMIA, UNSPECIFIED: ICD-10-CM

## 2022-07-20 DIAGNOSIS — I10 ESSENTIAL (PRIMARY) HYPERTENSION: ICD-10-CM

## 2022-07-20 DIAGNOSIS — Z79.899 OTHER LONG TERM (CURRENT) DRUG THERAPY: ICD-10-CM

## 2022-07-20 DIAGNOSIS — K57.90 DIVERTICULOSIS OF INTESTINE, PART UNSPECIFIED, WITHOUT PERFORATION OR ABSCESS WITHOUT BLEEDING: ICD-10-CM

## 2022-07-20 DIAGNOSIS — N18.30 CHRONIC KIDNEY DISEASE, STAGE 3 UNSPECIFIED: ICD-10-CM

## 2022-07-20 DIAGNOSIS — M16.9 OSTEOARTHRITIS OF HIP, UNSPECIFIED: ICD-10-CM

## 2022-07-20 DIAGNOSIS — M81.0 AGE-RELATED OSTEOPOROSIS WITHOUT CURRENT PATHOLOGICAL FRACTURE: ICD-10-CM

## 2022-07-20 DIAGNOSIS — N39.0 URINARY TRACT INFECTION, SITE NOT SPECIFIED: ICD-10-CM

## 2022-07-20 DIAGNOSIS — G40.89 OTHER SEIZURES: ICD-10-CM

## 2022-07-20 LAB
25(OH)D3 SERPL-MCNC: 56.4 NG/ML (ref 30–100)
ALBUMIN SERPL-MCNC: 4.4 G/DL (ref 3.5–5.2)
ALBUMIN/GLOB SERPL: 1.2 G/DL
ALP SERPL-CCNC: 136 U/L (ref 39–117)
ALT SERPL W P-5'-P-CCNC: 31 U/L (ref 1–33)
ANION GAP SERPL CALCULATED.3IONS-SCNC: 9.1 MMOL/L (ref 5–15)
AST SERPL-CCNC: 28 U/L (ref 1–32)
BASOPHILS # BLD AUTO: 0.03 10*3/MM3 (ref 0–0.2)
BASOPHILS NFR BLD AUTO: 0.5 % (ref 0–1.5)
BILIRUB SERPL-MCNC: 0.2 MG/DL (ref 0–1.2)
BUN SERPL-MCNC: 31 MG/DL (ref 8–23)
BUN/CREAT SERPL: 21.4 (ref 7–25)
CALCIUM SPEC-SCNC: 9.8 MG/DL (ref 8.6–10.5)
CHLORIDE SERPL-SCNC: 102 MMOL/L (ref 98–107)
CHOLEST SERPL-MCNC: 199 MG/DL (ref 0–200)
CO2 SERPL-SCNC: 27.9 MMOL/L (ref 22–29)
CREAT SERPL-MCNC: 1.45 MG/DL (ref 0.57–1)
DEPRECATED RDW RBC AUTO: 49.1 FL (ref 37–54)
EGFRCR SERPLBLD CKD-EPI 2021: 36.3 ML/MIN/1.73
EOSINOPHIL # BLD AUTO: 0.16 10*3/MM3 (ref 0–0.4)
EOSINOPHIL NFR BLD AUTO: 2.8 % (ref 0.3–6.2)
ERYTHROCYTE [DISTWIDTH] IN BLOOD BY AUTOMATED COUNT: 13.7 % (ref 12.3–15.4)
FOLATE SERPL-MCNC: >20 NG/ML (ref 4.78–24.2)
GLOBULIN UR ELPH-MCNC: 3.7 GM/DL
GLUCOSE SERPL-MCNC: 88 MG/DL (ref 65–99)
HBA1C MFR BLD: 5.8 % (ref 4.8–5.6)
HCT VFR BLD AUTO: 38.2 % (ref 34–46.6)
HDLC SERPL-MCNC: 101 MG/DL (ref 40–60)
HGB BLD-MCNC: 11.9 G/DL (ref 12–15.9)
IMM GRANULOCYTES # BLD AUTO: 0.01 10*3/MM3 (ref 0–0.05)
IMM GRANULOCYTES NFR BLD AUTO: 0.2 % (ref 0–0.5)
LDLC SERPL CALC-MCNC: 90 MG/DL (ref 0–100)
LDLC/HDLC SERPL: 0.89 {RATIO}
LYMPHOCYTES # BLD AUTO: 2.82 10*3/MM3 (ref 0.7–3.1)
LYMPHOCYTES NFR BLD AUTO: 49.6 % (ref 19.6–45.3)
MAGNESIUM SERPL-MCNC: 2.3 MG/DL (ref 1.6–2.4)
MCH RBC QN AUTO: 30.3 PG (ref 26.6–33)
MCHC RBC AUTO-ENTMCNC: 31.2 G/DL (ref 31.5–35.7)
MCV RBC AUTO: 97.2 FL (ref 79–97)
MONOCYTES # BLD AUTO: 0.46 10*3/MM3 (ref 0.1–0.9)
MONOCYTES NFR BLD AUTO: 8.1 % (ref 5–12)
NEUTROPHILS NFR BLD AUTO: 2.2 10*3/MM3 (ref 1.7–7)
NEUTROPHILS NFR BLD AUTO: 38.8 % (ref 42.7–76)
PHOSPHATE SERPL-MCNC: 3.8 MG/DL (ref 2.5–4.5)
PLATELET # BLD AUTO: 323 10*3/MM3 (ref 140–450)
PMV BLD AUTO: 10.8 FL (ref 6–12)
POTASSIUM SERPL-SCNC: 5.1 MMOL/L (ref 3.5–5.2)
PROT SERPL-MCNC: 8.1 G/DL (ref 6–8.5)
RBC # BLD AUTO: 3.93 10*6/MM3 (ref 3.77–5.28)
SODIUM SERPL-SCNC: 139 MMOL/L (ref 136–145)
T4 FREE SERPL-MCNC: 0.99 NG/DL (ref 0.93–1.7)
TRIGL SERPL-MCNC: 41 MG/DL (ref 0–150)
TSH SERPL DL<=0.05 MIU/L-ACNC: 3.67 UIU/ML (ref 0.27–4.2)
VIT B12 BLD-MCNC: 695 PG/ML (ref 211–946)
VLDLC SERPL-MCNC: 8 MG/DL (ref 5–40)
WBC NRBC COR # BLD: 5.68 10*3/MM3 (ref 3.4–10.8)

## 2022-07-20 PROCEDURE — 83036 HEMOGLOBIN GLYCOSYLATED A1C: CPT | Performed by: FAMILY MEDICINE

## 2022-07-20 PROCEDURE — 82746 ASSAY OF FOLIC ACID SERUM: CPT | Performed by: FAMILY MEDICINE

## 2022-07-20 PROCEDURE — 84443 ASSAY THYROID STIM HORMONE: CPT | Performed by: FAMILY MEDICINE

## 2022-07-20 PROCEDURE — 80061 LIPID PANEL: CPT | Performed by: FAMILY MEDICINE

## 2022-07-20 PROCEDURE — 80053 COMPREHEN METABOLIC PANEL: CPT | Performed by: FAMILY MEDICINE

## 2022-07-20 PROCEDURE — 82607 VITAMIN B-12: CPT | Performed by: FAMILY MEDICINE

## 2022-07-20 PROCEDURE — 83735 ASSAY OF MAGNESIUM: CPT | Performed by: FAMILY MEDICINE

## 2022-07-20 PROCEDURE — 85025 COMPLETE CBC W/AUTO DIFF WBC: CPT | Performed by: FAMILY MEDICINE

## 2022-07-20 PROCEDURE — 82306 VITAMIN D 25 HYDROXY: CPT | Performed by: FAMILY MEDICINE

## 2022-07-20 PROCEDURE — 84439 ASSAY OF FREE THYROXINE: CPT | Performed by: FAMILY MEDICINE

## 2022-07-20 PROCEDURE — 80177 DRUG SCRN QUAN LEVETIRACETAM: CPT | Performed by: FAMILY MEDICINE

## 2022-07-20 PROCEDURE — 80175 DRUG SCREEN QUAN LAMOTRIGINE: CPT | Performed by: FAMILY MEDICINE

## 2022-07-20 PROCEDURE — 84100 ASSAY OF PHOSPHORUS: CPT | Performed by: FAMILY MEDICINE

## 2022-07-22 ENCOUNTER — TELEPHONE (OUTPATIENT)
Dept: NEUROLOGY | Facility: CLINIC | Age: 82
End: 2022-07-22

## 2022-07-22 ENCOUNTER — DOCUMENTATION (OUTPATIENT)
Dept: NEUROLOGY | Facility: CLINIC | Age: 82
End: 2022-07-22

## 2022-07-22 LAB — LAMOTRIGINE SERPL-MCNC: 7.5 UG/ML (ref 2–20)

## 2022-07-22 NOTE — TELEPHONE ENCOUNTER
Aleja RN with [facility] called regarding Keppra level. She is wanting to know if Dr. Peralta was able to review patients Keppra level.     Aleja [phone] ext: [phone]

## 2022-07-22 NOTE — PROGRESS NOTES
Discussed with Slime at [facility] by calling the number provided which is [phone] extension [phone] about her current medications and that she is getting too sleepy with the increased dose of Keppra at 750 mg twice daily with food.  The trough level done this morning was 85 which is twice the upper limit of 40.    Discussed about decreasing the dose of the Keppra from 750 mg twice daily to 500 mg twice daily to the previous dose and to have another trough level checked in 5 days time.    Patient has not been noted to have any seizures so far as per the nursing staff.    If there are any further questions to get in touch with Dr. Alex Peralta for further instructions.    This is a telephonic conversation.

## 2022-07-25 LAB — LEVETIRACETAM SERPL-MCNC: 74.8 UG/ML (ref 10–40)

## 2022-08-01 NOTE — TELEPHONE ENCOUNTER
Spoke to caregiver at [facility].  They will repeat the Keppra level.  It does appear to be coming down from the number seen 2 weeks ago.  She was somewhat lethargic with the higher dosing.

## 2022-08-02 ENCOUNTER — TRANSCRIBE ORDERS (OUTPATIENT)
Dept: ADMINISTRATIVE | Facility: HOSPITAL | Age: 82
End: 2022-08-02

## 2022-08-02 ENCOUNTER — LAB REQUISITION (OUTPATIENT)
Dept: LAB | Facility: HOSPITAL | Age: 82
End: 2022-08-02

## 2022-08-02 DIAGNOSIS — R13.14 DYSPHAGIA, PHARYNGOESOPHAGEAL PHASE: Primary | ICD-10-CM

## 2022-08-02 DIAGNOSIS — E04.1 THYROID NODULE: Primary | ICD-10-CM

## 2022-08-02 DIAGNOSIS — Z79.899 OTHER LONG TERM (CURRENT) DRUG THERAPY: ICD-10-CM

## 2022-08-02 DIAGNOSIS — G40.89 OTHER SEIZURES: ICD-10-CM

## 2022-08-02 PROCEDURE — 80177 DRUG SCRN QUAN LEVETIRACETAM: CPT | Performed by: FAMILY MEDICINE

## 2022-08-03 ENCOUNTER — OFFICE VISIT (OUTPATIENT)
Dept: CARDIOLOGY | Facility: CLINIC | Age: 82
End: 2022-08-03

## 2022-08-03 VITALS
DIASTOLIC BLOOD PRESSURE: 82 MMHG | BODY MASS INDEX: 38.53 KG/M2 | HEART RATE: 65 BPM | HEIGHT: 55 IN | WEIGHT: 166.5 LBS | SYSTOLIC BLOOD PRESSURE: 120 MMHG | OXYGEN SATURATION: 93 %

## 2022-08-03 DIAGNOSIS — I10 ESSENTIAL (PRIMARY) HYPERTENSION: Primary | ICD-10-CM

## 2022-08-03 DIAGNOSIS — E78.00 HYPERCHOLESTEROLEMIA: ICD-10-CM

## 2022-08-03 PROCEDURE — 99214 OFFICE O/P EST MOD 30 MIN: CPT | Performed by: NURSE PRACTITIONER

## 2022-08-03 PROCEDURE — 93000 ELECTROCARDIOGRAM COMPLETE: CPT | Performed by: NURSE PRACTITIONER

## 2022-08-03 NOTE — PROGRESS NOTES
Date of Office Visit: 2022  Encounter Provider: LASHAE Perdomo  Place of Service: [facility] CARDIOLOGY  Patient Name: Erica Crockett  :1940  Primary Cardiologist: Dr. Cortez    Chief Complaint   Patient presents with   • Slow Heart Rate   :     Dear Dr. Carlson    HPI: Erica Crockett is a pleasant 81 y.o. female who presents 2022 for cardiac follow up.  She is a new patient to me and I reviewed her past medical records.  She saw Dr. Quintin Castillo in consultation in February.    She has a past medical history significant for mental disability,  hyperlipidemia, seizure disorder, chronic kidney disease stage III referred to cardiology clinic for evaluation of bradycardia.  She is chronic resident of nursing home and came accompanied by aids.  Patient unable to give any meaningful history.  At baseline also not verbal.    When she saw  in February, it was noted that for the prior 2 weeks she had become increasingly lethargic, drooling, crying intermittently, decreased oral intake, and noted to have decreased oxygen saturation for which she was placed on supplemental oxygen on nasal cannula for the past several days.  She was noted to be bradycardic with heart rate in the 40s and 50s few times.  Blood pressure is usually normal or borderline low.  She is on diltiazem for hypertension.    He noted that she  had to go to the ER on  last month but no significant abnormalities detected.  She had seen Dr. Ruiz the day prior and he ordered a chest CTA.  She actually was sent to the ER for further evaluation for possible pneumonia.    She returns today at your request for bradycardia.  The nursing home had been in contact with you about her heart rate and blood pressure and about her diltiazem.  You did give parameters for the diltiazem 120 mg daily, hold if heart rate less than 60 and systolic less than 100.  Today at visit her blood  pressure 120/82.  At recent doctor visits, staff reports blood pressure 1 teens/6070s.  We did call and speak to her nurse at the nursing home and she states her average blood pressure in the mornings prior to any medication in the 150s/70s.  Patient is nonverbal and does not respond to anything I say.  I am going with what the staff tells me and they state that she does not appear to be in any pain.  She has mostly been taking her medications.  There is a few days where she does not want to take it and may hold it in her mouth.    Past Medical History:   Diagnosis Date   • Anemia    • Anxiety    • Bursitis    • Bursitis    • DJD (degenerative joint disease), lumbar    • Hyperkalemia    • Hypoglycemia    • Intellectual disability    • Low back pain    • Osteoarthritis    • Osteopenia    • Pneumonia    • Renal disorder    • Renal insufficiency    • Scoliosis    • Seizure disorder (HCC)        No past surgical history on file.    Social History     Socioeconomic History   • Marital status: Single   Tobacco Use   • Smoking status: Never Smoker   • Smokeless tobacco: Never Used   Vaping Use   • Vaping Use: Never used   Substance and Sexual Activity   • Alcohol use: No   • Drug use: No   • Sexual activity: Defer       Family History   Family history unknown: Yes       The following portion of the patient's history were reviewed and updated as appropriate: past medical history, past surgical history, past social history, past family history, allergies, current medications, and problem list.    Review of Systems   Unable to perform ROS: patient nonverbal   Constitutional: Negative for diaphoresis, fever and malaise/fatigue.   HENT: Negative for congestion, hearing loss, hoarse voice, nosebleeds and sore throat.    Eyes: Negative for photophobia, vision loss in left eye, vision loss in right eye and visual disturbance.   Cardiovascular: Negative for chest pain, dyspnea on exertion, irregular heartbeat, leg swelling,  near-syncope, orthopnea, palpitations, paroxysmal nocturnal dyspnea and syncope.   Respiratory: Negative for cough, hemoptysis, shortness of breath, sleep disturbances due to breathing, snoring, sputum production and wheezing.    Endocrine: Negative for cold intolerance, heat intolerance, polydipsia, polyphagia and polyuria.   Hematologic/Lymphatic: Negative for bleeding problem. Does not bruise/bleed easily.   Skin: Negative for color change, dry skin, poor wound healing, rash and suspicious lesions.   Musculoskeletal: Negative for arthritis, back pain, falls, gout, joint pain, joint swelling, muscle cramps, muscle weakness and myalgias.   Gastrointestinal: Negative for bloating, abdominal pain, constipation, diarrhea, dysphagia, melena, nausea and vomiting.   Neurological: Negative for excessive daytime sleepiness, dizziness, headaches, light-headedness, loss of balance, numbness, paresthesias, seizures, vertigo and weakness.   Psychiatric/Behavioral: Negative for depression, memory loss and substance abuse. The patient is not nervous/anxious.        Allergies   Allergen Reactions   • Bee Venom    • Iodinated Diagnostic Agents    • Nsaids          Current Outpatient Medications:   •  acetaminophen (TYLENOL) 325 MG tablet, Take 650 mg by mouth Every 6 (Six) Hours As Needed., Disp: , Rfl:   •  atorvastatin (LIPITOR) 20 MG tablet, 20 mg Daily., Disp: , Rfl:   •  bisacodyl (DULCOLAX) 10 MG suppository, As Needed. Dulcolax 10 MG Rectal Suppository; Patient Sig: Dulcolax 10 MG Rectal Suppository ; 0; 04-Feb-2014; Active, Disp: , Rfl:   •  cetirizine (ZyrTEC) 10 MG tablet, Take 10 mg by mouth Every Night., Disp: , Rfl:   •  Dilt- MG 24 hr capsule, , Disp: , Rfl:   •  diltiazem XR (DILACOR XR) 240 MG 24 hr capsule, Take 240 mg by mouth Daily., Disp: , Rfl:   •  diphenhydrAMINE (BENADRYL) 25 mg capsule, Take 25 mg by mouth every 6 (six) hours as needed for itching., Disp: , Rfl:   •  estradiol (ESTRACE) 0.1 MG/GM  vaginal cream, Insert  into the vagina. Apply 1/2 inch ribbon to urethra twice a week, on Wed and Sat at bedtime, Disp: , Rfl:   •  gabapentin (NEURONTIN) 300 MG capsule, , Disp: , Rfl:   •  HYDROcodone-acetaminophen (NORCO) 5-325 MG per tablet, , Disp: , Rfl:   •  Hydrocortisone (Susan's magic butt cream), Apply 1 application topically to the appropriate area as directed As Needed., Disp: , Rfl:   •  ipratropium-albuterol (DUO-NEB) 0.5-2.5 mg/3 ml nebulizer, Take 3 mL by nebulization 4 (Four) Times a Day. (Patient taking differently: Take 3 mL by nebulization 4 (Four) Times a Day As Needed for Wheezing or Shortness of Air.), Disp: 360 mL, Rfl:   •  lamoTRIgine (LaMICtal) 200 MG tablet, Take 200 mg by mouth 2 (Two) Times a Day., Disp: , Rfl:   •  LORazepam (ATIVAN) 0.5 MG tablet, Take 0.5 tablets by mouth 3 (Three) Times a Day. For anxiety, Disp: 45 tablet, Rfl: 2  •  lubiprostone (AMITIZA) 8 MCG capsule, Take 8 mcg by mouth 2 (two) times a day with meals., Disp: , Rfl:   •  magnesium hydroxide (MILK OF MAGNESIA) 400 MG/5ML suspension, Take 30 mL by mouth 2 (Two) Times a Day As Needed for Constipation., Disp: , Rfl:   •  Multiple Vitamin (MULTI VITAMIN DAILY PO), Take  by mouth., Disp: , Rfl:   •  omeprazole (priLOSEC) 20 MG capsule, Take 40 mg by mouth Daily., Disp: , Rfl:   •  polyethylene glycol (MIRALAX) powder, Take 17 g by mouth 2 (Two) Times a Day., Disp: , Rfl:   •  Probiotic Product (PROBIOTIC PO), Take 2 Units by mouth Every Night., Disp: , Rfl:   •  Prolia 60 MG/ML solution prefilled syringe syringe, , Disp: , Rfl:   •  promethazine (PHENERGAN) 25 MG tablet, Take 25 mg by mouth Every 6 (Six) Hours As Needed for Nausea or Vomiting (PO/IM/ND PRN nausea, vomiting, x72 hrs, started med 12/10/19). [facility], Disp: , Rfl:   •  dextromethorphan-guaifenesin (ROBITUSSIN-DM)  MG/5ML syrup, Take 5 mL by mouth Every 4 (Four) Hours As Needed., Disp: , Rfl:   •  diclofenac (VOLTAREN) 1 % gel  "gel, Apply 4 g topically 4 (Four) Times a Day As Needed., Disp: , Rfl:   •  levETIRAcetam (KEPPRA) 1000 MG tablet, Take 1 tablet by mouth 2 (Two) Times a Day for 30 days. (Patient taking differently: Take 1,000 mg by mouth 2 (Two) Times a Day. Pt takes a total of 1500 mg PO twice daily), Disp: 60 tablet, Rfl: 11  •  levETIRAcetam (KEPPRA) 500 MG tablet, Take 1 tablet by mouth 2 (Two) Times a Day for 30 days. (Patient taking differently: Take 1,500 mg by mouth 2 (Two) Times a Day.), Disp: 60 tablet, Rfl: 6  •  levoFLOXacin (LEVAQUIN) 500 MG tablet, , Disp: , Rfl:   •  nystatin-triamcinolone (MYCOLOG) 822604-1.1 UNIT/GM-% ointment, , Disp: , Rfl:   •  sulfamethoxazole-trimethoprim (BACTRIM DS,SEPTRA DS) 800-160 MG per tablet, , Disp: , Rfl:         Objective:     Vitals:    08/03/22 1130   BP: 120/82   Pulse: 65   SpO2: 93%   Weight: 75.5 kg (166 lb 8 oz)   Height: 65.8 cm (25.89\")     Body mass index is 174.7 kg/m².      Vitals reviewed.   Constitutional:       General: Not in acute distress.     Appearance: Well-groomed and not in distress.   Eyes:      General:         Right eye: No discharge.         Left eye: No discharge.      Conjunctiva/sclera: Conjunctivae normal.   HENT:      Head: Normocephalic and atraumatic.      Right Ear: External ear normal.      Left Ear: External ear normal.      Nose: Nose normal.   Pulmonary:      Effort: Pulmonary effort is normal. No respiratory distress.      Breath sounds: Normal breath sounds. No wheezing. No rales.   Cardiovascular:      Normal rate. Regular rhythm.      No gallop.   Pulses:     Intact distal pulses.   Edema:     Peripheral edema absent.   Abdominal:      General: There is no distension.      Palpations: Abdomen is soft.      Tenderness: There is no abdominal tenderness.   Musculoskeletal:         General: No tenderness. Skin:     General: Skin is warm and dry.      Findings: No erythema or rash.   Neurological:      Mental Status: Exhibits a cognitive " deficit.   Psychiatric:         Attention and Perception: Inattentive.         Cognition and Memory: Cognition is impaired.               ECG 12 Lead    Date/Time: 8/3/2022 1:49 PM  Performed by: Adriana Walton APRN  Authorized by: Adriana Walton APRN   Comparison: compared with previous ECG from 2/10/2022  Similar to previous ECG  Rhythm: sinus rhythm  Rate: normal  Conduction: conduction normal  ST Segments: ST segments normal  T Waves: T waves normal  QRS axis: left    Clinical impression: non-specific ECG              Assessment:       Diagnosis Plan   1. Essential (primary) hypertension     2. Hypercholesterolemia            Plan:       1.  Acute hypoxemic respiratory failure on supplemental oxygen through nasal cannula - uses oxygen PRN based on nursing assessment and patient's behaviour  2.  Altered mental status from baseline with increased lethargy- -At baseline nonverbal and has significant disability since childhood.  Etiology not mentioned.  3.  Elevated blood pressure - average AM BP prior to mediations 150/70s.  Normal at visit today.  Parameters from PCP to hold diltiazem if HR < 60 or systolic < 100  4. Chronic pain from hip joint problems-gets hydrocodone at nursing home  5.  Seizure disorder  6. EKG -normal sinus rhythm with inferior Q waves-unchanged since 2019 EKG.  Unchanged  7. CKD stage 3   8.  Hypercholesterolemia on statin    Have BP and HR checked 2 hours after morning medication administration, Call in 1 week.    Addendum 8/11/2022 -received a 2-week listing of patient's blood pressure from her long-term care facility.  Her blood pressures prior to medications are a bit higher which range from a high of 185/90 to a low of 159/85.  Approximately 2 hours after her morning medications her blood pressures are under much better control was a high of 138/55 to a low of 120/62.  We will continue current medications and parameters.  I have contacted her nurse with instructions.    As always, it  has been a pleasure to participate in your patient's care. Thank you.       Sincerely,       LASHAE Perdomo      Current Outpatient Medications:   •  acetaminophen (TYLENOL) 325 MG tablet, Take 650 mg by mouth Every 6 (Six) Hours As Needed., Disp: , Rfl:   •  atorvastatin (LIPITOR) 20 MG tablet, 20 mg Daily., Disp: , Rfl:   •  bisacodyl (DULCOLAX) 10 MG suppository, As Needed. Dulcolax 10 MG Rectal Suppository; Patient Sig: Dulcolax 10 MG Rectal Suppository ; 0; 04-Feb-2014; Active, Disp: , Rfl:   •  cetirizine (ZyrTEC) 10 MG tablet, Take 10 mg by mouth Every Night., Disp: , Rfl:   •  Dilt- MG 24 hr capsule, , Disp: , Rfl:   •  diphenhydrAMINE (BENADRYL) 25 mg capsule, Take 25 mg by mouth every 6 (six) hours as needed for itching., Disp: , Rfl:   •  estradiol (ESTRACE) 0.1 MG/GM vaginal cream, Insert  into the vagina. Apply 1/2 inch ribbon to urethra twice a week, on Wed and Sat at bedtime, Disp: , Rfl:   •  gabapentin (NEURONTIN) 300 MG capsule, , Disp: , Rfl:   •  HYDROcodone-acetaminophen (NORCO) 5-325 MG per tablet, , Disp: , Rfl:   •  Hydrocortisone (Susan's magic butt cream), Apply 1 application topically to the appropriate area as directed As Needed., Disp: , Rfl:   •  ipratropium-albuterol (DUO-NEB) 0.5-2.5 mg/3 ml nebulizer, Take 3 mL by nebulization 4 (Four) Times a Day. (Patient taking differently: Take 3 mL by nebulization 4 (Four) Times a Day As Needed for Wheezing or Shortness of Air.), Disp: 360 mL, Rfl:   •  lamoTRIgine (LaMICtal) 200 MG tablet, Take 200 mg by mouth 2 (Two) Times a Day., Disp: , Rfl:   •  LORazepam (ATIVAN) 0.5 MG tablet, Take 0.5 tablets by mouth 3 (Three) Times a Day. For anxiety, Disp: 45 tablet, Rfl: 2  •  lubiprostone (AMITIZA) 8 MCG capsule, Take 8 mcg by mouth 2 (two) times a day with meals., Disp: , Rfl:   •  magnesium hydroxide (MILK OF MAGNESIA) 400 MG/5ML suspension, Take 30 mL by mouth 2 (Two) Times a Day As Needed for Constipation., Disp: , Rfl:   •  Multiple  Vitamin (MULTI VITAMIN DAILY PO), Take  by mouth., Disp: , Rfl:   •  omeprazole (priLOSEC) 20 MG capsule, Take 40 mg by mouth Daily., Disp: , Rfl:   •  polyethylene glycol (MIRALAX) powder, Take 17 g by mouth 2 (Two) Times a Day., Disp: , Rfl:   •  Probiotic Product (PROBIOTIC PO), Take 2 Units by mouth Every Night., Disp: , Rfl:   •  Prolia 60 MG/ML solution prefilled syringe syringe, , Disp: , Rfl:   •  promethazine (PHENERGAN) 25 MG tablet, Take 25 mg by mouth Every 6 (Six) Hours As Needed for Nausea or Vomiting (PO/IM/KY PRN nausea, vomiting, x72 hrs, started med 12/10/19). Per [facility] med record, Disp: , Rfl:   •  dextromethorphan-guaifenesin (ROBITUSSIN-DM)  MG/5ML syrup, Take 5 mL by mouth Every 4 (Four) Hours As Needed., Disp: , Rfl:   •  diclofenac (VOLTAREN) 1 % gel gel, Apply 4 g topically 4 (Four) Times a Day As Needed., Disp: , Rfl:   •  levETIRAcetam (KEPPRA) 1000 MG tablet, Take 1 tablet by mouth 2 (Two) Times a Day for 30 days. (Patient taking differently: Take 1,000 mg by mouth 2 (Two) Times a Day. Pt takes a total of 1500 mg PO twice daily), Disp: 60 tablet, Rfl: 11  •  levETIRAcetam (KEPPRA) 500 MG tablet, Take 1 tablet by mouth 2 (Two) Times a Day for 30 days. (Patient taking differently: Take 1,500 mg by mouth 2 (Two) Times a Day.), Disp: 60 tablet, Rfl: 6  •  levoFLOXacin (LEVAQUIN) 500 MG tablet, , Disp: , Rfl:   •  nystatin-triamcinolone (MYCOLOG) 101561-0.1 UNIT/GM-% ointment, , Disp: , Rfl:   •  sulfamethoxazole-trimethoprim (BACTRIM DS,SEPTRA DS) 800-160 MG per tablet, , Disp: , Rfl:       Dictated utilizing Dragon dictation

## 2022-08-07 LAB — LEVETIRACETAM SERPL-MCNC: 38.6 UG/ML (ref 10–40)

## 2022-08-09 ENCOUNTER — APPOINTMENT (OUTPATIENT)
Dept: GENERAL RADIOLOGY | Facility: HOSPITAL | Age: 82
End: 2022-08-09

## 2022-08-09 ENCOUNTER — HOSPITAL ENCOUNTER (OUTPATIENT)
Dept: GENERAL RADIOLOGY | Facility: HOSPITAL | Age: 82
Discharge: HOME | End: 2022-08-09
Admitting: OTOLARYNGOLOGY

## 2022-08-09 DIAGNOSIS — R13.14 DYSPHAGIA, PHARYNGOESOPHAGEAL PHASE: Primary | ICD-10-CM

## 2022-08-09 PROCEDURE — A9270 NON-COVERED ITEM OR SERVICE: HCPCS | Performed by: OTOLARYNGOLOGY

## 2022-08-09 PROCEDURE — 63710000001 BARIUM SULFATE 40 % SUSPENSION: Performed by: OTOLARYNGOLOGY

## 2022-08-09 PROCEDURE — 63710000001 BARIUM SULFATE 98 % RECONSTITUTED SUSPENSION: Performed by: OTOLARYNGOLOGY

## 2022-08-09 PROCEDURE — 92611 MOTION FLUOROSCOPY/SWALLOW: CPT

## 2022-08-09 PROCEDURE — 74230 X-RAY XM SWLNG FUNCJ C+: CPT

## 2022-08-09 PROCEDURE — 63710000001 BARIUM SULFATE 40 % RECONSTITUTED SUSPENSION: Performed by: OTOLARYNGOLOGY

## 2022-08-09 RX ADMIN — BARIUM SULFATE 4 ML: 980 POWDER, FOR SUSPENSION ORAL at 10:46

## 2022-08-09 RX ADMIN — BARIUM SULFATE 55 ML: 0.81 POWDER, FOR SUSPENSION ORAL at 10:46

## 2022-08-09 RX ADMIN — BARIUM SULFATE 50 ML: 400 SUSPENSION ORAL at 10:46

## 2022-08-09 NOTE — MBS/VFSS/FEES
Outpatient Speech Language Pathology   Adult Swallow Initial Evaluation  [facility]     Patient Name: Erica Crockett  : 1940  MRN: [id number]  Today's Date: 2022         Visit Date: 2022   Patient Active Problem List   Diagnosis   • Degeneration of intervertebral disc of lumbar region   • Developmental mental disorder   • Osteoarthritis of hip   • Scoliosis   • Chronic pain   • Nonverbal signs of pain   • Bursitis of left hip   • Encounter for monitoring opioid maintenance therapy   • Anxiety   • Constipation   • Hypercalcemia   • Epilepsy, not refractory (HCC)   • Osteoporosis   • Impulse control disorder   • Vitamin D deficiency   • Pneumonia of both lower lobes due to infectious organism   • KEILY (acute kidney injury) (HCC)   • Sepsis-associated organ dysfunction (HCC)   • Stage 3 chronic kidney disease (HCC)   • Hypercholesterolemia   • Acute kidney failure (HCC)   • Essential (primary) hypertension   • Generalized anxiety disorder   • Hyperkalemia   • Urinary tract infection        Past Medical History:   Diagnosis Date   • Anemia    • Anxiety    • Bursitis    • Bursitis    • DJD (degenerative joint disease), lumbar    • Hyperkalemia    • Hypoglycemia    • Intellectual disability    • Low back pain    • Osteoarthritis    • Osteopenia    • Pneumonia    • Renal disorder    • Renal insufficiency    • Scoliosis    • Seizure disorder (HCC)         No past surgical history on file.      Visit Dx:     ICD-10-CM ICD-9-CM   1. Dysphagia, pharyngoesophageal phase  R13.14 787.24            OP SLP Assessment/Plan - 22 1513        SLP Assessment    Functional Problems Swallowing  -CP    Impact on Function: Swallowing Risk of aspiration;Risk of dehydration  -CP    Clinical Impression: Swallowing Moderate:;oral phase dysphagia;pharyngeal phase dysphagia  -CP    Prognosis Good (comment)  -CP    Patient/caregiver participated in establishment of treatment plan and goals Yes  -CP    Patient would  benefit from skilled therapy intervention Yes  -CP       SLP Plan    Plan Comments per treating SLP  -CP          User Key  (r) = Recorded By, (t) = Taken By, (c) = Cosigned By    Initials Name Provider Type    CP Yamileth Hernandez MS CCC-SLP Speech and Language Pathologist                 SLP Adult Swallow Evaluation     Row Name 08/09/22 1300       Rehab Evaluation    Document Type evaluation  -CP    Subjective Information no complaints  -CP    Patient Observations alert;cooperative  -CP    Patient Effort good  -CP    Symptoms Noted During/After Treatment none  -CP       General Information    Patient Profile Reviewed yes  -CP    Pertinent History Of Current Problem The patient is an 82 y/o F present for VFSS due to concerns of possible aspiration by staff at [facility], where she resides. The patient is nonverbal. She had a recent diagnosis of PNA in April of this year. Previous VFSS at [facility] in 12/2019 revealed no penetration or aspiration, but severe premature spillage with thin and NTL, with recommendation for mech soft/HTL diet. She has since been advanced to [facility] soft/thin liquids.  -CP    Current Method of Nutrition soft textures;chopped;thin liquids  -CP    Precautions/Limitations, Vision WFL;for purposes of eval  -CP    Prior Level of Function-Communication cognitive-linguistic impairment;expressive language impairment;receptive language impairment  -CP    Prior Level of Function-Swallowing mechanical soft textures;thin liquids  -CP    Plans/Goals Discussed with patient;other (see comments);agreed upon  SLP from her facility present for exam  -CP    Barriers to Rehab cognitive status;medically complex;previous functional deficit  -CP    Patient's Goals for Discharge patient could not state  -CP    Family Goals for Discharge patient able to eat/drink without coughing/choking  -CP       Pain    Additional Documentation Pain Scale: FACES Pre/Post-Treatment (Group)  -CP       Pain Scale: FACES  Pre/Post-Treatment    Pain: FACES Scale, Pretreatment 0-->no hurt  -CP    Posttreatment Pain Rating 0-->no hurt  -CP       MBS/VFSS    Utensils Used adapted cup;spoon  -CP    Consistencies Trialed thin liquids;nectar/syrup-thick liquids;honey-thick liquids;pureed;soft textures  -CP       MBS/VFSS Interpretation    VFSS Summary VFSS completed with Dr. Pizarro, radiologist present. SLP from patient's facility also present and assisted feeding patient for exam. An adapted cup with a flexible silicone straw of relatively large diameter was used to present all liquid boluses, which is what she uses at mealtimes. The study was limited by constant patient motion, as well as frequent artifact due to patient habitually placing her hand on her neck during swallowing. The patient presents with moderate oropharyngeal dysphagia, characterized primarily by delayed initiation of the pharyngeal swallow, as well as reduced lingual bolus formation, control, and transit. The initial trial of thin liquid via straw was a relatively smaller bolus, and was swallowed without penetration or aspiration. More frequent laryngeal penetration occurred with thin and nectar-thick liquids with larger bolu sizes, with one episode of trace aspiration observed with thin liquids. This occurred due to large bolus size and complete filling of the pharynx (valleculae, lateral channels, and pyriform sinuses) prior to swallow initiation, with spillover of material into the larynx at times. The same pattern of delayed initiation was observed with honey-thick liquids, with one episode of transient, shallow penetration and no aspiration occurring on this study, but at risk to occur on occasion. An episode of laryngeal penetration of pyriform sinus residue was noted on one occasion, as patient was preparing to swallow a bolus of puree, which may have been related to an episode of hypopharyngeal reflux not captured during fluoro. Reduced mastication of soft solid  was observed, with prolonged oral preparation and premature spillage, but eventual clearance. Esophageal screening revealed barium retention in the mid-esophagus, which may also be impacting oropharyngeal functioning.  -CP    Oral Phase, Comment Given reported issues with recurrent UTI and only one reported pneumonia in the last year, may consider least restrictive option of continuing thin liquids, with consideration of adapted straw that limits bolus size to enhance safety. Could also consider trying honey-thick liquids if patient does not appear to be tolerating at mealtimes.  -CP       SLP Communication to Radiology    Summary Statement VFSS completed with Dr. Pizarro, radiologist present. The patient presents with moderate oropharyngeal dysphagia, characterized primarily by delayed initiation of the pharyngeal swallow, as well as reduced lingual bolus formation, control, and transit. The initial trial of thin liquid via straw was a relatively smaller bolus, and was swallowed without penetration or aspiration. More frequent laryngeal penetration occurred with thin and nectar-thick liquids with larger bolus sizes, with one episode of trace aspiration observed with thin liquids.  -CP       SLP Evaluation Clinical Impression    SLP Swallowing Diagnosis moderate;oral dysphagia;pharyngeal dysphagia  -CP    Functional Impact risk of aspiration/pneumonia;risk of malnutrition;risk of dehydration  -CP    Rehab Potential/Prognosis, Swallowing adequate, monitor progress closely  -CP    Swallow Criteria for Skilled Therapeutic Interventions Met demonstrates skilled criteria  -CP       Recommendations    Therapy Frequency (Swallow) PRN  -CP    Predicted Duration Therapy Intervention (Days) until discharge  -CP    SLP Diet Recommendation soft textures;ground;thin liquids;other (see comments)  dependent on pt/guardian's goals/wishes  -CP    Recommended Precautions and Strategies upright posture during/after eating;small bites of  food and sips of liquid;assist with feeding  -CP    Oral Care Recommendations Oral Care BID/PRN  -CP    SLP Rec. for Method of Medication Administration meds crushed;with pudding or applesauce  -CP    Monitor for Signs of Aspiration yes;notify SLP if any concerns  -CP    Anticipated Discharge Disposition (SLP) group home  -CP          User Key  (r) = Recorded By, (t) = Taken By, (c) = Cosigned By    Initials Name Provider Type    CP Yamileth Henrandez MS CCC-SLP Speech and Language Pathologist                               OP SLP Education     Row Name 08/09/22 1514       Education    Barriers to Learning Decreased comprehension  -CP    Action Taken to Address Barriers discussed options/plan with pt's SLP present  -CP    Education Provided Described results of evaluation;Family/caregivers expressed understanding of evaluation  -CP    Assessed Learning needs;Learning motivation;Learning preferences;Learning readiness  -CP    Learning Motivation Strong  (SLP)  -CP    Learning Method Explanation  -CP    Teaching Response Verbalized understanding  -CP          User Key  (r) = Recorded By, (t) = Taken By, (c) = Cosigned By    Initials Name Effective Dates    CP Yamileth Hernandez MS CCC-SLP 06/16/21 -                 SLP Outcome Measures (last 72 hours)     SLP Outcome Measures     Row Name 08/09/22 1500             SLP Outcome Measures    Outcome Measure Used? Adult NOMS  -CP              Adult FCM Scores    FCM Chosen Swallowing  -CP      Swallowing FCM Score 5  -CP            User Key  (r) = Recorded By, (t) = Taken By, (c) = Cosigned By    Initials Name Effective Dates    Yamileth Caro MS CCC-SLP 06/16/21 -                      Time Calculation:   SLP Start Time: 1015    Therapy Charges for Today     Code Description Service Date Service Provider Modifiers Qty    [id number]  ST MOTION FLUORO EVAL SWALLOW 7 8/9/2022 Yamileth Hernandez MS CCC-SLP GN 1             Patient was not wearing a face mask during  this therapy encounter. Therapist used appropriate personal protective equipment including mask, eye protection and gloves.  Mask used was standard procedure mask. Appropriate PPE was worn during the entire therapy session. Hand hygiene was completed before and after therapy session. Patient is not in enhanced droplet precautions.           Yamileth Hernandez MS CCC-SLP  8/9/2022

## 2022-08-10 ENCOUNTER — APPOINTMENT (OUTPATIENT)
Dept: GENERAL RADIOLOGY | Facility: HOSPITAL | Age: 82
End: 2022-08-10

## 2022-08-20 ENCOUNTER — LAB REQUISITION (OUTPATIENT)
Dept: LAB | Facility: HOSPITAL | Age: 82
End: 2022-08-20

## 2022-08-20 DIAGNOSIS — N39.0 URINARY TRACT INFECTION, SITE NOT SPECIFIED: ICD-10-CM

## 2022-08-20 LAB
BACTERIA UR QL AUTO: ABNORMAL /HPF
BILIRUB UR QL STRIP: NEGATIVE
CLARITY UR: ABNORMAL
COLOR UR: ABNORMAL
GLUCOSE UR STRIP-MCNC: NEGATIVE MG/DL
HGB UR QL STRIP.AUTO: ABNORMAL
HYALINE CASTS UR QL AUTO: ABNORMAL /LPF
KETONES UR QL STRIP: NEGATIVE
LEUKOCYTE ESTERASE UR QL STRIP.AUTO: ABNORMAL
NITRITE UR QL STRIP: NEGATIVE
PH UR STRIP.AUTO: 7 [PH] (ref 4.5–8)
PROT UR QL STRIP: ABNORMAL
RBC # UR STRIP: ABNORMAL /HPF
REF LAB TEST METHOD: ABNORMAL
SP GR UR STRIP: 1.01 (ref 1–1.03)
SQUAMOUS #/AREA URNS HPF: ABNORMAL /HPF
UROBILINOGEN UR QL STRIP: ABNORMAL
WBC # UR STRIP: ABNORMAL /HPF

## 2022-08-20 PROCEDURE — 81001 URINALYSIS AUTO W/SCOPE: CPT | Performed by: FAMILY MEDICINE

## 2022-08-20 PROCEDURE — 87086 URINE CULTURE/COLONY COUNT: CPT | Performed by: FAMILY MEDICINE

## 2022-08-21 LAB — BACTERIA SPEC AEROBE CULT: NORMAL

## 2022-08-24 ENCOUNTER — LAB REQUISITION (OUTPATIENT)
Dept: LAB | Facility: HOSPITAL | Age: 82
End: 2022-08-24

## 2022-08-24 DIAGNOSIS — N18.30 CHRONIC KIDNEY DISEASE, STAGE 3 UNSPECIFIED: ICD-10-CM

## 2022-08-24 DIAGNOSIS — E87.3 ALKALOSIS: ICD-10-CM

## 2022-08-24 DIAGNOSIS — I10 ESSENTIAL (PRIMARY) HYPERTENSION: ICD-10-CM

## 2022-08-24 DIAGNOSIS — M81.0 AGE-RELATED OSTEOPOROSIS WITHOUT CURRENT PATHOLOGICAL FRACTURE: ICD-10-CM

## 2022-08-24 DIAGNOSIS — E87.5 HYPERKALEMIA: ICD-10-CM

## 2022-08-24 LAB
ANION GAP SERPL CALCULATED.3IONS-SCNC: 5.4 MMOL/L (ref 5–15)
BUN SERPL-MCNC: 38 MG/DL (ref 8–23)
BUN/CREAT SERPL: 24.4 (ref 7–25)
CALCIUM SPEC-SCNC: 9.9 MG/DL (ref 8.6–10.5)
CHLORIDE SERPL-SCNC: 104 MMOL/L (ref 98–107)
CO2 SERPL-SCNC: 26.6 MMOL/L (ref 22–29)
CREAT SERPL-MCNC: 1.56 MG/DL (ref 0.57–1)
EGFRCR SERPLBLD CKD-EPI 2021: 33.3 ML/MIN/1.73
GLUCOSE SERPL-MCNC: 96 MG/DL (ref 65–99)
POTASSIUM SERPL-SCNC: 5.8 MMOL/L (ref 3.5–5.2)
POTASSIUM SERPL-SCNC: 7 MMOL/L (ref 3.5–5.2)
SODIUM SERPL-SCNC: 136 MMOL/L (ref 136–145)

## 2022-08-24 PROCEDURE — 84132 ASSAY OF SERUM POTASSIUM: CPT | Performed by: FAMILY MEDICINE

## 2022-08-24 PROCEDURE — 80048 BASIC METABOLIC PNL TOTAL CA: CPT | Performed by: FAMILY MEDICINE

## 2022-08-26 ENCOUNTER — HOSPITAL ENCOUNTER (EMERGENCY)
Facility: HOSPITAL | Age: 82
Discharge: NURSING, REHAB OR LONG-TERM CARE | End: 2022-08-26
Attending: EMERGENCY MEDICINE | Admitting: EMERGENCY MEDICINE

## 2022-08-26 ENCOUNTER — LAB REQUISITION (OUTPATIENT)
Dept: LAB | Facility: HOSPITAL | Age: 82
End: 2022-08-26

## 2022-08-26 VITALS
TEMPERATURE: 98.5 F | RESPIRATION RATE: 22 BRPM | HEART RATE: 62 BPM | WEIGHT: 151 LBS | SYSTOLIC BLOOD PRESSURE: 132 MMHG | OXYGEN SATURATION: 92 % | DIASTOLIC BLOOD PRESSURE: 66 MMHG | BODY MASS INDEX: 158.43 KG/M2

## 2022-08-26 DIAGNOSIS — E87.5 HYPERKALEMIA: ICD-10-CM

## 2022-08-26 DIAGNOSIS — E87.5 HYPERKALEMIA: Primary | ICD-10-CM

## 2022-08-26 DIAGNOSIS — N18.30 CHRONIC KIDNEY DISEASE, STAGE 3 UNSPECIFIED: ICD-10-CM

## 2022-08-26 DIAGNOSIS — N28.9 RENAL INSUFFICIENCY: ICD-10-CM

## 2022-08-26 LAB
ALBUMIN SERPL-MCNC: 4.4 G/DL (ref 3.5–5.2)
ALBUMIN/GLOB SERPL: 1.4 G/DL
ALP SERPL-CCNC: 147 U/L (ref 39–117)
ALT SERPL W P-5'-P-CCNC: 33 U/L (ref 1–33)
ANION GAP SERPL CALCULATED.3IONS-SCNC: 10.2 MMOL/L (ref 5–15)
ANION GAP SERPL CALCULATED.3IONS-SCNC: 6 MMOL/L (ref 5–15)
ANION GAP SERPL CALCULATED.3IONS-SCNC: 9.5 MMOL/L (ref 5–15)
AST SERPL-CCNC: 30 U/L (ref 1–32)
BASOPHILS # BLD AUTO: 0.03 10*3/MM3 (ref 0–0.2)
BASOPHILS NFR BLD AUTO: 0.4 % (ref 0–1.5)
BILIRUB SERPL-MCNC: <0.2 MG/DL (ref 0–1.2)
BUN SERPL-MCNC: 44 MG/DL (ref 8–23)
BUN SERPL-MCNC: 45 MG/DL (ref 8–23)
BUN SERPL-MCNC: 46 MG/DL (ref 8–23)
BUN/CREAT SERPL: 26.3 (ref 7–25)
BUN/CREAT SERPL: 28.7 (ref 7–25)
BUN/CREAT SERPL: 29.5 (ref 7–25)
CALCIUM SPEC-SCNC: 10.1 MG/DL (ref 8.6–10.5)
CALCIUM SPEC-SCNC: 10.2 MG/DL (ref 8.6–10.5)
CALCIUM SPEC-SCNC: 10.9 MG/DL (ref 8.6–10.5)
CHLORIDE SERPL-SCNC: 102 MMOL/L (ref 98–107)
CHLORIDE SERPL-SCNC: 105 MMOL/L (ref 98–107)
CHLORIDE SERPL-SCNC: 106 MMOL/L (ref 98–107)
CO2 SERPL-SCNC: 22.5 MMOL/L (ref 22–29)
CO2 SERPL-SCNC: 24.8 MMOL/L (ref 22–29)
CO2 SERPL-SCNC: 26 MMOL/L (ref 22–29)
CREAT SERPL-MCNC: 1.49 MG/DL (ref 0.57–1)
CREAT SERPL-MCNC: 1.57 MG/DL (ref 0.57–1)
CREAT SERPL-MCNC: 1.75 MG/DL (ref 0.57–1)
DEPRECATED RDW RBC AUTO: 48.7 FL (ref 37–54)
EGFRCR SERPLBLD CKD-EPI 2021: 29 ML/MIN/1.73
EGFRCR SERPLBLD CKD-EPI 2021: 33 ML/MIN/1.73
EGFRCR SERPLBLD CKD-EPI 2021: 35.1 ML/MIN/1.73
EOSINOPHIL # BLD AUTO: 0.08 10*3/MM3 (ref 0–0.4)
EOSINOPHIL NFR BLD AUTO: 1 % (ref 0.3–6.2)
ERYTHROCYTE [DISTWIDTH] IN BLOOD BY AUTOMATED COUNT: 13.7 % (ref 12.3–15.4)
GLOBULIN UR ELPH-MCNC: 3.2 GM/DL
GLUCOSE SERPL-MCNC: 111 MG/DL (ref 65–99)
GLUCOSE SERPL-MCNC: 128 MG/DL (ref 65–99)
GLUCOSE SERPL-MCNC: 92 MG/DL (ref 65–99)
HCT VFR BLD AUTO: 35.4 % (ref 34–46.6)
HGB BLD-MCNC: 11.4 G/DL (ref 12–15.9)
IMM GRANULOCYTES # BLD AUTO: 0.03 10*3/MM3 (ref 0–0.05)
IMM GRANULOCYTES NFR BLD AUTO: 0.4 % (ref 0–0.5)
LYMPHOCYTES # BLD AUTO: 2.86 10*3/MM3 (ref 0.7–3.1)
LYMPHOCYTES NFR BLD AUTO: 34.3 % (ref 19.6–45.3)
MCH RBC QN AUTO: 30.9 PG (ref 26.6–33)
MCHC RBC AUTO-ENTMCNC: 32.2 G/DL (ref 31.5–35.7)
MCV RBC AUTO: 95.9 FL (ref 79–97)
MONOCYTES # BLD AUTO: 0.61 10*3/MM3 (ref 0.1–0.9)
MONOCYTES NFR BLD AUTO: 7.3 % (ref 5–12)
NEUTROPHILS NFR BLD AUTO: 4.72 10*3/MM3 (ref 1.7–7)
NEUTROPHILS NFR BLD AUTO: 56.6 % (ref 42.7–76)
NRBC BLD AUTO-RTO: 0 /100 WBC (ref 0–0.2)
PLATELET # BLD AUTO: 335 10*3/MM3 (ref 140–450)
PMV BLD AUTO: 10.1 FL (ref 6–12)
POTASSIUM SERPL-SCNC: 5.4 MMOL/L (ref 3.5–5.2)
POTASSIUM SERPL-SCNC: 5.6 MMOL/L (ref 3.5–5.2)
POTASSIUM SERPL-SCNC: 6.6 MMOL/L (ref 3.5–5.2)
PROT SERPL-MCNC: 7.6 G/DL (ref 6–8.5)
QT INTERVAL: 435 MS
RBC # BLD AUTO: 3.69 10*6/MM3 (ref 3.77–5.28)
SODIUM SERPL-SCNC: 137 MMOL/L (ref 136–145)
SODIUM SERPL-SCNC: 137 MMOL/L (ref 136–145)
SODIUM SERPL-SCNC: 138 MMOL/L (ref 136–145)
WBC NRBC COR # BLD: 8.33 10*3/MM3 (ref 3.4–10.8)

## 2022-08-26 PROCEDURE — 96360 HYDRATION IV INFUSION INIT: CPT

## 2022-08-26 PROCEDURE — 99283 EMERGENCY DEPT VISIT LOW MDM: CPT

## 2022-08-26 PROCEDURE — 93010 ELECTROCARDIOGRAM REPORT: CPT | Performed by: INTERNAL MEDICINE

## 2022-08-26 PROCEDURE — 80053 COMPREHEN METABOLIC PANEL: CPT | Performed by: FAMILY MEDICINE

## 2022-08-26 PROCEDURE — 85025 COMPLETE CBC W/AUTO DIFF WBC: CPT | Performed by: NURSE PRACTITIONER

## 2022-08-26 PROCEDURE — 93005 ELECTROCARDIOGRAM TRACING: CPT | Performed by: NURSE PRACTITIONER

## 2022-08-26 RX ORDER — HYDROMORPHONE HCL 110MG/55ML
1 PATIENT CONTROLLED ANALGESIA SYRINGE INTRAVENOUS ONCE
Status: DISCONTINUED | OUTPATIENT
Start: 2022-08-26 | End: 2022-08-26

## 2022-08-26 RX ORDER — SODIUM CHLORIDE 0.9 % (FLUSH) 0.9 %
10 SYRINGE (ML) INJECTION AS NEEDED
Status: DISCONTINUED | OUTPATIENT
Start: 2022-08-26 | End: 2022-08-26 | Stop reason: HOSPADM

## 2022-08-26 RX ADMIN — SODIUM ZIRCONIUM CYCLOSILICATE 10 G: 10 POWDER, FOR SUSPENSION ORAL at 15:29

## 2022-08-26 RX ADMIN — SODIUM CHLORIDE 500 ML: 9 INJECTION, SOLUTION INTRAVENOUS at 12:41

## 2022-09-01 ENCOUNTER — LAB REQUISITION (OUTPATIENT)
Dept: LAB | Facility: HOSPITAL | Age: 82
End: 2022-09-01

## 2022-09-01 DIAGNOSIS — N18.5 CHRONIC KIDNEY DISEASE, STAGE 5: ICD-10-CM

## 2022-09-01 DIAGNOSIS — E87.6 HYPOKALEMIA: ICD-10-CM

## 2022-09-01 DIAGNOSIS — R94.5 ABNORMAL RESULTS OF LIVER FUNCTION STUDIES: ICD-10-CM

## 2022-09-01 LAB
ANION GAP SERPL CALCULATED.3IONS-SCNC: 5.8 MMOL/L (ref 5–15)
BUN SERPL-MCNC: 35 MG/DL (ref 8–23)
BUN/CREAT SERPL: 30.7 (ref 7–25)
CALCIUM SPEC-SCNC: 10.7 MG/DL (ref 8.6–10.5)
CHLORIDE SERPL-SCNC: 104 MMOL/L (ref 98–107)
CO2 SERPL-SCNC: 29.2 MMOL/L (ref 22–29)
CREAT SERPL-MCNC: 1.14 MG/DL (ref 0.57–1)
EGFRCR SERPLBLD CKD-EPI 2021: 48.5 ML/MIN/1.73
GLUCOSE SERPL-MCNC: 101 MG/DL (ref 65–99)
POTASSIUM SERPL-SCNC: 4.7 MMOL/L (ref 3.5–5.2)
SODIUM SERPL-SCNC: 139 MMOL/L (ref 136–145)

## 2022-09-01 PROCEDURE — 80048 BASIC METABOLIC PNL TOTAL CA: CPT | Performed by: FAMILY MEDICINE

## 2022-09-16 ENCOUNTER — LAB REQUISITION (OUTPATIENT)
Dept: LAB | Facility: HOSPITAL | Age: 82
End: 2022-09-16

## 2022-09-16 DIAGNOSIS — N18.9 CHRONIC KIDNEY DISEASE, UNSPECIFIED: ICD-10-CM

## 2022-09-16 LAB
ANION GAP SERPL CALCULATED.3IONS-SCNC: 6 MMOL/L (ref 5–15)
BUN SERPL-MCNC: 37 MG/DL (ref 8–23)
BUN/CREAT SERPL: 27.4 (ref 7–25)
CALCIUM SPEC-SCNC: 9.6 MG/DL (ref 8.6–10.5)
CHLORIDE SERPL-SCNC: 104 MMOL/L (ref 98–107)
CO2 SERPL-SCNC: 27 MMOL/L (ref 22–29)
CREAT SERPL-MCNC: 1.35 MG/DL (ref 0.57–1)
EGFRCR SERPLBLD CKD-EPI 2021: 39.6 ML/MIN/1.73
GLUCOSE SERPL-MCNC: 89 MG/DL (ref 65–99)
POTASSIUM SERPL-SCNC: 4.7 MMOL/L (ref 3.5–5.2)
SODIUM SERPL-SCNC: 137 MMOL/L (ref 136–145)

## 2022-09-16 PROCEDURE — 80048 BASIC METABOLIC PNL TOTAL CA: CPT | Performed by: FAMILY MEDICINE

## 2022-09-21 ENCOUNTER — LAB REQUISITION (OUTPATIENT)
Dept: LAB | Facility: HOSPITAL | Age: 82
End: 2022-09-21

## 2022-09-21 DIAGNOSIS — R33.9 RETENTION OF URINE, UNSPECIFIED: ICD-10-CM

## 2022-09-21 DIAGNOSIS — N39.0 URINARY TRACT INFECTION, SITE NOT SPECIFIED: ICD-10-CM

## 2022-09-21 LAB
BACTERIA UR QL AUTO: ABNORMAL /HPF
BILIRUB UR QL STRIP: NEGATIVE
CLARITY UR: ABNORMAL
COLOR UR: YELLOW
GLUCOSE UR STRIP-MCNC: NEGATIVE MG/DL
HGB UR QL STRIP.AUTO: ABNORMAL
HYALINE CASTS UR QL AUTO: ABNORMAL /LPF
KETONES UR QL STRIP: NEGATIVE
LEUKOCYTE ESTERASE UR QL STRIP.AUTO: ABNORMAL
NITRITE UR QL STRIP: NEGATIVE
PH UR STRIP.AUTO: 6 [PH] (ref 4.5–8)
PROT UR QL STRIP: ABNORMAL
RBC # UR STRIP: ABNORMAL /HPF
REF LAB TEST METHOD: ABNORMAL
SP GR UR STRIP: <=1.005 (ref 1–1.03)
SQUAMOUS #/AREA URNS HPF: ABNORMAL /HPF
UROBILINOGEN UR QL STRIP: ABNORMAL
WBC # UR STRIP: ABNORMAL /HPF
WBC CLUMPS # UR AUTO: ABNORMAL /HPF

## 2022-09-21 PROCEDURE — 81001 URINALYSIS AUTO W/SCOPE: CPT | Performed by: FAMILY MEDICINE

## 2022-09-21 PROCEDURE — 87186 SC STD MICRODIL/AGAR DIL: CPT | Performed by: FAMILY MEDICINE

## 2022-09-21 PROCEDURE — 87086 URINE CULTURE/COLONY COUNT: CPT | Performed by: FAMILY MEDICINE

## 2022-09-21 PROCEDURE — 87077 CULTURE AEROBIC IDENTIFY: CPT | Performed by: FAMILY MEDICINE

## 2022-09-23 LAB — BACTERIA SPEC AEROBE CULT: ABNORMAL

## 2022-09-26 ENCOUNTER — TELEPHONE (OUTPATIENT)
Dept: CARDIOLOGY | Facility: CLINIC | Age: 82
End: 2022-09-26

## 2022-09-26 ENCOUNTER — LAB REQUISITION (OUTPATIENT)
Dept: LAB | Facility: HOSPITAL | Age: 82
End: 2022-09-26

## 2022-09-26 DIAGNOSIS — R94.5 ABNORMAL RESULTS OF LIVER FUNCTION STUDIES: ICD-10-CM

## 2022-09-26 DIAGNOSIS — E87.6 HYPOKALEMIA: ICD-10-CM

## 2022-09-26 DIAGNOSIS — N18.9 CHRONIC KIDNEY DISEASE, UNSPECIFIED: ICD-10-CM

## 2022-09-26 DIAGNOSIS — R53.83 OTHER FATIGUE: ICD-10-CM

## 2022-09-26 LAB
ANION GAP SERPL CALCULATED.3IONS-SCNC: 9 MMOL/L (ref 5–15)
BASOPHILS # BLD AUTO: 0.02 10*3/MM3 (ref 0–0.2)
BASOPHILS NFR BLD AUTO: 0.3 % (ref 0–1.5)
BUN SERPL-MCNC: 28 MG/DL (ref 8–23)
BUN/CREAT SERPL: 24.1 (ref 7–25)
CALCIUM SPEC-SCNC: 10.4 MG/DL (ref 8.6–10.5)
CHLORIDE SERPL-SCNC: 105 MMOL/L (ref 98–107)
CO2 SERPL-SCNC: 27 MMOL/L (ref 22–29)
CREAT SERPL-MCNC: 1.16 MG/DL (ref 0.57–1)
DEPRECATED RDW RBC AUTO: 47.3 FL (ref 37–54)
EGFRCR SERPLBLD CKD-EPI 2021: 47.2 ML/MIN/1.73
EOSINOPHIL # BLD AUTO: 0.17 10*3/MM3 (ref 0–0.4)
EOSINOPHIL NFR BLD AUTO: 2.2 % (ref 0.3–6.2)
ERYTHROCYTE [DISTWIDTH] IN BLOOD BY AUTOMATED COUNT: 13.2 % (ref 12.3–15.4)
GLUCOSE SERPL-MCNC: 148 MG/DL (ref 65–99)
HCT VFR BLD AUTO: 35.9 % (ref 34–46.6)
HGB BLD-MCNC: 11.1 G/DL (ref 12–15.9)
IMM GRANULOCYTES # BLD AUTO: 0.05 10*3/MM3 (ref 0–0.05)
IMM GRANULOCYTES NFR BLD AUTO: 0.6 % (ref 0–0.5)
LYMPHOCYTES # BLD AUTO: 2.55 10*3/MM3 (ref 0.7–3.1)
LYMPHOCYTES NFR BLD AUTO: 32.7 % (ref 19.6–45.3)
MCH RBC QN AUTO: 30.2 PG (ref 26.6–33)
MCHC RBC AUTO-ENTMCNC: 30.9 G/DL (ref 31.5–35.7)
MCV RBC AUTO: 97.6 FL (ref 79–97)
MONOCYTES # BLD AUTO: 0.63 10*3/MM3 (ref 0.1–0.9)
MONOCYTES NFR BLD AUTO: 8.1 % (ref 5–12)
NEUTROPHILS NFR BLD AUTO: 4.37 10*3/MM3 (ref 1.7–7)
NEUTROPHILS NFR BLD AUTO: 56.1 % (ref 42.7–76)
NRBC BLD AUTO-RTO: 0 /100 WBC (ref 0–0.2)
PLATELET # BLD AUTO: 263 10*3/MM3 (ref 140–450)
PMV BLD AUTO: 10.1 FL (ref 6–12)
POTASSIUM SERPL-SCNC: 4 MMOL/L (ref 3.5–5.2)
RBC # BLD AUTO: 3.68 10*6/MM3 (ref 3.77–5.28)
SODIUM SERPL-SCNC: 141 MMOL/L (ref 136–145)
WBC NRBC COR # BLD: 7.79 10*3/MM3 (ref 3.4–10.8)

## 2022-09-26 PROCEDURE — 85025 COMPLETE CBC W/AUTO DIFF WBC: CPT | Performed by: FAMILY MEDICINE

## 2022-09-26 PROCEDURE — 80048 BASIC METABOLIC PNL TOTAL CA: CPT | Performed by: FAMILY MEDICINE

## 2022-09-27 RX ORDER — AMLODIPINE BESYLATE 5 MG/1
5 TABLET ORAL DAILY
Qty: 90 TABLET | Refills: 3 | Status: SHIPPED | OUTPATIENT
Start: 2022-09-27

## 2022-10-14 ENCOUNTER — OFFICE VISIT (OUTPATIENT)
Dept: NEUROLOGY | Facility: CLINIC | Age: 82
End: 2022-10-14

## 2022-10-14 DIAGNOSIS — G40.909 NON-REFRACTORY EPILEPSY: ICD-10-CM

## 2022-10-14 DIAGNOSIS — F88 DEVELOPMENTAL MENTAL DISORDER: Primary | ICD-10-CM

## 2022-10-14 PROCEDURE — 99213 OFFICE O/P EST LOW 20 MIN: CPT | Performed by: PSYCHIATRY & NEUROLOGY

## 2022-10-14 RX ORDER — LEVOFLOXACIN 250 MG/1
TABLET ORAL
COMMUNITY
Start: 2022-09-23 | End: 2022-12-26 | Stop reason: HOSPADM

## 2022-10-14 RX ORDER — FUROSEMIDE 40 MG/1
TABLET ORAL
COMMUNITY
Start: 2022-08-24 | End: 2022-12-26 | Stop reason: HOSPADM

## 2022-10-14 NOTE — PROGRESS NOTES
Chief Complaint   Patient presents with   • Seizures       Patient ID: Erica Crockett is a 82 y.o. female.    HPI: I had the pleasure of seeing your patient again today.  As you may know she is an 82-year-old female here for the management of seizures.  She is doing better at the lower dose of Keppra 500 mg twice daily.  We did raise it due to concern of multiple breakthrough seizures she had.  However at the higher dose she did have some mental status changes apparently.  In lowering the dose back down to 500 twice daily she seems to be doing well.  She has not had any seizures within the last 30 days.  Otherwise she is doing well no changes    The following portions of the patient's history were reviewed and updated as appropriate: allergies, current medications, past family history, past medical history, past social history, past surgical history and problem list.    Review of Systems   Neurological: Positive for seizures, speech difficulty and weakness. Negative for dizziness, tremors, syncope, light-headedness, numbness and headaches.   Hematological: Does not bruise/bleed easily.   Psychiatric/Behavioral: Positive for agitation, behavioral problems, confusion and decreased concentration. Negative for hallucinations, self-injury, sleep disturbance and suicidal ideas. The patient is nervous/anxious.       I have reviewed the review of systems above performed by my medical assistant.      There were no vitals filed for this visit.    Neurologic Exam     Mental Status   Patient has a history of developmental delay and cognitively is not able to verbalize response to questioning appropriately.  She does follow some commands.  She is able to move all 4 extremities spontaneously.  Reflexes are 2+ throughout.     Cranial Nerves     CN III, IV, VI   Pupils are equal, round, and reactive to light.      Physical Exam  Vitals reviewed.   Constitutional:       Appearance: She is well-developed.   HENT:      Head:  Normocephalic and atraumatic.   Eyes:      Pupils: Pupils are equal, round, and reactive to light.   Cardiovascular:      Rate and Rhythm: Normal rate and regular rhythm.   Pulmonary:      Breath sounds: Normal breath sounds.   Musculoskeletal:         General: Normal range of motion.   Skin:     General: Skin is warm.         Procedures    Assessment/Plan: We will continue the current dosing of Keppra 500 mg twice daily.  Return to clinic in 6 months or sooner if needed.  A total of 20 minutes was spent face-to-face with patient today.  Of that greater than 50% of this time was spent discussing signs and symptoms of seizures, patient education, plan of care and prognosis.       Diagnoses and all orders for this visit:    1. Developmental mental disorder (Primary)    2. Non-refractory epilepsy (HCC)           Alex Peralta II, MD

## 2022-10-21 ENCOUNTER — TELEPHONE (OUTPATIENT)
Dept: CARDIOLOGY | Facility: CLINIC | Age: 82
End: 2022-10-21

## 2022-10-21 NOTE — TELEPHONE ENCOUNTER
BP log received from pt's nursing home. This was reviewed by [facility].     Per [facility] BP is at goal, continue the same and call if any issues.     Called and spoke with Ashley at pt's facility ([facility]) to inform, she verbalized understanding. // HG

## 2022-10-23 ENCOUNTER — HOSPITAL ENCOUNTER (INPATIENT)
Facility: HOSPITAL | Age: 82
LOS: 2 days | Discharge: NURSING, REHAB OR LONG-TERM CARE | End: 2022-10-25
Attending: EMERGENCY MEDICINE | Admitting: HOSPITALIST

## 2022-10-23 ENCOUNTER — APPOINTMENT (OUTPATIENT)
Dept: GENERAL RADIOLOGY | Facility: HOSPITAL | Age: 82
End: 2022-10-23

## 2022-10-23 DIAGNOSIS — T83.511A URINARY TRACT INFECTION ASSOCIATED WITH CATHETERIZATION OF URINARY TRACT, UNSPECIFIED INDWELLING URINARY CATHETER TYPE, INITIAL ENCOUNTER: ICD-10-CM

## 2022-10-23 DIAGNOSIS — N39.0 URINARY TRACT INFECTION ASSOCIATED WITH CATHETERIZATION OF URINARY TRACT, UNSPECIFIED INDWELLING URINARY CATHETER TYPE, INITIAL ENCOUNTER: ICD-10-CM

## 2022-10-23 DIAGNOSIS — J18.9 PNEUMONIA OF LEFT LOWER LOBE DUE TO INFECTIOUS ORGANISM: Primary | ICD-10-CM

## 2022-10-23 LAB
ALBUMIN SERPL-MCNC: 4 G/DL (ref 3.5–5.2)
ALBUMIN/GLOB SERPL: 1.1 G/DL
ALP SERPL-CCNC: 158 U/L (ref 39–117)
ALT SERPL W P-5'-P-CCNC: 30 U/L (ref 1–33)
ANION GAP SERPL CALCULATED.3IONS-SCNC: 10 MMOL/L (ref 5–15)
AST SERPL-CCNC: 29 U/L (ref 1–32)
BACTERIA UR QL AUTO: ABNORMAL /HPF
BASOPHILS # BLD AUTO: 0.03 10*3/MM3 (ref 0–0.2)
BASOPHILS NFR BLD AUTO: 0.3 % (ref 0–1.5)
BILIRUB SERPL-MCNC: 0.2 MG/DL (ref 0–1.2)
BILIRUB UR QL STRIP: NEGATIVE
BUN SERPL-MCNC: 28 MG/DL (ref 8–23)
BUN/CREAT SERPL: 22.8 (ref 7–25)
CALCIUM SPEC-SCNC: 10.2 MG/DL (ref 8.6–10.5)
CHLORIDE SERPL-SCNC: 109 MMOL/L (ref 98–107)
CLARITY UR: CLEAR
CO2 SERPL-SCNC: 24 MMOL/L (ref 22–29)
COLOR UR: YELLOW
CREAT SERPL-MCNC: 1.23 MG/DL (ref 0.57–1)
D-LACTATE SERPL-SCNC: 0.8 MMOL/L (ref 0.5–2)
DEPRECATED RDW RBC AUTO: 48.4 FL (ref 37–54)
EGFRCR SERPLBLD CKD-EPI 2021: 44 ML/MIN/1.73
EOSINOPHIL # BLD AUTO: 0.1 10*3/MM3 (ref 0–0.4)
EOSINOPHIL NFR BLD AUTO: 0.8 % (ref 0.3–6.2)
ERYTHROCYTE [DISTWIDTH] IN BLOOD BY AUTOMATED COUNT: 13.6 % (ref 12.3–15.4)
FLUAV RNA RESP QL NAA+PROBE: NOT DETECTED
FLUBV RNA RESP QL NAA+PROBE: NOT DETECTED
GLOBULIN UR ELPH-MCNC: 3.7 GM/DL
GLUCOSE SERPL-MCNC: 102 MG/DL (ref 65–99)
GLUCOSE UR STRIP-MCNC: NEGATIVE MG/DL
HCT VFR BLD AUTO: 36 % (ref 34–46.6)
HGB BLD-MCNC: 11.3 G/DL (ref 12–15.9)
HGB UR QL STRIP.AUTO: ABNORMAL
HYALINE CASTS UR QL AUTO: ABNORMAL /LPF
IMM GRANULOCYTES # BLD AUTO: 0.05 10*3/MM3 (ref 0–0.05)
IMM GRANULOCYTES NFR BLD AUTO: 0.4 % (ref 0–0.5)
KETONES UR QL STRIP: NEGATIVE
L PNEUMO1 AG UR QL IA: NEGATIVE
LEUKOCYTE ESTERASE UR QL STRIP.AUTO: ABNORMAL
LYMPHOCYTES # BLD AUTO: 2.32 10*3/MM3 (ref 0.7–3.1)
LYMPHOCYTES NFR BLD AUTO: 19.4 % (ref 19.6–45.3)
MCH RBC QN AUTO: 30.5 PG (ref 26.6–33)
MCHC RBC AUTO-ENTMCNC: 31.4 G/DL (ref 31.5–35.7)
MCV RBC AUTO: 97.3 FL (ref 79–97)
MONOCYTES # BLD AUTO: 0.63 10*3/MM3 (ref 0.1–0.9)
MONOCYTES NFR BLD AUTO: 5.3 % (ref 5–12)
NEUTROPHILS NFR BLD AUTO: 73.8 % (ref 42.7–76)
NEUTROPHILS NFR BLD AUTO: 8.8 10*3/MM3 (ref 1.7–7)
NITRITE UR QL STRIP: NEGATIVE
NRBC BLD AUTO-RTO: 0 /100 WBC (ref 0–0.2)
PH UR STRIP.AUTO: 8.5 [PH] (ref 4.5–8)
PLATELET # BLD AUTO: 294 10*3/MM3 (ref 140–450)
PMV BLD AUTO: 10.8 FL (ref 6–12)
POTASSIUM SERPL-SCNC: 5.8 MMOL/L (ref 3.5–5.2)
POTASSIUM SERPL-SCNC: 6 MMOL/L (ref 3.5–5.2)
PROT SERPL-MCNC: 7.7 G/DL (ref 6–8.5)
PROT UR QL STRIP: ABNORMAL
RBC # BLD AUTO: 3.7 10*6/MM3 (ref 3.77–5.28)
RBC # UR STRIP: ABNORMAL /HPF
REF LAB TEST METHOD: ABNORMAL
S PNEUM AG SPEC QL LA: NEGATIVE
SARS-COV-2 RNA RESP QL NAA+PROBE: NOT DETECTED
SODIUM SERPL-SCNC: 143 MMOL/L (ref 136–145)
SP GR UR STRIP: 1.01 (ref 1–1.03)
SQUAMOUS #/AREA URNS HPF: ABNORMAL /HPF
UROBILINOGEN UR QL STRIP: ABNORMAL
WBC # UR STRIP: ABNORMAL /HPF
WBC NRBC COR # BLD: 11.93 10*3/MM3 (ref 3.4–10.8)

## 2022-10-23 PROCEDURE — 87636 SARSCOV2 & INF A&B AMP PRB: CPT | Performed by: EMERGENCY MEDICINE

## 2022-10-23 PROCEDURE — 36415 COLL VENOUS BLD VENIPUNCTURE: CPT

## 2022-10-23 PROCEDURE — 85025 COMPLETE CBC W/AUTO DIFF WBC: CPT | Performed by: EMERGENCY MEDICINE

## 2022-10-23 PROCEDURE — 25010000002 AZITHROMYCIN PER 500 MG: Performed by: EMERGENCY MEDICINE

## 2022-10-23 PROCEDURE — 87147 CULTURE TYPE IMMUNOLOGIC: CPT | Performed by: EMERGENCY MEDICINE

## 2022-10-23 PROCEDURE — 87449 NOS EACH ORGANISM AG IA: CPT | Performed by: INTERNAL MEDICINE

## 2022-10-23 PROCEDURE — 87040 BLOOD CULTURE FOR BACTERIA: CPT | Performed by: EMERGENCY MEDICINE

## 2022-10-23 PROCEDURE — 25010000002 CEFTRIAXONE SODIUM-DEXTROSE 1-3.74 GM-%(50ML) RECONSTITUTED SOLUTION: Performed by: EMERGENCY MEDICINE

## 2022-10-23 PROCEDURE — 81001 URINALYSIS AUTO W/SCOPE: CPT | Performed by: EMERGENCY MEDICINE

## 2022-10-23 PROCEDURE — 83605 ASSAY OF LACTIC ACID: CPT | Performed by: EMERGENCY MEDICINE

## 2022-10-23 PROCEDURE — 87150 DNA/RNA AMPLIFIED PROBE: CPT | Performed by: EMERGENCY MEDICINE

## 2022-10-23 PROCEDURE — 94799 UNLISTED PULMONARY SVC/PX: CPT

## 2022-10-23 PROCEDURE — 25010000002 ENOXAPARIN PER 10 MG: Performed by: INTERNAL MEDICINE

## 2022-10-23 PROCEDURE — 84132 ASSAY OF SERUM POTASSIUM: CPT | Performed by: INTERNAL MEDICINE

## 2022-10-23 PROCEDURE — 80053 COMPREHEN METABOLIC PANEL: CPT | Performed by: EMERGENCY MEDICINE

## 2022-10-23 PROCEDURE — 71045 X-RAY EXAM CHEST 1 VIEW: CPT

## 2022-10-23 PROCEDURE — 99284 EMERGENCY DEPT VISIT MOD MDM: CPT

## 2022-10-23 PROCEDURE — 87899 AGENT NOS ASSAY W/OPTIC: CPT | Performed by: INTERNAL MEDICINE

## 2022-10-23 PROCEDURE — 87086 URINE CULTURE/COLONY COUNT: CPT | Performed by: INTERNAL MEDICINE

## 2022-10-23 PROCEDURE — 99222 1ST HOSP IP/OBS MODERATE 55: CPT | Performed by: INTERNAL MEDICINE

## 2022-10-23 RX ORDER — AMOXICILLIN 250 MG
2 CAPSULE ORAL 2 TIMES DAILY
Status: DISCONTINUED | OUTPATIENT
Start: 2022-10-23 | End: 2022-10-25 | Stop reason: HOSPADM

## 2022-10-23 RX ORDER — SODIUM CHLORIDE 9 MG/ML
INJECTION, SOLUTION INTRAVENOUS
Status: COMPLETED
Start: 2022-10-23 | End: 2022-10-23

## 2022-10-23 RX ORDER — UREA 10 %
1 LOTION (ML) TOPICAL DAILY
COMMUNITY

## 2022-10-23 RX ORDER — SODIUM CHLORIDE 9 MG/ML
40 INJECTION, SOLUTION INTRAVENOUS AS NEEDED
Status: DISCONTINUED | OUTPATIENT
Start: 2022-10-23 | End: 2022-10-25 | Stop reason: HOSPADM

## 2022-10-23 RX ORDER — ONDANSETRON 4 MG/1
4 TABLET, FILM COATED ORAL EVERY 6 HOURS PRN
Status: DISCONTINUED | OUTPATIENT
Start: 2022-10-23 | End: 2022-10-25 | Stop reason: HOSPADM

## 2022-10-23 RX ORDER — WHEAT DEXTRIN/ASPARTAME 3 G/6 G
1 POWDER IN PACKET (EA) ORAL DAILY
COMMUNITY

## 2022-10-23 RX ORDER — BISACODYL 5 MG/1
5 TABLET, DELAYED RELEASE ORAL DAILY PRN
Status: DISCONTINUED | OUTPATIENT
Start: 2022-10-23 | End: 2022-10-25 | Stop reason: HOSPADM

## 2022-10-23 RX ORDER — ASCORBIC ACID 1000 MG
1000 TABLET, EXTENDED RELEASE ORAL DAILY
COMMUNITY

## 2022-10-23 RX ORDER — ACETAMINOPHEN 650 MG/1
650 SUPPOSITORY RECTAL EVERY 4 HOURS PRN
Status: DISCONTINUED | OUTPATIENT
Start: 2022-10-23 | End: 2022-10-25 | Stop reason: HOSPADM

## 2022-10-23 RX ORDER — CEFTRIAXONE 1 G/50ML
1 INJECTION, SOLUTION INTRAVENOUS EVERY 24 HOURS
Status: DISCONTINUED | OUTPATIENT
Start: 2022-10-24 | End: 2022-10-25

## 2022-10-23 RX ORDER — ONDANSETRON 2 MG/ML
4 INJECTION INTRAMUSCULAR; INTRAVENOUS EVERY 6 HOURS PRN
Status: DISCONTINUED | OUTPATIENT
Start: 2022-10-23 | End: 2022-10-25 | Stop reason: HOSPADM

## 2022-10-23 RX ORDER — SODIUM CHLORIDE 9 MG/ML
100 INJECTION, SOLUTION INTRAVENOUS CONTINUOUS
Status: DISCONTINUED | OUTPATIENT
Start: 2022-10-23 | End: 2022-10-24

## 2022-10-23 RX ORDER — CEFTRIAXONE 1 G/50ML
1 INJECTION, SOLUTION INTRAVENOUS ONCE
Status: COMPLETED | OUTPATIENT
Start: 2022-10-23 | End: 2022-10-23

## 2022-10-23 RX ORDER — IPRATROPIUM BROMIDE AND ALBUTEROL SULFATE 2.5; .5 MG/3ML; MG/3ML
3 SOLUTION RESPIRATORY (INHALATION) EVERY 6 HOURS PRN
Status: DISCONTINUED | OUTPATIENT
Start: 2022-10-23 | End: 2022-10-25 | Stop reason: HOSPADM

## 2022-10-23 RX ORDER — ENOXAPARIN SODIUM 100 MG/ML
30 INJECTION SUBCUTANEOUS DAILY
Status: DISCONTINUED | OUTPATIENT
Start: 2022-10-23 | End: 2022-10-25 | Stop reason: HOSPADM

## 2022-10-23 RX ORDER — SODIUM CHLORIDE 0.9 % (FLUSH) 0.9 %
10 SYRINGE (ML) INJECTION EVERY 12 HOURS SCHEDULED
Status: DISCONTINUED | OUTPATIENT
Start: 2022-10-23 | End: 2022-10-25 | Stop reason: HOSPADM

## 2022-10-23 RX ORDER — ACETAMINOPHEN 160 MG/5ML
650 SOLUTION ORAL EVERY 4 HOURS PRN
Status: DISCONTINUED | OUTPATIENT
Start: 2022-10-23 | End: 2022-10-25 | Stop reason: HOSPADM

## 2022-10-23 RX ORDER — POLYETHYLENE GLYCOL 3350 17 G/17G
17 POWDER, FOR SOLUTION ORAL DAILY PRN
Status: DISCONTINUED | OUTPATIENT
Start: 2022-10-23 | End: 2022-10-25 | Stop reason: HOSPADM

## 2022-10-23 RX ORDER — MELATONIN
1000 DAILY
COMMUNITY

## 2022-10-23 RX ORDER — SODIUM CHLORIDE 0.9 % (FLUSH) 0.9 %
10 SYRINGE (ML) INJECTION AS NEEDED
Status: DISCONTINUED | OUTPATIENT
Start: 2022-10-23 | End: 2022-10-25 | Stop reason: HOSPADM

## 2022-10-23 RX ORDER — ACETAMINOPHEN 325 MG/1
650 TABLET ORAL EVERY 4 HOURS PRN
Status: DISCONTINUED | OUTPATIENT
Start: 2022-10-23 | End: 2022-10-25 | Stop reason: HOSPADM

## 2022-10-23 RX ORDER — POLYVINYL ALCOHOL 14 MG/ML
1 SOLUTION/ DROPS OPHTHALMIC 4 TIMES DAILY
COMMUNITY
End: 2022-12-26 | Stop reason: HOSPADM

## 2022-10-23 RX ORDER — BISACODYL 10 MG
10 SUPPOSITORY, RECTAL RECTAL DAILY PRN
Status: DISCONTINUED | OUTPATIENT
Start: 2022-10-23 | End: 2022-10-25 | Stop reason: HOSPADM

## 2022-10-23 RX ORDER — HYDROCODONE BITARTRATE AND ACETAMINOPHEN 7.5; 325 MG/1; MG/1
1 TABLET ORAL EVERY 8 HOURS PRN
Status: ON HOLD | COMMUNITY
End: 2022-12-26 | Stop reason: SDUPTHER

## 2022-10-23 RX ADMIN — SODIUM CHLORIDE 100 ML/HR: 9 INJECTION, SOLUTION INTRAVENOUS at 21:10

## 2022-10-23 RX ADMIN — Medication 10 ML: at 09:56

## 2022-10-23 RX ADMIN — SODIUM CHLORIDE 500 MG: 900 INJECTION, SOLUTION INTRAVENOUS at 06:55

## 2022-10-23 RX ADMIN — CEFTRIAXONE 1 G: 1 INJECTION, SOLUTION INTRAVENOUS at 06:43

## 2022-10-23 RX ADMIN — SODIUM CHLORIDE 500 ML: 9 INJECTION, SOLUTION INTRAVENOUS at 06:49

## 2022-10-23 RX ADMIN — Medication 10 ML: at 21:06

## 2022-10-23 RX ADMIN — SODIUM CHLORIDE 100 ML/HR: 9 INJECTION, SOLUTION INTRAVENOUS at 09:56

## 2022-10-23 RX ADMIN — SENNOSIDES AND DOCUSATE SODIUM 2 TABLET: 50; 8.6 TABLET ORAL at 21:06

## 2022-10-23 RX ADMIN — ENOXAPARIN SODIUM 30 MG: 30 INJECTION SUBCUTANEOUS at 09:56

## 2022-10-24 LAB
ANION GAP SERPL CALCULATED.3IONS-SCNC: 9.7 MMOL/L (ref 5–15)
BACTERIA BLD CULT: ABNORMAL
BACTERIA SPEC AEROBE CULT: NO GROWTH
BASOPHILS # BLD AUTO: 0.03 10*3/MM3 (ref 0–0.2)
BASOPHILS NFR BLD AUTO: 0.4 % (ref 0–1.5)
BUN SERPL-MCNC: 22 MG/DL (ref 8–23)
BUN/CREAT SERPL: 20.8 (ref 7–25)
CALCIUM SPEC-SCNC: 9.3 MG/DL (ref 8.6–10.5)
CHLORIDE SERPL-SCNC: 112 MMOL/L (ref 98–107)
CO2 SERPL-SCNC: 19.3 MMOL/L (ref 22–29)
CREAT SERPL-MCNC: 1.06 MG/DL (ref 0.57–1)
DEPRECATED RDW RBC AUTO: 47.8 FL (ref 37–54)
EGFRCR SERPLBLD CKD-EPI 2021: 52.6 ML/MIN/1.73
EOSINOPHIL # BLD AUTO: 0.11 10*3/MM3 (ref 0–0.4)
EOSINOPHIL NFR BLD AUTO: 1.4 % (ref 0.3–6.2)
ERYTHROCYTE [DISTWIDTH] IN BLOOD BY AUTOMATED COUNT: 13.4 % (ref 12.3–15.4)
GLUCOSE SERPL-MCNC: 92 MG/DL (ref 65–99)
HCT VFR BLD AUTO: 34 % (ref 34–46.6)
HGB BLD-MCNC: 10.8 G/DL (ref 12–15.9)
IMM GRANULOCYTES # BLD AUTO: 0.06 10*3/MM3 (ref 0–0.05)
IMM GRANULOCYTES NFR BLD AUTO: 0.8 % (ref 0–0.5)
LYMPHOCYTES # BLD AUTO: 2.46 10*3/MM3 (ref 0.7–3.1)
LYMPHOCYTES NFR BLD AUTO: 32.4 % (ref 19.6–45.3)
MCH RBC QN AUTO: 30.9 PG (ref 26.6–33)
MCHC RBC AUTO-ENTMCNC: 31.8 G/DL (ref 31.5–35.7)
MCV RBC AUTO: 97.1 FL (ref 79–97)
MONOCYTES # BLD AUTO: 0.49 10*3/MM3 (ref 0.1–0.9)
MONOCYTES NFR BLD AUTO: 6.5 % (ref 5–12)
NEUTROPHILS NFR BLD AUTO: 4.44 10*3/MM3 (ref 1.7–7)
NEUTROPHILS NFR BLD AUTO: 58.5 % (ref 42.7–76)
NRBC BLD AUTO-RTO: 0 /100 WBC (ref 0–0.2)
PLATELET # BLD AUTO: 270 10*3/MM3 (ref 140–450)
PMV BLD AUTO: 10.6 FL (ref 6–12)
POTASSIUM SERPL-SCNC: 4.8 MMOL/L (ref 3.5–5.2)
RBC # BLD AUTO: 3.5 10*6/MM3 (ref 3.77–5.28)
SODIUM SERPL-SCNC: 141 MMOL/L (ref 136–145)
WBC NRBC COR # BLD: 7.59 10*3/MM3 (ref 3.4–10.8)

## 2022-10-24 PROCEDURE — 80048 BASIC METABOLIC PNL TOTAL CA: CPT | Performed by: INTERNAL MEDICINE

## 2022-10-24 PROCEDURE — 99232 SBSQ HOSP IP/OBS MODERATE 35: CPT | Performed by: INTERNAL MEDICINE

## 2022-10-24 PROCEDURE — 25010000002 ENOXAPARIN PER 10 MG: Performed by: INTERNAL MEDICINE

## 2022-10-24 PROCEDURE — 94799 UNLISTED PULMONARY SVC/PX: CPT

## 2022-10-24 PROCEDURE — 85025 COMPLETE CBC W/AUTO DIFF WBC: CPT | Performed by: INTERNAL MEDICINE

## 2022-10-24 PROCEDURE — 25010000002 CEFTRIAXONE SODIUM-DEXTROSE 1-3.74 GM-%(50ML) RECONSTITUTED SOLUTION: Performed by: INTERNAL MEDICINE

## 2022-10-24 RX ORDER — LUBIPROSTONE 8 UG/1
8 CAPSULE ORAL 2 TIMES DAILY WITH MEALS
Status: DISCONTINUED | OUTPATIENT
Start: 2022-10-24 | End: 2022-10-24

## 2022-10-24 RX ORDER — ZINC SULFATE 50(220)MG
220 CAPSULE ORAL DAILY
Status: DISCONTINUED | OUTPATIENT
Start: 2022-10-24 | End: 2022-10-25 | Stop reason: HOSPADM

## 2022-10-24 RX ORDER — GABAPENTIN 300 MG/1
300 CAPSULE ORAL NIGHTLY
Status: DISCONTINUED | OUTPATIENT
Start: 2022-10-24 | End: 2022-10-25 | Stop reason: HOSPADM

## 2022-10-24 RX ORDER — LAMOTRIGINE 100 MG/1
200 TABLET ORAL 2 TIMES DAILY
Status: DISCONTINUED | OUTPATIENT
Start: 2022-10-24 | End: 2022-10-25 | Stop reason: HOSPADM

## 2022-10-24 RX ORDER — MELATONIN
1000 DAILY
Status: DISCONTINUED | OUTPATIENT
Start: 2022-10-24 | End: 2022-10-25 | Stop reason: HOSPADM

## 2022-10-24 RX ORDER — HYDROCODONE BITARTRATE AND ACETAMINOPHEN 7.5; 325 MG/1; MG/1
1 TABLET ORAL EVERY 8 HOURS PRN
Status: DISCONTINUED | OUTPATIENT
Start: 2022-10-24 | End: 2022-10-25 | Stop reason: HOSPADM

## 2022-10-24 RX ORDER — AMLODIPINE BESYLATE 5 MG/1
5 TABLET ORAL DAILY
Status: DISCONTINUED | OUTPATIENT
Start: 2022-10-24 | End: 2022-10-25 | Stop reason: HOSPADM

## 2022-10-24 RX ORDER — SODIUM CHLORIDE 9 MG/ML
50 INJECTION, SOLUTION INTRAVENOUS CONTINUOUS
Status: ACTIVE | OUTPATIENT
Start: 2022-10-24 | End: 2022-10-24

## 2022-10-24 RX ORDER — LEVETIRACETAM 500 MG/1
1500 TABLET ORAL 2 TIMES DAILY
Status: DISCONTINUED | OUTPATIENT
Start: 2022-10-24 | End: 2022-10-24

## 2022-10-24 RX ORDER — ATORVASTATIN CALCIUM 20 MG/1
20 TABLET, FILM COATED ORAL DAILY
Status: DISCONTINUED | OUTPATIENT
Start: 2022-10-24 | End: 2022-10-25 | Stop reason: HOSPADM

## 2022-10-24 RX ORDER — PANTOPRAZOLE SODIUM 40 MG/1
40 TABLET, DELAYED RELEASE ORAL EVERY MORNING
Status: DISCONTINUED | OUTPATIENT
Start: 2022-10-24 | End: 2022-10-25 | Stop reason: HOSPADM

## 2022-10-24 RX ORDER — LUBIPROSTONE 24 UG/1
24 CAPSULE ORAL
Status: DISCONTINUED | OUTPATIENT
Start: 2022-10-24 | End: 2022-10-25 | Stop reason: HOSPADM

## 2022-10-24 RX ORDER — LEVETIRACETAM 500 MG/1
500 TABLET ORAL 2 TIMES DAILY
Status: DISCONTINUED | OUTPATIENT
Start: 2022-10-24 | End: 2022-10-25 | Stop reason: HOSPADM

## 2022-10-24 RX ADMIN — GABAPENTIN 300 MG: 300 CAPSULE ORAL at 20:45

## 2022-10-24 RX ADMIN — LEVETIRACETAM 500 MG: 500 TABLET, FILM COATED ORAL at 09:22

## 2022-10-24 RX ADMIN — SODIUM CHLORIDE 100 ML/HR: 9 INJECTION, SOLUTION INTRAVENOUS at 06:31

## 2022-10-24 RX ADMIN — LUBIPROSTONE 24 MCG: 24 CAPSULE, GELATIN COATED ORAL at 09:22

## 2022-10-24 RX ADMIN — HYDROCODONE BITARTRATE AND ACETAMINOPHEN 1 TABLET: 7.5; 325 TABLET ORAL at 20:45

## 2022-10-24 RX ADMIN — PANTOPRAZOLE SODIUM 40 MG: 40 TABLET, DELAYED RELEASE ORAL at 06:31

## 2022-10-24 RX ADMIN — LAMOTRIGINE 200 MG: 100 TABLET ORAL at 09:22

## 2022-10-24 RX ADMIN — Medication 10 ML: at 09:23

## 2022-10-24 RX ADMIN — Medication 220 MG: at 09:22

## 2022-10-24 RX ADMIN — ATORVASTATIN CALCIUM 20 MG: 20 TABLET, FILM COATED ORAL at 09:23

## 2022-10-24 RX ADMIN — SENNOSIDES AND DOCUSATE SODIUM 2 TABLET: 50; 8.6 TABLET ORAL at 09:21

## 2022-10-24 RX ADMIN — CEFTRIAXONE 1 G: 1 INJECTION, SOLUTION INTRAVENOUS at 06:31

## 2022-10-24 RX ADMIN — Medication 10 ML: at 20:45

## 2022-10-24 RX ADMIN — SENNOSIDES AND DOCUSATE SODIUM 2 TABLET: 50; 8.6 TABLET ORAL at 20:45

## 2022-10-24 RX ADMIN — AMLODIPINE BESYLATE 5 MG: 5 TABLET ORAL at 09:23

## 2022-10-24 RX ADMIN — LEVETIRACETAM 500 MG: 500 TABLET, FILM COATED ORAL at 20:45

## 2022-10-24 RX ADMIN — HYDROCODONE BITARTRATE AND ACETAMINOPHEN 1 TABLET: 7.5; 325 TABLET ORAL at 03:54

## 2022-10-24 RX ADMIN — CHOLECALCIFEROL TAB 25 MCG (1000 UNIT) 1000 UNITS: 25 TAB at 09:21

## 2022-10-24 RX ADMIN — ENOXAPARIN SODIUM 30 MG: 30 INJECTION SUBCUTANEOUS at 09:23

## 2022-10-24 RX ADMIN — LAMOTRIGINE 200 MG: 100 TABLET ORAL at 20:45

## 2022-10-25 VITALS
HEART RATE: 83 BPM | DIASTOLIC BLOOD PRESSURE: 87 MMHG | RESPIRATION RATE: 18 BRPM | BODY MASS INDEX: 24.61 KG/M2 | OXYGEN SATURATION: 91 % | WEIGHT: 156.8 LBS | TEMPERATURE: 97.8 F | HEIGHT: 67 IN | SYSTOLIC BLOOD PRESSURE: 152 MMHG

## 2022-10-25 PROBLEM — J18.9 PNEUMONIA OF LEFT LOWER LOBE DUE TO INFECTIOUS ORGANISM: Status: RESOLVED | Noted: 2022-10-23 | Resolved: 2022-10-25

## 2022-10-25 LAB
ANION GAP SERPL CALCULATED.3IONS-SCNC: 11.7 MMOL/L (ref 5–15)
BACTERIA SPEC AEROBE CULT: ABNORMAL
BUN SERPL-MCNC: 21 MG/DL (ref 8–23)
BUN/CREAT SERPL: 23.1 (ref 7–25)
CALCIUM SPEC-SCNC: 9.5 MG/DL (ref 8.6–10.5)
CHLORIDE SERPL-SCNC: 107 MMOL/L (ref 98–107)
CO2 SERPL-SCNC: 20.3 MMOL/L (ref 22–29)
CREAT SERPL-MCNC: 0.91 MG/DL (ref 0.57–1)
EGFRCR SERPLBLD CKD-EPI 2021: 63.1 ML/MIN/1.73
GLUCOSE SERPL-MCNC: 88 MG/DL (ref 65–99)
GRAM STN SPEC: ABNORMAL
ISOLATED FROM: ABNORMAL
POTASSIUM SERPL-SCNC: 4.2 MMOL/L (ref 3.5–5.2)
SODIUM SERPL-SCNC: 139 MMOL/L (ref 136–145)

## 2022-10-25 PROCEDURE — 80048 BASIC METABOLIC PNL TOTAL CA: CPT | Performed by: INTERNAL MEDICINE

## 2022-10-25 PROCEDURE — 25010000002 CEFTRIAXONE SODIUM-DEXTROSE 1-3.74 GM-%(50ML) RECONSTITUTED SOLUTION: Performed by: INTERNAL MEDICINE

## 2022-10-25 PROCEDURE — 25010000002 ENOXAPARIN PER 10 MG: Performed by: INTERNAL MEDICINE

## 2022-10-25 PROCEDURE — 99239 HOSP IP/OBS DSCHRG MGMT >30: CPT | Performed by: INTERNAL MEDICINE

## 2022-10-25 RX ORDER — IPRATROPIUM BROMIDE AND ALBUTEROL SULFATE 2.5; .5 MG/3ML; MG/3ML
3 SOLUTION RESPIRATORY (INHALATION) 4 TIMES DAILY PRN
Start: 2022-10-25

## 2022-10-25 RX ORDER — CEFUROXIME AXETIL 250 MG/1
500 TABLET ORAL EVERY 12 HOURS SCHEDULED
Status: DISCONTINUED | OUTPATIENT
Start: 2022-10-25 | End: 2022-10-25

## 2022-10-25 RX ORDER — CEFUROXIME AXETIL 500 MG/1
500 TABLET ORAL EVERY 12 HOURS SCHEDULED
Qty: 9 TABLET | Refills: 0 | Status: SHIPPED | OUTPATIENT
Start: 2022-10-25 | End: 2022-10-30

## 2022-10-25 RX ORDER — CEFUROXIME AXETIL 250 MG/1
500 TABLET ORAL EVERY 12 HOURS SCHEDULED
Status: DISCONTINUED | OUTPATIENT
Start: 2022-10-25 | End: 2022-10-25 | Stop reason: HOSPADM

## 2022-10-25 RX ADMIN — CEFTRIAXONE 1 G: 1 INJECTION, SOLUTION INTRAVENOUS at 05:40

## 2022-10-25 RX ADMIN — LEVETIRACETAM 500 MG: 500 TABLET, FILM COATED ORAL at 08:00

## 2022-10-25 RX ADMIN — HYDROCODONE BITARTRATE AND ACETAMINOPHEN 1 TABLET: 7.5; 325 TABLET ORAL at 07:16

## 2022-10-25 RX ADMIN — LAMOTRIGINE 200 MG: 100 TABLET ORAL at 08:00

## 2022-10-25 RX ADMIN — SENNOSIDES AND DOCUSATE SODIUM 2 TABLET: 50; 8.6 TABLET ORAL at 08:06

## 2022-10-25 RX ADMIN — CEFUROXIME AXETIL 500 MG: 250 TABLET, FILM COATED ORAL at 08:18

## 2022-10-25 RX ADMIN — ATORVASTATIN CALCIUM 20 MG: 20 TABLET, FILM COATED ORAL at 08:06

## 2022-10-25 RX ADMIN — ENOXAPARIN SODIUM 30 MG: 30 INJECTION SUBCUTANEOUS at 08:06

## 2022-10-25 RX ADMIN — Medication 220 MG: at 08:07

## 2022-10-25 RX ADMIN — CHOLECALCIFEROL TAB 25 MCG (1000 UNIT) 1000 UNITS: 25 TAB at 08:06

## 2022-10-25 RX ADMIN — LUBIPROSTONE 24 MCG: 24 CAPSULE, GELATIN COATED ORAL at 08:00

## 2022-10-25 RX ADMIN — Medication 10 ML: at 08:07

## 2022-10-25 RX ADMIN — AMLODIPINE BESYLATE 5 MG: 5 TABLET ORAL at 08:00

## 2022-10-25 RX ADMIN — PANTOPRAZOLE SODIUM 40 MG: 40 TABLET, DELAYED RELEASE ORAL at 05:39

## 2022-10-26 ENCOUNTER — LAB REQUISITION (OUTPATIENT)
Dept: LAB | Facility: HOSPITAL | Age: 82
End: 2022-10-26

## 2022-10-26 DIAGNOSIS — J69.0 PNEUMONITIS DUE TO INHALATION OF FOOD AND VOMIT: ICD-10-CM

## 2022-10-26 DIAGNOSIS — I10 ESSENTIAL (PRIMARY) HYPERTENSION: ICD-10-CM

## 2022-10-26 DIAGNOSIS — N39.0 URINARY TRACT INFECTION, SITE NOT SPECIFIED: ICD-10-CM

## 2022-10-26 DIAGNOSIS — E83.52 HYPERCALCEMIA: ICD-10-CM

## 2022-10-26 DIAGNOSIS — M16.9 OSTEOARTHRITIS OF HIP, UNSPECIFIED: ICD-10-CM

## 2022-10-26 DIAGNOSIS — D50.9 IRON DEFICIENCY ANEMIA, UNSPECIFIED: ICD-10-CM

## 2022-10-26 DIAGNOSIS — E87.5 HYPERKALEMIA: ICD-10-CM

## 2022-10-26 DIAGNOSIS — G40.89 OTHER SEIZURES: ICD-10-CM

## 2022-10-26 DIAGNOSIS — Z79.899 OTHER LONG TERM (CURRENT) DRUG THERAPY: ICD-10-CM

## 2022-10-26 DIAGNOSIS — E16.2 HYPOGLYCEMIA, UNSPECIFIED: ICD-10-CM

## 2022-10-26 DIAGNOSIS — M16.11 UNILATERAL PRIMARY OSTEOARTHRITIS, RIGHT HIP: ICD-10-CM

## 2022-10-26 LAB
25(OH)D3 SERPL-MCNC: 53.1 NG/ML (ref 30–100)
ALBUMIN SERPL-MCNC: 3.4 G/DL (ref 3.5–5.2)
ALBUMIN/GLOB SERPL: 0.9 G/DL
ALP SERPL-CCNC: 122 U/L (ref 39–117)
ALT SERPL W P-5'-P-CCNC: 22 U/L (ref 1–33)
ANION GAP SERPL CALCULATED.3IONS-SCNC: 11.1 MMOL/L (ref 5–15)
AST SERPL-CCNC: 23 U/L (ref 1–32)
BASOPHILS # BLD AUTO: 0.04 10*3/MM3 (ref 0–0.2)
BASOPHILS NFR BLD AUTO: 0.5 % (ref 0–1.5)
BILIRUB SERPL-MCNC: 0.2 MG/DL (ref 0–1.2)
BUN SERPL-MCNC: 26 MG/DL (ref 8–23)
BUN/CREAT SERPL: 24.5 (ref 7–25)
CALCIUM SPEC-SCNC: 9.7 MG/DL (ref 8.6–10.5)
CHLORIDE SERPL-SCNC: 106 MMOL/L (ref 98–107)
CHOLEST SERPL-MCNC: 184 MG/DL (ref 0–200)
CO2 SERPL-SCNC: 20.9 MMOL/L (ref 22–29)
CREAT SERPL-MCNC: 1.06 MG/DL (ref 0.57–1)
DEPRECATED RDW RBC AUTO: 46.5 FL (ref 37–54)
EGFRCR SERPLBLD CKD-EPI 2021: 52.6 ML/MIN/1.73
EOSINOPHIL # BLD AUTO: 0.22 10*3/MM3 (ref 0–0.4)
EOSINOPHIL NFR BLD AUTO: 2.9 % (ref 0.3–6.2)
ERYTHROCYTE [DISTWIDTH] IN BLOOD BY AUTOMATED COUNT: 13.2 % (ref 12.3–15.4)
FOLATE SERPL-MCNC: >20 NG/ML (ref 4.78–24.2)
GLOBULIN UR ELPH-MCNC: 3.8 GM/DL
GLUCOSE SERPL-MCNC: 82 MG/DL (ref 65–99)
HBA1C MFR BLD: 5.6 % (ref 4.8–5.6)
HCT VFR BLD AUTO: 36.3 % (ref 34–46.6)
HDLC SERPL-MCNC: 85 MG/DL (ref 40–60)
HGB BLD-MCNC: 11.5 G/DL (ref 12–15.9)
IMM GRANULOCYTES # BLD AUTO: 0.06 10*3/MM3 (ref 0–0.05)
IMM GRANULOCYTES NFR BLD AUTO: 0.8 % (ref 0–0.5)
LDLC SERPL CALC-MCNC: 90 MG/DL (ref 0–100)
LDLC/HDLC SERPL: 1.06 {RATIO}
LYMPHOCYTES # BLD AUTO: 3.46 10*3/MM3 (ref 0.7–3.1)
LYMPHOCYTES NFR BLD AUTO: 45.8 % (ref 19.6–45.3)
MAGNESIUM SERPL-MCNC: 2.2 MG/DL (ref 1.6–2.4)
MCH RBC QN AUTO: 30.3 PG (ref 26.6–33)
MCHC RBC AUTO-ENTMCNC: 31.7 G/DL (ref 31.5–35.7)
MCV RBC AUTO: 95.8 FL (ref 79–97)
MONOCYTES # BLD AUTO: 0.59 10*3/MM3 (ref 0.1–0.9)
MONOCYTES NFR BLD AUTO: 7.8 % (ref 5–12)
NEUTROPHILS NFR BLD AUTO: 3.18 10*3/MM3 (ref 1.7–7)
NEUTROPHILS NFR BLD AUTO: 42.2 % (ref 42.7–76)
NRBC BLD AUTO-RTO: 0 /100 WBC (ref 0–0.2)
PHOSPHATE SERPL-MCNC: 4.1 MG/DL (ref 2.5–4.5)
PLATELET # BLD AUTO: 283 10*3/MM3 (ref 140–450)
PMV BLD AUTO: 10.7 FL (ref 6–12)
POTASSIUM SERPL-SCNC: 4.8 MMOL/L (ref 3.5–5.2)
PROT SERPL-MCNC: 7.2 G/DL (ref 6–8.5)
RBC # BLD AUTO: 3.79 10*6/MM3 (ref 3.77–5.28)
SODIUM SERPL-SCNC: 138 MMOL/L (ref 136–145)
T4 FREE SERPL-MCNC: 1.24 NG/DL (ref 0.93–1.7)
TRIGL SERPL-MCNC: 46 MG/DL (ref 0–150)
TSH SERPL DL<=0.05 MIU/L-ACNC: 4.56 UIU/ML (ref 0.27–4.2)
VIT B12 BLD-MCNC: 806 PG/ML (ref 211–946)
VLDLC SERPL-MCNC: 9 MG/DL (ref 5–40)
WBC NRBC COR # BLD: 7.55 10*3/MM3 (ref 3.4–10.8)

## 2022-10-26 PROCEDURE — 84439 ASSAY OF FREE THYROXINE: CPT | Performed by: FAMILY MEDICINE

## 2022-10-26 PROCEDURE — 80177 DRUG SCRN QUAN LEVETIRACETAM: CPT | Performed by: FAMILY MEDICINE

## 2022-10-26 PROCEDURE — 84443 ASSAY THYROID STIM HORMONE: CPT | Performed by: FAMILY MEDICINE

## 2022-10-26 PROCEDURE — 82607 VITAMIN B-12: CPT | Performed by: FAMILY MEDICINE

## 2022-10-26 PROCEDURE — 80175 DRUG SCREEN QUAN LAMOTRIGINE: CPT | Performed by: FAMILY MEDICINE

## 2022-10-26 PROCEDURE — 85025 COMPLETE CBC W/AUTO DIFF WBC: CPT | Performed by: FAMILY MEDICINE

## 2022-10-26 PROCEDURE — 84100 ASSAY OF PHOSPHORUS: CPT | Performed by: FAMILY MEDICINE

## 2022-10-26 PROCEDURE — 82306 VITAMIN D 25 HYDROXY: CPT | Performed by: FAMILY MEDICINE

## 2022-10-26 PROCEDURE — 80053 COMPREHEN METABOLIC PANEL: CPT | Performed by: FAMILY MEDICINE

## 2022-10-26 PROCEDURE — 83735 ASSAY OF MAGNESIUM: CPT | Performed by: FAMILY MEDICINE

## 2022-10-26 PROCEDURE — 83036 HEMOGLOBIN GLYCOSYLATED A1C: CPT | Performed by: FAMILY MEDICINE

## 2022-10-26 PROCEDURE — 80061 LIPID PANEL: CPT | Performed by: FAMILY MEDICINE

## 2022-10-26 PROCEDURE — 82746 ASSAY OF FOLIC ACID SERUM: CPT | Performed by: FAMILY MEDICINE

## 2022-10-28 LAB
BACTERIA SPEC AEROBE CULT: NORMAL
LAMOTRIGINE SERPL-MCNC: 8.3 UG/ML (ref 2–20)
LEVETIRACETAM SERPL-MCNC: 24.5 UG/ML (ref 10–40)

## 2022-11-14 ENCOUNTER — TRANSCRIBE ORDERS (OUTPATIENT)
Dept: ADMINISTRATIVE | Facility: HOSPITAL | Age: 82
End: 2022-11-14

## 2022-11-14 ENCOUNTER — HOSPITAL ENCOUNTER (OUTPATIENT)
Dept: GENERAL RADIOLOGY | Facility: HOSPITAL | Age: 82
Discharge: HOME | End: 2022-11-14
Admitting: FAMILY MEDICINE

## 2022-11-14 DIAGNOSIS — Z87.01 PERSONAL HISTORY OF PNEUMONIA (RECURRENT): Primary | ICD-10-CM

## 2022-11-14 DIAGNOSIS — Z87.01 PERSONAL HISTORY OF PNEUMONIA (RECURRENT): ICD-10-CM

## 2022-11-14 PROCEDURE — 71046 X-RAY EXAM CHEST 2 VIEWS: CPT

## 2022-11-23 ENCOUNTER — LAB REQUISITION (OUTPATIENT)
Dept: LAB | Facility: HOSPITAL | Age: 82
End: 2022-11-23

## 2022-11-23 DIAGNOSIS — N18.30 CHRONIC KIDNEY DISEASE, STAGE 3 UNSPECIFIED: ICD-10-CM

## 2022-11-23 LAB
ANION GAP SERPL CALCULATED.3IONS-SCNC: 9.1 MMOL/L (ref 5–15)
BUN SERPL-MCNC: 31 MG/DL (ref 8–23)
BUN/CREAT SERPL: 30.1 (ref 7–25)
CALCIUM SPEC-SCNC: 9.8 MG/DL (ref 8.6–10.5)
CHLORIDE SERPL-SCNC: 103 MMOL/L (ref 98–107)
CO2 SERPL-SCNC: 25.9 MMOL/L (ref 22–29)
CREAT SERPL-MCNC: 1.03 MG/DL (ref 0.57–1)
EGFRCR SERPLBLD CKD-EPI 2021: 54.4 ML/MIN/1.73
GLUCOSE SERPL-MCNC: 89 MG/DL (ref 65–99)
POTASSIUM SERPL-SCNC: 5.4 MMOL/L (ref 3.5–5.2)
SODIUM SERPL-SCNC: 138 MMOL/L (ref 136–145)

## 2022-11-23 PROCEDURE — 80048 BASIC METABOLIC PNL TOTAL CA: CPT | Performed by: FAMILY MEDICINE

## 2022-11-24 ENCOUNTER — LAB REQUISITION (OUTPATIENT)
Dept: LAB | Facility: HOSPITAL | Age: 82
End: 2022-11-24

## 2022-11-24 DIAGNOSIS — N39.0 URINARY TRACT INFECTION, SITE NOT SPECIFIED: ICD-10-CM

## 2022-11-24 LAB
BACTERIA UR QL AUTO: ABNORMAL /HPF
BILIRUB UR QL STRIP: NEGATIVE
CLARITY UR: ABNORMAL
COLOR UR: YELLOW
GLUCOSE UR STRIP-MCNC: NEGATIVE MG/DL
HGB UR QL STRIP.AUTO: ABNORMAL
HYALINE CASTS UR QL AUTO: ABNORMAL /LPF
KETONES UR QL STRIP: NEGATIVE
LEUKOCYTE ESTERASE UR QL STRIP.AUTO: ABNORMAL
NITRITE UR QL STRIP: POSITIVE
PH UR STRIP.AUTO: 5.5 [PH] (ref 4.5–8)
PROT UR QL STRIP: NEGATIVE
RBC # UR STRIP: ABNORMAL /HPF
REF LAB TEST METHOD: ABNORMAL
SP GR UR STRIP: 1.02 (ref 1–1.03)
SQUAMOUS #/AREA URNS HPF: ABNORMAL /HPF
UROBILINOGEN UR QL STRIP: ABNORMAL
WBC # UR STRIP: ABNORMAL /HPF

## 2022-11-24 PROCEDURE — 81001 URINALYSIS AUTO W/SCOPE: CPT | Performed by: FAMILY MEDICINE

## 2022-11-24 PROCEDURE — 87086 URINE CULTURE/COLONY COUNT: CPT | Performed by: FAMILY MEDICINE

## 2022-11-24 PROCEDURE — 87186 SC STD MICRODIL/AGAR DIL: CPT | Performed by: FAMILY MEDICINE

## 2022-11-24 PROCEDURE — 87077 CULTURE AEROBIC IDENTIFY: CPT | Performed by: FAMILY MEDICINE

## 2022-11-27 LAB — BACTERIA SPEC AEROBE CULT: ABNORMAL

## 2022-12-08 ENCOUNTER — OFFICE VISIT (OUTPATIENT)
Dept: CARDIOLOGY | Facility: CLINIC | Age: 82
End: 2022-12-08

## 2022-12-08 VITALS
HEIGHT: 67 IN | WEIGHT: 156 LBS | DIASTOLIC BLOOD PRESSURE: 76 MMHG | BODY MASS INDEX: 24.48 KG/M2 | HEART RATE: 61 BPM | SYSTOLIC BLOOD PRESSURE: 132 MMHG

## 2022-12-08 DIAGNOSIS — I10 ESSENTIAL (PRIMARY) HYPERTENSION: Primary | ICD-10-CM

## 2022-12-08 PROCEDURE — 93000 ELECTROCARDIOGRAM COMPLETE: CPT | Performed by: INTERNAL MEDICINE

## 2022-12-08 PROCEDURE — 99213 OFFICE O/P EST LOW 20 MIN: CPT | Performed by: INTERNAL MEDICINE

## 2022-12-08 NOTE — PROGRESS NOTES
PATIENTINFORMATION    Date of Office Visit: 2022  Encounter Provider: Bob Cortez MD  Place of Service: [facility] CARDIOLOGY  Patient Name: Erica Crockett  : 1940    Subjective:     Encounter Date:2022      Patient ID: Erica Crockett is a 82 y.o. female.    Chief Complaint   Patient presents with   • Follow-up     HPI  Ms. Crockett is an 82 years old nursing home resident came to cardiac clinic for follow-up visit.  She was accompanied by nurse aides.  No significant new complaints per nursing staff.  She had chronic bilateral hip pain for which she was started on periodic hip injections and seems to have helped and she is calmer.  No ER visits or hospitalizations since last clinic visit.  Vital signs within normal range.      ROS  All systems reviewed and negative except as noted in HPI.    Past Medical History:   Diagnosis Date   • Anemia    • Anxiety    • Bursitis    • Bursitis    • DJD (degenerative joint disease), lumbar    • Hyperkalemia    • Hypoglycemia    • Intellectual disability    • Low back pain    • Osteoarthritis    • Osteopenia    • Pneumonia    • Renal disorder    • Renal insufficiency    • Scoliosis    • Seizure disorder (HCC)        History reviewed. No pertinent surgical history.    Social History     Socioeconomic History   • Marital status: Single   Tobacco Use   • Smoking status: Never   • Smokeless tobacco: Never   Vaping Use   • Vaping Use: Never used   Substance and Sexual Activity   • Alcohol use: No   • Drug use: No   • Sexual activity: Defer       Family History   Family history unknown: Yes           ECG 12 Lead    Date/Time: 2022 11:48 AM  Performed by: Bob Cortez MD  Authorized by: Bob Cortez MD   Comparison: compared with previous ECG from 2022  Similar to previous ECG  Comparison to previous ECG: Precordial q waves are new   Rhythm: sinus rhythm  Rate: normal  Conduction: conduction normal  Q  "waves: V4, V5 and V6    ST Segments: ST segments normal  T Waves: T waves normal  QRS axis: normal  Other findings: non-specific ST-T wave changes    Clinical impression: abnormal EKG               Objective:     /76 (BP Location: Left arm, Patient Position: Sitting)   Pulse 61   Ht 170.2 cm (67\")   Wt 70.8 kg (156 lb)   BMI 24.43 kg/m²  Body mass index is 24.43 kg/m².     Constitutional:       General: Not in acute distress.     Appearance: Well-developed. Not diaphoretic.   Eyes:      Pupils: Pupils are equal, round, and reactive to light.   HENT:      Head: Normocephalic and atraumatic.   Neck:      Thyroid: No thyromegaly.   Pulmonary:      Effort: Pulmonary effort is normal. No respiratory distress.      Breath sounds: Normal breath sounds. No wheezing. No rales.   Chest:      Chest wall: Not tender to palpatation.   Cardiovascular:      Normal rate. Regular rhythm.      No gallop.   Pulses:     Intact distal pulses.   Edema:     Peripheral edema absent.   Abdominal:      General: Bowel sounds are normal. There is no distension.      Palpations: Abdomen is soft.      Tenderness: There is no guarding.   Musculoskeletal: Normal range of motion.         General: No deformity.      Cervical back: Normal range of motion and neck supple. Skin:     General: Skin is warm and dry.      Findings: No rash.   Neurological:      Mental Status: Alert.      Cranial Nerves: No cranial nerve deficit.      Comments: Patient is nonverbal.   Psychiatric:         Judgment: Judgment normal.         Review Of Data: I have reviewed pertinent recent labs, images and documents and pertinent findings included in HPI or assessment below.    Lipid Panel    Lipid Panel 7/20/22 10/26/22   Total Cholesterol 199 184   Triglycerides 41 46   HDL Cholesterol 101 (A) 85 (A)   VLDL Cholesterol 8 9   LDL Cholesterol  90 90   LDL/HDL Ratio 0.89 1.06   (A) Abnormal value                Assessment/Plan:         1.  History of hypoxemic " respiratory failure: Currently off supplemental oxygen.  2.  Dementia/nonverbal patient  3.  Essential hypertension on amlodipine -good control  4. Chronic pain from hip joint problems-follows up with pain medicine-better pain control  5.  Seizure disorder  6. EKG -normal sinus rhythm with precordial Q waves-relatively new.  No clinical changes  7. CKD stage 3 -improved creatinine per most recent BMP  8.  Hypercholesterolemia on statin  9.  Mild aortic valve stenosis on echocardiogram done in February 2022.  10.  Dementia/nonverbal    Ms. Crockett is hemodynamically stable with normal blood pressure.  She has been doing well off Lasix.  Continue current  Return to cardiology clinic in 1 year or sooner with any concerning symptoms  Diagnosis and plan of care discussed with patient and verbalized understanding.            Your medication list          Accurate as of December 8, 2022 12:27 PM. If you have any questions, ask your nurse or doctor.            CHANGE how you take these medications      Instructions Last Dose Given Next Dose Due   levETIRAcetam 1000 MG tablet  Commonly known as: KEPPRA  What changed: additional instructions      Take 1 tablet by mouth 2 (Two) Times a Day for 30 days.       levETIRAcetam 500 MG tablet  Commonly known as: KEPPRA  What changed: additional instructions      Take 1 tablet by mouth 2 (Two) Times a Day for 30 days.          CONTINUE taking these medications      Instructions Last Dose Given Next Dose Due   acetaminophen 325 MG tablet  Commonly known as: TYLENOL      Take 650 mg by mouth Every 6 (Six) Hours As Needed.       amLODIPine 5 MG tablet  Commonly known as: NORVASC      Take 1 tablet by mouth Daily.       atorvastatin 20 MG tablet  Commonly known as: LIPITOR      20 mg Daily.       Benefiber Drink Mix pack      Take 1 packet by mouth Daily.       bisacodyl 10 MG suppository  Commonly known as: DULCOLAX      As Needed. Dulcolax 10 MG Rectal Suppository; Patient Sig: Dulcolax  10 MG Rectal Suppository ; 0; 04-Feb-2014; Active       cetirizine 10 MG tablet  Commonly known as: zyrTEC      Take 10 mg by mouth Every Night.       cholecalciferol 25 MCG (1000 UT) tablet  Commonly known as: VITAMIN D3      Take 1 tablet by mouth Daily.       dextromethorphan-guaifenesin  MG/5ML syrup  Commonly known as: ROBITUSSIN-DM      Take 5 mL by mouth Every 4 (Four) Hours As Needed.       diclofenac 1 % gel gel  Commonly known as: VOLTAREN      Apply 4 g topically 4 (Four) Times a Day As Needed.       diphenhydrAMINE 25 mg capsule  Commonly known as: BENADRYL      Take 25 mg by mouth every 6 (six) hours as needed for itching.       estradiol 0.1 MG/GM vaginal cream  Commonly known as: ESTRACE      Insert  into the vagina. Apply 1/2 inch ribbon to urethra twice a week, on Wed and Sat at bedtime       furosemide 40 MG tablet  Commonly known as: LASIX           gabapentin 300 MG capsule  Commonly known as: NEURONTIN      1 capsule Every Night.       HYDROcodone-acetaminophen 7.5-325 MG per tablet  Commonly known as: NORCO      Take 1 tablet by mouth Every 8 (Eight) Hours As Needed for Moderate Pain.       ipratropium-albuterol 0.5-2.5 mg/3 ml nebulizer  Commonly known as: DUO-NEB      Take 3 mL by nebulization 4 (Four) Times a Day As Needed for Wheezing or Shortness of Air.       lamoTRIgine 200 MG tablet  Commonly known as: LaMICtal      Take 200 mg by mouth 2 (Two) Times a Day.       levoFLOXacin 250 MG tablet  Commonly known as: LEVAQUIN           LORazepam 0.5 MG tablet  Commonly known as: ATIVAN      Take 0.5 tablets by mouth 3 (Three) Times a Day. For anxiety       lubiprostone 8 MCG capsule  Commonly known as: AMITIZA      Take 8 mcg by mouth 2 (two) times a day with meals.       magnesium hydroxide 400 MG/5ML suspension  Commonly known as: MILK OF MAGNESIA      Take 30 mL by mouth 2 (Two) Times a Day As Needed for Constipation.       Susan's magic butt cream      Apply 1 application topically to  the appropriate area as directed As Needed.       multivitamin tablet tablet  Commonly known as: THERAGRAN      Take  by mouth.       nystatin-triamcinolone 022147-4.1 UNIT/GM-% ointment  Commonly known as: MYCOLOG           omeprazole 20 MG capsule  Commonly known as: priLOSEC      Take 40 mg by mouth Daily.       polyethylene glycol 17 GM/SCOOP powder  Commonly known as: MIRALAX      Take 17 g by mouth 2 (Two) Times a Day.       polyvinyl alcohol 1.4 % ophthalmic solution  Commonly known as: LIQUIFILM      Administer 1 drop to both eyes 4 (Four) Times a Day.       PROBIOTIC PO      Take 2 Units by mouth Every Night.       Prolia 60 MG/ML solution prefilled syringe syringe  Generic drug: denosumab           promethazine 25 MG tablet  Commonly known as: PHENERGAN      Take 25 mg by mouth Every 6 (Six) Hours As Needed for Nausea or Vomiting (PO/IM/PA PRN nausea, vomiting, x72 hrs, started med 12/10/19). Per [facility] med record       Vitamin C ER 1000 MG tablet controlled-release      Take 1,000 mg by mouth Daily.       Zinc Sulfate 220 (50 Zn) MG tablet      Take 1 tablet by mouth Daily.                  Bob Cortez MD  12/08/22  12:27 EST

## 2022-12-14 ENCOUNTER — LAB REQUISITION (OUTPATIENT)
Dept: LAB | Facility: HOSPITAL | Age: 82
End: 2022-12-14

## 2022-12-14 DIAGNOSIS — N18.30 CHRONIC KIDNEY DISEASE, STAGE 3 UNSPECIFIED: ICD-10-CM

## 2022-12-14 LAB
ANION GAP SERPL CALCULATED.3IONS-SCNC: 7.7 MMOL/L (ref 5–15)
BUN SERPL-MCNC: 43 MG/DL (ref 8–23)
BUN/CREAT SERPL: 41.7 (ref 7–25)
CALCIUM SPEC-SCNC: 9.7 MG/DL (ref 8.6–10.5)
CHLORIDE SERPL-SCNC: 105 MMOL/L (ref 98–107)
CO2 SERPL-SCNC: 25.3 MMOL/L (ref 22–29)
CREAT SERPL-MCNC: 1.03 MG/DL (ref 0.57–1)
EGFRCR SERPLBLD CKD-EPI 2021: 54.4 ML/MIN/1.73
GLUCOSE SERPL-MCNC: 76 MG/DL (ref 65–99)
POTASSIUM SERPL-SCNC: 5.5 MMOL/L (ref 3.5–5.2)
SODIUM SERPL-SCNC: 138 MMOL/L (ref 136–145)

## 2022-12-14 PROCEDURE — 80048 BASIC METABOLIC PNL TOTAL CA: CPT | Performed by: FAMILY MEDICINE

## 2022-12-23 ENCOUNTER — APPOINTMENT (OUTPATIENT)
Dept: GENERAL RADIOLOGY | Facility: HOSPITAL | Age: 82
DRG: 178 | End: 2022-12-23
Payer: MEDICARE

## 2022-12-23 ENCOUNTER — HOSPITAL ENCOUNTER (EMERGENCY)
Facility: HOSPITAL | Age: 82
Discharge: ANOTHER HOSPITAL | DRG: 178 | End: 2022-12-23
Attending: EMERGENCY MEDICINE | Admitting: EMERGENCY MEDICINE
Payer: MEDICARE

## 2022-12-23 ENCOUNTER — APPOINTMENT (OUTPATIENT)
Dept: MRI IMAGING | Facility: HOSPITAL | Age: 82
DRG: 178 | End: 2022-12-23
Payer: MEDICARE

## 2022-12-23 ENCOUNTER — APPOINTMENT (OUTPATIENT)
Dept: CT IMAGING | Facility: HOSPITAL | Age: 82
DRG: 178 | End: 2022-12-23
Payer: MEDICARE

## 2022-12-23 ENCOUNTER — HOSPITAL ENCOUNTER (INPATIENT)
Facility: HOSPITAL | Age: 82
LOS: 2 days | Discharge: NURSING, REHAB OR LONG-TERM CARE | DRG: 178 | End: 2022-12-26
Attending: INTERNAL MEDICINE | Admitting: HOSPITALIST
Payer: MEDICARE

## 2022-12-23 VITALS
HEART RATE: 73 BPM | SYSTOLIC BLOOD PRESSURE: 148 MMHG | BODY MASS INDEX: 25.9 KG/M2 | TEMPERATURE: 98.3 F | RESPIRATION RATE: 14 BRPM | DIASTOLIC BLOOD PRESSURE: 79 MMHG | HEIGHT: 67 IN | OXYGEN SATURATION: 93 % | WEIGHT: 165 LBS

## 2022-12-23 DIAGNOSIS — F41.9 ANXIETY: ICD-10-CM

## 2022-12-23 DIAGNOSIS — R29.810 FACIAL DROOP: ICD-10-CM

## 2022-12-23 DIAGNOSIS — G40.909 NON-REFRACTORY EPILEPSY: ICD-10-CM

## 2022-12-23 DIAGNOSIS — R41.82 ALTERED MENTAL STATUS, UNSPECIFIED ALTERED MENTAL STATUS TYPE: Primary | ICD-10-CM

## 2022-12-23 PROBLEM — G45.9 TIA (TRANSIENT ISCHEMIC ATTACK): Status: ACTIVE | Noted: 2022-12-23

## 2022-12-23 LAB
ALBUMIN SERPL-MCNC: 3.7 G/DL (ref 3.5–5.2)
ALBUMIN/GLOB SERPL: 1.1 G/DL
ALP SERPL-CCNC: 128 U/L (ref 39–117)
ALT SERPL W P-5'-P-CCNC: 34 U/L (ref 1–33)
ANION GAP SERPL CALCULATED.3IONS-SCNC: 9.5 MMOL/L (ref 5–15)
APTT PPP: 47.9 SECONDS (ref 24.3–38.1)
AST SERPL-CCNC: 33 U/L (ref 1–32)
BACTERIA UR QL AUTO: ABNORMAL /HPF
BASOPHILS # BLD AUTO: 0.01 10*3/MM3 (ref 0–0.2)
BASOPHILS NFR BLD AUTO: 0.1 % (ref 0–1.5)
BILIRUB SERPL-MCNC: 0.2 MG/DL (ref 0–1.2)
BILIRUB UR QL STRIP: NEGATIVE
BUN SERPL-MCNC: 34 MG/DL (ref 8–23)
BUN/CREAT SERPL: 27.4 (ref 7–25)
CALCIUM SPEC-SCNC: 9.5 MG/DL (ref 8.6–10.5)
CHLORIDE SERPL-SCNC: 105 MMOL/L (ref 98–107)
CK SERPL-CCNC: 138 U/L (ref 20–180)
CLARITY UR: CLEAR
CO2 SERPL-SCNC: 25.5 MMOL/L (ref 22–29)
COLOR UR: YELLOW
CREAT SERPL-MCNC: 1.24 MG/DL (ref 0.57–1)
D-LACTATE SERPL-SCNC: 0.4 MMOL/L (ref 0.5–2)
D-LACTATE SERPL-SCNC: 0.5 MMOL/L (ref 0.5–2)
DEPRECATED RDW RBC AUTO: 49.7 FL (ref 37–54)
EGFRCR SERPLBLD CKD-EPI 2021: 43.5 ML/MIN/1.73
EOSINOPHIL # BLD AUTO: 0.07 10*3/MM3 (ref 0–0.4)
EOSINOPHIL NFR BLD AUTO: 0.9 % (ref 0.3–6.2)
ERYTHROCYTE [DISTWIDTH] IN BLOOD BY AUTOMATED COUNT: 13.7 % (ref 12.3–15.4)
GLOBULIN UR ELPH-MCNC: 3.5 GM/DL
GLUCOSE SERPL-MCNC: 95 MG/DL (ref 65–99)
GLUCOSE UR STRIP-MCNC: NEGATIVE MG/DL
HCT VFR BLD AUTO: 36.2 % (ref 34–46.6)
HGB BLD-MCNC: 11 G/DL (ref 12–15.9)
HGB UR QL STRIP.AUTO: NEGATIVE
HOLD SPECIMEN: NORMAL
HOLD SPECIMEN: NORMAL
HYALINE CASTS UR QL AUTO: ABNORMAL /LPF
IMM GRANULOCYTES # BLD AUTO: 0.01 10*3/MM3 (ref 0–0.05)
IMM GRANULOCYTES NFR BLD AUTO: 0.1 % (ref 0–0.5)
INR PPP: 0.99 (ref 0.9–1.1)
KETONES UR QL STRIP: NEGATIVE
LEUKOCYTE ESTERASE UR QL STRIP.AUTO: ABNORMAL
LIPASE SERPL-CCNC: 22 U/L (ref 13–60)
LYMPHOCYTES # BLD AUTO: 2.04 10*3/MM3 (ref 0.7–3.1)
LYMPHOCYTES NFR BLD AUTO: 26.8 % (ref 19.6–45.3)
MAGNESIUM SERPL-MCNC: 2.3 MG/DL (ref 1.6–2.4)
MCH RBC QN AUTO: 29.4 PG (ref 26.6–33)
MCHC RBC AUTO-ENTMCNC: 30.4 G/DL (ref 31.5–35.7)
MCV RBC AUTO: 96.8 FL (ref 79–97)
MONOCYTES # BLD AUTO: 0.31 10*3/MM3 (ref 0.1–0.9)
MONOCYTES NFR BLD AUTO: 4.1 % (ref 5–12)
NEUTROPHILS NFR BLD AUTO: 5.18 10*3/MM3 (ref 1.7–7)
NEUTROPHILS NFR BLD AUTO: 68 % (ref 42.7–76)
NITRITE UR QL STRIP: NEGATIVE
PH UR STRIP.AUTO: 5.5 [PH] (ref 4.5–8)
PHOSPHATE SERPL-MCNC: 3.5 MG/DL (ref 2.5–4.5)
PLATELET # BLD AUTO: 258 10*3/MM3 (ref 140–450)
PMV BLD AUTO: 11.1 FL (ref 6–12)
POTASSIUM SERPL-SCNC: 4.8 MMOL/L (ref 3.5–5.2)
PROCALCITONIN SERPL-MCNC: 0.08 NG/ML (ref 0–0.25)
PROT SERPL-MCNC: 7.2 G/DL (ref 6–8.5)
PROT UR QL STRIP: NEGATIVE
PROTHROMBIN TIME: 13.2 SECONDS (ref 12.1–15)
QT INTERVAL: 430 MS
RBC # BLD AUTO: 3.74 10*6/MM3 (ref 3.77–5.28)
RBC # UR STRIP: ABNORMAL /HPF
REF LAB TEST METHOD: ABNORMAL
SODIUM SERPL-SCNC: 140 MMOL/L (ref 136–145)
SP GR UR STRIP: 1.01 (ref 1–1.03)
SQUAMOUS #/AREA URNS HPF: ABNORMAL /HPF
TROPONIN T SERPL-MCNC: <0.01 NG/ML (ref 0–0.03)
TSH SERPL DL<=0.05 MIU/L-ACNC: 1.9 UIU/ML (ref 0.27–4.2)
UROBILINOGEN UR QL STRIP: ABNORMAL
WBC # UR STRIP: ABNORMAL /HPF
WBC NRBC COR # BLD: 7.62 10*3/MM3 (ref 3.4–10.8)
WHOLE BLOOD HOLD COAG: NORMAL
WHOLE BLOOD HOLD SPECIMEN: NORMAL

## 2022-12-23 PROCEDURE — 70450 CT HEAD/BRAIN W/O DYE: CPT

## 2022-12-23 PROCEDURE — 25010000002 CEFTRIAXONE PER 250 MG: Performed by: INTERNAL MEDICINE

## 2022-12-23 PROCEDURE — 36415 COLL VENOUS BLD VENIPUNCTURE: CPT

## 2022-12-23 PROCEDURE — 83605 ASSAY OF LACTIC ACID: CPT | Performed by: EMERGENCY MEDICINE

## 2022-12-23 PROCEDURE — 71045 X-RAY EXAM CHEST 1 VIEW: CPT

## 2022-12-23 PROCEDURE — 85025 COMPLETE CBC W/AUTO DIFF WBC: CPT | Performed by: EMERGENCY MEDICINE

## 2022-12-23 PROCEDURE — 93010 ELECTROCARDIOGRAM REPORT: CPT | Performed by: INTERNAL MEDICINE

## 2022-12-23 PROCEDURE — 70551 MRI BRAIN STEM W/O DYE: CPT

## 2022-12-23 PROCEDURE — 82962 GLUCOSE BLOOD TEST: CPT

## 2022-12-23 PROCEDURE — 81001 URINALYSIS AUTO W/SCOPE: CPT | Performed by: EMERGENCY MEDICINE

## 2022-12-23 PROCEDURE — 84145 PROCALCITONIN (PCT): CPT | Performed by: EMERGENCY MEDICINE

## 2022-12-23 PROCEDURE — 83690 ASSAY OF LIPASE: CPT | Performed by: EMERGENCY MEDICINE

## 2022-12-23 PROCEDURE — 25010000002 LEVETRIRACETAM PER 10 MG: Performed by: NURSE PRACTITIONER

## 2022-12-23 PROCEDURE — G0378 HOSPITAL OBSERVATION PER HR: HCPCS

## 2022-12-23 PROCEDURE — 25010000002 LORAZEPAM PER 2 MG: Performed by: INTERNAL MEDICINE

## 2022-12-23 PROCEDURE — 87040 BLOOD CULTURE FOR BACTERIA: CPT | Performed by: INTERNAL MEDICINE

## 2022-12-23 PROCEDURE — 82550 ASSAY OF CK (CPK): CPT | Performed by: EMERGENCY MEDICINE

## 2022-12-23 PROCEDURE — 99285 EMERGENCY DEPT VISIT HI MDM: CPT

## 2022-12-23 PROCEDURE — 85730 THROMBOPLASTIN TIME PARTIAL: CPT | Performed by: EMERGENCY MEDICINE

## 2022-12-23 PROCEDURE — 85610 PROTHROMBIN TIME: CPT | Performed by: EMERGENCY MEDICINE

## 2022-12-23 PROCEDURE — 84100 ASSAY OF PHOSPHORUS: CPT | Performed by: EMERGENCY MEDICINE

## 2022-12-23 PROCEDURE — 84443 ASSAY THYROID STIM HORMONE: CPT | Performed by: EMERGENCY MEDICINE

## 2022-12-23 PROCEDURE — P9612 CATHETERIZE FOR URINE SPEC: HCPCS

## 2022-12-23 PROCEDURE — 83605 ASSAY OF LACTIC ACID: CPT | Performed by: INTERNAL MEDICINE

## 2022-12-23 PROCEDURE — 93005 ELECTROCARDIOGRAM TRACING: CPT | Performed by: EMERGENCY MEDICINE

## 2022-12-23 PROCEDURE — 96360 HYDRATION IV INFUSION INIT: CPT

## 2022-12-23 PROCEDURE — 83735 ASSAY OF MAGNESIUM: CPT | Performed by: EMERGENCY MEDICINE

## 2022-12-23 PROCEDURE — 80053 COMPREHEN METABOLIC PANEL: CPT | Performed by: EMERGENCY MEDICINE

## 2022-12-23 PROCEDURE — 87040 BLOOD CULTURE FOR BACTERIA: CPT | Performed by: EMERGENCY MEDICINE

## 2022-12-23 PROCEDURE — 84484 ASSAY OF TROPONIN QUANT: CPT | Performed by: EMERGENCY MEDICINE

## 2022-12-23 RX ORDER — DIPHENHYDRAMINE HCL 25 MG
25 CAPSULE ORAL EVERY 6 HOURS PRN
Status: DISCONTINUED | OUTPATIENT
Start: 2022-12-23 | End: 2022-12-24

## 2022-12-23 RX ORDER — AMLODIPINE BESYLATE 5 MG/1
5 TABLET ORAL DAILY
Status: DISCONTINUED | OUTPATIENT
Start: 2022-12-24 | End: 2022-12-26 | Stop reason: HOSPADM

## 2022-12-23 RX ORDER — ATORVASTATIN CALCIUM 20 MG/1
20 TABLET, FILM COATED ORAL DAILY
Status: DISCONTINUED | OUTPATIENT
Start: 2022-12-24 | End: 2022-12-26 | Stop reason: HOSPADM

## 2022-12-23 RX ORDER — LORAZEPAM 0.5 MG/1
0.25 TABLET ORAL 3 TIMES DAILY
Status: DISCONTINUED | OUTPATIENT
Start: 2022-12-23 | End: 2022-12-26 | Stop reason: HOSPADM

## 2022-12-23 RX ORDER — PANTOPRAZOLE SODIUM 40 MG/1
40 TABLET, DELAYED RELEASE ORAL EVERY MORNING
Status: DISCONTINUED | OUTPATIENT
Start: 2022-12-24 | End: 2022-12-26 | Stop reason: HOSPADM

## 2022-12-23 RX ORDER — LAMOTRIGINE 100 MG/1
200 TABLET ORAL 2 TIMES DAILY
Status: DISCONTINUED | OUTPATIENT
Start: 2022-12-23 | End: 2022-12-26 | Stop reason: HOSPADM

## 2022-12-23 RX ORDER — BISACODYL 10 MG
10 SUPPOSITORY, RECTAL RECTAL DAILY PRN
Status: DISCONTINUED | OUTPATIENT
Start: 2022-12-23 | End: 2022-12-26 | Stop reason: HOSPADM

## 2022-12-23 RX ORDER — ASPIRIN 325 MG
325 TABLET ORAL DAILY
Status: DISCONTINUED | OUTPATIENT
Start: 2022-12-23 | End: 2022-12-24

## 2022-12-23 RX ORDER — LEVETIRACETAM 500 MG/1
1000 TABLET ORAL 2 TIMES DAILY
Status: DISCONTINUED | OUTPATIENT
Start: 2022-12-23 | End: 2022-12-23

## 2022-12-23 RX ORDER — ASPIRIN 300 MG/1
300 SUPPOSITORY RECTAL DAILY
Status: DISCONTINUED | OUTPATIENT
Start: 2022-12-23 | End: 2022-12-24

## 2022-12-23 RX ORDER — L.ACID,PARA/B.BIFIDUM/S.THERM 8B CELL
1 CAPSULE ORAL DAILY
Status: DISCONTINUED | OUTPATIENT
Start: 2022-12-24 | End: 2022-12-26 | Stop reason: HOSPADM

## 2022-12-23 RX ORDER — ATORVASTATIN CALCIUM 80 MG/1
80 TABLET, FILM COATED ORAL NIGHTLY
Status: DISCONTINUED | OUTPATIENT
Start: 2022-12-23 | End: 2022-12-24

## 2022-12-23 RX ORDER — MELATONIN
1000 DAILY
Status: DISCONTINUED | OUTPATIENT
Start: 2022-12-24 | End: 2022-12-24

## 2022-12-23 RX ORDER — SODIUM CHLORIDE 9 MG/ML
75 INJECTION, SOLUTION INTRAVENOUS CONTINUOUS
Status: DISCONTINUED | OUTPATIENT
Start: 2022-12-23 | End: 2022-12-25

## 2022-12-23 RX ORDER — LEVETIRACETAM 500 MG/5ML
500 INJECTION, SOLUTION, CONCENTRATE INTRAVENOUS EVERY 12 HOURS SCHEDULED
Status: DISCONTINUED | OUTPATIENT
Start: 2022-12-23 | End: 2022-12-26 | Stop reason: HOSPADM

## 2022-12-23 RX ORDER — SODIUM CHLORIDE 0.9 % (FLUSH) 0.9 %
10 SYRINGE (ML) INJECTION AS NEEDED
Status: DISCONTINUED | OUTPATIENT
Start: 2022-12-23 | End: 2022-12-23 | Stop reason: HOSPADM

## 2022-12-23 RX ORDER — DIPHENOXYLATE HYDROCHLORIDE AND ATROPINE SULFATE 2.5; .025 MG/1; MG/1
1 TABLET ORAL DAILY
Status: DISCONTINUED | OUTPATIENT
Start: 2022-12-24 | End: 2022-12-26 | Stop reason: HOSPADM

## 2022-12-23 RX ORDER — ACETAMINOPHEN 325 MG/1
650 TABLET ORAL EVERY 4 HOURS PRN
Status: DISCONTINUED | OUTPATIENT
Start: 2022-12-23 | End: 2022-12-26 | Stop reason: HOSPADM

## 2022-12-23 RX ORDER — LORAZEPAM 2 MG/ML
1 INJECTION INTRAMUSCULAR ONCE
Status: COMPLETED | OUTPATIENT
Start: 2022-12-23 | End: 2022-12-23

## 2022-12-23 RX ORDER — LUBIPROSTONE 8 UG/1
8 CAPSULE ORAL 2 TIMES DAILY WITH MEALS
Status: DISCONTINUED | OUTPATIENT
Start: 2022-12-23 | End: 2022-12-26 | Stop reason: HOSPADM

## 2022-12-23 RX ORDER — ONDANSETRON 2 MG/ML
4 INJECTION INTRAMUSCULAR; INTRAVENOUS EVERY 6 HOURS PRN
Status: DISCONTINUED | OUTPATIENT
Start: 2022-12-23 | End: 2022-12-26 | Stop reason: HOSPADM

## 2022-12-23 RX ORDER — POLYETHYLENE GLYCOL 3350 17 G/17G
17 POWDER, FOR SOLUTION ORAL 2 TIMES DAILY
Status: DISCONTINUED | OUTPATIENT
Start: 2022-12-23 | End: 2022-12-26 | Stop reason: HOSPADM

## 2022-12-23 RX ORDER — GABAPENTIN 300 MG/1
300 CAPSULE ORAL NIGHTLY
Status: DISCONTINUED | OUTPATIENT
Start: 2022-12-23 | End: 2022-12-26 | Stop reason: HOSPADM

## 2022-12-23 RX ORDER — ACETAMINOPHEN 650 MG/1
650 SUPPOSITORY RECTAL EVERY 4 HOURS PRN
Status: DISCONTINUED | OUTPATIENT
Start: 2022-12-23 | End: 2022-12-26 | Stop reason: HOSPADM

## 2022-12-23 RX ORDER — ZINC SULFATE 50(220)MG
220 CAPSULE ORAL DAILY
Status: DISCONTINUED | OUTPATIENT
Start: 2022-12-24 | End: 2022-12-26 | Stop reason: HOSPADM

## 2022-12-23 RX ORDER — IPRATROPIUM BROMIDE AND ALBUTEROL SULFATE 2.5; .5 MG/3ML; MG/3ML
3 SOLUTION RESPIRATORY (INHALATION) 4 TIMES DAILY PRN
Status: DISCONTINUED | OUTPATIENT
Start: 2022-12-23 | End: 2022-12-26 | Stop reason: HOSPADM

## 2022-12-23 RX ORDER — ASCORBIC ACID 500 MG
1000 TABLET ORAL DAILY
Status: DISCONTINUED | OUTPATIENT
Start: 2022-12-24 | End: 2022-12-24

## 2022-12-23 RX ADMIN — GLYCERIN 1 DROP: .002; .002; .01 SOLUTION/ DROPS OPHTHALMIC at 23:16

## 2022-12-23 RX ADMIN — CEFTRIAXONE SODIUM 1 G: 1 INJECTION, POWDER, FOR SOLUTION INTRAMUSCULAR; INTRAVENOUS at 23:08

## 2022-12-23 RX ADMIN — LORAZEPAM 1 MG: 2 INJECTION INTRAMUSCULAR; INTRAVENOUS at 19:21

## 2022-12-23 RX ADMIN — ASPIRIN 300 MG: 300 SUPPOSITORY RECTAL at 17:08

## 2022-12-23 RX ADMIN — SODIUM CHLORIDE, POTASSIUM CHLORIDE, SODIUM LACTATE AND CALCIUM CHLORIDE 1000 ML: 600; 310; 30; 20 INJECTION, SOLUTION INTRAVENOUS at 10:47

## 2022-12-23 RX ADMIN — SODIUM CHLORIDE 75 ML/HR: 9 INJECTION, SOLUTION INTRAVENOUS at 17:09

## 2022-12-23 RX ADMIN — LEVETIRACETAM 500 MG: 100 INJECTION INTRAVENOUS at 23:08

## 2022-12-23 NOTE — ED NOTES
Received a call from Ashley at [facility] reporting that pt guardian, Nicola, will be coming to sit with patient.

## 2022-12-23 NOTE — ED NOTES
"Received call from Ashley at [facility] reporting that she is sending pt here for \"stroke-like symptoms (R sided facial droop), decreased appetite, AMS, and generalized weakness for a couple days but it is worse today\". Per Ashley pt baseline is non-verbal.   "

## 2022-12-23 NOTE — ED PROVIDER NOTES
Subjective   History of Present Illness  Patient presents from care facility for concern of strokelike symptoms.  Staff reports has been going on for 2 to 3 days.  Symptoms include altered mental status, generalized weakness, decreased appetite, and right facial droop.  No known trauma or injury.  No recent changes in meds or hospitalizations.  Report was taken from the facility as well as EMS.  Patient at baseline is nonverbal and unable to contribute to her history.        Review of Systems   Constitutional: Negative for fever.   Respiratory: Negative for cough.    Gastrointestinal: Negative for diarrhea and vomiting.   Skin: Negative for rash.   Neurological: Negative for syncope.   All other systems reviewed and are negative.      Past Medical History:   Diagnosis Date   • Anemia    • Anxiety    • Bursitis    • Bursitis    • DJD (degenerative joint disease), lumbar    • Hyperkalemia    • Hypoglycemia    • Intellectual disability    • Low back pain    • Osteoarthritis    • Osteopenia    • Pneumonia    • Renal disorder    • Renal insufficiency    • Scoliosis    • Seizure disorder (HCC)        Allergies   Allergen Reactions   • Bactrim [Sulfamethoxazole-Trimethoprim] Unknown - High Severity   • Bee Venom    • Iodinated Diagnostic Agents    • Nsaids        No past surgical history on file.    Family History   Family history unknown: Yes       Social History     Socioeconomic History   • Marital status: Single   Tobacco Use   • Smoking status: Never   • Smokeless tobacco: Never   Vaping Use   • Vaping Use: Never used   Substance and Sexual Activity   • Alcohol use: No   • Drug use: No   • Sexual activity: Defer           Objective   Physical Exam  Vitals and nursing note reviewed.   HENT:      Head: Normocephalic and atraumatic.      Mouth/Throat:      Comments: Dry, no swelling or erythema.  Right lower facial droop.  Eyes:      Extraocular Movements: Extraocular movements intact.      Pupils: Pupils are equal,  round, and reactive to light.   Cardiovascular:      Rate and Rhythm: Normal rate and regular rhythm.      Heart sounds: Normal heart sounds.   Pulmonary:      Effort: Pulmonary effort is normal.      Breath sounds: Normal breath sounds.   Abdominal:      General: Bowel sounds are normal.      Palpations: Abdomen is soft.   Musculoskeletal:      Cervical back: Normal range of motion and neck supple.   Skin:     General: Skin is warm and dry.      Capillary Refill: Capillary refill takes 2 to 3 seconds.   Neurological:      Mental Status: She is alert.      Comments: Patient's in bed and able to engage as I am evaluating her.  She is nonverbal and keeps making grunting noises.  Patient will not follow commands when I asked her to  my hand or to take a deep breath.  Patient continues on at her baseline since arrival when she is sitting in bed comfortably grunting.  Patient will move all of her extremities.  Patient will respond to painful stimuli and localized.   Psychiatric:         Mood and Affect: Mood normal.         Behavior: Behavior normal.         ECG 12 Lead      Date/Time: 12/23/2022 8:50 AM  Performed by: Karthikeyan German MD  Authorized by: Karthikeyan German MD   Interpreted by physician  Comparison: compared with previous ECG   Similar to previous ECG  Comments: Rate of 65, sinus rhythm, normal axis, no acute ST elevation or depression                 ED Course  ED Course as of 12/23/22 1337   Fri Dec 23, 2022   1032 Ct negative for CVA; labs generally unremarkable; will get a MRI to r/o any additional IC abn and will likely d/c if negative. [AW]   1115 MRI is down and unsure when it will be working again.   ED paged for transfer. [AW]   4602 Dr. Solorio said that pt will need to be admitted for additional service and that the pt can't go from ED to ED and then to the Observation. [AW]   1155 Hospitalist at  has been paged. [AW]      ED Course User Index  [AW] Karthikeyan German MD                                            MDM  Number of Diagnoses or Management Options  Altered mental status, unspecified altered mental status type  Facial droop  Diagnosis management comments: ddx -stroke, electrolyte abnormality, dementia, drug reaction, dehydration, organ failure    NIHSS -   1a 0  1b Unable to assess  1c Unable to assess  2 0  3 0  4 1  5a/b 2  6a/b 2  7 Unable to assess  8 0  9 Unable to assess  10 Unable to assess  11 Unable to assess    CT Head Without Contrast    Result Date: 12/23/2022  1. No acute intracranial abnormalities appreciated, but if there is clinical concern for acute CVA, follow-up imaging is recommended. Redemonstration of probable sequelae of small vessel disease. Signer Name: Beth Gates MD  Signed: 12/23/2022 10:27 AM  Workstation Name: [facility]    XR Chest 1 View    Result Date: 12/23/2022  1. Interval development of a right lower lobe infiltrate most concerning for aspiration or pneumonia. Follow-up to clearing recommended. Signer Name: Beth Gates MD  Signed: 12/23/2022 10:28 AM  Workstation Name: [facility]  [facility]    Discussed pt with dr. Flores of  and they accepted the transfer.           Amount and/or Complexity of Data Reviewed  Clinical lab tests: ordered and reviewed  Tests in the radiology section of CPT®: ordered and reviewed  Tests in the medicine section of CPT®: reviewed        Final diagnoses:   Altered mental status, unspecified altered mental status type   Facial droop       ED Disposition  ED Disposition     ED Disposition   Transfer to Another Facility     Condition   --    Comment   --             No follow-up provider specified.       Medication List      Changed    * levETIRAcetam 1000 MG tablet  Commonly known as: KEPPRA  Take 1 tablet by mouth 2 (Two) Times a Day for 30 days.  What changed: additional instructions     * levETIRAcetam 500 MG tablet  Commonly known as: KEPPRA  Take 1  tablet by mouth 2 (Two) Times a Day for 30 days.  What changed: additional instructions         * This list has 2 medication(s) that are the same as other medications prescribed for you. Read the directions carefully, and ask your doctor or other care provider to review them with you.                Karthikeyan German MD  12/23/22 2914

## 2022-12-24 ENCOUNTER — APPOINTMENT (OUTPATIENT)
Dept: CARDIOLOGY | Facility: HOSPITAL | Age: 82
DRG: 178 | End: 2022-12-24
Payer: MEDICARE

## 2022-12-24 PROBLEM — J69.0 ASPIRATION PNEUMONIA OF RIGHT LUNG: Status: ACTIVE | Noted: 2022-12-24

## 2022-12-24 PROBLEM — K08.89 PAIN, DENTAL: Status: ACTIVE | Noted: 2022-12-23

## 2022-12-24 LAB
ALBUMIN SERPL-MCNC: 3.7 G/DL (ref 3.5–5.2)
ALBUMIN/GLOB SERPL: 1.1 G/DL
ALP SERPL-CCNC: 130 U/L (ref 39–117)
ALT SERPL W P-5'-P-CCNC: 33 U/L (ref 1–33)
ANION GAP SERPL CALCULATED.3IONS-SCNC: 10 MMOL/L (ref 5–15)
AST SERPL-CCNC: 32 U/L (ref 1–32)
BH CV ECHO MEAS - ACS: 1.8 CM
BH CV ECHO MEAS - AO MAX PG: 13.4 MMHG
BH CV ECHO MEAS - AO MEAN PG: 7.8 MMHG
BH CV ECHO MEAS - AO ROOT DIAM: 2.4 CM
BH CV ECHO MEAS - AO V2 MAX: 182.9 CM/SEC
BH CV ECHO MEAS - AO V2 VTI: 35.8 CM
BH CV ECHO MEAS - AVA(I,D): 2.06 CM2
BH CV ECHO MEAS - EDV(CUBED): 71 ML
BH CV ECHO MEAS - EDV(MOD-SP2): 56 ML
BH CV ECHO MEAS - EDV(MOD-SP4): 67 ML
BH CV ECHO MEAS - EF(MOD-BP): 64.2 %
BH CV ECHO MEAS - EF(MOD-SP2): 66.1 %
BH CV ECHO MEAS - EF(MOD-SP4): 59.7 %
BH CV ECHO MEAS - ESV(CUBED): 21.8 ML
BH CV ECHO MEAS - ESV(MOD-SP2): 19 ML
BH CV ECHO MEAS - ESV(MOD-SP4): 27 ML
BH CV ECHO MEAS - FS: 32.5 %
BH CV ECHO MEAS - IVS/LVPW: 0.8 CM
BH CV ECHO MEAS - IVSD: 0.9 CM
BH CV ECHO MEAS - LA DIMENSION: 3 CM
BH CV ECHO MEAS - LAT PEAK E' VEL: 15.8 CM/SEC
BH CV ECHO MEAS - LV DIASTOLIC VOL/BSA (35-75): 36 CM2
BH CV ECHO MEAS - LV MASS(C)D: 136 GRAMS
BH CV ECHO MEAS - LV MAX PG: 3.1 MMHG
BH CV ECHO MEAS - LV MEAN PG: 1.86 MMHG
BH CV ECHO MEAS - LV SYSTOLIC VOL/BSA (12-30): 14.5 CM2
BH CV ECHO MEAS - LV V1 MAX: 88.1 CM/SEC
BH CV ECHO MEAS - LV V1 VTI: 18.4 CM
BH CV ECHO MEAS - LVIDD: 4.1 CM
BH CV ECHO MEAS - LVIDS: 2.8 CM
BH CV ECHO MEAS - LVOT AREA: 4 CM2
BH CV ECHO MEAS - LVOT DIAM: 2.26 CM
BH CV ECHO MEAS - LVPWD: 1.12 CM
BH CV ECHO MEAS - MED PEAK E' VEL: 3.5 CM/SEC
BH CV ECHO MEAS - MV DEC SLOPE: 889.4 CM/SEC2
BH CV ECHO MEAS - MV DEC TIME: 0.14 MSEC
BH CV ECHO MEAS - MV E MAX VEL: 74.7 CM/SEC
BH CV ECHO MEAS - MV MAX PG: 10.2 MMHG
BH CV ECHO MEAS - MV MEAN PG: 4.4 MMHG
BH CV ECHO MEAS - MV P1/2T: 52.7 MSEC
BH CV ECHO MEAS - MV V2 VTI: 31.8 CM
BH CV ECHO MEAS - MVA(P1/2T): 4.2 CM2
BH CV ECHO MEAS - MVA(VTI): 2.32 CM2
BH CV ECHO MEAS - PA ACC TIME: 0.12 SEC
BH CV ECHO MEAS - PA PR(ACCEL): 25.5 MMHG
BH CV ECHO MEAS - PA V2 MAX: 114 CM/SEC
BH CV ECHO MEAS - PI END-D VEL: 84.2 CM/SEC
BH CV ECHO MEAS - QP/QS: 0.79
BH CV ECHO MEAS - RV MAX PG: 3.3 MMHG
BH CV ECHO MEAS - RV V1 MAX: 91.3 CM/SEC
BH CV ECHO MEAS - RV V1 VTI: 11.2 CM
BH CV ECHO MEAS - RVOT DIAM: 2.6 CM
BH CV ECHO MEAS - SI(MOD-SP2): 19.9 ML/M2
BH CV ECHO MEAS - SI(MOD-SP4): 21.5 ML/M2
BH CV ECHO MEAS - SV(LVOT): 73.7 ML
BH CV ECHO MEAS - SV(MOD-SP2): 37 ML
BH CV ECHO MEAS - SV(MOD-SP4): 40 ML
BH CV ECHO MEAS - SV(RVOT): 58.5 ML
BH CV ECHO MEAS - TR MAX PG: 0.96 MMHG
BH CV ECHO MEAS - TR MAX VEL: 48.9 CM/SEC
BH CV ECHO MEASUREMENTS AVERAGE E/E' RATIO: 7.74
BH CV ECHO SHUNT ASSESSMENT PERFORMED (HIDDEN SCRIPTING): 1
BH CV XLRA - RV BASE: 3 CM
BH CV XLRA - RV LENGTH: 6.4 CM
BH CV XLRA - RV MID: 2.37 CM
BH CV XLRA - TDI S': 15.4 CM/SEC
BILIRUB SERPL-MCNC: 0.2 MG/DL (ref 0–1.2)
BUN SERPL-MCNC: 23 MG/DL (ref 8–23)
BUN/CREAT SERPL: 25.6 (ref 7–25)
CALCIUM SPEC-SCNC: 9.5 MG/DL (ref 8.6–10.5)
CHLORIDE SERPL-SCNC: 108 MMOL/L (ref 98–107)
CHOLEST SERPL-MCNC: 184 MG/DL (ref 0–200)
CO2 SERPL-SCNC: 23 MMOL/L (ref 22–29)
CREAT SERPL-MCNC: 0.9 MG/DL (ref 0.57–1)
DEPRECATED RDW RBC AUTO: 40 FL (ref 37–54)
EGFRCR SERPLBLD CKD-EPI 2021: 64 ML/MIN/1.73
ERYTHROCYTE [DISTWIDTH] IN BLOOD BY AUTOMATED COUNT: 12.4 % (ref 12.3–15.4)
GLOBULIN UR ELPH-MCNC: 3.5 GM/DL
GLUCOSE BLDC GLUCOMTR-MCNC: 129 MG/DL (ref 70–130)
GLUCOSE BLDC GLUCOMTR-MCNC: 66 MG/DL (ref 70–130)
GLUCOSE SERPL-MCNC: 73 MG/DL (ref 65–99)
HBA1C MFR BLD: 5.8 % (ref 4.8–5.6)
HCT VFR BLD AUTO: 32.8 % (ref 34–46.6)
HDLC SERPL-MCNC: 100 MG/DL (ref 40–60)
HGB BLD-MCNC: 11.1 G/DL (ref 12–15.9)
LDLC SERPL CALC-MCNC: 75 MG/DL (ref 0–100)
LDLC/HDLC SERPL: 0.75 {RATIO}
LEFT ATRIUM VOLUME INDEX: 19.9 ML/M2
MAXIMAL PREDICTED HEART RATE: 138 BPM
MCH RBC QN AUTO: 29.8 PG (ref 26.6–33)
MCHC RBC AUTO-ENTMCNC: 33.8 G/DL (ref 31.5–35.7)
MCV RBC AUTO: 87.9 FL (ref 79–97)
PLATELET # BLD AUTO: 244 10*3/MM3 (ref 140–450)
PMV BLD AUTO: 10.9 FL (ref 6–12)
POTASSIUM SERPL-SCNC: 4.6 MMOL/L (ref 3.5–5.2)
PROT SERPL-MCNC: 7.2 G/DL (ref 6–8.5)
RBC # BLD AUTO: 3.73 10*6/MM3 (ref 3.77–5.28)
SINUS: 2.6 CM
SODIUM SERPL-SCNC: 141 MMOL/L (ref 136–145)
STJ: 2.35 CM
STRESS TARGET HR: 117 BPM
TRIGL SERPL-MCNC: 44 MG/DL (ref 0–150)
TSH SERPL DL<=0.05 MIU/L-ACNC: 1.84 UIU/ML (ref 0.27–4.2)
VLDLC SERPL-MCNC: 9 MG/DL (ref 5–40)
WBC NRBC COR # BLD: 7.62 10*3/MM3 (ref 3.4–10.8)

## 2022-12-24 PROCEDURE — 84443 ASSAY THYROID STIM HORMONE: CPT | Performed by: INTERNAL MEDICINE

## 2022-12-24 PROCEDURE — 80061 LIPID PANEL: CPT | Performed by: INTERNAL MEDICINE

## 2022-12-24 PROCEDURE — 82962 GLUCOSE BLOOD TEST: CPT

## 2022-12-24 PROCEDURE — 93306 TTE W/DOPPLER COMPLETE: CPT

## 2022-12-24 PROCEDURE — 93306 TTE W/DOPPLER COMPLETE: CPT | Performed by: INTERNAL MEDICINE

## 2022-12-24 PROCEDURE — 25010000002 CEFTRIAXONE PER 250 MG: Performed by: INTERNAL MEDICINE

## 2022-12-24 PROCEDURE — 83036 HEMOGLOBIN GLYCOSYLATED A1C: CPT | Performed by: INTERNAL MEDICINE

## 2022-12-24 PROCEDURE — 25010000002 PERFLUTREN (DEFINITY) 8.476 MG IN SODIUM CHLORIDE (PF) 0.9 % 10 ML INJECTION: Performed by: INTERNAL MEDICINE

## 2022-12-24 PROCEDURE — 85027 COMPLETE CBC AUTOMATED: CPT | Performed by: INTERNAL MEDICINE

## 2022-12-24 PROCEDURE — 92610 EVALUATE SWALLOWING FUNCTION: CPT

## 2022-12-24 PROCEDURE — 99223 1ST HOSP IP/OBS HIGH 75: CPT | Performed by: PSYCHIATRY & NEUROLOGY

## 2022-12-24 PROCEDURE — 80053 COMPREHEN METABOLIC PANEL: CPT | Performed by: INTERNAL MEDICINE

## 2022-12-24 PROCEDURE — 25010000002 LEVETRIRACETAM PER 10 MG: Performed by: NURSE PRACTITIONER

## 2022-12-24 RX ORDER — NICOTINE POLACRILEX 4 MG
15 LOZENGE BUCCAL
Status: DISCONTINUED | OUTPATIENT
Start: 2022-12-24 | End: 2022-12-26 | Stop reason: HOSPADM

## 2022-12-24 RX ORDER — LEVETIRACETAM 500 MG/1
500 TABLET ORAL 2 TIMES DAILY
COMMUNITY
End: 2022-12-26 | Stop reason: HOSPADM

## 2022-12-24 RX ORDER — DEXTROSE MONOHYDRATE 25 G/50ML
25 INJECTION, SOLUTION INTRAVENOUS
Status: DISCONTINUED | OUTPATIENT
Start: 2022-12-24 | End: 2022-12-26 | Stop reason: HOSPADM

## 2022-12-24 RX ADMIN — LEVETIRACETAM 500 MG: 100 INJECTION INTRAVENOUS at 21:05

## 2022-12-24 RX ADMIN — PERFLUTREN 3 ML: 6.52 INJECTION, SUSPENSION INTRAVENOUS at 10:48

## 2022-12-24 RX ADMIN — LEVETIRACETAM 500 MG: 100 INJECTION INTRAVENOUS at 09:35

## 2022-12-24 RX ADMIN — GLYCERIN 1 DROP: .002; .002; .01 SOLUTION/ DROPS OPHTHALMIC at 21:12

## 2022-12-24 RX ADMIN — DEXTROSE MONOHYDRATE 25 G: 25 INJECTION, SOLUTION INTRAVENOUS at 11:54

## 2022-12-24 RX ADMIN — CEFTRIAXONE SODIUM 1 G: 1 INJECTION, POWDER, FOR SOLUTION INTRAMUSCULAR; INTRAVENOUS at 21:05

## 2022-12-24 RX ADMIN — ASPIRIN 300 MG: 300 SUPPOSITORY RECTAL at 09:35

## 2022-12-24 RX ADMIN — GLYCERIN 1 DROP: .002; .002; .01 SOLUTION/ DROPS OPHTHALMIC at 09:35

## 2022-12-24 RX ADMIN — GLYCERIN 1 DROP: .002; .002; .01 SOLUTION/ DROPS OPHTHALMIC at 04:47

## 2022-12-24 NOTE — CONSULTS
Neurology consult Note    Patient Name: Erica Crockett   MRN: [id number]  Age: 82 y.o.  Sex: female  : 1940    Primary Care Physician: Tulio Carlson MD  Referring Physician:  Karthikeyan German MD      Handedness: Unknown  Race: White    Chief Complaint/Reason for Consultation: Altered mental status    Subjective .  HPI: 82-year-old white female with known diagnosis of intellectual disability, seizure disorder, who has been admitted with not eating well and progressive weakness and questionable facial droop, unclear onset time.  Patient is unable to provide any history, no family at bedside.  History obtained per the chart review.      Review of Systems   Unable to perform ROS: Mental status change      Past Medical History:   Diagnosis Date   • Anemia    • Anxiety    • Bursitis    • Bursitis    • DJD (degenerative joint disease), lumbar    • Hyperkalemia    • Hypoglycemia    • Intellectual disability    • Low back pain    • Osteoarthritis    • Osteopenia    • Pneumonia    • Renal disorder    • Renal insufficiency    • Scoliosis    • Seizure disorder (HCC)      History reviewed. No pertinent surgical history.  Family History   Family history unknown: Yes     Social History     Socioeconomic History   • Marital status: Single   Tobacco Use   • Smoking status: Never   • Smokeless tobacco: Never   Vaping Use   • Vaping Use: Never used   Substance and Sexual Activity   • Alcohol use: No   • Drug use: No   • Sexual activity: Defer     Allergies   Allergen Reactions   • Bactrim [Sulfamethoxazole-Trimethoprim] Unknown - High Severity   • Bee Venom    • Iodinated Diagnostic Agents    • Nsaids      Prior to Admission medications    Medication Sig Start Date End Date Taking? Authorizing Provider   acetaminophen (TYLENOL) 325 MG tablet Take 650 mg by mouth Every 6 (Six) Hours As Needed. 14   Provider, MD Giorgi   amLODIPine (NORVASC) 5 MG tablet Take 1 tablet by mouth Daily. 22   Bob Cortez  MD JENNIFER   Ascorbic Acid (Vitamin C ER) 1000 MG tablet controlled-release Take 1,000 mg by mouth Daily.    Giorgi Lock MD   atorvastatin (LIPITOR) 20 MG tablet 20 mg Daily. 9/5/21   Giorgi Lock MD   bisacodyl (DULCOLAX) 10 MG suppository As Needed. Dulcolax 10 MG Rectal Suppository; Patient Sig: Dulcolax 10 MG Rectal Suppository ; 0; 04-Feb-2014; Active 2/4/14   Giorgi Lock MD   cetirizine (ZyrTEC) 10 MG tablet Take 10 mg by mouth Every Night. 8/31/15   Giorgi Lock MD   cholecalciferol (VITAMIN D3) 25 MCG (1000 UT) tablet Take 1 tablet by mouth Daily.    Giorgi Lock MD   dextromethorphan-guaifenesin (ROBITUSSIN-DM)  MG/5ML syrup Take 5 mL by mouth Every 4 (Four) Hours As Needed.    Giorgi Lock MD   diclofenac (VOLTAREN) 1 % gel gel Apply 4 g topically 4 (Four) Times a Day As Needed.    Giorgi Lock MD   diphenhydrAMINE (BENADRYL) 25 mg capsule Take 25 mg by mouth every 6 (six) hours as needed for itching.    Giorgi Lock MD   estradiol (ESTRACE) 0.1 MG/GM vaginal cream Insert  into the vagina. Apply 1/2 inch ribbon to urethra twice a week, on Wed and Sat at bedtime 9/7/21   Giorgi Lock MD   furosemide (LASIX) 40 MG tablet  8/24/22   Giorgi Lock MD   gabapentin (NEURONTIN) 300 MG capsule 1 capsule Every Night. 3/28/22   Giorgi Lock MD   HYDROcodone-acetaminophen (NORCO) 7.5-325 MG per tablet Take 1 tablet by mouth Every 8 (Eight) Hours As Needed for Moderate Pain.    Giorgi Lock MD   Hydrocortisone (Susan's magic butt cream) Apply 1 application topically to the appropriate area as directed As Needed.    Giorgi Lock MD   ipratropium-albuterol (DUO-NEB) 0.5-2.5 mg/3 ml nebulizer Take 3 mL by nebulization 4 (Four) Times a Day As Needed for Wheezing or Shortness of Air. 10/25/22   Hector Cardenas, DO   lamoTRIgine (LaMICtal) 200 MG tablet Take 200 mg by mouth 2 (Two) Times a Day. 2/4/14    Giorgi Lock MD   levETIRAcetam (KEPPRA) 1000 MG tablet Take 1 tablet by mouth 2 (Two) Times a Day for 30 days.  Patient taking differently: Take 1,000 mg by mouth 2 (Two) Times a Day. Pt takes a total of 1500 mg PO twice daily 9/14/21 12/8/22  Alex Peralta II, MD   levETIRAcetam (KEPPRA) 500 MG tablet Take 1 tablet by mouth 2 (Two) Times a Day for 30 days.  Patient taking differently: Take 500 mg by mouth 2 (Two) Times a Day. Per [facility] emar, 500mg 1tablet twice daily 2/7/22 12/8/22  Alex Peralta II, MD   levoFLOXacin (LEVAQUIN) 250 MG tablet  9/23/22   Giorgi Lock MD   LORazepam (ATIVAN) 0.5 MG tablet Take 0.5 tablets by mouth 3 (Three) Times a Day. For anxiety 2/18/22   Alex Peralta II, MD   lubiprostone (AMITIZA) 8 MCG capsule Take 8 mcg by mouth 2 (two) times a day with meals.    Giorgi Lock MD   magnesium hydroxide (MILK OF MAGNESIA) 400 MG/5ML suspension Take 30 mL by mouth 2 (Two) Times a Day As Needed for Constipation. 2/4/14   Giorgi Lock MD   Multiple Vitamin (MULTI VITAMIN DAILY PO) Take  by mouth. 3/4/14   Giorgi Lock MD   nystatin-triamcinolone (MYCOLOG) 299494-0.1 UNIT/GM-% ointment  5/12/22   Giorgi Lock MD   omeprazole (priLOSEC) 20 MG capsule Take 40 mg by mouth Daily.    Giorgi Lock MD   polyethylene glycol (MIRALAX) powder Take 17 g by mouth 2 (Two) Times a Day. 2/4/14   Giorgi Lock MD   polyvinyl alcohol (LIQUIFILM) 1.4 % ophthalmic solution Administer 1 drop to both eyes 4 (Four) Times a Day.    Giorgi Lock MD   Probiotic Product (PROBIOTIC PO) Take 2 Units by mouth Every Night.    Giorgi Lock MD   Prolia 60 MG/ML solution prefilled syringe syringe  5/10/22   Giorgi Lock MD   promethazine (PHENERGAN) 25 MG tablet Take 25 mg by mouth Every 6 (Six) Hours As Needed for Nausea or Vomiting (PO/IM/ME PRN nausea, vomiting, x72 hrs, started med 12/10/19). [facility]  12/10/19   Giorgi Lock MD   Wheat Dextrin (Benefiber Drink Mix) pack Take 1 packet by mouth Daily.    Giorgi Lock MD   Zinc Sulfate 220 (50 Zn) MG tablet Take 1 tablet by mouth Daily.    Giorgi Lock MD             Objective     Temp:  [97.4 °F (36.3 °C)-98.3 °F (36.8 °C)] 97.6 °F (36.4 °C)  Heart Rate:  [58-94] 94  Resp:  [14-18] 16  BP: (136-181)/(64-95) 181/95  Neurological Exam  Mental Status  Drowsy. Patient is nonverbal. Global aphasia present.    Cranial Nerves  No obvious facial droop is appreciated, patient is mute does not talk, pupils are equal and reactive, no obvious gaze deviation noted.  Other cranial nerves are difficult to assess.    Motor  Decreased muscle bulk throughout. No fasciculations present. Increased muscle tone.  Patient seems to be moving all 4 extremities equally, no obvious focal weakness.    Sensory  Withdraws to painful stimuli in all 4 extremities.    Reflexes  Reflexes seemed okay.    Coordination    Unable to assess.    Gait    Unable to assess.      Physical Exam  Vitals and nursing note reviewed.   Constitutional:       Appearance: She is ill-appearing and toxic-appearing.   HENT:      Head: Normocephalic and atraumatic.   Musculoskeletal:      Cervical back: Normal range of motion and neck supple.           Hospital Meds:  Scheduled- amLODIPine, 5 mg, Oral, Daily  vitamin C, 1,000 mg, Oral, Daily  aspirin, 325 mg, Oral, Daily   Or  aspirin, 300 mg, Rectal, Daily  atorvastatin, 20 mg, Oral, Daily  atorvastatin, 80 mg, Oral, Nightly  cefTRIAXone, 1 g, Intravenous, Q24H  cholecalciferol, 1,000 Units, Oral, Daily  gabapentin, 300 mg, Oral, Nightly  Glycerin-Hypromellose-, 1 drop, Both Eyes, Q6H  lactobacillus acidophilus, 1 capsule, Oral, Daily  lamoTRIgine, 200 mg, Oral, BID  levETIRAcetam, 500 mg, Intravenous, Q12H  LORazepam, 0.25 mg, Oral, TID  lubiprostone, 8 mcg, Oral, BID With Meals  multivitamin, 1 tablet, Oral, Daily  pantoprazole, 40  mg, Oral, QAM  polyethylene glycol, 17 g, Oral, BID  zinc sulfate, 220 mg, Oral, Daily      Infusions- sodium chloride, 75 mL/hr, Last Rate: Stopped (12/23/22 1921)       PRNs- •  acetaminophen **OR** acetaminophen  •  bisacodyl  •  diphenhydrAMINE  •  ipratropium-albuterol  •  ondansetron      Results Reviewed:  I have personally reviewed current lab, radiology, and data  CT head shows no acute changes, atrophy of the brain  MRI brain shows no acute findings, mild white matter disease, no hemorrhage      Results for orders placed during the hospital encounter of 02/10/22    Adult Transthoracic Echo Complete W/ Cont if Necessary Per Protocol    Interpretation Summary  · Left ventricular ejection fraction appears to be 61 - 65%. Normal left ventricular cavity size and wall thickness noted. All left ventricular wall segments contract normally. Left ventricular diastolic function was normal.  · The aortic valve exhibits sclerosis. No significant aortic valve regurgitation is present. Mild aortic valve stenosis is present.            Assessment/Plan:      1. Altered mental status, not specified.  Her intracranial imaging overall it looks okay, she does have history of seizure disorder, but unsure if he had any new seizures.  We will try to contact family to get more information on what.  In the meantime, patient can be continued on her home dose of Keppra and Lamictal.  Start her on aspirin 81 mg daily as well.  We will consider getting CT angiogram of head and neck once I talk to the family.  2. Increase activity if possible.    Will talk to the hospitalist.  Thank you for the consult.    Update-  Spoke to patient's nephew who is her POA, who describes that patient has a mental status of 6-month-old, and has been in a facility all her life, and is well cared for.  She recently had dental appointment, and nephew thinks that patient has some dental pain, but he never noted any facial droop.  After discussing with the  nephew, and looking at her imaging, patient likely does not have any new vascular event, and her home medications can be continued.  She needs continued dental evaluation.  We will talk to the primary team and let them know.  Thank you for the consult.      Jordi Grayson MD  December 24, 2022  11:19 EST

## 2022-12-24 NOTE — PLAN OF CARE
Goal Outcome Evaluation:      BSE attempted with patient unable to initiate swallow.  Remain NPO, alternate means of nutrition/medication administration.  Pt would benefit from VFSS should current status improve for PO possibility.  RNChantelle, notified.  ST to follow.

## 2022-12-24 NOTE — PLAN OF CARE
Goal Outcome Evaluation:     Patient received as direct admit from [facility] at 1512. Patient nonverbal, not following commands, pt's nephew/legal guardian reports pt with intellectual deficits at baseline. Pt's nephew not aware of PTA medications, this nurse called [facility]  [phone] ext [phone], nurse and pt's nephew both tried to contact staff at [facility] and were only able to leave message.

## 2022-12-24 NOTE — H&P
HISTORY AND PHYSICAL   [facility]        Date of Admission: 2022  Patient Identification:  Name: Erica Crockett  Age: 82 y.o.  Sex: female  :  1940  MRN: [id number]                     Primary Care Physician: Tulio Carlson MD    Chief Complaint:  82 year old female was sent to the ED in [place] from [facility]; she was not eating and has had a facial droop; she has also had generalized weakness; the patient is not able to give any history due to intellectual disability; ct head was done and negative but mri was requested to rule out acute cva and was not available in [place] so the patient was transferred; history was obtained from her nephew carmela who is poa as well as the ED physican; she recently saw a dentist and has a tooth which needs to be removed    History of Present Illness:   As above    Past Medical History:  Past Medical History:   Diagnosis Date   • Anemia    • Anxiety    • Bursitis    • Bursitis    • DJD (degenerative joint disease), lumbar    • Hyperkalemia    • Hypoglycemia    • Intellectual disability    • Low back pain    • Osteoarthritis    • Osteopenia    • Pneumonia    • Renal disorder    • Renal insufficiency    • Scoliosis    • Seizure disorder (HCC)      Past Surgical History:  No past surgical history on file.   Home Meds:  Medications Prior to Admission   Medication Sig Dispense Refill Last Dose   • acetaminophen (TYLENOL) 325 MG tablet Take 650 mg by mouth Every 6 (Six) Hours As Needed.      • amLODIPine (NORVASC) 5 MG tablet Take 1 tablet by mouth Daily. 90 tablet 3    • Ascorbic Acid (Vitamin C ER) 1000 MG tablet controlled-release Take 1,000 mg by mouth Daily.      • atorvastatin (LIPITOR) 20 MG tablet 20 mg Daily.      • bisacodyl (DULCOLAX) 10 MG suppository As Needed. Dulcolax 10 MG Rectal Suppository; Patient Sig: Dulcolax 10 MG Rectal Suppository ; 0; -2014; Active      • cetirizine (ZyrTEC) 10 MG tablet Take 10 mg by mouth Every  Night.      • cholecalciferol (VITAMIN D3) 25 MCG (1000 UT) tablet Take 1 tablet by mouth Daily.      • dextromethorphan-guaifenesin (ROBITUSSIN-DM)  MG/5ML syrup Take 5 mL by mouth Every 4 (Four) Hours As Needed.      • diclofenac (VOLTAREN) 1 % gel gel Apply 4 g topically 4 (Four) Times a Day As Needed.      • diphenhydrAMINE (BENADRYL) 25 mg capsule Take 25 mg by mouth every 6 (six) hours as needed for itching.      • estradiol (ESTRACE) 0.1 MG/GM vaginal cream Insert  into the vagina. Apply 1/2 inch ribbon to urethra twice a week, on Wed and Sat at bedtime      • furosemide (LASIX) 40 MG tablet       • gabapentin (NEURONTIN) 300 MG capsule 1 capsule Every Night.      • HYDROcodone-acetaminophen (NORCO) 7.5-325 MG per tablet Take 1 tablet by mouth Every 8 (Eight) Hours As Needed for Moderate Pain.      • Hydrocortisone (Susan's magic butt cream) Apply 1 application topically to the appropriate area as directed As Needed.      • ipratropium-albuterol (DUO-NEB) 0.5-2.5 mg/3 ml nebulizer Take 3 mL by nebulization 4 (Four) Times a Day As Needed for Wheezing or Shortness of Air.      • lamoTRIgine (LaMICtal) 200 MG tablet Take 200 mg by mouth 2 (Two) Times a Day.      • levETIRAcetam (KEPPRA) 1000 MG tablet Take 1 tablet by mouth 2 (Two) Times a Day for 30 days. (Patient taking differently: Take 1,000 mg by mouth 2 (Two) Times a Day. Pt takes a total of 1500 mg PO twice daily) 60 tablet 11    • levETIRAcetam (KEPPRA) 500 MG tablet Take 1 tablet by mouth 2 (Two) Times a Day for 30 days. (Patient taking differently: Take 500 mg by mouth 2 (Two) Times a Day. Per CLL emar, 500mg 1tablet twice daily) 60 tablet 6    • levoFLOXacin (LEVAQUIN) 250 MG tablet       • LORazepam (ATIVAN) 0.5 MG tablet Take 0.5 tablets by mouth 3 (Three) Times a Day. For anxiety 45 tablet 2    • lubiprostone (AMITIZA) 8 MCG capsule Take 8 mcg by mouth 2 (two) times a day with meals.      • magnesium hydroxide (MILK OF MAGNESIA) 400 MG/5ML  suspension Take 30 mL by mouth 2 (Two) Times a Day As Needed for Constipation.      • Multiple Vitamin (MULTI VITAMIN DAILY PO) Take  by mouth.      • nystatin-triamcinolone (MYCOLOG) 623400-4.1 UNIT/GM-% ointment       • omeprazole (priLOSEC) 20 MG capsule Take 40 mg by mouth Daily.      • polyethylene glycol (MIRALAX) powder Take 17 g by mouth 2 (Two) Times a Day.      • polyvinyl alcohol (LIQUIFILM) 1.4 % ophthalmic solution Administer 1 drop to both eyes 4 (Four) Times a Day.      • Probiotic Product (PROBIOTIC PO) Take 2 Units by mouth Every Night.      • Prolia 60 MG/ML solution prefilled syringe syringe       • promethazine (PHENERGAN) 25 MG tablet Take 25 mg by mouth Every 6 (Six) Hours As Needed for Nausea or Vomiting (PO/IM/CA PRN nausea, vomiting, x72 hrs, started med 12/10/19). Per [facility] med record      • Wheat Dextrin (Benefiber Drink Mix) pack Take 1 packet by mouth Daily.      • Zinc Sulfate 220 (50 Zn) MG tablet Take 1 tablet by mouth Daily.          Allergies:  Allergies   Allergen Reactions   • Bactrim [Sulfamethoxazole-Trimethoprim] Unknown - High Severity   • Bee Venom    • Iodinated Diagnostic Agents    • Nsaids      Immunizations:  Immunization History   Administered Date(s) Administered   • COVID-19 (PFIZER) PURPLE CAP 2021, 2021   • Td 2008     Social History:   Social History     Social History Narrative   • Not on file     Social History     Socioeconomic History   • Marital status: Single   Tobacco Use   • Smoking status: Never   • Smokeless tobacco: Never   Vaping Use   • Vaping Use: Never used   Substance and Sexual Activity   • Alcohol use: No   • Drug use: No   • Sexual activity: Defer       Family History:  Family History   Family history unknown: Yes        Review of Systems  Not obtainable from the patient  Objective:  T Max 24 hrs: Temp (24hrs), Av.1 °F (36.7 °C), Min:97.9 °F (36.6 °C), Max:98.3 °F (36.8 °C)    Vitals Ranges:   Temp:  [97.9 °F (36.6  °C)-98.3 °F (36.8 °C)] 98.2 °F (36.8 °C)  Heart Rate:  [58-78] 78  Resp:  [14-18] 18  BP: ()/(54-93) 138/76      Exam:  /76 (BP Location: Left arm, Patient Position: Lying)   Pulse 78   Temp 98.2 °F (36.8 °C) (Axillary)   Resp 18   SpO2 96%     General Appearance:    Alert, cooperative, no distress, appears stated age; nonverbal, chronically ill appearing   Head:    Normocephalic, without obvious abnormality, atraumatic   Eyes:    PERRL, conjunctivae/corneas clear, EOM's intact, both eyes   Ears:    Normal external ear canals, both ears   Nose:   Nares normal, septum midline, mucosa normal, no drainage    or sinus tenderness   Throat:   Lips, mucosa, and tongue normal   Neck:   Supple, symmetrical, trachea midline, no adenopathy;     thyroid:  no enlargement/tenderness/nodules; no carotid    bruit or JVD   Back:     Symmetric, no curvature, ROM normal, no CVA tenderness   Lungs:     Decreased breath sounds bilaterally, respirations unlabored   Chest Wall:    No tenderness or deformity    Heart:    Regular rate and rhythm, S1 and S2 normal, no murmur, rub   or gallop   Abdomen:     Soft, nontender, bowel sounds active all four quadrants,     no masses, no hepatomegaly, no splenomegaly   Extremities:   Extremities normal, atraumatic, no cyanosis or edema   Pulses:   2+ and symmetric all extremities   Skin:   Skin color, texture, turgor normal, no rashes or lesions               .    Data Review:  Labs in chart were reviewed.  WBC   Date Value Ref Range Status   12/23/2022 7.62 3.40 - 10.80 10*3/mm3 Final     Hemoglobin   Date Value Ref Range Status   12/23/2022 11.0 (L) 12.0 - 15.9 g/dL Final     Hematocrit   Date Value Ref Range Status   12/23/2022 36.2 34.0 - 46.6 % Final     Platelets   Date Value Ref Range Status   12/23/2022 258 140 - 450 10*3/mm3 Final     Sodium   Date Value Ref Range Status   12/23/2022 140 136 - 145 mmol/L Final     Potassium   Date Value Ref Range Status   12/23/2022 4.8 3.5  - 5.2 mmol/L Final     Chloride   Date Value Ref Range Status   12/23/2022 105 98 - 107 mmol/L Final     CO2   Date Value Ref Range Status   12/23/2022 25.5 22.0 - 29.0 mmol/L Final     BUN   Date Value Ref Range Status   12/23/2022 34 (H) 8 - 23 mg/dL Final     Creatinine   Date Value Ref Range Status   12/23/2022 1.24 (H) 0.57 - 1.00 mg/dL Final     Glucose   Date Value Ref Range Status   12/23/2022 95 65 - 99 mg/dL Final     Calcium   Date Value Ref Range Status   12/23/2022 9.5 8.6 - 10.5 mg/dL Final     Magnesium   Date Value Ref Range Status   12/23/2022 2.3 1.6 - 2.4 mg/dL Final     Phosphorus   Date Value Ref Range Status   12/23/2022 3.5 2.5 - 4.5 mg/dL Final     AST (SGOT)   Date Value Ref Range Status   12/23/2022 33 (H) 1 - 32 U/L Final     ALT (SGPT)   Date Value Ref Range Status   12/23/2022 34 (H) 1 - 33 U/L Final     Alkaline Phosphatase   Date Value Ref Range Status   12/23/2022 128 (H) 39 - 117 U/L Final       Results from last 7 days   Lab Units 12/23/22  0847   TSH uIU/mL 1.900          Imaging Results (All)     Procedure Component Value Units Date/Time    MRI Brain Without Contrast [[id number]] Resulted: 12/23/22 2003     Updated: 12/23/22 2007            Assessment:  Active Hospital Problems    Diagnosis  POA   • **TIA (transient ischemic attack) [G45.9]  Yes      Resolved Hospital Problems   No resolved problems to display.       Plan:  Stroke workup  Will add antibiotics for possible dental issues which could have played a role in her symptoms  Patient is dnr per her nephew  Monitor on telemetry  D.w ed provider, nurse and poenrike Coles MD  12/23/2022  20:15 EST

## 2022-12-24 NOTE — THERAPY EVALUATION
Acute Care - Speech Language Pathology   Swallow Initial Evaluation [facility]     Patient Name: Erica Crockett  : 1940  MRN: [id number]  Today's Date: 2022               Admit Date: 2022    Visit Dx:   No diagnosis found.  Patient Active Problem List   Diagnosis   • Degeneration of intervertebral disc of lumbar region   • Developmental mental disorder   • Osteoarthritis of hip   • Scoliosis   • Chronic pain   • Nonverbal signs of pain   • Bursitis of left hip   • Encounter for monitoring opioid maintenance therapy   • Anxiety   • Constipation   • Hypercalcemia   • Epilepsy, not refractory (HCC)   • Osteoporosis   • Impulse control disorder   • Vitamin D deficiency   • Pneumonia of both lower lobes due to infectious organism   • KEILY (acute kidney injury) (HCC)   • Sepsis-associated organ dysfunction (HCC)   • Stage 3 chronic kidney disease (HCC)   • Hypercholesterolemia   • Acute kidney failure (HCC)   • Essential (primary) hypertension   • Generalized anxiety disorder   • Hyperkalemia   • Urinary tract infection   • TIA (transient ischemic attack)     Past Medical History:   Diagnosis Date   • Anemia    • Anxiety    • Bursitis    • Bursitis    • DJD (degenerative joint disease), lumbar    • Hyperkalemia    • Hypoglycemia    • Intellectual disability    • Low back pain    • Osteoarthritis    • Osteopenia    • Pneumonia    • Renal disorder    • Renal insufficiency    • Scoliosis    • Seizure disorder (HCC)      History reviewed. No pertinent surgical history.    SLP Recommendation and Plan  SLP Swallowing Diagnosis: profound, suspected pharyngeal dysphagia (22 104)  SLP Diet Recommendation: NPO (22)  Recommended Precautions and Strategies: general aspiration precautions (22 104)  SLP Rec. for Method of Medication Administration: meds via alternate route (22)     Monitor for Signs of Aspiration: yes, notify SLP if any concerns (22 104)  Recommended  Diagnostics: reassess via clinical swallow evaluation, reassess via VFSS ([facility]) (12/24/22 1045)  Swallow Criteria for Skilled Therapeutic Interventions Met: demonstrates skilled criteria (12/24/22 1045)     Rehab Potential/Prognosis, Swallowing: re-evaluate goals as necessary (12/24/22 1045)  Therapy Frequency (Swallow): PRN (12/24/22 1045)  Predicted Duration Therapy Intervention (Days): until discharge (12/24/22 1045)                                               SWALLOW EVALUATION (last 72 hours)     SLP Adult Swallow Evaluation     Row Name 12/24/22 1045                   Rehab Evaluation    Document Type evaluation  -CB        Subjective Information no complaints  -CB        Patient Observations alert;poorly cooperative;unable to respond  -CB        Patient Effort poor  -CB           General Information    Patient Profile Reviewed yes  -CB        Pertinent History Of Current Problem 82 year old female admitted for stroke workup.  MRI unable to determine due to motion artifact.  Pt currently NPO, previous VFSS 8/2022 indicated need for mech/NTL, however, family opted for thin per chart.  -CB        Current Method of Nutrition NPO  -CB        Precautions/Limitations, Vision vision impairment, bilaterally  -CB        Precautions/Limitations, Hearing hearing impairment, bilaterally  -CB        Prior Level of Function-Communication expressive language impairment;cognitive-linguistic impairment  -CB        Prior Level of Function-Swallowing soft to chew;thin liquids  -CB        Plans/Goals Discussed with agreed upon  caregiver  -CB        Barriers to Rehab cognitive status;previous functional deficit  -CB           Clinical Swallow Eval    Clinical Swallow Evaluation Summary Pt presents with open oral cavity, dry, chapped lips.  Pt unable to follow commands or make eye contact.  Natural dentition noted.  Pt w/neck extension, repositioned w/use of pillows.  Single ice chip to labial and lingual musculature with  inability to close labials or make any attempt at initiating swallow.  Audible wetness noted from pharyngeal cavity.  Pt would possibly benefit from VFSS due to right lower lobe infiltrate noted per xray.  No further attempts at PO presented, due to high nature of aspiration, recommend continued NPO w/alternate means of nutrition and medication administration.  Unable to test cognitive or communicative status at this time.  ST to follow.  -CB           SLP Evaluation Clinical Impression    SLP Swallowing Diagnosis profound;suspected pharyngeal dysphagia  -CB        Functional Impact risk of aspiration/pneumonia;risk of malnutrition;risk of dehydration  -CB        Rehab Potential/Prognosis, Swallowing re-evaluate goals as necessary  -CB        Swallow Criteria for Skilled Therapeutic Interventions Met demonstrates skilled criteria  -CB           Recommendations    Therapy Frequency (Swallow) PRN  -CB        Predicted Duration Therapy Intervention (Days) until discharge  -CB        SLP Diet Recommendation NPO  -CB        Recommended Diagnostics reassess via clinical swallow evaluation;reassess via VFSS ([facility]BS)  -CB        Recommended Precautions and Strategies general aspiration precautions  -CB        Oral Care Recommendations Oral Care BID/PRN  -CB        SLP Rec. for Method of Medication Administration meds via alternate route  -CB        Monitor for Signs of Aspiration yes;notify SLP if any concerns  -CB           (LTG) Patient will demonstrate functional swallow for    Diet Texture (Demonstrate functional swallow) soft to chew (chopped) textures  -CB        Liquid viscosity (Demonstrate functional swallow) thin liquids  -CB        [place] (Demonstrate functional swallow) with moderate cues (50-74% accuracy)  -CB        Time Frame (Demonstrate functional swallow) by discharge  -CB        Progress/Outcomes (Demonstrate functional swallow) continuing progress toward goal  -CB              User Key  (r) = Recorded  By, (t) = Taken By, (c) = Cosigned By    Initials Name Effective Dates    Judti Gupta CCC-SLP 11/10/22 -                 EDUCATION  The patient has been educated in the following areas:   NPO rationale.        SLP GOALS     Row Name 12/24/22 1045             (LTG) Patient will demonstrate functional swallow for    Diet Texture (Demonstrate functional swallow) soft to chew (chopped) textures  -CB      Liquid viscosity (Demonstrate functional swallow) thin liquids  -CB      [place] (Demonstrate functional swallow) with moderate cues (50-74% accuracy)  -CB      Time Frame (Demonstrate functional swallow) by discharge  -CB      Progress/Outcomes (Demonstrate functional swallow) continuing progress toward goal  -CB            User Key  (r) = Recorded By, (t) = Taken By, (c) = Cosigned By    Initials Name Provider Type    Judit Gupta CCC-SLP Speech and Language Pathologist                   Time Calculation:    Time Calculation- SLP     Row Name 12/24/22 1110             Time Calculation- SLP    SLP Start Time 1045  -CB      SLP Received On 12/24/22  -CB         Untimed Charges    21302-EK Eval Oral Pharyng Swallow Minutes 25  -CB         Total Minutes    Untimed Charges Total Minutes 25  -CB       Total Minutes 25  -CB            User Key  (r) = Recorded By, (t) = Taken By, (c) = Cosigned By    Initials Name Provider Type    Judit Gupta CCC-SLP Speech and Language Pathologist                Therapy Charges for Today     Code Description Service Date Service Provider Modifiers Qty    [id number]  ST EVAL ORAL PHARYNG SWALLOW 2 12/24/2022 Judit Bright CCC-SLP GN 1               VI Meehan  12/24/2022

## 2022-12-24 NOTE — PROGRESS NOTES
[facility] Acute Inpt Rehab    Referral received via stroke order set. Please note this is screening only.  Rehab admissions will not actively be evaluating this pt.  If felt pt is appropriate for our services once therapies start, please call our office at [phone] to initiate full referral.    Felecia Reese RN  Acute Rehab Admission Nurse

## 2022-12-24 NOTE — SIGNIFICANT NOTE
RN declined therapy eval today, will check back on 12/26/2022.     12/24/22 6404   OTHER   Discipline physical therapist   Rehab Time/Intention   Session Not Performed other (see comments)   Recommendation   PT - Next Appointment 12/26/22

## 2022-12-24 NOTE — PROGRESS NOTES
Name: Erica Crockett ADMIT: 2022   : 1940  PCP: Tulio Carlson MD    MRN: [id number] LOS: 0 days   AGE/SEX: 82 y.o. female  ROOM: [facility]     Subjective   Subjective   Nonverbal.  Alert however and follows some simple commands.    Review of Systems     Objective   Objective   Vital Signs  Temp:  [97.4 °F (36.3 °C)-97.8 °F (36.6 °C)] 97.8 °F (36.6 °C)  Heart Rate:  [72-94] 94  Resp:  [16-18] 18  BP: (136-181)/(67-95) 169/83  SpO2:  [95 %-97 %] 95 %  on  Flow (L/min):  [2-3] 3;   Device (Oxygen Therapy): nasal cannula  Body mass index is 25.84 kg/m².  Physical Exam  Vitals and nursing note reviewed.   Constitutional:       General: She is not in acute distress.     Appearance: She is ill-appearing.   HENT:      Mouth/Throat:      Comments: Would not participate with oral examination  Neck:      Vascular: No JVD.      Trachea: No tracheal deviation.   Cardiovascular:      Rate and Rhythm: Normal rate and regular rhythm.      Heart sounds: Normal heart sounds.   Pulmonary:      Effort: Pulmonary effort is normal. No respiratory distress.      Breath sounds: Normal breath sounds.   Abdominal:      General: Bowel sounds are normal.      Palpations: Abdomen is soft.      Tenderness: There is no abdominal tenderness.   Skin:     General: Skin is warm and dry.   Neurological:      General: No focal deficit present.      Mental Status: She is alert.      Comments: No obvious facial droop   Psychiatric:         Behavior: Behavior is uncooperative.       Results Review     I reviewed the patient's new clinical results.  Results from last 7 days   Lab Units 22  0430 22  0847   WBC 10*3/mm3 7.62 7.62   HEMOGLOBIN g/dL 11.1* 11.0*   PLATELETS 10*3/mm3 244 258     Results from last 7 days   Lab Units 22  0430 22  0847   SODIUM mmol/L 141 140   POTASSIUM mmol/L 4.6 4.8   CHLORIDE mmol/L 108* 105   CO2 mmol/L 23.0 25.5   BUN mg/dL 23 34*   CREATININE mg/dL 0.90 1.24*   GLUCOSE mg/dL 73  95   EGFR mL/min/1.73 64.0 43.5*     Results from last 7 days   Lab Units 12/24/22  0430 12/23/22  0847   ALBUMIN g/dL 3.70 3.70   BILIRUBIN mg/dL 0.2 0.2   ALK PHOS U/L 130* 128*   AST (SGOT) U/L 32 33*   ALT (SGPT) U/L 33 34*     Results from last 7 days   Lab Units 12/24/22  0430 12/23/22  0847   CALCIUM mg/dL 9.5 9.5   ALBUMIN g/dL 3.70 3.70   MAGNESIUM mg/dL  --  2.3   PHOSPHORUS mg/dL  --  3.5     Results from last 7 days   Lab Units 12/23/22  2142 12/23/22  0847   PROCALCITONIN ng/mL  --  0.08   LACTATE mmol/L 0.4* 0.5     Hemoglobin A1C   Date/Time Value Ref Range Status   12/24/2022 0430 5.80 (H) 4.80 - 5.60 % Final     Glucose   Date/Time Value Ref Range Status   12/24/2022 1147 66 (L) 70 - 130 mg/dL Final     Comment:     Meter: [id number] : [id number] Alisa NORTH       CT Head Without Contrast    Result Date: 12/23/2022  1. No acute intracranial abnormalities appreciated, but if there is clinical concern for acute CVA, follow-up imaging is recommended. Redemonstration of probable sequelae of small vessel disease. Signer Name: Beth Gates MD  Signed: 12/23/2022 10:27 AM  Workstation Name: [facility]  Radiology [facility]    XR Chest 1 View    Result Date: 12/23/2022  1. Interval development of a right lower lobe infiltrate most concerning for aspiration or pneumonia. Follow-up to clearing recommended. Signer Name: Beth Gates MD  Signed: 12/23/2022 10:28 AM  Workstation Name: [facility]  [facility]iology [facility]    Scheduled Medications  amLODIPine, 5 mg, Oral, Daily  atorvastatin, 20 mg, Oral, Daily  cefTRIAXone, 1 g, Intravenous, Q24H  gabapentin, 300 mg, Oral, Nightly  Glycerin-Hypromellose-, 1 drop, Both Eyes, Q6H  lactobacillus acidophilus, 1 capsule, Oral, Daily  lamoTRIgine, 200 mg, Oral, BID  levETIRAcetam, 500 mg, Intravenous, Q12H  LORazepam, 0.25 mg, Oral, TID  lubiprostone, 8 mcg, Oral, BID With Meals  multivitamin, 1 tablet, Oral,  "Daily  pantoprazole, 40 mg, Oral, QAM  polyethylene glycol, 17 g, Oral, BID  zinc sulfate, 220 mg, Oral, Daily    Infusions  sodium chloride, 75 mL/hr, Last Rate: Stopped (12/23/22 1921)    Diet  NPO Diet NPO Type: Strict NPO       Assessment/Plan     Active Hospital Problems    Diagnosis  POA   • **Dental pain [K08.89]  Yes   • Aspiration pneumonia of right lung (HCC) [J69.0]  Yes   • Epilepsy, not refractory (HCC) [G40.909]  Yes   • Essential (primary) hypertension [I10]  Yes      Resolved Hospital Problems   No resolved problems to display.       82 y.o. female admitted with Pain, dental.    Neurology note reviewed.  They do not feel this represents TIA or stroke.  They did leave patient on full dose aspirin and high intensity statin which I will discontinue.  There is reported dental issue prior to admission which may have contributed to \"facial droop\".  Patient not cooperative enough with exam for me to assess her mouth.  Will check Panorex.  Started on Rocephin on admission which should cover aspiration pneumonia.  Calcitonin negative.  Check ESR/CRP.  Not really sure what kind of dental issue she has at present but will likely need outpatient follow-up.    Her home medications were never reconciled by admission nurse.  They were completed however by admitting MD.  Will have nurse verify medications and notify us of any significant changes.  Her Keppra has been switched to IV.  She seems at her baseline mental state possibly but speech plans swallow study.  Will continue fluids for now.  If not cleared by speech by tomorrow would probably try to give her the Lamictal.      · SCDs for DVT prophylaxis.  · DNR.  · Discussed with patient and nursing staff.  · Anticipate discharge back to her facility but need to clarify her swallowing status first.  Continue treating aspiration pneumonia.      Dawson Delgado MD  [facility]  12/24/22  16:20 EST      "

## 2022-12-24 NOTE — CONSULTS
Nutrition Services    Patient Name:  Erica Crockett  YOB: 1940  MRN: [id number]  Admit Date:  12/23/2022    Assessment Date:  12/24/22    Comment: Patient currently in cardiology. She was transferred from ED in [place]. Patient has been reported to be eating poorly, progressive weakness, and questionable facial droop. She is unable to provide information due to intellectual disability. Her nephew is her POA. Per SLP, patient has profound, suspected pharyngeal dysphagia and alternate route for nutrition was recommended. RD unsure of patient's GOC, but if agreeable to tube feeds, see below.      If Tfs are appropriate:   Start- Isosource 1.5 @ 10ml/hr + 30 ml q4 hours free water. Advance as tolerated to goal rate of 55ml/hr + 30 ml q4 hours free water. Which will provide 1980kcal, 90g protein, 1008ml fluid + 180ml free water.    CLINICAL NUTRITION ASSESSMENT      Reason for Assessment Nurse Admission Screen     Diagnosis/Problem   TIA    Medical/Surgical History Past Medical History:   Diagnosis Date   • Anemia    • Anxiety    • Bursitis    • Bursitis    • DJD (degenerative joint disease), lumbar    • Hyperkalemia    • Hypoglycemia    • Intellectual disability    • Low back pain    • Osteoarthritis    • Osteopenia    • Pneumonia    • Renal disorder    • Renal insufficiency    • Scoliosis    • Seizure disorder (HCC)        History reviewed. No pertinent surgical history.     Encounter Information        Nutrition History:  Patient currently in cardiology. She was transferred from ED in [place]. Patient has been reported to be eating poorly, progressive weakness, and questionable facial droop. She is unable to provide information due to intellectual disability. Her nephew is her POA. Per SLP, patient has profound, suspected pharyngeal dysphagia and alternate route for nutrition was recommended. RD unsure of patient's GOC.    Food Preferences:    Supplements:    Factors Affecting Intake: altered  "mental status, swallow impairment     Anthropometrics        Current Height  Current Weight  BMI kg/m2 Height: 170.2 cm (67\")  Weight: 74.8 kg (165 lb) (12/24/22 1008)  Body mass index is 25.84 kg/m².   Adjusted BMI (if applicable)        Admission Weight 165# (74.8 kg)        Ideal Body Weight (IBW) 135# (61.6 kg)    Adjusted IBW (if applicable)        Usual Body Weight (UBW)    Weight Change/Trend Gain, Amount/Timeframe: 9#, 1-2 months        Weight History Wt Readings from Last 30 Encounters:   12/24/22 1008 74.8 kg (165 lb)   12/23/22 1849 74.8 kg (165 lb)   12/23/22 0836 74.8 kg (165 lb)   12/08/22 1130 70.8 kg (156 lb)   10/23/22 0801 71.1 kg (156 lb 12.8 oz)   10/23/22 0454 73.5 kg (162 lb)   08/26/22 1212 68.5 kg (151 lb)   08/03/22 1130 75.5 kg (166 lb 8 oz)   07/18/22 1359 75.5 kg (166 lb 8 oz)   02/10/22 1816 70 kg (154 lb 5.2 oz)   02/10/22 1351 70.8 kg (156 lb)   02/10/22 1056 74.4 kg (164 lb)   02/10/22 1030 68.2 kg (150 lb 4.8 oz)   01/18/22 1216 66.2 kg (146 lb)   12/11/19 1815 76.6 kg (168 lb 14.4 oz)   12/11/19 1506 78.7 kg (173 lb 8 oz)   03/08/19 1311 75.5 kg (166 lb 8 oz)   12/11/18 1258 76.7 kg (169 lb 3.2 oz)   09/12/18 1253 75.5 kg (166 lb 8 oz)   06/12/18 0830 75.6 kg (166 lb 11.2 oz)   03/08/18 1035 76.2 kg (168 lb)   02/28/18 0914 75.9 kg (167 lb 4 oz)   12/12/17 1008 76.2 kg (168 lb 1.6 oz)   06/15/17 0922 68.5 kg (151 lb)   04/12/17 1027 68.5 kg (151 lb)   02/21/17 0853 68.5 kg (151 lb)   01/12/17 0913 68 kg (150 lb)   03/17/16 1017 70.8 kg (156 lb)   09/30/15 1518 67.9 kg (149 lb 12.8 oz)   12/31/14 1424 63.5 kg (139 lb 15.9 oz)   09/03/14 0914 16.8 kg (36 lb 15.9 oz)   03/04/14 1054 62.1 kg (136 lb 15.9 oz)   02/04/14 1119 62.1 kg (136 lb 15.9 oz)           --  Estimated/Assessed Needs       Energy Requirements    Height for Calculation  Height: 170.2 cm (67\")   Weight for Calculation 165# (74.8 kg)    Method for Estimation  25 kcal/kg, 30 kcal/kg   EST Needs (kcal/day) 8548-3292     "   Protein Requirements    Weight for Calculation 165# (74.8 kg)    EST Protein Needs (g/kg) 1.0 - 1.2 gm/kg   EST Daily Needs (g/day) 75-90       Fluid Requirements     Method for Estimation 1 mL/kcal    Estimated Needs (mL/day) 0374-7446       Fluid Deficit    Current Na Level (mEq/L)    Desired Na Level (mEq/L)    Estimated Fluid Deficit (L)           Tests/Procedures        Tests/Procedures CT scan, MRI, X-Ray     Labs       Pertinent Labs    Results from last 7 days   Lab Units 12/24/22  0430 12/23/22  0847   SODIUM mmol/L 141 140   POTASSIUM mmol/L 4.6 4.8   CHLORIDE mmol/L 108* 105   CO2 mmol/L 23.0 25.5   BUN mg/dL 23 34*   CREATININE mg/dL 0.90 1.24*   CALCIUM mg/dL 9.5 9.5   BILIRUBIN mg/dL 0.2 0.2   ALK PHOS U/L 130* 128*   ALT (SGPT) U/L 33 34*   AST (SGOT) U/L 32 33*   GLUCOSE mg/dL 73 95     Results from last 7 days   Lab Units 12/24/22  0430 12/23/22  0847   MAGNESIUM mg/dL  --  2.3   PHOSPHORUS mg/dL  --  3.5   HEMOGLOBIN g/dL 11.1* 11.0*   HEMATOCRIT % 32.8* 36.2   WBC 10*3/mm3 7.62 7.62   TRIGLYCERIDES mg/dL 44  --    ALBUMIN g/dL 3.70 3.70     Results from last 7 days   Lab Units 12/24/22  0430 12/23/22  0847   INR   --  0.99   APTT seconds  --  47.9*   PLATELETS 10*3/mm3 244 258     COVID19   Date Value Ref Range Status   10/23/2022 Not Detected Not Detected - Ref. Range Final     Lab Results   Component Value Date    HGBA1C 5.80 (H) 12/24/2022          Medications           Scheduled Medications amLODIPine, 5 mg, Oral, Daily  vitamin C, 1,000 mg, Oral, Daily  aspirin, 325 mg, Oral, Daily   Or  aspirin, 300 mg, Rectal, Daily  atorvastatin, 20 mg, Oral, Daily  atorvastatin, 80 mg, Oral, Nightly  cefTRIAXone, 1 g, Intravenous, Q24H  cholecalciferol, 1,000 Units, Oral, Daily  gabapentin, 300 mg, Oral, Nightly  Glycerin-Hypromellose-, 1 drop, Both Eyes, Q6H  lactobacillus acidophilus, 1 capsule, Oral, Daily  lamoTRIgine, 200 mg, Oral, BID  levETIRAcetam, 500 mg, Intravenous, Q12H  LORazepam,  0.25 mg, Oral, TID  lubiprostone, 8 mcg, Oral, BID With Meals  multivitamin, 1 tablet, Oral, Daily  pantoprazole, 40 mg, Oral, QAM  polyethylene glycol, 17 g, Oral, BID  zinc sulfate, 220 mg, Oral, Daily       Infusions sodium chloride, 75 mL/hr, Last Rate: Stopped (12/23/22 1921)       PRN Medications •  acetaminophen **OR** acetaminophen  •  bisacodyl  •  dextrose  •  dextrose  •  diphenhydrAMINE  •  glucagon (human recombinant)  •  ipratropium-albuterol  •  ondansetron     Physical Findings          Physical Appearance alert, disoriented, Other: nonverbal, intellectual disability    Oral/Mouth Cavity dental caries   Edema  no edema   Gastrointestinal last bowel movement: QUIN    Skin  skin intact   Tubes/Drains none   NFPE Not applicable at this time   --  Current Nutrition Orders & Evaluation of Intake       Oral Nutrition     Food Allergies NKFA   Current PO Diet NPO Diet NPO Type: Strict NPO   Supplement n/a   PO Evaluation     % PO Intake     # of Days Evaluated    --  PES STATEMENT / NUTRITION DIAGNOSIS      Nutrition Dx Problem  Problem: Swallowing Difficulty  Etiology: Medical Diagnosis TIA, altered mental status   Signs/Symptoms: NPO and SLP/Swallow Evaluation profound suspected pharyngeal dysphagia     Comment:    --  NUTRITION INTERVENTION / PLAN OF CARE      Intervention Goal(s) Nutrition support treatment, Reduce/improve symptoms, Meet estimated needs, Disease management/therapy, Initiate feeding/diet and Maintain weight         RD Intervention/Action Await initiation of EN/PN, Follow Tx Progress and Care plan reviewed         Prescription/Orders:       PO Diet       Supplements       Snacks       Enteral Nutrition If Tfs are appropriate:   Start- Isosource 1.5 @ 10ml/hr + 30 ml q4 hours free water. Advance as tolerated to goal rate of 55ml/hr. Which will provide 1980kcal, 90g protein, 1008ml fluid + 180ml free water.       Parenteral Nutrition    New Prescription Ordered? No, recommended   --       Monitor/Evaluation Per protocol   Discharge Plan/Needs Pending clinical course   Education Education not appropriate at this time   --    RD to follow per protocol.      Electronically signed by:  Mckenna River RD  12/24/22 12:20 EST

## 2022-12-25 ENCOUNTER — APPOINTMENT (OUTPATIENT)
Dept: CT IMAGING | Facility: HOSPITAL | Age: 82
DRG: 178 | End: 2022-12-25
Payer: MEDICARE

## 2022-12-25 LAB
ANION GAP SERPL CALCULATED.3IONS-SCNC: 13 MMOL/L (ref 5–15)
BUN SERPL-MCNC: 23 MG/DL (ref 8–23)
BUN/CREAT SERPL: 26.4 (ref 7–25)
CALCIUM SPEC-SCNC: 9.5 MG/DL (ref 8.6–10.5)
CHLORIDE SERPL-SCNC: 112 MMOL/L (ref 98–107)
CO2 SERPL-SCNC: 20 MMOL/L (ref 22–29)
CREAT SERPL-MCNC: 0.87 MG/DL (ref 0.57–1)
CRP SERPL-MCNC: 8.3 MG/DL (ref 0–0.5)
DEPRECATED RDW RBC AUTO: 43.2 FL (ref 37–54)
EGFRCR SERPLBLD CKD-EPI 2021: 66.6 ML/MIN/1.73
ERYTHROCYTE [DISTWIDTH] IN BLOOD BY AUTOMATED COUNT: 12.7 % (ref 12.3–15.4)
ERYTHROCYTE [SEDIMENTATION RATE] IN BLOOD: 93 MM/HR (ref 0–30)
GLUCOSE BLDC GLUCOMTR-MCNC: 89 MG/DL (ref 70–130)
GLUCOSE SERPL-MCNC: 87 MG/DL (ref 65–99)
HCT VFR BLD AUTO: 36 % (ref 34–46.6)
HGB BLD-MCNC: 11.8 G/DL (ref 12–15.9)
MCH RBC QN AUTO: 30.4 PG (ref 26.6–33)
MCHC RBC AUTO-ENTMCNC: 32.8 G/DL (ref 31.5–35.7)
MCV RBC AUTO: 92.8 FL (ref 79–97)
PLATELET # BLD AUTO: 297 10*3/MM3 (ref 140–450)
PMV BLD AUTO: 10.6 FL (ref 6–12)
POTASSIUM SERPL-SCNC: 4.1 MMOL/L (ref 3.5–5.2)
RBC # BLD AUTO: 3.88 10*6/MM3 (ref 3.77–5.28)
SODIUM SERPL-SCNC: 145 MMOL/L (ref 136–145)
WBC NRBC COR # BLD: 8.38 10*3/MM3 (ref 3.4–10.8)
WHOLE BLOOD HOLD SPECIMEN: NORMAL

## 2022-12-25 PROCEDURE — 80048 BASIC METABOLIC PNL TOTAL CA: CPT | Performed by: HOSPITALIST

## 2022-12-25 PROCEDURE — 86140 C-REACTIVE PROTEIN: CPT | Performed by: HOSPITALIST

## 2022-12-25 PROCEDURE — 85652 RBC SED RATE AUTOMATED: CPT | Performed by: HOSPITALIST

## 2022-12-25 PROCEDURE — 82962 GLUCOSE BLOOD TEST: CPT

## 2022-12-25 PROCEDURE — 25010000002 CEFTRIAXONE PER 250 MG: Performed by: INTERNAL MEDICINE

## 2022-12-25 PROCEDURE — 25010000002 LEVETRIRACETAM PER 10 MG: Performed by: NURSE PRACTITIONER

## 2022-12-25 PROCEDURE — 85027 COMPLETE CBC AUTOMATED: CPT | Performed by: HOSPITALIST

## 2022-12-25 PROCEDURE — 70486 CT MAXILLOFACIAL W/O DYE: CPT

## 2022-12-25 RX ORDER — SODIUM CHLORIDE, SODIUM LACTATE, POTASSIUM CHLORIDE, CALCIUM CHLORIDE 600; 310; 30; 20 MG/100ML; MG/100ML; MG/100ML; MG/100ML
75 INJECTION, SOLUTION INTRAVENOUS CONTINUOUS
Status: DISCONTINUED | OUTPATIENT
Start: 2022-12-25 | End: 2022-12-26 | Stop reason: HOSPADM

## 2022-12-25 RX ADMIN — LEVETIRACETAM 500 MG: 100 INJECTION INTRAVENOUS at 21:39

## 2022-12-25 RX ADMIN — GLYCERIN 1 DROP: .002; .002; .01 SOLUTION/ DROPS OPHTHALMIC at 08:23

## 2022-12-25 RX ADMIN — GLYCERIN 1 DROP: .002; .002; .01 SOLUTION/ DROPS OPHTHALMIC at 21:29

## 2022-12-25 RX ADMIN — LEVETIRACETAM 500 MG: 100 INJECTION INTRAVENOUS at 08:23

## 2022-12-25 RX ADMIN — SODIUM CHLORIDE, POTASSIUM CHLORIDE, SODIUM LACTATE AND CALCIUM CHLORIDE 75 ML/HR: 600; 310; 30; 20 INJECTION, SOLUTION INTRAVENOUS at 08:23

## 2022-12-25 RX ADMIN — CEFTRIAXONE SODIUM 1 G: 1 INJECTION, POWDER, FOR SOLUTION INTRAMUSCULAR; INTRAVENOUS at 21:28

## 2022-12-25 NOTE — PROGRESS NOTES
Name: Erica Crockett ADMIT: 2022   : 1940  PCP: Tulio Carlson MD    MRN: [id number] LOS: 1 days   AGE/SEX: 82 y.o. female  ROOM: [facility]     Subjective   Subjective   Nonverbal. Alert.    Review of Systems   Unable to perform ROS: Dementia      Objective   Objective   Vital Signs  Temp:  [97.5 °F (36.4 °C)-98.5 °F (36.9 °C)] 98.5 °F (36.9 °C)  Heart Rate:  [] 101  Resp:  [16-20] 20  BP: (146-181)/(73-98) 177/73  SpO2:  [94 %-96 %] 95 %  on  Flow (L/min):  [3] 3;   Device (Oxygen Therapy): nasal cannula  Body mass index is 25.84 kg/m².     Physical Exam  Vitals and nursing note reviewed.   Constitutional:       General: She is not in acute distress.     Appearance: She is ill-appearing.   Neck:      Vascular: No JVD.      Trachea: No tracheal deviation.   Cardiovascular:      Rate and Rhythm: Normal rate and regular rhythm.      Heart sounds: Normal heart sounds.   Pulmonary:      Effort: Pulmonary effort is normal. No respiratory distress.      Breath sounds: Normal breath sounds.   Abdominal:      General: Bowel sounds are normal.      Palpations: Abdomen is soft.      Tenderness: There is no abdominal tenderness.   Musculoskeletal:      Right lower leg: No edema.      Left lower leg: No edema.   Skin:     General: Skin is warm and dry.   Neurological:      General: No focal deficit present.      Mental Status: She is alert.      Comments: No obvious facial droop   Psychiatric:         Behavior: Behavior is uncooperative.       Results Review     I reviewed the patient's new clinical results.  Results from last 7 days   Lab Units 22  0638 22  0430 22  0847   WBC 10*3/mm3 8.38 7.62 7.62   HEMOGLOBIN g/dL 11.8* 11.1* 11.0*   PLATELETS 10*3/mm3 297 244 258     Results from last 7 days   Lab Units 22  0638 22  0430 22  0847   SODIUM mmol/L 145 141 140   POTASSIUM mmol/L 4.1 4.6 4.8   CHLORIDE mmol/L 112* 108* 105   CO2 mmol/L 20.0* 23.0 25.5   BUN mg/dL  23 23 34*   CREATININE mg/dL 0.87 0.90 1.24*   GLUCOSE mg/dL 87 73 95   EGFR mL/min/1.73 66.6 64.0 43.5*     Results from last 7 days   Lab Units 12/24/22  0430 12/23/22  0847   ALBUMIN g/dL 3.70 3.70   BILIRUBIN mg/dL 0.2 0.2   ALK PHOS U/L 130* 128*   AST (SGOT) U/L 32 33*   ALT (SGPT) U/L 33 34*     Results from last 7 days   Lab Units 12/25/22  0638 12/24/22  0430 12/23/22  0847   CALCIUM mg/dL 9.5 9.5 9.5   ALBUMIN g/dL  --  3.70 3.70   MAGNESIUM mg/dL  --   --  2.3   PHOSPHORUS mg/dL  --   --  3.5     Results from last 7 days   Lab Units 12/23/22  2142 12/23/22  0847   PROCALCITONIN ng/mL  --  0.08   LACTATE mmol/L 0.4* 0.5     Hemoglobin A1C   Date/Time Value Ref Range Status   12/24/2022 0430 5.80 (H) 4.80 - 5.60 % Final     Glucose   Date/Time Value Ref Range Status   12/25/2022 0002 89 70 - 130 mg/dL Final     Comment:     Meter: [id number] : [id number] Polina Roberts NA   12/24/2022 1338 129 70 - 130 mg/dL Final     Comment:     Meter: [id number] : [id number] Alisa NORTH   12/24/2022 1147 66 (L) 70 - 130 mg/dL Final     Comment:     Meter: [id number] : [id number] Alisa NORTH       CT Head Without Contrast    Result Date: 12/23/2022  1. No acute intracranial abnormalities appreciated, but if there is clinical concern for acute CVA, follow-up imaging is recommended. Redemonstration of probable sequelae of small vessel disease. Signer Name: Beth Gates MD  Signed: 12/23/2022 10:27 AM  Workstation Name: [id number]  [facility]    XR Chest 1 View    Result Date: 12/23/2022  1. Interval development of a right lower lobe infiltrate most concerning for aspiration or pneumonia. Follow-up to clearing recommended. Signer Name: Beth Gates MD  Signed: 12/23/2022 10:28 AM  Workstation Name: [email or web address]  [facility] [place]    Scheduled Meds  amLODIPine, 5 mg, Oral, Daily  atorvastatin, 20 mg, Oral, Daily  cefTRIAXone, 1 g, Intravenous, Q24H  gabapentin,  300 mg, Oral, Nightly  Glycerin-Hypromellose-, 1 drop, Both Eyes, Q6H  lactobacillus acidophilus, 1 capsule, Oral, Daily  lamoTRIgine, 200 mg, Oral, BID  levETIRAcetam, 500 mg, Intravenous, Q12H  LORazepam, 0.25 mg, Oral, TID  lubiprostone, 8 mcg, Oral, BID With Meals  multivitamin, 1 tablet, Oral, Daily  pantoprazole, 40 mg, Oral, QAM  polyethylene glycol, 17 g, Oral, BID  zinc sulfate, 220 mg, Oral, Daily    Continuous Infusions  sodium chloride, 75 mL/hr, Last Rate: Stopped (12/23/22 1921)    PRN Meds  •  acetaminophen **OR** acetaminophen  •  bisacodyl  •  dextrose  •  dextrose  •  glucagon (human recombinant)  •  ipratropium-albuterol  •  ondansetron     NPO Diet NPO Type: Strict NPO     I have personally reviewed:  [x]  Laboratory    [x]  Microbiology   [x]  Radiology   [x]  EKG/Telemetry   []  Cardiology/Vascular   []  Pathology   []  Records      Assessment/Plan     Active Hospital Problems    Diagnosis  POA   • **Dental pain [K08.89]  Yes   • Aspiration pneumonia of right lung (HCC) [J69.0]  Yes   • Epilepsy, not refractory (HCC) [G40.909]  Yes   • Essential (primary) hypertension [I10]  Yes      Resolved Hospital Problems   No resolved problems to display.     82 y.o. female admitted with reported facial droop    Altered mental status  -Not felt to be TIA or stroke  -Possible dental issue Panorex machine reportedly down will check CT    Possible aspiration pneumonia  -Ceftriaxone   -SLP reevaluation tomorrow, possible VFSS    Seizure disorder  -Keppra IV    · SCDs for DVT prophylaxis.  · DNR.  · Discussed with patient and nursing staff.  · Anticipate discharge back to her facility but need to clarify her swallowing status first.  Continue treating aspiration pneumonia.    Kenny Dexter MD  [facility]  12/25/22

## 2022-12-25 NOTE — PLAN OF CARE
Goal Outcome Evaluation:  Plan of Care Reviewed With: patient        Progress: no change  Outcome Evaluation: Blood sugar stable. AOx0, nonverbal, reaches out frequently and whimpers until hugged, pleasant and calm. 2L NC, SR, no BM this shift, voiding per purewick, strict NPO due to swallowing difficulty no PO meds given, never tried to get OOB or pull out lines, repositioning causes emotional distress and immediately returns self to right side.        Problem: Adult Inpatient Plan of Care  Goal: Plan of Care Review  Outcome: Ongoing, Progressing  Flowsheets (Taken 12/25/2022 0448)  Progress: no change  Plan of Care Reviewed With: patient  Outcome Evaluation: Blood sugar stable. AOx0, nonverbal, reaches out frequently and whimpers until hugged, pleasant and calm. 2L NC, SR, no BM this shift, voiding per purewick, strict NPO due to swallowing difficulty no PO meds given, never tried to get OOB or pull out lines, repositioning causes emotional distress and immediately returns self to right side.  Goal: Patient-Specific Goal (Individualized)  Outcome: Ongoing, Progressing  Goal: Absence of Hospital-Acquired Illness or Injury  Outcome: Ongoing, Progressing  Intervention: Identify and Manage Fall Risk  Recent Flowsheet Documentation  Taken 12/25/2022 0400 by Comfort Acosta, RN  Safety Promotion/Fall Prevention:   safety round/check completed   fall prevention program maintained   elopement precautions  Taken 12/25/2022 0200 by Comfort Acosta, RN  Safety Promotion/Fall Prevention:   safety round/check completed   fall prevention program maintained   elopement precautions  Taken 12/25/2022 0000 by Comfort Acosta, RN  Safety Promotion/Fall Prevention:   safety round/check completed   fall prevention program maintained   elopement precautions  Taken 12/24/2022 2200 by Comfort Acosta, RN  Safety Promotion/Fall Prevention:   safety round/check completed   fall prevention program maintained   elopement precautions  Taken  12/24/2022 2000 by Comfort Acosta RN  Safety Promotion/Fall Prevention:   safety round/check completed   fall prevention program maintained   elopement precautions  Intervention: Prevent Skin Injury  Recent Flowsheet Documentation  Taken 12/24/2022 2000 by Comfort Acosta RN  Skin Protection:   adhesive use limited   incontinence pads utilized   transparent dressing maintained   tubing/devices free from skin contact  Intervention: Prevent and Manage VTE (Venous Thromboembolism) Risk  Recent Flowsheet Documentation  Taken 12/24/2022 2000 by Comfort Acosta RN  VTE Prevention/Management:   bilateral   sequential compression devices on  Goal: Optimal Comfort and Wellbeing  Outcome: Ongoing, Progressing  Intervention: Provide Person-Centered Care  Recent Flowsheet Documentation  Taken 12/24/2022 2000 by Comfort Acosta RN  Trust Relationship/Rapport:   care explained   choices provided   emotional support provided   reassurance provided  Goal: Readiness for Transition of Care  Outcome: Ongoing, Progressing     Problem: Fall Injury Risk  Goal: Absence of Fall and Fall-Related Injury  Outcome: Ongoing, Progressing  Intervention: Identify and Manage Contributors  Recent Flowsheet Documentation  Taken 12/24/2022 2000 by Comfort Acosta RN  Medication Review/Management: medications reviewed  Intervention: Promote Injury-Free Environment  Recent Flowsheet Documentation  Taken 12/25/2022 0400 by Comfort Acosta RN  Safety Promotion/Fall Prevention:   safety round/check completed   fall prevention program maintained   elopement precautions  Taken 12/25/2022 0200 by Comfort Acosta RN  Safety Promotion/Fall Prevention:   safety round/check completed   fall prevention program maintained   elopement precautions  Taken 12/25/2022 0000 by Comfort Acosta RN  Safety Promotion/Fall Prevention:   safety round/check completed   fall prevention program maintained   elopement precautions  Taken 12/24/2022 2200 by Comfort Acosta  RN  Safety Promotion/Fall Prevention:   safety round/check completed   fall prevention program maintained   elopement precautions  Taken 12/24/2022 2000 by Comfort Acosta RN  Safety Promotion/Fall Prevention:   safety round/check completed   fall prevention program maintained   elopement precautions     Problem: Behavioral Health Comorbidity  Goal: Maintenance of Behavioral Health Symptom Control  Outcome: Ongoing, Progressing  Intervention: Maintain Behavioral Health Symptom Control  Recent Flowsheet Documentation  Taken 12/24/2022 2000 by Comfort Acosta RN  Medication Review/Management: medications reviewed     Problem: Hypertension Comorbidity  Goal: Blood Pressure in Desired Range  Outcome: Ongoing, Progressing  Intervention: Maintain Blood Pressure Management  Recent Flowsheet Documentation  Taken 12/24/2022 2000 by Comfort Acosta RN  Medication Review/Management: medications reviewed     Problem: Seizure Disorder Comorbidity  Goal: Maintenance of Seizure Control  Outcome: Ongoing, Progressing     Problem: Skin Injury Risk Increased  Goal: Skin Health and Integrity  Outcome: Ongoing, Progressing  Intervention: Optimize Skin Protection  Recent Flowsheet Documentation  Taken 12/24/2022 2000 by Comfort Acosta RN  Pressure Reduction Techniques:   frequent weight shift encouraged   weight shift assistance provided  Pressure Reduction Devices: alternating pressure pump (ADD)  Skin Protection:   adhesive use limited   incontinence pads utilized   transparent dressing maintained   tubing/devices free from skin contact

## 2022-12-26 VITALS
SYSTOLIC BLOOD PRESSURE: 176 MMHG | TEMPERATURE: 97.9 F | RESPIRATION RATE: 18 BRPM | BODY MASS INDEX: 25.9 KG/M2 | HEIGHT: 67 IN | OXYGEN SATURATION: 99 % | DIASTOLIC BLOOD PRESSURE: 73 MMHG | HEART RATE: 75 BPM | WEIGHT: 165 LBS

## 2022-12-26 LAB
ANION GAP SERPL CALCULATED.3IONS-SCNC: 13 MMOL/L (ref 5–15)
BUN SERPL-MCNC: 30 MG/DL (ref 8–23)
BUN/CREAT SERPL: 34.9 (ref 7–25)
CALCIUM SPEC-SCNC: 9.5 MG/DL (ref 8.6–10.5)
CHLORIDE SERPL-SCNC: 116 MMOL/L (ref 98–107)
CO2 SERPL-SCNC: 21 MMOL/L (ref 22–29)
CREAT SERPL-MCNC: 0.86 MG/DL (ref 0.57–1)
DEPRECATED RDW RBC AUTO: 42.7 FL (ref 37–54)
EGFRCR SERPLBLD CKD-EPI 2021: 67.5 ML/MIN/1.73
ERYTHROCYTE [DISTWIDTH] IN BLOOD BY AUTOMATED COUNT: 12.8 % (ref 12.3–15.4)
GLUCOSE SERPL-MCNC: 171 MG/DL (ref 65–99)
HCT VFR BLD AUTO: 34.3 % (ref 34–46.6)
HGB BLD-MCNC: 10.8 G/DL (ref 12–15.9)
MCH RBC QN AUTO: 29 PG (ref 26.6–33)
MCHC RBC AUTO-ENTMCNC: 31.5 G/DL (ref 31.5–35.7)
MCV RBC AUTO: 92.2 FL (ref 79–97)
PLATELET # BLD AUTO: 265 10*3/MM3 (ref 140–450)
PMV BLD AUTO: 10.2 FL (ref 6–12)
POTASSIUM SERPL-SCNC: 4 MMOL/L (ref 3.5–5.2)
RBC # BLD AUTO: 3.72 10*6/MM3 (ref 3.77–5.28)
SODIUM SERPL-SCNC: 150 MMOL/L (ref 136–145)
WBC NRBC COR # BLD: 8.13 10*3/MM3 (ref 3.4–10.8)

## 2022-12-26 PROCEDURE — 85027 COMPLETE CBC AUTOMATED: CPT | Performed by: HOSPITALIST

## 2022-12-26 PROCEDURE — 25010000002 LEVETRIRACETAM PER 10 MG: Performed by: NURSE PRACTITIONER

## 2022-12-26 PROCEDURE — 97166 OT EVAL MOD COMPLEX 45 MIN: CPT

## 2022-12-26 PROCEDURE — 80048 BASIC METABOLIC PNL TOTAL CA: CPT | Performed by: HOSPITALIST

## 2022-12-26 PROCEDURE — 92526 ORAL FUNCTION THERAPY: CPT

## 2022-12-26 RX ORDER — AMOXICILLIN AND CLAVULANATE POTASSIUM 875; 125 MG/1; MG/1
1 TABLET, FILM COATED ORAL 2 TIMES DAILY
Qty: 8 TABLET | Refills: 0 | Status: SHIPPED | OUTPATIENT
Start: 2022-12-26 | End: 2022-12-30

## 2022-12-26 RX ORDER — LEVETIRACETAM 1000 MG/1
1500 TABLET ORAL 2 TIMES DAILY
Status: ON HOLD
Start: 2022-12-26 | End: 2023-03-05

## 2022-12-26 RX ORDER — GABAPENTIN 300 MG/1
300 CAPSULE ORAL NIGHTLY
Qty: 3 CAPSULE | Refills: 0 | Status: SHIPPED | OUTPATIENT
Start: 2022-12-26

## 2022-12-26 RX ORDER — HYDROCODONE BITARTRATE AND ACETAMINOPHEN 7.5; 325 MG/1; MG/1
1 TABLET ORAL EVERY 8 HOURS PRN
Qty: 9 TABLET | Refills: 0 | Status: SHIPPED | OUTPATIENT
Start: 2022-12-26

## 2022-12-26 RX ADMIN — LEVETIRACETAM 500 MG: 100 INJECTION INTRAVENOUS at 08:08

## 2022-12-26 RX ADMIN — GLYCERIN 1 DROP: .002; .002; .01 SOLUTION/ DROPS OPHTHALMIC at 08:09

## 2022-12-26 RX ADMIN — GLYCERIN 1 DROP: .002; .002; .01 SOLUTION/ DROPS OPHTHALMIC at 03:30

## 2022-12-26 NOTE — CASE MANAGEMENT/SOCIAL WORK
Case Management Discharge Note      Final Note: The patient was d/c to an I.C. level of care at [facility] and transported by [facility] stretcher. COLEMAN CHAUHAN    Provided Post Acute Provider List?: N/A  N/A Provider List Comment: Return to [facility]  Provided Post Acute Provider Quality & Resource List?: N/A  N/A Quality & Resource List Comment: Return to [facility]    Selected Continued Care - Admitted Since 12/23/2022     Destination Coordination complete.    Service Provider Selected Services Address Phone Fax Patient Preferred    [facility] [address] [facility] [address] [phone] --          Durable Medical Equipment    No services have been selected for the patient.              Dialysis/Infusion    No services have been selected for the patient.              Home Medical Care    No services have been selected for the patient.              Therapy    No services have been selected for the patient.              Community Resources    No services have been selected for the patient.              Community & DME    No services have been selected for the patient.                  Transportation Services  Ambulance: [facility]    Final Discharge Disposition Code: 04 - intermediate care facility

## 2022-12-26 NOTE — PROGRESS NOTES
[facility] Acute Rehab  Stroke screening per stroke order set via [facility]. Noted MRI negative and is a resident of [facility] with plans to return there. Will sign off at this time    Celina Elmore RN  Acute Rehab Admission Nurse

## 2022-12-26 NOTE — PLAN OF CARE
Goal Outcome Evaluation:  Plan of Care Reviewed With: patient        Progress: improving  Outcome Evaluation: Patient seen for clinical swallow assessment. Pt sleeping when SLP arrived and startled upon waking. Unable to follow commands. Voice clear. Previous VFSS with recs for honey, but patient typically tolerated a least restrictive diet of mech soft and thins. No overt s/s of aspiration with honey via spoon/cup, but unable to suck from a straw. Laryngeal elevation appeared functional. Oral holding noted intermittently with pudding. Pt unable to fully masticate peach and swallowed it whole with pudding wash. SLP recs puree and honey, no straws. Patient appeared safest with spoon drinks. Assist with meals, patient made no attempts to feed self.      Patient was not wearing a face mask during this therapy encounter. Therapist used appropriate personal protective equipment including mask, eye protection and gloves.  Mask used was standard procedure mask. Appropriate PPE was worn during the entire therapy session. Hand hygiene was completed before and after therapy session. Patient is not in enhanced droplet precautions.

## 2022-12-26 NOTE — THERAPY RE-EVALUATION
Acute Care - Speech Language Pathology Re-Evaluation    [facility]     Patient Name: Erica Crockett  : 1940  MRN: [id number]    Today's Date: 2022                   Admit Date: 2022       Visit Dx:    No diagnosis found.    Patient Active Problem List   Diagnosis   • Degeneration of intervertebral disc of lumbar region   • Developmental mental disorder   • Osteoarthritis of hip   • Scoliosis   • Chronic pain   • Nonverbal signs of pain   • Bursitis of left hip   • Encounter for monitoring opioid maintenance therapy   • Anxiety   • Constipation   • Hypercalcemia   • Epilepsy, not refractory (HCC)   • Osteoporosis   • Impulse control disorder   • Vitamin D deficiency   • Pneumonia of both lower lobes due to infectious organism   • KEILY (acute kidney injury) (HCC)   • Sepsis-associated organ dysfunction (HCC)   • Stage 3 chronic kidney disease (HCC)   • Hypercholesterolemia   • Acute kidney failure (HCC)   • Essential (primary) hypertension   • Generalized anxiety disorder   • Hyperkalemia   • Urinary tract infection   • Dental pain   • Aspiration pneumonia of right lung (HCC)       Past Medical History:   Diagnosis Date   • Anemia    • Anxiety    • Bursitis    • Bursitis    • DJD (degenerative joint disease), lumbar    • Hyperkalemia    • Hypoglycemia    • Intellectual disability    • Low back pain    • Osteoarthritis    • Osteopenia    • Pneumonia    • Renal disorder    • Renal insufficiency    • Scoliosis    • Seizure disorder (HCC)        History reviewed. No pertinent surgical history.    SLP Recommendation and Plan                                           Plan of Care Reviewed With: patient (22 0543)  Progress: improving (22 4320)  Outcome Evaluation: Patient seen for clinical swallow assessment. Pt sleeping when SLP arrived and startled upon waking. Unable to follow commands. Voice clear. Previous VFSS with recs for honey, but patient typically tolerated a least  restrictive diet of mech soft and thins. No overt s/s of aspiration with honey via spoon/cup, but unable to suck from a straw. Laryngeal elevation appeared functional. Oral holding noted intermittently with pudding. Pt unable to fully masticate peach and swallowed it whole with pudding wash. SLP recs puree and honey, no straws. Assist with meals, patient made no attempts to feed self. (12/26/22 3530)                       EDUCATION    The patient has been educated in the following areas:       Dysphagia (Swallowing Impairment).             SLP GOALS     Row Name 12/26/22 1400 12/24/22 1045          (LTG) Patient will demonstrate functional swallow for    Diet Texture (Demonstrate functional swallow) soft to chew (chopped) textures  -SH soft to chew (chopped) textures  -CB     Liquid viscosity (Demonstrate functional swallow) thin liquids  -SH thin liquids  -CB     F[place]hawe (Demonstrate functional swallow) with moderate cues (50-74% accuracy)  -SH with moderate cues (50-74% accuracy)  -CB     Time Frame (Demonstrate functional swallow) by discharge  -SH by discharge  -CB     Progress/Outcomes (Demonstrate functional swallow) continuing progress toward goal  -SH continuing progress toward goal  -CB     Comment (Demonstrate functional swallow) Patient seen for clinical swallow assessment. Pt sleeping when SLP arrived and startled upon waking. Unable to follow commands. Voice clear. Previous VFSS with recs for honey, but patient typically tolerated a least restrictive diet of mech soft and thins. No overt s/s of aspiration with honey via spoon/cup, but unable to suck from a straw. Laryngeal elevation appeared functional. Oral holding noted intermittently with pudding. Pt unable to fully masticate peach and swallowed it whole with pudding wash. SLP recs puree and honey, no straws. Patient appeared safest with spoon drinks. Assist with meals, patient made no attempts to feed self.  -SH --           User Key  (r) =  Recorded By, (t) = Taken By, (c) = Cosigned By    Initials Name Provider Type     Hillary Lucas MS CCC-SLP Speech and Language Pathologist    Judit Gupta CCC-SLP Speech and Language Pathologist                            Time Calculation:        Time Calculation- SLP     Row Name 12/26/22 1501             Time Calculation- SLP    SLP Start Time 1300  -SH      SLP Received On 12/26/22  -SH         Untimed Charges    13374-YI Treatment Swallow Minutes 60  -SH         Total Minutes    Untimed Charges Total Minutes 60  -SH       Total Minutes 60  -SH            User Key  (r) = Recorded By, (t) = Taken By, (c) = Cosigned By    Initials Name Provider Type     Hillary Lucas MS CCC-SLP Speech and Language Pathologist                  Therapy Charges for Today     Code Description Service Date Service Provider Modifiers Qty    [id number]  ST TREATMENT SWALLOW 4 12/26/2022 Hillary Lucas MS CCC-SLP GN 1                           Hillary Lucas MS CCC-SLP  12/26/2022

## 2022-12-26 NOTE — CASE MANAGEMENT/SOCIAL WORK
Discharge Planning Assessment  [facility]     Patient Name: Erica Crockett  MRN: [id number]  Today's Date: 12/26/2022    Admit Date: 12/23/2022    Plan: Return to [facility]   Discharge Needs Assessment     Row Name 12/26/22 1610       Living Environment    People in Home facility resident    Current Living Arrangements extended care facility    Primary Care Provided by other (see comments)  Facility staff    Provides Primary Care For no one, unable/limited ability to care for self    Quality of Family Relationships helpful;involved;supportive    Able to Return to Prior Arrangements yes       Resource/Environmental Concerns    Resource/Environmental Concerns none    Transportation Concerns no car       Transition Planning    Transportation Anticipated other (see comments)  [facility] stretcher       Discharge Needs Assessment    Concerns to be Addressed discharge planning    Anticipated Changes Related to Illness inability to care for self    Equipment Needed After Discharge none    Current Discharge Risk physical impairment;cognitively impaired               Discharge Plan     Row Name 12/26/22 7748       Plan    Plan Return to [facility]    Patient/Family in Agreement with Plan yes    Provided Post Acute Provider List? N/A    N/A Provider List Comment Return to [facility]    Provided Post Acute Provider Quality & Resource List? N/A    N/A Quality & Resource List Comment Return to [facility]    Plan Comments Noted that the patient is non-verbal and has intellectual disabilities.  Spoke to the patient’s legal guardian- her nephew Nicola Kapadia [phone].  He was requested to provide copies to CCP of the patient’s POA documents and he informed CCP to obtain a copy from [facility].  Explained role of CCP, verified facesheet, checked IMM and discussed discharge planning needs.  He confirms that the patient has resided at [facility] for about 10 years and will be returning  upon d/c.  He states that a voicemail message can be left for him informing him of the details of his aunts return to [facility].  CCP left a voicemail him informing him that the patient will be transported by [employer] van at 4:30pm.  Spoke to Ashley- AUSTIN with [facility] [address] where the patient resides [phone]. Ashley states that they are expecting the patient and she was informed that the patient will be placed on oral antibiotics instead of IV ant she will be transported at 4:30pm.  [employer] reservation number is [email or web address]. Ashley was requested to provide a copy of the patient’s POA documents if possible.  Ashley stated that she would fax those documents to [facility].  [facility] will follow to assist with the patient’s d/c to her Intermediate level of care at [facility] and will be transported by [employer]. COLEMAN CHAUHAN              Continued Care and Services - Admitted Since 12/23/2022     Destination Coordination complete.    Service Provider Request Status Selected Services Address Phone Fax Patient Preferred    [facility]  [facility] [address] [facility] [id number] [phone] --              Expected Discharge Date and Time     Expected Discharge Date Expected Discharge Time    Dec 26, 2022          Demographic Summary     Row Name 12/26/22 1610       General Information    Admission Type inpatient    Arrived From long-term care    Referral Source admission list    Reason for Consult discharge planning    Preferred Language English       Contact Information    Permission Granted to Share Info With family/designee               Functional Status     Row Name 12/26/22 1610       Functional Status    Usual Activity Tolerance poor    Current Activity Tolerance poor       Functional Status, IADL    Medications completely dependent    Meal Preparation completely dependent    Housekeeping completely dependent    Laundry  completely dependent    Shopping completely dependent       Mental Status Summary    Recent Changes in Mental Status/Cognitive Functioning unable to assess               Psychosocial    No documentation.                Abuse/Neglect    No documentation.                Legal    No documentation.                Substance Abuse    No documentation.                Patient Forms    No documentation.                   COLEMAN Wahl

## 2022-12-26 NOTE — PLAN OF CARE
Goal Outcome Evaluation:  Plan of Care Reviewed With: patient        Progress: improving      Patient seen by Dr Dexter and ok to d/c back to facility. Patient went back to [facility] via [facility], report called to [phone] ext [phone] taken by Ashley.

## 2022-12-26 NOTE — THERAPY EVALUATION
Patient Name: Erica Crockett  : 1940    MRN: [id number]                              Today's Date: 2022       Admit Date: 2022    Visit Dx: No diagnosis found.  Patient Active Problem List   Diagnosis   • Degeneration of intervertebral disc of lumbar region   • Developmental mental disorder   • Osteoarthritis of hip   • Scoliosis   • Chronic pain   • Nonverbal signs of pain   • Bursitis of left hip   • Encounter for monitoring opioid maintenance therapy   • Anxiety   • Constipation   • Hypercalcemia   • Epilepsy, not refractory (HCC)   • Osteoporosis   • Impulse control disorder   • Vitamin D deficiency   • Pneumonia of both lower lobes due to infectious organism   • KEILY (acute kidney injury) (HCC)   • Sepsis-associated organ dysfunction (HCC)   • Stage 3 chronic kidney disease (HCC)   • Hypercholesterolemia   • Acute kidney failure (HCC)   • Essential (primary) hypertension   • Generalized anxiety disorder   • Hyperkalemia   • Urinary tract infection   • Dental pain   • Aspiration pneumonia of right lung (HCC)     Past Medical History:   Diagnosis Date   • Anemia    • Anxiety    • Bursitis    • Bursitis    • DJD (degenerative joint disease), lumbar    • Hyperkalemia    • Hypoglycemia    • Intellectual disability    • Low back pain    • Osteoarthritis    • Osteopenia    • Pneumonia    • Renal disorder    • Renal insufficiency    • Scoliosis    • Seizure disorder (HCC)      History reviewed. No pertinent surgical history.   General Information     Row Name 22 1219          OT Time and Intention    Document Type evaluation  -MW     Mode of Treatment individual therapy;occupational therapy  -MW     Row Name 22 1212          General Information    Patient Profile Reviewed yes  -MW     Prior Level of Function --  pt nonverbal, intellectual disability present; per RN, pt required w/c most recently due to weakness however was ambulatory at baseline ; resides at [facility]  [facility] and SPV, unknown level of assist for ADLs at baseline  -     Existing Precautions/Restrictions fall  -     Barriers to Rehab medically complex;previous functional deficit;cognitive status  intellectual disability  -     Row Name 12/26/22 1219          Living Environment    [facility]  -     Row Name 12/26/22 1219          Cognition    Orientation Status (Cognition) unable/difficult to assess  nonverbal  -     Row Name 12/26/22 1219          Safety Issues, Functional Mobility    Safety Issues Affecting Function (Mobility) ability to follow commands;insight into deficits/self-awareness;safety precaution awareness;safety precautions follow-through/compliance  -     Impairments Affecting Function (Mobility) cognition;endurance/activity tolerance  -     Cognitive Impairments, Mobility Safety/Performance attention;safety precaution awareness;insight into deficits/self-awareness;problem-solving/reasoning;awareness, need for assistance;sequencing abilities  -           User Key  (r) = Recorded By, (t) = Taken By, (c) = Cosigned By    Initials Name Provider Type     Moon Lopez OT Occupational Therapist                 Mobility/ADL's     Row Name 12/26/22 1226          Bed Mobility    Comment, (Bed Mobility) x1 long sitting trial, pt not following commands, resistive to attempting to sit this date  -     Row Name 12/26/22 1226          Transfers    Comment, (Transfers) not approp to assess  -     Row Name 12/26/22 1226          Activities of Daily Living    BADL Assessment/Intervention lower body dressing;toileting  -     Row Name 12/26/22 1226          Lower Body Dressing Assessment/Training    [place] Level (Lower Body Dressing) socks;dependent (less than 25% patient effort)  -     Row Name 12/26/22 1226          Toileting Assessment/Training    Comment, (Toileting) anticipate total A, incontinent, brief for toilieting  -            User Key  (r) = Recorded By, (t) = Taken By, (c) = Cosigned By    Initials Name Provider Type     Moon Lopez OT Occupational Therapist               Obj/Interventions     Row Name 12/26/22 1228          Sensory Assessment (Somatosensory)    Sensory Assessment (Somatosensory) unable/difficult to assess  -[facility] 12/26/22 1228          Vision Assessment/Intervention    Visual Impairment/Limitations unable/difficult to assess  -[facility] 12/26/22 1228          Range of Motion Comprehensive    Comment, General Range of Motion resistive when attempting ROM, able to achieve 7%% end range bilaterally for shoulder planes; elbow WFL with non purposeful movement  -[facility] Name 12/26/22 1228          Strength Comprehensive (MMT)    General Manual Muscle Testing (MMT) Assessment upper extremity strength deficits identified  -     Comment, General Manual Muscle Testing (MMT) Assessment grossly, weak, unable to complete MMT  -           User Key  (r) = Recorded By, (t) = Taken By, (c) = Cosigned By    Initials Name Provider Type     Moon Lopez OT Occupational Therapist               Goals/Plan     Row Name 12/26/22 1235          Bed Mobility Goal 1 (OT)    Activity/Assistive Device (Bed Mobility Goal 1, OT) supine to sit  -MW     [place] Level/Cues Needed (Bed Mobility Goal 1, OT) minimum assist (75% or more patient effort)  -MW     Time Frame (Bed Mobility Goal 1, OT) short term goal (STG)  -MW     Progress/Outcomes (Bed Mobility Goal 1, OT) goal ongoing  -MW     Row Name 12/26/22 1235          Transfer Goal 1 (OT)    Activity/Assistive Device (Transfer Goal 1, OT) sit-to-stand/stand-to-sit  -MW     [place] Level/Cues Needed (Transfer Goal 1, OT) moderate assist (50-74% patient effort)  -MW     Time Frame (Transfer Goal 1, OT) short term goal (STG);2 weeks  -MW     Progress/Outcome (Transfer Goal 1, OT) goal ongoing  -MW     Row Name 12/26/22 1235          Bathing Goal 1  (OT)    Activity/Device (Bathing Goal 1, OT) upper body bathing  -MW     [place] Level/Cues Needed (Bathing Goal 1, OT) moderate assist (50-74% patient effort)  -MW     Time Frame (Bathing Goal 1, OT) short term goal (STG);2 weeks  -MW     Progress/Outcomes (Bathing Goal 1, OT) goal ongoing  -MW     Row Name 12/26/22 1235          Therapy Assessment/Plan (OT)    Planned Therapy Interventions (OT) activity tolerance training;functional balance retraining;BADL retraining;neuromuscular control/coordination retraining;occupation/activity based interventions;ROM/therapeutic exercise;strengthening exercise;transfer/mobility retraining;patient/caregiver education/training  -MW           User Key  (r) = Recorded By, (t) = Taken By, (c) = Cosigned By    Initials Name Provider Type    Moon Alicia, OT Occupational Therapist               Clinical Impression     Row Name 12/26/22 1229          Pain Assessment    Additional Documentation Pain Scale: FACES Pre/Post-Treatment (Group)  -     Row Name 12/26/22 1229          Pain Scale: FACES Pre/Post-Treatment    Pain: FACES Scale, Pretreatment 0-->no hurt  -MW     Posttreatment Pain Rating 0-->no hurt  -MW     Row Name 12/26/22 1229          Plan of Care Review    Plan of Care Reviewed With patient  -MW     Progress no change  -MW     Outcome Evaluation Pt is a 83 yo female admitted for strok- like symptoms and dental pain. Pt is nonverbal, not following commands, per chart - pt's nephew/legal guardian reports pt with intellectual deficits at baseline. Pt resides at [facility], has full care and 24 hour SPV, per report, pt was ambulatory at baseline however using w/c most recently, unknown level of assist for ADLs required at baseline. Pt seen this date for OT eval, not able to follow commands, non verbal, s/up for g/h task and pt initiates lower portion of face however max A for upper portion. x1 long sitting trial completed with max A, pt resistive and  presents with non-purposeful movement throughout, deferred further activity to EOB this date. Pt requires total A for LBD and toileting this date. Pt willl likely continue to benefit from skilled OT to address overall generalized weaknss noted impacting overall (I). Will continue to update plan as able. Plans to return to LTC at d/c when medically stable.  -     Row Name 12/26/22 1229          Therapy Assessment/Plan (OT)    Rehab Potential (OT) good, to achieve stated therapy goals  -     Criteria for Skilled Therapeutic Interventions Met (OT) meets criteria;yes;skilled treatment is necessary  -     Therapy Frequency (OT) 2 times/wk  -     Row Name 12/26/22 1229          Therapy Plan Review/Discharge Plan (OT)    Anticipated Discharge Disposition (OT) extended care facility  -     Row Name 12/26/22 1229          Vital Signs    O2 Delivery Pre Treatment room air  -MW     Pre Patient Position Supine  -     Row Name 12/26/22 1229          Positioning and Restraints    Pre-Treatment Position in bed  -MW     Post Treatment Position bed  -MW     In Bed notified nsg;exit alarm on;encouraged to call for assist;supine;patient within staff view  side rails up x4 upon entry, left all 4 up  -MW           User Key  (r) = Recorded By, (t) = Taken By, (c) = Cosigned By    Initials Name Provider Type    Moon Alicia, OT Occupational Therapist               Outcome Measures     Row Name 12/26/22 1236          How much help from another is currently needed...    Putting on and taking off regular lower body clothing? 1  -MW     Bathing (including washing, rinsing, and drying) 1  -MW     Toileting (which includes using toilet bed pan or urinal) 1  -MW     Putting on and taking off regular upper body clothing 1  -MW     Taking care of personal grooming (such as brushing teeth) 1  -MW     Eating meals 1  -MW     AM-PAC 6 Clicks Score (OT) 6  -MW     Row Name 12/26/22 3319          How much help from another person do  you currently need...    Turning from your back to your side while in flat bed without using bedrails? 2  -LC     Moving from lying on back to sitting on the side of a flat bed without bedrails? 2  -LC     Moving to and from a bed to a chair (including a wheelchair)? 2  -LC     Standing up from a chair using your arms (e.g., wheelchair, bedside chair)? 2  -LC     Climbing 3-5 steps with a railing? 1  -LC     To walk in hospital room? 1  -LC     AM-PAC 6 Clicks Score (PT) 10  -LC     Highest level of mobility 4 --> Transferred to chair/commode  -     Row Name 12/26/22 1236          Modified Stanton Scale    Modified Stanton Scale 5 - Severe disability.  Bedridden, incontinent, and requiring constant nursing care and attention.  -     Row Name 12/26/22 1236          Functional Assessment    Outcome Measure Options AM-PAC 6 Clicks Daily Activity (OT);Modified Stanton  -           User Key  (r) = Recorded By, (t) = Taken By, (c) = Cosigned By    Initials Name Provider Type    Christina Nielson, RN Registered Nurse    Moon Alicia OT Occupational Therapist                Occupational Therapy Education     Title: PT OT SLP Therapies (In Progress)     Topic: Occupational Therapy (In Progress)     Point: ADL training (In Progress)     Description:   Instruct learner(s) on proper safety adaptation and remediation techniques during self care or transfers.   Instruct in proper use of assistive devices.              Learning Progress Summary           Patient Acceptance, E, NL by AMAN at 12/26/2022 1236    Comment: role of OT                   Point: Home exercise program (Not Started)     Description:   Instruct learner(s) on appropriate technique for monitoring, assisting and/or progressing therapeutic exercises/activities.              Learner Progress:  Not documented in this visit.          Point: Precautions (In Progress)     Description:   Instruct learner(s) on prescribed precautions during self-care and  functional transfers.              Learning Progress Summary           Patient Acceptance, E, NL by  at 12/26/2022 1236    Comment: role of OT                   Point: Body mechanics (In Progress)     Description:   Instruct learner(s) on proper positioning and spine alignment during self-care, functional mobility activities and/or exercises.              Learning Progress Summary           Patient Acceptance, E, NL by  at 12/26/2022 1236    Comment: role of OT                               User Key     Initials Effective Dates Name Provider Type Discipline    AMAN 08/20/21 -  Moon Lopez OT Occupational Therapist OT              OT Recommendation and Plan  Planned Therapy Interventions (OT): activity tolerance training, functional balance retraining, BADL retraining, neuromuscular control/coordination retraining, occupation/activity based interventions, ROM/therapeutic exercise, strengthening exercise, transfer/mobility retraining, patient/caregiver education/training  Therapy Frequency (OT): 2 times/wk  Plan of Care Review  Plan of Care Reviewed With: patient  Progress: no change  Outcome Evaluation: Pt is a 83 yo female admitted for strok- like symptoms and dental pain. Pt is nonverbal, not following commands, per chart - pt's nephew/legal guardian reports pt with intellectual deficits at baseline. Pt resides at [facility], has full care and 24 hour SPV, per report, pt was ambulatory at baseline however using w/c most recently, unknown level of assist for ADLs required at baseline. Pt seen this date for OT eval, not able to follow commands, non verbal, s/up for g/h task and pt initiates lower portion of face however max A for upper portion. x1 long sitting trial completed with max A, pt resistive and presents with non-purposeful movement throughout, deferred further activity to EOB this date. Pt requires total A for LBD and toileting this date. Pt willl likely continue to benefit from skilled OT to  address overall generalized weaknss noted impacting overall (I). Will continue to update plan as able. Plans to return to LTC at d/c when medically stable.     Time Calculation:    Time Calculation- OT     Row Name 12/26/22 1237             Time Calculation- OT    OT Start Time 0858  -MW      OT Stop Time 0910  -MW      OT Time Calculation (min) 12 min  -MW      OT Received On 12/26/22  -MW      OT Goal Re-Cert Due Date 01/09/23  -MW         Untimed Charges    OT Eval/Re-eval Minutes 12  -MW         Total Minutes    Untimed Charges Total Minutes 12  -MW       Total Minutes 12  -MW            User Key  (r) = Recorded By, (t) = Taken By, (c) = Cosigned By    Initials Name Provider Type    Moon Alicia OT Occupational Therapist              Therapy Charges for Today     Code Description Service Date Service Provider Modifiers Qty    99193818302 HC OT EVAL MOD COMPLEXITY 2 12/26/2022 Moon Lopez OT GO 1               Moon Lopez OT  12/26/2022

## 2022-12-26 NOTE — DISCHARGE SUMMARY
[facility]    Date of Discharge:  12/26/2022    PCP: Tulio Carlson MD    Discharge Diagnosis:   Active Hospital Problems    Diagnosis  POA   • **Dental pain [K08.89]  Yes   • Aspiration pneumonia of right lung (HCC) [J69.0]  Yes   • Epilepsy, not refractory (HCC) [G40.909]  Yes   • Essential (primary) hypertension [I10]  Yes      Resolved Hospital Problems   No resolved problems to display.          Consults     Date and Time Order Name Status Description    12/23/2022  4:54 PM Inpatient Neurology Consult Stroke Completed         Hospital Course  82 y.o. female initially admitted for reported facial droop. She was evaluated by neurology and they did not suspect TIA or stroke. There was concern for possible dental issue. Panorex machine was reportedly down. CT did not show any large periapical lucency. There was RLL infiltrate seen on CXR concerning for pneumonia. She received a few days of ceftriaxone in the hospital and will be discharged on PO augmentin for a few more days. SLP evaluated and they recommended Diet: Regular/House Diet; No Straw, Feeding Assistance - Nursing; Texture: Pureed (NDD 1); Fluid Consistency: Honey Thick.    I discussed the patient's findings and my recommendations with nursing staff.    Condition on Discharge: Stable to return to facility.     Temp:  [97.4 °F (36.3 °C)-98.6 °F (37 °C)] 97.9 °F (36.6 °C)  Heart Rate:  [] 75  Resp:  [18] 18  BP: (159-176)/(73-91) 176/73  Body mass index is 25.84 kg/m².    Physical Exam   Constitutional:       General: She is not in acute distress.     Appearance: She is ill-appearing.   Neck:      Vascular: No JVD.      Trachea: No tracheal deviation.   Cardiovascular:      Rate and Rhythm: Normal rate and regular rhythm.      Heart sounds: Normal heart sounds.   Pulmonary:      Effort: Pulmonary effort is normal. No respiratory distress.      Breath sounds: Normal breath sounds.   Abdominal:       General: Bowel sounds are normal.      Palpations: Abdomen is soft.      Tenderness: There is no abdominal tenderness.   Musculoskeletal:      Right lower leg: No edema.      Left lower leg: No edema.   Skin:     General: Skin is warm and dry.   Neurological:      General: No focal deficit present.      Mental Status: She is alert.      Comments: No obvious facial droop   Psychiatric:         Behavior: Behavior is uncooperative.     Disposition: Long Term Care (DC - External)       Discharge Medications      New Medications      Instructions Start Date   amoxicillin-clavulanate 875-125 MG per tablet  Commonly known as: Augmentin   1 tablet, Oral, 2 Times Daily         Changes to Medications      Instructions Start Date   gabapentin 300 MG capsule  Commonly known as: NEURONTIN  What changed: how to take this   300 mg, Oral, Nightly      HYDROcodone-acetaminophen 7.5-325 MG per tablet  Commonly known as: NORCO  What changed: reasons to take this   1 tablet, Oral, Every 8 Hours PRN      levETIRAcetam 1000 MG tablet  Commonly known as: KEPPRA  What changed:   · how much to take  · additional instructions  · Another medication with the same name was removed. Continue taking this medication, and follow the directions you see here.   1,500 mg, Oral, 2 Times Daily, Pt takes a total of 1500 mg PO twice daily         Continue These Medications      Instructions Start Date   amLODIPine 5 MG tablet  Commonly known as: NORVASC   5 mg, Oral, Daily      atorvastatin 20 MG tablet  Commonly known as: LIPITOR   20 mg, Daily      Benefiber Drink Mix pack   1 packet, Oral, Daily      cetirizine 10 MG tablet  Commonly known as: zyrTEC   10 mg, Oral, Nightly      cholecalciferol 25 MCG (1000 UT) tablet  Commonly known as: VITAMIN D3   1,000 Units, Oral, Daily      dextromethorphan-guaifenesin  MG/5ML syrup  Commonly known as: ROBITUSSIN-DM   5 mL, Oral, Every 4 Hours PRN      ipratropium-albuterol 0.5-2.5 mg/3 ml  nebulizer  Commonly known as: DUO-NEB   3 mL, Nebulization, 4 Times Daily PRN      lamoTRIgine 200 MG tablet  Commonly known as: LaMICtal   200 mg, Oral, 2 Times Daily      lubiprostone 8 MCG capsule  Commonly known as: AMITIZA   8 mcg, Oral, 2 Times Daily With Meals      magnesium hydroxide 400 MG/5ML suspension  Commonly known as: MILK OF MAGNESIA   30 mL, Oral, 2 Times Daily PRN      multivitamin tablet tablet  Commonly known as: THERAGRAN   Oral      omeprazole 20 MG capsule  Commonly known as: priLOSEC   40 mg, Oral, Daily      polyethylene glycol 17 GM/SCOOP powder  Commonly known as: MIRALAX   17 g, Oral, 2 Times Daily      PROBIOTIC PO   2 Units, Oral, Nightly      Prolia 60 MG/ML solution prefilled syringe syringe  Generic drug: denosumab   No dose, route, or frequency recorded.      Vitamin C ER 1000 MG tablet controlled-release   1,000 mg, Oral, Daily      Zinc Sulfate 220 (50 Zn) MG tablet   1 tablet, Oral, Daily         Stop These Medications    acetaminophen 325 MG tablet  Commonly known as: TYLENOL     bisacodyl 10 MG suppository  Commonly known as: DULCOLAX     diclofenac 1 % gel gel  Commonly known as: VOLTAREN     diphenhydrAMINE 25 mg capsule  Commonly known as: BENADRYL     estradiol 0.1 MG/GM vaginal cream  Commonly known as: ESTRACE     furosemide 40 MG tablet  Commonly known as: LASIX     levoFLOXacin 250 MG tablet  Commonly known as: LEVAQUIN     LORazepam 0.5 MG tablet  Commonly known as: ATIVAN     Susan's magic butt cream     nystatin-triamcinolone 315837-9.1 UNIT/GM-% ointment  Commonly known as: MYCOLOG     polyvinyl alcohol 1.4 % ophthalmic solution  Commonly known as: LIQUIFILM     promethazine 25 MG tablet  Commonly known as: PHENERGAN           Diet Instructions     Diet: Regular, Dysphagia; Honey Thick Liquids; Pureed      Discharge Diet:  Regular  Dysphagia       Fluid Consistency: Honey Thick Liquids    Pureed Options: Pureed    no straw, feeding assistance         Activity  Instructions     Activity as Tolerated           Additional Instructions for the Follow-ups that You Need to Schedule     Call MD for problems / concerns.   As directed         Contact information for follow-up providers     Tulio Carlson MD .    Specialty: Family Medicine  Contact information:  [address] [address] [phone]                   Contact information for after-discharge care     Destination     [facility] .    Service: Assisted Living  Contact information:  [address]  [facility] [address] [phone]                            Pending Labs     Order Current Status    Basic Metabolic Panel Collected (12/26/22 1544)    CBC (No Diff) Collected (12/26/22 1544)    Blood Culture - Blood, Arm, Left Preliminary result    Blood Culture - Blood, Arm, Right Preliminary result         Kenny Dexter MD  [facility]  12/26/22    Discharge time spent greater than 30 minutes.

## 2022-12-26 NOTE — PLAN OF CARE
Goal Outcome Evaluation:  Plan of Care Reviewed With: patient        Progress: no change  Outcome Evaluation: Pt is a 83 yo female admitted for strok- like symptoms and dental pain. Pt is nonverbal, not following commands, per chart - pt's nephew/legal guardian reports pt with intellectual deficits at baseline. Pt resides at [facility], has full care and 24 hour SPV, per report, pt was ambulatory at baseline however using w/c most recently, unknown level of assist for ADLs required at baseline. Pt seen this date for OT eval, not able to follow commands, non verbal, s/up for g/h task and pt initiates lower portion of face however max A for upper portion. x1 long sitting trial completed with max A, pt resistive and presents with non-purposeful movement throughout, deferred further activity to EOB this date. Pt requires total A for LBD and toileting this date. Pt willl likely continue to benefit from skilled OT to address overall generalized weaknss noted impacting overall (I). Will continue to update plan as able. Plans to return to LTC at d/c when medically stable.

## 2022-12-26 NOTE — DISCHARGE PLACEMENT REQUEST
"Erica Pryor (82 y.o. Female)     Date of Birth   1940    Social Security Number       Address   [address] [place] [address]    Home Phone   [phone]    MRN   [id number]       Confucianism   None    Marital Status   Single                            Admission Date   12/23/22    Admission Type   Urgent    Admitting Provider   Dawson Delgado MD    Attending Provider   Kenny Dexter MD    Department, Room/Bed   [address], 96/1       Discharge Date       Discharge Disposition       Discharge Destination                               Attending Provider: Kenny Dexter MD    Allergies: Bactrim [Sulfamethoxazole-trimethoprim], Bee Venom, Iodinated Diagnostic Agents, Nsaids    Isolation: None   Infection: None   Code Status: No CPR    Ht: 170.2 cm (67\")   Wt: 74.8 kg (165 lb)    Admission Cmt: None   Principal Problem: Dental pain [K08.89]                 Active Insurance as of 12/23/2022     Primary Coverage     Payor Plan Insurance Group Employer/Plan Group    MEDICARE MEDICARE A & B      Payor Plan Address Payor Plan Phone Number Payor Plan Fax Number Effective Dates    [address] [phone]  5/1/1976 - None Entered    [facility] [id number]       Subscriber Name Subscriber Birth Date Member ID       ERICA PRYOR 1940 [id number]           Secondary Coverage     Payor Plan Insurance Group Employer/Plan Group    [place]      Payor Plan Address Payor Plan Phone Number Payor Plan Fax Number Effective Dates    [address] [phone]  2/14/2016 - None Entered    [place] [address]       Subscriber Name Subscriber Birth Date Member ID       ERICA PRYOR 1940 [id number]                 Emergency Contacts      (Rel.) Home Phone Work Phone Mobile Phone    Nicola Kapadia (nephew) (Legal Guardian) [phone] -- --            {Outbreak/Travel/Exposure Documentation......;  Question Available Choices " Patient Response   COVID-19 Outbreak Screen:  Do you currently have a new onset of the following symptoms?        Fever/Chills, Cough, Shortness of air, Loss of taste or smell, No, Unknown  (not recorded)   COVID-19 Outbreak Screen: In the last 14 days, have you had contact with anyone who is ill, has show any of the symptoms listed above and/or has been diagnosis with the 2019 Novel Coronavirus? This includes any immediate household members but excludes any patients with whom you have been in contact within your normal work duties wearing proper PPE, if you are a healthcare worker.  Yes, No, Unknown              (not recorded)   COVID-19 Outbreak Screen: Who was notified? Free text (not recorded)   Ebola Screening Outbreak Screen: Have you traveled to the [place] or [place] within the past 21 days?  Yes, No, Unknown (not recorded)   Ebola Screening Outbreak Screen: Do you have ANY of the following symptoms: Fever/Chills, Vomiting, Diarrhea, Fatigue, Headache, Muscle pain, Unexplained bleeding, Abdominal (stomach) pain, No, Unknown (not recorded)   Ebola Screening Outbreak Screen: Name of Person notified Free text (not recorded)   Travel Screen: Have you traveled in the last month? If so, to what country have you traveled? If [place] what state? Yes, No, Unknown  List of all countries  List of all States (not recorded)  (not recorded)  (not recorded)   Infection Risk: Do you currently have the following symptoms?  (If cough is selected, the Tuberculosis Screen is performed.) Cough, Fever, Rash, No (not recorded)   Tuberculosis Screen: Do you have any of the following Tuberculosis Risks?  · Have you lived or spent time with anyone who had or may have TB?  · Have you lived in or visited any of the following areas for more than one month: [place], [place], [place], [place] or [place], the [place] or [place]?  · Do you have HIV/AIDS?  · Have you lived in or worked in a nursing home, homeless  shelter, correctional facility, or substance abuse treatment facility?   · No    If Yes do you have any of the following symptoms? Yes responses display to the right    If Yes, symptoms listed are:  Cough greater than or equal to 3 weeks, Loss of appetite, Unexplained weight loss, Night sweats, Bloody sputum or hemoptysis, Hoarseness, Fever, Fatigue, Chest pain, No (not recorded)  (not recorded)   Exposure Screen: Have you been exposed to any of these contagious diseases in the last month? Measles, Chickenpox, Meningitis, Pertussis, Whooping Cough, No (not recorded)

## 2022-12-26 NOTE — PLAN OF CARE
Goal Outcome Evaluation:  Plan of Care Reviewed With: patient        Progress: no change  Outcome Evaluation: Pt is non-verbal, some aggitation noted during care, 2L NC, no respiratory distress noted, bed alarm in place, strict NPO due to not passing swallow study with ST, continue on IV abt, no adverse effects noted

## 2022-12-26 NOTE — SIGNIFICANT NOTE
Attempted PT eval today. No family present when attempted eval.  Secondary to pt being non verbal and with intellectual diabilities participation was poor.  Pt demonstrating agitation with attempts at PT eval.  Unable to complete meaningful evaluation.  Will attempt back as able.     12/26/22 0161   OTHER   Discipline physical therapist   Rehab Time/Intention   Session Not Performed patient/family declined, not feeling well   Recommendation   PT - Next Appointment 12/27/22

## 2022-12-26 NOTE — PROGRESS NOTES
Name: Erica Crockett ADMIT: 2022   : 1940  PCP: Tulio Carlson MD    MRN: [id number] LOS: 2 days   AGE/SEX: 82 y.o. female  [facility]     Subjective   Subjective   Nonverbal. Alert.    Review of Systems   Unable to perform ROS: Dementia      Objective   Objective   Vital Signs  Temp:  [97.4 °F (36.3 °C)-100.1 °F (37.8 °C)] 97.4 °F (36.3 °C)  Heart Rate:  [] 98  Resp:  [18-20] 18  BP: (149-174)/(76-91) 174/89  SpO2:  [93 %-94 %] 94 %  on  Flow (L/min):  [3] 3;   Device (Oxygen Therapy): nasal cannula  Body mass index is 25.84 kg/m².     Physical Exam  Vitals and nursing note reviewed.   Constitutional:       General: She is not in acute distress.     Appearance: She is ill-appearing.   Neck:      Vascular: No JVD.      Trachea: No tracheal deviation.   Cardiovascular:      Rate and Rhythm: Normal rate and regular rhythm.      Heart sounds: Normal heart sounds.   Pulmonary:      Effort: Pulmonary effort is normal. No respiratory distress.      Breath sounds: Normal breath sounds.   Abdominal:      General: Bowel sounds are normal.      Palpations: Abdomen is soft.      Tenderness: There is no abdominal tenderness.   Musculoskeletal:      Right lower leg: No edema.      Left lower leg: No edema.   Skin:     General: Skin is warm and dry.   Neurological:      General: No focal deficit present.      Mental Status: She is alert.      Comments: No obvious facial droop   Psychiatric:         Behavior: Behavior is uncooperative.     Results Review     I reviewed the patient's new clinical results.  Results from last 7 days   Lab Units 22  0638 22  0430 22  0847   WBC 10*3/mm3 8.38 7.62 7.62   HEMOGLOBIN g/dL 11.8* 11.1* 11.0*   PLATELETS 10*3/mm3 297 244 258     Results from last 7 days   Lab Units 22  0638 22  0430 22  0847   SODIUM mmol/L 145 141 140   POTASSIUM mmol/L 4.1 4.6 4.8   CHLORIDE mmol/L 112* 108* 105   CO2 mmol/L 20.0* 23.0 25.5   BUN mg/dL 23  23 34*   CREATININE mg/dL 0.87 0.90 1.24*   GLUCOSE mg/dL 87 73 95   EGFR mL/min/1.73 66.6 64.0 43.5*     Results from last 7 days   Lab Units 12/24/22  0430 12/23/22  0847   ALBUMIN g/dL 3.70 3.70   BILIRUBIN mg/dL 0.2 0.2   ALK PHOS U/L 130* 128*   AST (SGOT) U/L 32 33*   ALT (SGPT) U/L 33 34*     Results from last 7 days   Lab Units 12/25/22  0638 12/24/22  0430 12/23/22  0847   CALCIUM mg/dL 9.5 9.5 9.5   ALBUMIN g/dL  --  3.70 3.70   MAGNESIUM mg/dL  --   --  2.3   PHOSPHORUS mg/dL  --   --  3.5     Results from last 7 days   Lab Units 12/23/22  2142 12/23/22  0847   PROCALCITONIN ng/mL  --  0.08   LACTATE mmol/L 0.4* 0.5     Hemoglobin A1C   Date/Time Value Ref Range Status   12/24/2022 0430 5.80 (H) 4.80 - 5.60 % Final     Glucose   Date/Time Value Ref Range Status   12/25/2022 0002 89 70 - 130 mg/dL Final     Comment:     Meter: [id number] : [id number] Polina NORTH   12/24/2022 1338 129 70 - 130 mg/dL Final     Comment:     Meter: [id number] : [id number] Alisa NORTH   12/24/2022 1147 66 (L) 70 - 130 mg/dL Final     Comment:     Meter: [id number] : [id number] Alisa NORTH       CT Facial Bones Without Contrast    Result Date: 12/25/2022  FINDINGS AND IMPRESSION: No large periapical lucency is seen multiple dental amalgams are present which cannot be evaluated with CT.  There is no displaced facial fracture.  This report was finalized on 12/25/2022 4:40 PM by Dr. Justin Worthington M.D.      Scheduled Meds  amLODIPine, 5 mg, Oral, Daily  atorvastatin, 20 mg, Oral, Daily  cefTRIAXone, 1 g, Intravenous, Q24H  gabapentin, 300 mg, Oral, Nightly  Glycerin-Hypromellose-, 1 drop, Both Eyes, Q6H  lactobacillus acidophilus, 1 capsule, Oral, Daily  lamoTRIgine, 200 mg, Oral, BID  levETIRAcetam, 500 mg, Intravenous, Q12H  LORazepam, 0.25 mg, Oral, TID  lubiprostone, 8 mcg, Oral, BID With Meals  multivitamin, 1 tablet, Oral, Daily  pantoprazole, 40 mg, Oral, QAM  polyethylene glycol, 17  g, Oral, BID  zinc sulfate, 220 mg, Oral, Daily    Continuous Infusions  lactated ringers, 75 mL/hr, Last Rate: 75 mL/hr (12/25/22 0823)    PRN Meds  •  acetaminophen **OR** acetaminophen  •  bisacodyl  •  dextrose  •  dextrose  •  glucagon (human recombinant)  •  ipratropium-albuterol  •  ondansetron     NPO Diet NPO Type: Strict NPO     I have personally reviewed:  []  Laboratory    []  Microbiology   []  Radiology   [x]  EKG/Telemetry  SR on tele  []  Cardiology/Vascular   []  Pathology   []  Records      Assessment/Plan     Active Hospital Problems    Diagnosis  POA   • **Dental pain [K08.89]  Yes   • Aspiration pneumonia of right lung (HCC) [J69.0]  Yes   • Epilepsy, not refractory (HCC) [G40.909]  Yes   • Essential (primary) hypertension [I10]  Yes      Resolved Hospital Problems   No resolved problems to display.     82 y.o. female admitted with reported facial droop    Altered mental status  -Not felt to be TIA or stroke   -Possible dental issue Panorex machine reportedly down, CT head no large periapical lucency    Possible aspiration pneumonia  -Ceftriaxone   -SLP reevaluation, possible VFSS    Seizure disorder  -Keppra IV  -PO when able    · SCDs for DVT prophylaxis.  · DNR.  · Discussed with patient and nursing staff.  · Anticipate discharge back to her facility but need to clarify her swallowing status first.  Continue treating aspiration pneumonia.    Kenny Dexter MD  [facility]  12/26/22

## 2022-12-27 LAB
GLUCOSE BLDC GLUCOMTR-MCNC: 69 MG/DL (ref 70–130)
GLUCOSE BLDC GLUCOMTR-MCNC: 74 MG/DL (ref 70–130)
GLUCOSE BLDC GLUCOMTR-MCNC: 80 MG/DL (ref 70–130)
GLUCOSE BLDC GLUCOMTR-MCNC: 81 MG/DL (ref 70–130)
GLUCOSE BLDC GLUCOMTR-MCNC: 82 MG/DL (ref 70–130)
GLUCOSE BLDC GLUCOMTR-MCNC: 90 MG/DL (ref 70–130)

## 2022-12-28 LAB
BACTERIA SPEC AEROBE CULT: NORMAL

## 2023-01-06 RX ORDER — LORAZEPAM 1 MG/1
TABLET ORAL
Qty: 3 TABLET | Refills: 2 | Status: SHIPPED | OUTPATIENT
Start: 2023-01-06

## 2023-01-10 PROCEDURE — 84100 ASSAY OF PHOSPHORUS: CPT | Performed by: FAMILY MEDICINE

## 2023-01-10 PROCEDURE — 84439 ASSAY OF FREE THYROXINE: CPT | Performed by: FAMILY MEDICINE

## 2023-01-10 PROCEDURE — 80053 COMPREHEN METABOLIC PANEL: CPT | Performed by: FAMILY MEDICINE

## 2023-01-10 PROCEDURE — 83735 ASSAY OF MAGNESIUM: CPT | Performed by: FAMILY MEDICINE

## 2023-01-10 PROCEDURE — 82306 VITAMIN D 25 HYDROXY: CPT | Performed by: FAMILY MEDICINE

## 2023-01-10 PROCEDURE — 82746 ASSAY OF FOLIC ACID SERUM: CPT | Performed by: FAMILY MEDICINE

## 2023-01-10 PROCEDURE — 84443 ASSAY THYROID STIM HORMONE: CPT | Performed by: FAMILY MEDICINE

## 2023-01-10 PROCEDURE — 80061 LIPID PANEL: CPT | Performed by: FAMILY MEDICINE

## 2023-01-10 PROCEDURE — 80177 DRUG SCRN QUAN LEVETIRACETAM: CPT | Performed by: FAMILY MEDICINE

## 2023-01-10 PROCEDURE — 82607 VITAMIN B-12: CPT | Performed by: FAMILY MEDICINE

## 2023-01-10 PROCEDURE — 80175 DRUG SCREEN QUAN LAMOTRIGINE: CPT | Performed by: FAMILY MEDICINE

## 2023-01-11 ENCOUNTER — LAB REQUISITION (OUTPATIENT)
Dept: LAB | Facility: HOSPITAL | Age: 83
End: 2023-01-11
Payer: MEDICARE

## 2023-01-11 DIAGNOSIS — E87.5 HYPERKALEMIA: ICD-10-CM

## 2023-01-11 DIAGNOSIS — E16.2 HYPOGLYCEMIA, UNSPECIFIED: ICD-10-CM

## 2023-01-11 DIAGNOSIS — D50.9 IRON DEFICIENCY ANEMIA, UNSPECIFIED: ICD-10-CM

## 2023-01-11 DIAGNOSIS — I10 ESSENTIAL (PRIMARY) HYPERTENSION: ICD-10-CM

## 2023-01-11 DIAGNOSIS — K57.90 DIVERTICULOSIS OF INTESTINE, PART UNSPECIFIED, WITHOUT PERFORATION OR ABSCESS WITHOUT BLEEDING: ICD-10-CM

## 2023-01-11 DIAGNOSIS — G40.89 OTHER SEIZURES: ICD-10-CM

## 2023-01-11 DIAGNOSIS — E83.52 HYPERCALCEMIA: ICD-10-CM

## 2023-01-11 DIAGNOSIS — M81.0 AGE-RELATED OSTEOPOROSIS WITHOUT CURRENT PATHOLOGICAL FRACTURE: ICD-10-CM

## 2023-01-11 DIAGNOSIS — N18.30 CHRONIC KIDNEY DISEASE, STAGE 3 UNSPECIFIED: ICD-10-CM

## 2023-01-11 LAB
25(OH)D3 SERPL-MCNC: 73.9 NG/ML (ref 30–100)
ALBUMIN SERPL-MCNC: 4.4 G/DL (ref 3.5–5.2)
ALBUMIN/GLOB SERPL: 1 G/DL
ALP SERPL-CCNC: 154 U/L (ref 39–117)
ALT SERPL W P-5'-P-CCNC: 23 U/L (ref 1–33)
ANION GAP SERPL CALCULATED.3IONS-SCNC: 11.8 MMOL/L (ref 5–15)
AST SERPL-CCNC: 24 U/L (ref 1–32)
BILIRUB SERPL-MCNC: 0.2 MG/DL (ref 0–1.2)
BUN SERPL-MCNC: 31 MG/DL (ref 8–23)
BUN/CREAT SERPL: 19.3 (ref 7–25)
CALCIUM SPEC-SCNC: 10.6 MG/DL (ref 8.6–10.5)
CHLORIDE SERPL-SCNC: 104 MMOL/L (ref 98–107)
CHOLEST SERPL-MCNC: 192 MG/DL (ref 0–200)
CO2 SERPL-SCNC: 25.2 MMOL/L (ref 22–29)
CREAT SERPL-MCNC: 1.61 MG/DL (ref 0.57–1)
EGFRCR SERPLBLD CKD-EPI 2021: 31.8 ML/MIN/1.73
FOLATE SERPL-MCNC: >20 NG/ML (ref 4.78–24.2)
GLOBULIN UR ELPH-MCNC: 4.4 GM/DL
GLUCOSE SERPL-MCNC: 115 MG/DL (ref 65–99)
HDLC SERPL-MCNC: 96 MG/DL (ref 40–60)
LDLC SERPL CALC-MCNC: 86 MG/DL (ref 0–100)
LDLC/HDLC SERPL: 0.89 {RATIO}
MAGNESIUM SERPL-MCNC: 2.9 MG/DL (ref 1.6–2.4)
PHOSPHATE SERPL-MCNC: 3.8 MG/DL (ref 2.5–4.5)
POTASSIUM SERPL-SCNC: 4.7 MMOL/L (ref 3.5–5.2)
PROT SERPL-MCNC: 8.8 G/DL (ref 6–8.5)
SODIUM SERPL-SCNC: 141 MMOL/L (ref 136–145)
T4 FREE SERPL-MCNC: 1.05 NG/DL (ref 0.93–1.7)
TRIGL SERPL-MCNC: 53 MG/DL (ref 0–150)
TSH SERPL DL<=0.05 MIU/L-ACNC: 1.01 UIU/ML (ref 0.27–4.2)
VIT B12 BLD-MCNC: 1266 PG/ML (ref 211–946)
VLDLC SERPL-MCNC: 10 MG/DL (ref 5–40)

## 2023-01-12 ENCOUNTER — TRANSCRIBE ORDERS (OUTPATIENT)
Dept: ADMINISTRATIVE | Facility: HOSPITAL | Age: 83
End: 2023-01-12
Payer: MEDICARE

## 2023-01-12 ENCOUNTER — HOSPITAL ENCOUNTER (OUTPATIENT)
Dept: GENERAL RADIOLOGY | Facility: HOSPITAL | Age: 83
Discharge: HOME | End: 2023-01-12
Admitting: FAMILY MEDICINE
Payer: MEDICARE

## 2023-01-12 DIAGNOSIS — Z87.01 PERSONAL HISTORY OF PNEUMONIA (RECURRENT): Primary | ICD-10-CM

## 2023-01-12 PROCEDURE — 71046 X-RAY EXAM CHEST 2 VIEWS: CPT

## 2023-01-13 LAB
LAMOTRIGINE SERPL-MCNC: 10.9 UG/ML (ref 2–20)
LEVETIRACETAM SERPL-MCNC: 25 UG/ML (ref 10–40)

## 2023-01-15 ENCOUNTER — LAB REQUISITION (OUTPATIENT)
Dept: LAB | Facility: HOSPITAL | Age: 83
End: 2023-01-15
Payer: MEDICARE

## 2023-01-15 DIAGNOSIS — N18.9 CHRONIC KIDNEY DISEASE, UNSPECIFIED: ICD-10-CM

## 2023-01-15 LAB
ANION GAP SERPL CALCULATED.3IONS-SCNC: 5.4 MMOL/L (ref 5–15)
BUN SERPL-MCNC: 28 MG/DL (ref 8–23)
BUN/CREAT SERPL: 23 (ref 7–25)
CALCIUM SPEC-SCNC: 9.1 MG/DL (ref 8.6–10.5)
CHLORIDE SERPL-SCNC: 113 MMOL/L (ref 98–107)
CO2 SERPL-SCNC: 27.6 MMOL/L (ref 22–29)
CREAT SERPL-MCNC: 1.22 MG/DL (ref 0.57–1)
EGFRCR SERPLBLD CKD-EPI 2021: 44.4 ML/MIN/1.73
GLUCOSE SERPL-MCNC: 90 MG/DL (ref 65–99)
POTASSIUM SERPL-SCNC: 5 MMOL/L (ref 3.5–5.2)
SODIUM SERPL-SCNC: 146 MMOL/L (ref 136–145)

## 2023-01-15 PROCEDURE — 80048 BASIC METABOLIC PNL TOTAL CA: CPT | Performed by: FAMILY MEDICINE

## 2023-01-20 ENCOUNTER — HOSPITAL ENCOUNTER (EMERGENCY)
Facility: HOSPITAL | Age: 83
Discharge: HOME | End: 2023-01-20
Attending: EMERGENCY MEDICINE | Admitting: EMERGENCY MEDICINE
Payer: MEDICARE

## 2023-01-20 VITALS
DIASTOLIC BLOOD PRESSURE: 62 MMHG | WEIGHT: 165.1 LBS | TEMPERATURE: 97.3 F | OXYGEN SATURATION: 94 % | HEART RATE: 68 BPM | SYSTOLIC BLOOD PRESSURE: 136 MMHG | BODY MASS INDEX: 25.86 KG/M2 | RESPIRATION RATE: 22 BRPM

## 2023-01-20 DIAGNOSIS — F41.9 ANXIETY: Primary | ICD-10-CM

## 2023-01-20 DIAGNOSIS — H20.9 UVEITIS: Primary | ICD-10-CM

## 2023-01-20 PROCEDURE — 99283 EMERGENCY DEPT VISIT LOW MDM: CPT

## 2023-01-20 RX ORDER — LORAZEPAM 2 MG/ML
1 INJECTION INTRAMUSCULAR ONCE
Status: SHIPPED | OUTPATIENT
Start: 2023-01-20

## 2023-01-20 RX ORDER — PROPARACAINE HYDROCHLORIDE 5 MG/ML
2 SOLUTION/ DROPS OPHTHALMIC ONCE
Status: COMPLETED | OUTPATIENT
Start: 2023-01-20 | End: 2023-01-20

## 2023-01-20 RX ADMIN — PROPARACAINE HYDROCHLORIDE 2 DROP: 5 SOLUTION/ DROPS OPHTHALMIC at 17:16

## 2023-01-20 RX ADMIN — FLUORESCEIN SODIUM 1 STRIP: 1 STRIP OPHTHALMIC at 17:16

## 2023-01-20 NOTE — ED PROVIDER NOTES
Subjective   History of Present Illness  History of Present Illness    Chief complaint: Red eye    Location: Right    Quality/Severity: Moderate, with drainage    Timing/Onset/Duration: Started Sunday    Modifying Factors: Not getting better    Associated Symptoms: Patient has had drainage from the eye    Narrative: This 82-year-old female presents with red eye    PCPTulio Carlson MD  Review of Systems   Unable to perform ROS: Patient nonverbal        Medication List      ASK your doctor about these medications    amLODIPine 5 MG tablet  Commonly known as: NORVASC  Take 1 tablet by mouth Daily.     atorvastatin 20 MG tablet  Commonly known as: LIPITOR     Benefiber Drink Mix pack     cetirizine 10 MG tablet  Commonly known as: zyrTEC     cholecalciferol 25 MCG (1000 UT) tablet  Commonly known as: VITAMIN D3     dextromethorphan-guaifenesin  MG/5ML syrup  Commonly known as: ROBITUSSIN-DM     gabapentin 300 MG capsule  Commonly known as: NEURONTIN  Take 1 capsule by mouth Every Night.     HYDROcodone-acetaminophen 7.5-325 MG per tablet  Commonly known as: NORCO  Take 1 tablet by mouth Every 8 (Eight) Hours As Needed for Severe Pain.     ipratropium-albuterol 0.5-2.5 mg/3 ml nebulizer  Commonly known as: DUO-NEB  Take 3 mL by nebulization 4 (Four) Times a Day As Needed for Wheezing or Shortness of Air.     lamoTRIgine 200 MG tablet  Commonly known as: LaMICtal     levETIRAcetam 1000 MG tablet  Commonly known as: KEPPRA  Take 1.5 tablets by mouth 2 (Two) Times a Day for 30 days. Pt takes a total of 1500 mg PO twice daily     LORazepam 1 MG tablet  Commonly known as: Ativan  Take 2 tabs one hour prior to appointment and may repeat 1 tab prn upon arrival anxiety     lubiprostone 8 MCG capsule  Commonly known as: AMITIZA     magnesium hydroxide 400 MG/5ML suspension  Commonly known as: MILK OF MAGNESIA     multivitamin tablet tablet  Commonly known as: THERAGRAN     omeprazole 20 MG capsule  Commonly known as:  priLOSEC     polyethylene glycol 17 GM/SCOOP powder  Commonly known as: MIRALAX     PROBIOTIC PO     Prolia 60 MG/ML solution prefilled syringe syringe  Generic drug: denosumab     Vitamin C ER 1000 MG tablet controlled-release     Zinc Sulfate 220 (50 Zn) MG tablet            Past Medical History:   Diagnosis Date   • Anemia    • Anxiety    • Bursitis    • Bursitis    • DJD (degenerative joint disease), lumbar    • Hyperkalemia    • Hypoglycemia    • Intellectual disability    • Low back pain    • Osteoarthritis    • Osteopenia    • Pneumonia    • Renal disorder    • Renal insufficiency    • Scoliosis    • Seizure disorder (HCC)        Allergies   Allergen Reactions   • Bactrim [Sulfamethoxazole-Trimethoprim] Unknown - High Severity   • Bee Venom    • Iodinated Contrast Media    • Nsaids        No past surgical history on file.    Family History   Family history unknown: Yes       Social History     Socioeconomic History   • Marital status: Single   Tobacco Use   • Smoking status: Never   • Smokeless tobacco: Never   Vaping Use   • Vaping Use: Never used   Substance and Sexual Activity   • Alcohol use: No   • Drug use: No   • Sexual activity: Defer           Objective   Physical Exam  Vitals (The temperature is 97.3 °F, pulse 83, respirations 20, /82, room air pulse ox 94%) and nursing note reviewed.   Eyes:      Comments: The right pupil is midrange and nonreactive, the conjunctiva is injected.  The fluorescein stain is negative, the pressure is 11.         Procedures           ED Course      17:35 EST, 01/20/23:  I spoke with Dr. Sutherland, on-call for ophthalmology.  They will coordinate with the nursing staff for a follow-up appointment.                                         MDM    Final diagnoses:   Uveitis       ED Disposition  ED Disposition     None          No follow-up provider specified.       Medication List      No changes were made to your prescriptions during this visit.          Juliann  Mikey ESPINOZA MD  01/21/23 0015       Mikey Humphreys MD  01/25/23 [id number]

## 2023-01-28 ENCOUNTER — APPOINTMENT (OUTPATIENT)
Dept: GENERAL RADIOLOGY | Facility: HOSPITAL | Age: 83
End: 2023-01-28
Payer: MEDICARE

## 2023-01-28 ENCOUNTER — HOSPITAL ENCOUNTER (EMERGENCY)
Facility: HOSPITAL | Age: 83
Discharge: NURSING, REHAB OR LONG-TERM CARE | End: 2023-01-28
Attending: EMERGENCY MEDICINE | Admitting: EMERGENCY MEDICINE
Payer: MEDICARE

## 2023-01-28 VITALS
TEMPERATURE: 96.8 F | HEART RATE: 79 BPM | RESPIRATION RATE: 20 BRPM | HEIGHT: 63 IN | OXYGEN SATURATION: 97 % | WEIGHT: 148 LBS | SYSTOLIC BLOOD PRESSURE: 138 MMHG | BODY MASS INDEX: 26.22 KG/M2 | DIASTOLIC BLOOD PRESSURE: 77 MMHG

## 2023-01-28 DIAGNOSIS — R06.00 DYSPNEA, UNSPECIFIED TYPE: Primary | ICD-10-CM

## 2023-01-28 DIAGNOSIS — I95.9 TRANSIENT HYPOTENSION: ICD-10-CM

## 2023-01-28 LAB
ALBUMIN SERPL-MCNC: 3.6 G/DL (ref 3.5–5.2)
ALBUMIN/GLOB SERPL: 1.1 G/DL
ALP SERPL-CCNC: 103 U/L (ref 39–117)
ALT SERPL W P-5'-P-CCNC: 17 U/L (ref 1–33)
ANION GAP SERPL CALCULATED.3IONS-SCNC: 9.1 MMOL/L (ref 5–15)
AST SERPL-CCNC: 21 U/L (ref 1–32)
BASOPHILS # BLD AUTO: 0.05 10*3/MM3 (ref 0–0.2)
BASOPHILS NFR BLD AUTO: 0.5 % (ref 0–1.5)
BILIRUB SERPL-MCNC: 0.3 MG/DL (ref 0–1.2)
BUN SERPL-MCNC: 25 MG/DL (ref 8–23)
BUN/CREAT SERPL: 20.2 (ref 7–25)
CALCIUM SPEC-SCNC: 9.3 MG/DL (ref 8.6–10.5)
CHLORIDE SERPL-SCNC: 106 MMOL/L (ref 98–107)
CO2 SERPL-SCNC: 26.9 MMOL/L (ref 22–29)
CREAT SERPL-MCNC: 1.24 MG/DL (ref 0.57–1)
DEPRECATED RDW RBC AUTO: 50 FL (ref 37–54)
EGFRCR SERPLBLD CKD-EPI 2021: 43.5 ML/MIN/1.73
EOSINOPHIL # BLD AUTO: 0.25 10*3/MM3 (ref 0–0.4)
EOSINOPHIL NFR BLD AUTO: 2.3 % (ref 0.3–6.2)
ERYTHROCYTE [DISTWIDTH] IN BLOOD BY AUTOMATED COUNT: 14.2 % (ref 12.3–15.4)
GLOBULIN UR ELPH-MCNC: 3.4 GM/DL
GLUCOSE SERPL-MCNC: 92 MG/DL (ref 65–99)
HCT VFR BLD AUTO: 34.6 % (ref 34–46.6)
HGB BLD-MCNC: 10.7 G/DL (ref 12–15.9)
IMM GRANULOCYTES # BLD AUTO: 0.03 10*3/MM3 (ref 0–0.05)
IMM GRANULOCYTES NFR BLD AUTO: 0.3 % (ref 0–0.5)
LYMPHOCYTES # BLD AUTO: 3.81 10*3/MM3 (ref 0.7–3.1)
LYMPHOCYTES NFR BLD AUTO: 34.4 % (ref 19.6–45.3)
MCH RBC QN AUTO: 30.1 PG (ref 26.6–33)
MCHC RBC AUTO-ENTMCNC: 30.9 G/DL (ref 31.5–35.7)
MCV RBC AUTO: 97.5 FL (ref 79–97)
MONOCYTES # BLD AUTO: 0.61 10*3/MM3 (ref 0.1–0.9)
MONOCYTES NFR BLD AUTO: 5.5 % (ref 5–12)
NEUTROPHILS NFR BLD AUTO: 57 % (ref 42.7–76)
NEUTROPHILS NFR BLD AUTO: 6.34 10*3/MM3 (ref 1.7–7)
NRBC BLD AUTO-RTO: 0 /100 WBC (ref 0–0.2)
PLATELET # BLD AUTO: 266 10*3/MM3 (ref 140–450)
PMV BLD AUTO: 10.4 FL (ref 6–12)
POTASSIUM SERPL-SCNC: 4.7 MMOL/L (ref 3.5–5.2)
PROT SERPL-MCNC: 7 G/DL (ref 6–8.5)
RBC # BLD AUTO: 3.55 10*6/MM3 (ref 3.77–5.28)
SODIUM SERPL-SCNC: 142 MMOL/L (ref 136–145)
TROPONIN T SERPL-MCNC: <0.01 NG/ML (ref 0–0.03)
WBC NRBC COR # BLD: 11.09 10*3/MM3 (ref 3.4–10.8)

## 2023-01-28 PROCEDURE — 93010 ELECTROCARDIOGRAM REPORT: CPT | Performed by: INTERNAL MEDICINE

## 2023-01-28 PROCEDURE — 85025 COMPLETE CBC W/AUTO DIFF WBC: CPT | Performed by: EMERGENCY MEDICINE

## 2023-01-28 PROCEDURE — 99284 EMERGENCY DEPT VISIT MOD MDM: CPT

## 2023-01-28 PROCEDURE — 80053 COMPREHEN METABOLIC PANEL: CPT | Performed by: EMERGENCY MEDICINE

## 2023-01-28 PROCEDURE — 71045 X-RAY EXAM CHEST 1 VIEW: CPT

## 2023-01-28 PROCEDURE — 93005 ELECTROCARDIOGRAM TRACING: CPT | Performed by: EMERGENCY MEDICINE

## 2023-01-28 PROCEDURE — 84484 ASSAY OF TROPONIN QUANT: CPT | Performed by: EMERGENCY MEDICINE

## 2023-01-30 LAB — QT INTERVAL: 370 MS

## 2023-02-02 ENCOUNTER — HOSPITAL ENCOUNTER (OUTPATIENT)
Dept: ULTRASOUND IMAGING | Facility: HOSPITAL | Age: 83
Discharge: HOME | End: 2023-02-02
Admitting: OTOLARYNGOLOGY
Payer: MEDICARE

## 2023-02-02 DIAGNOSIS — E04.1 THYROID NODULE: ICD-10-CM

## 2023-02-02 PROCEDURE — 76536 US EXAM OF HEAD AND NECK: CPT

## 2023-02-15 ENCOUNTER — LAB REQUISITION (OUTPATIENT)
Dept: LAB | Facility: HOSPITAL | Age: 83
End: 2023-02-15
Payer: MEDICARE

## 2023-02-15 DIAGNOSIS — N81.3 COMPLETE UTEROVAGINAL PROLAPSE: ICD-10-CM

## 2023-02-15 LAB
ANION GAP SERPL CALCULATED.3IONS-SCNC: 11.6 MMOL/L (ref 5–15)
BUN SERPL-MCNC: 38 MG/DL (ref 8–23)
BUN/CREAT SERPL: 18.5 (ref 7–25)
CALCIUM SPEC-SCNC: 9.9 MG/DL (ref 8.6–10.5)
CHLORIDE SERPL-SCNC: 111 MMOL/L (ref 98–107)
CO2 SERPL-SCNC: 23.4 MMOL/L (ref 22–29)
CREAT SERPL-MCNC: 2.05 MG/DL (ref 0.57–1)
EGFRCR SERPLBLD CKD-EPI 2021: 23.8 ML/MIN/1.73
GLUCOSE SERPL-MCNC: 108 MG/DL (ref 65–99)
POTASSIUM SERPL-SCNC: 5.1 MMOL/L (ref 3.5–5.2)
SODIUM SERPL-SCNC: 146 MMOL/L (ref 136–145)

## 2023-02-15 PROCEDURE — 80048 BASIC METABOLIC PNL TOTAL CA: CPT | Performed by: FAMILY MEDICINE

## 2023-02-20 ENCOUNTER — LAB REQUISITION (OUTPATIENT)
Dept: LAB | Facility: HOSPITAL | Age: 83
End: 2023-02-20
Payer: MEDICARE

## 2023-02-20 DIAGNOSIS — Z13.228 ENCOUNTER FOR SCREENING FOR OTHER METABOLIC DISORDERS: ICD-10-CM

## 2023-02-20 LAB
ALBUMIN SERPL-MCNC: 3.6 G/DL (ref 3.5–5.2)
ALBUMIN/GLOB SERPL: 1.1 G/DL
ALP SERPL-CCNC: 98 U/L (ref 39–117)
ALT SERPL W P-5'-P-CCNC: 22 U/L (ref 1–33)
ANION GAP SERPL CALCULATED.3IONS-SCNC: 7.6 MMOL/L (ref 5–15)
ANION GAP SERPL CALCULATED.3IONS-SCNC: 7.6 MMOL/L (ref 5–15)
AST SERPL-CCNC: 27 U/L (ref 1–32)
BILIRUB SERPL-MCNC: <0.2 MG/DL (ref 0–1.2)
BUN SERPL-MCNC: 36 MG/DL (ref 8–23)
BUN SERPL-MCNC: 36 MG/DL (ref 8–23)
BUN/CREAT SERPL: 27.5 (ref 7–25)
BUN/CREAT SERPL: 27.5 (ref 7–25)
CALCIUM SPEC-SCNC: 10.4 MG/DL (ref 8.6–10.5)
CALCIUM SPEC-SCNC: 10.4 MG/DL (ref 8.6–10.5)
CHLORIDE SERPL-SCNC: 115 MMOL/L (ref 98–107)
CHLORIDE SERPL-SCNC: 115 MMOL/L (ref 98–107)
CO2 SERPL-SCNC: 25.4 MMOL/L (ref 22–29)
CO2 SERPL-SCNC: 25.4 MMOL/L (ref 22–29)
CREAT SERPL-MCNC: 1.31 MG/DL (ref 0.57–1)
CREAT SERPL-MCNC: 1.31 MG/DL (ref 0.57–1)
EGFRCR SERPLBLD CKD-EPI 2021: 40.8 ML/MIN/1.73
EGFRCR SERPLBLD CKD-EPI 2021: 40.8 ML/MIN/1.73
GLOBULIN UR ELPH-MCNC: 3.2 GM/DL
GLUCOSE SERPL-MCNC: 94 MG/DL (ref 65–99)
GLUCOSE SERPL-MCNC: 94 MG/DL (ref 65–99)
POTASSIUM SERPL-SCNC: 5 MMOL/L (ref 3.5–5.2)
POTASSIUM SERPL-SCNC: 5 MMOL/L (ref 3.5–5.2)
PROT SERPL-MCNC: 6.8 G/DL (ref 6–8.5)
SODIUM SERPL-SCNC: 148 MMOL/L (ref 136–145)
SODIUM SERPL-SCNC: 148 MMOL/L (ref 136–145)

## 2023-02-20 PROCEDURE — 80053 COMPREHEN METABOLIC PANEL: CPT | Performed by: FAMILY MEDICINE

## 2023-03-05 ENCOUNTER — HOSPITAL ENCOUNTER (INPATIENT)
Facility: HOSPITAL | Age: 83
LOS: 4 days | Discharge: HOME | DRG: 193 | End: 2023-03-09
Attending: EMERGENCY MEDICINE | Admitting: INTERNAL MEDICINE
Payer: MEDICARE

## 2023-03-05 ENCOUNTER — APPOINTMENT (OUTPATIENT)
Dept: GENERAL RADIOLOGY | Facility: HOSPITAL | Age: 83
DRG: 193 | End: 2023-03-05
Payer: MEDICARE

## 2023-03-05 DIAGNOSIS — J18.9 PNEUMONIA OF RIGHT MIDDLE LOBE DUE TO INFECTIOUS ORGANISM: Primary | ICD-10-CM

## 2023-03-05 DIAGNOSIS — R13.12 OROPHARYNGEAL DYSPHAGIA: ICD-10-CM

## 2023-03-05 LAB
ALBUMIN SERPL-MCNC: 3.7 G/DL (ref 3.5–5.2)
ALBUMIN/GLOB SERPL: 1.2 G/DL
ALP SERPL-CCNC: 125 U/L (ref 39–117)
ALT SERPL W P-5'-P-CCNC: 52 U/L (ref 1–33)
ANION GAP SERPL CALCULATED.3IONS-SCNC: 13.3 MMOL/L (ref 5–15)
AST SERPL-CCNC: 50 U/L (ref 1–32)
BASOPHILS # BLD AUTO: 0.01 10*3/MM3 (ref 0–0.2)
BASOPHILS NFR BLD AUTO: 0.1 % (ref 0–1.5)
BILIRUB SERPL-MCNC: 0.3 MG/DL (ref 0–1.2)
BUN SERPL-MCNC: 43 MG/DL (ref 8–23)
BUN/CREAT SERPL: 32.3 (ref 7–25)
CALCIUM SPEC-SCNC: 9.5 MG/DL (ref 8.6–10.5)
CHLORIDE SERPL-SCNC: 102 MMOL/L (ref 98–107)
CO2 SERPL-SCNC: 21.7 MMOL/L (ref 22–29)
CREAT SERPL-MCNC: 1.33 MG/DL (ref 0.57–1)
D-LACTATE SERPL-SCNC: 1.2 MMOL/L (ref 0.5–2)
DEPRECATED RDW RBC AUTO: 51.2 FL (ref 37–54)
EGFRCR SERPLBLD CKD-EPI 2021: 40 ML/MIN/1.73
EOSINOPHIL # BLD AUTO: 0.03 10*3/MM3 (ref 0–0.4)
EOSINOPHIL NFR BLD AUTO: 0.4 % (ref 0.3–6.2)
ERYTHROCYTE [DISTWIDTH] IN BLOOD BY AUTOMATED COUNT: 14.2 % (ref 12.3–15.4)
GLOBULIN UR ELPH-MCNC: 3 GM/DL
GLUCOSE SERPL-MCNC: 94 MG/DL (ref 65–99)
HCT VFR BLD AUTO: 35.5 % (ref 34–46.6)
HGB BLD-MCNC: 10.8 G/DL (ref 12–15.9)
HOLD SPECIMEN: NORMAL
HOLD SPECIMEN: NORMAL
IMM GRANULOCYTES # BLD AUTO: 0.01 10*3/MM3 (ref 0–0.05)
IMM GRANULOCYTES NFR BLD AUTO: 0.1 % (ref 0–0.5)
LYMPHOCYTES # BLD AUTO: 1.18 10*3/MM3 (ref 0.7–3.1)
LYMPHOCYTES NFR BLD AUTO: 17.5 % (ref 19.6–45.3)
MCH RBC QN AUTO: 29.4 PG (ref 26.6–33)
MCHC RBC AUTO-ENTMCNC: 30.4 G/DL (ref 31.5–35.7)
MCV RBC AUTO: 96.7 FL (ref 79–97)
MONOCYTES # BLD AUTO: 0.35 10*3/MM3 (ref 0.1–0.9)
MONOCYTES NFR BLD AUTO: 5.2 % (ref 5–12)
NEUTROPHILS NFR BLD AUTO: 5.15 10*3/MM3 (ref 1.7–7)
NEUTROPHILS NFR BLD AUTO: 76.7 % (ref 42.7–76)
NT-PROBNP SERPL-MCNC: 775.8 PG/ML (ref 0–1800)
PLATELET # BLD AUTO: 217 10*3/MM3 (ref 140–450)
PMV BLD AUTO: 11.4 FL (ref 6–12)
POTASSIUM SERPL-SCNC: 4.9 MMOL/L (ref 3.5–5.2)
PROCALCITONIN SERPL-MCNC: 2.83 NG/ML (ref 0–0.25)
PROT SERPL-MCNC: 6.7 G/DL (ref 6–8.5)
QT INTERVAL: 340 MS
RBC # BLD AUTO: 3.67 10*6/MM3 (ref 3.77–5.28)
SODIUM SERPL-SCNC: 137 MMOL/L (ref 136–145)
TROPONIN T SERPL HS-MCNC: 27 NG/L
TROPONIN T SERPL HS-MCNC: 28 NG/L
WBC NRBC COR # BLD: 6.73 10*3/MM3 (ref 3.4–10.8)
WHOLE BLOOD HOLD COAG: NORMAL
WHOLE BLOOD HOLD SPECIMEN: NORMAL

## 2023-03-05 PROCEDURE — 80053 COMPREHEN METABOLIC PANEL: CPT | Performed by: EMERGENCY MEDICINE

## 2023-03-05 PROCEDURE — 87077 CULTURE AEROBIC IDENTIFY: CPT | Performed by: EMERGENCY MEDICINE

## 2023-03-05 PROCEDURE — 99285 EMERGENCY DEPT VISIT HI MDM: CPT

## 2023-03-05 PROCEDURE — 71046 X-RAY EXAM CHEST 2 VIEWS: CPT

## 2023-03-05 PROCEDURE — 94799 UNLISTED PULMONARY SVC/PX: CPT

## 2023-03-05 PROCEDURE — 25010000002 VANCOMYCIN 1 G RECONSTITUTED SOLUTION: Performed by: INTERNAL MEDICINE

## 2023-03-05 PROCEDURE — 85025 COMPLETE CBC W/AUTO DIFF WBC: CPT | Performed by: EMERGENCY MEDICINE

## 2023-03-05 PROCEDURE — 25010000002 ENOXAPARIN PER 10 MG: Performed by: INTERNAL MEDICINE

## 2023-03-05 PROCEDURE — 94761 N-INVAS EAR/PLS OXIMETRY MLT: CPT

## 2023-03-05 PROCEDURE — 84484 ASSAY OF TROPONIN QUANT: CPT | Performed by: EMERGENCY MEDICINE

## 2023-03-05 PROCEDURE — 83880 ASSAY OF NATRIURETIC PEPTIDE: CPT | Performed by: EMERGENCY MEDICINE

## 2023-03-05 PROCEDURE — 87147 CULTURE TYPE IMMUNOLOGIC: CPT | Performed by: EMERGENCY MEDICINE

## 2023-03-05 PROCEDURE — 93005 ELECTROCARDIOGRAM TRACING: CPT | Performed by: EMERGENCY MEDICINE

## 2023-03-05 PROCEDURE — 94640 AIRWAY INHALATION TREATMENT: CPT

## 2023-03-05 PROCEDURE — 93010 ELECTROCARDIOGRAM REPORT: CPT | Performed by: INTERNAL MEDICINE

## 2023-03-05 PROCEDURE — 87150 DNA/RNA AMPLIFIED PROBE: CPT | Performed by: EMERGENCY MEDICINE

## 2023-03-05 PROCEDURE — 36415 COLL VENOUS BLD VENIPUNCTURE: CPT

## 2023-03-05 PROCEDURE — 87186 SC STD MICRODIL/AGAR DIL: CPT | Performed by: EMERGENCY MEDICINE

## 2023-03-05 PROCEDURE — 83605 ASSAY OF LACTIC ACID: CPT | Performed by: EMERGENCY MEDICINE

## 2023-03-05 PROCEDURE — 99223 1ST HOSP IP/OBS HIGH 75: CPT | Performed by: INTERNAL MEDICINE

## 2023-03-05 PROCEDURE — 87040 BLOOD CULTURE FOR BACTERIA: CPT | Performed by: EMERGENCY MEDICINE

## 2023-03-05 PROCEDURE — 84145 PROCALCITONIN (PCT): CPT | Performed by: EMERGENCY MEDICINE

## 2023-03-05 PROCEDURE — 25010000002 CEFTRIAXONE SODIUM-DEXTROSE 2-2.22 GM-%(50ML) RECONSTITUTED SOLUTION: Performed by: EMERGENCY MEDICINE

## 2023-03-05 RX ORDER — POLYVINYL ALCOHOL 14 MG/ML
1 SOLUTION/ DROPS OPHTHALMIC 4 TIMES DAILY
Status: DISCONTINUED | OUTPATIENT
Start: 2023-03-05 | End: 2023-03-09 | Stop reason: HOSPADM

## 2023-03-05 RX ORDER — SODIUM CHLORIDE 0.9 % (FLUSH) 0.9 %
10 SYRINGE (ML) INJECTION AS NEEDED
Status: DISCONTINUED | OUTPATIENT
Start: 2023-03-05 | End: 2023-03-09 | Stop reason: HOSPADM

## 2023-03-05 RX ORDER — ACETAMINOPHEN 325 MG/1
650 TABLET ORAL EVERY 6 HOURS PRN
COMMUNITY

## 2023-03-05 RX ORDER — ENOXAPARIN SODIUM 100 MG/ML
40 INJECTION SUBCUTANEOUS DAILY
Status: DISCONTINUED | OUTPATIENT
Start: 2023-03-05 | End: 2023-03-09 | Stop reason: HOSPADM

## 2023-03-05 RX ORDER — LEVETIRACETAM 500 MG/1
500 TABLET ORAL 2 TIMES DAILY
Status: DISCONTINUED | OUTPATIENT
Start: 2023-03-05 | End: 2023-03-09 | Stop reason: HOSPADM

## 2023-03-05 RX ORDER — CEFTRIAXONE 1 G/50ML
1 INJECTION, SOLUTION INTRAVENOUS EVERY 24 HOURS
Status: DISCONTINUED | OUTPATIENT
Start: 2023-03-05 | End: 2023-03-08

## 2023-03-05 RX ORDER — CEFTRIAXONE 2 G/50ML
2 INJECTION, SOLUTION INTRAVENOUS ONCE
Status: COMPLETED | OUTPATIENT
Start: 2023-03-05 | End: 2023-03-05

## 2023-03-05 RX ORDER — LORAZEPAM 0.5 MG/1
0.5 TABLET ORAL EVERY 8 HOURS
COMMUNITY

## 2023-03-05 RX ORDER — SODIUM CHLORIDE 9 MG/ML
40 INJECTION, SOLUTION INTRAVENOUS AS NEEDED
Status: DISCONTINUED | OUTPATIENT
Start: 2023-03-05 | End: 2023-03-09 | Stop reason: HOSPADM

## 2023-03-05 RX ORDER — SODIUM CHLORIDE 9 MG/ML
100 INJECTION, SOLUTION INTRAVENOUS CONTINUOUS
Status: DISCONTINUED | OUTPATIENT
Start: 2023-03-05 | End: 2023-03-07

## 2023-03-05 RX ORDER — PANTOPRAZOLE SODIUM 40 MG/1
40 TABLET, DELAYED RELEASE ORAL
Status: DISCONTINUED | OUTPATIENT
Start: 2023-03-06 | End: 2023-03-09 | Stop reason: HOSPADM

## 2023-03-05 RX ORDER — IPRATROPIUM BROMIDE AND ALBUTEROL SULFATE 2.5; .5 MG/3ML; MG/3ML
3 SOLUTION RESPIRATORY (INHALATION)
Status: DISCONTINUED | OUTPATIENT
Start: 2023-03-05 | End: 2023-03-09 | Stop reason: HOSPADM

## 2023-03-05 RX ORDER — POLYETHYLENE GLYCOL 3350 17 G/17G
17 POWDER, FOR SOLUTION ORAL DAILY PRN
Status: DISCONTINUED | OUTPATIENT
Start: 2023-03-05 | End: 2023-03-09 | Stop reason: HOSPADM

## 2023-03-05 RX ORDER — ACETAMINOPHEN 325 MG/1
650 TABLET ORAL EVERY 4 HOURS PRN
Status: DISCONTINUED | OUTPATIENT
Start: 2023-03-05 | End: 2023-03-08

## 2023-03-05 RX ORDER — LEVETIRACETAM 500 MG/1
500 TABLET ORAL 2 TIMES DAILY
COMMUNITY

## 2023-03-05 RX ORDER — ASCORBIC ACID 500 MG
500 TABLET ORAL DAILY
Status: DISCONTINUED | OUTPATIENT
Start: 2023-03-05 | End: 2023-03-09 | Stop reason: HOSPADM

## 2023-03-05 RX ORDER — METRONIDAZOLE 500 MG/100ML
500 INJECTION, SOLUTION INTRAVENOUS EVERY 8 HOURS
Status: DISCONTINUED | OUTPATIENT
Start: 2023-03-05 | End: 2023-03-06

## 2023-03-05 RX ORDER — SODIUM CHLORIDE 0.9 % (FLUSH) 0.9 %
10 SYRINGE (ML) INJECTION EVERY 12 HOURS SCHEDULED
Status: DISCONTINUED | OUTPATIENT
Start: 2023-03-05 | End: 2023-03-09 | Stop reason: HOSPADM

## 2023-03-05 RX ORDER — ONDANSETRON 4 MG/1
4 TABLET, FILM COATED ORAL EVERY 6 HOURS PRN
Status: DISCONTINUED | OUTPATIENT
Start: 2023-03-05 | End: 2023-03-09 | Stop reason: HOSPADM

## 2023-03-05 RX ORDER — BISACODYL 10 MG
10 SUPPOSITORY, RECTAL RECTAL DAILY PRN
Status: DISCONTINUED | OUTPATIENT
Start: 2023-03-05 | End: 2023-03-09 | Stop reason: HOSPADM

## 2023-03-05 RX ORDER — ONDANSETRON 2 MG/ML
4 INJECTION INTRAMUSCULAR; INTRAVENOUS EVERY 6 HOURS PRN
Status: DISCONTINUED | OUTPATIENT
Start: 2023-03-05 | End: 2023-03-09 | Stop reason: HOSPADM

## 2023-03-05 RX ORDER — GABAPENTIN 300 MG/1
300 CAPSULE ORAL NIGHTLY
Status: DISCONTINUED | OUTPATIENT
Start: 2023-03-05 | End: 2023-03-09 | Stop reason: HOSPADM

## 2023-03-05 RX ORDER — LORAZEPAM 0.5 MG/1
0.5 TABLET ORAL EVERY 8 HOURS PRN
Status: DISCONTINUED | OUTPATIENT
Start: 2023-03-05 | End: 2023-03-09 | Stop reason: HOSPADM

## 2023-03-05 RX ORDER — LAMOTRIGINE 100 MG/1
200 TABLET ORAL 2 TIMES DAILY
Status: DISCONTINUED | OUTPATIENT
Start: 2023-03-05 | End: 2023-03-09 | Stop reason: HOSPADM

## 2023-03-05 RX ORDER — ALBUTEROL SULFATE 2.5 MG/3ML
2.5 SOLUTION RESPIRATORY (INHALATION) EVERY 4 HOURS PRN
Status: DISCONTINUED | OUTPATIENT
Start: 2023-03-05 | End: 2023-03-09 | Stop reason: HOSPADM

## 2023-03-05 RX ORDER — L.ACID,PARA/B.BIFIDUM/S.THERM 8B CELL
1 CAPSULE ORAL DAILY
Status: DISCONTINUED | OUTPATIENT
Start: 2023-03-05 | End: 2023-03-09 | Stop reason: HOSPADM

## 2023-03-05 RX ORDER — SIMETHICONE 80 MG
80 TABLET,CHEWABLE ORAL EVERY 6 HOURS PRN
COMMUNITY

## 2023-03-05 RX ORDER — BISACODYL 5 MG/1
5 TABLET, DELAYED RELEASE ORAL DAILY PRN
Status: DISCONTINUED | OUTPATIENT
Start: 2023-03-05 | End: 2023-03-09 | Stop reason: HOSPADM

## 2023-03-05 RX ORDER — GUAIFENESIN AND DEXTROMETHORPHAN HYDROBROMIDE 100; 10 MG/5ML; MG/5ML
10 SOLUTION ORAL EVERY 4 HOURS PRN
Status: DISCONTINUED | OUTPATIENT
Start: 2023-03-05 | End: 2023-03-09 | Stop reason: HOSPADM

## 2023-03-05 RX ORDER — ATORVASTATIN CALCIUM 20 MG/1
20 TABLET, FILM COATED ORAL DAILY
Status: DISCONTINUED | OUTPATIENT
Start: 2023-03-05 | End: 2023-03-09 | Stop reason: HOSPADM

## 2023-03-05 RX ORDER — ACETAMINOPHEN 160 MG/5ML
650 SOLUTION ORAL EVERY 4 HOURS PRN
Status: DISCONTINUED | OUTPATIENT
Start: 2023-03-05 | End: 2023-03-08

## 2023-03-05 RX ORDER — LORAZEPAM 2 MG/ML
4 INJECTION INTRAMUSCULAR EVERY 4 HOURS PRN
Status: DISCONTINUED | OUTPATIENT
Start: 2023-03-05 | End: 2023-03-09 | Stop reason: HOSPADM

## 2023-03-05 RX ORDER — LUBIPROSTONE 8 UG/1
8 CAPSULE ORAL 2 TIMES DAILY WITH MEALS
Status: DISCONTINUED | OUTPATIENT
Start: 2023-03-05 | End: 2023-03-09 | Stop reason: HOSPADM

## 2023-03-05 RX ORDER — CHOLECALCIFEROL (VITAMIN D3) 125 MCG
5 CAPSULE ORAL NIGHTLY PRN
Status: DISCONTINUED | OUTPATIENT
Start: 2023-03-05 | End: 2023-03-09 | Stop reason: HOSPADM

## 2023-03-05 RX ORDER — ACETAMINOPHEN 650 MG/1
650 SUPPOSITORY RECTAL EVERY 4 HOURS PRN
Status: DISCONTINUED | OUTPATIENT
Start: 2023-03-05 | End: 2023-03-08

## 2023-03-05 RX ADMIN — ENOXAPARIN SODIUM 40 MG: 40 INJECTION SUBCUTANEOUS at 13:41

## 2023-03-05 RX ADMIN — METRONIDAZOLE 500 MG: 500 INJECTION, SOLUTION INTRAVENOUS at 20:18

## 2023-03-05 RX ADMIN — SODIUM CHLORIDE 1 G: 900 INJECTION, SOLUTION INTRAVENOUS at 16:15

## 2023-03-05 RX ADMIN — OXYCODONE HYDROCHLORIDE AND ACETAMINOPHEN 500 MG: 500 TABLET ORAL at 13:41

## 2023-03-05 RX ADMIN — Medication 10 ML: at 20:29

## 2023-03-05 RX ADMIN — SODIUM CHLORIDE 100 ML/HR: 9 INJECTION, SOLUTION INTRAVENOUS at 20:28

## 2023-03-05 RX ADMIN — Medication 1 CAPSULE: at 13:41

## 2023-03-05 RX ADMIN — SODIUM CHLORIDE 500 ML: 9 INJECTION, SOLUTION INTRAVENOUS at 07:23

## 2023-03-05 RX ADMIN — METRONIDAZOLE 500 MG: 500 INJECTION, SOLUTION INTRAVENOUS at 12:13

## 2023-03-05 RX ADMIN — LEVETIRACETAM 500 MG: 500 TABLET, FILM COATED ORAL at 20:18

## 2023-03-05 RX ADMIN — IPRATROPIUM BROMIDE AND ALBUTEROL SULFATE 3 ML: .5; 3 SOLUTION RESPIRATORY (INHALATION) at 19:47

## 2023-03-05 RX ADMIN — ATORVASTATIN CALCIUM 20 MG: 20 TABLET, FILM COATED ORAL at 13:41

## 2023-03-05 RX ADMIN — ACETAMINOPHEN 650 MG: 325 SOLUTION ORAL at 20:18

## 2023-03-05 RX ADMIN — CEFTRIAXONE 2 G: 2 INJECTION, SOLUTION INTRAVENOUS at 10:28

## 2023-03-05 NOTE — PLAN OF CARE
Goal Outcome Evaluation:  Plan of Care Reviewed With: patient        Progress: no change  Outcome Evaluation: new admission from [facility], staff at bedside until 3p. patient is non verbal and incontinent. fluids given and anbx initiated. vss.

## 2023-03-05 NOTE — ED NOTES
"Pt caretaker came to nurses station and reports \"pt soiled herself\" cleaned and changed pt depends. Pt tolerated well  "

## 2023-03-05 NOTE — ED PROVIDER NOTES
"Subjective     History provided by:  Medical records and EMS personnel    History of Present Illness    · Chief complaint: Low oxygen saturations and fever    · Location: At [facility]    · Quality/Severity: The patient reportedly had a fever of 101.5 and low oxygen saturations.  Nursing staff nor caretaker from [facility] know exactly how low her sats were.  EMS reports a saturation of 86% on 10 L and 93% on a nonrebreather mask.    · Timing/Onset: The patient was found this morning with this condition.    · Modifying Factors: The patient's been evaluated here for the same in the past.    · Associated symptoms: None know.    · Narrative: The patient is an 82-year-old white female who is a resident of [facility].  She is nonverbal and has a history of intellectual disability.  She was found febrile with a reported temperature of 101.5.  She had low oxygen saturations and was sent here.  EMS report an oxygen saturation of 86% on 10 L of oxygen via nasal cannula.  On a nonrebreather mask her oxygen saturation was 93% and her heart rate was 116.  I evaluated the patient here in January for low oxygen saturations and no pathology was found at that time.    Review of Systems  Past Medical History:   Diagnosis Date   • Anemia    • Anxiety    • Bursitis    • Bursitis    • DJD (degenerative joint disease), lumbar    • Hyperkalemia    • Hypoglycemia    • Intellectual disability    • Low back pain    • Osteoarthritis    • Osteopenia    • Pneumonia    • Renal disorder    • Renal insufficiency    • Scoliosis    • Seizure disorder ([facility])      BP 95/43 (BP Location: Right arm, Patient Position: Lying)   Pulse 85   Temp 98.5 °F (36.9 °C) (Axillary)   Resp 22   Ht 170.2 cm (67\")   Wt 75.3 kg (165 lb 14.4 oz)   SpO2 94%   BMI 25.98 kg/m²     Past Medical History:   Diagnosis Date   • Anemia    • Anxiety    • Bursitis    • Bursitis    • DJD (degenerative joint disease), lumbar    • " Hyperkalemia    • Hypoglycemia    • Intellectual disability    • Low back pain    • Osteoarthritis    • Osteopenia    • Pneumonia    • Renal disorder    • Renal insufficiency    • Scoliosis    • Seizure disorder (HCC)        Allergies   Allergen Reactions   • Bactrim [Sulfamethoxazole-Trimethoprim] Unknown - High Severity   • Bee Venom    • Iodinated Contrast Media    • Nsaids        History reviewed. No pertinent surgical history.    Family History   Family history unknown: Yes       Social History     Socioeconomic History   • Marital status: Single   Tobacco Use   • Smoking status: Never   • Smokeless tobacco: Never   Vaping Use   • Vaping Use: Never used   Substance and Sexual Activity   • Alcohol use: No   • Drug use: No   • Sexual activity: Defer           Objective   Physical Exam  Vitals and nursing note reviewed.   Constitutional:       General: She is not in acute distress.     Appearance: She is well-developed and normal weight. She is not ill-appearing, toxic-appearing or diaphoretic.      Comments: The patient appeared in no acute distress when I evaluated the patient.  She did not appear toxic.  Review of her vital signs: She was afebrile with a temperature of 98.2, slightly tachypnea with a respiratory rate of 21 with an oxygen saturation of 80% on room air that improved to 90% on 4 L of oxygen via nasal cannula, tachycardic with a heart rate of 109, blood pressure low 94/58.   HENT:      Head: Normocephalic and atraumatic.      Mouth/Throat:      Mouth: Mucous membranes are moist.      Pharynx: Oropharynx is clear.   Cardiovascular:      Rate and Rhythm: Regular rhythm. Tachycardia present.      Heart sounds: No murmur heard.  Pulmonary:      Effort: Pulmonary effort is normal.      Comments: The patient makes a moaning sound with her mouth making it difficult to hear anything else listening to her lungs.  Abdominal:      General: Bowel sounds are normal.      Palpations: Abdomen is soft.       Tenderness: There is no abdominal tenderness.   Musculoskeletal:      Cervical back: Normal range of motion and neck supple.      Right lower leg: No edema.      Left lower leg: No edema.   Skin:     General: Skin is warm and dry.      Capillary Refill: Capillary refill takes less than 2 seconds.   Neurological:      Mental Status: She is alert.   Psychiatric:         Mood and Affect: Mood normal.         Procedures           ED Course  ED Course as of 03/05/23 1540   Sun Mar 05, 2023   0726 My interpretation of the patient's EKG tracing performed 07: 08 is sinus tachycardia with a rate of 100, no acute ST segment elevation or depression consistent with ischemia, left axis deviation, normal R wave transition, normal NH and QT intervals.  With the exception of the sinus tachycardia, no change in comparison to tracing 12/23/2020. [TP]   0849 Review the patient's laboratory studies: The patient CBC has a normal white count of 6.7 with a left shift.  Hemoglobin slightly low at 10.8 with a normal hematocrit of 35.5 and normal platelets 217.  The patient's CMP has an elevated BUN of 43 and an elevated creatinine of 1.33 with a low GFR 40 which is baseline for the patient consistent with chronic kidney disease.  Sodium and potassium and glucose are normal.  Mild elevation of the transaminases with a normal total bilirubin.  High-sensitivity troponin is in the indeterminate range at 27.  proBNP normal at 775.8.  Lactic acid normal at 1.2 and procalcitonin elevated significantly at 2.83.  2 sets of blood cultures are pending. [TP]   0932 The patient's chest x-ray shows a new right midlung infiltrate consistent with pneumonia. [TP]   0948 The patient is hypoxic and her blood pressure is borderline low and tachycardic.  Is my impression she would benefit from hospitalization.  Antibiotic therapy was initiated with Rocephin and vancomycin. [TP]   1002 10: 01 patient discussed with Dr. Cardenas, hospitalist, who agreed to admit  the patient. [TP]      ED Course User Index  [TP] Curt Gutierrez MD                                           Medical Decision Making  My differential diagnosis for dyspnea includes but is not limited to:  Asthma, COPD, pneumonia, pulmonary embolus, acute respiratory distress syndrome, pneumothorax, pleural effusion, pulmonary fibrosis, congestive heart failure, myocardial infarction, DKA, uremia, acidosis, sepsis, anemia, drug related, hyperventilation, CNS disease    Pneumonia of right middle lobe due to infectious organism: acute illness or injury  Amount and/or Complexity of Data Reviewed  Independent Historian: EMS  Labs: ordered. Decision-making details documented in ED Course.  Radiology: ordered. Decision-making details documented in ED Course.  ECG/medicine tests: ordered. Decision-making details documented in ED Course.  Discussion of management or test interpretation with external provider(s): Details documented in the ED course.    Risk  Prescription drug management.  Decision regarding hospitalization.          Final diagnoses:   Pneumonia of right middle lobe due to infectious organism       ED Disposition  ED Disposition     ED Disposition   Decision to Admit    Condition   --    Comment   Level of Care: Telemetry [5]   Diagnosis: Pneumonia of right middle lobe due to infectious organism [[id number]]   Admitting Physician: CHARLINE MONROE [[id number]]   Isolate for COVID?: No [0]   Bed Request Comments: Right middle lobe pneumonia   Certification: I Certify That Inpatient Hospital Services Are Medically Necessary For Greater Than 2 Midnights               No follow-up provider specified.       Medication List      ASK your doctor about these medications    levETIRAcetam 500 MG tablet  Commonly known as: KEPPRA  Ask about: Which instructions should I use?            Labs Reviewed   COMPREHENSIVE METABOLIC PANEL - Abnormal; Notable for the following components:       Result Value    BUN 43 (*)     Creatinine  1.33 (*)     CO2 21.7 (*)     ALT (SGPT) 52 (*)     AST (SGOT) 50 (*)     Alkaline Phosphatase 125 (*)     BUN/Creatinine Ratio 32.3 (*)     eGFR 40.0 (*)     All other components within normal limits    Narrative:     GFR Normal >60  Chronic Kidney Disease <60  Kidney Failure <15    The GFR formula is only valid for adults with stable renal function between ages 18 and 70.   SINGLE HSTROPONIN T - Abnormal; Notable for the following components:    HS Troponin T 27 (*)     All other components within normal limits    Narrative:     High Sensitive Troponin T Reference Range:  <10.0 ng/L- Negative Female for AMI  <15.0 ng/L- Negative Male for AMI  >=10 - Abnormal Female indicating possible myocardial injury.  >=15 - Abnormal Male indicating possible myocardial injury.   Clinicians would have to utilize clinical acumen, EKG, Troponin, and serial changes to determine if it is an Acute Myocardial Infarction or myocardial injury due to an underlying chronic condition.        CBC WITH AUTO DIFFERENTIAL - Abnormal; Notable for the following components:    RBC 3.67 (*)     Hemoglobin 10.8 (*)     MCHC 30.4 (*)     Neutrophil % 76.7 (*)     Lymphocyte % 17.5 (*)     All other components within normal limits   PROCALCITONIN - Abnormal; Notable for the following components:    Procalcitonin 2.83 (*)     All other components within normal limits    Narrative:     As a Marker for Sepsis (Non-Neonates):    1. <0.5 ng/mL represents a low risk of severe sepsis and/or septic shock.  2. >2 ng/mL represents a high risk of severe sepsis and/or septic shock.    As a Marker for Lower Respiratory Tract Infections that require antibiotic therapy:    PCT on Admission    Antibiotic Therapy       6-12 Hrs later    >0.5                Strongly Recommended  >0.25 - <0.5        Recommended   0.1 - 0.25          Discouraged              Remeasure/reassess PCT  <0.1                Strongly Discouraged     Remeasure/reassess PCT    As 28 day  "mortality risk marker: \"Change in Procalcitonin Result\" (>80% or <=80%) if Day 0 (or Day 1) and Day 4 values are available. Refer to [email or web address] [email or web address][email or web address]    Change in PCT <=80%  A decrease of PCT levels below or equal to 80% defines a positive change in PCT test result representing a higher risk for 28-day all-cause mortality of patients diagnosed with severe sepsis for septic shock.    Change in PCT >80%  A decrease of PCT levels of more than 80% defines a negative change in PCT result representing a lower risk for 28-day all-cause mortality of patients diagnosed with severe sepsis or septic shock.      SINGLE HSTROPONIN T - Abnormal; Notable for the following components:    HS Troponin T 28 (*)     All other components within normal limits    Narrative:     High Sensitive Troponin T Reference Range:  <10.0 ng/L- Negative Female for AMI  <15.0 ng/L- Negative Male for AMI  >=10 - Abnormal Female indicating possible myocardial injury.  >=15 - Abnormal Male indicating possible myocardial injury.   Clinicians would have to utilize clinical acumen, EKG, Troponin, and serial changes to determine if it is an Acute Myocardial Infarction or myocardial injury due to an underlying chronic condition.        BNP (IN-HOUSE) - Normal    Narrative:     Among patients with dyspnea, NT-proBNP is highly sensitive for the detection of acute congestive heart failure. In addition NT-proBNP of <300 pg/ml effectively rules out acute congestive heart failure with 99% negative predictive value.    Results may be falsely decreased if patient taking Biotin.     LACTIC ACID, PLASMA - Normal   BLOOD CULTURE   BLOOD CULTURE   RAINBOW DRAW    Narrative:     The following orders were created for panel order [place] Draw.  Procedure                               Abnormality         Status                     ---------                               -----------         ------                     Green Top (Gel)[[id number]]              "                     Final result               Lavender Top[[id number]]                                     Final result               Gold Top - SST[[id number]]                                   Final result               Light Blue Top[[id number]]                                   Final result                 Please view results for these tests on the individual orders.   CBC AND DIFFERENTIAL    Narrative:     The following orders were created for panel order CBC & Differential.  Procedure                               Abnormality         Status                     ---------                               -----------         ------                     CBC Auto Differential[[id number]]        Abnormal            Final result                 Please view results for these tests on the individual orders.   GREEN TOP   LAVENDER TOP   GOLD TOP - SST   LIGHT BLUE TOP     XR Chest 2 View   Final Result      1. New consolidation in the right lung most characteristic of pneumonia with associated volume loss. No effusion. Follow-up to clearing recommended.      Signer Name: Anthony Hsu MD    Signed: 3/5/2023 9:04 AM    Workstation Name: [email or web address]     [facility]             Medication List      ASK your doctor about these medications    levETIRAcetam 500 MG tablet  Commonly known as: KEPPRA  Ask about: Which instructions should I use?                 Curt Gutierrez MD  03/05/23 4601

## 2023-03-05 NOTE — H&P
[facility] HOSPITALIST     Tulio Carlson MD    CHIEF COMPLAINT:     Low oxygen saturation    HISTORY OF PRESENT ILLNESS:    82-year-old female past medical history of intellectual disability resident of [facility] presented for low oxygen saturations and fever.  Per record review she has been admitted here for the same.  HPI limited given her intellectual disability. This morning they noted that the patient's temperature was 101.5 and her oxygen saturation was about 86%.  They called EMS and she was placed on nonrebreather brought to the emergency room where again her oxygen saturation was 86 on room air and needed 4 L nasal cannula.  Chest x-ray was obtained, she was given IV antibiotics and admission was requested for these issues.      Past Medical History:   Diagnosis Date   • Anemia    • Anxiety    • Bursitis    • Bursitis    • DJD (degenerative joint disease), lumbar    • Hyperkalemia    • Hypoglycemia    • Intellectual disability    • Low back pain    • Osteoarthritis    • Osteopenia    • Pneumonia    • Renal disorder    • Renal insufficiency    • Scoliosis    • Seizure disorder (HCC)      History reviewed. No pertinent surgical history.  Family History   Family history unknown: Yes     Social History     Tobacco Use   • Smoking status: Never   • Smokeless tobacco: Never   Vaping Use   • Vaping Use: Never used   Substance Use Topics   • Alcohol use: No   • Drug use: No     Facility-Administered Medications Prior to Admission   Medication Dose Route Frequency Provider Last Rate Last Admin   • LORazepam (ATIVAN) injection 1 mg  1 mg Intramuscular Once Tulio Carlson MD         Medications Prior to Admission   Medication Sig Dispense Refill Last Dose   • amLODIPine (NORVASC) 5 MG tablet Take 1 tablet by mouth Daily. 90 tablet 3 3/4/2023 at 0800   • Ascorbic Acid (Vitamin C ER) 1000 MG tablet controlled-release Take 1,000 mg by mouth Daily.   3/4/2023 at 0800   • atorvastatin (LIPITOR)  20 MG tablet 1 tablet Daily.   3/4/2023 at 0800   • cetirizine (ZyrTEC) 10 MG tablet Take 1 tablet by mouth Every Night.   3/4/2023 at 2000   • cholecalciferol (VITAMIN D3) 25 MCG (1000 UT) tablet Take 1 tablet by mouth Daily.   3/4/2023 at 0800   • dextromethorphan-guaifenesin (ROBITUSSIN-DM)  MG/5ML syrup Take 10 mL by mouth Every 4 (Four) Hours As Needed.      • gabapentin (NEURONTIN) 300 MG capsule Take 1 capsule by mouth Every Night. 3 capsule 0 3/4/2023 at 2000   • HYDROcodone-acetaminophen (NORCO) 7.5-325 MG per tablet Take 1 tablet by mouth Every 8 (Eight) Hours As Needed for Severe Pain. 9 tablet 0    • ipratropium-albuterol (DUO-NEB) 0.5-2.5 mg/3 ml nebulizer Take 3 mL by nebulization 4 (Four) Times a Day As Needed for Wheezing or Shortness of Air.      • lamoTRIgine (LaMICtal) 200 MG tablet Take 1 tablet by mouth 2 (Two) Times a Day.   3/4/2023 at 2000   • levETIRAcetam (KEPPRA) 500 MG tablet Take 1 tablet by mouth 2 (Two) Times a Day.   3/4/2023 at 2000   • LORazepam (ATIVAN) 0.5 MG tablet Take 1 tablet by mouth Every 8 (Eight) Hours.   3/4/2023 at 2000   • lubiprostone (AMITIZA) 8 MCG capsule Take 1 capsule by mouth 2 (Two) Times a Day With Meals.   3/4/2023 at 2000   • Multiple Vitamin (MULTI VITAMIN DAILY PO) Take 1 tablet by mouth Daily.   3/4/2023 at 0800   • omeprazole (priLOSEC) 20 MG capsule Take 2 capsules by mouth Daily.   3/4/2023 at 0800   • Polyvinyl Alcohol-Povidone PF (HYPOTEARS) 1.4-0.6 % ophthalmic solution Administer 1 drop to both eyes 4 (Four) Times a Day.   3/4/2023 at 2000   • Probiotic Product (PROBIOTIC PO) Take 2 capsules by mouth Every Night.   3/4/2023 at 2000   • Wheat Dextrin (Benefiber Drink Mix) pack Take 1 packet by mouth Daily.   3/4/2023 at 0800   • Zinc Sulfate 220 (50 Zn) MG tablet Take 1 tablet by mouth Daily.   3/4/2023 at 0800   • acetaminophen (TYLENOL) 325 MG tablet Take 2 tablets by mouth Every 6 (Six) Hours As Needed for Mild Pain.   Unknown   •  "levETIRAcetam (KEPPRA) 1000 MG tablet Take 1.5 tablets by mouth 2 (Two) Times a Day for 30 days. Pt takes a total of 1500 mg PO twice daily (Patient taking differently: Take 500 mg by mouth 2 (Two) Times a Day. Pt takes a total of 1500 mg PO twice daily)      • LORazepam (Ativan) 1 MG tablet Take 2 tabs one hour prior to appointment and may repeat 1 tab prn upon arrival anxiety 3 tablet 2    • magnesium hydroxide (MILK OF MAGNESIA) 400 MG/5ML suspension Take 30 mL by mouth 2 (Two) Times a Day As Needed for Constipation.   Unknown   • polyethylene glycol (MIRALAX) powder Take 17 g by mouth Daily As Needed (constipation).   Unknown   • Prolia 60 MG/ML solution prefilled syringe syringe Inject 1 mL under the skin into the appropriate area as directed Every 6 (Six) Months.   Unknown   • simethicone (MYLICON) 80 MG chewable tablet Chew 1 tablet Every 6 (Six) Hours As Needed for Flatulence.   Unknown     Allergies:  Bactrim [sulfamethoxazole-trimethoprim], Bee venom, Iodinated contrast media, and Nsaids    Immunization History   Administered Date(s) Administered   • COVID-19 (PFIZER) PURPLE CAP 01/08/2021, 01/31/2021   • Td 06/17/2008           REVIEW OF SYSTEMS:  Please see the above history of present illness for pertinent positives and negatives.    Objective     Vital Signs  Temp:  [97.6 °F (36.4 °C)-99.2 °F (37.3 °C)] 97.6 °F (36.4 °C)  Heart Rate:  [] 90  Resp:  [20-21] 20  BP: ()/(49-62) 107/49    Flowsheet Rows    Flowsheet Row First Filed Value   Admission Height 170.2 cm (67\") Documented at 03/05/2023 0835   Admission Weight 75.3 kg (165 lb 14.4 oz) Documented at 03/05/2023 0835           Physical Exam:  Physical Exam  Vitals reviewed.   Cardiovascular:      Rate and Rhythm: Normal rate.   Pulmonary:      Effort: No respiratory distress.      Breath sounds: Rhonchi present.   Abdominal:      Palpations: Abdomen is soft.      Tenderness: There is no abdominal tenderness.   Musculoskeletal:      Right " lower leg: No edema.      Left lower leg: No edema.   Neurological:      Mental Status: She is alert.      Comments: Alert to person         Emotional Behavior:    Mental Status:  Alertness alert to person                 Anxiety mild anxiety noted    Debilities:   Disabilities  Chronic electrical disability   Agitation none    Results Review:    I reviewed the patient's new clinical results.  Lab Results (most recent)     Procedure Component Value Units Date/Time    Single High Sensitivity Troponin T [[id number]]  (Abnormal) Collected: 03/05/23 1023    Specimen: Blood from Arm, Left Updated: 03/05/23 1051     HS Troponin T 28 ng/L     Narrative:      High Sensitive Troponin T Reference Range:  <10.0 ng/L- Negative Female for AMI  <15.0 ng/L- Negative Male for AMI  >=10 - Abnormal Female indicating possible myocardial injury.  >=15 - Abnormal Male indicating possible myocardial injury.   Clinicians would have to utilize clinical acumen, EKG, Troponin, and serial changes to determine if it is an Acute Myocardial Infarction or myocardial injury due to an underlying chronic condition.         Blood Culture - Blood, Arm, Left [[id number]] Collected: 03/05/23 1023    Specimen: Blood from Arm, Left Updated: 03/05/23 1028    [place] Draw [[id number]] Collected: 03/05/23 0719    Specimen: Blood Updated: 03/05/23 0830    Narrative:      The following orders were created for panel order [place] Draw.  Procedure                               Abnormality         Status                     ---------                               -----------         ------                     Green Top (Gel)[[id number]]                                  Final result               Lavender Top[[id number]]                                     Final result               Gold Top - SST[[id number]]                                   Final result               Light Blue Top[[id number]]                                   Final result                 Please view  "results for these tests on the individual orders.    Lavender Top [[id number]] Collected: 03/05/23 0719    Specimen: Blood Updated: 03/05/23 0830     Extra Tube hold for add-on     Comment: Auto resulted       Gold Top - SST [[id number]] Collected: 03/05/23 0719    Specimen: Blood Updated: 03/05/23 0830     Extra Tube Hold for add-ons.     Comment: Auto resulted.       Green Top (Gel) [[id number]] Collected: 03/05/23 0719    Specimen: Blood Updated: 03/05/23 0830     Extra Tube Hold for add-ons.     Comment: Auto resulted.       Light Blue Top [[id number]] Collected: 03/05/23 0719    Specimen: Blood Updated: 03/05/23 0830     Extra Tube Hold for add-ons.     Comment: Auto resulted       Blood Culture - Blood, Wrist, Right [[id number]] Collected: 03/05/23 0829    Specimen: Blood from Wrist, Right Updated: 03/05/23 0829    Procalcitonin [[id number]]  (Abnormal) Collected: 03/05/23 0719    Specimen: Blood Updated: 03/05/23 0817     Procalcitonin 2.83 ng/mL     Narrative:      As a Marker for Sepsis (Non-Neonates):    1. <0.5 ng/mL represents a low risk of severe sepsis and/or septic shock.  2. >2 ng/mL represents a high risk of severe sepsis and/or septic shock.    As a Marker for Lower Respiratory Tract Infections that require antibiotic therapy:    PCT on Admission    Antibiotic Therapy       6-12 Hrs later    >0.5                Strongly Recommended  >0.25 - <0.5        Recommended   0.1 - 0.25          Discouraged              Remeasure/reassess PCT  <0.1                Strongly Discouraged     Remeasure/reassess PCT    As 28 day mortality risk marker: \"Change in Procalcitonin Result\" (>80% or <=80%) if Day 0 (or Day 1) and Day 4 values are available. Refer to http://www.[facility]-pct-calculator.com    Change in PCT <=80%  A decrease of PCT levels below or equal to 80% defines a positive change in PCT test result representing a higher risk for 28-day all-cause mortality of patients diagnosed with severe sepsis for septic " shock.    Change in PCT >80%  A decrease of PCT levels of more than 80% defines a negative change in PCT result representing a lower risk for 28-day all-cause mortality of patients diagnosed with severe sepsis or septic shock.       BNP [[id number]]  (Normal) Collected: 03/05/23 0719    Specimen: Blood Updated: 03/05/23 0756     proBNP 775.8 pg/mL     Narrative:      Among patients with dyspnea, NT-proBNP is highly sensitive for the detection of acute congestive heart failure. In addition NT-proBNP of <300 pg/ml effectively rules out acute congestive heart failure with 99% negative predictive value.    Results may be falsely decreased if patient taking Biotin.      Single High Sensitivity Troponin T [[id number]]  (Abnormal) Collected: 03/05/23 0719    Specimen: Blood Updated: 03/05/23 0754     HS Troponin T 27 ng/L     Narrative:      High Sensitive Troponin T Reference Range:  <10.0 ng/L- Negative Female for AMI  <15.0 ng/L- Negative Male for AMI  >=10 - Abnormal Female indicating possible myocardial injury.  >=15 - Abnormal Male indicating possible myocardial injury.   Clinicians would have to utilize clinical acumen, EKG, Troponin, and serial changes to determine if it is an Acute Myocardial Infarction or myocardial injury due to an underlying chronic condition.         Comprehensive Metabolic Panel [[id number]]  (Abnormal) Collected: 03/05/23 0719    Specimen: Blood Updated: 03/05/23 0747     Glucose 94 mg/dL      BUN 43 mg/dL      Creatinine 1.33 mg/dL      Sodium 137 mmol/L      Potassium 4.9 mmol/L      Comment: Slight hemolysis detected by analyzer. Results may be affected.        Chloride 102 mmol/L      CO2 21.7 mmol/L      Calcium 9.5 mg/dL      Total Protein 6.7 g/dL      Albumin 3.7 g/dL      ALT (SGPT) 52 U/L      AST (SGOT) 50 U/L      Comment: Slight hemolysis detected by analyzer. Results may be affected.        Alkaline Phosphatase 125 U/L      Total Bilirubin 0.3 mg/dL      Globulin 3.0 gm/dL       A/G Ratio 1.2 g/dL      BUN/Creatinine Ratio 32.3     Anion Gap 13.3 mmol/L      eGFR 40.0 mL/min/1.73     Narrative:      GFR Normal >60  Chronic Kidney Disease <60  Kidney Failure <15    The GFR formula is only valid for adults with stable renal function between ages 18 and 70.    Lactic Acid, Plasma [[id number]]  (Normal) Collected: 03/05/23 0719    Specimen: Blood Updated: 03/05/23 0742     Lactate 1.2 mmol/L     CBC & Differential [[id number]]  (Abnormal) Collected: 03/05/23 0719    Specimen: Blood Updated: 03/05/23 0727    Narrative:      The following orders were created for panel order CBC & Differential.  Procedure                               Abnormality         Status                     ---------                               -----------         ------                     CBC Auto Differential[[id number]]        Abnormal            Final result                 Please view results for these tests on the individual orders.    CBC Auto Differential [[id number]]  (Abnormal) Collected: 03/05/23 0719    Specimen: Blood Updated: 03/05/23 0727     WBC 6.73 10*3/mm3      RBC 3.67 10*6/mm3      Hemoglobin 10.8 g/dL      Hematocrit 35.5 %      MCV 96.7 fL      MCH 29.4 pg      MCHC 30.4 g/dL      RDW 14.2 %      RDW-SD 51.2 fl      MPV 11.4 fL      Platelets 217 10*3/mm3      Neutrophil % 76.7 %      Lymphocyte % 17.5 %      Monocyte % 5.2 %      Eosinophil % 0.4 %      Basophil % 0.1 %      Immature Grans % 0.1 %      Neutrophils, Absolute 5.15 10*3/mm3      Lymphocytes, Absolute 1.18 10*3/mm3      Monocytes, Absolute 0.35 10*3/mm3      Eosinophils, Absolute 0.03 10*3/mm3      Basophils, Absolute 0.01 10*3/mm3      Immature Grans, Absolute 0.01 10*3/mm3           Imaging Results (Most Recent)     Procedure Component Value Units Date/Time    XR Chest 2 View [[id number]] Collected: 03/05/23 0904     Updated: 03/05/23 0906    Narrative:      CR Chest 2 Vws    INDICATION:    Short of air. Fever. Low oxygen  saturation    COMPARISON:    1/28/2023    FINDINGS:   PA and lateral views of the chest.  Cardiac silhouette is within normal limits. Unremarkable vascularity. Left lung is clear. There is right-sided volume loss and there is consolidation in the perihilar and mid lung zone on the right most characteristic  of pneumonia. There is no distinct effusion or pneumothorax. Follow-up to clearing is recommended.        Impression:        1. New consolidation in the right lung most characteristic of pneumonia with associated volume loss. No effusion. Follow-up to clearing recommended.    Signer Name: Anthony Hsu MD   Signed: 3/5/2023 9:04 AM   Workstation Name: [email or web address]    [facility]        reviewed showing right lung consolidation  Records reviewed has a history of right lower lobe infiltrate however per the chest x-ray 1/12/2023 this area had resolved.    ECG/EMG Results (most recent)     Procedure Component Value Units Date/Time    ECG 12 Lead Dyspnea [[id number]] Collected: 03/05/23 0708     Updated: 03/05/23 0709     QT Interval 340 ms     Narrative:      HEART RATE= 100  bpm  RR Interval= 604  ms  DC Interval= 157  ms  P Horizontal Axis= -6  deg  P Front Axis= 26  deg  QRSD Interval= 89  ms  QT Interval= 340  ms  QRS Axis= -38  deg  T Wave Axis= 59  deg  - ABNORMAL ECG -  Sinus tachycardia  Probable left atrial enlargement  Inferior infarct, old  Electronically Signed By:   Date and Time of Study: 2023-03-05 07:08:11        reviewed      Assessment & Plan     Active Hospital Problems:  Active Hospital Problems    Diagnosis    • **Pneumonia of right middle lobe due to infectious organism      Plan:       Acute hypoxic resp failure d/t RLL PNA  -given repeated episodes right lower lobe in the past concerning for aspiration  -on 6L HFNC, wean as able  -CXR reviewed above  -continue Rocephin, start Flagyl  -pulmonary toilet  -SLP eval      Seizure disorder  - chronic  - continue home Keppra  Lamictal  - continue prn ativan      HTN  - chronic   - BP soft thus hold amlodipine     Intellectual disability  -Chronic    Chronic pain  - holding narcotics given resp status     High risk     DVT ppx-lovenox    Code status: per records and caregiver DNR    I discussed the patient's findings and my recommendations with caregiver at bedside    Electronically signed by Hector Cardenas DO, 03/05/23, 12:56 EST.

## 2023-03-06 LAB
ALBUMIN SERPL-MCNC: 3.3 G/DL (ref 3.5–5.2)
ALBUMIN/GLOB SERPL: 1 G/DL
ALP SERPL-CCNC: 107 U/L (ref 39–117)
ALT SERPL W P-5'-P-CCNC: 37 U/L (ref 1–33)
ANION GAP SERPL CALCULATED.3IONS-SCNC: 11.3 MMOL/L (ref 5–15)
AST SERPL-CCNC: 33 U/L (ref 1–32)
BACTERIA BLD CULT: ABNORMAL
BACTERIA ID TEST ISLT QL CULT: ABNORMAL
BILIRUB SERPL-MCNC: 0.2 MG/DL (ref 0–1.2)
BOTTLE TYPE: ABNORMAL
BUN SERPL-MCNC: 37 MG/DL (ref 8–23)
BUN/CREAT SERPL: 32.7 (ref 7–25)
CALCIUM SPEC-SCNC: 9.1 MG/DL (ref 8.6–10.5)
CHLORIDE SERPL-SCNC: 106 MMOL/L (ref 98–107)
CO2 SERPL-SCNC: 21.7 MMOL/L (ref 22–29)
CREAT SERPL-MCNC: 1.13 MG/DL (ref 0.57–1)
DEPRECATED RDW RBC AUTO: 54.7 FL (ref 37–54)
EGFRCR SERPLBLD CKD-EPI 2021: 48.7 ML/MIN/1.73
ERYTHROCYTE [DISTWIDTH] IN BLOOD BY AUTOMATED COUNT: 14.9 % (ref 12.3–15.4)
GLOBULIN UR ELPH-MCNC: 3.4 GM/DL
GLUCOSE SERPL-MCNC: 98 MG/DL (ref 65–99)
HCT VFR BLD AUTO: 36.3 % (ref 34–46.6)
HGB BLD-MCNC: 10.9 G/DL (ref 12–15.9)
MAGNESIUM SERPL-MCNC: 2.3 MG/DL (ref 1.6–2.4)
MCH RBC QN AUTO: 30 PG (ref 26.6–33)
MCHC RBC AUTO-ENTMCNC: 30 G/DL (ref 31.5–35.7)
MCV RBC AUTO: 100 FL (ref 79–97)
MRSA DNA SPEC QL NAA+PROBE: ABNORMAL
PLATELET # BLD AUTO: 202 10*3/MM3 (ref 140–450)
PMV BLD AUTO: 11.6 FL (ref 6–12)
POTASSIUM SERPL-SCNC: 4.5 MMOL/L (ref 3.5–5.2)
PROCALCITONIN SERPL-MCNC: 19.32 NG/ML (ref 0–0.25)
PROT SERPL-MCNC: 6.7 G/DL (ref 6–8.5)
RBC # BLD AUTO: 3.63 10*6/MM3 (ref 3.77–5.28)
SODIUM SERPL-SCNC: 139 MMOL/L (ref 136–145)
WBC NRBC COR # BLD: 20.2 10*3/MM3 (ref 3.4–10.8)

## 2023-03-06 PROCEDURE — 80053 COMPREHEN METABOLIC PANEL: CPT | Performed by: HOSPITALIST

## 2023-03-06 PROCEDURE — 94761 N-INVAS EAR/PLS OXIMETRY MLT: CPT

## 2023-03-06 PROCEDURE — 94664 DEMO&/EVAL PT USE INHALER: CPT

## 2023-03-06 PROCEDURE — 85027 COMPLETE CBC AUTOMATED: CPT | Performed by: INTERNAL MEDICINE

## 2023-03-06 PROCEDURE — 25010000002 ENOXAPARIN PER 10 MG: Performed by: INTERNAL MEDICINE

## 2023-03-06 PROCEDURE — 94799 UNLISTED PULMONARY SVC/PX: CPT

## 2023-03-06 PROCEDURE — 83735 ASSAY OF MAGNESIUM: CPT | Performed by: INTERNAL MEDICINE

## 2023-03-06 PROCEDURE — 99232 SBSQ HOSP IP/OBS MODERATE 35: CPT | Performed by: HOSPITALIST

## 2023-03-06 PROCEDURE — 25010000002 CEFTRIAXONE SODIUM-DEXTROSE 1-3.74 GM-%(50ML) RECONSTITUTED SOLUTION: Performed by: INTERNAL MEDICINE

## 2023-03-06 PROCEDURE — 25010000002 VANCOMYCIN 1 G RECONSTITUTED SOLUTION: Performed by: HOSPITALIST

## 2023-03-06 PROCEDURE — 87641 MR-STAPH DNA AMP PROBE: CPT | Performed by: HOSPITALIST

## 2023-03-06 PROCEDURE — 92610 EVALUATE SWALLOWING FUNCTION: CPT

## 2023-03-06 PROCEDURE — 84145 PROCALCITONIN (PCT): CPT | Performed by: HOSPITALIST

## 2023-03-06 RX ADMIN — SODIUM CHLORIDE 100 ML/HR: 9 INJECTION, SOLUTION INTRAVENOUS at 08:50

## 2023-03-06 RX ADMIN — IPRATROPIUM BROMIDE AND ALBUTEROL SULFATE 3 ML: .5; 3 SOLUTION RESPIRATORY (INHALATION) at 13:29

## 2023-03-06 RX ADMIN — CEFTRIAXONE 1 G: 1 INJECTION, SOLUTION INTRAVENOUS at 10:18

## 2023-03-06 RX ADMIN — ACETAMINOPHEN 650 MG: 325 TABLET ORAL at 20:57

## 2023-03-06 RX ADMIN — GABAPENTIN 300 MG: 300 CAPSULE ORAL at 20:57

## 2023-03-06 RX ADMIN — LAMOTRIGINE 200 MG: 100 TABLET ORAL at 08:48

## 2023-03-06 RX ADMIN — METRONIDAZOLE 500 MG: 500 INJECTION, SOLUTION INTRAVENOUS at 04:03

## 2023-03-06 RX ADMIN — SODIUM CHLORIDE 1000 MG: 900 INJECTION, SOLUTION INTRAVENOUS at 15:46

## 2023-03-06 RX ADMIN — OXYCODONE HYDROCHLORIDE AND ACETAMINOPHEN 500 MG: 500 TABLET ORAL at 10:18

## 2023-03-06 RX ADMIN — SODIUM CHLORIDE 100 ML/HR: 9 INJECTION, SOLUTION INTRAVENOUS at 21:05

## 2023-03-06 RX ADMIN — Medication 10 ML: at 21:05

## 2023-03-06 RX ADMIN — ENOXAPARIN SODIUM 40 MG: 40 INJECTION SUBCUTANEOUS at 15:46

## 2023-03-06 RX ADMIN — LAMOTRIGINE 200 MG: 100 TABLET ORAL at 20:57

## 2023-03-06 RX ADMIN — MELATONIN TAB 5 MG 5 MG: 5 TAB at 20:57

## 2023-03-06 RX ADMIN — PANTOPRAZOLE SODIUM 40 MG: 40 TABLET, DELAYED RELEASE ORAL at 08:52

## 2023-03-06 RX ADMIN — IPRATROPIUM BROMIDE AND ALBUTEROL SULFATE 3 ML: .5; 3 SOLUTION RESPIRATORY (INHALATION) at 19:55

## 2023-03-06 RX ADMIN — LEVETIRACETAM 500 MG: 500 TABLET, FILM COATED ORAL at 08:48

## 2023-03-06 RX ADMIN — LEVETIRACETAM 500 MG: 500 TABLET, FILM COATED ORAL at 20:57

## 2023-03-06 RX ADMIN — IPRATROPIUM BROMIDE AND ALBUTEROL SULFATE 3 ML: .5; 3 SOLUTION RESPIRATORY (INHALATION) at 07:19

## 2023-03-06 RX ADMIN — Medication 1 CAPSULE: at 08:52

## 2023-03-06 RX ADMIN — POLYVINYL ALCOHOL 1 DROP: 14 SOLUTION/ DROPS OPHTHALMIC at 21:45

## 2023-03-06 RX ADMIN — ATORVASTATIN CALCIUM 20 MG: 20 TABLET, FILM COATED ORAL at 08:52

## 2023-03-06 NOTE — SIGNIFICANT NOTE
03/06/23 1745   Provider Notification   Reason for Communication Critical lab value  (mrsa positive)   Provider Name quinn   Notification Route Text page   Response No new orders        47 yo M with hx of nephrolithiasis s/p ESWL ~10 years ago who had a CT scan that was ordered by general surgery during work up for LLQ pain. CT scan revealed 1.3 cm obstructing UPJ stone.  Patient states that he has been having pain on and off for the last We discussed the risks, benefits, complications, and alternatives at length including but not limited to bleeding, infection, ureteral injury, possible need for percutaneous nephrostomy tube and/or stent placement.   The patient expressed understanding and

## 2023-03-06 NOTE — PROGRESS NOTES
"Hospitalist Team      Patient Care Team:  Tulio Carlson MD as PCP - General (Family Medicine)  Aleksandra Kent APRN as Nurse Practitioner (Pain Medicine)  Ja Harris MD as Consulting Physician (Neurology)  Magen Farmer MD as Consulting Physician (Nephrology)  Mikey Sanchez MD (Psychiatry)  Nelsy Butcher DPM as Consulting Physician (Podiatry)  Amy Bryan DO as Consulting Physician (Obstetrics and Gynecology)      Chief Complaint:  Follow-up Acute Hypoxic Respiratory Failure due to Pneumonia     Subjective    No acute events overnight.  Ms. Crockett is non-verbal.  Staff at bedside feeding her breakfast w/o issues.  Overnight physician note reviewed.    Objective    Vital Signs  Temp:  [97.3 °F (36.3 °C)-100.9 °F (38.3 °C)] 97.8 °F (36.6 °C)  Heart Rate:  [76-99] 84  Resp:  [20-22] 20  BP: ()/(43-85) 136/70  Oxygen Therapy  SpO2: 94 %  Pulse Oximetry Type: Continuous  Device (Oxygen Therapy): nasal cannula  Flow (L/min): 4}    Flowsheet Rows    Flowsheet Row First Filed Value   Admission Height 170.2 cm (67\") Documented at 03/05/2023 0835   Admission Weight 75.3 kg (165 lb 14.4 oz) Documented at 03/05/2023 0835          Physical Exam:    General: Elderly appearing female in no acute distress.  HEENT: Pupils are equal and EOMI.  No scleral icterus.  Lungs: Right hemithorax is diminished.  No wheeze or rhonchi appreciated.  Left hemithorax is clear.  Respirations are non-labored.  CV: Regular rate and rhythm.  No murmurs appreciated.  Radial pulses are 2+ and symmetric.  Abdomen: Soft and non-tender w/ active bowel sounds.  MSK: No C/C/E.  Neuro: CN II-XII grossly intact.  No signs of aspiration at the bedside during feeding.  Psych: Non-agitated.    Results Review:     I reviewed the patient's new clinical results.    Lab Results (last 24 hours)     Procedure Component Value Units Date/Time    CBC (No Diff) [[id number]]  (Abnormal) Collected: 03/06/23 0816    Specimen: Blood " Updated: 03/06/23 0919     WBC 20.20 10*3/mm3      RBC 3.63 10*6/mm3      Hemoglobin 10.9 g/dL      Hematocrit 36.3 %      .0 fL      MCH 30.0 pg      MCHC 30.0 g/dL      RDW 14.9 %      RDW-SD 54.7 fl      MPV 11.6 fL      Platelets 202 10*3/mm3     Blood Culture - Blood, Wrist, Right [[id number]]  (Normal) Collected: 03/05/23 0829    Specimen: Blood from Wrist, Right Updated: 03/06/23 0830     Blood Culture No growth at 24 hours    Magnesium [[id number]] Collected: 03/06/23 0816    Specimen: Blood Updated: 03/06/23 0826    Comprehensive Metabolic Panel [[id number]] Collected: 03/06/23 0816    Specimen: Blood Updated: 03/06/23 0826    Procalcitonin [[id number]] Collected: 03/06/23 0816    Specimen: Blood Updated: 03/06/23 0826    Single High Sensitivity Troponin T [[id number]]  (Abnormal) Collected: 03/05/23 1023    Specimen: Blood from Arm, Left Updated: 03/05/23 1051     HS Troponin T 28 ng/L     Narrative:      High Sensitive Troponin T Reference Range:  <10.0 ng/L- Negative Female for AMI  <15.0 ng/L- Negative Male for AMI  >=10 - Abnormal Female indicating possible myocardial injury.  >=15 - Abnormal Male indicating possible myocardial injury.   Clinicians would have to utilize clinical acumen, EKG, Troponin, and serial changes to determine if it is an Acute Myocardial Infarction or myocardial injury due to an underlying chronic condition.         Blood Culture - Blood, Arm, Left [[id number]] Collected: 03/05/23 1023    Specimen: Blood from Arm, Left Updated: 03/05/23 1028          Imaging Results (Last 24 Hours)     ** No results found for the last 24 hours. **          X-ray reviewed personally by physician.      Medication Review:   I have reviewed the patient's current medication list    Current Facility-Administered Medications:   •  acetaminophen (TYLENOL) tablet 650 mg, 650 mg, Oral, Q4H PRN **OR** acetaminophen (TYLENOL) 160 MG/5ML solution 650 mg, 650 mg, Oral, Q4H PRN, 650 mg at 03/05/23 2018 **OR**  acetaminophen (TYLENOL) suppository 650 mg, 650 mg, Rectal, Q4H PRN, Hector Cardenas, DO  •  albuterol (PROVENTIL) nebulizer solution 0.083% 2.5 mg/3mL, 2.5 mg, Nebulization, Q4H PRN, Hector Cardenas, DO  •  ascorbic acid (VITAMIN C) tablet 500 mg, 500 mg, Oral, Daily, Hector Cardenas, DO, 500 mg at 03/05/23 1341  •  atorvastatin (LIPITOR) tablet 20 mg, 20 mg, Oral, Daily, Hector Cardenas, DO, 20 mg at 03/06/23 0852  •  polyethylene glycol (MIRALAX) packet 17 g, 17 g, Oral, Daily PRN **AND** bisacodyl (DULCOLAX) EC tablet 5 mg, 5 mg, Oral, Daily PRN **AND** bisacodyl (DULCOLAX) suppository 10 mg, 10 mg, Rectal, Daily PRN, Hector Cardenas, DO  •  cefTRIAXone (ROCEPHIN) IVPB 1 g/50ml dextrose (premix), 1 g, Intravenous, Q24H, Hector Cardenas, DO  •  dextromethorphan-guaifenesin (ROBITUSSIN-DM) syrup 10 mL, 10 mL, Oral, Q4H PRN, Hector Cardenas, DO  •  Enoxaparin Sodium (LOVENOX) syringe 40 mg, 40 mg, Subcutaneous, Daily, Hector Cardenas, DO, 40 mg at 03/05/23 1341  •  gabapentin (NEURONTIN) capsule 300 mg, 300 mg, Oral, Nightly, Hector Cardenas, DO  •  ipratropium-albuterol (DUO-NEB) nebulizer solution 3 mL, 3 mL, Nebulization, Q6H While Awake - RT, Hector Cardenas, DO, 3 mL at 03/06/23 0719  •  lactobacillus acidophilus (RISAQUAD) capsule 1 capsule, 1 capsule, Oral, Daily, Hector Cardenas, DO, 1 capsule at 03/06/23 0852  •  lamoTRIgine (LaMICtal) tablet 200 mg, 200 mg, Oral, BID, Hectro Cardenas, DO, 200 mg at 03/06/23 0848  •  levETIRAcetam (KEPPRA) tablet 500 mg, 500 mg, Oral, BID, Hector Cardenas, DO, 500 mg at 03/06/23 0848  •  LORazepam (ATIVAN) injection 4 mg, 4 mg, Intravenous, Q4H PRN, Jose Antonio Boothe, DO  •  LORazepam (ATIVAN) tablet 0.5 mg, 0.5 mg, Oral, Q8H PRN, Hector Cardenas, DO  •  lubiprostone (AMITIZA) capsule 8 mcg, 8 mcg, Oral, BID With Meals, Hector Cardenas, DO  •  magnesium hydroxide (MILK OF MAGNESIA) suspension 10 mL, 10 mL, Oral, BID PRN, Hector Cardenas, DO  •  melatonin tablet 5 mg, 5 mg, Oral, Nightly PRN, Ronald,  Hector GARDUNO,   •  metroNIDAZOLE (FLAGYL) IVPB 500 mg, 500 mg, Intravenous, Q8H, Hector Cardenas DO, 500 mg at 03/06/23 0403  •  ondansetron (ZOFRAN) tablet 4 mg, 4 mg, Oral, Q6H PRN **OR** ondansetron (ZOFRAN) injection 4 mg, 4 mg, Intravenous, Q6H PRN, Hector Cardenas DO  •  pantoprazole (PROTONIX) EC tablet 40 mg, 40 mg, Oral, Q AM, Hector Cardenas DO, 40 mg at 03/06/23 0852  •  polyvinyl alcohol (LIQUIFILM) 1.4 % ophthalmic solution 1 drop, 1 drop, Both Eyes, 4x Daily, Hector Cardenas,   •  sodium chloride 0.9 % flush 10 mL, 10 mL, Intravenous, PRN, Hector Cardenas,   •  sodium chloride 0.9 % flush 10 mL, 10 mL, Intravenous, PRN, Hector Cardenas DO  •  sodium chloride 0.9 % flush 10 mL, 10 mL, Intravenous, Q12H, Hector Cardenas DO, 10 mL at 03/05/23 2029  •  sodium chloride 0.9 % infusion 40 mL, 40 mL, Intravenous, PRN, Hector Cardenas DO  •  sodium chloride 0.9 % infusion, 100 mL/hr, Intravenous, Continuous, Hector Cardenas DO, Last Rate: 100 mL/hr at 03/06/23 0850, 100 mL/hr at 03/06/23 0850      Assessment & Plan     1.  Acute Hypoxic Respiratory Failure due to Pneumonia: Continue to trend PCT and Leukocytosis as both appear to be a robust response to infection.  Given the high PCT, aspirations seems unlikely.  Blood cultures appear to be contaminated.  I'm going to add Vanc and check a nasal swab.  Wean O2 as able.  I do not feel Ms. Crockett can follow instructions to add pulmonary toilet.    2.  Seizure Disorder: No acute issues.  Continue home regimen.    3.  Essential Hypertension: Near goal on exam, but trend is good off of home therapy so will continue to monitor trend.    4.  Chronic Pain Syndrome: Holding home regimen due to respiratory compromise.  JOSE reviewed.  Continue to monitor.    Plan for disposition: Predicated on hospital course.    Carmelo Van MD  03/06/23  09:21 EST

## 2023-03-06 NOTE — PROGRESS NOTES
"Pharmacy Antimicrobial Dosing Service    Subjective:  Erica Crockett is a 82 y.o.female admitted with low oxygen saturation. Pharmacy has been consulted to dose Vancomycin for possible pneumonia. MRSA nares ordered (3/6/23)    PMH: seizure disorder, renal disorder, hx pneumonia, hx bursitis, intellectual disability      Assessment/Plan    1. Day #2 Vancomycin: Goal -600 mcg*h/mL. Patient received vancomycin 1 gm (~14 mg/kg ABW) IV x1 in ED yesterday (3/5/23 @~1600). Provider continuing vancomycin will schedule vancomycin 1 gm (~ 14 mg/kg ABW) IV q24h today x 3 doses. Will order level if regimen extended.     2. Day #2 Ceftriaxone: 1 gm IV q24h.    Will continue to monitor drug levels, renal function, culture and sensitivities, and patient clinical status.       Objective:  Relevant clinical data and objective history reviewed:  170.2 cm (67\")   75.3 kg (165 lb 14.4 oz)   Ideal body weight: 61.6 kg (135 lb 12.9 oz)  Adjusted ideal body weight: 67.1 kg (147 lb 13.5 oz)  Body mass index is 25.98 kg/m².        Results from last 7 days   Lab Units 03/06/23  0816 03/05/23  0719   CREATININE mg/dL 1.13* 1.33*     Estimated Creatinine Clearance: 40.7 mL/min (A) (by C-G formula based on SCr of 1.13 mg/dL (H)).  I/O last 3 completed shifts:  In: 50 [P.O.:50]  Out: -     Results from last 7 days   Lab Units 03/06/23  0816 03/05/23  0719   WBC 10*3/mm3 20.20* 6.73     Temperature    03/05/23 2200 03/05/23 2305 03/06/23 0555   Temp: 97.9 °F (36.6 °C) 97.3 °F (36.3 °C) 97.8 °F (36.6 °C)     Baseline culture/source/susceptibility:  Microbiology Results (last 10 days)       Procedure Component Value - Date/Time    Blood Culture - Blood, Arm, Left [[id number]]  (Normal) Collected: 03/05/23 1023    Lab Status: Preliminary result Specimen: Blood from Arm, Left Updated: 03/06/23 1030     Blood Culture No growth at 24 hours    Blood Culture - Blood, Wrist, Right [[id number]]  (Abnormal) Collected: 03/05/23 0829    Lab Status: " Preliminary result Specimen: Blood from Wrist, Right Updated: 03/06/23 0945     Blood Culture Abnormal Stain     Gram Stain Aerobic Bottle Gram positive cocci in chains      Aerobic Bottle Gram positive cocci in clusters    Blood Culture ID, PCR - Blood, Wrist, Right [[id number]]  (Abnormal) Collected: 03/05/23 0829    Lab Status: Final result Specimen: Blood from Wrist, Right Updated: 03/06/23 0956     BCID, PCR Staph spp, not aureus or lugdunensis. Identification by BCID2 PCR.     BCID, PCR 2 Streptococcus spp, not A, B, or pneumoniae. Identification by BCID2 PCR.     BOTTLE TYPE Aerobic Bottle            Anti-Infectives (From admission, onward)      Ordered     Dose/Rate Route Frequency Start Stop    03/06/23 1103  vancomycin (VANCOCIN) 1,000 mg in sodium chloride 0.9 % 250 mL IVPB        Ordering Provider: Carmelo Van MD    1,000 mg Intravenous Every 24 Hours 03/06/23 1200 03/09/23 1159    03/05/23 1139  cefTRIAXone (ROCEPHIN) IVPB 1 g/50ml dextrose (premix)        Ordering Provider: Hector Cardenas DO    1 g  100 mL/hr over 30 Minutes Intravenous Every 24 Hours 03/05/23 1230 03/10/23 1029    03/05/23 0948  cefTRIAXone (ROCEPHIN) IVPB 2 g/50ml dextrose (premix)        Ordering Provider: Curt Gutierrez MD    2 g  100 mL/hr over 30 Minutes Intravenous Once 03/05/23 0950 03/05/23 1058    03/05/23 0948  vancomycin 1 g/250 mL 0.9% NS (vial-mate)        Ordering Provider: Hector Cardenas DO    1 g Intravenous Once 03/05/23 0950 03/05/23 1615            Mell INGRAM, [facility]  03/06/23 11:14 EST

## 2023-03-06 NOTE — THERAPY EVALUATION
Acute Care - Speech Language Pathology   Swallow Initial Evaluation MILANA Medel     Patient Name: Erica Crockett  : 1940  MRN: [id number]  Today's Date: 3/6/2023               Admit Date: 3/5/2023    Visit Dx:     ICD-10-CM ICD-9-CM   1. Pneumonia of right middle lobe due to infectious organism  J18.9 486     Patient Active Problem List   Diagnosis   • Degeneration of intervertebral disc of lumbar region   • Developmental mental disorder   • Osteoarthritis of hip   • Scoliosis   • Chronic pain   • Nonverbal signs of pain   • Bursitis of left hip   • Encounter for monitoring opioid maintenance therapy   • Anxiety   • Constipation   • Hypercalcemia   • Epilepsy, not refractory (HCC)   • Osteoporosis   • Impulse control disorder   • Vitamin D deficiency   • Pneumonia of both lower lobes due to infectious organism   • KEILY (acute kidney injury) (HCC)   • Sepsis-associated organ dysfunction (HCC)   • Stage 3 chronic kidney disease (HCC)   • Hypercholesterolemia   • Acute kidney failure (HCC)   • Essential (primary) hypertension   • Generalized anxiety disorder   • Hyperkalemia   • Urinary tract infection   • Dental pain   • Aspiration pneumonia of right lung (HCC)   • Pneumonia of right middle lobe due to infectious organism     Past Medical History:   Diagnosis Date   • Anemia    • Anxiety    • Bursitis    • Bursitis    • DJD (degenerative joint disease), lumbar    • Hyperkalemia    • Hypoglycemia    • Intellectual disability    • Low back pain    • Osteoarthritis    • Osteopenia    • Pneumonia    • Renal disorder    • Renal insufficiency    • Scoliosis    • Seizure disorder (HCC)      History reviewed. No pertinent surgical history.    SLP Recommendation and Plan  SLP Swallowing Diagnosis: moderate, oral dysphagia, pharyngeal dysphagia (23)  SLP Diet Recommendation: puree, honey thick liquids (23)  Recommended Precautions and Strategies: upright posture during/after eating, small  bites of food and sips of liquid, alternate between small bites of food and sips of liquid, general aspiration precautions, reflux precautions, fatigue precautions, 1:1 supervision, assist with feeding (03/06/23 0815)  SLP Rec. for Method of Medication Administration: meds crushed, with puree, as tolerated (03/06/23 0815)     Monitor for Signs of Aspiration: yes, notify SLP if any concerns (03/06/23 0815)  Recommended Diagnostics: VFSS (MBS) (03/06/23 0815)  Swallow Criteria for Skilled Therapeutic Interventions Met: demonstrates skilled criteria (03/06/23 0815)  Anticipated Discharge Disposition (SLP): [facility] facility ([facility]) (03/06/23 0815)  Rehab Potential/Prognosis, Swallowing: re-evaluate goals as necessary (03/06/23 0815)  Therapy Frequency (Swallow): PRN (03/06/23 0815)  Predicted Duration Therapy Intervention (Days): until discharge (03/06/23 0815)                  Patient/Family Concerns, Anticipated Discharge Disposition (SLP): Pt unable to report. (03/06/23 0815)                     Plan of Care Reviewed With:  (RNSaskia)  Outcome Evaluation: SLP: Clinical swallow evaluation completed this am during breakfast. Chart reviewed and contacted SLP at [facility] ([facility]) caring for this pt. Hx is significant for high risk of aspiration per prior MBS studies in 2019 and 2022. Pt placed on honey thick puree diet in January 2022. Per SLP at [facility], pt currently on minced and moist diet at [facility] w/thins. Liquids just recently changed back to thins in the past week from Honey thick. Concerns for kidney issues and not getting enough fluids prompted the change to thins. Repeat MBS recommended but declined by guardian as outpatient. Current evaluation from today indicates a moderate oral and suspected pharyngeal dysphagia. Pt with anterior loss of saliva mixed with food out of the right side of the mouth (dependent position due to contractures and pt preference), decreased mastication with limited  chewing/mashing. Pt with increased oral transit of ground solids (regular solids not attempted given prior hx/reports). Mild oral residue of ground solids in oral cavity. Impulsivity with oral intake of honey thick liquids (no thin or nectar thick liquids attempted due to prior reports and risks) taking large drinks via straw. Timely swallow palpated and laryngeal elevation appears to be WFL. No overt s/s of aspiration noted. Tolerated meds crushed in pudding. Pt required assistance with feeding and will hold a cup intermittently and drink from a straw. Impressions: Moderate oral and moderate pharyngeal dysphagia with suspected aspiration. Recommend:  Puree diet with honey thick liquids and meds crushed in applesauce (due to no minced/moist diet available here and safest recommendation is puree versus soft ground - NDD 2), Modified Barium Swallow Study to further assess least restrictive diet recommendations. Oral care before breakfast and after meals to decrease oral bacteria load/risk of developing aspiration pneumonia and to clear any possible oral residue after meals. Fully upright position during po intake as able w/pt contractures. Small bites/drinks w/removal of drink from straw to maintain. Check mouth frequently for residual. SLP to follow during stay for diet tolerance and possible advancement.      SWALLOW EVALUATION (last 72 hours)     SLP Adult Swallow Evaluation     Row Name 03/06/23 0815                   Rehab Evaluation    Document Type evaluation  -AD        Subjective Information --  Pt is nonverbal and does not/is not able to report complaints.  -AD        Patient Observations alert;cooperative  -AD        Patient/Family/Caregiver Comments/Observations Pt seen upright in bed w/right lean despite repositioning. Leg contractures versus drawing legs up. Cooperative with feeding but unable to provide any history and no verbal output to assess vocal quality.  -AD        Patient Effort good  -AD         Symptoms Noted During/After Treatment none  -AD           General Information    Patient Profile Reviewed yes  -AD        Pertinent History Of Current Problem Pt is an 83 y/o resident of [facility] with hx of severe intellectual disability, seizure d/o and CKD. Recurrent pneumonia/dyspnea noted in chart over recent years and concerns for at least aspiration events  (Dec 2019, April 2022, August 2022, Dec 2022, Jan 2023 and current admission). Concerns for dehydration recently at [facility] and worsening kidney function per staff report requiring IV fluids. Pt was on a minced and moist diet w/honey thick liquids and thin water in between meals at [facility] since discharge from [facility] with recent change to thins w/meals in the last week per [facility] SLP conversation today. Continued concerns for aspiration per [facility] SLP.  -AD        Current Method of Nutrition mechanical ground textures;thin liquids  -AD        Precautions/Limitations, Vision other (see comments)  Pt will locate person speaking/caring for her with her eyes. Not able to assess otherwise.  -AD        Precautions/Limitations, Hearing difficult to assess  -AD        Prior Level of Function-Communication cognitive-linguistic impairment  severe intellectual disability with seizure d/o; nonverbal  -AD        Prior Level of Function-Swallowing assistance needed;other (see comments)  minced and moist diet with thin liquids (changed from honey to thin liquids in last week)  -AD        Plans/Goals Discussed with other (see comments)  RNSaskia and Jocelin, staff SLP at [facility]  -AD        Barriers to Rehab previous functional deficit;cognitive status;contractures  -AD        Patient's Goals for Discharge patient could not state  -AD           Pain    Additional Documentation --  no current pain indicated by facial expressions and body movement  -AD           Oral Motor Structure and Function    Oral Lesions or Structural Abnormalities and/or variants no gross lesions  or abnormalities noted  -AD        Dentition Assessment missing teeth;other (see comments)  severely limited dentition. One lower left incisor noted. Unable to see other teeth at this time; noted limited dentition in past with no opposing teeth  -AD        Secretion Management anterior loss;other (see comments)  right sided loss, minimal out of right corner of mouth  -AD        Mucosal Quality moist, healthy  -AD        Volitional Swallow unable to elicit  -AD        Volitional Cough unable to elicit  -AD           Oral Musculature and Cranial Nerve Assessment    Oral Motor General Assessment generalized oral motor weakness  -AD        Oral Motor, Comment No asymmetry or drooping noted. Pt unable to participate in oral motor/cranial nerve exam. Generalized weakness of the tongue and lips noted during meal assessment. Unable to assess velar or voice.  -AD           General Eating/Swallowing Observations    Respiratory Support Currently in Use nasal cannula  -AD        O2 Liters 3L  -AD        Eating/Swallowing Skills self-fed;fed by SLP;needed assist;impulsive self-feeding behaviors;rate is too fast;other (see comments)  pt controlled drinks from straw with attempts at large intake and consecutive drinks; SLP able to control with removing after taking small amount via straw; pt required feeding assistance with food otherwise.  -AD        Positioning During Eating upright in bed;contracted;other (see comments)  leans to right, difficulty maintaining fully upright position in bed.  -AD        Utensils Used spoon;straw  -AD        Consistencies Trialed soft to chew textures;ground;pureed;honey-thick liquids  -AD           Respiratory    Respiratory Status WFL;during swallowing/eating  -AD           Clinical Swallow Eval    Oral Prep Phase impaired  -AD        Oral Transit impaired  -AD        Oral Residue impaired  -AD        Pharyngeal Phase suspected pharyngeal impairment  -AD        Esophageal Phase unremarkable   Note hx of retained barium on esophageal screen on MBS of Aug 2022.  -AD        Clinical Swallow Evaluation Summary Clinical swallow evaluation completed this am during breakfast. Chart reviewed and contacted SLP at [facility] ([facility]) caring for this pt. Hx is significant for high risk of aspiration per prior MBS studies in 2019 and 2022. Pt placed on honey thick puree diet in January 2022. Per SLP at [facility], pt currently on minced and moist diet at [facility] w/thins. Liquids just recently changed back to thins in the past week from Honey thick. Concerns for kidney issues and not getting enough fluids prompted the change to thins. Repeat MBS recommended but declined by guardian as outpatient. Current evaluation from today indicates a moderate oral and suspected pharyngeal dysphagia. Pt with anterior loss of saliva mixed with food out of the right side of the mouth (dependent position due to contractures and pt preference), decreased mastication with limited chewing/mashing. Pt with increased oral transit of ground solids (regular solids not attempted given prior hx/reports). Mild oral residue of ground solids in oral cavity. Impulsivity with oral intake of honey thick liquids (no thin or nectar thick liquids attempted due to prior reports and risks) taking large drinks via straw. Timely swallow palpated and laryngeal elevation appears to be WFL. No overt s/s of aspiration noted. Tolerated meds crushed in pudding. Pt required assistance with feeding and will hold a cup intermittently and drink from a straw.  -AD           Oral Prep Concerns    Oral Prep Concerns inefficient mastication;poor rotary chew;anterior loss;increased prep time  -AD        Inefficient Mastication honey;pudding;mechanical soft  -AD        Poor Rotary Chew mechanical soft  -AD        Anterior Loss mechanical soft;other (see comments)  saliva  -AD        Increased Prep Time mechanical soft;pudding  -AD           Oral Transit Concerns    Oral Transit  Concerns increased oral transit time  -AD        Increased Oral Transit Time mechanical soft  -AD           Oral Residue Concerns    Oral Residue Concerns sulci residue, bilateral  -AD        Sulci Residue, Bilateral mechanical soft  -AD           Pharyngeal Phase Concerns    Pharyngeal Phase Concerns other (see comments)  -AD        Pharyngeal Phase Concerns, Comment Prior history of SEVERE spill of all consistencies into the pharynx prior to swallow, delayed pharyngeal swallow and penetration of all liquid consistencies and trace aspiration of thins on past instrumental (MBS) swallow studies. No current s/s of aspiration noted on ground/moist foods and honey thick liquids.  -AD           SLP Evaluation Clinical Impression    SLP Swallowing Diagnosis moderate;oral dysphagia;pharyngeal dysphagia  -AD        Functional Impact risk of aspiration/pneumonia;risk of malnutrition;risk of dehydration  -AD        Rehab Potential/Prognosis, Swallowing re-evaluate goals as necessary  -AD        Swallow Criteria for Skilled Therapeutic Interventions Met demonstrates skilled criteria  -AD           Recommendations    Therapy Frequency (Swallow) PRN  -AD        Predicted Duration Therapy Intervention (Days) until discharge  -AD        SLP Diet Recommendation puree;honey thick liquids  -AD        Recommended Diagnostics VFSS (MBS)  -AD        Recommended Precautions and Strategies upright posture during/after eating;small bites of food and sips of liquid;alternate between small bites of food and sips of liquid;general aspiration precautions;reflux precautions;fatigue precautions;1:1 supervision;assist with feeding  -AD        Oral Care Recommendations Oral Care before breakfast, after meals and PRN;Toothbrush  -AD        SLP Rec. for Method of Medication Administration meds crushed;with puree;as tolerated  -AD        Monitor for Signs of Aspiration yes;notify SLP if any concerns  -AD        Anticipated Discharge Disposition (SLP)  community-based residential facility ([facility])  -AD        Patient/Family Concerns, Anticipated Discharge Disposition (SLP) Pt unable to report.  -AD           Swallow Goals (SLP)    Swallow LTGs Patient will demonstrate progress toward functional swallow for  -AD        Swallow STGs diet tolerance goal selection (SLP)  -AD        Diet Tolerance Goal Selection (SLP) Patient will tolerate therapeutic trials of;Swallow Short Term Goal 1  -AD           (LTG) Patient will demonstrate progress toward functional swallow for    Diet Texture (Demonstrate progress toward functional swallow) mechanical ground textures  minced an moist texture  -AD        Liquid viscosity (Demonstrate progress toward functional swallow) nectar/ mildly thick liquids  -AD        Stephenson (Demonstrate progress towards functional swallow) with 1:1 assist/ supervision  -AD        Barriers (Demonstrate progress toward functional swallow) intellectual disability and chronic dysphagia  -AD        Progress/Outcomes (Demonstrate progress toward functional swallow) new goal  -AD           (STG) Patient will tolerate therapeutic trials of    Consistencies Trialed (Tolerate therapeutic trials) soft to chew (ground) textures;nectar/ mildly thick liquids  -AD        Desired Outcome (Tolerate therapeutic trials) without signs/symptoms of aspiration;with adequate oral prep/transit/clearance  -AD        Stephenson (Tolerate therapeutic trials) with 1:1 assist/ supervision  -AD        Time Frame (Tolerate therapeutic trials) 1 week;by discharge  -AD        Progress/Outcomes (Tolerate therapeutic trials) new goal  -AD           (STG) Swallow 1    (STG) Swallow 1 Pt will participate in a modified barium swallow study in 2 days to determine risk of aspiration and least restrictive diet recommendations.  -AD        Stephenson (Swallow Short Term Goal 1) with 1:1 assist/ supervision  -AD        Time Frame (Swallow Short Term Goal 1) other (see comments)  2 days   -AD        Progress/Outcomes (Swallow Short Term Goal 1) new goal  -AD              User Key  (r) = Recorded By, (t) = Taken By, (c) = Cosigned By    Initials Name Effective Dates    Nelsy Marques MS CCC-SLP 06/16/21 -                 EDUCATION  The patient has been educated in the following areas:   Dysphagia (Swallowing Impairment) Modified Diet Instruction. Pt unable to demonstrate understanding of results and recommendations.         SLP GOALS     Row Name 03/06/23 0815             (LTG) Patient will demonstrate progress toward functional swallow for    Diet Texture (Demonstrate progress toward functional swallow) mechanical ground textures  minced an moist texture  -AD      Liquid viscosity (Demonstrate progress toward functional swallow) nectar/ mildly thick liquids  -AD      Grainger (Demonstrate progress towards functional swallow) with 1:1 assist/ supervision  -AD      Barriers (Demonstrate progress toward functional swallow) intellectual disability and chronic dysphagia  -AD      Progress/Outcomes (Demonstrate progress toward functional swallow) new goal  -AD         (STG) Patient will tolerate therapeutic trials of    Consistencies Trialed (Tolerate therapeutic trials) soft to chew (ground) textures;nectar/ mildly thick liquids  -AD      Desired Outcome (Tolerate therapeutic trials) without signs/symptoms of aspiration;with adequate oral prep/transit/clearance  -AD      Grainger (Tolerate therapeutic trials) with 1:1 assist/ supervision  -AD      Time Frame (Tolerate therapeutic trials) 1 week;by discharge  -AD      Progress/Outcomes (Tolerate therapeutic trials) new goal  -AD         (STG) Swallow 1    (STG) Swallow 1 Pt will participate in a modified barium swallow study in 2 days to determine risk of aspiration and least restrictive diet recommendations.  -AD      Grainger (Swallow Short Term Goal 1) with 1:1 assist/ supervision  -AD      Time Frame (Swallow Short Term Goal 1)  other (see comments)  2 days  -AD      Progress/Outcomes (Swallow Short Term Goal 1) new goal  -AD            User Key  (r) = Recorded By, (t) = Taken By, (c) = Cosigned By    Initials Name Provider Type    Nelsy Marques MS CCC-SLP Speech and Language Pathologist                   Time Calculation:    Time Calculation- SLP     Row Name 03/06/23 1635             Time Calculation- SLP    SLP Start Time 0815  -AD         Untimed Charges    [id number] Eval Oral Pharyng Swallow Minutes 60  -AD         Total Minutes    Untimed Charges Total Minutes 60  -AD       Total Minutes 60  -AD            User Key  (r) = Recorded By, (t) = Taken By, (c) = Cosigned By    Initials Name Provider Type    Nelsy Marques MS CCC-SLP Speech and Language Pathologist                Therapy Charges for Today     Code Description Service Date Service Provider Modifiers Qty    [id number]  ST EVAL ORAL PHARYNG SWALLOW 4 3/6/2023 Nelsy Morrell MS CCC-SLP GN 1               Nelsy Morrell MS CCC-SLP  3/6/2023

## 2023-03-06 NOTE — PLAN OF CARE
Goal Outcome Evaluation:  Plan of Care Reviewed With: other (see comments)        Progress: no change  Outcome Evaluation: VS stable, NC 4 LPM, IV abx, IV fluids. Aspiration and Seizure precautions in place. TCDB.

## 2023-03-06 NOTE — CASE MANAGEMENT/SOCIAL WORK
Continued Stay Note  MILANA Medel     Patient Name: Erica Crockett  MRN: [id number]  Today's Date: 3/6/2023    Admit Date: 3/5/2023    Plan: from [facility]   Discharge Plan     Row Name 03/06/23 1611       Plan    Plan from [facility]    Plan Comments Patient is non-verbal from [facility], no staff or family present at bedside. CM # placed on white board. Call to patients legal guradian, nephew Nicola Kapadia. LVM requesting non emrgent return call to discuss dc plan and review medicare rights. CM will follow up in am regarding dc plan.               Discharge Codes    No documentation.               Expected Discharge Date and Time     Expected Discharge Date Expected Discharge Time    Mar 7, 2023             Lee Us RN

## 2023-03-06 NOTE — PLAN OF CARE
Goal Outcome Evaluation:  Plan of Care Reviewed With:  (RN, Saskia)           Outcome Evaluation: SLP: Clinical swallow evaluation completed this am during breakfast. Chart reviewed and contacted SLP at [facility] ([facility]) caring for this pt. Hx is significant for high risk of aspiration per prior MBS studies in 2019 and 2022. Pt placed on honey thick puree diet in January 2022. Per SLP at [facility], pt currently on minced and moist diet at [facility] w/thins. Liquids just recently changed back to thins in the past week from Honey thick. Concerns for kidney issues and not getting enough fluids prompted the change to thins. Repeat MBS recommended but declined by guardian as outpatient. Current evaluation from today indicates a moderate oral and suspected pharyngeal dysphagia. Pt with anterior loss of saliva mixed with food out of the right side of the mouth (dependent position due to contractures and pt preference), decreased mastication with limited chewing/mashing. Pt with increased oral transit of ground solids (regular solids not attempted given prior hx/reports). Mild oral residue of ground solids in oral cavity. Impulsivity with oral intake of honey thick liquids (no thin or nectar thick liquids attempted due to prior reports and risks) taking large drinks via straw. Timely swallow palpated and laryngeal elevation appears to be WFL. No overt s/s of aspiration noted. Tolerated meds crushed in pudding. Pt required assistance with feeding and will hold a cup intermittently and drink from a straw.     Impressions: Moderate oral and moderate pharyngeal dysphagia with suspected aspiration. Recommend:  Puree diet with honey thick liquids and meds crushed in applesauce (due to no minced/moist diet available here and safest recommendation is puree versus soft ground - NDD 2), Modified Barium Swallow Study to further assess least restrictive diet recommendations. Oral care before breakfast and after meals to decrease oral bacteria  load/risk of developing aspiration pneumonia and to clear any possible oral residue after meals. Fully upright position during po intake as able w/pt contractures. Small bites/drinks w/removal of drink from straw to maintain. Check mouth frequently for residual. SLP to follow during stay for diet tolerance and possible advancement.

## 2023-03-06 NOTE — PLAN OF CARE
Goal Outcome Evaluation:  Plan of Care Reviewed With: patient        Progress: improving  Outcome Evaluation: vss, o2 titrated down. antibiotics continued and fluids. speech eval this shift. multiple bm this shift, creamy brown in color. no CLL staff available this shift

## 2023-03-06 NOTE — PROGRESS NOTES
Patient receiving IV rocephin and flagyl due to concern of aspiration. Nursing reporting tonight she is pocketing pills and coughing. Unable to provide lamotrigine to her this evening. She took her home dose keppra. Will add IV ativan PRN for seizures, place on seizure precautions, consult speech. Should any tremors develop will plan on additional IV keppra dose.

## 2023-03-07 ENCOUNTER — APPOINTMENT (OUTPATIENT)
Dept: GENERAL RADIOLOGY | Facility: HOSPITAL | Age: 83
DRG: 193 | End: 2023-03-07
Payer: MEDICARE

## 2023-03-07 LAB
ALBUMIN SERPL-MCNC: 3.1 G/DL (ref 3.5–5.2)
ALBUMIN/GLOB SERPL: 1 G/DL
ALP SERPL-CCNC: 103 U/L (ref 39–117)
ALT SERPL W P-5'-P-CCNC: 29 U/L (ref 1–33)
ANION GAP SERPL CALCULATED.3IONS-SCNC: 11.9 MMOL/L (ref 5–15)
AST SERPL-CCNC: 25 U/L (ref 1–32)
BASOPHILS # BLD AUTO: 0.03 10*3/MM3 (ref 0–0.2)
BASOPHILS NFR BLD AUTO: 0.2 % (ref 0–1.5)
BILIRUB SERPL-MCNC: 0.2 MG/DL (ref 0–1.2)
BUN SERPL-MCNC: 21 MG/DL (ref 8–23)
BUN/CREAT SERPL: 23.3 (ref 7–25)
CALCIUM SPEC-SCNC: 8.6 MG/DL (ref 8.6–10.5)
CHLORIDE SERPL-SCNC: 113 MMOL/L (ref 98–107)
CO2 SERPL-SCNC: 19.1 MMOL/L (ref 22–29)
CREAT SERPL-MCNC: 0.9 MG/DL (ref 0.57–1)
DEPRECATED RDW RBC AUTO: 51.4 FL (ref 37–54)
EGFRCR SERPLBLD CKD-EPI 2021: 64 ML/MIN/1.73
EOSINOPHIL # BLD AUTO: 0.14 10*3/MM3 (ref 0–0.4)
EOSINOPHIL NFR BLD AUTO: 1 % (ref 0.3–6.2)
ERYTHROCYTE [DISTWIDTH] IN BLOOD BY AUTOMATED COUNT: 14.8 % (ref 12.3–15.4)
GLOBULIN UR ELPH-MCNC: 3 GM/DL
GLUCOSE SERPL-MCNC: 104 MG/DL (ref 65–99)
HCT VFR BLD AUTO: 30.9 % (ref 34–46.6)
HGB BLD-MCNC: 9.7 G/DL (ref 12–15.9)
IMM GRANULOCYTES # BLD AUTO: 0.2 10*3/MM3 (ref 0–0.05)
IMM GRANULOCYTES NFR BLD AUTO: 1.4 % (ref 0–0.5)
LYMPHOCYTES # BLD AUTO: 2.46 10*3/MM3 (ref 0.7–3.1)
LYMPHOCYTES NFR BLD AUTO: 16.7 % (ref 19.6–45.3)
MCH RBC QN AUTO: 29.9 PG (ref 26.6–33)
MCHC RBC AUTO-ENTMCNC: 31.4 G/DL (ref 31.5–35.7)
MCV RBC AUTO: 95.4 FL (ref 79–97)
MONOCYTES # BLD AUTO: 0.65 10*3/MM3 (ref 0.1–0.9)
MONOCYTES NFR BLD AUTO: 4.4 % (ref 5–12)
NEUTROPHILS NFR BLD AUTO: 11.22 10*3/MM3 (ref 1.7–7)
NEUTROPHILS NFR BLD AUTO: 76.3 % (ref 42.7–76)
NRBC BLD AUTO-RTO: 0 /100 WBC (ref 0–0.2)
PLATELET # BLD AUTO: 227 10*3/MM3 (ref 140–450)
PMV BLD AUTO: 11 FL (ref 6–12)
POTASSIUM SERPL-SCNC: 4.1 MMOL/L (ref 3.5–5.2)
PROCALCITONIN SERPL-MCNC: 7.97 NG/ML (ref 0–0.25)
PROT SERPL-MCNC: 6.1 G/DL (ref 6–8.5)
RBC # BLD AUTO: 3.24 10*6/MM3 (ref 3.77–5.28)
SODIUM SERPL-SCNC: 144 MMOL/L (ref 136–145)
WBC NRBC COR # BLD: 14.7 10*3/MM3 (ref 3.4–10.8)

## 2023-03-07 PROCEDURE — 92611 MOTION FLUOROSCOPY/SWALLOW: CPT

## 2023-03-07 PROCEDURE — 25010000002 ENOXAPARIN PER 10 MG: Performed by: INTERNAL MEDICINE

## 2023-03-07 PROCEDURE — 94761 N-INVAS EAR/PLS OXIMETRY MLT: CPT

## 2023-03-07 PROCEDURE — 74230 X-RAY XM SWLNG FUNCJ C+: CPT

## 2023-03-07 PROCEDURE — 25010000002 CEFTRIAXONE SODIUM-DEXTROSE 1-3.74 GM-%(50ML) RECONSTITUTED SOLUTION: Performed by: INTERNAL MEDICINE

## 2023-03-07 PROCEDURE — 85025 COMPLETE CBC W/AUTO DIFF WBC: CPT | Performed by: HOSPITALIST

## 2023-03-07 PROCEDURE — 99232 SBSQ HOSP IP/OBS MODERATE 35: CPT | Performed by: HOSPITALIST

## 2023-03-07 PROCEDURE — 80053 COMPREHEN METABOLIC PANEL: CPT | Performed by: HOSPITALIST

## 2023-03-07 PROCEDURE — 25010000002 VANCOMYCIN 1 G RECONSTITUTED SOLUTION: Performed by: HOSPITALIST

## 2023-03-07 PROCEDURE — 94799 UNLISTED PULMONARY SVC/PX: CPT

## 2023-03-07 PROCEDURE — 84145 PROCALCITONIN (PCT): CPT | Performed by: HOSPITALIST

## 2023-03-07 RX ADMIN — LORAZEPAM 0.5 MG: 0.5 TABLET ORAL at 23:29

## 2023-03-07 RX ADMIN — LEVETIRACETAM 500 MG: 500 TABLET, FILM COATED ORAL at 20:06

## 2023-03-07 RX ADMIN — LUBIPROSTONE 8 MCG: 8 CAPSULE, GELATIN COATED ORAL at 17:47

## 2023-03-07 RX ADMIN — SODIUM CHLORIDE 100 ML/HR: 9 INJECTION, SOLUTION INTRAVENOUS at 06:46

## 2023-03-07 RX ADMIN — IPRATROPIUM BROMIDE AND ALBUTEROL SULFATE 3 ML: .5; 3 SOLUTION RESPIRATORY (INHALATION) at 19:38

## 2023-03-07 RX ADMIN — Medication 1 CAPSULE: at 09:32

## 2023-03-07 RX ADMIN — ATORVASTATIN CALCIUM 20 MG: 20 TABLET, FILM COATED ORAL at 09:32

## 2023-03-07 RX ADMIN — LAMOTRIGINE 200 MG: 100 TABLET ORAL at 20:06

## 2023-03-07 RX ADMIN — IPRATROPIUM BROMIDE AND ALBUTEROL SULFATE 3 ML: .5; 3 SOLUTION RESPIRATORY (INHALATION) at 07:34

## 2023-03-07 RX ADMIN — MELATONIN TAB 5 MG 5 MG: 5 TAB at 23:29

## 2023-03-07 RX ADMIN — ENOXAPARIN SODIUM 40 MG: 40 INJECTION SUBCUTANEOUS at 17:50

## 2023-03-07 RX ADMIN — PANTOPRAZOLE SODIUM 40 MG: 40 TABLET, DELAYED RELEASE ORAL at 06:41

## 2023-03-07 RX ADMIN — OXYCODONE HYDROCHLORIDE AND ACETAMINOPHEN 500 MG: 500 TABLET ORAL at 09:32

## 2023-03-07 RX ADMIN — CEFTRIAXONE 1 G: 1 INJECTION, SOLUTION INTRAVENOUS at 11:39

## 2023-03-07 RX ADMIN — IPRATROPIUM BROMIDE AND ALBUTEROL SULFATE 3 ML: .5; 3 SOLUTION RESPIRATORY (INHALATION) at 13:08

## 2023-03-07 RX ADMIN — LEVETIRACETAM 500 MG: 500 TABLET, FILM COATED ORAL at 09:32

## 2023-03-07 RX ADMIN — Medication 10 ML: at 09:32

## 2023-03-07 RX ADMIN — POLYVINYL ALCOHOL 1 DROP: 14 SOLUTION/ DROPS OPHTHALMIC at 20:06

## 2023-03-07 RX ADMIN — LAMOTRIGINE 200 MG: 100 TABLET ORAL at 09:32

## 2023-03-07 RX ADMIN — LUBIPROSTONE 8 MCG: 8 CAPSULE, GELATIN COATED ORAL at 09:32

## 2023-03-07 RX ADMIN — POLYVINYL ALCOHOL 1 DROP: 14 SOLUTION/ DROPS OPHTHALMIC at 09:32

## 2023-03-07 RX ADMIN — Medication 10 ML: at 20:06

## 2023-03-07 RX ADMIN — POLYVINYL ALCOHOL 1 DROP: 14 SOLUTION/ DROPS OPHTHALMIC at 11:40

## 2023-03-07 RX ADMIN — POLYVINYL ALCOHOL 1 DROP: 14 SOLUTION/ DROPS OPHTHALMIC at 17:50

## 2023-03-07 RX ADMIN — GABAPENTIN 300 MG: 300 CAPSULE ORAL at 20:06

## 2023-03-07 RX ADMIN — SODIUM CHLORIDE 1000 MG: 900 INJECTION, SOLUTION INTRAVENOUS at 09:31

## 2023-03-07 NOTE — PROGRESS NOTES
Adult Nutrition  Assessment/PES    Patient Name:  Erica Crockett  YOB: 1940  MRN: [id number]  Admit Date:  3/5/2023    Assessment Date:  3/7/2023    Comments:  Po intake 30% ave of meals noted.  Recommend: Add Boost Plus with meals ( honey thick) to provide an additional source of nutrition  Will cont to follow and monitor.      Reason for Assessment     Row Name 03/07/23 1143          Reason for Assessment    Reason For Assessment identified at risk by screening criteria  diff chew/swallow     Diagnosis neurologic conditions;infection/sepsis  [facility]                Nutrition/Diet History     Row Name 03/07/23 1143          Nutrition/Diet History    Typical Intake (Food/Fluid/EN/PN) Pt asleep in bed at visit. Noted from [place]                Labs/Tests/Procedures/Meds     Row Name 03/07/23 1144          Labs/Procedures/Meds    Lab Results Reviewed reviewed        Diagnostic Tests/Procedures    Diagnostic Test/Procedure Reviewed reviewed     Diagnostic Test/Procedures Comments SLP eval        Medications    Pertinent Medications Reviewed reviewed     Pertinent Medications Comments vitamin C, risaquad                  Estimated/Assessed Needs - Anthropometrics     Row Name 03/07/23 1144          Anthropometrics    Weight for Calculation 75.3 kg (165 lb 14.4 oz)     Additional Documentation --  based on EMR 3 months ago wt stable        Estimated/Assessed Needs    Additional Documentation Estimated Calorie Needs (Group);Fluid Requirements (Group);Protein Requirements (Group)        Estimated Calorie Needs    Estimated Calorie Requirement (kcal/day) 1496 kcal ( mifflin 1.2 )     Estimated Calorie Need Method [place] Je[place]        Protein Requirements    Est Protein Requirement Amount (gms/kg) 1.0 gm protein  75 gm pro        Fluid Requirements    Estimated Fluid Requirement Method RDA Method  1496 ml                Nutrition Prescription Ordered     Row Name 03/07/23  1144          Nutrition Prescription PO    Current PO Diet Pureed     Fluid Consistency Honey thick                Evaluation of Received Nutrient/Fluid Intake     Row Name 03/07/23 1145          Fluid Intake Evaluation    Oral Fluid (mL) 440  insufficient data        PO Evaluation    Number of Meals 5     % PO Intake 30                   Problem/Interventions:   Problem 1     Row Name 03/07/23 1145          Nutrition Diagnoses Problem 1    Problem 1 Biting/Chewing Difficulty     Etiology (related to) Functional Diagnosis     Functional Diagnosis Cognitive deficit     Signs/Symptoms (evidenced by) Report/Observation                      Intervention Goal     Row Name 03/07/23 1146          Intervention Goal    PO Tolerate PO;PO intake (%)     PO Intake % 50 %  or greater                Nutrition Intervention     Row Name 03/07/23 1146          Nutrition Intervention    RD/Tech Action Follow Tx progress                  Education/Evaluation     Row Name 03/07/23 1146          Education    Education No discharge needs identified at this time        Monitor/Evaluation    Monitor Per protocol;I&O;PO intake;Pertinent labs;Weight;Symptoms                 Electronically signed by:  Danita Sung RD  03/07/23 11:46 EST

## 2023-03-07 NOTE — PLAN OF CARE
Goal Outcome Evaluation:  Plan of Care Reviewed With:  (Choco MESSINA, Jocelin; MD, Dr. Van)           Outcome Evaluation: SLP: Completed MBS. Pt with no aspiration or significant penetration of thins, nectar, honey or puree. Continues with severe delay/spill of all consistencies to the valleculae and thins and nectar thick and honey thick liquids to the level of the pyriforms. Impression: moderate oropharyngeal dysphagia. Recommend: advance diet to puree with thin liquids, no straws and drinks from cup no greater than spoon size drinks. Supervision with meals. Results and recommendations discussed with staff SLP from [place].

## 2023-03-07 NOTE — CASE MANAGEMENT/SOCIAL WORK
Continued Stay Note  MILANA Medel     Patient Name: Erica Crockett  MRN: [id number]  Today's Date: 3/7/2023    Admit Date: 3/5/2023    Plan: Return to [facility] [place]   Discharge Plan     Row Name 03/07/23 1303       Plan    Plan Return to [facility] [place]    Patient/Family in Agreement with Plan yes    Plan Comments Rec'd call from patients nephew, Nicola Kapadia, he is patients legal guardian. He verifies that patient is from [facility] and states plan for her to return there [place]. Patient is non-verbal at baseline and uses a wc with assistance for mobility. She is able to feed herself with supervision and staff assists with all ADLs. IMM explained and understanding verbalized, no additional questions or concerns noted. Nicola plans to visit his aunt again this afternoon and get an update on her status at that time. CM will continue to follow.               Discharge Codes    No documentation.               Expected Discharge Date and Time     Expected Discharge Date Expected Discharge Time    Mar 7, 2023             Lee Us RN

## 2023-03-07 NOTE — PLAN OF CARE
Problem: Adult Inpatient Plan of Care  Goal: Plan of Care Review  Outcome: Ongoing, Progressing  Flowsheets (Taken 3/7/2023 1829)  Plan of Care Reviewed With:   patient   caregiver  Outcome Evaluation: Has video MBS test done today- can have thin liquids. In isolation. IVF d/c'd On IV ATB. Repeat blood clts no growth. Crush meds with pudding. Labs in AM   Goal Outcome Evaluation:  Plan of Care Reviewed With: patient, caregiver           Outcome Evaluation: Has video MBS test done today- can have thin liquids. In isolation. IVF d/c'd On IV ATB. Repeat blood clts no growth. Crush meds with pudding. Labs in AM

## 2023-03-07 NOTE — PROGRESS NOTES
"Hospitalist Team      Patient Care Team:  Tulio Carlson MD as PCP - General (Family Medicine)  Aleksandra Kent APRN as Nurse Practitioner (Pain Medicine)  Ja Harris MD as Consulting Physician (Neurology)  Magen Farmer MD as Consulting Physician (Nephrology)  Mikey Sanchez MD (Psychiatry)  Nelsy Butcher DPM as Consulting Physician (Podiatry)  Amy Bryan DO as Consulting Physician (Obstetrics and Gynecology)      Chief Complaint: Follow-up Acute Hypoxic Respiratory Failure due to Pneumonia    Subjective    No acute events overnight.  [facility] anushka reports that Ms. Crockett is more at her baseline.  She tells me that Ms. Crockett is very antisocial so she is \"coughing\" now to keep people away.    Objective    Vital Signs  Temp:  [97.8 °F (36.6 °C)-98.6 °F (37 °C)] 98.1 °F (36.7 °C)  Heart Rate:  [] 94  Resp:  [18-24] 24  BP: (127-164)/(61-79) 164/79  Oxygen Therapy  SpO2: 91 %  Pulse Oximetry Type: Continuous  Device (Oxygen Therapy): humidified, nasal cannula  Flow (L/min): 1.5}    Flowsheet Rows    Flowsheet Row First Filed Value   Admission Height 170.2 cm (67\") Documented at 03/05/2023 0835   Admission Weight 75.3 kg (165 lb 14.4 oz) Documented at 03/05/2023 0835          Physical Exam:    Ms. Crockett will not allow me to exam her, but she is in no respiratory distress and her cranial nerves remain grossly intact.    Results Review:     I reviewed the patient's new clinical results.    Lab Results (last 24 hours)     Procedure Component Value Units Date/Time    Blood Culture - Blood, Arm, Left [[id number]]  (Normal) Collected: 03/05/23 1023    Specimen: Blood from Arm, Left Updated: 03/07/23 1030     Blood Culture No growth at 2 days    Procalcitonin [[id number]]  (Abnormal) Collected: 03/07/23 0619    Specimen: Blood Updated: 03/07/23 0731     Procalcitonin 7.97 ng/mL     Narrative:      As a Marker for Sepsis (Non-Neonates):    1. <0.5 ng/mL represents a low risk of severe " "sepsis and/or septic shock.  2. >2 ng/mL represents a high risk of severe sepsis and/or septic shock.    As a Marker for Lower Respiratory Tract Infections that require antibiotic therapy:    PCT on Admission    Antibiotic Therapy       6-12 Hrs later    >0.5                Strongly Recommended  >0.25 - <0.5        Recommended   0.1 - 0.25          Discouraged              Remeasure/reassess PCT  <0.1                Strongly Discouraged     Remeasure/reassess PCT    As 28 day mortality risk marker: \"Change in Procalcitonin Result\" (>80% or <=80%) if Day 0 (or Day 1) and Day 4 values are available. Refer to http://www.[employer]-pct-calculator.com    Change in PCT <=80%  A decrease of PCT levels below or equal to 80% defines a positive change in PCT test result representing a higher risk for 28-day all-cause mortality of patients diagnosed with severe sepsis for septic shock.    Change in PCT >80%  A decrease of PCT levels of more than 80% defines a negative change in PCT result representing a lower risk for 28-day all-cause mortality of patients diagnosed with severe sepsis or septic shock.       Comprehensive Metabolic Panel [[id number]]  (Abnormal) Collected: 03/07/23 0619    Specimen: Blood Updated: 03/07/23 0704     Glucose 104 mg/dL      BUN 21 mg/dL      Creatinine 0.90 mg/dL      Sodium 144 mmol/L      Potassium 4.1 mmol/L      Comment: Slight hemolysis detected by analyzer. Results may be affected.        Chloride 113 mmol/L      CO2 19.1 mmol/L      Calcium 8.6 mg/dL      Total Protein 6.1 g/dL      Albumin 3.1 g/dL      ALT (SGPT) 29 U/L      AST (SGOT) 25 U/L      Alkaline Phosphatase 103 U/L      Total Bilirubin 0.2 mg/dL      Globulin 3.0 gm/dL      A/G Ratio 1.0 g/dL      BUN/Creatinine Ratio 23.3     Anion Gap 11.9 mmol/L      eGFR 64.0 mL/min/1.73     Narrative:      GFR Normal >60  Chronic Kidney Disease <60  Kidney Failure <15    The GFR formula is only valid for adults with stable renal function " between ages 18 and 70.    CBC & Differential [[id number]]  (Abnormal) Collected: 03/07/23 0619    Specimen: Blood Updated: 03/07/23 0650    Narrative:      The following orders were created for panel order CBC & Differential.  Procedure                               Abnormality         Status                     ---------                               -----------         ------                     CBC Auto Differential[[id number]]        Abnormal            Final result                 Please view results for these tests on the individual orders.    CBC Auto Differential [[id number]]  (Abnormal) Collected: 03/07/23 0619    Specimen: Blood Updated: 03/07/23 0650     WBC 14.70 10*3/mm3      RBC 3.24 10*6/mm3      Hemoglobin 9.7 g/dL      Hematocrit 30.9 %      MCV 95.4 fL      MCH 29.9 pg      MCHC 31.4 g/dL      RDW 14.8 %      RDW-SD 51.4 fl      MPV 11.0 fL      Platelets 227 10*3/mm3      Neutrophil % 76.3 %      Lymphocyte % 16.7 %      Monocyte % 4.4 %      Eosinophil % 1.0 %      Basophil % 0.2 %      Immature Grans % 1.4 %      Neutrophils, Absolute 11.22 10*3/mm3      Lymphocytes, Absolute 2.46 10*3/mm3      Monocytes, Absolute 0.65 10*3/mm3      Eosinophils, Absolute 0.14 10*3/mm3      Basophils, Absolute 0.03 10*3/mm3      Immature Grans, Absolute 0.20 10*3/mm3      nRBC 0.0 /100 WBC     Blood Culture - Blood, Wrist, Right [[id number]]  (Abnormal) Collected: 03/05/23 0829    Specimen: Blood from Wrist, Right Updated: 03/07/23 0639     Blood Culture Abnormal Stain     Gram Stain Aerobic Bottle Gram positive cocci in chains      Aerobic Bottle Gram positive cocci in clusters    MRSA Screen, PCR (Inpatient) - Swab, Nares [[id number]]  (Abnormal) Collected: 03/06/23 1121    Specimen: Swab from Nares Updated: 03/06/23 1729     MRSA PCR MRSA Detected    Narrative:      The negative predictive value of this diagnostic test is high and should only be used to consider de-escalating anti-MRSA therapy. A positive result  may indicate colonization with MRSA and must be correlated clinically.          Imaging Results (Last 24 Hours)     Procedure Component Value Units Date/Time    FL Video Swallow With Speech Single Contrast [[id number]] Collected: 03/07/23 0914     Updated: 03/07/23 0917    Narrative:      VIDEO SWALLOWING STUDY, 3/7/2023     HISTORY:  Coughing following meals. Chronic symptoms. HISTORY of pneumonia. [facility] patient     TECHNIQUE:  Video swallowing study was conducted by the speech pathologist in my  presence and recorded on digital video disc.     Fluoroscopy time 2.5 minutes. A single spot image was recorded for  documentation purposes.     Reference air kerma:  8.8 mGy     FINDINGS:  There is premature spillage with all consistencies, exacerbated by  thinner consistencies. Episodic flash penetration but no aspiration.  Please the full report of the speech pathologist for further details and  dietary recommendations.       Impression:      1. Premature spillage with episodic penetration but no aspiration.     This report was finalized on 3/7/2023 9:15 AM by Dr. Anthony Hsu MD.             Medication Review:   I have reviewed the patient's current medication list    Current Facility-Administered Medications:   •  acetaminophen (TYLENOL) tablet 650 mg, 650 mg, Oral, Q4H PRN, 650 mg at 03/06/23 2057 **OR** acetaminophen (TYLENOL) 160 MG/5ML solution 650 mg, 650 mg, Oral, Q4H PRN, 650 mg at 03/05/23 2018 **OR** acetaminophen (TYLENOL) suppository 650 mg, 650 mg, Rectal, Q4H PRN, Hector Cardenas, DO  •  albuterol (PROVENTIL) nebulizer solution 0.083% 2.5 mg/3mL, 2.5 mg, Nebulization, Q4H PRN, Hector Cardenas, DO  •  ascorbic acid (VITAMIN C) tablet 500 mg, 500 mg, Oral, Daily, Hector Cardenas, DO, 500 mg at 03/07/23 0932  •  atorvastatin (LIPITOR) tablet 20 mg, 20 mg, Oral, Daily, Hector Cardenas, DO, 20 mg at 03/07/23 0932  •  polyethylene glycol (MIRALAX) packet 17 g, 17 g, Oral, Daily PRN **AND** bisacodyl  (DULCOLAX) EC tablet 5 mg, 5 mg, Oral, Daily PRN **AND** bisacodyl (DULCOLAX) suppository 10 mg, 10 mg, Rectal, Daily PRN, Hector Cardenas, DO  •  cefTRIAXone (ROCEPHIN) IVPB 1 g/50ml dextrose (premix), 1 g, Intravenous, Q24H, Hector aCrdenas, , Last Rate: 100 mL/hr at 03/07/23 1139, 1 g at 03/07/23 1139  •  dextromethorphan-guaifenesin (ROBITUSSIN-DM) syrup 10 mL, 10 mL, Oral, Q4H PRN, Hector Cardenas, DO  •  Enoxaparin Sodium (LOVENOX) syringe 40 mg, 40 mg, Subcutaneous, Daily, Hector Cardenas, , 40 mg at 03/06/23 1546  •  gabapentin (NEURONTIN) capsule 300 mg, 300 mg, Oral, Nightly, Hector Cardenas DO, 300 mg at 03/06/23 2057  •  ipratropium-albuterol (DUO-NEB) nebulizer solution 3 mL, 3 mL, Nebulization, Q6H While Awake - RT, Hector Cardenas, , 3 mL at 03/07/23 1308  •  lactobacillus acidophilus (RISAQUAD) capsule 1 capsule, 1 capsule, Oral, Daily, Hector Cardenas, , 1 capsule at 03/07/23 0932  •  lamoTRIgine (LaMICtal) tablet 200 mg, 200 mg, Oral, BID, Hector Cardenas DO, 200 mg at 03/07/23 0932  •  levETIRAcetam (KEPPRA) tablet 500 mg, 500 mg, Oral, BID, Hector Cardenas, , 500 mg at 03/07/23 0932  •  LORazepam (ATIVAN) injection 4 mg, 4 mg, Intravenous, Q4H PRN, Jose Antonio Boothe, DO  •  LORazepam (ATIVAN) tablet 0.5 mg, 0.5 mg, Oral, Q8H PRN, Hector Cardenas,   •  lubiprostone (AMITIZA) capsule 8 mcg, 8 mcg, Oral, BID With Meals, Hector Cardenas, DO, 8 mcg at 03/07/23 0932  •  magnesium hydroxide (MILK OF MAGNESIA) suspension 10 mL, 10 mL, Oral, BID PRN, Hector Cardenas, DO  •  melatonin tablet 5 mg, 5 mg, Oral, Nightly PRN, Hector Cardenas, DO, 5 mg at 03/06/23 2057  •  ondansetron (ZOFRAN) tablet 4 mg, 4 mg, Oral, Q6H PRN **OR** ondansetron (ZOFRAN) injection 4 mg, 4 mg, Intravenous, Q6H PRN, Hector Cardenas, DO  •  pantoprazole (PROTONIX) EC tablet 40 mg, 40 mg, Oral, Q AM, Hector Cardenas, DO, 40 mg at 03/07/23 0641  •  Pharmacy Consult - Pharmacy to dose, , Does not apply, Continuous PRN, Carmelo Van MD  •   polyvinyl alcohol (LIQUIFILM) 1.4 % ophthalmic solution 1 drop, 1 drop, Both Eyes, 4x Daily, Hector Cardenas, , 1 drop at 03/07/23 1140  •  sodium chloride 0.9 % flush 10 mL, 10 mL, Intravenous, PRN, Hector Cardenas, DO  •  sodium chloride 0.9 % flush 10 mL, 10 mL, Intravenous, PRN, Hector Cardenas, DO  •  sodium chloride 0.9 % flush 10 mL, 10 mL, Intravenous, Q12H, Hector Cardenas, , 10 mL at 03/07/23 0932  •  sodium chloride 0.9 % infusion 40 mL, 40 mL, Intravenous, PRN, Hector Cardenas,   •  sodium chloride 0.9 % infusion, 100 mL/hr, Intravenous, Continuous, Hector Cardenas DO, Last Rate: 100 mL/hr at 03/07/23 0646, 100 mL/hr at 03/07/23 0646  •  vancomycin 1 g/250 mL 0.9% NS (vial-mate), 1,000 mg, Intravenous, Q18H, Carmelo Van MD, 1,000 mg at 03/07/23 0931      Assessment & Plan     1.  Acute Hypoxic Respiratory Failure due to Pneumonia: She has been weaned from 4L to 1.5.  Leukocytosis and PCT trending down.  MRSA swab was positive so will continue coverage for this but plan to de-escalate to Doxycycline from [facility] pending finalization of all culture data.      2.  Seizure Disorder: No acute issues on current regimen.     3.  Essential Hypertension: Not at goal on exam.  Trend is okay so will continue monitor on home regimen.     4.  Chronic Pain Syndrome w/ Narcotic Dependence: No acute issues.  Holding home regimen due to #1.  No sign of withdrawal.    Plan for disposition: Back to [facility] when able.    Carmelo Van MD  03/07/23  17:15 EST

## 2023-03-08 LAB
BASOPHILS # BLD AUTO: 0.03 10*3/MM3 (ref 0–0.2)
BASOPHILS NFR BLD AUTO: 0.3 % (ref 0–1.5)
DEPRECATED RDW RBC AUTO: 52.4 FL (ref 37–54)
EOSINOPHIL # BLD AUTO: 0.2 10*3/MM3 (ref 0–0.4)
EOSINOPHIL NFR BLD AUTO: 1.7 % (ref 0.3–6.2)
ERYTHROCYTE [DISTWIDTH] IN BLOOD BY AUTOMATED COUNT: 14.5 % (ref 12.3–15.4)
HCT VFR BLD AUTO: 28.9 % (ref 34–46.6)
HGB BLD-MCNC: 9.1 G/DL (ref 12–15.9)
IMM GRANULOCYTES # BLD AUTO: 0.11 10*3/MM3 (ref 0–0.05)
IMM GRANULOCYTES NFR BLD AUTO: 0.9 % (ref 0–0.5)
LYMPHOCYTES # BLD AUTO: 2.62 10*3/MM3 (ref 0.7–3.1)
LYMPHOCYTES NFR BLD AUTO: 21.9 % (ref 19.6–45.3)
MCH RBC QN AUTO: 30.4 PG (ref 26.6–33)
MCHC RBC AUTO-ENTMCNC: 31.5 G/DL (ref 31.5–35.7)
MCV RBC AUTO: 96.7 FL (ref 79–97)
MONOCYTES # BLD AUTO: 0.49 10*3/MM3 (ref 0.1–0.9)
MONOCYTES NFR BLD AUTO: 4.1 % (ref 5–12)
NEUTROPHILS NFR BLD AUTO: 71.1 % (ref 42.7–76)
NEUTROPHILS NFR BLD AUTO: 8.51 10*3/MM3 (ref 1.7–7)
PLATELET # BLD AUTO: 286 10*3/MM3 (ref 140–450)
PMV BLD AUTO: 10.9 FL (ref 6–12)
PROCALCITONIN SERPL-MCNC: 4.95 NG/ML (ref 0–0.25)
RBC # BLD AUTO: 2.99 10*6/MM3 (ref 3.77–5.28)
WBC NRBC COR # BLD: 11.96 10*3/MM3 (ref 3.4–10.8)

## 2023-03-08 PROCEDURE — 94799 UNLISTED PULMONARY SVC/PX: CPT

## 2023-03-08 PROCEDURE — 84145 PROCALCITONIN (PCT): CPT | Performed by: HOSPITALIST

## 2023-03-08 PROCEDURE — 94664 DEMO&/EVAL PT USE INHALER: CPT

## 2023-03-08 PROCEDURE — 25010000002 VANCOMYCIN 1 G RECONSTITUTED SOLUTION: Performed by: HOSPITALIST

## 2023-03-08 PROCEDURE — 25010000002 ENOXAPARIN PER 10 MG: Performed by: INTERNAL MEDICINE

## 2023-03-08 PROCEDURE — 99232 SBSQ HOSP IP/OBS MODERATE 35: CPT | Performed by: HOSPITALIST

## 2023-03-08 PROCEDURE — 25010000002 LORAZEPAM PER 2 MG: Performed by: HOSPITALIST

## 2023-03-08 PROCEDURE — 92526 ORAL FUNCTION THERAPY: CPT

## 2023-03-08 PROCEDURE — 85025 COMPLETE CBC W/AUTO DIFF WBC: CPT | Performed by: HOSPITALIST

## 2023-03-08 PROCEDURE — 94761 N-INVAS EAR/PLS OXIMETRY MLT: CPT

## 2023-03-08 PROCEDURE — 25010000002 CEFTRIAXONE SODIUM-DEXTROSE 1-3.74 GM-%(50ML) RECONSTITUTED SOLUTION: Performed by: INTERNAL MEDICINE

## 2023-03-08 RX ORDER — HYDROCODONE BITARTRATE AND ACETAMINOPHEN 7.5; 325 MG/1; MG/1
1 TABLET ORAL EVERY 8 HOURS PRN
Status: DISCONTINUED | OUTPATIENT
Start: 2023-03-08 | End: 2023-03-09 | Stop reason: HOSPADM

## 2023-03-08 RX ORDER — OXYCODONE AND ACETAMINOPHEN 7.5; 325 MG/1; MG/1
1 TABLET ORAL ONCE AS NEEDED
Status: DISCONTINUED | OUTPATIENT
Start: 2023-03-08 | End: 2023-03-08

## 2023-03-08 RX ORDER — LORAZEPAM 2 MG/ML
0.5 INJECTION INTRAMUSCULAR EVERY 4 HOURS PRN
Status: DISCONTINUED | OUTPATIENT
Start: 2023-03-08 | End: 2023-03-09 | Stop reason: HOSPADM

## 2023-03-08 RX ORDER — CEFDINIR 300 MG/1
300 CAPSULE ORAL EVERY 12 HOURS SCHEDULED
Status: DISCONTINUED | OUTPATIENT
Start: 2023-03-08 | End: 2023-03-09 | Stop reason: HOSPADM

## 2023-03-08 RX ORDER — DOXYCYCLINE 100 MG/1
100 CAPSULE ORAL EVERY 12 HOURS SCHEDULED
Status: DISCONTINUED | OUTPATIENT
Start: 2023-03-08 | End: 2023-03-09 | Stop reason: HOSPADM

## 2023-03-08 RX ADMIN — ENOXAPARIN SODIUM 40 MG: 40 INJECTION SUBCUTANEOUS at 16:29

## 2023-03-08 RX ADMIN — DOXYCYCLINE 100 MG: 100 CAPSULE ORAL at 21:26

## 2023-03-08 RX ADMIN — ATORVASTATIN CALCIUM 20 MG: 20 TABLET, FILM COATED ORAL at 09:38

## 2023-03-08 RX ADMIN — OXYCODONE HYDROCHLORIDE AND ACETAMINOPHEN 500 MG: 500 TABLET ORAL at 09:38

## 2023-03-08 RX ADMIN — LEVETIRACETAM 500 MG: 500 TABLET, FILM COATED ORAL at 21:26

## 2023-03-08 RX ADMIN — CEFTRIAXONE 1 G: 1 INJECTION, SOLUTION INTRAVENOUS at 09:39

## 2023-03-08 RX ADMIN — IPRATROPIUM BROMIDE AND ALBUTEROL SULFATE 3 ML: .5; 3 SOLUTION RESPIRATORY (INHALATION) at 19:47

## 2023-03-08 RX ADMIN — CEFDINIR 300 MG: 300 CAPSULE ORAL at 21:26

## 2023-03-08 RX ADMIN — LORAZEPAM 0.5 MG: 2 INJECTION INTRAMUSCULAR; INTRAVENOUS at 14:10

## 2023-03-08 RX ADMIN — SODIUM CHLORIDE 1000 MG: 900 INJECTION, SOLUTION INTRAVENOUS at 04:40

## 2023-03-08 RX ADMIN — POLYVINYL ALCOHOL 1 DROP: 14 SOLUTION/ DROPS OPHTHALMIC at 21:25

## 2023-03-08 RX ADMIN — LAMOTRIGINE 200 MG: 100 TABLET ORAL at 09:45

## 2023-03-08 RX ADMIN — LUBIPROSTONE 8 MCG: 8 CAPSULE, GELATIN COATED ORAL at 18:28

## 2023-03-08 RX ADMIN — POLYVINYL ALCOHOL 1 DROP: 14 SOLUTION/ DROPS OPHTHALMIC at 09:39

## 2023-03-08 RX ADMIN — MELATONIN TAB 5 MG 5 MG: 5 TAB at 21:26

## 2023-03-08 RX ADMIN — GABAPENTIN 300 MG: 300 CAPSULE ORAL at 21:26

## 2023-03-08 RX ADMIN — LEVETIRACETAM 500 MG: 500 TABLET, FILM COATED ORAL at 09:42

## 2023-03-08 RX ADMIN — PANTOPRAZOLE SODIUM 40 MG: 40 TABLET, DELAYED RELEASE ORAL at 06:29

## 2023-03-08 RX ADMIN — Medication 10 ML: at 09:40

## 2023-03-08 RX ADMIN — IPRATROPIUM BROMIDE AND ALBUTEROL SULFATE 3 ML: .5; 3 SOLUTION RESPIRATORY (INHALATION) at 07:31

## 2023-03-08 RX ADMIN — POLYVINYL ALCOHOL 1 DROP: 14 SOLUTION/ DROPS OPHTHALMIC at 18:35

## 2023-03-08 RX ADMIN — POLYVINYL ALCOHOL 1 DROP: 14 SOLUTION/ DROPS OPHTHALMIC at 12:20

## 2023-03-08 RX ADMIN — LAMOTRIGINE 200 MG: 100 TABLET ORAL at 21:26

## 2023-03-08 RX ADMIN — Medication 1 CAPSULE: at 09:38

## 2023-03-08 RX ADMIN — Medication 10 ML: at 21:26

## 2023-03-08 RX ADMIN — LUBIPROSTONE 8 MCG: 8 CAPSULE, GELATIN COATED ORAL at 09:45

## 2023-03-08 NOTE — CASE MANAGEMENT/SOCIAL WORK
Continued Stay Note   Ely Celis     Patient Name: Erica Crockett  MRN: [id number]  Today's Date: 3/8/2023    Admit Date: 3/5/2023    Plan: plan return to [facility]   Discharge Plan     Row Name 03/08/23 1333       Plan    Plan plan return to [facility]    Patient/Family in Agreement with Plan yes    Plan Comments Patient sleeping in bed, CLL sitter present. Chart reviewed, plan swallow eval today. CM will continue to follow.               Discharge Codes    No documentation.               Expected Discharge Date and Time     Expected Discharge Date Expected Discharge Time    Mar 7, 2023             Lee Us RN

## 2023-03-08 NOTE — PROGRESS NOTES
"Hospitalist Team      Patient Care Team:  Tulio Carlson MD as PCP - General (Family Medicine)  Aleksandra Kent APRN as Nurse Practitioner (Pain Medicine)  Ja Harris MD as Consulting Physician (Neurology)  Magen Farmer MD as Consulting Physician (Nephrology)  Mikey Sanchez MD (Psychiatry)  Nelsy Butcher DPM as Consulting Physician (Podiatry)  Amy Bryan DO as Consulting Physician (Obstetrics and Gynecology)      Chief Complaint: Follow-up Acute Hypoxic Respiratory Failure    Subjective    Quite agitated this afternoon, but calmed after a small dose of Ativan.  [place] that she only wears 1L of O2 at night, but otherwise has been liberated from O2.  No other acute events noted.    Objective    Vital Signs  Temp:  [98 °F (36.7 °C)-98.2 °F (36.8 °C)] 98 °F (36.7 °C)  Heart Rate:  [84-96] 84  Resp:  [18-20] 20  BP: (134-156)/(66-80) 136/66  Oxygen Therapy  SpO2: 92 %  Pulse Oximetry Type: Continuous  Device (Oxygen Therapy): nasal cannula  Flow (L/min): 1}  Flowsheet Rows    Flowsheet Row First Filed Value   Admission Height 170.2 cm (67\") Documented at 03/05/2023 0835   Admission Weight 75.3 kg (165 lb 14.4 oz) Documented at 03/05/2023 0835          Physical Exam:    General: Appears in no acute distress.  Lungs: Breath sounds are diminished but I appreciate no wheeze or rales.  Respirations are non-labored.  CV: Regular rate and rhythm. No murmurs appreciated.  Radial pulses are 2+ and symmetric.  Abdomen: Soft and non-tender w/ active bowel sounds  MSK: No C/C/E.  Neuro: CN II-XII grossly intact.  Psych: Non-agitated.    Results Review:     I reviewed the patient's new clinical results.    Lab Results (last 24 hours)     Procedure Component Value Units Date/Time    Blood Culture - Blood, Arm, Left [[id number]]  (Normal) Collected: 03/05/23 1023    Specimen: Blood from Arm, Left Updated: 03/08/23 1030     Blood Culture No growth at 3 days    Blood Culture - Blood, Wrist, Right " "[[id number]]  (Abnormal) Collected: 03/05/23 0829    Specimen: Blood from Wrist, Right Updated: 03/08/23 0558     Blood Culture Gram Positive Cocci     Isolated from Aerobic Bottle     Blood Culture Staphylococcus, coagulase negative     Isolated from Aerobic Bottle     Gram Stain Aerobic Bottle Gram positive cocci in chains      Aerobic Bottle Gram positive cocci in clusters    Narrative:      Probable contaminant requires clinical correlation, susceptibility not performed unless requested by physician.      Procalcitonin [[id number]]  (Abnormal) Collected: 03/08/23 0349    Specimen: Blood Updated: 03/08/23 0526     Procalcitonin 4.95 ng/mL     Narrative:      As a Marker for Sepsis (Non-Neonates):    1. <0.5 ng/mL represents a low risk of severe sepsis and/or septic shock.  2. >2 ng/mL represents a high risk of severe sepsis and/or septic shock.    As a Marker for Lower Respiratory Tract Infections that require antibiotic therapy:    PCT on Admission    Antibiotic Therapy       6-12 Hrs later    >0.5                Strongly Recommended  >0.25 - <0.5        Recommended   0.1 - 0.25          Discouraged              Remeasure/reassess PCT  <0.1                Strongly Discouraged     Remeasure/reassess PCT    As 28 day mortality risk marker: \"Change in Procalcitonin Result\" (>80% or <=80%) if Day 0 (or Day 1) and Day 4 values are available. Refer to http://www.[employer]-pct-calculator.com    Change in PCT <=80%  A decrease of PCT levels below or equal to 80% defines a positive change in PCT test result representing a higher risk for 28-day all-cause mortality of patients diagnosed with severe sepsis for septic shock.    Change in PCT >80%  A decrease of PCT levels of more than 80% defines a negative change in PCT result representing a lower risk for 28-day all-cause mortality of patients diagnosed with severe sepsis or septic shock.       CBC & Differential [[id number]]  (Abnormal) Collected: 03/08/23 0349    Specimen: " Blood Updated: 03/08/23 0502    Narrative:      The following orders were created for panel order CBC & Differential.  Procedure                               Abnormality         Status                     ---------                               -----------         ------                     CBC Auto Differential[[id number]]        Abnormal            Final result                 Please view results for these tests on the individual orders.    CBC Auto Differential [[id number]]  (Abnormal) Collected: 03/08/23 0349    Specimen: Blood Updated: 03/08/23 0502     WBC 11.96 10*3/mm3      RBC 2.99 10*6/mm3      Hemoglobin 9.1 g/dL      Hematocrit 28.9 %      MCV 96.7 fL      MCH 30.4 pg      MCHC 31.5 g/dL      RDW 14.5 %      RDW-SD 52.4 fl      MPV 10.9 fL      Platelets 286 10*3/mm3      Neutrophil % 71.1 %      Lymphocyte % 21.9 %      Monocyte % 4.1 %      Eosinophil % 1.7 %      Basophil % 0.3 %      Immature Grans % 0.9 %      Neutrophils, Absolute 8.51 10*3/mm3      Lymphocytes, Absolute 2.62 10*3/mm3      Monocytes, Absolute 0.49 10*3/mm3      Eosinophils, Absolute 0.20 10*3/mm3      Basophils, Absolute 0.03 10*3/mm3      Immature Grans, Absolute 0.11 10*3/mm3           Imaging Results (Last 24 Hours)     ** No results found for the last 24 hours. **          Medication Review:   I have reviewed the patient's current medication list    Current Facility-Administered Medications:   •  acetaminophen (TYLENOL) tablet 650 mg, 650 mg, Oral, Q4H PRN, 650 mg at 03/06/23 2057 **OR** acetaminophen (TYLENOL) 160 MG/5ML solution 650 mg, 650 mg, Oral, Q4H PRN, 650 mg at 03/05/23 2018 **OR** acetaminophen (TYLENOL) suppository 650 mg, 650 mg, Rectal, Q4H PRN, Hector Cardenas, DO  •  albuterol (PROVENTIL) nebulizer solution 0.083% 2.5 mg/3mL, 2.5 mg, Nebulization, Q4H PRN, Hector Cardenas, DO  •  ascorbic acid (VITAMIN C) tablet 500 mg, 500 mg, Oral, Daily, Hector Cardenas, DO, 500 mg at 03/08/23 0938  •  atorvastatin (LIPITOR) tablet  20 mg, 20 mg, Oral, Daily, Hector Cardenas, , 20 mg at 03/08/23 0938  •  polyethylene glycol (MIRALAX) packet 17 g, 17 g, Oral, Daily PRN **AND** bisacodyl (DULCOLAX) EC tablet 5 mg, 5 mg, Oral, Daily PRN **AND** bisacodyl (DULCOLAX) suppository 10 mg, 10 mg, Rectal, Daily PRN, Hector Cardenas,   •  cefTRIAXone (ROCEPHIN) IVPB 1 g/50ml dextrose (premix), 1 g, Intravenous, Q24H, Hector Cardenas DO, Last Rate: 100 mL/hr at 03/08/23 0939, 1 g at 03/08/23 0939  •  dextromethorphan-guaifenesin (ROBITUSSIN-DM) syrup 10 mL, 10 mL, Oral, Q4H PRN, Hector Cardenas DO  •  Enoxaparin Sodium (LOVENOX) syringe 40 mg, 40 mg, Subcutaneous, Daily, Hector Cardenas DO, 40 mg at 03/08/23 1629  •  gabapentin (NEURONTIN) capsule 300 mg, 300 mg, Oral, Nightly, Hector Cardenas DO, 300 mg at 03/07/23 2006  •  ipratropium-albuterol (DUO-NEB) nebulizer solution 3 mL, 3 mL, Nebulization, Q6H While Awake - RT, Hector Cardenas DO, 3 mL at 03/08/23 0731  •  lactobacillus acidophilus (RISAQUAD) capsule 1 capsule, 1 capsule, Oral, Daily, Hector Cardenas DO, 1 capsule at 03/08/23 0938  •  lamoTRIgine (LaMICtal) tablet 200 mg, 200 mg, Oral, BID, Hector Cardenas DO, 200 mg at 03/08/23 0945  •  levETIRAcetam (KEPPRA) tablet 500 mg, 500 mg, Oral, BID, Hector Cardenas DO, 500 mg at 03/08/23 0942  •  LORazepam (ATIVAN) injection 0.5 mg, 0.5 mg, Intravenous, Q4H PRN, Carmelo Van MD, 0.5 mg at 03/08/23 1410  •  LORazepam (ATIVAN) injection 4 mg, 4 mg, Intravenous, Q4H PRN, Jose Antonio Boothe, DO  •  LORazepam (ATIVAN) tablet 0.5 mg, 0.5 mg, Oral, Q8H PRN, Hector Cardenas, , 0.5 mg at 03/07/23 2329  •  lubiprostone (AMITIZA) capsule 8 mcg, 8 mcg, Oral, BID With Meals, Hector Cardenas, , 8 mcg at 03/08/23 0945  •  magnesium hydroxide (MILK OF MAGNESIA) suspension 10 mL, 10 mL, Oral, BID PRN, Hector Cardenas, DO  •  melatonin tablet 5 mg, 5 mg, Oral, Nightly PRN, Hector Cardenas, , 5 mg at 03/07/23 2329  •  ondansetron (ZOFRAN) tablet 4 mg, 4 mg, Oral, Q6H PRN  **OR** ondansetron (ZOFRAN) injection 4 mg, 4 mg, Intravenous, Q6H PRN, Hector Cardenas, DO  •  oxyCODONE-acetaminophen (PERCOCET) 7.5-325 MG per tablet 1 tablet, 1 tablet, Oral, Once PRN, Jose Antonio Boothe, DO  •  pantoprazole (PROTONIX) EC tablet 40 mg, 40 mg, Oral, Q AM, Hector Cardenas DO, 40 mg at 03/08/23 0629  •  Pharmacy Consult - Pharmacy to dose, , Does not apply, Continuous PRN, Carmelo Van MD  •  polyvinyl alcohol (LIQUIFILM) 1.4 % ophthalmic solution 1 drop, 1 drop, Both Eyes, 4x Daily, Hector Cardenas DO, 1 drop at 03/08/23 1220  •  sodium chloride 0.9 % flush 10 mL, 10 mL, Intravenous, PRN, Hector Cardenas DO  •  sodium chloride 0.9 % flush 10 mL, 10 mL, Intravenous, PRN, Hector Cardenas, DO  •  sodium chloride 0.9 % flush 10 mL, 10 mL, Intravenous, Q12H, Hector Cardenas DO, 10 mL at 03/08/23 0940  •  sodium chloride 0.9 % infusion 40 mL, 40 mL, Intravenous, PRN, Hector Cardenas DO  •  vancomycin 1 g/250 mL 0.9% NS (vial-mate), 1,000 mg, Intravenous, Q18H, Carmelo Van MD, 1,000 mg at 03/08/23 0440      Assessment & Plan     1.  Acute Hypoxic Respiratory Failure due to Pneumonia: Liberated from O2.  Procalcitonin continues to trend down as is leukocytosis.  I'm going to transition her to Cefdinir and Doxycycline tomrrow.      2.  Seizure Disorder: No acute issues on current regimen.     3.  Essential Hypertension: Trend is a little better.  Continue on current home regimen.     4.  Chronic Pain Syndrome w/ Narcotic Dependence: I'm going to add back some of her home regimen given improvement in respiratory status.    Plan for disposition: A.D. in [facility].    Carmelo Van MD  03/08/23  16:39 EST

## 2023-03-08 NOTE — THERAPY TREATMENT NOTE
Acute Care - Speech Language Pathology   Swallow Treatment Note MILANA Medel     Patient Name: Erica Crockett  : 1940  MRN: [id number]  Today's Date: 3/8/2023               Admit Date: 3/5/2023    Visit Dx:     ICD-10-CM ICD-9-CM   1. Pneumonia of right middle lobe due to infectious organism  J18.9 486   2. Oropharyngeal dysphagia  R13.12 787.22     Patient Active Problem List   Diagnosis   • Degeneration of intervertebral disc of lumbar region   • Developmental mental disorder   • Osteoarthritis of hip   • Scoliosis   • Chronic pain   • Nonverbal signs of pain   • Bursitis of left hip   • Encounter for monitoring opioid maintenance therapy   • Anxiety   • Constipation   • Hypercalcemia   • Epilepsy, not refractory (HCC)   • Osteoporosis   • Impulse control disorder   • Vitamin D deficiency   • Pneumonia of both lower lobes due to infectious organism   • EKILY (acute kidney injury) (HCC)   • Sepsis-associated organ dysfunction (HCC)   • Stage 3 chronic kidney disease (HCC)   • Hypercholesterolemia   • Acute kidney failure (HCC)   • Essential (primary) hypertension   • Generalized anxiety disorder   • Hyperkalemia   • Urinary tract infection   • Dental pain   • Aspiration pneumonia of right lung (HCC)   • Pneumonia of right middle lobe due to infectious organism     Past Medical History:   Diagnosis Date   • Anemia    • Anxiety    • Bursitis    • Bursitis    • DJD (degenerative joint disease), lumbar    • Hyperkalemia    • Hypoglycemia    • Intellectual disability    • Low back pain    • Osteoarthritis    • Osteopenia    • Pneumonia    • Renal disorder    • Renal insufficiency    • Scoliosis    • Seizure disorder (HCC)      History reviewed. No pertinent surgical history.    SLP Recommendation and Plan     SLP Diet Recommendation: puree, thin liquids ([id number])  Recommended Precautions and Strategies: upright posture during/after eating, small bites of food and sips of liquid, alternate between small  bites of food and sips of liquid, general aspiration precautions, reflux precautions, fatigue precautions, 1:1 supervision, assist with feeding (03/08/23 0926)  SLP Rec. for Method of Medication Administration: meds crushed, with puree, as tolerated (03/08/23 0926)     Monitor for Signs of Aspiration: yes, notify SLP if any concerns (03/08/23 0926)        Anticipated Discharge Disposition (SLP): community-based residential facility (CB), anticipate therapy at next level of care (03/08/23 0926)     Therapy Frequency (Swallow): PRN (03/08/23 0926)  Predicted Duration Therapy Intervention (Days): until discharge (03/08/23 0926)        Daily Summary of Progress (SLP): progress toward functional goals is good (03/08/23 0926)         Patient/Family Concerns, Anticipated Discharge Disposition (SLP): No concerns reported per caregiver (03/08/23 0926)     Treatment Assessment (SLP): continued, toleration of diet, mild-moderate, oral dysphagia, pharyngeal dysphagia (03/08/23 0926)  Treatment Assessment Comments (SLP): Pt tolerating puree diet with thins overall. Mild holding of food at times, but able to swallow with tactile cues and prompts. One episode of choking on thins noted with drink provided by caregiver. Encouraged caregiver to allow pt to take drinks on her own and assist with movement of cup. Able to tolerate liquids in small amounts. Continue to recommend no straw. (03/08/23 0926)  Plan for Continued Treatment (SLP): continue treatment per plan of care (03/08/23 0926)         Plan of Care Reviewed With: caregiver (Haley)  Outcome Evaluation: SLP: Pt tolerating puree diet with thins when given small drinks from cup. Difficulty maintaining positioning and requiring repositioning during meal. Education provided to caregiver for no use of straws and to encourage pt to take small drinks on her own from a cup. May benefit from personal cups used at [facility] for familiarity.      SWALLOW EVALUATION (last 72  hours)     SLP Adult Swallow Evaluation     Row Name 03/08/23 0926 03/07/23 0828 03/06/23 0815             Rehab Evaluation    Document Type therapy note (daily note)  -AD evaluation  -AD evaluation  -AD      Subjective Information --  pt nonverbal; no complaints from caregiver  -AD -- --  Pt is nonverbal and does not/is not able to report complaints.  -AD      Patient Observations alert  generally cooperative  -AD alert;cooperative  -AD alert;cooperative  -AD      Patient/Family/Caregiver Comments/Observations Pt seen upright in bed w/caregiver, Haley, from [place] present and feeding pt. Pt requiring repositioning due to fidgeting and sliding down in bed.  -AD Pt seen in radiology upright in video imaging chair with use of wedge to keep upright and pulling of LE upward. Staff SLP, Jocelin, from [facility] present for study. Providing modified cups for the study.  -AD Pt seen upright in bed w/right lean despite repositioning. Leg contractures versus drawing legs up. Cooperative with feeding but unable to provide any history and no verbal output to assess vocal quality.  -AD      Patient Effort adequate  -AD good  -AD good  -AD      Symptoms Noted During/After Treatment none  -AD none  -AD none  -AD         General Information    Patient Profile Reviewed yes  -AD yes  -AD yes  -AD      Pertinent History Of Current Problem No changes in history from 3/6/23.  -AD No changes in history.  -AD Pt is an 83 y/o resident of [facility] with hx of severe intellectual disability, seizure d/o and CKD. Recurrent pneumonia/dyspnea noted in chart over recent years and concerns for at least aspiration events  (Dec 2019, April 2022, August 2022, Dec 2022, Jan 2023 and current admission). Concerns for dehydration recently at [facility] and worsening kidney function per staff report requiring IV fluids. Pt was on a minced and moist diet w/honey thick liquids and thin water in between meals at [facility] since discharge  from [facility] with recent change to thins w/meals in the last week per [place] SLP conversation today. Continued concerns for aspiration per [place] SLP.  -AD      Current Method of Nutrition -- pureed;honey-thick liquids  -AD mechanical ground textures;thin liquids  -AD      Precautions/Limitations, Vision -- -- other (see comments)  Pt will locate person speaking/caring for her with her eyes. Not able to assess otherwise.  -AD      Precautions/Limitations, Hearing -- -- difficult to assess  -AD      Prior Level of Function-Communication -- -- cognitive-linguistic impairment  severe intellectual disability with seizure d/o; nonverbal  -AD      Prior Level of Function-Swallowing -- -- assistance needed;other (see comments)  minced and moist diet with thin liquids (changed from honey to thin liquids in last week)  -AD      Plans/Goals Discussed with -- other (see comments);agreed upon  MAYURI Monreal  -AD other (see comments)  Saskia AVILA and Jocelin, staff SLP at [place]  -AD      Barriers to Rehab -- previous functional deficit;cognitive status  -AD previous functional deficit;cognitive status;contractures  -AD      Patient's Goals for Discharge -- patient could not state  -AD patient could not state  -AD      Family Goals for Discharge -- other (see comments)  least restrictive diet per staff SLP, Jocelin.  -AD --         Pain    Additional Documentation --  no pain indicated during the session.  -AD --  no pain indicated  -AD --  no current pain indicated by facial expressions and body movement  -AD         Oral Motor Structure and Function    Oral Lesions or Structural Abnormalities and/or variants -- -- no gross lesions or abnormalities noted  -AD      Dentition Assessment -- -- missing teeth;other (see comments)  severely limited dentition. One lower left incisor noted. Unable to see other teeth at this time; noted limited dentition in past with no opposing teeth  -AD      Secretion Management -- -- anterior loss;other (see  comments)  right sided loss, minimal out of right corner of mouth  -AD      Mucosal Quality -- -- moist, healthy  -AD      Volitional Swallow -- -- unable to elicit  -AD      Volitional Cough -- -- unable to elicit  -AD         Oral Musculature and Cranial Nerve Assessment    Oral Motor General Assessment -- -- generalized oral motor weakness  -AD      Oral Motor, Comment -- -- No asymmetry or drooping noted. Pt unable to participate in oral motor/cranial nerve exam. Generalized weakness of the tongue and lips noted during meal assessment. Unable to assess velar or voice.  -AD         General Eating/Swallowing Observations    Respiratory Support Currently in Use nasal cannula  -AD nasal cannula  -AD nasal cannula  -AD      O2 Liters 2L  -AD 2L  -AD 3L  -AD      Eating/Swallowing Skills fed by staff/caregiver  -AD fed by SLP;other (see comments)  pt controlled straw and cup drinks  -AD self-fed;fed by SLP;needed assist;impulsive self-feeding behaviors;rate is too fast;other (see comments)  pt controlled drinks from straw with attempts at large intake and consecutive drinks; SLP able to control with removing after taking small amount via straw; pt required feeding assistance with food otherwise.  -AD      Positioning During Eating upright in bed;needs frequent re-positioning  as close to 90 degrees as achievable  -AD upright in chair;contracted;needs frequent re-positioning;other (see comments)  wedge behind back to keep upright near 90 degrees  -AD upright in bed;contracted;other (see comments)  leans to right, difficulty maintaining fully upright position in bed.  -AD      Utensils Used -- -- spoon;straw  -AD      Consistencies Trialed -- -- soft to chew textures;ground;pureed;honey-thick liquids  -AD         Respiratory    Respiratory Status WFL;during swallowing/eating  -AD WFL;during swallowing/eating  -AD WFL;during swallowing/eating  -AD         Clinical Swallow Eval    Oral Prep Phase -- -- impaired  -AD       Oral Transit -- -- impaired  -AD      Oral Residue -- -- impaired  -AD      Pharyngeal Phase -- -- suspected pharyngeal impairment  -AD      Esophageal Phase -- -- unremarkable  Note hx of retained barium on esophageal screen on MBS of Aug 2022.  -AD      Clinical Swallow Evaluation Summary -- -- Clinical swallow evaluation completed this am during breakfast. Chart reviewed and contacted SLP at [facility] ([facility]) caring for this pt. Hx is significant for high risk of aspiration per prior MBS studies in 2019 and 2022. Pt placed on honey thick puree diet in January 2022. Per SLP at [facility], pt currently on minced and moist diet at [facility] w/thins. Liquids just recently changed back to thins in the past week from Honey thick. Concerns for kidney issues and not getting enough fluids prompted the change to thins. Repeat MBS recommended but declined by guardian as outpatient. Current evaluation from today indicates a moderate oral and suspected pharyngeal dysphagia. Pt with anterior loss of saliva mixed with food out of the right side of the mouth (dependent position due to contractures and pt preference), decreased mastication with limited chewing/mashing. Pt with increased oral transit of ground solids (regular solids not attempted given prior hx/reports). Mild oral residue of ground solids in oral cavity. Impulsivity with oral intake of honey thick liquids (no thin or nectar thick liquids attempted due to prior reports and risks) taking large drinks via straw. Timely swallow palpated and laryngeal elevation appears to be WFL. No overt s/s of aspiration noted. Tolerated meds crushed in pudding. Pt required assistance with feeding and will hold a cup intermittently and drink from a straw.  -AD         Oral Prep Concerns    Oral Prep Concerns -- -- inefficient mastication;poor rotary chew;anterior loss;increased prep time  -AD      Inefficient Mastication -- -- honey;pudding;mechanical soft  -AD      Poor Rotary Chew -- --  mechanical soft  -AD      Anterior Loss -- -- mechanical soft;other (see comments)  saliva  -AD      Increased Prep Time -- -- mechanical soft;pudding  -AD         Oral Transit Concerns    Oral Transit Concerns -- -- increased oral transit time  -AD      Increased Oral Transit Time -- -- mechanical soft  -AD         Oral Residue Concerns    Oral Residue Concerns -- -- sulci residue, bilateral  -AD      Sulci Residue, Bilateral -- -- mechanical soft  -AD         Pharyngeal Phase Concerns    Pharyngeal Phase Concerns -- -- other (see comments)  -AD      Pharyngeal Phase Concerns, Comment -- -- Prior history of SEVERE spill of all consistencies into the pharynx prior to swallow, delayed pharyngeal swallow and penetration of all liquid consistencies and trace aspiration of thins on past instrumental (MBS) swallow studies. No current s/s of aspiration noted on ground/moist foods and honey thick liquids.  -AD         MBS/VFSS    Utensils Used -- spoon;cup;straw;adapted cup  limitation of 10cc amounts; cup with handles  -AD --      Consistencies Trialed -- pureed;thin liquids;nectar/syrup-thick liquids;honey-thick liquids;pudding thick  -AD --         MBS/VFSS Interpretation    Oral Prep Phase -- impaired oral phase of swallowing  -AD --      Oral Transit Phase -- impaired  -AD --      Oral Residue -- WFL  during this study as no solids or mechanical soft items trialed.  -AD --      VFSS Summary -- Pt presents with a mild to moderate oropharyngeal dysphagia. Deficits in oral phase of decreased bolus formation and control. This results in early spill over the back of the tongue. No significant oral residue noted on trials after the swallow. Solids not assessed due to missing dentition and previously noted decreased mastication/bolus prep. Pharyngeal phase is marked by spill and premature spill of all liquids to the level of the pyriforms ant thins to the UES. Pudding with spill to the valleculae. Delay of pharyngeal swallow  from 1 sec - 8 seconds (2-5 on NTL, 2-3 HTL, 1-8 sec pudding, 2-3 for thins). Pt with no observed penetration or aspiration but continues to demonstrate consistent SEVERE spill into the pharynx. Pt with decreased amount of pooling with smaller bolus. Mild pharyngeal residue noted on honey thick liquids only in the lateral channels that is cleared with pudding trials. Decreased epiglottic deflection noted on all trials of liquids but not on pudding trials. Felt to be due to the weight of the bolus eliciting deflection.  -AD --      Oral Phase, Comment -- Decreased oral control of bolus resulting in early spill due to lingual weakness and cognitive status. Improved bolus control with small trials of thins from adapted cup and spoon size trials.  -AD --         Initiation of Pharyngeal Swallow    Pharyngeal Phase, Comment -- Severe pooling of all contrast into the pharynx. All liquids to pyriforms and thins to UES. Pudding to the level of the valleculae. Decreased epiglottic deflection on liquids but functional on pudding trials. No observed penetration or aspiration. Decreased pooling noted on thins from spoon and adapted cup due to smaller size of bolus. Increased pooling noted with straw trials of thins. Mild residue in the lateral channels on honey thick liquids cleared w/pudding bolus. No other significant residue noted on other liquid trials or pudding.  -AD --         SLP Evaluation Clinical Impression    SLP Swallowing Diagnosis -- mild-moderate;oral dysphagia;pharyngeal dysphagia  -AD moderate;oral dysphagia;pharyngeal dysphagia  -AD      Functional Impact -- risk of aspiration/pneumonia  -AD risk of aspiration/pneumonia;risk of malnutrition;risk of dehydration  -AD      Rehab Potential/Prognosis, Swallowing -- re-evaluate goals as necessary  -AD re-evaluate goals as necessary  -AD      Swallow Criteria for Skilled Therapeutic Interventions Met -- demonstrates skilled criteria  -AD demonstrates skilled  criteria  -AD         SLP Treatment Clinical Impressions    Treatment Assessment (SLP) continued;toleration of diet;mild-moderate;oral dysphagia;pharyngeal dysphagia  -AD -- --      Treatment Assessment Comments (SLP) Pt tolerating puree diet with thins overall. Mild holding of food at times, but able to swallow with tactile cues and prompts. One episode of choking on thins noted with drink provided by caregiver. Encouraged caregiver to allow pt to take drinks on her own and assist with movement of cup. Able to tolerate liquids in small amounts. Continue to recommend no straw.  -AD -- --      Daily Summary of Progress (SLP) progress toward functional goals is good  -AD -- --      Barriers to Overall Progress (SLP) Baseline deficits  -AD -- --      Plan for Continued Treatment (SLP) continue treatment per plan of care  -AD -- --      Care Plan Review evaluation/treatment results reviewed;care plan/treatment goals reviewed;risks/benefits reviewed;current/potential barriers reviewed  -AD -- --      Care Plan Review, Other Participant(s) caregiver  -AD -- --         Recommendations    Therapy Frequency (Swallow) PRN  -AD PRN  -AD PRN  -AD      Predicted Duration Therapy Intervention (Days) until discharge  -AD until discharge  -AD until discharge  -AD      SLP Diet Recommendation puree;thin liquids  -AD puree;thin liquids  -AD puree;honey thick liquids  -AD      Recommended Diagnostics -- -- [place]  -AD      Recommended Precautions and Strategies upright posture during/after eating;small bites of food and sips of liquid;alternate between small bites of food and sips of liquid;general aspiration precautions;reflux precautions;fatigue precautions;1:1 supervision;assist with feeding  -AD upright posture during/after eating;small bites of food and sips of liquid;alternate between small bites of food and sips of liquid;general aspiration precautions;reflux precautions;fatigue precautions;1:1 supervision;assist with  feeding  -AD upright posture during/after eating;small bites of food and sips of liquid;alternate between small bites of food and sips of liquid;general aspiration precautions;reflux precautions;fatigue precautions;1:1 supervision;assist with feeding  -AD      Oral Care Recommendations Oral Care before breakfast, after meals and PRN;Toothbrush  -AD Oral Care before breakfast, after meals and PRN;Toothbrush  -AD Oral Care before breakfast, after meals and PRN;Toothbrush  -AD      SLP Rec. for Method of Medication Administration meds crushed;with puree;as tolerated  -AD meds crushed;with puree;as tolerated  -AD meds crushed;with puree;as tolerated  -AD      Monitor for Signs of Aspiration yes;notify SLP if any concerns  -AD yes;notify SLP if any concerns  -AD yes;notify SLP if any concerns  -AD      Anticipated Discharge Disposition (SLP) [facility] facility (RF);anticipate therapy at next level of care  -AD community-based residential facility ([facility])  -AD [facility] facility (RF)  -AD      Patient/Family Concerns, Anticipated Discharge Disposition (SLP) No concerns reported per caregiver  -AD Pt cannot report.  -AD Pt unable to report.  -AD         Swallow Goals (SLP)    Swallow LTGs -- -- Patient will demonstrate progress toward functional swallow for  -AD      Swallow STGs -- -- diet tolerance goal selection (SLP)  -AD      Diet Tolerance Goal Selection (SLP) -- -- Patient will tolerate therapeutic trials of;Swallow Short Term Goal 1  -AD         (LTG) Patient will demonstrate progress toward functional swallow for    Diet Texture (Demonstrate progress toward functional swallow) mechanical ground textures  minced an moist texture  -AD mechanical ground textures  minced an moist texture  -AD mechanical ground textures  minced an moist texture  -AD      Liquid viscosity (Demonstrate progress toward functional swallow) thin liquids  -AD thin liquids  revised this date  -AD nectar/  mildly thick liquids  -AD      [place] (Demonstrate progress towards functional swallow) with 1:1 assist/ supervision  -AD with 1:1 assist/ supervision  -AD with 1:1 assist/ supervision  -AD      Time Frame (Demonstrate progress toward functional swallow) by discharge  -AD by discharge  -AD --      Barriers (Demonstrate progress toward functional swallow) intellectual disability and chronic dysphagia  -AD intellectual disability and chronic dysphagia  -AD intellectual disability and chronic dysphagia  -AD      Progress/Outcomes (Demonstrate progress toward functional swallow) continuing progress toward goal;goal ongoing  -AD good progress toward goal;goal ongoing  -AD new goal  -AD      Comment (Demonstrate progress toward functional swallow) Tolerating puree foods and thins from small cup drinks. One episode of choking when large drink taken from cup and pt shifting in bed to lower position. Repositioned and no further instances. Instruction regarding allowing pt to take small sips and to see if she will handle the cup as well, decreasing risk of aspiration.  -AD -- --         (STG) Patient will tolerate therapeutic trials of    Consistencies Trialed (Tolerate therapeutic trials) mechanical ground textures;thin liquids  -AD mechanical ground textures;thin liquids  revised this date  -AD soft to chew (ground) textures;nectar/ mildly thick liquids  -AD      Desired Outcome (Tolerate therapeutic trials) without signs/symptoms of aspiration;with adequate oral prep/transit/clearance  -AD without signs/symptoms of aspiration;with adequate oral prep/transit/clearance  -AD without signs/symptoms of aspiration;with adequate oral prep/transit/clearance  -AD      [place] (Tolerate therapeutic trials) with 1:1 assist/ supervision  -AD with 1:1 assist/ supervision  -AD with 1:1 assist/ supervision  -AD      Time Frame (Tolerate therapeutic trials) 1 week;by discharge  -AD 1 week;by discharge  -AD 1 week;by discharge   -AD      Progress/Outcomes (Tolerate therapeutic trials) new goal  -AD new goal  -AD new goal  -AD      Comment (Tolerate therapeutic trials) Not targeted during this session.  -AD Not targeted during this session.  -AD --         (STG) Swallow 1    (STG) Swallow 1 -- Pt will participate in a modified barium swallow study in 2 days to determine risk of aspiration and least restrictive diet recommendations.  -AD Pt will participate in a modified barium swallow study in 2 days to determine risk of aspiration and least restrictive diet recommendations.  -AD      [place] (Swallow Short Term Goal 1) -- with 1:1 assist/ supervision  -AD with 1:1 assist/ supervision  -AD      Time Frame (Swallow Short Term Goal 1) -- --  2 days  -AD other (see comments)  2 days  -AD      Progress/Outcomes (Swallow Short Term Goal 1) -- goal met  -AD new goal  -AD            User Key  (r) = Recorded By, (t) = Taken By, (c) = Cosigned By    Initials Name Effective Dates    AD Nelsy Morrell MS CCC-SLP 06/16/21 -                 EDUCATION  The patient has been educated in the following areas:   Dysphagia (Swallowing Impairment) Modified Diet Instruction Safe Feeding .  Caregiver verbalizes understanding of diet modifications and careful feeding techniques.         SLP GOALS     Row Name 03/08/23 0926 03/07/23 0828 03/06/23 0815       (LTG) Patient will demonstrate progress toward functional swallow for    Diet Texture (Demonstrate progress toward functional swallow) mechanical ground textures  minced an moist texture  -AD mechanical ground textures  minced an moist texture  -AD mechanical ground textures  minced an moist texture  -AD    Liquid viscosity (Demonstrate progress toward functional swallow) thin liquids  -AD thin liquids  revised this date  -AD nectar/ mildly thick liquids  -AD    [place] (Demonstrate progress towards functional swallow) with 1:1 assist/ supervision  -AD with 1:1 assist/ supervision  -AD with 1:1  assist/ supervision  -AD    Time Frame (Demonstrate progress toward functional swallow) by discharge  -AD by discharge  -AD --    Barriers (Demonstrate progress toward functional swallow) intellectual disability and chronic dysphagia  -AD intellectual disability and chronic dysphagia  -AD intellectual disability and chronic dysphagia  -AD    Progress/Outcomes (Demonstrate progress toward functional swallow) continuing progress toward goal;goal ongoing  -AD good progress toward goal;goal ongoing  -AD new goal  -AD    Comment (Demonstrate progress toward functional swallow) Tolerating puree foods and thins from small cup drinks. One episode of choking when large drink taken from cup and pt shifting in bed to lower position. Repositioned and no further instances. Instruction regarding allowing pt to take small sips and to see if she will handle the cup as well, decreasing risk of aspiration.  -AD -- --       (STG) Patient will tolerate therapeutic trials of    Consistencies Trialed (Tolerate therapeutic trials) mechanical ground textures;thin liquids  -AD mechanical ground textures;thin liquids  revised this date  -AD soft to chew (ground) textures;nectar/ mildly thick liquids  -AD    Desired Outcome (Tolerate therapeutic trials) without signs/symptoms of aspiration;with adequate oral prep/transit/clearance  -AD without signs/symptoms of aspiration;with adequate oral prep/transit/clearance  -AD without signs/symptoms of aspiration;with adequate oral prep/transit/clearance  -AD    [place] (Tolerate therapeutic trials) with 1:1 assist/ supervision  -AD with 1:1 assist/ supervision  -AD with 1:1 assist/ supervision  -AD    Time Frame (Tolerate therapeutic trials) 1 week;by discharge  -AD 1 week;by discharge  -AD 1 week;by discharge  -AD    Progress/Outcomes (Tolerate therapeutic trials) new goal  -AD new goal  -AD new goal  -AD    Comment (Tolerate therapeutic trials) Not targeted during this session.  -AD Not  targeted during this session.  -AD --       (STG) Swallow 1    (STG) Swallow 1 -- Pt will participate in a modified barium swallow study in 2 days to determine risk of aspiration and least restrictive diet recommendations.  -AD Pt will participate in a modified barium swallow study in 2 days to determine risk of aspiration and least restrictive diet recommendations.  -AD    [place] (Swallow Short Term Goal 1) -- with 1:1 assist/ supervision  -AD with 1:1 assist/ supervision  -AD    Time Frame (Swallow Short Term Goal 1) -- --  2 days  -AD other (see comments)  2 days  -AD    Progress/Outcomes (Swallow Short Term Goal 1) -- goal met  -AD new goal  -AD          User Key  (r) = Recorded By, (t) = Taken By, (c) = Cosigned By    Initials Name Provider Type    Nelsy Marques MS CCC-SLP Speech and Language Pathologist                   Time Calculation:    Time Calculation- SLP     Row Name 03/08/23 1303             Time Calculation- SLP    SLP Start Time 0926  -AD      SLP Stop Time 0954  -AD      SLP Time Calculation (min) 28 min  -AD      Total Timed Code Minutes- SLP 0 minute(s)  -AD      SLP Non-Billable Time (min) 0 min  -AD      SLP Received On 03/08/23  -AD         Untimed Charges    [id number] Treatment Swallow Minutes 28  -AD         Total Minutes    Untimed Charges Total Minutes 28  -AD       Total Minutes 28  -AD            User Key  (r) = Recorded By, (t) = Taken By, (c) = Cosigned By    Initials Name Provider Type    Nelsy Marques MS CCC-SLP Speech and Language Pathologist                Therapy Charges for Today     Code Description Service Date Service Provider Modifiers Qty    [id number] HC ST MOTION FLUORO EVAL SWALLOW 6 3/7/2023 Nelsy Morrell MS CCC-SLP GN 1    [id number] HC ST TREATMENT SWALLOW 2 3/8/2023 Nelsy Morrell MS CCC-SLP GN 1               Nelsy Morrell MS CCC-SLP  3/8/2023

## 2023-03-08 NOTE — DISCHARGE INSTR - DIET
Reason for Referral  Patient was referred for a MBS to assess the efficiency of his/her swallow function, rule out aspiration and make recommendations regarding safe dietary consistencies, effective compensatory strategies, and safe eating environment. Completed on 3/7/23.             Recommendations/Treatment  SLP Swallowing Diagnosis: mild-moderate, oral dysphagia, pharyngeal dysphagia (03/07/23 0828)  Functional Impact: risk of aspiration/pneumonia (03/07/23 0828)  Rehab Potential/Prognosis, Swallowing: re-evaluate goals as necessary (03/07/23 0828)  Swallow Criteria for Skilled Therapeutic Interventions Met: demonstrates skilled criteria (03/07/23 0828)  Therapy Frequency (Swallow): PRN (03/07/23 0828)  Predicted Duration Therapy Intervention (Days): until discharge (03/07/23 0828)  SLP Diet Recommendation: puree, nectar thick liquids (03/07/23 0828)  Recommended Precautions and Strategies: upright posture during/after eating, small bites of food and sips of liquid, alternate between small bites of food and sips of liquid, general aspiration precautions, reflux precautions, fatigue precautions, 1:1 supervision, assist with feeding (03/07/23 0828)  SLP Rec. for Method of Medication Administration: meds crushed, with puree, as tolerated (03/07/23 0828)  Monitor for Signs of Aspiration: yes, notify SLP if any concerns (03/07/23 0828)  Anticipated Discharge Disposition (SLP): community-based residential facility ([facility]) (03/07/23 0828)    Instrumental Set-up  Utensils Used: spoon, cup, straw, adapted cup (limitation of 10cc amounts; cup with handles) (03/07/23 0828)  Consistencies Trialed: pureed, thin liquids, nectar/syrup-thick liquids, honey-thick liquids, pudding thick (03/07/23 0828)    Oral Preparation/ Oral Phase  Oral Prep Phase: impaired oral phase of swallowing (03/07/23 0828)  Oral Transit Phase: impaired (03/07/23 0828)  Oral Residue: WFL (during this study as no solids or mechanical soft items trialed.)  (03/07/23 0828)  Oral Phase, Comment: Decreased oral control of bolus resulting in early spill due to lingual weakness and cognitive status. Improved bolus control with small trials of thins from adapted cup and spoon size trials. (03/07/23 0828)             Pharyngeal Phase  Pharyngeal Phase, Comment: Severe pooling of all contrast into the pharynx. All liquids to pyriforms and thins to UES. Pudding to the level of the valleculae. Decreased epiglottic deflection on liquids but functional on pudding trials. No observed penetration or aspiration. Decreased pooling noted on thins from spoon and adapted cup due to smaller size of bolus. Increased pooling noted with straw trials of thins. Mild residue in the lateral channels on honey thick liquids cleared w/pudding bolus. No other significant residue noted on other liquid trials or pudding. (03/07/23 0828)  VFSS Summary: Pt presents with a mild to moderate oropharyngeal dysphagia. Deficits in oral phase of decreased bolus formation and control. This results in early spill over the back of the tongue. No significant oral residue noted on trials after the swallow. Solids not assessed due to missing dentition and previously noted decreased mastication/bolus prep. Pharyngeal phase is marked by spill and premature spill of all liquids to the level of the pyriforms ant thins to the UES. Pudding with spill to the valleculae. Delay of pharyngeal swallow from 1 sec - 8 seconds (2-5 on NTL, 2-3 HTL, 1-8 sec pudding, 2-3 for thins). Pt with no observed penetration or aspiration but continues to demonstrate consistent SEVERE spill into the pharynx. Pt with decreased amount of pooling with smaller bolus. Mild pharyngeal residue noted on honey thick liquids only in the lateral channels that is cleared with pudding trials. Decreased epiglottic deflection noted on all trials of liquids but not on pudding trials. Felt to be due to the weight of the bolus eliciting deflection. (03/07/23  0840)

## 2023-03-08 NOTE — PLAN OF CARE
Problem: Adult Inpatient Plan of Care  Goal: Plan of Care Review  Outcome: Ongoing, Progressing  Flowsheets (Taken 3/8/2023 9526)  Plan of Care Reviewed With: (Haley) caregiver  Outcome Evaluation: SLP: Pt tolerating puree diet with thins when given small drinks from cup. Difficulty maintaining positioning and requiring repositioning during meal. Education provided to caregiver for no use of straws and to encourage pt to take small drinks on her own from a cup. May benefit from personal cups used at [facility] for familiarity.

## 2023-03-08 NOTE — MBS/VFSS/FEES
Acute Care - Speech Language Pathology   Swallow Modified Barium Swallow Study MILANA Medel     Patient Name: Erica Crockett  : 1940  MRN: [id number]  Today's Date: 3/7/2023               Admit Date: 3/5/2023    Visit Dx:     ICD-10-CM ICD-9-CM   1. Pneumonia of right middle lobe due to infectious organism  J18.9 486   2. Oropharyngeal dysphagia  R13.12 787.22     Patient Active Problem List   Diagnosis   • Degeneration of intervertebral disc of lumbar region   • Developmental mental disorder   • Osteoarthritis of hip   • Scoliosis   • Chronic pain   • Nonverbal signs of pain   • Bursitis of left hip   • Encounter for monitoring opioid maintenance therapy   • Anxiety   • Constipation   • Hypercalcemia   • Epilepsy, not refractory (HCC)   • Osteoporosis   • Impulse control disorder   • Vitamin D deficiency   • Pneumonia of both lower lobes due to infectious organism   • KEILY (acute kidney injury) ([facility])   • Sepsis-associated organ dysfunction ([facility])   • Stage 3 chronic kidney disease (HCC)   • Hypercholesterolemia   • Acute kidney failure ([facility])   • Essential (primary) hypertension   • Generalized anxiety disorder   • Hyperkalemia   • Urinary tract infection   • Dental pain   • Aspiration pneumonia of right lung (HCC)   • Pneumonia of right middle lobe due to infectious organism     Past Medical History:   Diagnosis Date   • Anemia    • Anxiety    • Bursitis    • Bursitis    • DJD (degenerative joint disease), lumbar    • Hyperkalemia    • Hypoglycemia    • Intellectual disability    • Low back pain    • Osteoarthritis    • Osteopenia    • Pneumonia    • Renal disorder    • Renal insufficiency    • Scoliosis    • Seizure disorder ([facility])      History reviewed. No pertinent surgical history.    SLP Recommendation and Plan  SLP Swallowing Diagnosis: mild-moderate, oral dysphagia, pharyngeal dysphagia (23)  SLP Diet Recommendation: puree, thin liquids (23)  Recommended Precautions and  Strategies: upright posture during/after eating, small bites of food and sips of liquid, alternate between small bites of food and sips of liquid, general aspiration precautions, reflux precautions, fatigue precautions, 1:1 supervision, assist with feeding (03/07/23 0828)  SLP Rec. for Method of Medication Administration: meds crushed, with puree, as tolerated (03/07/23 0828)     Monitor for Signs of Aspiration: yes, notify SLP if any concerns (03/07/23 0828)     Swallow Criteria for Skilled Therapeutic Interventions Met: demonstrates skilled criteria (03/07/23 0828)  Anticipated Discharge Disposition (SLP): [facility] facility ([facility]) (03/07/23 0828)  Rehab Potential/Prognosis, Swallowing: re-evaluate goals as necessary (03/07/23 0828)  Therapy Frequency (Swallow): PRN (03/07/23 0828)  Predicted Duration Therapy Intervention (Days): until discharge (03/07/23 0828)                  Patient/Family Concerns, Anticipated Discharge Disposition (SLP): Pt cannot report. (03/07/23 0828)                     Plan of Care Reviewed With:  (Choco MESSINA, Jocelin; MD, Dr. Van)  Outcome Evaluation: SLP: Completed MBS. Pt with no aspiration or significant penetration of thins, nectar, honey or puree. Continues with severe delay/spill of all consistencies to the valleculae and thins and nectar thick and honey thick liquids to the level of the pyriforms. Impression: moderate oropharyngeal dysphagia. Recommend: advance diet to puree with thin liquids, no straws and drinks from cup no greater than spoon size drinks. Supervision with meals. Results and recommendations discussed with staff SLP from [place].      SWALLOW EVALUATION (last 72 hours)     SLP Adult Swallow Evaluation     Row Name 03/07/23 0828 03/06/23 0815                Rehab Evaluation    Document Type evaluation  -AD evaluation  -AD       Subjective Information -- --  Pt is nonverbal and does not/is not able to report complaints.  -AD        Patient Observations alert;cooperative  -AD alert;cooperative  -AD       Patient/Family/Caregiver Comments/Observations Pt seen in radiology upright in video imaging chair with use of wedge to keep upright and pulling of LE upward. Staff SLP, Jocelin, from [facility] present for study. Providing modified cups for the study.  -AD Pt seen upright in bed w/right lean despite repositioning. Leg contractures versus drawing legs up. Cooperative with feeding but unable to provide any history and no verbal output to assess vocal quality.  -AD       Patient Effort good  -AD good  -AD       Symptoms Noted During/After Treatment none  -AD none  -AD          General Information    Patient Profile Reviewed yes  -AD yes  -AD       Pertinent History Of Current Problem No changes in history.  -AD Pt is an 83 y/o resident of [facility] with hx of severe intellectual disability, seizure d/o and CKD. Recurrent pneumonia/dyspnea noted in chart over recent years and concerns for at least aspiration events  (Dec 2019, April 2022, August 2022, Dec 2022, Jan 2023 and current admission). Concerns for dehydration recently at [facility] and worsening kidney function per staff report requiring IV fluids. Pt was on a minced and moist diet w/honey thick liquids and thin water in between meals at [facility] since discharge from [facility] with recent change to thins w/meals in the last week per [facility] SLP conversation today. Continued concerns for aspiration per [facility] SLP.  -AD       Current Method of Nutrition pureed;honey-thick liquids  -AD mechanical ground textures;thin liquids  -AD       Precautions/Limitations, Vision -- other (see comments)  Pt will locate person speaking/caring for her with her eyes. Not able to assess otherwise.  -AD       Precautions/Limitations, Hearing -- difficult to assess  -AD       Prior Level of Function-Communication -- cognitive-linguistic impairment  severe intellectual disability with seizure d/o; nonverbal   -AD       Prior Level of Function-Swallowing -- assistance needed;other (see comments)  minced and moist diet with thin liquids (changed from honey to thin liquids in last week)  -AD       Plans/Goals Discussed with other (see comments);agreed upon  MAYURI Monreal  -AD other (see comments)  RNS[place]ia and Jocelin, staff SLP at [facility]  -AD       Barriers to Rehab previous functional deficit;cognitive status  -AD previous functional deficit;cognitive status;contractures  -AD       Patient's Goals for Discharge patient could not state  -AD patient could not state  -AD       Family Goals for Discharge other (see comments)  least restrictive diet per staff SLP, Jocelin.  -AD --          Pain    Additional Documentation --  no pain indicated  -AD --  no current pain indicated by facial expressions and body movement  -AD          Oral Motor Structure and Function    Oral Lesions or Structural Abnormalities and/or variants -- no gross lesions or abnormalities noted  -AD       Dentition Assessment -- missing teeth;other (see comments)  severely limited dentition. One lower left incisor noted. Unable to see other teeth at this time; noted limited dentition in past with no opposing teeth  -AD       Secretion Management -- anterior loss;other (see comments)  right sided loss, minimal out of right corner of mouth  -AD       Mucosal Quality -- moist, healthy  -AD       Volitional Swallow -- unable to elicit  -AD       Volitional Cough -- unable to elicit  -AD          Oral Musculature and Cranial Nerve Assessment    Oral Motor General Assessment -- generalized oral motor weakness  -AD       Oral Motor, Comment -- No asymmetry or drooping noted. Pt unable to participate in oral motor/cranial nerve exam. Generalized weakness of the tongue and lips noted during meal assessment. Unable to assess velar or voice.  -AD          General Eating/Swallowing Observations    Respiratory Support Currently in Use nasal cannula  -AD nasal cannula  -AD        O2 Liters 2L  -AD 3L  -AD       Eating/Swallowing Skills fed by SLP;other (see comments)  pt controlled straw and cup drinks  -AD self-fed;fed by SLP;needed assist;impulsive self-feeding behaviors;rate is too fast;other (see comments)  pt controlled drinks from straw with attempts at large intake and consecutive drinks; SLP able to control with removing after taking small amount via straw; pt required feeding assistance with food otherwise.  -AD       Positioning During Eating upright in chair;contracted;needs frequent re-positioning;other (see comments)  wedge behind back to keep upright near 90 degrees  -AD upright in bed;contracted;other (see comments)  leans to right, difficulty maintaining fully upright position in bed.  -AD       Utensils Used -- spoon;straw  -AD       Consistencies Trialed -- soft to chew textures;ground;pureed;honey-thick liquids  -AD          Respiratory    Respiratory Status WFL;during swallowing/eating  -AD WFL;during swallowing/eating  -AD          Clinical Swallow Eval    Oral Prep Phase -- impaired  -AD       Oral Transit -- impaired  -AD       Oral Residue -- impaired  -AD       Pharyngeal Phase -- suspected pharyngeal impairment  -AD       Esophageal Phase -- unremarkable  Note hx of retained barium on esophageal screen on MBS of Aug 2022.  -AD       Clinical Swallow Evaluation Summary -- Clinical swallow evaluation completed this am during breakfast. Chart reviewed and contacted SLP at [facility] caring for this pt. Hx is significant for high risk of aspiration per prior MBS studies in 2019 and 2022. Pt placed on honey thick puree diet in January 2022. Per SLP at [facility], pt currently on minced and moist diet at [facility] w/thins. Liquids just recently changed back to thins in the past week from Honey thick. Concerns for kidney issues and not getting enough fluids prompted the change to thins. Repeat MBS recommended but declined by guardian as outpatient. Current evaluation  from today indicates a moderate oral and suspected pharyngeal dysphagia. Pt with anterior loss of saliva mixed with food out of the right side of the mouth (dependent position due to contractures and pt preference), decreased mastication with limited chewing/mashing. Pt with increased oral transit of ground solids (regular solids not attempted given prior hx/reports). Mild oral residue of ground solids in oral cavity. Impulsivity with oral intake of honey thick liquids (no thin or nectar thick liquids attempted due to prior reports and risks) taking large drinks via straw. Timely swallow palpated and laryngeal elevation appears to be WFL. No overt s/s of aspiration noted. Tolerated meds crushed in pudding. Pt required assistance with feeding and will hold a cup intermittently and drink from a straw.  -AD          Oral Prep Concerns    Oral Prep Concerns -- inefficient mastication;poor rotary chew;anterior loss;increased prep time  -AD       Inefficient Mastication -- honey;pudding;mechanical soft  -AD       Poor Rotary Chew -- mechanical soft  -AD       Anterior Loss -- mechanical soft;other (see comments)  saliva  -AD       Increased Prep Time -- mechanical soft;pudding  -AD          Oral Transit Concerns    Oral Transit Concerns -- increased oral transit time  -AD       Increased Oral Transit Time -- mechanical soft  -AD          Oral Residue Concerns    Oral Residue Concerns -- sulci residue, bilateral  -AD       Sulci Residue, Bilateral -- mechanical soft  -AD          Pharyngeal Phase Concerns    Pharyngeal Phase Concerns -- other (see comments)  -AD       Pharyngeal Phase Concerns, Comment -- Prior history of SEVERE spill of all consistencies into the pharynx prior to swallow, delayed pharyngeal swallow and penetration of all liquid consistencies and trace aspiration of thins on past instrumental (MBS) swallow studies. No current s/s of aspiration noted on ground/moist foods and honey thick liquids.  -AD           MBS/VFSS    Utensils Used spoon;cup;straw;adapted cup  limitation of 10cc amounts; cup with handles  -AD --       Consistencies Trialed pureed;thin liquids;nectar/syrup-thick liquids;honey-thick liquids;pudding thick  -AD --          MBS/VFSS Interpretation    Oral Prep Phase impaired oral phase of swallowing  -AD --       Oral Transit Phase impaired  -AD --       Oral Residue WFL  during this study as no solids or mechanical soft items trialed.  -AD --       VFSS Summary Pt presents with a mild to moderate oropharyngeal dysphagia. Deficits in oral phase of decreased bolus formation and control. This results in early spill over the back of the tongue. No significant oral residue noted on trials after the swallow. Solids not assessed due to missing dentition and previously noted decreased mastication/bolus prep. Pharyngeal phase is marked by spill and premature spill of all liquids to the level of the pyriforms ant thins to the UES. Pudding with spill to the valleculae. Delay of pharyngeal swallow from 1 sec - 8 seconds (2-5 on NTL, 2-3 HTL, 1-8 sec pudding, 2-3 for thins). Pt with no observed penetration or aspiration but continues to demonstrate consistent SEVERE spill into the pharynx. Pt with decreased amount of pooling with smaller bolus. Mild pharyngeal residue noted on honey thick liquids only in the lateral channels that is cleared with pudding trials. Decreased epiglottic deflection noted on all trials of liquids but not on pudding trials. Felt to be due to the weight of the bolus eliciting deflection.  -AD --       Oral Phase, Comment Decreased oral control of bolus resulting in early spill due to lingual weakness and cognitive status. Improved bolus control with small trials of thins from adapted cup and spoon size trials.  -AD --          Initiation of Pharyngeal Swallow    Pharyngeal Phase, Comment Severe pooling of all contrast into the pharynx. All liquids to pyriforms and thins to UES. Pudding to  the level of the valleculae. Decreased epiglottic deflection on liquids but functional on pudding trials. No observed penetration or aspiration. Decreased pooling noted on thins from spoon and adapted cup due to smaller size of bolus. Increased pooling noted with straw trials of thins. Mild residue in the lateral channels on honey thick liquids cleared w/pudding bolus. No other significant residue noted on other liquid trials or pudding.  -AD --          SLP Evaluation Clinical Impression    SLP Swallowing Diagnosis mild-moderate;oral dysphagia;pharyngeal dysphagia  -AD moderate;oral dysphagia;pharyngeal dysphagia  -AD       Functional Impact risk of aspiration/pneumonia  -AD risk of aspiration/pneumonia;risk of malnutrition;risk of dehydration  -AD       Rehab Potential/Prognosis, Swallowing re-evaluate goals as necessary  -AD re-evaluate goals as necessary  -AD       Swallow Criteria for Skilled Therapeutic Interventions Met demonstrates skilled criteria  -AD demonstrates skilled criteria  -AD          Recommendations    Therapy Frequency (Swallow) PRN  -AD PRN  -AD       Predicted Duration Therapy Intervention (Days) until discharge  -AD until discharge  -AD       SLP Diet Recommendation puree; thin liquids  -AD puree;honey thick liquids  -AD       Recommended Diagnostics -- VFSS (MBS)  -AD       Recommended Precautions and Strategies upright posture during/after eating;small bites of food and sips of liquid;alternate between small bites of food and sips of liquid;general aspiration precautions;reflux precautions;fatigue precautions;1:1 supervision;assist with feeding  -AD upright posture during/after eating;small bites of food and sips of liquid;alternate between small bites of food and sips of liquid;general aspiration precautions;reflux precautions;fatigue precautions;1:1 supervision;assist with feeding  -AD       Oral Care Recommendations Oral Care before breakfast, after meals and PRN;Toothbrush  -AD Oral  Care before breakfast, after meals and PRN;Toothbrush  -AD       SLP Rec. for Method of Medication Administration meds crushed;with puree;as tolerated  -AD meds crushed;with puree;as tolerated  -AD       Monitor for Signs of Aspiration yes;notify SLP if any concerns  -AD yes;notify SLP if any concerns  -AD       Anticipated Discharge Disposition (SLP) [facility]  -AD [facility] ([facility]  -AD       Patient/Family Concerns, Anticipated Discharge Disposition (SLP) Pt cannot report.  -AD Pt unable to report.  -AD          Swallow Goals (SLP)    Swallow LTGs -- Patient will demonstrate progress toward functional swallow for  -AD       Swallow STGs -- diet tolerance goal selection (SLP)  -AD       Diet Tolerance Goal Selection (SLP) -- Patient will tolerate therapeutic trials of;Swallow Short Term Goal 1  -AD          (LTG) Patient will demonstrate progress toward functional swallow for    Diet Texture (Demonstrate progress toward functional swallow) mechanical ground textures  minced an moist texture  -AD mechanical ground textures  minced an moist texture  -AD       Liquid viscosity (Demonstrate progress toward functional swallow) thin liquids  revised this date  -AD nectar/ mildly thick liquids  -AD       [place] (Demonstrate progress towards functional swallow) with 1:1 assist/ supervision  -AD with 1:1 assist/ supervision  -AD       Time Frame (Demonstrate progress toward functional swallow) by discharge  -AD --       Barriers (Demonstrate progress toward functional swallow) intellectual disability and chronic dysphagia  -AD intellectual disability and chronic dysphagia  -AD       Progress/Outcomes (Demonstrate progress toward functional swallow) good progress toward goal;goal ongoing  -AD new goal  -AD          (STG) Patient will tolerate therapeutic trials of    Consistencies Trialed (Tolerate therapeutic trials) mechanical ground textures;thin liquids   revised this date  -AD soft to chew (ground) textures;nectar/ mildly thick liquids  -AD       Desired Outcome (Tolerate therapeutic trials) without signs/symptoms of aspiration;with adequate oral prep/transit/clearance  -AD without signs/symptoms of aspiration;with adequate oral prep/transit/clearance  -AD       [place] (Tolerate therapeutic trials) with 1:1 assist/ supervision  -AD with 1:1 assist/ supervision  -AD       Time Frame (Tolerate therapeutic trials) 1 week;by discharge  -AD 1 week;by discharge  -AD       Progress/Outcomes (Tolerate therapeutic trials) new goal  -AD new goal  -AD       Comment (Tolerate therapeutic trials) Not targeted during this session.  -AD --          (STG) Swallow 1    (STG) Swallow 1 Pt will participate in a modified barium swallow study in 2 days to determine risk of aspiration and least restrictive diet recommendations.  -AD Pt will participate in a modified barium swallow study in 2 days to determine risk of aspiration and least restrictive diet recommendations.  -AD       [place] (Swallow Short Term Goal 1) with 1:1 assist/ supervision  -AD with 1:1 assist/ supervision  -AD       Time Frame (Swallow Short Term Goal 1) --  2 days  -AD other (see comments)  2 days  -AD       Progress/Outcomes (Swallow Short Term Goal 1) goal met  -AD new goal  -AD             User Key  (r) = Recorded By, (t) = Taken By, (c) = Cosigned By    Initials Name Effective Dates    AD Nelsy Morrell, MS CCC-SLP 06/16/21 -                 EDUCATION  The patient has been educated in the following areas:   Dysphagia (Swallowing Impairment) Modified Diet Instruction. [facility] SLP, Jocelin, verbalizes and demonstrates understanding.        SLP GOALS     Row Name 03/07/23 0828 03/06/23 0815          (LTG) Patient will demonstrate progress toward functional swallow for    Diet Texture (Demonstrate progress toward functional swallow) mechanical ground textures  minced an moist texture  -AD mechanical  ground textures  minced an moist texture  -AD     Liquid viscosity (Demonstrate progress toward functional swallow) thin liquids  revised this date  -AD nectar/ mildly thick liquids  -AD     [place] (Demonstrate progress towards functional swallow) with 1:1 assist/ supervision  -AD with 1:1 assist/ supervision  -AD     Time Frame (Demonstrate progress toward functional swallow) by discharge  -AD --     Barriers (Demonstrate progress toward functional swallow) intellectual disability and chronic dysphagia  -AD intellectual disability and chronic dysphagia  -AD     Progress/Outcomes (Demonstrate progress toward functional swallow) good progress toward goal;goal ongoing  -AD new goal  -AD        (STG) Patient will tolerate therapeutic trials of    Consistencies Trialed (Tolerate therapeutic trials) mechanical ground textures;thin liquids  revised this date  -AD soft to chew (ground) textures;nectar/ mildly thick liquids  -AD     Desired Outcome (Tolerate therapeutic trials) without signs/symptoms of aspiration;with adequate oral prep/transit/clearance  -AD without signs/symptoms of aspiration;with adequate oral prep/transit/clearance  -AD     [place] (Tolerate therapeutic trials) with 1:1 assist/ supervision  -AD with 1:1 assist/ supervision  -AD     Time Frame (Tolerate therapeutic trials) 1 week;by discharge  -AD 1 week;by discharge  -AD     Progress/Outcomes (Tolerate therapeutic trials) new goal  -AD new goal  -AD     Comment (Tolerate therapeutic trials) Not targeted during this session.  -AD --        (STG) Swallow 1    (STG) Swallow 1 Pt will participate in a modified barium swallow study in 2 days to determine risk of aspiration and least restrictive diet recommendations.  -AD Pt will participate in a modified barium swallow study in 2 days to determine risk of aspiration and least restrictive diet recommendations.  -AD     [place] (Swallow Short Term Goal 1) with 1:1 assist/ supervision  -AD  with 1:1 assist/ supervision  -AD     Time Frame (Swallow Short Term Goal 1) --  2 days  -AD other (see comments)  2 days  -AD     Progress/Outcomes (Swallow Short Term Goal 1) goal met  -AD new goal  -AD           User Key  (r) = Recorded By, (t) = Taken By, (c) = Cosigned By    Initials Name Provider Type    Nelsy Marques MS CCC-SLP Speech and Language Pathologist                   Time Calculation:    Time Calculation- SLP     Row Name 03/07/23 0955             Time Calculation- SLP    SLP Start Time 0828  -AD      SLP Stop Time 0955  -AD      SLP Time Calculation (min) 87 min  -AD      Total Timed Code Minutes- SLP 0 minute(s)  -AD      SLP Non-Billable Time (min) 0 min  -AD      SLP Received On 03/07/23  -AD         Untimed Charges    [id number] Fluoro Eval Swallow Minutes 87  -AD         Total Minutes    Untimed Charges Total Minutes 87  -AD       Total Minutes 87  -AD            User Key  (r) = Recorded By, (t) = Taken By, (c) = Cosigned By    Initials Name Provider Type    AD Nelsy Morrell MS CCC-SLP Speech and Language Pathologist                Therapy Charges for Today     Code Description Service Date Service Provider Modifiers Qty    51572321433 HC ST EVAL ORAL PHARYNG SWALLOW 4 3/6/2023 Nelsy Morrell MS CCC-SLP GN 1    [id number] HC ST MOTION FLUORO EVAL SWALLOW 6 3/7/2023 Nelsy Morrell MS CCC-SLP GN 1               Nelsy Morrell MS CCC-SLP  3/7/2023

## 2023-03-08 NOTE — PLAN OF CARE
Goal Outcome Evaluation:  Plan of Care Reviewed With: patient        Progress: improving  Outcome Evaluation: Rested well during the night. No complaints of pain, or signs and symptoms of pain. Remains in isolation. Tolerating IV Vanc. VSS.

## 2023-03-09 VITALS
DIASTOLIC BLOOD PRESSURE: 81 MMHG | HEART RATE: 90 BPM | WEIGHT: 165.9 LBS | OXYGEN SATURATION: 93 % | SYSTOLIC BLOOD PRESSURE: 155 MMHG | BODY MASS INDEX: 26.04 KG/M2 | RESPIRATION RATE: 20 BRPM | HEIGHT: 67 IN | TEMPERATURE: 97.9 F

## 2023-03-09 LAB
BACTERIA SPEC AEROBE CULT: ABNORMAL
BACTERIA SPEC AEROBE CULT: ABNORMAL
GRAM STN SPEC: ABNORMAL
GRAM STN SPEC: ABNORMAL
ISOLATED FROM: ABNORMAL
ISOLATED FROM: ABNORMAL

## 2023-03-09 PROCEDURE — 99238 HOSP IP/OBS DSCHRG MGMT 30/<: CPT | Performed by: HOSPITALIST

## 2023-03-09 PROCEDURE — 94799 UNLISTED PULMONARY SVC/PX: CPT

## 2023-03-09 PROCEDURE — 94761 N-INVAS EAR/PLS OXIMETRY MLT: CPT

## 2023-03-09 RX ORDER — DOXYCYCLINE 100 MG/1
100 CAPSULE ORAL EVERY 12 HOURS SCHEDULED
Qty: 12 CAPSULE | Refills: 0 | Status: SHIPPED | OUTPATIENT
Start: 2023-03-09 | End: 2023-03-15

## 2023-03-09 RX ORDER — CEFDINIR 300 MG/1
300 CAPSULE ORAL EVERY 12 HOURS SCHEDULED
Qty: 20 CAPSULE | Refills: 0 | Status: SHIPPED | OUTPATIENT
Start: 2023-03-09 | End: 2023-03-19

## 2023-03-09 RX ADMIN — ATORVASTATIN CALCIUM 20 MG: 20 TABLET, FILM COATED ORAL at 08:57

## 2023-03-09 RX ADMIN — POLYVINYL ALCOHOL 1 DROP: 14 SOLUTION/ DROPS OPHTHALMIC at 11:41

## 2023-03-09 RX ADMIN — LEVETIRACETAM 500 MG: 500 TABLET, FILM COATED ORAL at 08:57

## 2023-03-09 RX ADMIN — DOXYCYCLINE 100 MG: 100 CAPSULE ORAL at 08:57

## 2023-03-09 RX ADMIN — OXYCODONE HYDROCHLORIDE AND ACETAMINOPHEN 500 MG: 500 TABLET ORAL at 08:57

## 2023-03-09 RX ADMIN — Medication 1 CAPSULE: at 09:01

## 2023-03-09 RX ADMIN — CEFDINIR 300 MG: 300 CAPSULE ORAL at 08:57

## 2023-03-09 RX ADMIN — PANTOPRAZOLE SODIUM 40 MG: 40 TABLET, DELAYED RELEASE ORAL at 06:43

## 2023-03-09 RX ADMIN — LAMOTRIGINE 200 MG: 100 TABLET ORAL at 08:57

## 2023-03-09 RX ADMIN — IPRATROPIUM BROMIDE AND ALBUTEROL SULFATE 3 ML: .5; 3 SOLUTION RESPIRATORY (INHALATION) at 07:13

## 2023-03-09 RX ADMIN — POLYVINYL ALCOHOL 1 DROP: 14 SOLUTION/ DROPS OPHTHALMIC at 08:57

## 2023-03-09 RX ADMIN — LUBIPROSTONE 8 MCG: 8 CAPSULE, GELATIN COATED ORAL at 08:59

## 2023-03-09 RX ADMIN — Medication 10 ML: at 08:57

## 2023-03-09 NOTE — DISCHARGE SUMMARY
Erica Crockett  1940  [id number]    Hospitalists Discharge Summary    Date of Admission: 3/5/2023  Date of Discharge:  3/9/2023    Primary Discharge Diagnoses:  1.  Strep salivarius Bacteremia due to Pneumonia of the Right Lung  2.  Acute Hypoxic Respiratory Failure  3.  Mild-to-Moderate Oral Dysphagia    Secondary Discharge Diagnoses:  1.  Seizure disorder  2.  Essential Hypertension  3.  Chronic Pain Syndrome w/ Narcotic Dependence  4.  Developmental Mental Disorder/Intellectual Disability  5.  Generalized Anxiety Disorder  6.  CKDIIIb  7.  Nocturnal Hypoxia    History of Present Illness (taken from H&P):  82-year-old female past medical history of intellectual disability resident of [facility] presented for low oxygen saturations and fever.  Per record review she has been admitted here for the same.  HPI limited given her intellectual disability. This morning they noted that the patient's temperature was 101.5 and her oxygen saturation was about 86%.  They called EMS and she was placed on nonrebreather brought to the emergency room where again her oxygen saturation was 86 on room air and needed 4 L nasal cannula.  Chest x-ray was obtained, she was given IV antibiotics and admission was requested for these issues.    Hospital Course:  Ms. Crockett was admitted to the Med/Surg unit and continued on 4L of supplemental O2 and broad-spectrum abx.  Culture data appeared contaminated, but then grew a strep species also.  Her MRSA swab was also positive.  Although she was seen by SLP, she did not demonstrate aspiration so I adjusted her antibiotic therapy to cover cultured organisms.  She was liberated from daytime O2, and the day of discharge, she was able to take her oral abx therapy w/o issue.  I also resumed her full anti-hypertensive regimen at discharge.     PCP  Patient Care Team:  Tulio Carlson MD as PCP - General (Family Medicine)  Aleksandra Kent APRN as Nurse Practitioner (Pain  Medicine)  Ja Harris MD as Consulting Physician (Neurology)  Magen Farmer MD as Consulting Physician (Nephrology)  Mikey Sanchez MD (Psychiatry)  Nelsy Butcher DPM as Consulting Physician (Podiatry)  Amy Bryan DO as Consulting Physician (Obstetrics and Gynecology)    Consults:   Consults     No orders found from 2/4/2023 to 3/6/2023.          Operations and Procedures Performed:       XR Chest 2 View    Result Date: 3/5/2023  Narrative: CR Chest 2 Vws INDICATION:  Short of air. Fever. Low oxygen saturation COMPARISON:  1/28/2023 FINDINGS: PA and lateral views of the chest.  Cardiac silhouette is within normal limits. Unremarkable vascularity. Left lung is clear. There is right-sided volume loss and there is consolidation in the perihilar and mid lung zone on the right most characteristic of pneumonia. There is no distinct effusion or pneumothorax. Follow-up to clearing is recommended.      Impression: 1. New consolidation in the right lung most characteristic of pneumonia with associated volume loss. No effusion. Follow-up to clearing recommended. Signer Name: Anthony Hsu MD  Signed: 3/5/2023 9:04 AM  Workstation Name: [id number]  [facility] Video Swallow With Speech Single Contrast    Result Date: 3/7/2023  Narrative: VIDEO SWALLOWING STUDY, 3/7/2023  HISTORY: Coughing following meals. Chronic symptoms. HISTORY of pneumonia. [facility] patient  TECHNIQUE: Video swallowing study was conducted by the speech pathologist in my presence and recorded on digital video disc.  Fluoroscopy time 2.5 minutes. A single spot image was recorded for documentation purposes.  Reference air kerma:  8.8 mGy  FINDINGS: There is premature spillage with all consistencies, exacerbated by thinner consistencies. Episodic flash penetration but no aspiration. Please the full report of the speech pathologist for further details and dietary recommendations.      Impression:  1. Premature spillage with episodic penetration but no aspiration.  This report was finalized on 3/7/2023 9:15 AM by Dr. Anthony Hsu MD.        Allergies:  is allergic to bactrim [sulfamethoxazole-trimethoprim], bee venom, iodinated contrast media, and nsaids.    Jose  reviewed    Discharge Medications:     Discharge Medications      New Medications      Instructions Start Date   cefdinir 300 MG capsule  Commonly known as: OMNICEF   300 mg, Oral, Every 12 Hours Scheduled      doxycycline 100 MG capsule  Commonly known as: MONODOX   100 mg, Oral, Every 12 Hours Scheduled         Continue These Medications      Instructions Start Date   acetaminophen 325 MG tablet  Commonly known as: TYLENOL   650 mg, Oral, Every 6 Hours PRN      amLODIPine 5 MG tablet  Commonly known as: NORVASC   5 mg, Oral, Daily      atorvastatin 20 MG tablet  Commonly known as: LIPITOR   20 mg, Daily      Benefiber Drink Mix pack   1 packet, Oral, Daily      cetirizine 10 MG tablet  Commonly known as: zyrTEC   10 mg, Oral, Nightly      cholecalciferol 25 MCG (1000 UT) tablet  Commonly known as: VITAMIN D3   1,000 Units, Oral, Daily      dextromethorphan-guaifenesin  MG/5ML syrup  Commonly known as: ROBITUSSIN-DM   10 mL, Oral, Every 4 Hours PRN      gabapentin 300 MG capsule  Commonly known as: NEURONTIN   300 mg, Oral, Nightly      HYDROcodone-acetaminophen 7.5-325 MG per tablet  Commonly known as: NORCO   1 tablet, Oral, Every 8 Hours PRN      ipratropium-albuterol 0.5-2.5 mg/3 ml nebulizer  Commonly known as: DUO-NEB   3 mL, Nebulization, 4 Times Daily PRN      lamoTRIgine 200 MG tablet  Commonly known as: LaMICtal   200 mg, Oral, 2 Times Daily      levETIRAcetam 500 MG tablet  Commonly known as: KEPPRA   500 mg, Oral, 2 Times Daily      LORazepam 0.5 MG tablet  Commonly known as: ATIVAN   0.5 mg, Oral, Every 8 Hours      LORazepam 1 MG tablet  Commonly known as: Ativan   Take 2 tabs one hour prior to appointment and may repeat 1 tab  prn upon arrival anxiety      lubiprostone 8 MCG capsule  Commonly known as: AMITIZA   8 mcg, Oral, 2 Times Daily With Meals      multivitamin tablet tablet  Commonly known as: THERAGRAN   1 tablet, Oral, Daily      omeprazole 20 MG capsule  Commonly known as: priLOSEC   40 mg, Oral, Daily      polyethylene glycol 17 GM/SCOOP powder  Commonly known as: MIRALAX   17 g, Oral, Daily PRN      Polyvinyl Alcohol-Povidone PF 1.4-0.6 % ophthalmic solution  Commonly known as: HYPOTEARS   1 drop, Both Eyes, 4 Times Daily      PROBIOTIC PO   2 capsules, Oral, Nightly      Prolia 60 MG/ML solution prefilled syringe syringe  Generic drug: denosumab   60 mg, Subcutaneous, Every 6 Months      simethicone 80 MG chewable tablet  Commonly known as: MYLICON   80 mg, Oral, Every 6 Hours PRN      Vitamin C ER 1000 MG tablet controlled-release   1,000 mg, Oral, Daily      Zinc Sulfate 220 (50 Zn) MG tablet   1 tablet, Oral, Daily         Stop These Medications    magnesium hydroxide 400 MG/5ML suspension  Commonly known as: MILK OF MAGNESIA            Last Lab Results:   Lab Results (most recent)     Procedure Component Value Units Date/Time    Blood Culture - Blood, Arm, Left [[id number]]  (Normal) Collected: 03/05/23 1023    Specimen: Blood from Arm, Left Updated: 03/09/23 1030     Blood Culture No growth at 4 days    Blood Culture - Blood, Wrist, Right [[id number]]  (Abnormal)  (Susceptibility) Collected: 03/05/23 0829    Specimen: Blood from Wrist, Right Updated: 03/09/23 0621     Blood Culture Streptococcus salivarius     Isolated from Aerobic Bottle     Blood Culture Staphylococcus, coagulase negative     Isolated from Aerobic Bottle     Gram Stain Aerobic Bottle Gram positive cocci in chains      Aerobic Bottle Gram positive cocci in clusters    Narrative:      Probable contaminant requires clinical correlation, susceptibility not performed unless requested by physician.      Susceptibility      Streptococcus salivarius      MONICA      " Ceftriaxone Susceptible      Penicillin G Susceptible      Vancomycin Susceptible                           Procalcitonin [[id number]]  (Abnormal) Collected: 03/08/23 0349    Specimen: Blood Updated: 03/08/23 0526     Procalcitonin 4.95 ng/mL     Narrative:      As a Marker for Sepsis (Non-Neonates):    1. <0.5 ng/mL represents a low risk of severe sepsis and/or septic shock.  2. >2 ng/mL represents a high risk of severe sepsis and/or septic shock.    As a Marker for Lower Respiratory Tract Infections that require antibiotic therapy:    PCT on Admission    Antibiotic Therapy       6-12 Hrs later    >0.5                Strongly Recommended  >0.25 - <0.5        Recommended   0.1 - 0.25          Discouraged              Remeasure/reassess PCT  <0.1                Strongly Discouraged     Remeasure/reassess PCT    As 28 day mortality risk marker: \"Change in Procalcitonin Result\" (>80% or <=80%) if Day 0 (or Day 1) and Day 4 values are available. Refer to http://www."[employer][email or web address][facility]    Change in PCT <=80%  A decrease of PCT levels below or equal to 80% defines a positive change in PCT test result representing a higher risk for 28-day all-cause mortality of patients diagnosed with severe sepsis for septic shock.    Change in PCT >80%  A decrease of PCT levels of more than 80% defines a negative change in PCT result representing a lower risk for 28-day all-cause mortality of patients diagnosed with severe sepsis or septic shock.       CBC & Differential [[id number]]  (Abnormal) Collected: 03/08/23 0349    Specimen: Blood Updated: 03/08/23 0502    Narrative:      The following orders were created for panel order CBC & Differential.  Procedure                               Abnormality         Status                     ---------                               -----------         ------                     CBC Auto Differential[[id number]]        Abnormal            Final result                 Please view results for " "these tests on the individual orders.    CBC Auto Differential [[id number]]  (Abnormal) Collected: 03/08/23 0349    Specimen: Blood Updated: 03/08/23 0502     WBC 11.96 10*3/mm3      RBC 2.99 10*6/mm3      Hemoglobin 9.1 g/dL      Hematocrit 28.9 %      MCV 96.7 fL      MCH 30.4 pg      MCHC 31.5 g/dL      RDW 14.5 %      RDW-SD 52.4 fl      MPV 10.9 fL      Platelets 286 10*3/mm3      Neutrophil % 71.1 %      Lymphocyte % 21.9 %      Monocyte % 4.1 %      Eosinophil % 1.7 %      Basophil % 0.3 %      Immature Grans % 0.9 %      Neutrophils, Absolute 8.51 10*3/mm3      Lymphocytes, Absolute 2.62 10*3/mm3      Monocytes, Absolute 0.49 10*3/mm3      Eosinophils, Absolute 0.20 10*3/mm3      Basophils, Absolute 0.03 10*3/mm3      Immature Grans, Absolute 0.11 10*3/mm3     Procalcitonin [[id number]]  (Abnormal) Collected: 03/07/23 0619    Specimen: Blood Updated: 03/07/23 0731     Procalcitonin 7.97 ng/mL     Narrative:      As a Marker for Sepsis (Non-Neonates):    1. <0.5 ng/mL represents a low risk of severe sepsis and/or septic shock.  2. >2 ng/mL represents a high risk of severe sepsis and/or septic shock.    As a Marker for Lower Respiratory Tract Infections that require antibiotic therapy:    PCT on Admission    Antibiotic Therapy       6-12 Hrs later    >0.5                Strongly Recommended  >0.25 - <0.5        Recommended   0.1 - 0.25          Discouraged              Remeasure/reassess PCT  <0.1                Strongly Discouraged     Remeasure/reassess PCT    As 28 day mortality risk marker: \"Change in Procalcitonin Result\" (>80% or <=80%) if Day 0 (or Day 1) and Day 4 values are available. Refer to http://www.[facility]-pct-[email or web address].com    Change in PCT <=80%  A decrease of PCT levels below or equal to 80% defines a positive change in PCT test result representing a higher risk for 28-day all-cause mortality of patients diagnosed with severe sepsis for septic shock.    Change in PCT >80%  A decrease of PCT " levels of more than 80% defines a negative change in PCT result representing a lower risk for 28-day all-cause mortality of patients diagnosed with severe sepsis or septic shock.       Comprehensive Metabolic Panel [[id number]]  (Abnormal) Collected: 03/07/23 0619    Specimen: Blood Updated: 03/07/23 0704     Glucose 104 mg/dL      BUN 21 mg/dL      Creatinine 0.90 mg/dL      Sodium 144 mmol/L      Potassium 4.1 mmol/L      Comment: Slight hemolysis detected by analyzer. Results may be affected.        Chloride 113 mmol/L      CO2 19.1 mmol/L      Calcium 8.6 mg/dL      Total Protein 6.1 g/dL      Albumin 3.1 g/dL      ALT (SGPT) 29 U/L      AST (SGOT) 25 U/L      Alkaline Phosphatase 103 U/L      Total Bilirubin 0.2 mg/dL      Globulin 3.0 gm/dL      A/G Ratio 1.0 g/dL      BUN/Creatinine Ratio 23.3     Anion Gap 11.9 mmol/L      eGFR 64.0 mL/min/1.73     Narrative:      GFR Normal >60  Chronic Kidney Disease <60  Kidney Failure <15    The GFR formula is only valid for adults with stable renal function between ages 18 and 70.    CBC & Differential [[id number]]  (Abnormal) Collected: 03/07/23 0619    Specimen: Blood Updated: 03/07/23 0650    Narrative:      The following orders were created for panel order CBC & Differential.  Procedure                               Abnormality         Status                     ---------                               -----------         ------                     CBC Auto Differential[[id number]]        Abnormal            Final result                 Please view results for these tests on the individual orders.    CBC Auto Differential [[id number]]  (Abnormal) Collected: 03/07/23 0619    Specimen: Blood Updated: 03/07/23 0650     WBC 14.70 10*3/mm3      RBC 3.24 10*6/mm3      Hemoglobin 9.7 g/dL      Hematocrit 30.9 %      MCV 95.4 fL      MCH 29.9 pg      MCHC 31.4 g/dL      RDW 14.8 %      RDW-SD 51.4 fl      MPV 11.0 fL      Platelets 227 10*3/mm3      Neutrophil % 76.3 %       Lymphocyte % 16.7 %      Monocyte % 4.4 %      Eosinophil % 1.0 %      Basophil % 0.2 %      Immature Grans % 1.4 %      Neutrophils, Absolute 11.22 10*3/mm3      Lymphocytes, Absolute 2.46 10*3/mm3      Monocytes, Absolute 0.65 10*3/mm3      Eosinophils, Absolute 0.14 10*3/mm3      Basophils, Absolute 0.03 10*3/mm3      Immature Grans, Absolute 0.20 10*3/mm3      nRBC 0.0 /100 WBC     MRSA Screen, PCR (Inpatient) - Swab, Nares [[id number]]  (Abnormal) Collected: 03/06/23 1121    Specimen: Swab from Nares Updated: 03/06/23 1729     MRSA PCR MRSA Detected    Narrative:      The negative predictive value of this diagnostic test is high and should only be used to consider de-escalating anti-MRSA therapy. A positive result may indicate colonization with MRSA and must be correlated clinically.    Blood Culture ID, PCR - Blood, Wrist, Right [[id number]]  (Abnormal) Collected: 03/05/23 0829    Specimen: Blood from Wrist, Right Updated: 03/06/23 0956     BCID, PCR Staph spp, not aureus or lugdunensis. Identification by BCID2 PCR.     BCID, PCR 2 Streptococcus spp, not A, B, or pneumoniae. Identification by BCID2 PCR.     BOTTLE TYPE Aerobic Bottle    Comprehensive Metabolic Panel [[id number]]  (Abnormal) Collected: 03/06/23 0816    Specimen: Blood Updated: 03/06/23 0933     Glucose 98 mg/dL      BUN 37 mg/dL      Creatinine 1.13 mg/dL      Sodium 139 mmol/L      Potassium 4.5 mmol/L      Chloride 106 mmol/L      CO2 21.7 mmol/L      Calcium 9.1 mg/dL      Total Protein 6.7 g/dL      Albumin 3.3 g/dL      ALT (SGPT) 37 U/L      AST (SGOT) 33 U/L      Alkaline Phosphatase 107 U/L      Total Bilirubin 0.2 mg/dL      Globulin 3.4 gm/dL      A/G Ratio 1.0 g/dL      BUN/Creatinine Ratio 32.7     Anion Gap 11.3 mmol/L      eGFR 48.7 mL/min/1.73     Narrative:      GFR Normal >60  Chronic Kidney Disease <60  Kidney Failure <15    The GFR formula is only valid for adults with stable renal function between ages 18 and 70.    Magnesium  [[id number]]  (Normal) Collected: 03/06/23 0816    Specimen: Blood Updated: 03/06/23 0933     Magnesium 2.3 mg/dL     CBC (No Diff) [[id number]]  (Abnormal) Collected: 03/06/23 0816    Specimen: Blood Updated: 03/06/23 0919     WBC 20.20 10*3/mm3      RBC 3.63 10*6/mm3      Hemoglobin 10.9 g/dL      Hematocrit 36.3 %      .0 fL      MCH 30.0 pg      MCHC 30.0 g/dL      RDW 14.9 %      RDW-SD 54.7 fl      MPV 11.6 fL      Platelets 202 10*3/mm3     Single High Sensitivity Troponin T [[id number]]  (Abnormal) Collected: 03/05/23 1023    Specimen: Blood from Arm, Left Updated: 03/05/23 1051     HS Troponin T 28 ng/L     Narrative:      High Sensitive Troponin T Reference Range:  <10.0 ng/L- Negative Female for AMI  <15.0 ng/L- Negative Male for AMI  >=10 - Abnormal Female indicating possible myocardial injury.  >=15 - Abnormal Male indicating possible myocardial injury.   Clinicians would have to utilize clinical acumen, EKG, Troponin, and serial changes to determine if it is an Acute Myocardial Infarction or myocardial injury due to an underlying chronic condition.         [place] Draw [[id number]] Collected: 03/05/23 0719    Specimen: Blood Updated: 03/05/23 0830    Narrative:      The following orders were created for panel order [place] Draw.  Procedure                               Abnormality         Status                     ---------                               -----------         ------                     Green Top (Gel)[[id number]]                                  Final result               Lavender Top[[id number]]                                     Final result               Gold Top - SST[[id number]]                                   Final result               Light Blue Top[[id number]]                                   Final result                 Please view results for these tests on the individual orders.    Lavender Top [[id number]] Collected: 03/05/23 0719    Specimen: Blood Updated: 03/05/23 0830      Extra Tube hold for add-on     Comment: Auto resulted       Gold Top - SST [[id number]] Collected: 03/05/23 0719    Specimen: Blood Updated: 03/05/23 0830     Extra Tube Hold for add-ons.     Comment: Auto resulted.       Green Top (Gel) [[id number]] Collected: 03/05/23 0719    Specimen: Blood Updated: 03/05/23 0830     Extra Tube Hold for add-ons.     Comment: Auto resulted.       Light Blue Top [[id number]] Collected: 03/05/23 0719    Specimen: Blood Updated: 03/05/23 0830     Extra Tube Hold for add-ons.     Comment: Auto resulted       BNP [[id number]]  (Normal) Collected: 03/05/23 0719    Specimen: Blood Updated: 03/05/23 0756     proBNP 775.8 pg/mL     Narrative:      Among patients with dyspnea, NT-proBNP is highly sensitive for the detection of acute congestive heart failure. In addition NT-proBNP of <300 pg/ml effectively rules out acute congestive heart failure with 99% negative predictive value.    Results may be falsely decreased if patient taking Biotin.      Single High Sensitivity Troponin T [[id number]]  (Abnormal) Collected: 03/05/23 0719    Specimen: Blood Updated: 03/05/23 0754     HS Troponin T 27 ng/L     Narrative:      High Sensitive Troponin T Reference Range:  <10.0 ng/L- Negative Female for AMI  <15.0 ng/L- Negative Male for AMI  >=10 - Abnormal Female indicating possible myocardial injury.  >=15 - Abnormal Male indicating possible myocardial injury.   Clinicians would have to utilize clinical acumen, EKG, Troponin, and serial changes to determine if it is an Acute Myocardial Infarction or myocardial injury due to an underlying chronic condition.         Lactic Acid, Plasma [[id number]]  (Normal) Collected: 03/05/23 0719    Specimen: Blood Updated: 03/05/23 0742     Lactate 1.2 mmol/L         Imaging Results (Most Recent)     Procedure Component Value Units Date/Time    FL Video Swallow With Speech Single Contrast [[id number]] Collected: 03/07/23 0914     Updated: 03/07/23 0917    Narrative:       VIDEO SWALLOWING STUDY, 3/7/2023     HISTORY:  Coughing following meals. Chronic symptoms. HISTORY of pneumonia. [facility] patient     TECHNIQUE:  Video swallowing study was conducted by the speech pathologist in my  presence and recorded on digital video disc.     Fluoroscopy time 2.5 minutes. A single spot image was recorded for  documentation purposes.     Reference air kerma:  8.8 mGy     FINDINGS:  There is premature spillage with all consistencies, exacerbated by  thinner consistencies. Episodic flash penetration but no aspiration.  Please the full report of the speech pathologist for further details and  dietary recommendations.       Impression:      1. Premature spillage with episodic penetration but no aspiration.     This report was finalized on 3/7/2023 9:15 AM by Dr. Anthony Hsu MD.       XR Chest 2 View [[id number]] Collected: 03/05/23 0904     Updated: 03/05/23 0906    Narrative:      CR Chest 2 Vws    INDICATION:    Short of air. Fever. Low oxygen saturation    COMPARISON:    1/28/2023    FINDINGS:   PA and lateral views of the chest.  Cardiac silhouette is within normal limits. Unremarkable vascularity. Left lung is clear. There is right-sided volume loss and there is consolidation in the perihilar and mid lung zone on the right most characteristic  of pneumonia. There is no distinct effusion or pneumothorax. Follow-up to clearing is recommended.        Impression:        1. New consolidation in the right lung most characteristic of pneumonia with associated volume loss. No effusion. Follow-up to clearing recommended.    Signer Name: Anthony Hsu MD   Signed: 3/5/2023 9:04 AM   Workstation Name: [id number]    [facility] [place]          PROCEDURES      Condition on Discharge:  Stable    Physical Exam at Discharge  Vital Signs  Temp:  [97.9 °F (36.6 °C)-98 °F (36.7 °C)] 97.9 °F (36.6 °C)  Heart Rate:  [] 90  Resp:  [18-22] 20  BP: (134-165)/(66-90)  155/81    Physical Exam:  Physical Exam   Constitutional: Patient appears stated age and in no acute distress.   Cardiovascular: Regular rate, regular rhythm, S1 normal and S2 normal.  Exam reveals no gallop and no friction rub.  No murmur heard.  Pulmonary/Chest: Lungs are clear to auscultation bilaterally. No respiratory distress. No wheezes. No rhonchi. No rales.   Abdominal: Soft. Bowel sounds are normal. There is no tenderness.   Musculoskeletal: Normal Muscle tone  Extremities: No edema.   Neurological: Cranial nerves II-XII are grossly intact with no focal deficits.  Skin: Skin is warm. No rash noted. Nails show no clubbing.  No cyanosis or erythema.    Discharge Disposition  Back to [facility]    Visiting Nurse:    No     Home PT/OT:  No     Home Safety Evaluation:  No     DME  None    Discharge Diet:      Dietary Orders (From admission, onward)     Start     Ordered    03/07/23 1159  Diet: Regular/House Diet; No Straw; Texture: Pureed (NDD 1); Fluid Consistency: Thin (IDDSI 0)  Diet Effective Now        References:    Diet Order [place]   Question Answer Comment   Diets: Regular/House Diet    Diet Modifiers / Additional Diets: No Straw    Texture: Pureed (NDD 1)    Fluid Consistency: Thin (IDDSI 0)        03/07/23 1159                Activity at Discharge:  As tolerated    Follow-up Appointments  Future Appointments   Date Time Provider Department Center   4/14/2023 10:20 AM Alex Peralta II, MD [facility]   6/15/2023 11:30 AM Bob Cortez MD [facility]     Additional Instructions for the Follow-ups that You Need to Schedule     Discharge Follow-up with PCP   As directed       Currently Documented PCP:    Tulio Carlson MD    PCP Phone Number:    [phone]     Follow Up Details: 1 week               Test Results Pending at Discharge  Pending Labs     Order Current Status    Blood Culture - Blood, Arm, Left Preliminary result           Carmelo Van MD  03/09/23  11:22 EST    Time: <30  minutes

## 2023-03-09 NOTE — PLAN OF CARE
Goal Outcome Evaluation:  Plan of Care Reviewed With: patient        Progress: improving  Outcome Evaluation: Rested well during the night. No complaints of pain or discomfort. VSS. Sitter at bedside.

## 2023-03-09 NOTE — CASE MANAGEMENT/SOCIAL WORK
Continued Stay Note  MILANA Medel     Patient Name: Erica Crockett  MRN: [id number]  Today's Date: 3/9/2023    Admit Date: 3/5/2023    Plan: Return back to [facility]   Discharge Plan     Row Name 03/09/23 1146       Plan    Plan Return back to [facility]    Plan Comments Per provider in AM huddle, plan is for patient to return back to [facility] today. CM will follow.               Discharge Codes    No documentation.               Expected Discharge Date and Time     Expected Discharge Date Expected Discharge Time    Mar 9, 2023             Viviana Baez RN

## 2023-03-09 NOTE — PLAN OF CARE
Goal Outcome Evaluation:  Plan of Care Reviewed With: patient, caregiver           Outcome Evaluation: Pt weaned to 1l nc today.  ativan given 1x for agitation; vanc and rocephin d/c'd and will start PO doxycycline and omnicef tonight;  plans to go back to [place] after discharge.

## 2023-03-10 LAB — BACTERIA SPEC AEROBE CULT: NORMAL

## 2023-03-10 NOTE — CASE MANAGEMENT/SOCIAL WORK
Case Management Discharge Note      Final Note: Discharged back to [facility]         Selected Continued Care - Discharged on 3/9/2023 Admission date: 3/5/2023 - Discharge disposition: Long Term Care (DC - External)    Destination    No services have been selected for the patient.              Durable Medical Equipment    No services have been selected for the patient.              Dialysis/Infusion    No services have been selected for the patient.              Home Medical Care    No services have been selected for the patient.              Therapy    No services have been selected for the patient.              Community Resources    No services have been selected for the patient.              Community & DME    No services have been selected for the patient.                Selected Continued Care - Prior Encounters Includes continued care and service providers with selected services from prior encounters from 12/5/2022 to 3/9/2023    Discharged on 12/26/2022 Admission date: 12/23/2022 - Discharge disposition: Intermediate Care     Destination     Service Provider Selected Services Address Phone Fax Patient Preferred    [facility] [address], [facility] [id number] [phone] --                         Final Discharge Disposition Code: 01 - home or self-care

## 2023-03-15 ENCOUNTER — LAB REQUISITION (OUTPATIENT)
Dept: LAB | Facility: HOSPITAL | Age: 83
End: 2023-03-15
Payer: MEDICARE

## 2023-03-15 DIAGNOSIS — N18.2 CHRONIC KIDNEY DISEASE, STAGE 2 (MILD): ICD-10-CM

## 2023-03-15 LAB
ANION GAP SERPL CALCULATED.3IONS-SCNC: 8.5 MMOL/L (ref 5–15)
BUN SERPL-MCNC: 35 MG/DL (ref 8–23)
BUN/CREAT SERPL: 31.8 (ref 7–25)
CALCIUM SPEC-SCNC: 11 MG/DL (ref 8.6–10.5)
CHLORIDE SERPL-SCNC: 106 MMOL/L (ref 98–107)
CO2 SERPL-SCNC: 26.5 MMOL/L (ref 22–29)
CREAT SERPL-MCNC: 1.1 MG/DL (ref 0.57–1)
EGFRCR SERPLBLD CKD-EPI 2021: 50.3 ML/MIN/1.73
GLUCOSE SERPL-MCNC: 93 MG/DL (ref 65–99)
POTASSIUM SERPL-SCNC: 5.1 MMOL/L (ref 3.5–5.2)
SODIUM SERPL-SCNC: 141 MMOL/L (ref 136–145)

## 2023-03-15 PROCEDURE — 80048 BASIC METABOLIC PNL TOTAL CA: CPT | Performed by: FAMILY MEDICINE

## 2023-03-24 ENCOUNTER — HOSPITAL ENCOUNTER (OUTPATIENT)
Dept: GENERAL RADIOLOGY | Facility: HOSPITAL | Age: 83
Discharge: HOME | End: 2023-03-24
Admitting: FAMILY MEDICINE
Payer: MEDICARE

## 2023-03-24 ENCOUNTER — TRANSCRIBE ORDERS (OUTPATIENT)
Dept: ADMINISTRATIVE | Facility: HOSPITAL | Age: 83
End: 2023-03-24
Payer: MEDICARE

## 2023-03-24 DIAGNOSIS — R10.84 ABDOMINAL PAIN, GENERALIZED: ICD-10-CM

## 2023-03-24 DIAGNOSIS — K59.00 CONSTIPATION, UNSPECIFIED CONSTIPATION TYPE: ICD-10-CM

## 2023-03-24 DIAGNOSIS — K59.00 CONSTIPATION, UNSPECIFIED CONSTIPATION TYPE: Primary | ICD-10-CM

## 2023-03-24 PROCEDURE — 74018 RADEX ABDOMEN 1 VIEW: CPT

## 2023-03-29 ENCOUNTER — HOSPITAL ENCOUNTER (OUTPATIENT)
Dept: GENERAL RADIOLOGY | Facility: HOSPITAL | Age: 83
Discharge: HOME | End: 2023-03-29
Admitting: FAMILY MEDICINE
Payer: MEDICARE

## 2023-03-29 ENCOUNTER — TRANSCRIBE ORDERS (OUTPATIENT)
Dept: ADMINISTRATIVE | Facility: HOSPITAL | Age: 83
End: 2023-03-29
Payer: MEDICARE

## 2023-03-29 DIAGNOSIS — Z87.01 PERSONAL HISTORY OF PNEUMONIA (RECURRENT): Primary | ICD-10-CM

## 2023-03-29 DIAGNOSIS — Z87.01 PERSONAL HISTORY OF PNEUMONIA (RECURRENT): ICD-10-CM

## 2023-03-29 PROCEDURE — 71046 X-RAY EXAM CHEST 2 VIEWS: CPT

## 2023-04-07 ENCOUNTER — TELEPHONE (OUTPATIENT)
Dept: NEUROLOGY | Facility: CLINIC | Age: 83
End: 2023-04-07

## 2023-04-07 NOTE — TELEPHONE ENCOUNTER
Caller: YURIDIA    Relationship: W/ [facility]    Best call back number: [phone]    What was the call regarding: YURIDIA ASKS IF PT'S F/U APPT W/ DR. MAGUIRE NEXT Friday, 4/14/23, CAN BE SWITCHED TO A [facility] VIDEO VISIT INSTEAD OF AN IN-OFFICE VISIT.    Do you require a callback: YES, PLEASE.    PLEASE REVIEW AND ADVISE.

## 2023-04-12 RX ORDER — LORAZEPAM 0.5 MG/1
0.5 TABLET ORAL EVERY 8 HOURS
Qty: 21 TABLET | Refills: 0 | Status: SHIPPED | OUTPATIENT
Start: 2023-04-12

## 2023-04-14 ENCOUNTER — OFFICE VISIT (OUTPATIENT)
Dept: NEUROLOGY | Facility: CLINIC | Age: 83
End: 2023-04-14
Payer: MEDICARE

## 2023-04-14 DIAGNOSIS — G40.909 NON-REFRACTORY EPILEPSY: Primary | ICD-10-CM

## 2023-04-14 DIAGNOSIS — F88 DEVELOPMENTAL MENTAL DISORDER: ICD-10-CM

## 2023-04-14 PROCEDURE — 1159F MED LIST DOCD IN RCRD: CPT | Performed by: PSYCHIATRY & NEUROLOGY

## 2023-04-14 PROCEDURE — 99212 OFFICE O/P EST SF 10 MIN: CPT | Performed by: PSYCHIATRY & NEUROLOGY

## 2023-04-14 PROCEDURE — 1160F RVW MEDS BY RX/DR IN RCRD: CPT | Performed by: PSYCHIATRY & NEUROLOGY

## 2023-04-14 NOTE — PROGRESS NOTES
"Chief complaint: History of seizures    Patient ID: Erica Crockett is a 82 y.o. female.    HPI:   You have chosen to receive care through a telephone visit. Do you consent to use a telephone visit for your medical care today?  \"Yes\".  I had the pleasure of speaking with the caregiver for Erica today.  As you may know she is an 82-year-old female here for the management of seizures.  She has been doing much better overall.  She was recently hospitalized for urinary tract infection and metabolic encephalopathy.  However since discharge she has been doing well.  No seizures since her last visit with us.  She is still taking Keppra 500 mg twice daily as well as Lamictal 200 mg twice daily.  Her most recent labs were performed in January of this year and was within normal limits.    The following portions of the patient's history were reviewed and updated as appropriate: allergies, current medications, past family history, past medical history, past social history, past surgical history and problem list.    Review of Systems   I have reviewed the review of systems above performed by my medical assistant.      There were no vitals filed for this visit.    Neurologic Exam    Physical Exam    Procedures    Assessment/Plan: Plan will be to continue her current antiepileptic drug therapy and see her back in 6 months or sooner if needed.  A total of 15 minutes was spent during this visit discussing signs and symptoms of seizures, patient education, plan of care and prognosis.       Diagnoses and all orders for this visit:    1. Non-refractory epilepsy (Primary)    2. Developmental mental disorder           Alex Peralta II, MD          "

## 2023-05-15 ENCOUNTER — TRANSCRIBE ORDERS (OUTPATIENT)
Dept: ADMINISTRATIVE | Facility: HOSPITAL | Age: 83
End: 2023-05-15
Payer: MEDICARE

## 2023-05-15 DIAGNOSIS — Z12.31 SCREENING MAMMOGRAM, ENCOUNTER FOR: Primary | ICD-10-CM

## 2023-05-16 ENCOUNTER — HOSPITAL ENCOUNTER (OUTPATIENT)
Dept: GENERAL RADIOLOGY | Facility: HOSPITAL | Age: 83
Discharge: HOME | End: 2023-05-16
Admitting: FAMILY MEDICINE
Payer: MEDICARE

## 2023-05-16 ENCOUNTER — TRANSCRIBE ORDERS (OUTPATIENT)
Dept: ADMINISTRATIVE | Facility: HOSPITAL | Age: 83
End: 2023-05-16
Payer: MEDICARE

## 2023-05-16 DIAGNOSIS — R06.89 ACUTE RESPIRATORY INSUFFICIENCY: ICD-10-CM

## 2023-05-16 DIAGNOSIS — Z87.01 PERSONAL HISTORY OF PNEUMONIA (RECURRENT): ICD-10-CM

## 2023-05-16 DIAGNOSIS — Z87.01 PERSONAL HISTORY OF PNEUMONIA (RECURRENT): Primary | ICD-10-CM

## 2023-05-16 PROCEDURE — 71046 X-RAY EXAM CHEST 2 VIEWS: CPT

## 2023-05-19 ENCOUNTER — APPOINTMENT (OUTPATIENT)
Dept: GENERAL RADIOLOGY | Facility: HOSPITAL | Age: 83
End: 2023-05-19
Payer: MEDICARE

## 2023-05-19 ENCOUNTER — HOSPITAL ENCOUNTER (EMERGENCY)
Facility: HOSPITAL | Age: 83
Discharge: HOME | End: 2023-05-19
Attending: EMERGENCY MEDICINE
Payer: MEDICARE

## 2023-05-19 VITALS
OXYGEN SATURATION: 93 % | HEART RATE: 84 BPM | TEMPERATURE: 98.8 F | DIASTOLIC BLOOD PRESSURE: 72 MMHG | SYSTOLIC BLOOD PRESSURE: 146 MMHG | RESPIRATION RATE: 17 BRPM

## 2023-05-19 DIAGNOSIS — Z00.00 NORMAL EXAM: Primary | ICD-10-CM

## 2023-05-19 LAB
ALBUMIN SERPL-MCNC: 3.6 G/DL (ref 3.5–5.2)
ALBUMIN/GLOB SERPL: 1 G/DL
ALP SERPL-CCNC: 92 U/L (ref 39–117)
ALT SERPL W P-5'-P-CCNC: 17 U/L (ref 1–33)
ANION GAP SERPL CALCULATED.3IONS-SCNC: 13.1 MMOL/L (ref 5–15)
AST SERPL-CCNC: 21 U/L (ref 1–32)
BASOPHILS # BLD AUTO: 0.03 10*3/MM3 (ref 0–0.2)
BASOPHILS NFR BLD AUTO: 0.4 % (ref 0–1.5)
BILIRUB SERPL-MCNC: 0.2 MG/DL (ref 0–1.2)
BILIRUB UR QL STRIP: NEGATIVE
BUN SERPL-MCNC: 29 MG/DL (ref 8–23)
BUN/CREAT SERPL: 26.6 (ref 7–25)
CALCIUM SPEC-SCNC: 9.8 MG/DL (ref 8.6–10.5)
CHLORIDE SERPL-SCNC: 108 MMOL/L (ref 98–107)
CLARITY UR: CLEAR
CO2 SERPL-SCNC: 21.9 MMOL/L (ref 22–29)
COLOR UR: YELLOW
CREAT SERPL-MCNC: 1.09 MG/DL (ref 0.57–1)
DEPRECATED RDW RBC AUTO: 47.4 FL (ref 37–54)
EGFRCR SERPLBLD CKD-EPI 2021: 50.8 ML/MIN/1.73
EOSINOPHIL # BLD AUTO: 0.23 10*3/MM3 (ref 0–0.4)
EOSINOPHIL NFR BLD AUTO: 2.8 % (ref 0.3–6.2)
ERYTHROCYTE [DISTWIDTH] IN BLOOD BY AUTOMATED COUNT: 13.2 % (ref 12.3–15.4)
FLUAV RNA RESP QL NAA+PROBE: NOT DETECTED
FLUBV RNA RESP QL NAA+PROBE: NOT DETECTED
GLOBULIN UR ELPH-MCNC: 3.7 GM/DL
GLUCOSE SERPL-MCNC: 85 MG/DL (ref 65–99)
GLUCOSE UR STRIP-MCNC: NEGATIVE MG/DL
HCT VFR BLD AUTO: 34.7 % (ref 34–46.6)
HGB BLD-MCNC: 10.8 G/DL (ref 12–15.9)
HGB UR QL STRIP.AUTO: NEGATIVE
IMM GRANULOCYTES # BLD AUTO: 0.01 10*3/MM3 (ref 0–0.05)
IMM GRANULOCYTES NFR BLD AUTO: 0.1 % (ref 0–0.5)
KETONES UR QL STRIP: NEGATIVE
LEUKOCYTE ESTERASE UR QL STRIP.AUTO: NEGATIVE
LYMPHOCYTES # BLD AUTO: 3.64 10*3/MM3 (ref 0.7–3.1)
LYMPHOCYTES NFR BLD AUTO: 44.5 % (ref 19.6–45.3)
MCH RBC QN AUTO: 29.6 PG (ref 26.6–33)
MCHC RBC AUTO-ENTMCNC: 31.1 G/DL (ref 31.5–35.7)
MCV RBC AUTO: 95.1 FL (ref 79–97)
MONOCYTES # BLD AUTO: 0.58 10*3/MM3 (ref 0.1–0.9)
MONOCYTES NFR BLD AUTO: 7.1 % (ref 5–12)
NEUTROPHILS NFR BLD AUTO: 3.69 10*3/MM3 (ref 1.7–7)
NEUTROPHILS NFR BLD AUTO: 45.1 % (ref 42.7–76)
NITRITE UR QL STRIP: NEGATIVE
PH UR STRIP.AUTO: 7 [PH] (ref 4.5–8)
PLATELET # BLD AUTO: 313 10*3/MM3 (ref 140–450)
PMV BLD AUTO: 10.2 FL (ref 6–12)
POTASSIUM SERPL-SCNC: 4.8 MMOL/L (ref 3.5–5.2)
PROT SERPL-MCNC: 7.3 G/DL (ref 6–8.5)
PROT UR QL STRIP: NEGATIVE
RBC # BLD AUTO: 3.65 10*6/MM3 (ref 3.77–5.28)
SARS-COV-2 RNA RESP QL NAA+PROBE: NOT DETECTED
SODIUM SERPL-SCNC: 143 MMOL/L (ref 136–145)
SP GR UR STRIP: 1.01 (ref 1–1.03)
UROBILINOGEN UR QL STRIP: NORMAL
WBC NRBC COR # BLD: 8.18 10*3/MM3 (ref 3.4–10.8)

## 2023-05-19 PROCEDURE — 99283 EMERGENCY DEPT VISIT LOW MDM: CPT

## 2023-05-19 PROCEDURE — 85025 COMPLETE CBC W/AUTO DIFF WBC: CPT | Performed by: EMERGENCY MEDICINE

## 2023-05-19 PROCEDURE — 81003 URINALYSIS AUTO W/O SCOPE: CPT | Performed by: EMERGENCY MEDICINE

## 2023-05-19 PROCEDURE — 71045 X-RAY EXAM CHEST 1 VIEW: CPT

## 2023-05-19 PROCEDURE — 87636 SARSCOV2 & INF A&B AMP PRB: CPT | Performed by: EMERGENCY MEDICINE

## 2023-05-19 PROCEDURE — 80053 COMPREHEN METABOLIC PANEL: CPT | Performed by: EMERGENCY MEDICINE

## 2023-05-19 PROCEDURE — 36415 COLL VENOUS BLD VENIPUNCTURE: CPT

## 2023-05-19 RX ORDER — SODIUM CHLORIDE 0.9 % (FLUSH) 0.9 %
10 SYRINGE (ML) INJECTION AS NEEDED
Status: DISCONTINUED | OUTPATIENT
Start: 2023-05-19 | End: 2023-05-19 | Stop reason: HOSPADM

## 2023-05-19 NOTE — DISCHARGE INSTRUCTIONS
Continue current medications.  Follow-up with PCP as above.  Return to ED for medical emergencies.

## 2023-05-19 NOTE — ED PROVIDER NOTES
"Subjective   History of Present Illness  Erica Crockett is an 82-year-old white female who presents secondary to fever and decreased oxygen saturations.  Patient is nonverbal.  All history is through caregivers.  Patient noted to have a fever of 100.7 this morning.  Oxygen saturation 92% on 5 L per nursing home report.  Ms. Crockett was placed on Augmentin 2 days ago as they felt patient was \"trending toward pneumonia\".  Caregivers elected to send the patient to the ED this morning for evaluation.    History provided by:  Nursing home and caregiver  History limited by: Patient is nonverbal.      Review of Systems   Unable to perform ROS: Patient nonverbal   Constitutional: Positive for fever.       Past Medical History:   Diagnosis Date   • Anemia    • Anxiety    • Bursitis    • Bursitis    • DJD (degenerative joint disease), lumbar    • Hyperkalemia    • Hypoglycemia    • Intellectual disability    • Low back pain    • Osteoarthritis    • Osteopenia    • Pneumonia    • Renal disorder    • Renal insufficiency    • Scoliosis    • Seizure disorder        Allergies   Allergen Reactions   • Bactrim [Sulfamethoxazole-Trimethoprim] Unknown - High Severity   • Bee Venom    • Iodinated Contrast Media    • Nsaids        No past surgical history on file.    Family History   Family history unknown: Yes       Social History     Socioeconomic History   • Marital status: Single   Tobacco Use   • Smoking status: Never   • Smokeless tobacco: Never   Vaping Use   • Vaping Use: Never used   Substance and Sexual Activity   • Alcohol use: No   • Drug use: No   • Sexual activity: Defer           Objective   Physical Exam  Vitals and nursing note reviewed.   Constitutional:       General: She is not in acute distress.     Appearance: Normal appearance. She is well-developed. She is not ill-appearing, toxic-appearing or diaphoretic.      Comments: 82-year-old white female lying in bed.  Patient appears in fair overall health for age.  " Vital signs are unremarkable.   HENT:      Head: Normocephalic and atraumatic.      Right Ear: Tympanic membrane, ear canal and external ear normal.      Left Ear: Tympanic membrane, ear canal and external ear normal.      Nose: Nose normal.      Mouth/Throat:      Mouth: Mucous membranes are moist.      Pharynx: Oropharynx is clear.   Eyes:      Extraocular Movements: Extraocular movements intact.      Conjunctiva/sclera: Conjunctivae normal.      Pupils: Pupils are equal, round, and reactive to light.   Cardiovascular:      Rate and Rhythm: Normal rate and regular rhythm.      Pulses: Normal pulses.      Heart sounds: Normal heart sounds. No murmur heard.    No friction rub. No gallop.   Pulmonary:      Effort: Pulmonary effort is normal.      Breath sounds: Normal breath sounds.   Abdominal:      General: Bowel sounds are normal. There is no distension.      Palpations: Abdomen is soft. There is no mass.      Tenderness: There is no abdominal tenderness. There is no guarding or rebound.      Hernia: No hernia is present.   Musculoskeletal:         General: Normal range of motion.      Cervical back: Normal range of motion and neck supple.   Skin:     General: Skin is warm and dry.      Capillary Refill: Capillary refill takes less than 2 seconds.   Neurological:      General: No focal deficit present.      Mental Status: She is alert. Mental status is at baseline.      Deep Tendon Reflexes: Reflexes are normal and symmetric.   Psychiatric:         Mood and Affect: Mood normal.         Behavior: Behavior normal.         Procedures           ED Course  ED Course as of 05/19/23 0707   Fri May 19, 2023   0526 Obtaining full set of labs, chest x-ray, UA as well as COVID and flu swab.  Patient's oxygen saturation 95% on 5 L nasal cannula. [SS]   0634 Hemoglobin(!): 10.8 [SS]   0634 Hematocrit: 34.7 [SS]   0634 BUN(!): 29 [SS]   0634 CBC only notable for mild anemia with hemoglobin 10.5.  Normal hematocrit.  Hemoglobin  is at baseline.  CMP notable for mild renal dysfunction with BUN 29 creatinine 1.09.  This likewise appears to be at baseline.  COVID and flu swabs negative.  Urinalysis unremarkable. [SS]   0634 Chest x-ray shows elevated right hemidiaphragm and chronic basilar atelectasis.  No evidence of infiltrate.    Patient's oxygen saturation 92% on room air.  In the mid 90s on 2% nasal cannula.    I find no evidence of acute disease process.  Will DC home. [SS]      ED Course User Index  [SS] Geovanni Phillips MD      Labs Reviewed   COMPREHENSIVE METABOLIC PANEL - Abnormal; Notable for the following components:       Result Value    BUN 29 (*)     Creatinine 1.09 (*)     Chloride 108 (*)     CO2 21.9 (*)     BUN/Creatinine Ratio 26.6 (*)     eGFR 50.8 (*)     All other components within normal limits    Narrative:     GFR Normal >60  Chronic Kidney Disease <60  Kidney Failure <15    The GFR formula is only valid for adults with stable renal function between ages 18 and 70.   CBC WITH AUTO DIFFERENTIAL - Abnormal; Notable for the following components:    RBC 3.65 (*)     Hemoglobin 10.8 (*)     MCHC 31.1 (*)     Lymphocytes, Absolute 3.64 (*)     All other components within normal limits   COVID-19 AND FLU A/B, NP SWAB IN TRANSPORT MEDIA 8-12 HR TAT - Normal    Narrative:     Fact sheet for providers: https://www.fda.gov/media/914957/download    Fact sheet for patients: https://www.fda.gov/media/356812/download    Test performed by PCR.   URINALYSIS W/ MICROSCOPIC IF INDICATED (NO CULTURE) - Normal    Narrative:     Urine microscopic not indicated.   CBC AND DIFFERENTIAL    Narrative:     The following orders were created for panel order CBC & Differential.  Procedure                               Abnormality         Status                     ---------                               -----------         ------                     CBC Auto Differential[[id number]]        Abnormal            Final result                 Please  view results for these tests on the individual orders.     XR Chest 1 View    Result Date: 5/19/2023  Narrative: XR CHEST 1 VW-  INDICATION: Fever.  COMPARISON:  05/16/2023  FINDINGS: 2 portable AP view(s) of the chest.  Heart size is top normal. Pulmonary vascularity is normal. There is some atherosclerotic disease in the aorta. There is chronic elevation of the right hemidiaphragm with chronic right basilar atelectasis. There is also some mild atelectasis at the left base that appears stable. No pneumothorax.      Impression: No significant interval change.  This report was finalized on 5/19/2023 6:06 AM by Dr. Ryan Judge MD.      XR Chest PA & Lateral    Result Date: 5/16/2023  Narrative: CHEST X-RAY, 05/16/2023     HISTORY: 82-year-old female [facility] resident with lethargy and vomiting beginning yesterday. Decreased oxygen saturation. Shortness of air. Pneumonia in March.  TECHNIQUE: AP and lateral upright chest series.  COMPARISON: *  Chest x-ray, 3/5/2023 and 1/28/2023.  FINDINGS: Dense infiltrate in the suprahilar right upper lung has resolved since the prior exam. There is mild infiltrate or atelectasis in the lung bases. Shallow lung expansion. No visible pleural effusion.  Chronic elevation right hemidiaphragm. Heart size and pulmonary vascularity are normal.      Impression: 1.  Previous stents right upper lobe infiltrate has resolved. 2.  Shallow lung expansion with mild infiltrate or atelectasis in the lung bases. 3.  Chronic elevation right hemidiaphragm.  This report was finalized on 5/16/2023 10:40 AM by Dr. Lionel Ye MD.      My differential diagnosis for fever includes but is not limited:  To viral infections including COVID-19, bacterial infections, fungal infections, fever of unknown origin, auto regulatory dysfunction, hyperthermia, heat exhaustion, heat stroke, malignant neuroleptic syndrome and others.                                         MDM    Final diagnoses:    Normal exam       ED Disposition  ED Disposition     ED Disposition   Discharge    Condition   Stable    Comment   --             Tulio Carlson MD  [address] [address]  [phone]    Schedule an appointment as soon as possible for a visit in 1 week  for continued or worsened symptoms, Sooner if needed         Medication List      No changes were made to your prescriptions during this visit.          Geovanni Phillips MD  05/19/23 0707

## 2023-06-09 ENCOUNTER — TRANSCRIBE ORDERS (OUTPATIENT)
Dept: ADMINISTRATIVE | Facility: HOSPITAL | Age: 83
End: 2023-06-09
Payer: MEDICARE

## 2023-06-09 ENCOUNTER — HOSPITAL ENCOUNTER (OUTPATIENT)
Dept: GENERAL RADIOLOGY | Facility: HOSPITAL | Age: 83
Discharge: HOME | End: 2023-06-09
Payer: MEDICARE

## 2023-06-09 DIAGNOSIS — Z87.01 PERSONAL HISTORY OF PNEUMONIA (RECURRENT): ICD-10-CM

## 2023-06-09 DIAGNOSIS — Z87.01 PERSONAL HISTORY OF PNEUMONIA (RECURRENT): Primary | ICD-10-CM

## 2023-06-09 PROCEDURE — 71046 X-RAY EXAM CHEST 2 VIEWS: CPT

## 2023-06-15 ENCOUNTER — OFFICE VISIT (OUTPATIENT)
Dept: CARDIOLOGY | Facility: CLINIC | Age: 83
End: 2023-06-15
Payer: MEDICARE

## 2023-06-15 VITALS
SYSTOLIC BLOOD PRESSURE: 150 MMHG | HEIGHT: 67 IN | RESPIRATION RATE: 16 BRPM | OXYGEN SATURATION: 94 % | HEART RATE: 63 BPM | DIASTOLIC BLOOD PRESSURE: 74 MMHG | WEIGHT: 146 LBS | BODY MASS INDEX: 22.91 KG/M2

## 2023-06-15 DIAGNOSIS — I10 ESSENTIAL (PRIMARY) HYPERTENSION: ICD-10-CM

## 2023-06-15 DIAGNOSIS — E78.00 HYPERCHOLESTEROLEMIA: Primary | ICD-10-CM

## 2023-06-15 PROCEDURE — 3077F SYST BP >= 140 MM HG: CPT | Performed by: INTERNAL MEDICINE

## 2023-06-15 PROCEDURE — 99213 OFFICE O/P EST LOW 20 MIN: CPT | Performed by: INTERNAL MEDICINE

## 2023-06-15 PROCEDURE — 3078F DIAST BP <80 MM HG: CPT | Performed by: INTERNAL MEDICINE

## 2023-06-15 NOTE — PROGRESS NOTES
"PATIENTINFORMATION    Date of Office Visit: 06/15/2023  Encounter Provider: Bob Cortez MD  Place of Service: [facility] CARDIOLOGY  Patient Name: Erica Crockett  : 1940    Subjective:     Encounter Date:06/15/2023      Patient ID: Erica Crockett is a 82 y.o. female.    No chief complaint on file.    HPI  Ms. Crockett is an 81 yo lady brought to cardiology clinic for follow-up visit.  She has severe developmental abnormality and dementia and she is nonverbal.  She was morning consistently during office visit today likely from underlying pain.  She was recently treated for pneumonia as an outpatient but otherwise no significant events since last clinic visit.  I have reviewed medications and vital signs sent from [facility].      ROS  All systems reviewed and negative except as noted in HPI.    Past Medical History:   Diagnosis Date   • Anemia    • Anxiety    • Bursitis    • Bursitis    • DJD (degenerative joint disease), lumbar    • Hyperkalemia    • Hypoglycemia    • Intellectual disability    • Low back pain    • Osteoarthritis    • Osteopenia    • Pneumonia    • Renal disorder    • Renal insufficiency    • Scoliosis    • Seizure disorder        History reviewed. No pertinent surgical history.    Social History     Socioeconomic History   • Marital status: Single   Tobacco Use   • Smoking status: Never   • Smokeless tobacco: Never   Vaping Use   • Vaping Use: Never used   Substance and Sexual Activity   • Alcohol use: No   • Drug use: No   • Sexual activity: Defer       Family History   Family history unknown: Yes         Procedures       Objective:     /74   Pulse 63   Resp 16   Ht 170.2 cm (67\")   Wt 66.2 kg (146 lb)   SpO2 94%   BMI 22.87 kg/m²  Body mass index is 22.87 kg/m².     Constitutional:       General: Not in acute distress.     Appearance: Well-developed. Not diaphoretic.   Eyes:      Pupils: Pupils are equal, round, and reactive to light. "   HENT:      Head: Normocephalic and atraumatic.   Neck:      Thyroid: No thyromegaly.   Pulmonary:      Effort: Pulmonary effort is normal. No respiratory distress.      Breath sounds: Normal breath sounds. No wheezing. No rales.   Chest:      Chest wall: Not tender to palpatation.   Cardiovascular:      Normal rate. Regular rhythm.      No gallop.   Pulses:     Intact distal pulses.   Edema:     Peripheral edema absent.   Abdominal:      General: Bowel sounds are normal. There is no distension.      Palpations: Abdomen is soft.      Tenderness: There is no guarding.   Musculoskeletal: Normal range of motion.         General: No deformity.      Cervical back: Normal range of motion and neck supple. Skin:     General: Skin is warm and dry.      Findings: No rash.   Neurological:      Mental Status: Alert.      Cranial Nerves: No cranial nerve deficit.         Review Of Data: I have reviewed pertinent recent labs, images and documents and pertinent findings included in HPI or assessment below.    Lipid Panel        10/26/2022    04:20 12/24/2022    04:30 1/10/2023    23:45   Lipid Panel   Total Cholesterol 184  184  192    Triglycerides 46  44  53    HDL Cholesterol 85  100  96    VLDL Cholesterol 9  9  10    LDL Cholesterol  90  75  86    LDL/HDL Ratio 1.06  0.75  0.89          Assessment/Plan:           1. History of hypoxemic respiratory failure without any recurrence and does not require supplemental oxygen  2. Dementia/nonverbal patient  3. Essential hypertension on amlodipine  4. Chronic pain from hip joint problems-follows up with pain medicine-better pain control  5. Seizure disorder  6. CKD stage 3   7. Hypercholesterolemia on statin  8. Mild aortic valve stenosis on echocardiogram done in February 2022.    Cardiovascular exam is unremarkable other than elevated blood pressure.  I have reviewed blood pressure logs over the past months and systolic has consistently been elevated as high as 180.  I have  increased amlodipine to 5 mg p.o. twice daily and will review blood pressure logs after 2 weeks.  She needs evaluation for morning and treatment which is likely from underlying pain.    Otherwise return to clinic in 1 year or sooner with concerns   Diagnosis and plan of care discussed with patient and verbalized understanding.            Your medication list          Accurate as of Brigid 15, 2023 12:05 PM. If you have any questions, ask your nurse or doctor.            CONTINUE taking these medications      Instructions Last Dose Given Next Dose Due   acetaminophen 325 MG tablet  Commonly known as: TYLENOL      Take 2 tablets by mouth Every 6 (Six) Hours As Needed for Mild Pain.       amLODIPine 5 MG tablet  Commonly known as: NORVASC      Take 1 tablet by mouth Daily.       atorvastatin 20 MG tablet  Commonly known as: LIPITOR      1 tablet Daily.       Benefiber Drink Mix pack      Take 1 packet by mouth Daily.       cetirizine 10 MG tablet  Commonly known as: zyrTEC      Take 1 tablet by mouth Every Night.       cholecalciferol 25 MCG (1000 UT) tablet  Commonly known as: VITAMIN D3      Take 1 tablet by mouth Daily.       dextromethorphan-guaifenesin  MG/5ML syrup  Commonly known as: ROBITUSSIN-DM      Take 10 mL by mouth Every 4 (Four) Hours As Needed.       gabapentin 300 MG capsule  Commonly known as: NEURONTIN      Take 1 capsule by mouth Every Night.       HYDROcodone-acetaminophen 7.5-325 MG per tablet  Commonly known as: NORCO      Take 1 tablet by mouth Every 8 (Eight) Hours As Needed for Severe Pain.       ipratropium-albuterol 0.5-2.5 mg/3 ml nebulizer  Commonly known as: DUO-NEB      Take 3 mL by nebulization 4 (Four) Times a Day As Needed for Wheezing or Shortness of Air.       lamoTRIgine 200 MG tablet  Commonly known as: LaMICtal      Take 1 tablet by mouth 2 (Two) Times a Day.       levETIRAcetam 500 MG tablet  Commonly known as: KEPPRA      Take 1 tablet by mouth 2 (Two) Times a Day.        LORazepam 1 MG tablet  Commonly known as: Ativan      Take 2 tabs one hour prior to appointment and may repeat 1 tab prn upon arrival anxiety       LORazepam 0.5 MG tablet  Commonly known as: ATIVAN      Take 1 tablet by mouth Every 8 (Eight) Hours.       lubiprostone 8 MCG capsule  Commonly known as: AMITIZA      Take 1 capsule by mouth 2 (Two) Times a Day With Meals.       multivitamin tablet tablet  Commonly known as: THERAGRAN      Take 1 tablet by mouth Daily.       omeprazole 20 MG capsule  Commonly known as: priLOSEC      Take 2 capsules by mouth Daily.       polyethylene glycol 17 GM/SCOOP powder  Commonly known as: MIRALAX      Take 17 g by mouth Daily As Needed (constipation).       Polyvinyl Alcohol-Povidone PF 1.4-0.6 % ophthalmic solution  Commonly known as: HYPOTEARS      Administer 1 drop to both eyes 4 (Four) Times a Day.       PROBIOTIC PO      Take 2 capsules by mouth Every Night.       Prolia 60 MG/ML solution prefilled syringe syringe  Generic drug: denosumab      Inject 1 mL under the skin into the appropriate area as directed Every 6 (Six) Months.       simethicone 80 MG chewable tablet  Commonly known as: MYLICON      Chew 1 tablet Every 6 (Six) Hours As Needed for Flatulence.       Vitamin C ER 1000 MG tablet controlled-release      Take 1,000 mg by mouth Daily.       Zinc Sulfate 220 (50 Zn) MG tablet      Take 1 tablet by mouth Daily.                  Bob Cortez MD  06/15/23  12:05 EDT

## 2023-06-17 PROBLEM — J69.0: Status: ACTIVE | Noted: 2023-06-17

## 2023-06-19 ENCOUNTER — TELEPHONE (OUTPATIENT)
Dept: SPEECH THERAPY | Facility: HOSPITAL | Age: 83
End: 2023-06-19
Payer: MEDICARE

## 2023-06-19 NOTE — TELEPHONE ENCOUNTER
Return phone call to MAYURI Monreal at [facility]. Update given on pt status regarding swallowing status/NPO status at this time. Inquired about any other POA/guardian and she states Nicola Kapadia is the person she has spoken to in the past regarding Brittanie's care. Will keep her updated as therapy progresses for continuity of care.

## 2023-06-26 PROBLEM — I35.0 NONRHEUMATIC AORTIC VALVE STENOSIS: Status: ACTIVE | Noted: 2023-06-26

## 2023-07-27 ENCOUNTER — HOSPITAL ENCOUNTER (OUTPATIENT)
Facility: HOSPITAL | Age: 83
Setting detail: OBSERVATION
Discharge: HOME | End: 2023-07-28
Attending: EMERGENCY MEDICINE | Admitting: HOSPITALIST
Payer: MEDICARE

## 2023-07-27 ENCOUNTER — TRANSCRIBE ORDERS (OUTPATIENT)
Dept: ADMINISTRATIVE | Facility: HOSPITAL | Age: 83
End: 2023-07-27
Payer: MEDICARE

## 2023-07-27 ENCOUNTER — LAB REQUISITION (OUTPATIENT)
Dept: LAB | Facility: HOSPITAL | Age: 83
End: 2023-07-27
Payer: MEDICARE

## 2023-07-27 DIAGNOSIS — N18.30 CHRONIC KIDNEY DISEASE, STAGE 3 UNSPECIFIED: ICD-10-CM

## 2023-07-27 DIAGNOSIS — E87.5 HYPERKALEMIA: ICD-10-CM

## 2023-07-27 DIAGNOSIS — E04.1 NONTOXIC UNINODULAR GOITER: Primary | ICD-10-CM

## 2023-07-27 DIAGNOSIS — N17.9 ACUTE KIDNEY INJURY: Primary | ICD-10-CM

## 2023-07-27 LAB
ANION GAP SERPL CALCULATED.3IONS-SCNC: 6.8 MMOL/L (ref 5–15)
ANION GAP SERPL CALCULATED.3IONS-SCNC: 7.6 MMOL/L (ref 5–15)
BUN SERPL-MCNC: 36 MG/DL (ref 8–23)
BUN SERPL-MCNC: 39 MG/DL (ref 8–23)
BUN/CREAT SERPL: 23.6 (ref 7–25)
BUN/CREAT SERPL: 27.5 (ref 7–25)
CALCIUM SPEC-SCNC: 10 MG/DL (ref 8.6–10.5)
CALCIUM SPEC-SCNC: 10.3 MG/DL (ref 8.6–10.5)
CHLORIDE SERPL-SCNC: 106 MMOL/L (ref 98–107)
CHLORIDE SERPL-SCNC: 107 MMOL/L (ref 98–107)
CO2 SERPL-SCNC: 25.2 MMOL/L (ref 22–29)
CO2 SERPL-SCNC: 26.4 MMOL/L (ref 22–29)
CREAT SERPL-MCNC: 1.31 MG/DL (ref 0.57–1)
CREAT SERPL-MCNC: 1.65 MG/DL (ref 0.57–1)
EGFRCR SERPLBLD CKD-EPI 2021: 30.9 ML/MIN/1.73
EGFRCR SERPLBLD CKD-EPI 2021: 40.8 ML/MIN/1.73
GLUCOSE SERPL-MCNC: 115 MG/DL (ref 65–99)
GLUCOSE SERPL-MCNC: 98 MG/DL (ref 65–99)
HOLD SPECIMEN: NORMAL
POTASSIUM SERPL-SCNC: 6.4 MMOL/L (ref 3.5–5.2)
POTASSIUM SERPL-SCNC: 6.7 MMOL/L (ref 3.5–5.2)
SODIUM SERPL-SCNC: 139 MMOL/L (ref 136–145)
SODIUM SERPL-SCNC: 140 MMOL/L (ref 136–145)
WHOLE BLOOD HOLD COAG: NORMAL
WHOLE BLOOD HOLD SPECIMEN: NORMAL

## 2023-07-27 PROCEDURE — 96361 HYDRATE IV INFUSION ADD-ON: CPT

## 2023-07-27 PROCEDURE — 93010 ELECTROCARDIOGRAM REPORT: CPT | Performed by: INTERNAL MEDICINE

## 2023-07-27 PROCEDURE — 93005 ELECTROCARDIOGRAM TRACING: CPT | Performed by: EMERGENCY MEDICINE

## 2023-07-27 PROCEDURE — 80048 BASIC METABOLIC PNL TOTAL CA: CPT | Performed by: FAMILY MEDICINE

## 2023-07-27 PROCEDURE — 96374 THER/PROPH/DIAG INJ IV PUSH: CPT

## 2023-07-27 PROCEDURE — 80048 BASIC METABOLIC PNL TOTAL CA: CPT | Performed by: EMERGENCY MEDICINE

## 2023-07-27 PROCEDURE — G0378 HOSPITAL OBSERVATION PER HR: HCPCS

## 2023-07-27 PROCEDURE — 99284 EMERGENCY DEPT VISIT MOD MDM: CPT

## 2023-07-27 RX ORDER — L.ACID,CASEI/B.ANIMAL/S.THERMO 16B CELL
2 CAPSULE ORAL DAILY
COMMUNITY

## 2023-07-27 RX ORDER — AMLODIPINE BESYLATE 5 MG/1
5 TABLET ORAL DAILY
COMMUNITY

## 2023-07-27 RX ORDER — LUBIPROSTONE 8 UG/1
8 CAPSULE ORAL 2 TIMES DAILY WITH MEALS
COMMUNITY

## 2023-07-27 RX ORDER — LORAZEPAM 0.5 MG/1
0.5 TABLET ORAL EVERY 6 HOURS PRN
Status: DISCONTINUED | OUTPATIENT
Start: 2023-07-27 | End: 2023-07-28 | Stop reason: HOSPADM

## 2023-07-27 RX ORDER — HYDROCODONE BITARTRATE AND ACETAMINOPHEN 7.5; 325 MG/1; MG/1
1 TABLET ORAL EVERY 8 HOURS PRN
Status: DISCONTINUED | OUTPATIENT
Start: 2023-07-27 | End: 2023-07-28 | Stop reason: HOSPADM

## 2023-07-27 RX ORDER — IPRATROPIUM BROMIDE AND ALBUTEROL SULFATE 2.5; .5 MG/3ML; MG/3ML
3 SOLUTION RESPIRATORY (INHALATION) 4 TIMES DAILY PRN
Status: DISCONTINUED | OUTPATIENT
Start: 2023-07-27 | End: 2023-07-28 | Stop reason: HOSPADM

## 2023-07-27 RX ORDER — AMLODIPINE BESYLATE 2.5 MG/1
2.5 TABLET ORAL DAILY
COMMUNITY
End: 2023-07-28 | Stop reason: HOSPADM

## 2023-07-27 RX ORDER — PROMETHAZINE HYDROCHLORIDE 25 MG/1
25 SUPPOSITORY RECTAL EVERY 6 HOURS PRN
COMMUNITY

## 2023-07-27 RX ORDER — AMLODIPINE BESYLATE 5 MG/1
5 TABLET ORAL DAILY
Status: DISCONTINUED | OUTPATIENT
Start: 2023-07-28 | End: 2023-07-28 | Stop reason: HOSPADM

## 2023-07-27 RX ORDER — SIMETHICONE 80 MG
80 TABLET,CHEWABLE ORAL EVERY 6 HOURS PRN
COMMUNITY

## 2023-07-27 RX ORDER — BISACODYL 5 MG/1
10 TABLET, DELAYED RELEASE ORAL DAILY PRN
Status: DISCONTINUED | OUTPATIENT
Start: 2023-07-27 | End: 2023-07-28 | Stop reason: HOSPADM

## 2023-07-27 RX ORDER — MELATONIN
1000 DAILY
COMMUNITY

## 2023-07-27 RX ORDER — GABAPENTIN 300 MG/1
300 CAPSULE ORAL NIGHTLY
Status: DISCONTINUED | OUTPATIENT
Start: 2023-07-27 | End: 2023-07-28 | Stop reason: HOSPADM

## 2023-07-27 RX ORDER — BISACODYL 10 MG
10 SUPPOSITORY, RECTAL RECTAL DAILY PRN
COMMUNITY

## 2023-07-27 RX ORDER — POLYETHYLENE GLYCOL 3350 17 G/17G
17 POWDER, FOR SOLUTION ORAL DAILY
COMMUNITY

## 2023-07-27 RX ORDER — UREA 40 G/100G
1 LOTION TOPICAL 2 TIMES DAILY PRN
COMMUNITY

## 2023-07-27 RX ORDER — NALOXONE HYDROCHLORIDE 4 MG/.1ML
1 SPRAY NASAL AS NEEDED
Status: DISCONTINUED | OUTPATIENT
Start: 2023-07-27 | End: 2023-07-28 | Stop reason: HOSPADM

## 2023-07-27 RX ORDER — SODIUM CHLORIDE 0.9 % (FLUSH) 0.9 %
10 SYRINGE (ML) INJECTION AS NEEDED
Status: DISCONTINUED | OUTPATIENT
Start: 2023-07-27 | End: 2023-07-28 | Stop reason: HOSPADM

## 2023-07-27 RX ORDER — DIPHENHYDRAMINE HCL 25 MG
25 CAPSULE ORAL EVERY 6 HOURS PRN
COMMUNITY

## 2023-07-27 RX ORDER — WATER / MINERAL OIL / WHITE PETROLATUM 16 OZ
1 CREAM TOPICAL AS NEEDED
COMMUNITY

## 2023-07-27 RX ORDER — POLYETHYLENE GLYCOL 3350 17 G/17G
17 POWDER, FOR SOLUTION ORAL DAILY PRN
Status: DISCONTINUED | OUTPATIENT
Start: 2023-07-27 | End: 2023-07-28 | Stop reason: HOSPADM

## 2023-07-27 RX ORDER — ATORVASTATIN CALCIUM 20 MG/1
20 TABLET, FILM COATED ORAL DAILY
COMMUNITY

## 2023-07-27 RX ORDER — SODIUM CHLORIDE 450 MG/100ML
125 INJECTION, SOLUTION INTRAVENOUS CONTINUOUS
Status: DISCONTINUED | OUTPATIENT
Start: 2023-07-27 | End: 2023-07-28 | Stop reason: HOSPADM

## 2023-07-27 RX ORDER — SODIUM CHLORIDE 9 MG/ML
40 INJECTION, SOLUTION INTRAVENOUS AS NEEDED
Status: DISCONTINUED | OUTPATIENT
Start: 2023-07-27 | End: 2023-07-28 | Stop reason: HOSPADM

## 2023-07-27 RX ORDER — ATORVASTATIN CALCIUM 20 MG/1
20 TABLET, FILM COATED ORAL DAILY
Status: DISCONTINUED | OUTPATIENT
Start: 2023-07-28 | End: 2023-07-28 | Stop reason: HOSPADM

## 2023-07-27 RX ORDER — CETIRIZINE HYDROCHLORIDE 10 MG/1
10 TABLET ORAL DAILY
COMMUNITY

## 2023-07-27 RX ORDER — SENNOSIDES 8.6 MG
650 CAPSULE ORAL EVERY 6 HOURS PRN
COMMUNITY

## 2023-07-27 RX ORDER — MULTIPLE VITAMINS W/ MINERALS TAB 9MG-400MCG
1 TAB ORAL DAILY
COMMUNITY

## 2023-07-27 RX ORDER — ENEMA 19; 7 G/133ML; G/133ML
1 ENEMA RECTAL ONCE AS NEEDED
COMMUNITY

## 2023-07-27 RX ORDER — LEVETIRACETAM 500 MG/1
500 TABLET ORAL 2 TIMES DAILY
Status: DISCONTINUED | OUTPATIENT
Start: 2023-07-27 | End: 2023-07-28 | Stop reason: HOSPADM

## 2023-07-27 RX ORDER — AMOXICILLIN 250 MG
2 CAPSULE ORAL 2 TIMES DAILY
Status: DISCONTINUED | OUTPATIENT
Start: 2023-07-27 | End: 2023-07-28 | Stop reason: HOSPADM

## 2023-07-27 RX ORDER — LOPERAMIDE HYDROCHLORIDE 2 MG/1
2 CAPSULE ORAL 4 TIMES DAILY PRN
COMMUNITY

## 2023-07-27 RX ORDER — LAMOTRIGINE 100 MG/1
200 TABLET ORAL 2 TIMES DAILY
Status: DISCONTINUED | OUTPATIENT
Start: 2023-07-27 | End: 2023-07-28 | Stop reason: HOSPADM

## 2023-07-27 RX ORDER — SODIUM CHLORIDE 0.9 % (FLUSH) 0.9 %
10 SYRINGE (ML) INJECTION EVERY 12 HOURS SCHEDULED
Status: DISCONTINUED | OUTPATIENT
Start: 2023-07-27 | End: 2023-07-28 | Stop reason: HOSPADM

## 2023-07-27 RX ORDER — PANTOPRAZOLE SODIUM 40 MG/1
40 TABLET, DELAYED RELEASE ORAL
Status: DISCONTINUED | OUTPATIENT
Start: 2023-07-28 | End: 2023-07-28 | Stop reason: HOSPADM

## 2023-07-27 RX ORDER — BISACODYL 5 MG/1
5 TABLET, DELAYED RELEASE ORAL DAILY PRN
Status: DISCONTINUED | OUTPATIENT
Start: 2023-07-27 | End: 2023-07-28 | Stop reason: HOSPADM

## 2023-07-27 RX ORDER — SODIUM CHLORIDE 9 MG/ML
250 INJECTION, SOLUTION INTRAVENOUS CONTINUOUS
Status: DISCONTINUED | OUTPATIENT
Start: 2023-07-27 | End: 2023-07-28 | Stop reason: HOSPADM

## 2023-07-27 RX ORDER — BISACODYL 10 MG
10 SUPPOSITORY, RECTAL RECTAL DAILY PRN
Status: DISCONTINUED | OUTPATIENT
Start: 2023-07-27 | End: 2023-07-28 | Stop reason: HOSPADM

## 2023-07-27 RX ORDER — MENTHOL 40 MG/ML
1 GEL TOPICAL DAILY
COMMUNITY

## 2023-07-27 RX ADMIN — LAMOTRIGINE 200 MG: 100 TABLET ORAL at 20:47

## 2023-07-27 RX ADMIN — SODIUM CHLORIDE 500 ML: 9 INJECTION, SOLUTION INTRAVENOUS at 17:56

## 2023-07-27 RX ADMIN — Medication 50 MEQ: at 18:29

## 2023-07-27 RX ADMIN — Medication 10 ML: at 20:48

## 2023-07-27 RX ADMIN — SODIUM BICARBONATE 50 MEQ: 84 INJECTION INTRAVENOUS at 18:29

## 2023-07-27 RX ADMIN — LEVETIRACETAM 500 MG: 500 TABLET, FILM COATED ORAL at 20:47

## 2023-07-27 RX ADMIN — SODIUM ZIRCONIUM CYCLOSILICATE 10 G: 10 POWDER, FOR SUSPENSION ORAL at 18:29

## 2023-07-27 RX ADMIN — SODIUM CHLORIDE 250 ML/HR: 9 INJECTION, SOLUTION INTRAVENOUS at 17:56

## 2023-07-27 RX ADMIN — SENNOSIDES AND DOCUSATE SODIUM 2 TABLET: 50; 8.6 TABLET ORAL at 20:47

## 2023-07-27 RX ADMIN — GABAPENTIN 300 MG: 300 CAPSULE ORAL at 20:47

## 2023-07-27 RX ADMIN — SODIUM CHLORIDE 125 ML/HR: 4.5 INJECTION, SOLUTION INTRAVENOUS at 20:46

## 2023-07-27 NOTE — H&P
[facility]     PCP: Tulio Carlson MD    CODE status: DNR/DNR    CHIEF COMPLAINT: Abnormal lab    HISTORY OF PRESENT ILLNESS:    Patient is well-known to myself in this facility from previous admissions for aspiration pneumonia, with last return to [place] under palliative care.  She returns to [facility] ED complaining of reported abnormal lab.  Patient has history of recurrent aspiration pneumonia due to chronic dysphagia, hyperkalemia, anxiety, seizure disorder, intellectual disability, chronically noncommunicative, osteoarthritis, renal insufficiency.  Patient's blood was reportedly checked by caretaker at [facility] around 1 PM this afternoon, and they found her to have potassium of 6.4, and also worsening renal function.  They therefore brought her back to the ED again.     Work-up in the ED revealed potassium of 6.7 with minimally elevated creatinine from baseline of 1.3, therefore admission was sought.       Past Medical History:   Diagnosis Date    Anemia     Anxiety     Bursitis     Bursitis     DJD (degenerative joint disease), lumbar     Hyperkalemia     Hypoglycemia     Intellectual disability     Low back pain     Osteoarthritis     Osteopenia     Pneumonia     Renal disorder     Renal insufficiency     Scoliosis     Seizure disorder      No past surgical history on file.  Family History   Family history unknown: Yes     Social History     Tobacco Use    Smoking status: Never    Smokeless tobacco: Never   Vaping Use    Vaping Use: Never used   Substance Use Topics    Alcohol use: No    Drug use: No     (Not in a hospital admission)    Allergies:  Bactrim [sulfamethoxazole-trimethoprim], Bee venom, Iodinated contrast media, and Nsaids    Immunization History   Administered Date(s) Administered    COVID-19 (PFIZER) Purple Cap Monovalent 01/08/2021, 01/31/2021    Td (TDVAX) 06/17/2008       REVIEW OF SYSTEMS:  Please see the above history of present illness  "for pertinent positives and negatives.  The remainder of the patient's systems have been reviewed and are negative.     Vital Signs  Temp:  [97.4 °F (36.3 °C)] 97.4 °F (36.3 °C)  Heart Rate:  [60-68] 68  Resp:  [20] 20  BP: (120-156)/(68-86) 120/86  Flowsheet Rows      Flowsheet Row First Filed Value   Admission Height 152.4 cm (60\") Documented at 07/27/2023 1622   Admission Weight 73.6 kg (162 lb 3.2 oz) Documented at 07/27/2023 1622               Physical Exam:  General: Patient is awake and alert.  Elderly female, chronically noncommunicative,  no acute distress noted.   HENT: Head is atraumatic, normocephalic. Hearing is grossly intact. Nose is without obvious congestion and appears patent. Neck is supple and trachea is midline.   Eyes: Vision is grossly intact. Pupils appear equal and round.   Cardiovascular: Heart has regular rate and rhythm with no murmurs, rubs or gallops noted.   Respiratory: Lungs are clear to ausculation without wheezes, rhonchi or rales.   Abdominal/GI: Soft, nontender, bowel sounds present. No rebound or guarding present.   Extremities: No peripheral edema noted.   Musculoskeletal: Spontaneous movement of bilateral upper and lower extremities against gravity noted. No signs of injury or deformity noted.   Skin: Warm and dry.   Psych: Mood and affect are appropriate. Cooperative with exam.   Neuro: No facial asymmetry noted. No focal deficits noted, hearing and vision are grossly intact.    Emotional Behavior: Unable to assess fully due to patient chronically being noncommunicative.    Judgment and Insight   Mental Status   Memory   Mood and Affect: neither of the following found acutely        Depression                 Anxiety     Debilities: no signs of the following found    Physical Weakness     Handicaps     Disabilities     Agitation         Results Review:    I reviewed the patient's new clinical results.  Lab Results (most recent)       Procedure Component Value Units Date/Time "    Basic Metabolic Panel [[id number]]  (Abnormal) Collected: 07/27/23 1700    Specimen: Blood Updated: 07/27/23 1801     Glucose 115 mg/dL      BUN 39 mg/dL      Creatinine 1.65 mg/dL      Sodium 140 mmol/L      Potassium 6.7 mmol/L      Chloride 106 mmol/L      CO2 26.4 mmol/L      Calcium 10.3 mg/dL      BUN/Creatinine Ratio 23.6     Anion Gap 7.6 mmol/L      eGFR 30.9 mL/min/1.73     Narrative:      GFR Normal >60  Chronic Kidney Disease <60  Kidney Failure <15    The GFR formula is only valid for adults with stable renal function between ages 18 and 70.    [place] Draw [[id number]] Collected: 07/27/23 1700    Specimen: Blood Updated: 07/27/23 1800    Narrative:      The following orders were created for panel order [place] Draw.  Procedure                               Abnormality         Status                     ---------                               -----------         ------                     Green Top (Gel)[[id number]]                                  Final result               Lavender Top[[id number]]                                     Final result               Gold Top - SST[[id number]]                                                              Light Blue Top[[id number]]                                   Final result                 Please view results for these tests on the individual orders.    Green Top (Gel) [[id number]] Collected: 07/27/23 1700    Specimen: Blood Updated: 07/27/23 1800     Extra Tube Hold for add-ons.     Comment: Auto resulted.       Lavender Top [[id number]] Collected: 07/27/23 1700    Specimen: Blood Updated: 07/27/23 1800     Extra Tube hold for add-on     Comment: Auto resulted       Light Blue Top [[id number]] Collected: 07/27/23 1700    Specimen: Blood Updated: 07/27/23 1800     Extra Tube Hold for add-ons.     Comment: Auto resulted               Imaging Results (Most Recent)       None          Reviewed    ECG/EMG Results (most recent)       Procedure Component Value Units  Date/Time    ECG 12 Lead Electrolyte Imbalance [[id number]] Collected: 07/27/23 1812     Updated: 07/27/23 1813     QT Interval 430 ms     Narrative:      HEART RATE= 65  bpm  RR Interval= 924  ms  DE Interval= 238  ms  P Horizontal Axis= -20  deg  P Front Axis= 39  deg  QRSD Interval= 97  ms  QT Interval= 430  ms  QRS Axis= -22  deg  T Wave Axis= 40  deg  - ABNORMAL ECG -  Sinus rhythm  Prolonged DE interval  Borderline left axis deviation  Electronically Signed By:   Date and Time of Study: 2023-07-27 18:12:50          Reviewed    Assessment & Plan     Acute hyperkalemia  -Mildly elevated creatinine above baseline which appears to be 1.1-1.3, currently 1.65 with potassium of 6.7 likely secondary to dehydration from poor oral intake  -Patient already received Lokelma in ED will repeat dose tomorrow, repeat lab work in a.m.  -No acute EKG changes seen  -We will place on IV fluid with half-normal saline      Chronic stable conditions to be managed with home medications include the following conditions listed below. Also please note: Every effort was made to accurately obtain patient's home medications. This was done utilizing extensive chart review, ED documentation, recent pharmacy records, and where possible thorough discussion with the patient if medications were known by the patient. I have reviewed and edited the patient's home medications as per my efforts above and current med list can be found within home medications portion of this electronic medical record and is accurate as possible as of 07/27/23     GERD-continue home PPI    Chronic pain-continue home Norco and gabapentin    Seizure disorder-continue Lamictal and Keppra, and as needed Ativan    Chronic constipation-continue Dulcolax    Not oxygen dependent COPD-continue DuoNebs as needed      DVT PPX: SCDs        I discussed the patient's findings and my recommendations with patient     Jose Antonio SOLIS Tl,   07/27/23  19:12 EDT    Time: 38 mins    At  [facility], we believe that sharing information builds trust and better relationships. You are receiving this note because you recently visited [facility]. It is possible you will see health information before a provider has talked with you about it. This kind of information can be easy to misunderstand. To help you fully understand what it means for your health, we urge you to discuss this note with your provider.

## 2023-07-27 NOTE — ED NOTES
"Nursing report ED to floor  Erica Crockett  82 y.o.  female    HPI :   Chief Complaint   Patient presents with    Abnormal Lab       Admitting doctor:   Carmelo Van MD    Admitting diagnosis:   The primary encounter diagnosis was Acute kidney injury. A diagnosis of Hyperkalemia was also pertinent to this visit.    Code status:   Current Code Status       Date Active Code Status Order ID Comments User Context       Prior            Allergies:   Bactrim [sulfamethoxazole-trimethoprim], Bee venom, Iodinated contrast media, and Nsaids    Isolation:   No active isolations    Intake and Output    Intake/Output Summary (Last 24 hours) at 7/27/2023 1843  Last data filed at 7/27/2023 1828  Gross per 24 hour   Intake 500 ml   Output --   Net 500 ml       Weight:       07/27/23  1622   Weight: 73.6 kg (162 lb 3.2 oz)       Most recent vitals:   Vitals:    07/27/23 1622 07/27/23 1731   BP: 141/68 149/69   Pulse: 65 60   Resp: 20    Temp: 97.4 °F (36.3 °C)    TempSrc: Temporal    SpO2: 94% 93%   Weight: 73.6 kg (162 lb 3.2 oz)    Height: 152.4 cm (60\")        Active LDAs/IV Access:   Lines, Drains & Airways       Active LDAs       Name Placement date Placement time Site Days    Peripheral IV 07/27/23 1656 Anterior;Proximal;Right Forearm 07/27/23  1656  Forearm  less than 1    External Urinary Catheter 06/17/23  1504  --  40                    Labs (abnormal labs have a star):   Labs Reviewed   BASIC METABOLIC PANEL - Abnormal; Notable for the following components:       Result Value    Glucose 115 (*)     BUN 39 (*)     Creatinine 1.65 (*)     Potassium 6.7 (*)     eGFR 30.9 (*)     All other components within normal limits    Narrative:     GFR Normal >60  Chronic Kidney Disease <60  Kidney Failure <15    The GFR formula is only valid for adults with stable renal function between ages 18 and 70.   RAINBOW DRAW    Narrative:     The following orders were created for panel order [place] Draw.  Procedure                    "            Abnormality         Status                     ---------                               -----------         ------                     Green Top (Gel)[[id number]]                                  Final result               Lavender Top[[id number]]                                     Final result               Gold Top - SST[[id number]]                                                              Light Blue Top[[id number]]                                   Final result                 Please view results for these tests on the individual orders.   GREEN TOP   LAVENDER TOP   LIGHT BLUE TOP   GOLD TOP - SST       EKG:   ECG 12 Lead Electrolyte Imbalance   Preliminary Result   HEART RATE= 65  bpm   RR Interval= 924  ms   RI Interval= 238  ms   P Horizontal Axis= -20  deg   P Front Axis= 39  deg   QRSD Interval= 97  ms   QT Interval= 430  ms   QRS Axis= -22  deg   T Wave Axis= 40  deg   - ABNORMAL ECG -   Sinus rhythm   Prolonged RI interval   Borderline left axis deviation   Electronically Signed By:    Date and Time of Study: 2023-07-27 18:12:50          Meds given in ED:   Medications   sodium chloride 0.9 % infusion (250 mL/hr Intravenous New Bag 7/27/23 1756)   sodium chloride 0.9 % bolus 500 mL (0 mL Intravenous Stopped 7/27/23 1828)   sodium zirconium cyclosilicate (LOKELMA) pack 10 g (10 g Oral Given 7/27/23 1829)   sodium bicarbonate injection 8.4% 50 mEq (50 mEq Intravenous Given 7/27/23 1829)       Imaging results:  No radiology results for the last day    Ambulatory status:       Social issues:   Social History     Socioeconomic History    Marital status: Single   Tobacco Use    Smoking status: Never    Smokeless tobacco: Never   Vaping Use    Vaping Use: Never used   Substance and Sexual Activity    Alcohol use: No    Drug use: No    Sexual activity: Defer       NIH Stroke Scale:       Tasneem Moy RN  07/27/23 18:43 EDT

## 2023-07-27 NOTE — ED PROVIDER NOTES
Subjective     History provided by:  Patient, caregiver and medical records  History of Present Illness    Chief complaint: Hyperkalemia    Location: In her blood    Quality/Severity: The patient's caretaker from [facility] states he checked her blood earlier today and her potassium was elevated so she was sent to the ER.    Timing/Onset: Blood was drawn at 13: 00 this afternoon.    Modifying Factors: Patient does not have a history of renal failure.    Associated symptoms: None reported.  No vomiting or diarrhea per caretaker.    Narrative: The patient is a 82-year-old white female who is a resident of [facility].  BMP drawn earlier in the day showed a an elevated potassium of 6.4.  She had worsening and normal renal function with a BUN of 36 and a creatinine of 1.31 with a diminished GFR of 40.8.  Normally she has normal renal function.  The caretaker states that she is a little less energetic than normal.  She is normally averbal.  She has had no vomiting or diarrhea.  Review of Systems    Past Medical History:   Diagnosis Date    Acquired cyst of kidney     Acute upper respiratory infection     Age-related nuclear cataract of both eyes     Age-related osteoporosis without current pathological fracture     Allergic rhinitis     Anemia     Anxiety     Atopic dermatitis     Bursitis     Bursitis     Candidal stomatitis     Carotid artery stenosis     Chronic kidney disease, stage III (moderate)     Chronic pain syndrome     Chronic sinusitis     Conjunctivitis     Constipation     Corns and callosities     Diverticulosis of intestine w/o perforation or abscess w/o bleeding     DJD (degenerative joint disease), lumbar     Dry eye syndrome     Hammer toe     Hypercalcemia     Hyperkalemia     Hypertension     Hypoglycemia     Intellectual disability     Iron deficiency anemia, unspecified     Low back pain     Osteoarthritis     Osteopenia     Other forms of scoliosis, lumbar region     Other  specified deforming dorsopathies, site unspecified     Pain     Pain in toe of left foot     Pneumonia     Pneumonitis due to inhalation of food or vomitus     Pressure ulcer     Profound intellectual disabilities     Pruritus vulvae     Renal disorder     Renal insufficiency     Scoliosis     Scoliosis     Seizure disorder     UTI (urinary tract infection)        Allergies   Allergen Reactions    Bactrim [Sulfamethoxazole-Trimethoprim] Unknown - High Severity    Bee Venom     Iodinated Contrast Media     Nsaids        History reviewed. No pertinent surgical history.    Family History   Family history unknown: Yes       Social History     Socioeconomic History    Marital status: Single   Tobacco Use    Smoking status: Never    Smokeless tobacco: Never   Vaping Use    Vaping Use: Never used   Substance and Sexual Activity    Alcohol use: No    Drug use: No    Sexual activity: Defer           Objective   Physical Exam  Vitals and nursing note reviewed.   Constitutional:       General: She is not in acute distress.     Appearance: She is normal weight. She is not ill-appearing, toxic-appearing or diaphoretic.      Comments: The patient appears in no acute distress and does not appear ill.  Review of her vital signs: She is afebrile and all her vital signs are within normal limits.   HENT:      Head: Normocephalic and atraumatic.      Nose: Nose normal.      Mouth/Throat:      Mouth: Mucous membranes are moist.      Pharynx: Oropharynx is clear.   Eyes:      General: No scleral icterus.     Conjunctiva/sclera: Conjunctivae normal.      Pupils: Pupils are equal, round, and reactive to light.   Cardiovascular:      Rate and Rhythm: Normal rate and regular rhythm.      Heart sounds: No murmur heard.  Pulmonary:      Effort: Pulmonary effort is normal.      Breath sounds: Normal breath sounds.   Abdominal:      General: Bowel sounds are normal.      Palpations: Abdomen is soft.      Tenderness: There is no abdominal  tenderness. There is no guarding.   Musculoskeletal:         General: No signs of injury.      Cervical back: Normal range of motion and neck supple. No tenderness.      Right lower leg: No edema.      Left lower leg: No edema.   Skin:     General: Skin is warm and dry.      Capillary Refill: Capillary refill takes less than 2 seconds.      Coloration: Skin is not pale.      Findings: No erythema or rash.   Neurological:      General: No focal deficit present.      Mental Status: She is alert.      Cranial Nerves: No cranial nerve deficit.      Comments: The patient is awake.  She is averbal.   Psychiatric:         Mood and Affect: Mood normal.       Procedures           ED Course  ED Course as of 07/27/23 2206   Thu Jul 27, 2023 1828 My interpretation of the patient's EKG tracing performed 18: 12 is normal sinus rhythm with a rate of 65, prolonged VT interval consistent with first-degree AV block, left axis deviation, no acute ST segment elevation or depression consistent with ischemia, normal T waves, normal QRS duration.  No evidence of EKG changes secondary to hyperkalemia. [TP]   1829 The patient's repeat BMP shows worsening renal function with a BUN of 39 and creatinine of 1.65 and a low GFR of 30.9 consistent with acute kidney injury. [TP]   1831 The patient was administered an IV a 500 cc normal saline bolus followed by infusion at 250 cc/h for her acute kidney injury. [TP]   1831 The patient was administered [place] and sodium bicarbonate for hyperkalemia. [TP]   1834 18:33 patient discussed with Dr. Van, who agrees to admit the patient. [TP]      ED Course User Index  [TP] Curt Gutierrez MD                                           Medical Decision Making  Problems Addressed:  Acute kidney injury: complicated acute illness or injury  Hyperkalemia: complicated acute illness or injury    Amount and/or Complexity of Data Reviewed  Labs: ordered. Decision-making details documented in ED  Course.  ECG/medicine tests: ordered and independent interpretation performed. Decision-making details documented in ED Course.  Discussion of management or test interpretation with external provider(s): Details documented in the ED course.    Risk  Prescription drug management.  Decision regarding hospitalization.      Edlabs  No orders to display   D    .[email or web address]      Final diagnoses:   Acute kidney injury   Hyperkalemia       ED Disposition  ED Disposition       ED Disposition   Decision to Admit    Condition   --    Comment   Level of Care: Telemetry [5]   Diagnosis: Hyperkalemia [551207]   Admitting Physician: GUIDO LENZ [[id number]]   Bed Request Comments: Acute kidney injury with hyperkalemia                 No follow-up provider specified.       Medication List        ASK your doctor about these medications      LORazepam 0.5 MG tablet  Commonly known as: ATIVAN  Take 1 tablet by mouth Every 6 (Six) Hours As Needed for Anxiety.  Ask about: Which instructions should I use?                 Curt Gutierrez MD  07/27/23 1407

## 2023-07-28 ENCOUNTER — READMISSION MANAGEMENT (OUTPATIENT)
Dept: CALL CENTER | Facility: HOSPITAL | Age: 83
End: 2023-07-28
Payer: MEDICARE

## 2023-07-28 VITALS
BODY MASS INDEX: 31.84 KG/M2 | WEIGHT: 162.2 LBS | OXYGEN SATURATION: 90 % | HEIGHT: 60 IN | HEART RATE: 85 BPM | DIASTOLIC BLOOD PRESSURE: 68 MMHG | SYSTOLIC BLOOD PRESSURE: 136 MMHG | TEMPERATURE: 96.6 F | RESPIRATION RATE: 18 BRPM

## 2023-07-28 LAB
ANION GAP SERPL CALCULATED.3IONS-SCNC: 10 MMOL/L (ref 5–15)
BUN SERPL-MCNC: 28 MG/DL (ref 8–23)
BUN/CREAT SERPL: 23.9 (ref 7–25)
CALCIUM SPEC-SCNC: 9.1 MG/DL (ref 8.6–10.5)
CHLORIDE SERPL-SCNC: 104 MMOL/L (ref 98–107)
CO2 SERPL-SCNC: 24 MMOL/L (ref 22–29)
CREAT SERPL-MCNC: 1.17 MG/DL (ref 0.57–1)
EGFRCR SERPLBLD CKD-EPI 2021: 46.7 ML/MIN/1.73
GLUCOSE SERPL-MCNC: 90 MG/DL (ref 65–99)
POTASSIUM SERPL-SCNC: 5.1 MMOL/L (ref 3.5–5.2)
QT INTERVAL: 430 MS
SODIUM SERPL-SCNC: 138 MMOL/L (ref 136–145)

## 2023-07-28 PROCEDURE — 96361 HYDRATE IV INFUSION ADD-ON: CPT

## 2023-07-28 PROCEDURE — G0378 HOSPITAL OBSERVATION PER HR: HCPCS

## 2023-07-28 PROCEDURE — 80048 BASIC METABOLIC PNL TOTAL CA: CPT | Performed by: INTERNAL MEDICINE

## 2023-07-28 RX ADMIN — LEVETIRACETAM 500 MG: 500 TABLET, FILM COATED ORAL at 10:23

## 2023-07-28 RX ADMIN — AMLODIPINE BESYLATE 5 MG: 5 TABLET ORAL at 10:20

## 2023-07-28 RX ADMIN — LAMOTRIGINE 200 MG: 100 TABLET ORAL at 10:20

## 2023-07-28 RX ADMIN — SODIUM CHLORIDE 125 ML/HR: 4.5 INJECTION, SOLUTION INTRAVENOUS at 04:11

## 2023-07-28 RX ADMIN — ATORVASTATIN CALCIUM 20 MG: 20 TABLET, FILM COATED ORAL at 10:20

## 2023-07-28 NOTE — CASE MANAGEMENT/SOCIAL WORK
Continued Stay Note  MILANA Medel     Patient Name: Erica Crockett  MRN: [id number]  Today's Date: 7/28/2023    Admit Date: 7/27/2023    Plan: Return to [facility] via EMS   Discharge Plan       Row Name 07/28/23 0944       Plan    Plan Return to [facility] via EMS    Plan Comments CCP rec'd call from Ashley at [facility], she will be caring for pt.  CCP introduced self, role and we discussed dc plan.  Ashley asked dc summary be faxed to [phone]/faxed by me.  Also asked if pt can return via EMS.  RN will need to call report to [phone]/ext [phone].  Per Ashley pt is under Palliative Care mostly for pain meds.  CCP will follow. Viktoria Barone      Row Name 07/28/23 0932       Plan    Plan return to [facility]    Plan Comments CCP called pts roxy Petersen, left  asking for return call.  CCP called [facility] [phone] left  for Daina to CB re: dc plan for pt.  CCP will follow. Viktoria Barone                   Discharge Codes    No documentation.                 Expected Discharge Date and Time       Expected Discharge Date Expected Discharge Time    Jul 28, 2023               Viktoria Aviles

## 2023-07-28 NOTE — PLAN OF CARE
Goal Outcome Evaluation:  Plan of Care Reviewed With: other (see comments)        Progress: improving  Outcome Evaluation: VS stable, on RA. Pt Non-verbal. Potassium 5.1 this morning down from 6.7.  IV fluids. Pt rested well through out night, update given to [facility].

## 2023-07-28 NOTE — DISCHARGE SUMMARY
Date of Admission: 7/27/2023    Date of Discharge:  7/28/2023    Length of stay:  LOS: 0 days     Presenting Problem:   Hyperkalemia [E87.5]  Acute kidney injury [N17.9]      Active Diagnosis During Hospital Stay/Discharge Diagnoses/Course by Diagnoses:           Acute hyperkalemia  Acute renal insufficiency  -baseline Cr which appears to be 1.1-1.3, 1.1 at discharge  -K returned to normal        GERD  -continue home PPI     Chronic pain  -continue home Norco and gabapentin     Seizure disorder  -continue Lamictal and Keppra, and as needed Ativan     Chronic constipation  -continue Dulcolax     Not oxygen dependent COPD  -continue DuoNebs as needed    Hospital Course  Patient is a 82 y.o. female presented with hyperkelamia acute renal insufficiency.  She was admitted overnight with IV fluids her potassium returned to normal who renal function returned back to her baseline.  She seemed to be back to her baseline mental status and was felt able to return to [facility].        Procedures Performed:none         Consults:   Consults       No orders found for last 30 day(s).            Pertinent Test Results:           Invalid input(s): NEUTOPHILPCT,  EOSPCT  Results from last 7 days   Lab Units 07/28/23  0537 07/27/23  1700 07/27/23  1300   SODIUM mmol/L 138 140 139   POTASSIUM mmol/L 5.1 6.7* 6.4*   CHLORIDE mmol/L 104 106 107   CO2 mmol/L 24.0 26.4 25.2   BUN mg/dL 28* 39* 36*   CREATININE mg/dL 1.17* 1.65* 1.31*   CALCIUM mg/dL 9.1 10.3 10.0   GLUCOSE mg/dL 90 115* 98       Microbiology Results (last 10 days)       ** No results found for the last 240 hours. **            Results for orders placed during the hospital encounter of 12/23/22    Adult transthoracic echo complete    Interpretation Summary    Left ventricular systolic function is normal. Calculated left ventricular EF = 64.2%    Left ventricular diastolic function was normal.    Saline test results are negative.    There is calcification of the  aortic valve.    Aortic valve area is 2.1 cm2.    Aortic valve maximum pressure gradient is 13 mmHg. Aortic valve mean pressure gradient is 8 mmHg.      Imaging Results (All)       None              Condition on Discharge:  stable at baseline    Vital Signs  Temp:  [94.4 °F (34.7 °C)-97.4 °F (36.3 °C)] 96.6 °F (35.9 °C)  Heart Rate:  [60-85] 85  Resp:  [18-20] 18  BP: (120-181)/(68-86) 136/68    Physical Exam:  Physical Exam  Vitals reviewed.   Cardiovascular:      Rate and Rhythm: Normal rate.   Pulmonary:      Effort: No respiratory distress.   Abdominal:      General: There is no distension.   Neurological:      Mental Status: She is alert. Mental status is at baseline.       Emotional Behavior:       Depression  none               Anxiety  non    Debilities:     Agitation  none    Discharge Disposition  Home or Self Care    Discharge Medications     Discharge Medications        Changes to Medications        Instructions Start Date   amLODIPine 5 MG tablet  Commonly known as: NORVASC  What changed: Another medication with the same name was removed. Continue taking this medication, and follow the directions you see here.   5 mg, Oral, Daily             Continue These Medications        Instructions Start Date   acetaminophen 650 MG 8 hr tablet  Commonly known as: TYLENOL   650 mg, Oral, Every 6 Hours PRN      AQUAPHOR LIP REPAIR EX   1 application , Apply externally, 4 Times Daily PRN      eucerin cream   1 application , Topical, As Needed      atorvastatin 20 MG tablet  Commonly known as: LIPITOR   20 mg, Oral, Daily      BENEFIBER DRINK MIX PO   1 packet, Oral, Daily      Biofreeze Roll-On 4 % gel  Generic drug: Menthol (Topical Analgesic)   1 application , Apply externally, Daily, For hips      bisacodyl 5 MG EC tablet  Commonly known as: DULCOLAX   10 mg, Oral, Daily PRN      bisacodyl 10 MG suppository  Commonly known as: DULCOLAX   10 mg, Rectal, Daily PRN      cetirizine 10 MG tablet  Commonly known as:  zyrTEC   10 mg, Oral, Daily      cholecalciferol 25 MCG (1000 UT) tablet  Commonly known as: VITAMIN D3   1,000 Units, Oral, Daily      dextromethorphan-guaifenesin  MG/5ML syrup  Commonly known as: ROBITUSSIN-DM   10 mL, Oral, Every 4 Hours PRN      Diazepam 10MG rectal suppository  Commonly known as: Valium   20 mg, Rectal, As Needed      diphenhydrAMINE 25 mg capsule  Commonly known as: BENADRYL   25 mg, Oral, Every 6 Hours PRN      fleet enema 7-19 GM/118ML enema   1 enema, Rectal, Once As Needed      gabapentin 300 MG capsule  Commonly known as: NEURONTIN   300 mg, Oral, Nightly      HYDROcodone-acetaminophen 7.5-325 MG per tablet  Commonly known as: NORCO   1 tablet, Oral, Every 8 Hours PRN      ipratropium-albuterol 0.5-2.5 mg/3 ml nebulizer  Commonly known as: DUO-NEB   3 mL, Nebulization, 4 Times Daily PRN      lamoTRIgine 200 MG tablet  Commonly known as: LaMICtal   200 mg, Oral, 2 Times Daily      levETIRAcetam 500 MG tablet  Commonly known as: KEPPRA   500 mg, Oral, 2 Times Daily      loperamide 2 MG capsule  Commonly known as: IMODIUM   2 mg, Oral, 4 Times Daily PRN      LORazepam 0.5 MG tablet  Commonly known as: ATIVAN   0.5 mg, Oral, Every 6 Hours PRN      lubiprostone 8 MCG capsule  Commonly known as: AMITIZA   8 mcg, Oral, 2 Times Daily With Meals      MiraLax 17 g packet  Generic drug: polyethylene glycol   17 g, Oral, Daily      multivitamin with minerals tablet tablet   1 tablet, Oral, Daily      nystatin-triamcinolone 683765-5.1 UNIT/GM-% cream  Commonly known as: MYCOLOG II   1 application , Topical, 2 Times Daily      omeprazole 20 MG capsule  Commonly known as: priLOSEC   40 mg, Oral, Daily      Polyvinyl Alcohol-Povidone PF 1.4-0.6 % ophthalmic solution  Commonly known as: HYPOTEARS   1 drop, Both Eyes, 4 Times Daily      Prolia 60 MG/ML solution prefilled syringe syringe  Generic drug: denosumab   60 mg, Subcutaneous, Every 6 Months      promethazine 25 MG suppository  Commonly known  as: PHENERGAN   25 mg, Rectal, Every 6 Hours PRN      Risaquad-2 capsule capsule   2 capsules, Oral, Daily      SILVASORB ANTIMICROBIAL SHEET EX   1 application , Apply externally, As Needed      simethicone 80 MG chewable tablet  Commonly known as: MYLICON   80 mg, Oral, Every 6 Hours PRN      Urea 40 % lotion   1 application , Apply externally, 2 Times Daily PRN               Discharge Diet:   Diet Instructions       Advance Diet As Tolerated -Target Diet: advance back to diet at LT      Target Diet: advance back to diet at LT            Activity at Discharge:     Follow-up Appointments  Future Appointments   Date Time Provider Department Center   8/15/2023 11:00 AM [facility]  1 [facility]   10/17/2023  2:20 PM Alex Peralta II, MD [facility]     Additional Instructions for the Follow-ups that You Need to Schedule       Discharge Follow-up with PCP   As directed       Currently Documented PCP:    Tulio Carlson MD    PCP Phone Number:    [phone]     Follow Up Details: one week                Test Results Pending at Discharge  Pending Labs       Order Current Status    [place] Draw In process             Risk for Readmission (LACE) Score: 6 (7/28/2023  6:00 AM)      Time: Discharge 34 min with face-to-face history exam, writing of prescriptions, and documenting discharge data including care coordination with the nursing staff.      Electronically signed by Hector Cardenas DO, 07/28/23, 09:23 EDT.

## 2023-07-28 NOTE — OUTREACH NOTE
Prep Survey      Flowsheet Row Responses   [facility] facility patient discharged from? [place]   Is LACE score < 7 ? Yes   Eligibility Not Eligible   What are the reasons patient is not eligible? Other  [[facility]]   Does the patient have one of the following disease processes/diagnoses(primary or secondary)? Other   Prep survey completed? Yes            Shamika PALMER - Registered Nurse

## 2023-07-28 NOTE — DISCHARGE PLACEMENT REQUEST
"Erica Pryor (82 y.o. Female)       Date of Birth   1940    Social Security Number       Address   [address] [facility] [address]    Home Phone   [phone]    MRN   [id number]       Confucianist   None    Marital Status   Single                            Admission Date   7/27/23    Admission Type   Emergency    Admitting Provider   Carmelo Van MD    Attending Provider   Carmelo Van MD    Department, Room/Bed   [facility], 1409/1       Discharge Date       Discharge Disposition   Home or Self Care    Discharge Destination                                 Attending Provider: Carmelo Van MD    Allergies: Bactrim [Sulfamethoxazole-trimethoprim], Bee Venom, Iodinated Contrast Media, Nsaids    Isolation: None   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 [id number]         Subscriber Name Subscriber Birth Date Member ID       ERICA PRYOR 1940 [id number]               Secondary Coverage       Payor Plan Insurance Group Employer/Plan Group    [place]        Payor Plan Address Payor Plan Phone Number Payor Plan Fax Number Effective Dates    [address] [phone]  2/14/2016 - None Entered    [place] [address]         Subscriber Name Subscriber Birth Date Member ID       ERICA PRYOR 1940 [id number]                     Emergency Contacts        (Rel.) Home Phone Work Phone Mobile Phone    Nicola Kapadia (nephew) (Legal Guardian) [phone] -- --                "

## 2023-07-28 NOTE — CASE MANAGEMENT/SOCIAL WORK
Continued Stay Note  MILANA Medel     Patient Name: Erica Crockett  MRN: [id number]  Today's Date: 7/28/2023    Admit Date: 7/27/2023    Plan: return to [facility]   Discharge Plan       Row Name 07/28/23 0932       Plan    Plan return to [facility]    Plan Comments CCP called pts nepheleni Nicola, left  asking for return call.  CCP called [facility] [phone] left  for Daina to  re: dc plan for pt.  CCP will follow. Viktoria Barone                   Discharge Codes    No documentation.                 Expected Discharge Date and Time       Expected Discharge Date Expected Discharge Time    Jul 28, 2023               Viktoria Aviles

## 2023-07-29 ENCOUNTER — LAB REQUISITION (OUTPATIENT)
Dept: LAB | Facility: HOSPITAL | Age: 83
End: 2023-07-29
Payer: MEDICARE

## 2023-07-29 DIAGNOSIS — N18.30 CHRONIC KIDNEY DISEASE, STAGE 3 UNSPECIFIED: ICD-10-CM

## 2023-07-29 LAB
ANION GAP SERPL CALCULATED.3IONS-SCNC: 9.1 MMOL/L (ref 5–15)
BUN SERPL-MCNC: 30 MG/DL (ref 8–23)
BUN/CREAT SERPL: 22.6 (ref 7–25)
CALCIUM SPEC-SCNC: 9.4 MG/DL (ref 8.6–10.5)
CHLORIDE SERPL-SCNC: 110 MMOL/L (ref 98–107)
CO2 SERPL-SCNC: 21.9 MMOL/L (ref 22–29)
CREAT SERPL-MCNC: 1.33 MG/DL (ref 0.57–1)
EGFRCR SERPLBLD CKD-EPI 2021: 40 ML/MIN/1.73
GLUCOSE SERPL-MCNC: 97 MG/DL (ref 65–99)
POTASSIUM SERPL-SCNC: 4.7 MMOL/L (ref 3.5–5.2)
SODIUM SERPL-SCNC: 141 MMOL/L (ref 136–145)

## 2023-07-29 PROCEDURE — 80048 BASIC METABOLIC PNL TOTAL CA: CPT | Performed by: FAMILY MEDICINE

## 2023-07-31 NOTE — CASE MANAGEMENT/SOCIAL WORK
Case Management Discharge Note      Final Note: Discharged to [facility]         Selected Continued Care - Discharged on 7/28/2023 Admission date: 7/27/2023 - Discharge disposition: Home or Self Care      Destination Coordination complete.      Service Provider Selected Services Address Phone Fax Patient Preferred    [facility] [address] [address] [phone] --              Durable Medical Equipment    No services have been selected for the patient.                Dialysis/Infusion    No services have been selected for the patient.                Home Medical Care    No services have been selected for the patient.                Therapy    No services have been selected for the patient.                Community Resources    No services have been selected for the patient.                Community & DME    No services have been selected for the patient.                         Final Discharge Disposition Code: 04 - intermediate care facility

## 2023-08-03 ENCOUNTER — LAB REQUISITION (OUTPATIENT)
Dept: LAB | Facility: HOSPITAL | Age: 83
End: 2023-08-03
Payer: MEDICARE

## 2023-08-03 DIAGNOSIS — E87.5 HYPERKALEMIA: ICD-10-CM

## 2023-08-03 DIAGNOSIS — N18.30 CHRONIC KIDNEY DISEASE, STAGE 3 UNSPECIFIED: ICD-10-CM

## 2023-08-03 LAB
ANION GAP SERPL CALCULATED.3IONS-SCNC: 11.2 MMOL/L (ref 5–15)
BUN SERPL-MCNC: 34 MG/DL (ref 8–23)
BUN/CREAT SERPL: 23.4 (ref 7–25)
CALCIUM SPEC-SCNC: 9.9 MG/DL (ref 8.6–10.5)
CHLORIDE SERPL-SCNC: 101 MMOL/L (ref 98–107)
CO2 SERPL-SCNC: 26.8 MMOL/L (ref 22–29)
CREAT SERPL-MCNC: 1.45 MG/DL (ref 0.57–1)
EGFRCR SERPLBLD CKD-EPI 2021: 36.1 ML/MIN/1.73
GLUCOSE SERPL-MCNC: 98 MG/DL (ref 65–99)
POTASSIUM SERPL-SCNC: 3.8 MMOL/L (ref 3.5–5.2)
SODIUM SERPL-SCNC: 139 MMOL/L (ref 136–145)

## 2023-08-03 PROCEDURE — 80048 BASIC METABOLIC PNL TOTAL CA: CPT | Performed by: FAMILY MEDICINE

## 2023-08-18 ENCOUNTER — HOSPITAL ENCOUNTER (OUTPATIENT)
Dept: ULTRASOUND IMAGING | Facility: HOSPITAL | Age: 83
Discharge: HOME | End: 2023-08-18
Admitting: OTOLARYNGOLOGY
Payer: MEDICARE

## 2023-08-18 DIAGNOSIS — E04.1 NONTOXIC UNINODULAR GOITER: ICD-10-CM

## 2023-08-18 PROCEDURE — 76536 US EXAM OF HEAD AND NECK: CPT

## 2023-09-16 ENCOUNTER — LAB REQUISITION (OUTPATIENT)
Dept: LAB | Facility: HOSPITAL | Age: 83
End: 2023-09-16
Payer: MEDICARE

## 2023-09-16 DIAGNOSIS — N39.0 URINARY TRACT INFECTION, SITE NOT SPECIFIED: ICD-10-CM

## 2023-09-16 PROCEDURE — 87186 SC STD MICRODIL/AGAR DIL: CPT | Performed by: FAMILY MEDICINE

## 2023-09-16 PROCEDURE — 81001 URINALYSIS AUTO W/SCOPE: CPT | Performed by: FAMILY MEDICINE

## 2023-09-16 PROCEDURE — 87086 URINE CULTURE/COLONY COUNT: CPT | Performed by: FAMILY MEDICINE

## 2023-09-16 PROCEDURE — 87088 URINE BACTERIA CULTURE: CPT | Performed by: FAMILY MEDICINE

## 2023-09-17 LAB
BACTERIA UR QL AUTO: ABNORMAL /HPF
BILIRUB UR QL STRIP: NEGATIVE
CLARITY UR: ABNORMAL
COLOR UR: YELLOW
GLUCOSE UR STRIP-MCNC: NEGATIVE MG/DL
HGB UR QL STRIP.AUTO: ABNORMAL
HYALINE CASTS UR QL AUTO: ABNORMAL /LPF
KETONES UR QL STRIP: NEGATIVE
LEUKOCYTE ESTERASE UR QL STRIP.AUTO: ABNORMAL
NITRITE UR QL STRIP: POSITIVE
PH UR STRIP.AUTO: 6.5 [PH] (ref 4.5–8)
PROT UR QL STRIP: ABNORMAL
RBC # UR STRIP: ABNORMAL /HPF
REF LAB TEST METHOD: ABNORMAL
SP GR UR STRIP: 1.02 (ref 1–1.03)
SQUAMOUS #/AREA URNS HPF: ABNORMAL /HPF
UROBILINOGEN UR QL STRIP: ABNORMAL
WBC # UR STRIP: ABNORMAL /HPF

## 2023-09-19 LAB — BACTERIA SPEC AEROBE CULT: ABNORMAL

## 2023-09-26 ENCOUNTER — TELEPHONE (OUTPATIENT)
Dept: NEUROLOGY | Facility: CLINIC | Age: 83
End: 2023-09-26
Payer: MEDICARE

## 2023-09-26 NOTE — TELEPHONE ENCOUNTER
SPOKE WITH YURIDIA AT [place] AND THIS PT IS STILL ON FOR A VV IN OCTOBER WITH DR MAGUIRE

## 2023-10-17 ENCOUNTER — TELEMEDICINE (OUTPATIENT)
Dept: NEUROLOGY | Facility: CLINIC | Age: 83
End: 2023-10-17
Payer: MEDICARE

## 2023-10-17 DIAGNOSIS — G40.909 NON-REFRACTORY EPILEPSY: Primary | ICD-10-CM

## 2023-10-17 DIAGNOSIS — F88 DEVELOPMENTAL MENTAL DISORDER: ICD-10-CM

## 2023-10-17 PROCEDURE — 1159F MED LIST DOCD IN RCRD: CPT | Performed by: PSYCHIATRY & NEUROLOGY

## 2023-10-17 PROCEDURE — 99212 OFFICE O/P EST SF 10 MIN: CPT | Performed by: PSYCHIATRY & NEUROLOGY

## 2023-10-17 PROCEDURE — 1160F RVW MEDS BY RX/DR IN RCRD: CPT | Performed by: PSYCHIATRY & NEUROLOGY

## 2023-10-17 NOTE — PROGRESS NOTES
Chief complaint: History of seizures    Patient ID: Erica Crockett is a 83 y.o. female.    HPI:  This was an audio and video enabled telemedicine encounter.  The patient's caregiver did consent to this type of visit.  I am at the neurology clinic and they are at the patient's place of residence.  Over the pleasure of seeing the patient today.  As you may know she is an 83-year-old female being seen for the management of seizures.  She has had 2 seizures since last follow-up.  1 in August 1 in September.  They only lasted a few seconds.  She was not given Diastat.  She has been taking Keppra and Lamictal.  There have been no missed doses.  They do not feel that she was experiencing any new medical issues or infections with no seizures occurred.    The following portions of the patient's history were reviewed and updated as appropriate: allergies, current medications, past family history, past medical history, past social history, past surgical history and problem list.    Review of Systems   I have reviewed the review of systems above performed by my medical assistant.      There were no vitals filed for this visit.    Neurologic Exam    Physical Exam    Procedures    Assessment/Plan: We are going to continue the Lamictal and Keppra for now.  I did ask for them to check a Lamictal level today.  We will see her in 6 months or sooner if needed.  A total of 15 minutes was spent face-to-face with the patient today.  Of that greater than 50% of this time was spent discussing signs and symptoms of seizures, patient education, plan of care and prognosis.         Diagnoses and all orders for this visit:    1. Non-refractory epilepsy (Primary)    2. Developmental mental disorder           Alex Peralta II, MD

## 2023-10-18 ENCOUNTER — LAB REQUISITION (OUTPATIENT)
Dept: LAB | Facility: HOSPITAL | Age: 83
End: 2023-10-18
Payer: MEDICARE

## 2023-10-18 DIAGNOSIS — Z79.899 OTHER LONG TERM (CURRENT) DRUG THERAPY: ICD-10-CM

## 2023-10-18 PROCEDURE — 80175 DRUG SCREEN QUAN LAMOTRIGINE: CPT | Performed by: FAMILY MEDICINE

## 2023-10-20 LAB — LAMOTRIGINE SERPL-MCNC: 7.9 UG/ML (ref 2–20)

## 2024-01-24 ENCOUNTER — LAB REQUISITION (OUTPATIENT)
Dept: LAB | Facility: HOSPITAL | Age: 84
End: 2024-01-24
Payer: MEDICARE

## 2024-01-24 DIAGNOSIS — G40.89 OTHER SEIZURES: ICD-10-CM

## 2024-01-24 DIAGNOSIS — Z79.899 OTHER LONG TERM (CURRENT) DRUG THERAPY: ICD-10-CM

## 2024-01-24 PROCEDURE — 80175 DRUG SCREEN QUAN LAMOTRIGINE: CPT | Performed by: FAMILY MEDICINE

## 2024-01-24 PROCEDURE — 80177 DRUG SCRN QUAN LEVETIRACETAM: CPT | Performed by: FAMILY MEDICINE

## 2024-01-26 LAB
LAMOTRIGINE SERPL-MCNC: 7.1 UG/ML (ref 2–20)
LEVETIRACETAM SERPL-MCNC: 31.8 UG/ML (ref 10–40)

## 2024-02-29 ENCOUNTER — APPOINTMENT (OUTPATIENT)
Dept: GENERAL RADIOLOGY | Facility: HOSPITAL | Age: 84
End: 2024-02-29
Payer: MEDICARE

## 2024-02-29 ENCOUNTER — HOSPITAL ENCOUNTER (EMERGENCY)
Facility: HOSPITAL | Age: 84
Discharge: NURSING, REHAB OR LONG-TERM CARE | End: 2024-02-29
Attending: EMERGENCY MEDICINE | Admitting: EMERGENCY MEDICINE
Payer: MEDICARE

## 2024-02-29 VITALS
OXYGEN SATURATION: 93 % | TEMPERATURE: 97.3 F | HEART RATE: 84 BPM | RESPIRATION RATE: 24 BRPM | DIASTOLIC BLOOD PRESSURE: 64 MMHG | SYSTOLIC BLOOD PRESSURE: 126 MMHG

## 2024-02-29 DIAGNOSIS — J10.1 INFLUENZA A: Primary | ICD-10-CM

## 2024-02-29 DIAGNOSIS — J40 BRONCHITIS: ICD-10-CM

## 2024-02-29 LAB
ALBUMIN SERPL-MCNC: 3.4 G/DL (ref 3.5–5.2)
ALBUMIN/GLOB SERPL: 0.9 G/DL
ALP SERPL-CCNC: 83 U/L (ref 39–117)
ALT SERPL W P-5'-P-CCNC: 25 U/L (ref 1–33)
ANION GAP SERPL CALCULATED.3IONS-SCNC: 9 MMOL/L (ref 5–15)
AST SERPL-CCNC: 35 U/L (ref 1–32)
BASOPHILS # BLD AUTO: 0.02 10*3/MM3 (ref 0–0.2)
BASOPHILS NFR BLD AUTO: 0.2 % (ref 0–1.5)
BILIRUB SERPL-MCNC: 0.2 MG/DL (ref 0–1.2)
BUN SERPL-MCNC: 47 MG/DL (ref 8–23)
BUN/CREAT SERPL: 34.8 (ref 7–25)
CALCIUM SPEC-SCNC: 9.9 MG/DL (ref 8.6–10.5)
CHLORIDE SERPL-SCNC: 113 MMOL/L (ref 98–107)
CO2 SERPL-SCNC: 29 MMOL/L (ref 22–29)
CREAT SERPL-MCNC: 1.35 MG/DL (ref 0.57–1)
DEPRECATED RDW RBC AUTO: 41.6 FL (ref 37–54)
EGFRCR SERPLBLD CKD-EPI 2021: 39.1 ML/MIN/1.73
EOSINOPHIL # BLD AUTO: 0.15 10*3/MM3 (ref 0–0.4)
EOSINOPHIL NFR BLD AUTO: 1.5 % (ref 0.3–6.2)
ERYTHROCYTE [DISTWIDTH] IN BLOOD BY AUTOMATED COUNT: 12.4 % (ref 12.3–15.4)
FLUAV RNA RESP QL NAA+PROBE: DETECTED
FLUBV RNA RESP QL NAA+PROBE: NOT DETECTED
GEN 5 2HR TROPONIN T REFLEX: 22 NG/L
GLOBULIN UR ELPH-MCNC: 3.8 GM/DL
GLUCOSE SERPL-MCNC: 111 MG/DL (ref 65–99)
HCT VFR BLD AUTO: 33 % (ref 34–46.6)
HGB BLD-MCNC: 10.4 G/DL (ref 12–15.9)
IMM GRANULOCYTES # BLD AUTO: 0.05 10*3/MM3 (ref 0–0.05)
IMM GRANULOCYTES NFR BLD AUTO: 0.5 % (ref 0–0.5)
LYMPHOCYTES # BLD AUTO: 3.51 10*3/MM3 (ref 0.7–3.1)
LYMPHOCYTES NFR BLD AUTO: 35.3 % (ref 19.6–45.3)
MCH RBC QN AUTO: 28.9 PG (ref 26.6–33)
MCHC RBC AUTO-ENTMCNC: 31.5 G/DL (ref 31.5–35.7)
MCV RBC AUTO: 91.7 FL (ref 79–97)
MONOCYTES # BLD AUTO: 0.7 10*3/MM3 (ref 0.1–0.9)
MONOCYTES NFR BLD AUTO: 7 % (ref 5–12)
NEUTROPHILS NFR BLD AUTO: 5.51 10*3/MM3 (ref 1.7–7)
NEUTROPHILS NFR BLD AUTO: 55.5 % (ref 42.7–76)
NRBC BLD AUTO-RTO: 0 /100 WBC (ref 0–0.2)
NT-PROBNP SERPL-MCNC: 717 PG/ML (ref 0–1800)
PLATELET # BLD AUTO: 290 10*3/MM3 (ref 140–450)
PMV BLD AUTO: 10.6 FL (ref 6–12)
POTASSIUM SERPL-SCNC: 4.5 MMOL/L (ref 3.5–5.2)
PROT SERPL-MCNC: 7.2 G/DL (ref 6–8.5)
QT INTERVAL: 399 MS
QTC INTERVAL: 452 MS
RBC # BLD AUTO: 3.6 10*6/MM3 (ref 3.77–5.28)
RSV RNA RESP QL NAA+PROBE: NOT DETECTED
SARS-COV-2 RNA RESP QL NAA+PROBE: NOT DETECTED
SODIUM SERPL-SCNC: 151 MMOL/L (ref 136–145)
TROPONIN T DELTA: -4 NG/L
TROPONIN T SERPL HS-MCNC: 26 NG/L
WBC NRBC COR # BLD AUTO: 9.94 10*3/MM3 (ref 3.4–10.8)

## 2024-02-29 PROCEDURE — 84484 ASSAY OF TROPONIN QUANT: CPT | Performed by: PHYSICIAN ASSISTANT

## 2024-02-29 PROCEDURE — 94640 AIRWAY INHALATION TREATMENT: CPT

## 2024-02-29 PROCEDURE — 93010 ELECTROCARDIOGRAM REPORT: CPT | Performed by: INTERNAL MEDICINE

## 2024-02-29 PROCEDURE — 93005 ELECTROCARDIOGRAM TRACING: CPT | Performed by: PHYSICIAN ASSISTANT

## 2024-02-29 PROCEDURE — 71045 X-RAY EXAM CHEST 1 VIEW: CPT

## 2024-02-29 PROCEDURE — 99284 EMERGENCY DEPT VISIT MOD MDM: CPT

## 2024-02-29 PROCEDURE — 83880 ASSAY OF NATRIURETIC PEPTIDE: CPT | Performed by: PHYSICIAN ASSISTANT

## 2024-02-29 PROCEDURE — 80053 COMPREHEN METABOLIC PANEL: CPT | Performed by: PHYSICIAN ASSISTANT

## 2024-02-29 PROCEDURE — 94799 UNLISTED PULMONARY SVC/PX: CPT

## 2024-02-29 PROCEDURE — 25810000003 SODIUM CHLORIDE 0.9 % SOLUTION: Performed by: PHYSICIAN ASSISTANT

## 2024-02-29 PROCEDURE — 87637 SARSCOV2&INF A&B&RSV AMP PRB: CPT | Performed by: PHYSICIAN ASSISTANT

## 2024-02-29 PROCEDURE — 85025 COMPLETE CBC W/AUTO DIFF WBC: CPT | Performed by: PHYSICIAN ASSISTANT

## 2024-02-29 PROCEDURE — 36415 COLL VENOUS BLD VENIPUNCTURE: CPT

## 2024-02-29 PROCEDURE — 94761 N-INVAS EAR/PLS OXIMETRY MLT: CPT

## 2024-02-29 PROCEDURE — 94664 DEMO&/EVAL PT USE INHALER: CPT

## 2024-02-29 RX ORDER — IBUPROFEN 200 MG
1 TABLET ORAL DAILY
COMMUNITY

## 2024-02-29 RX ORDER — IPRATROPIUM BROMIDE AND ALBUTEROL SULFATE 2.5; .5 MG/3ML; MG/3ML
3 SOLUTION RESPIRATORY (INHALATION) ONCE
Status: COMPLETED | OUTPATIENT
Start: 2024-02-29 | End: 2024-02-29

## 2024-02-29 RX ORDER — VITAMINS A AND D OINTMENT 15.5; 53.4 G/100G; G/100G
OINTMENT TOPICAL 2 TIMES DAILY
COMMUNITY

## 2024-02-29 RX ORDER — EPINEPHRINE 0.3 MG/.3ML
0.3 INJECTION SUBCUTANEOUS ONCE AS NEEDED
COMMUNITY
Start: 2023-11-02

## 2024-02-29 RX ORDER — ACETAMINOPHEN 325 MG/1
650 TABLET ORAL EVERY 6 HOURS PRN
COMMUNITY

## 2024-02-29 RX ORDER — GLYCERIN, LIDOCAINE 144; 50 MG/G; MG/G
1 CREAM TOPICAL 2 TIMES DAILY PRN
COMMUNITY

## 2024-02-29 RX ORDER — PETROLATUM,WHITE
1 OINTMENT IN PACKET (GRAM) TOPICAL AS NEEDED
COMMUNITY

## 2024-02-29 RX ORDER — SODIUM CHLORIDE 0.9 % (FLUSH) 0.9 %
10 SYRINGE (ML) INJECTION AS NEEDED
Status: DISCONTINUED | OUTPATIENT
Start: 2024-02-29 | End: 2024-02-29 | Stop reason: HOSPADM

## 2024-02-29 RX ORDER — PREDNISONE 20 MG/1
20 TABLET ORAL 2 TIMES DAILY
Qty: 10 TABLET | Refills: 0 | Status: SHIPPED | OUTPATIENT
Start: 2024-02-29

## 2024-02-29 RX ORDER — HYDROCODONE BITARTRATE AND ACETAMINOPHEN 7.5; 325 MG/1; MG/1
1 TABLET ORAL ONCE
Status: COMPLETED | OUTPATIENT
Start: 2024-02-29 | End: 2024-02-29

## 2024-02-29 RX ORDER — ECHINACEA PURPUREA EXTRACT 125 MG
2 TABLET ORAL AS NEEDED
COMMUNITY

## 2024-02-29 RX ORDER — IPRATROPIUM BROMIDE AND ALBUTEROL SULFATE 2.5; .5 MG/3ML; MG/3ML
3 SOLUTION RESPIRATORY (INHALATION) EVERY 4 HOURS PRN
Qty: 360 ML | Refills: 0 | Status: SHIPPED | OUTPATIENT
Start: 2024-02-29

## 2024-02-29 RX ORDER — SODIUM ZIRCONIUM CYCLOSILICATE 10 G/10G
10 POWDER, FOR SUSPENSION ORAL DAILY
COMMUNITY

## 2024-02-29 RX ORDER — OSELTAMIVIR PHOSPHATE 30 MG/1
30 CAPSULE ORAL EVERY 12 HOURS SCHEDULED
COMMUNITY

## 2024-02-29 RX ORDER — LORAZEPAM 0.5 MG/1
0.5 TABLET ORAL ONCE
Status: COMPLETED | OUTPATIENT
Start: 2024-02-29 | End: 2024-02-29

## 2024-02-29 RX ADMIN — SODIUM CHLORIDE 500 ML: 9 INJECTION, SOLUTION INTRAVENOUS at 07:43

## 2024-02-29 RX ADMIN — SODIUM CHLORIDE 500 ML: 9 INJECTION, SOLUTION INTRAVENOUS at 09:20

## 2024-02-29 RX ADMIN — LORAZEPAM 0.5 MG: 0.5 TABLET ORAL at 10:03

## 2024-02-29 RX ADMIN — HYDROCODONE BITARTRATE AND ACETAMINOPHEN 1 TABLET: 7.5; 325 TABLET ORAL at 10:04

## 2024-02-29 RX ADMIN — IPRATROPIUM BROMIDE AND ALBUTEROL SULFATE 3 ML: .5; 3 SOLUTION RESPIRATORY (INHALATION) at 08:38

## 2024-02-29 NOTE — ED PROVIDER NOTES
SHARED VISIT: This visit was performed by BOTH a physician and an APC. The substantive portion of the medical decision making was performed by this attesting physician who made or approved the management plan and takes responsibility for patient management. All studies in the APC note (if performed) were independently interpreted by me.      The URIEL and I have discussed this patient's history, physical exam, and treatment plan.  I have reviewed the documentation and personally had a face to face interaction with the patient. I affirm the documentation and agree with the treatment and plan.  The attached note describes my personal findings.       I provided a substantive portion of the care of the patient.  I personally performed the physical exam in its entirety, and below are my findings.        History  83-year-old female with significant intellectual disability brought with reported low O2 saturations and occasional cough.  Here in the ED patient does not have specific complaints.    Physical Exam  Vital Signs reviewed  GENERAL: Alert female in no obvious distress.  Triage vitals reviewed notable for O2 sats in the mid 90s on 2 L nasal cannula.  Temperature 97.3.  Heart rate and blood pressure are benign  HENT: nares patent  EYES: no scleral icterus  CV: regular rhythm, regular rate-no murmur  RESPIRATORY: normal effort, clear to auscultation bilaterally-O2 sats mid 90s on 2 L nasal cannula  ABDOMEN: soft  MUSCULOSKELETAL: no deformity  NEURO: Strength sensation and coordination are grossly intact.  Patient is nonverbal  SKIN: warm, dry      Assessment and Data Review    ED Course as of 02/29/24 1420   Thu Feb 29, 2024   0802 Patient presents with a 1 week history of cough, shortness of breath.  Reportedly tested positive for flu last week and is on Tamiflu.  She is in no acute distress on exam.  She is on 4 L of oxygen.  Plan for chest x-ray, basic labs.  Differential diagnoses include but not limited to  pneumonia, viral URI, COPD exacerbation. [EE]   0809 Chest x-ray independently interpreted myself shows no evidence of acute infiltrate. [EE]   0825 Labs reviewed showed normal white blood count.  Hemoglobin 10.4 near baseline.    Chemistries show baseline renal failure.  BNP is normal which would go against congestive heart failure  High-sensitivity troponin of 26 likely related to renal disease and age.  Patient not having chest pain.  Doubt acute coronary syndrome. [DB]   1005 Influenza A PCR(!): Detected [EE]   1005 Sodium(!): 151 [EE]   1053 Patient appears stable.  She is on oxygen at night and doing well on 2 L.  We will discharge.  She is on Tamiflu.  I see no need for admission.  Caregiver is comfortable with this plan.  We will send her home on DuoNeb and steroids. [EE]      ED Course User Index  [DB] Barron Leon MD  [EE] Javier Andrea PA   I discussed treatment and evaluation this patient with CHERRY Andrea.    Chest x-ray independently interpreted by me shows no obvious acute pathology.  CBC with normal white blood cell count platelet counts.  Hemoglobin stable at 10.4.  Viral panel pending    EKG independently interpreted by me:  Sinus 77  Normal P waves and OR intervals  QRS-left axis deviation, normal QRS  ST, T waves-nonspecific changes  Not significant change when compared to 7/2023    At this point I do not see signs of serious acute medical illness other than likely influenza.  Patient seems to be stable on oxygen by nasal cannula and I do not see real indication for hospital admission at this point.         Barron Leon MD  02/29/24 2808

## 2024-02-29 NOTE — ED NOTES
:Pt appears more irritable, RN confirmed with caretaker that pt did not receive her morning medications, including pain medication and anxiety medication. PA notified and orders placed.

## 2024-02-29 NOTE — ED PROVIDER NOTES
EMERGENCY DEPARTMENT ENCOUNTER    Room Number:  02/02  Date of encounter:  2/29/2024  PCP: Tulio Carlson MD  Historian: Caregiver  Chronic or social conditions impacting care (social determinants of health): DNR from group home    HPI:  Chief Complaint: Short of breath  A complete HPI/ROS/PMH/PSH/SH/FH are unobtainable due to: Intellectual disability    Context: Erica Crockett is a 83 y.o. female with a history of developmental disorder, chronic kidney disease, aspiration pneumonia.  She presents to the ED c/o acute cough, shortness of breath.  Patient's symptoms reportedly started 1 week ago.  She reportedly tested positive for the flu and is placed on Tamiflu.  Patient typically wears oxygen at night between 2 and 4 L.  She does not normally wear oxygen during the day.  Today she was reportedly hypoxic on room air.  They deny any fever.  Reportedly she has been eating.  Patient self was unable to give any reliable history and is nonverbal.    Review of prior external notes (non-ED):   I reviewed telemedicine office visit from 10/17/2023.  Patient seen by neurology for history of seizures.    Review of prior external test results outside of this encounter:  I reviewed a BMP from 8/3/2023.  Creatinine 1.45, potassium 3.8    PAST MEDICAL HISTORY  Active Ambulatory Problems     Diagnosis Date Noted    Degeneration of intervertebral disc of lumbar region 03/17/2016    Developmental mental disorder 03/17/2016    Osteoarthritis of hip 03/17/2016    Scoliosis 03/17/2016    Chronic pain 06/02/2016    Nonverbal signs of pain 06/02/2016    Bursitis of left hip 04/12/2017    Encounter for monitoring opioid maintenance therapy 06/12/2018    Anxiety 10/22/2019    Constipation 06/25/2013    Hypercalcemia 10/22/2019    Epilepsy, not refractory 10/22/2019    Osteoporosis 10/22/2019    Impulse control disorder 10/22/2019    Vitamin D deficiency 10/22/2019    Pneumonia of both lower lobes due to infectious organism 12/11/2019     KEILY (acute kidney injury) 12/17/2019    Sepsis-associated organ dysfunction 12/17/2019    Stage 3 chronic kidney disease 06/19/2013    Hypercholesterolemia 02/10/2022    Acute kidney failure 06/19/2013    Essential (primary) hypertension 06/19/2013    Generalized anxiety disorder 06/19/2013    Urinary tract infection 10/08/2014    Dental pain 12/23/2022    Aspiration pneumonia of right lung 12/24/2022    Pneumonia of right middle lobe due to infectious organism 03/05/2023    Aspiration pneumonia of right lung, unspecified aspiration pneumonia type, unspecified part of lung 06/17/2023    Nonrheumatic aortic valve stenosis 06/26/2023     Resolved Ambulatory Problems     Diagnosis Date Noted    Profound mental retardation in adult 10/22/2019    Seizure disorder 10/22/2019    SOB (shortness of breath) 02/10/2022    Acute respiratory failure with hypoxia 02/10/2022    Unusual change in behavior 02/10/2022    Hyperkalemia 03/26/2014    Pneumonia of left lower lobe due to infectious organism 10/23/2022     Past Medical History:   Diagnosis Date    Acquired cyst of kidney     Acute upper respiratory infection     Age-related nuclear cataract of both eyes     Age-related osteoporosis without current pathological fracture     Allergic rhinitis     Anemia     Atopic dermatitis     Bursitis     Bursitis     Candidal stomatitis     Carotid artery stenosis     Chronic kidney disease, stage III (moderate)     Chronic pain syndrome     Chronic sinusitis     Conjunctivitis     Corns and callosities     Diverticulosis of intestine w/o perforation or abscess w/o bleeding     DJD (degenerative joint disease), lumbar     Dry eye syndrome     Hammer toe     Hypertension     Hypoglycemia     Intellectual disability     Iron deficiency anemia, unspecified     Low back pain     Osteoarthritis     Osteopenia     Other forms of scoliosis, lumbar region     Other specified deforming dorsopathies, site unspecified     Pain     Pain in toe of  left foot     Pneumonia     Pneumonitis due to inhalation of food or vomitus     Pressure ulcer     Profound intellectual disabilities     Pruritus vulvae     Renal disorder     Renal insufficiency     UTI (urinary tract infection)          PAST SURGICAL HISTORY  No past surgical history on file.      FAMILY HISTORY  Family History   Family history unknown: Yes         SOCIAL HISTORY  Social History     Socioeconomic History    Marital status: Single   Tobacco Use    Smoking status: Never    Smokeless tobacco: Never   Vaping Use    Vaping Use: Never used   Substance and Sexual Activity    Alcohol use: No    Drug use: No    Sexual activity: Defer         ALLERGIES  Bactrim [sulfamethoxazole-trimethoprim], Bee venom, Iodinated contrast media, and Nsaids        REVIEW OF SYSTEMS  Unobtainable secondary to developmental delay      PHYSICAL EXAM    I have reviewed the triage vital signs and nursing notes.    ED Triage Vitals [02/29/24 0712]   Temp Heart Rate Resp BP SpO2   97.3 °F (36.3 °C) 73 20 134/61 96 %      Temp src Heart Rate Source Patient Position BP Location FiO2 (%)   -- -- -- -- --       Physical Exam  GENERAL: Alert, chronically ill, not distressed  HENT: head atraumatic, no nuchal rigidity  EYES: no scleral icterus, EOMI  CV: regular rhythm, regular rate, no murmur  RESPIRATORY: normal effort, scattered rhonchi  ABDOMEN: soft, nontender  MUSCULOSKELETAL: Extremities contracted, no pedal edema  NEURO: alert, nonverbal  SKIN: warm, dry        LAB RESULTS  Recent Results (from the past 24 hour(s))   COVID-19, FLU A/B, RSV PCR 1 HR TAT - Swab, Nasopharynx    Collection Time: 02/29/24  7:29 AM    Specimen: Nasopharynx; Swab   Result Value Ref Range    COVID19 Not Detected Not Detected - Ref. Range    Influenza A PCR Detected (A) Not Detected    Influenza B PCR Not Detected Not Detected    RSV, PCR Not Detected Not Detected   Comprehensive Metabolic Panel    Collection Time: 02/29/24  7:41 AM    Specimen: Blood    Result Value Ref Range    Glucose 111 (H) 65 - 99 mg/dL    BUN 47 (H) 8 - 23 mg/dL    Creatinine 1.35 (H) 0.57 - 1.00 mg/dL    Sodium 151 (H) 136 - 145 mmol/L    Potassium 4.5 3.5 - 5.2 mmol/L    Chloride 113 (H) 98 - 107 mmol/L    CO2 29.0 22.0 - 29.0 mmol/L    Calcium 9.9 8.6 - 10.5 mg/dL    Total Protein 7.2 6.0 - 8.5 g/dL    Albumin 3.4 (L) 3.5 - 5.2 g/dL    ALT (SGPT) 25 1 - 33 U/L    AST (SGOT) 35 (H) 1 - 32 U/L    Alkaline Phosphatase 83 39 - 117 U/L    Total Bilirubin 0.2 0.0 - 1.2 mg/dL    Globulin 3.8 gm/dL    A/G Ratio 0.9 g/dL    BUN/Creatinine Ratio 34.8 (H) 7.0 - 25.0    Anion Gap 9.0 5.0 - 15.0 mmol/L    eGFR 39.1 (L) >60.0 mL/min/1.73   BNP    Collection Time: 02/29/24  7:41 AM    Specimen: Blood   Result Value Ref Range    proBNP 717.0 0.0 - 1,800.0 pg/mL   High Sensitivity Troponin T    Collection Time: 02/29/24  7:41 AM    Specimen: Blood   Result Value Ref Range    HS Troponin T 26 (H) <14 ng/L   CBC Auto Differential    Collection Time: 02/29/24  7:41 AM    Specimen: Blood   Result Value Ref Range    WBC 9.94 3.40 - 10.80 10*3/mm3    RBC 3.60 (L) 3.77 - 5.28 10*6/mm3    Hemoglobin 10.4 (L) 12.0 - 15.9 g/dL    Hematocrit 33.0 (L) 34.0 - 46.6 %    MCV 91.7 79.0 - 97.0 fL    MCH 28.9 26.6 - 33.0 pg    MCHC 31.5 31.5 - 35.7 g/dL    RDW 12.4 12.3 - 15.4 %    RDW-SD 41.6 37.0 - 54.0 fl    MPV 10.6 6.0 - 12.0 fL    Platelets 290 140 - 450 10*3/mm3    Neutrophil % 55.5 42.7 - 76.0 %    Lymphocyte % 35.3 19.6 - 45.3 %    Monocyte % 7.0 5.0 - 12.0 %    Eosinophil % 1.5 0.3 - 6.2 %    Basophil % 0.2 0.0 - 1.5 %    Immature Grans % 0.5 0.0 - 0.5 %    Neutrophils, Absolute 5.51 1.70 - 7.00 10*3/mm3    Lymphocytes, Absolute 3.51 (H) 0.70 - 3.10 10*3/mm3    Monocytes, Absolute 0.70 0.10 - 0.90 10*3/mm3    Eosinophils, Absolute 0.15 0.00 - 0.40 10*3/mm3    Basophils, Absolute 0.02 0.00 - 0.20 10*3/mm3    Immature Grans, Absolute 0.05 0.00 - 0.05 10*3/mm3    nRBC 0.0 0.0 - 0.2 /100 WBC   ECG 12 Lead Dyspnea     Collection Time: 02/29/24  8:14 AM   Result Value Ref Range    QT Interval 399 ms    QTC Interval 452 ms   High Sensitivity Troponin T 2Hr    Collection Time: 02/29/24 10:08 AM    Specimen: Blood   Result Value Ref Range    HS Troponin T 22 (H) <14 ng/L    Troponin T Delta -4 (L) >=-4 - <+4 ng/L       Ordered the above labs and independently reviewed the results.        RADIOLOGY  XR Chest 1 View    Result Date: 2/29/2024  XR CHEST 1 VW-  Clinical: Shortness of breath  COMPARISON examination 7/10/2023  FINDINGS: Patient is rotated towards the right. There is elevation of the right hemidiaphragm with again few linear areas of opacity at the right lung base, scarring versus persistent discoid atelectasis. No acute airspace disease has developed within either lung. The cardiomediastinal silhouette is stable allowing for rotational factors. The remainder is unremarkable.  CONCLUSION: Stable chest  This report was finalized on 2/29/2024 8:03 AM by Dr. Juan Randle M.D on Workstation: [email or web address]       I ordered the above noted radiological studies. Reviewed by me and discussed with radiologist.  See dictation for official radiology interpretation.      MEDICATIONS GIVEN IN ER    Medications   sodium chloride 0.9 % bolus 500 mL (0 mL Intravenous Stopped 2/29/24 0924)   ipratropium-albuterol (DUO-NEB) nebulizer solution 3 mL (3 mL Nebulization Given 2/29/24 0838)   sodium chloride 0.9 % bolus 500 mL (0 mL Intravenous Stopped 2/29/24 1003)   LORazepam (ATIVAN) tablet 0.5 mg (0.5 mg Oral Given 2/29/24 1003)   HYDROcodone-acetaminophen (NORCO) 7.5-325 MG per tablet 1 tablet (1 tablet Oral Given 2/29/24 1004)         ADDITIONAL ORDERS CONSIDERED BUT NOT ORDERED:  Considered admission however patient is stable appearing.  No evidence of pneumonia on chest x-ray.  O2 sats stable on 2 L of oxygen.  No evidence of respiratory failure.  She does have a nebulizer at home, which we will send in DuoNeb.      PROGRESS, DATA ANALYSIS,  CONSULTS, AND MEDICAL DECISION MAKING    All labs have been independently interpreted by myself.  All radiology studies have been independently interpreted by myself and discussed with radiologist dictating the report.   EKG's independently interpreted by myself.  Discussion below represents my analysis of pertinent findings related to patient's condition, differential diagnosis, treatment plan and final disposition.    I have discussed case with Dr. Leon, emergency room physician.  He has performed his own bedside examination and agrees with treatment plan.    ED Course as of 02/29/24 1520   Thu Feb 29, 2024   0802 Patient presents with a 1 week history of cough, shortness of breath.  Reportedly tested positive for flu last week and is on Tamiflu.  She is in no acute distress on exam.  She is on 4 L of oxygen.  Plan for chest x-ray, basic labs.  Differential diagnoses include but not limited to pneumonia, viral URI, COPD exacerbation. [EE]   0809 Chest x-ray independently interpreted myself shows no evidence of acute infiltrate. [EE]   0825 Labs reviewed showed normal white blood count.  Hemoglobin 10.4 near baseline.    Chemistries show baseline renal failure.  BNP is normal which would go against congestive heart failure  High-sensitivity troponin of 26 likely related to renal disease and age.  Patient not having chest pain.  Doubt acute coronary syndrome. [DB]   1005 Influenza A PCR(!): Detected [EE]   1005 Sodium(!): 151 [EE]   1053 Patient appears stable.  She is on oxygen at night and doing well on 2 L.  We will discharge.  She is on Tamiflu.  I see no need for admission.  Caregiver is comfortable with this plan.  We will send her home on DuoNeb and steroids. [EE]      ED Course User Index  [DB] Barron Leon MD  [EE] Javier Andrea PA       AS OF 15:20 EST VITALS:    BP - 126/64  HR - 84  TEMP - 97.3 °F (36.3 °C)  O2 SATS - 93%        DIAGNOSIS  Final diagnoses:   Influenza A   Bronchitis          DISPOSITION  Discharged    Admission was considered but after careful review of the patient's presentation, physical examination, diagnostic results, and response to treatment the patient may be safely discharged with outpatient follow-up.         Dictated utilizing Dragon dictation     Javier Andrea PA  02/29/24 1521

## 2024-02-29 NOTE — ED NOTES
Patient to ER via ems from [facility]  Patient is positive for flu  Patient had sat of 84 on her 2L during the night    Patient wears 2-4L at night via NC    EMS reports patient was 84% room air for EMS    Patient was 96% on 4L      Patient took oxygen off during triage  Patient nonverbal

## 2024-02-29 NOTE — CASE MANAGEMENT/SOCIAL WORK
Discharge Planning Assessment  [facility]     Patient Name: Erica Crockett  MRN: [id number]  Today's Date: 2/29/2024    Admit Date: 2/29/2024        Discharge Needs Assessment    No documentation.                  Discharge Plan       Row Name 02/29/24 8307       Plan    Plan Comments Patient with legal guardian on file, listed as her nephew, Nicola Kapadia. Registration obtained consent to treat from guardian over the phone. Paperwork has been scanned into Epic. [facility] present upon chart review. Primary RN contacted legal guardian to inform of discharge from ER. [facility] will [facility]. I contacted [facility] dispatch and spoke with [place], who provides ETA of 1200. Catina SCHWARTZ RN CCP manager updated, per policy.                   Continued Care and Services - Admitted Since 2/29/2024    Coordination has not been started for this encounter.          Demographic Summary    No documentation.                  Functional Status    No documentation.                  Psychosocial    No documentation.                  Abuse/Neglect    No documentation.                  Legal    No documentation.                  Substance Abuse    No documentation.                  Patient Forms    No documentation.                     Jose Antonio Garcia RN

## 2024-02-29 NOTE — PROGRESS NOTES
Clinical Pharmacy Services: Medication History    Erica Crockett is a 83 y.o. female presenting to [facility] for   Chief Complaint   Patient presents with    Flu Symptoms       She  has a past medical history of Acquired cyst of kidney, Acute upper respiratory infection, Age-related nuclear cataract of both eyes, Age-related osteoporosis without current pathological fracture, Allergic rhinitis, Anemia, Anxiety, Atopic dermatitis, Bursitis, Bursitis, Candidal stomatitis, Carotid artery stenosis, Chronic kidney disease, stage III (moderate), Chronic pain syndrome, Chronic sinusitis, Conjunctivitis, Constipation, Corns and callosities, Diverticulosis of intestine w/o perforation or abscess w/o bleeding, DJD (degenerative joint disease), lumbar, Dry eye syndrome, Hammer toe, Hypercalcemia, Hyperkalemia, Hypertension, Hypoglycemia, Intellectual disability, Iron deficiency anemia, unspecified, Low back pain, Osteoarthritis, Osteopenia, Other forms of scoliosis, lumbar region, Other specified deforming dorsopathies, site unspecified, Pain, Pain in toe of left foot, Pneumonia, Pneumonitis due to inhalation of food or vomitus, Pressure ulcer, Profound intellectual disabilities, Pruritus vulvae, Renal disorder, Renal insufficiency, Scoliosis, Scoliosis, Seizure disorder, and UTI (urinary tract infection).    Allergies as of 02/29/2024 - Reviewed 02/29/2024   Allergen Reaction Noted    Bactrim [sulfamethoxazole-trimethoprim] Unknown - High Severity 12/23/2022    Bee venom  03/17/2016    Iodinated contrast media  03/17/2016    Nsaids  03/17/2016       Medication information was obtained from: Nursing Home   Pharmacy and Phone Number:     Prior to Admission Medications       Prescriptions Last Dose Informant Patient Reported? Taking?    A&D ointment  Nursing Home Yes Yes    Apply  topically 2 (Two) Times a Day.    acetaminophen (TYLENOL) 325 MG tablet  Nursing Home Yes Yes    Take 2 tablets by mouth Every 6  (Six) Hours As Needed for Mild Pain.    amLODIPine (NORVASC) 5 MG tablet  Nursing Home Yes Yes    Take 1 tablet by mouth Daily.    atorvastatin (LIPITOR) 20 MG tablet  Nursing Home Yes Yes    Take 1 tablet by mouth Daily.    bisacodyl (DULCOLAX) 10 MG suppository  Nursing Home Yes Yes    Insert 1 suppository into the rectum Daily As Needed for Constipation (if po medications ineffective).    bisacodyl (DULCOLAX) 5 MG EC tablet  Nursing Home Yes Yes    Take 2 tablets by mouth Daily As Needed for Constipation.    Calcium Polycarbophil (FIBER-LAX PO)  Nursing Home Yes Yes    Take 1 tablet by mouth 2 (Two) Times a Day.    carbamide peroxide (DEBROX) 6.5 % otic solution  Nursing Home Yes Yes    Administer 4 drops into both ears As Needed for Ear Pain.    cetirizine (zyrTEC) 10 MG tablet  Nursing Home Yes Yes    Take 1 tablet by mouth Daily.    Cholecalciferol 25 MCG (1000 UT) tablet  Nursing Home Yes Yes    Take 1 tablet by mouth Daily.    dextromethorphan-guaifenesin (ROBITUSSIN-DM)  MG/5ML syrup  Nursing Home Yes Yes    Take 10 mL by mouth Every 4 (Four) Hours As Needed.    DIAZEPAM 10MG SUPP  Nursing Home Yes Yes    Insert 2 suppositories into the rectum As Needed (for seizures).    diphenhydrAMINE (BENADRYL) 25 mg capsule  Nursing Home Yes Yes    Take 1 capsule by mouth Every 6 (Six) Hours As Needed for Itching or Allergies.    EPINEPHrine (EPIPEN) 0.3 MG/0.3ML solution auto-injector injection  Nursing Home Yes Yes    Inject 0.3 mL under the skin into the appropriate area as directed 1 (One) Time As Needed.    gabapentin (NEURONTIN) 300 MG capsule  Nursing Home No Yes    Take 1 capsule by mouth Every Night.    HYDROcodone-acetaminophen (NORCO) 7.5-325 MG per tablet  Nursing Home No Yes    Take 1 tablet by mouth Every 8 (Eight) Hours As Needed for Severe Pain.    Hydrocortisone (Susan's magic butt cream)  Nursing Home Yes Yes    Apply 1 Application topically to the appropriate area as directed As Needed.     hydrocortisone 2.5 % cream  Nursing Home Yes Yes    Apply 1 Application topically to the appropriate area as directed 2 (Two) Times a Day.    ipratropium-albuterol (DUO-NEB) 0.5-2.5 mg/3 ml nebulizer  Nursing Home No Yes    Take 3 mL by nebulization 4 (Four) Times a Day As Needed for Wheezing or Shortness of Air.    lamoTRIgine (LaMICtal) 200 MG tablet  Nursing Home Yes Yes    Take 1 tablet by mouth 2 (Two) Times a Day.    levETIRAcetam (KEPPRA) 500 MG tablet  Nursing Home Yes Yes    Take 1 tablet by mouth 2 (Two) Times a Day.    Lidocaine-Glycerin (Preparation H) 5-14.4 % cream  Nursing Home Yes Yes    Apply 1 Application topically 2 (Two) Times a Day As Needed.    loperamide (IMODIUM) 2 MG capsule  Nursing Home Yes Yes    Take 1 capsule by mouth 4 (Four) Times a Day As Needed for Diarrhea.    LORazepam (ATIVAN) 0.5 MG tablet  Nursing Home No Yes    Take 1 tablet by mouth Every 6 (Six) Hours As Needed for Anxiety.    lubiprostone (AMITIZA) 8 MCG capsule  Nursing Home Yes Yes    Take 1 capsule by mouth 2 (Two) Times a Day With Meals.    magnesium hydroxide (MILK OF MAGNESIA) 400 MG/5ML suspension  Nursing Home Yes Yes    Take 30 mL by mouth Daily As Needed for Constipation.    Menthol, Topical Analgesic, (Biofreeze Roll-On) 4 % gel  Nursing Home Yes Yes    Apply 1 Application topically Daily. For hips    multivitamin with minerals tablet tablet  Nursing Home Yes Yes    Take 1 tablet by mouth Daily.    neomycin-bacitracin-polymyxin (NEOSPORIN) 5-400-5000 ointment  Nursing Home Yes Yes    Apply 1 Application topically to the appropriate area as directed Daily.    Nutritional Supplements (ARGINAID EXTRA PO)  Nursing Home Yes Yes    Take 1 Units by mouth Daily.    nystatin-triamcinolone (MYCOLOG II) 756873-3.1 UNIT/GM-% cream  Nursing Home Yes Yes    Apply 1 Application topically to the appropriate area as directed 2 (Two) Times a Day.    omeprazole (priLOSEC) 20 MG capsule  Nursing Home Yes Yes    Take 2 capsules by  mouth Daily.    oseltamivir (TAMIFLU) 30 MG capsule  Nursing Home Yes Yes    Take 1 capsule by mouth Every 12 (Twelve) Hours. Ends 2/29/24    polyethylene glycol (MiraLax) 17 g packet  Nursing Home Yes Yes    Take 17 g by mouth Daily.    Polyvinyl Alcohol-Povidone PF (HYPOTEARS) 1.4-0.6 % ophthalmic solution  Nursing Home Yes Yes    Administer 1 drop to both eyes 4 (Four) Times a Day.    Probiotic Product (Risaquad-2) capsule capsule  Nursing Home Yes Yes    Take 2 capsules by mouth Daily.    promethazine (PHENERGAN) 25 MG suppository  Nursing Home Yes Yes    Insert 1 suppository into the rectum Every 6 (Six) Hours As Needed for Nausea or Vomiting.    simethicone (MYLICON) 80 MG chewable tablet  Nursing Home Yes Yes    Chew 1 tablet Every 6 (Six) Hours As Needed for Flatulence (GI discomfort).    Skin Protectants, Misc. (AQUAPHOR LIP REPAIR EX)  Nursing Home Yes Yes    Apply 1 application  topically 4 (Four) Times a Day As Needed (to lips).    Skin Protectants, Misc. (eucerin) cream  Nursing Home Yes Yes    Apply 1 Application topically to the appropriate area as directed As Needed for Dry Skin.    sodium chloride 0.65 % nasal spray  Nursing Home Yes Yes    2 sprays into the nostril(s) as directed by provider As Needed for Congestion.    Sodium Phosphates (fleet enema) 7-19 GM/118ML enema  Nursing Home Yes Yes    Insert 1 enema into the rectum 1 (One) Time As Needed.    sodium zirconium cyclosilicate (Lokelma) 10 g pack  Nursing Home Yes Yes    Take 10 g by mouth Daily.    Urea 40 % lotion  Nursing Home Yes Yes    Apply 1 application  topically 2 (Two) Times a Day As Needed (apply to left 4th toe bunion as needed).    Wheat Dextrin (BENEFIBER DRINK MIX PO)  Nursing Home Yes Yes    Take 1 packet by mouth Daily.    white petrolatum ointment  Nursing Home Yes Yes    Apply 1 Application topically to the appropriate area as directed As Needed for Dry Skin. Apply to Nostrils    Wound Dressings (SILVASORB ANTIMICROBIAL  SHEET EX)  Nursing Home Yes Yes    Apply 1 application  topically As Needed (TO LEFT BUTTOCKS AS NEEDED).              Medication notes:     This medication list is complete to the best of my knowledge as of 2/29/2024    Please call if questions.    Zane Vasquez  Medication History Technician  [phone]    2/29/2024 10:13 EST

## 2024-04-25 ENCOUNTER — TELEPHONE (OUTPATIENT)
Dept: NEUROLOGY | Facility: CLINIC | Age: 84
End: 2024-04-25
Payer: MEDICARE

## 2024-04-25 ENCOUNTER — TELEMEDICINE (OUTPATIENT)
Dept: NEUROLOGY | Facility: CLINIC | Age: 84
End: 2024-04-25
Payer: MEDICARE

## 2024-04-25 DIAGNOSIS — G40.909 EPILEPSY, NOT REFRACTORY: Primary | ICD-10-CM

## 2024-04-25 DIAGNOSIS — F88 DEVELOPMENTAL MENTAL DISORDER: ICD-10-CM

## 2024-04-25 NOTE — TELEPHONE ENCOUNTER
Spoke to [facility] to get pt scheduled for a tele visit with moy burnette. Agreed to be scheduled on October 28th

## 2024-04-25 NOTE — PROGRESS NOTES
DOS: 2024  NAME: Erica Crockett   : 1940  PCP: Tulio Carlson MD    No chief complaint on file.     I performed this clinical encounter via real-time telehealth video connection originating from the patient's location at home and my location at [facility]. Verbal consent to participate in this telehealth video clinical visit was obtained and any questions by the patient regarding the interaction were answered.     SUBJECTIVE  Neurological Problem:  83 y.o.  female with a past medical history of HTN, hypercholesterolemia, vitamin D deficiency, CKD, seizure disorder, developmental mental disorder, CAMILA, osteoporosis, scoliosis, lumbar DDD, h/o aspiration PNA. They are seen in follow up today for seizures, however the problem is new to the examiner. Patient last seen by Dr. Alex Peralta via telemedicine in 2023, with a summary of the history taken from the previous note with additions/modifications as indicated. She is accompanied by her nurse, AUSTIN Purdy.    Interval History:   Ms. Crockett has been a long-time patient of our clinic since , previously seen by Dr. Harris and most recently by Dr. Peralta for seizure disorder. She is a resident of [facility]. She has been maintained on Keppra 500 mg BID and lamotrigine 200 mg BID for seizure control. At her last visit in October a lamotrigine level was ordered.     She presents via telehealth today along with Rajwinder, her registered nurse.  Since her last follow-up she has had approximately 3 seizures, the last one occurring January 3.  Her nurse Rajwinder reports that she was eating breakfast and she dropped her head and closed her eyes.  This episode lasted less than 1 minute and she came to on her own and immediately began looking around.  There was no loss of bowel or bladder continence, no tongue/lip biting, no postictal phase.  Previous to that event she experienced a seizure on  and again a seizure on .   Both of these lasted less than 1 minute.  She has not required the administration of Diastat and she has not been sent to the hospital for her seizures.  Nursing staff states that the seizures did not occur in conjunction with illness nor was Erica missing sleep or skipping meals.  She continues on levetiracetam 500 mg twice daily and lamotrigine 200 mg twice daily and is tolerating those well.  The nurse does mention her potassium and calcium have been elevated and nephrology is following to correct these electrolytes.  Her Lamictal level was checked in January, resulted at 7.1. They have no other changes or concerns to discuss today.    Review of Systems   Neurological:  Positive for seizures.        The following portions of the patient's history were reviewed and updated as appropriate: allergies, current medications, past family history, past medical history, past social history, past surgical history and problem list.    Current Medications:   Current Outpatient Medications:     A&D ointment, Apply  topically 2 (Two) Times a Day., Disp: , Rfl:     acetaminophen (TYLENOL) 325 MG tablet, Take 2 tablets by mouth Every 6 (Six) Hours As Needed for Mild Pain., Disp: , Rfl:     amLODIPine (NORVASC) 5 MG tablet, Take 1 tablet by mouth Daily., Disp: , Rfl:     atorvastatin (LIPITOR) 20 MG tablet, Take 1 tablet by mouth Daily., Disp: , Rfl:     bisacodyl (DULCOLAX) 10 MG suppository, Insert 1 suppository into the rectum Daily As Needed for Constipation (if po medications ineffective)., Disp: , Rfl:     bisacodyl (DULCOLAX) 5 MG EC tablet, Take 2 tablets by mouth Daily As Needed for Constipation., Disp: , Rfl:     Calcium Polycarbophil (FIBER-LAX PO), Take 1 tablet by mouth 2 (Two) Times a Day., Disp: , Rfl:     carbamide peroxide (DEBROX) 6.5 % otic solution, Administer 4 drops into both ears As Needed for Ear Pain., Disp: , Rfl:     cetirizine (zyrTEC) 10 MG tablet, Take 1 tablet by mouth Daily., Disp: , Rfl:      Cholecalciferol 25 MCG (1000 UT) tablet, Take 1 tablet by mouth Daily., Disp: , Rfl:     dextromethorphan-guaifenesin (ROBITUSSIN-DM)  MG/5ML syrup, Take 10 mL by mouth Every 4 (Four) Hours As Needed., Disp: , Rfl:     DIAZEPAM 10MG SUPP, Insert 2 suppositories into the rectum As Needed (for seizures)., Disp: , Rfl:     diphenhydrAMINE (BENADRYL) 25 mg capsule, Take 1 capsule by mouth Every 6 (Six) Hours As Needed for Itching or Allergies., Disp: , Rfl:     EPINEPHrine (EPIPEN) 0.3 MG/0.3ML solution auto-injector injection, Inject 0.3 mL under the skin into the appropriate area as directed 1 (One) Time As Needed., Disp: , Rfl:     gabapentin (NEURONTIN) 300 MG capsule, Take 1 capsule by mouth Every Night., Disp: 3 capsule, Rfl: 0    HYDROcodone-acetaminophen (NORCO) 7.5-325 MG per tablet, Take 1 tablet by mouth Every 8 (Eight) Hours As Needed for Severe Pain., Disp: 6 tablet, Rfl: 0    Hydrocortisone (Susan's magic butt cream), Apply 1 Application topically to the appropriate area as directed As Needed., Disp: , Rfl:     hydrocortisone 2.5 % cream, Apply 1 Application topically to the appropriate area as directed 2 (Two) Times a Day., Disp: , Rfl:     ipratropium-albuterol (DUO-NEB) 0.5-2.5 mg/3 ml nebulizer, Take 3 mL by nebulization 4 (Four) Times a Day As Needed for Wheezing or Shortness of Air., Disp: , Rfl:     ipratropium-albuterol (DUO-NEB) 0.5-2.5 mg/3 ml nebulizer, Take 3 mL by nebulization Every 4 (Four) Hours As Needed for Wheezing., Disp: 360 mL, Rfl: 0    lamoTRIgine (LaMICtal) 200 MG tablet, Take 1 tablet by mouth 2 (Two) Times a Day., Disp: , Rfl:     levETIRAcetam (KEPPRA) 500 MG tablet, Take 1 tablet by mouth 2 (Two) Times a Day., Disp: , Rfl:     Lidocaine-Glycerin (Preparation H) 5-14.4 % cream, Apply 1 Application topically 2 (Two) Times a Day As Needed., Disp: , Rfl:     loperamide (IMODIUM) 2 MG capsule, Take 1 capsule by mouth 4 (Four) Times a Day As Needed for Diarrhea., Disp: , Rfl:      LORazepam (ATIVAN) 0.5 MG tablet, Take 1 tablet by mouth Every 6 (Six) Hours As Needed for Anxiety., Disp: 8 tablet, Rfl: 0    lubiprostone (AMITIZA) 8 MCG capsule, Take 1 capsule by mouth 2 (Two) Times a Day With Meals., Disp: , Rfl:     magnesium hydroxide (MILK OF MAGNESIA) 400 MG/5ML suspension, Take 30 mL by mouth Daily As Needed for Constipation., Disp: , Rfl:     Menthol, Topical Analgesic, (Biofreeze Roll-On) 4 % gel, Apply 1 Application topically Daily. For hips, Disp: , Rfl:     multivitamin with minerals tablet tablet, Take 1 tablet by mouth Daily., Disp: , Rfl:     neomycin-bacitracin-polymyxin (NEOSPORIN) 5-400-5000 ointment, Apply 1 Application topically to the appropriate area as directed Daily., Disp: , Rfl:     Nutritional Supplements (ARGINAID EXTRA PO), Take 1 Units by mouth Daily., Disp: , Rfl:     nystatin-triamcinolone (MYCOLOG II) 081442-6.1 UNIT/GM-% cream, Apply 1 Application topically to the appropriate area as directed 2 (Two) Times a Day., Disp: , Rfl:     omeprazole (priLOSEC) 20 MG capsule, Take 2 capsules by mouth Daily., Disp: , Rfl:     oseltamivir (TAMIFLU) 30 MG capsule, Take 1 capsule by mouth Every 12 (Twelve) Hours. Ends 2/29/24, Disp: , Rfl:     polyethylene glycol (MiraLax) 17 g packet, Take 17 g by mouth Daily., Disp: , Rfl:     Polyvinyl Alcohol-Povidone PF (HYPOTEARS) 1.4-0.6 % ophthalmic solution, Administer 1 drop to both eyes 4 (Four) Times a Day., Disp: , Rfl:     predniSONE (DELTASONE) 20 MG tablet, Take 1 tablet by mouth 2 (Two) Times a Day., Disp: 10 tablet, Rfl: 0    Probiotic Product (Risaquad-2) capsule capsule, Take 2 capsules by mouth Daily., Disp: , Rfl:     promethazine (PHENERGAN) 25 MG suppository, Insert 1 suppository into the rectum Every 6 (Six) Hours As Needed for Nausea or Vomiting., Disp: , Rfl:     simethicone (MYLICON) 80 MG chewable tablet, Chew 1 tablet Every 6 (Six) Hours As Needed for Flatulence (GI discomfort)., Disp: , Rfl:     Skin  Protectants, Misc. (AQUAPHOR LIP REPAIR EX), Apply 1 application  topically 4 (Four) Times a Day As Needed (to lips)., Disp: , Rfl:     Skin Protectants, Misc. (eucerin) cream, Apply 1 Application topically to the appropriate area as directed As Needed for Dry Skin., Disp: , Rfl:     sodium chloride 0.65 % nasal spray, 2 sprays into the nostril(s) as directed by provider As Needed for Congestion., Disp: , Rfl:     Sodium Phosphates (fleet enema) 7-19 GM/118ML enema, Insert 1 enema into the rectum 1 (One) Time As Needed., Disp: , Rfl:     sodium zirconium cyclosilicate (Lokelma) 10 g pack, Take 10 g by mouth Daily., Disp: , Rfl:     Urea 40 % lotion, Apply 1 application  topically 2 (Two) Times a Day As Needed (apply to left 4th toe bunion as needed)., Disp: , Rfl:     Wheat Dextrin (BENEFIBER DRINK MIX PO), Take 1 packet by mouth Daily., Disp: , Rfl:     white petrolatum ointment, Apply 1 Application topically to the appropriate area as directed As Needed for Dry Skin. Apply to Nostrils, Disp: , Rfl:     Wound Dressings (SILVASORB ANTIMICROBIAL SHEET EX), Apply 1 application  topically As Needed (TO LEFT BUTTOCKS AS NEEDED)., Disp: , Rfl:   **I did not stop or change the above medications.  Patient's medication list was updated to reflect medications they have reported as currently taking, including medication changes made by other providers.    Objective   Vital Signs:  There were no vitals taken for this visit.  There is no height or weight on file to calculate BMI.    Physical Exam     Result Review :  The following data was reviewed by: LASHAE Graff on 04/25/2024:  Laboratory Results:         Lab Results   Component Value Date    WBC 9.94 02/29/2024    HGB 10.4 (L) 02/29/2024    HCT 33.0 (L) 02/29/2024    MCV 91.7 02/29/2024     02/29/2024     Lab Results   Component Value Date    GLUCOSE 111 (H) 02/29/2024    BUN 47 (H) 02/29/2024    CREATININE 1.35 (H) 02/29/2024    EGFRIFNONA 42 (L) 02/11/2022  "   EGFRIFAFRI 47 (L) 01/24/2022    BCR 34.8 (H) 02/29/2024    K 4.5 02/29/2024    CO2 29.0 02/29/2024    CALCIUM 9.9 02/29/2024    ALBUMIN 3.4 (L) 02/29/2024    LABIL2 0.9 (L) 01/24/2022    AST 35 (H) 02/29/2024    ALT 25 02/29/2024     Lab Results   Component Value Date    HGBA1C 5.80 (H) 12/24/2022     Lab Results   Component Value Date    CHOL 192 01/10/2023    CHOL 184 12/24/2022    CHOL 184 10/26/2022     Lab Results   Component Value Date    HDL 96 (H) 01/10/2023     (H) 12/24/2022    HDL 85 (H) 10/26/2022     Lab Results   Component Value Date    LDL 86 01/10/2023    LDL 75 12/24/2022    LDL 90 10/26/2022     Lab Results   Component Value Date    TRIG 53 01/10/2023    TRIG 44 12/24/2022    TRIG 46 10/26/2022     No results found for: \"RPR\"  Lab Results   Component Value Date    TSH 1.010 01/10/2023     Lab Results   Component Value Date    EQKVZJYN24 1,266 (H) 01/10/2023                  Assessment and Plan   Diagnoses and all orders for this visit:    1. Epilepsy, not refractory (Primary)  -     Lamotrigine Level; Future    2. Developmental mental disorder      We will continue Erica's current regimen of lamotrigine 200 mg twice daily and levetiracetam 500 mg twice daily.  We will also repeat a Lamictal level before her next visit.    We will see Erica  back in 6 months, sooner if symptoms warrant.     LASHAE Graff  [facility]   04/25/24       I spent 20 minutes caring for Erica on this date of service. This time includes time spent by me in the following activities:preparing for the visit, reviewing tests, obtaining and/or reviewing a separately obtained history, performing a medically appropriate examination and/or evaluation , counseling and educating the patient/family/caregiver, ordering medications, tests, or procedures, referring and communicating with other health care professionals , documenting information in the medical record, independently interpreting results and " communicating that information with the patient/family/caregiver, and care coordination  Follow Up   No follow-ups on file.  Patient was given instructions and counseling regarding her condition or for health maintenance advice. Please see specific information pulled into the AVS if appropriate.       Dictated using Dragon Dictation

## 2024-06-17 ENCOUNTER — TRANSCRIBE ORDERS (OUTPATIENT)
Dept: ADMINISTRATIVE | Facility: HOSPITAL | Age: 84
End: 2024-06-17
Payer: MEDICARE

## 2024-06-17 DIAGNOSIS — Z12.31 VISIT FOR SCREENING MAMMOGRAM: Primary | ICD-10-CM

## 2024-07-19 ENCOUNTER — HOSPITAL ENCOUNTER (OUTPATIENT)
Dept: MAMMOGRAPHY | Facility: HOSPITAL | Age: 84
Discharge: HOME | End: 2024-07-19
Admitting: FAMILY MEDICINE
Payer: MEDICARE

## 2024-07-19 DIAGNOSIS — Z12.31 VISIT FOR SCREENING MAMMOGRAM: ICD-10-CM

## 2024-07-19 PROCEDURE — 77067 SCR MAMMO BI INCL CAD: CPT

## 2024-07-19 PROCEDURE — 77063 BREAST TOMOSYNTHESIS BI: CPT

## 2024-07-24 ENCOUNTER — OFFICE VISIT (OUTPATIENT)
Dept: OBSTETRICS AND GYNECOLOGY | Facility: CLINIC | Age: 84
End: 2024-07-24
Payer: MEDICARE

## 2024-07-24 VITALS
SYSTOLIC BLOOD PRESSURE: 104 MMHG | WEIGHT: 148.6 LBS | DIASTOLIC BLOOD PRESSURE: 58 MMHG | HEIGHT: 60 IN | BODY MASS INDEX: 29.17 KG/M2

## 2024-07-24 DIAGNOSIS — Z01.419 CERVICAL SMEAR, AS PART OF ROUTINE GYNECOLOGICAL EXAMINATION: ICD-10-CM

## 2024-07-24 DIAGNOSIS — Z01.419 ROUTINE GYNECOLOGICAL EXAMINATION: Primary | ICD-10-CM

## 2024-07-24 RX ORDER — DIPHENHYDRAMINE HCL 25 MG
CAPSULE ORAL
COMMUNITY
Start: 2022-10-17

## 2024-07-24 RX ORDER — VALACYCLOVIR HYDROCHLORIDE 1 G/1
TABLET, FILM COATED ORAL
COMMUNITY
Start: 2024-05-09

## 2024-07-24 RX ORDER — DENOSUMAB 60 MG/ML
INJECTION SUBCUTANEOUS
COMMUNITY
Start: 2024-05-04

## 2024-07-24 RX ORDER — NYSTATIN 100000 U/G
CREAM TOPICAL
COMMUNITY
Start: 2024-04-26

## 2024-07-24 RX ORDER — MAGNESIUM GLYCINATE 100 MG
CAPSULE ORAL
COMMUNITY
Start: 2024-03-16

## 2024-07-24 NOTE — PROGRESS NOTES
GYN Annual Exam     CC- Here for annual exam.     Erica Crockett is a 83 y.o. female who presents for annual well woman exam. She is an established patient but is new pt to me. Pt is resident of [facility]. 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 She is in wheelchair. She is accompanied by by two caregivers from [facility]. Last MMG . Last bone density 2021 with osteoporosis: T score -3.2. This is managed by her PCP. She stopped vaginal estrogen last year per Dr. Bryan. Her caregivers reports she is having no issues.  She has not previous pap smears to review in EPIC.     OB History               Para        Term   0            AB        Living             SAB        IAB        Ectopic        Molar        Multiple        Live Births                    Current contraception: post menopausal status  History of abnormal Pap smear: no  History of abnormal mammogram: no  Family history of uterine, colon or ovarian cancer:  unknown  Family history of breast cancer:  unknown    Health Maintenance   Topic Date Due    URINE MICROALBUMIN  Never done    BMI FOLLOWUP  Never done    Pneumococcal Vaccine 65+ (1 of 2 - PCV) Never done    ZOSTER VACCINE (1 of 2) Never done    RSV Vaccine - Adults (1 - 1-dose 60+ series) Never done    ANNUAL WELLNESS VISIT  Never done    TDAP/TD VACCINES (2 - Tdap) 2018    DIABETIC EYE EXAM  2020    DXA SCAN  2023    HEMOGLOBIN A1C  2023    COVID-19 Vaccine (3 -  season) 2023    LIPID PANEL  01/10/2024    INFLUENZA VACCINE  2024    COLORECTAL CANCER SCREENING  2029       Past Medical History:   Diagnosis Date    Acquired cyst of kidney     Acute upper respiratory infection     Age-related nuclear cataract of both eyes     Age-related osteoporosis without current pathological fracture     Allergic rhinitis     Anemia     Anxiety     Atopic dermatitis     Bursitis     Bursitis     Candidal stomatitis     Carotid artery stenosis      Chronic kidney disease, stage III (moderate)     Chronic pain syndrome     Chronic sinusitis     Conjunctivitis     Constipation     Corns and callosities     Diverticulosis of intestine w/o perforation or abscess w/o bleeding     DJD (degenerative joint disease), lumbar     Dry eye syndrome     Hammer toe     Hypercalcemia     Hyperkalemia     Hypertension     Hypoglycemia     Intellectual disability     Iron deficiency anemia, unspecified     Low back pain     Osteoarthritis     Osteopenia     Other forms of scoliosis, lumbar region     Other specified deforming dorsopathies, site unspecified     Pain     Pain in toe of left foot     Pneumonia     Pneumonitis due to inhalation of food or vomitus     Pressure ulcer     Profound intellectual disabilities     Pruritus vulvae     Renal disorder     Renal insufficiency     Scoliosis     Scoliosis     Seizure disorder     UTI (urinary tract infection)        No past surgical history on file.      Current Outpatient Medications:     A&D ointment, Apply  topically 2 (Two) Times a Day., Disp: , Rfl:     acetaminophen (TYLENOL) 325 MG tablet, Take 2 tablets by mouth Every 6 (Six) Hours As Needed for Mild Pain., Disp: , Rfl:     amLODIPine (NORVASC) 5 MG tablet, Take 1 tablet by mouth Daily., Disp: , Rfl:     atorvastatin (LIPITOR) 20 MG tablet, Take 1 tablet by mouth Daily., Disp: , Rfl:     bisacodyl (DULCOLAX) 10 MG suppository, Insert 1 suppository into the rectum Daily As Needed for Constipation (if po medications ineffective)., Disp: , Rfl:     bisacodyl (DULCOLAX) 5 MG EC tablet, Take 2 tablets by mouth Daily As Needed for Constipation., Disp: , Rfl:     cetirizine (zyrTEC) 10 MG tablet, Take 1 tablet by mouth Daily., Disp: , Rfl:     Dextran 70-Hypromellose (Artificial Tears) 0.1-0.3 % solution, , Disp: , Rfl:     DIAZEPAM 10MG SUPP, Insert 2 suppositories into the rectum As Needed (for seizures)., Disp: , Rfl:     EPINEPHrine (EPIPEN) 0.3 MG/0.3ML solution  auto-injector injection, Inject 0.3 mL under the skin into the appropriate area as directed 1 (One) Time As Needed., Disp: , Rfl:     gabapentin (NEURONTIN) 300 MG capsule, Take 1 capsule by mouth Every Night., Disp: 3 capsule, Rfl: 0    HYDROcodone-acetaminophen (NORCO) 7.5-325 MG per tablet, Take 1 tablet by mouth Every 8 (Eight) Hours As Needed for Severe Pain., Disp: 6 tablet, Rfl: 0    hydrocortisone 2.5 % cream, Apply 1 Application topically to the appropriate area as directed 2 (Two) Times a Day., Disp: , Rfl:     ipratropium-albuterol (DUO-NEB) 0.5-2.5 mg/3 ml nebulizer, Take 3 mL by nebulization 4 (Four) Times a Day As Needed for Wheezing or Shortness of Air., Disp: , Rfl:     ipratropium-albuterol (DUO-NEB) 0.5-2.5 mg/3 ml nebulizer, Take 3 mL by nebulization Every 4 (Four) Hours As Needed for Wheezing., Disp: 360 mL, Rfl: 0    lamoTRIgine (LaMICtal) 200 MG tablet, Take 1 tablet by mouth 2 (Two) Times a Day., Disp: , Rfl:     levETIRAcetam (KEPPRA) 500 MG tablet, Take 1 tablet by mouth 2 (Two) Times a Day., Disp: , Rfl:     Lidocaine-Glycerin (Preparation H) 5-14.4 % cream, Apply 1 Application topically 2 (Two) Times a Day As Needed., Disp: , Rfl:     loperamide (IMODIUM) 2 MG capsule, Take 1 capsule by mouth 4 (Four) Times a Day As Needed for Diarrhea., Disp: , Rfl:     LORazepam (ATIVAN) 0.5 MG tablet, Take 1 tablet by mouth Every 6 (Six) Hours As Needed for Anxiety., Disp: 8 tablet, Rfl: 0    lubiprostone (AMITIZA) 8 MCG capsule, Take 1 capsule by mouth 2 (Two) Times a Day With Meals., Disp: , Rfl:     Magnesium Glycinate 100 MG capsule, , Disp: , Rfl:     nystatin (MYCOSTATIN) 447796 UNIT/GM cream, , Disp: , Rfl:     Prolia 60 MG/ML solution prefilled syringe syringe, , Disp: , Rfl:     tuberculin 5 UNIT/0.1ML injection, , Disp: , Rfl:     valACYclovir (VALTREX) 1000 MG tablet, , Disp: , Rfl:     Calcium Polycarbophil (FIBER-LAX PO), Take 1 tablet by mouth 2 (Two) Times a Day., Disp: , Rfl:      carbamide peroxide (DEBROX) 6.5 % otic solution, Administer 4 drops into both ears As Needed for Ear Pain., Disp: , Rfl:     Cholecalciferol 25 MCG (1000 UT) tablet, Take 1 tablet by mouth Daily., Disp: , Rfl:     dextromethorphan-guaifenesin (ROBITUSSIN-DM)  MG/5ML syrup, Take 10 mL by mouth Every 4 (Four) Hours As Needed., Disp: , Rfl:     diphenhydrAMINE (BENADRYL) 25 mg capsule, Take 1 capsule by mouth Every 6 (Six) Hours As Needed for Itching or Allergies., Disp: , Rfl:     Hydrocortisone (Susan's magic butt cream), Apply 1 Application topically to the appropriate area as directed As Needed., Disp: , Rfl:     magnesium hydroxide (MILK OF MAGNESIA) 400 MG/5ML suspension, Take 30 mL by mouth Daily As Needed for Constipation., Disp: , Rfl:     Menthol, Topical Analgesic, (Biofreeze Roll-On) 4 % gel, Apply 1 Application topically Daily. For hips, Disp: , Rfl:     multivitamin with minerals tablet tablet, Take 1 tablet by mouth Daily., Disp: , Rfl:     neomycin-bacitracin-polymyxin (NEOSPORIN) 5-400-5000 ointment, Apply 1 Application topically to the appropriate area as directed Daily., Disp: , Rfl:     Nutritional Supplements (ARGINAID EXTRA PO), Take 1 Units by mouth Daily., Disp: , Rfl:     nystatin-triamcinolone (MYCOLOG II) 163233-1.1 UNIT/GM-% cream, Apply 1 Application topically to the appropriate area as directed 2 (Two) Times a Day., Disp: , Rfl:     omeprazole (priLOSEC) 20 MG capsule, Take 2 capsules by mouth Daily., Disp: , Rfl:     oseltamivir (TAMIFLU) 30 MG capsule, Take 1 capsule by mouth Every 12 (Twelve) Hours. Ends 2/29/24, Disp: , Rfl:     polyethylene glycol (MiraLax) 17 g packet, Take 17 g by mouth Daily., Disp: , Rfl:     Polyvinyl Alcohol-Povidone PF (HYPOTEARS) 1.4-0.6 % ophthalmic solution, Administer 1 drop to both eyes 4 (Four) Times a Day., Disp: , Rfl:     predniSONE (DELTASONE) 20 MG tablet, Take 1 tablet by mouth 2 (Two) Times a Day., Disp: 10 tablet, Rfl: 0    Probiotic Product  (Risaquad-2) capsule capsule, Take 2 capsules by mouth Daily., Disp: , Rfl:     promethazine (PHENERGAN) 25 MG suppository, Insert 1 suppository into the rectum Every 6 (Six) Hours As Needed for Nausea or Vomiting., Disp: , Rfl:     simethicone (MYLICON) 80 MG chewable tablet, Chew 1 tablet Every 6 (Six) Hours As Needed for Flatulence (GI discomfort)., Disp: , Rfl:     Skin Protectants, Misc. (AQUAPHOR LIP REPAIR EX), Apply 1 application  topically 4 (Four) Times a Day As Needed (to lips)., Disp: , Rfl:     Skin Protectants, Misc. (eucerin) cream, Apply 1 Application topically to the appropriate area as directed As Needed for Dry Skin., Disp: , Rfl:     sodium chloride 0.65 % nasal spray, 2 sprays into the nostril(s) as directed by provider As Needed for Congestion., Disp: , Rfl:     Sodium Phosphates (fleet enema) 7-19 GM/118ML enema, Insert 1 enema into the rectum 1 (One) Time As Needed., Disp: , Rfl:     sodium zirconium cyclosilicate (Lokelma) 10 g pack, Take 10 g by mouth Daily., Disp: , Rfl:     Urea 40 % lotion, Apply 1 application  topically 2 (Two) Times a Day As Needed (apply to left 4th toe bunion as needed)., Disp: , Rfl:     Wheat Dextrin (BENEFIBER DRINK MIX PO), Take 1 packet by mouth Daily., Disp: , Rfl:     white petrolatum ointment, Apply 1 Application topically to the appropriate area as directed As Needed for Dry Skin. Apply to Nostrils, Disp: , Rfl:     Wound Dressings (SILVASORB ANTIMICROBIAL SHEET EX), Apply 1 application  topically As Needed (TO LEFT BUTTOCKS AS NEEDED)., Disp: , Rfl:     Allergies   Allergen Reactions    Bactrim [Sulfamethoxazole-Trimethoprim] Unknown - High Severity    Bee Venom     Iodinated Contrast Media     Nsaids        Social History     Tobacco Use    Smoking status: Never    Smokeless tobacco: Never   Vaping Use    Vaping status: Never Used   Substance Use Topics    Alcohol use: No    Drug use: No       Family History   Family history unknown: Yes       Review of  "Systems   Constitutional:  Negative for appetite change, chills, fatigue, fever and unexpected weight change.   Gastrointestinal:  Negative for abdominal distention, abdominal pain, anal bleeding, blood in stool, constipation, diarrhea, nausea and vomiting.   Genitourinary:  Negative for dyspareunia, dysuria, menstrual problem, pelvic pain, vaginal bleeding, vaginal discharge and vaginal pain.       /58   Ht 152.4 cm (60\")   Wt 67.4 kg (148 lb 9.6 oz)   BMI 29.02 kg/m²     Physical Exam  Vitals reviewed.   Constitutional:       General: She is not in acute distress.     Appearance: Normal appearance. She is well-developed. She is not ill-appearing, toxic-appearing or diaphoretic.   Cardiovascular:      Rate and Rhythm: Normal rate and regular rhythm.      Heart sounds: Normal heart sounds.   Pulmonary:      Effort: Pulmonary effort is normal. No respiratory distress.      Breath sounds: Normal breath sounds. No stridor. No wheezing.   Abdominal:      General: Bowel sounds are normal.      Palpations: Abdomen is soft. There is no mass.   Genitourinary:     General: Normal vulva.      Labia:         Right: No rash, tenderness or lesion.         Left: No rash, tenderness or lesion.       Urethra: No prolapse, urethral pain, urethral swelling or urethral lesion.      Uterus: Not deviated, not enlarged, not fixed and not tender.       Adnexa:         Right: No mass, tenderness or fullness.          Left: No mass, tenderness or fullness.        Rectum: No external hemorrhoid.      Comments: Pt did not tolerate speculum exam- tried to kick caregivers and provider. Pt did not tolerate bimanual exam. Exam limited d/t patient cooperation.   Musculoskeletal:         General: No tenderness. Normal range of motion.   Skin:     General: Skin is warm.      Findings: No erythema or rash.   Neurological:      Mental Status: She is alert. Mental status is at baseline.      Cranial Nerves: No cranial nerve deficit.      " Motor: Weakness present.      Coordination: Coordination abnormal.      Gait: Gait abnormal.            Assessment/Plan    1) GYN HM: No pap smear needed. Pt uncooperative with exam. MMG 7/24 negative  2) : No VB.   3) Bone health - Weight bearing exercise, dietary calcium recommendations and vitamin D reviewed. Last bone density 5/2021 with osteoporosis. On prolia. Needs another bone density 5/2023. This was not done. Managed per PCP.   4) Body mass index is 29.02 kg/m².  Diet and Exercise discussed  5) Vulvar/mons pruitis. Exam normal today. Recommend Mycolog cream bid x 7 days prn- pt already has this on her med list.  6) Social: resident of [facility]. Nonverbal. In wheel chair.    Parts of this document have been copied or forwarded from her previous visits and have been reviewed, updated and edited as indicated.       Follow up prn        Diagnoses and all orders for this visit:    Routine gynecological examination  -     Cancel: POC Urinalysis Dipstick  -     Pap IG (Image Guided)    Cervical smear, as part of routine gynecological examination  -     Cancel: POC Urinalysis Dipstick  -     Pap IG (Image Guided)    Other orders  -     Prolia 60 MG/ML solution prefilled syringe syringe  -     Dextran 70-Hypromellose (Artificial Tears) 0.1-0.3 % solution  -     Magnesium Glycinate 100 MG capsule  -     nystatin (MYCOSTATIN) 326397 UNIT/GM cream  -     tuberculin 5 UNIT/0.1ML injection  -     valACYclovir (VALTREX) 1000 MG tablet        LASHAE Rodriguez  7/24/2024  10:53 EDT

## 2024-07-26 DIAGNOSIS — Z78.0 POST-MENOPAUSAL: Primary | ICD-10-CM

## 2024-08-07 ENCOUNTER — APPOINTMENT (OUTPATIENT)
Dept: BONE DENSITY | Facility: HOSPITAL | Age: 84
End: 2024-08-07
Payer: MEDICARE

## 2024-08-07 PROCEDURE — 77080 DXA BONE DENSITY AXIAL: CPT

## 2024-10-09 ENCOUNTER — TELEPHONE (OUTPATIENT)
Dept: NEUROLOGY | Facility: CLINIC | Age: 84
End: 2024-10-09
Payer: MEDICARE

## 2024-10-09 NOTE — TELEPHONE ENCOUNTER
Rajwinder, Nurse at [facility], called to report patient was sitting at dining table this morning for breakfast when she slumped forward and to side, head down, it appeared seizure-like for her. She reportedly lost her pulse, entire episode lasted approximately 3 min. Verbal and tactile stimuli administered, patient came to on her own, was alert. Vitals: /70, respirations WNL no agonal breathing. No fever, chills, nothing proceeding event.

## 2024-10-28 ENCOUNTER — TELEMEDICINE (OUTPATIENT)
Dept: NEUROLOGY | Facility: CLINIC | Age: 84
End: 2024-10-28
Payer: MEDICARE

## 2024-10-28 ENCOUNTER — TELEPHONE (OUTPATIENT)
Dept: NEUROLOGY | Facility: CLINIC | Age: 84
End: 2024-10-28

## 2024-10-28 DIAGNOSIS — G40.909 EPILEPSY, NOT REFRACTORY: Primary | ICD-10-CM

## 2024-10-28 DIAGNOSIS — F88 DEVELOPMENTAL MENTAL DISORDER: ICD-10-CM

## 2024-10-28 PROCEDURE — 1159F MED LIST DOCD IN RCRD: CPT | Performed by: PSYCHIATRY & NEUROLOGY

## 2024-10-28 PROCEDURE — 99213 OFFICE O/P EST LOW 20 MIN: CPT | Performed by: PSYCHIATRY & NEUROLOGY

## 2024-10-28 PROCEDURE — 1160F RVW MEDS BY RX/DR IN RCRD: CPT | Performed by: PSYCHIATRY & NEUROLOGY

## 2024-10-28 NOTE — PROGRESS NOTES
Chief complaint: History of seizures      Patient ID: Erica Crocektt is a 84 y.o. female.    HPI:This was an audio and video enabled telemedicine encounter.  The patient's caregiver did consent to this type of visit.  I am here in the neurology clinic and they are at her place of residence.  She has apparently had 1 seizure-like episode in September.  It lasted for few seconds.  She continues to take seizure medication including lamotrigine and Keppra.  She is also on gabapentin.  No other issues neurologically.      The following portions of the patient's history were reviewed and updated as appropriate: allergies, current medications, past family history, past medical history, past social history, past surgical history and problem list.    Review of Systems   I have reviewed the review of systems above performed by my medical assistant.      There were no vitals filed for this visit.    Neurological Exam     Physical Exam    Procedures    Assessment/Plan: We are going to check a Lamictal level.  We will continue her current dosing of medication for now though.  Will see her back in 6 months or sooner if needed.  A total of 20 minutes was spent face-to-face with the patient today.  Of that greater than 50% of this time was spent discussing signs and symptoms of seizure, patient education, plan of care and prognosis.         Diagnoses and all orders for this visit:    1. Epilepsy, not refractory (Primary)    2. Developmental mental disorder           Alex Peralta II, MD

## 2024-10-28 NOTE — TELEPHONE ENCOUNTER
Provider: TING    Caller: SOREN    Relationship to Patient: DENICE MARIN    Phone Number: [phone], EXT [phone]    Reason for Call: [facility] 6 MON[facility]U [facility] AND WOULD LIKE A VIDEO VISIT IF POSSIBLE.      PLEASE REVIEW AND ADVISE     THANK YOU

## 2024-12-17 ENCOUNTER — OFFICE VISIT (OUTPATIENT)
Age: 84
End: 2024-12-17
Payer: MEDICARE

## 2024-12-17 ENCOUNTER — HOSPITAL ENCOUNTER (OUTPATIENT)
Dept: CARDIOLOGY | Facility: HOSPITAL | Age: 84
Discharge: HOME | End: 2024-12-17
Admitting: PHYSICIAN ASSISTANT
Payer: MEDICARE

## 2024-12-17 VITALS
BODY MASS INDEX: 29.82 KG/M2 | OXYGEN SATURATION: 94 % | HEART RATE: 70 BPM | DIASTOLIC BLOOD PRESSURE: 80 MMHG | WEIGHT: 151.9 LBS | SYSTOLIC BLOOD PRESSURE: 130 MMHG | HEIGHT: 60 IN

## 2024-12-17 DIAGNOSIS — F88 DEVELOPMENTAL MENTAL DISORDER: ICD-10-CM

## 2024-12-17 DIAGNOSIS — N18.30 STAGE 3 CHRONIC KIDNEY DISEASE, UNSPECIFIED WHETHER STAGE 3A OR 3B CKD: ICD-10-CM

## 2024-12-17 DIAGNOSIS — E78.00 HYPERCHOLESTEROLEMIA: ICD-10-CM

## 2024-12-17 DIAGNOSIS — I10 ESSENTIAL (PRIMARY) HYPERTENSION: ICD-10-CM

## 2024-12-17 DIAGNOSIS — I35.0 NONRHEUMATIC AORTIC VALVE STENOSIS: Primary | ICD-10-CM

## 2024-12-17 DIAGNOSIS — R52 NONVERBAL SIGNS OF PAIN: ICD-10-CM

## 2024-12-17 DIAGNOSIS — I35.0 NONRHEUMATIC AORTIC VALVE STENOSIS: ICD-10-CM

## 2024-12-17 PROCEDURE — 93242 EXT ECG>48HR<7D RECORDING: CPT

## 2024-12-17 NOTE — PROGRESS NOTES
CARDIOLOGY        Patient Name: Erica Crockett  :1940  Age: 84 y.o.  Primary Cardiologist: Bob Cortez MD  Encounter Provider:  Jose To PA-C    Date of Service: 24            CHIEF COMPLAINT / REASON FOR OFFICE VISIT     Follow-up      HISTORY OF PRESENT ILLNESS       HPI  Erica Crockett is a 84 y.o. female who presents today for follow-up.     Pt has a  history significant for dementia/nonverbal, seizure disorder, CKD, hypertension, nonrheumatic aortic valve stenosis, and hypercholesterolemia presents for follow-up.  Patient was last seen in office on 2023 where she was seen for blood pressure follow-up.  She had medications adjusted before.  She had no new complaints at this time.  She had her amlodipine changed from 5 mg daily to 5 mg in the morning and 2.5 mg in the evening.  She was to follow back up in 6 months.    Per caregiver at nursing facility, patient has had 2 episodes 1 in October and 1 on 2024 where she would become unresponsive and not talking.  She would stop breathing.  Her heart rate and blood pressure dropped.  She was sent to the ER where she had a evaluation.  It is felt that she had a seizure.  She has a history of seizures.  She had labs and CT of her head performed at that time.  Per Garbe give her she has not had any issues since.  Her PCP that comes to the facility recommended her come in to the office today for a cardiac monitor.  Per caregiver she is acting her normal self with no issues today.  Patient is nonverbal.    The following portions of the patient's history were reviewed and updated as appropriate: allergies, current medications, past family history, past medical history, past social history, past surgical history and problem list.      VITAL SIGNS     There were no vitals taken for this visit.    @[email or web address]  Wt Readings from Last 3 Encounters:   24 67.4 kg (148 lb 9.6 oz)   23 73.6 kg (162 lb 3.2  oz)   06/26/23 68.1 kg (150 lb 3.2 oz)     There is no height or weight on file to calculate BMI.        PHYSICAL EXAMINATION     Constitutional:       Appearance: Not in distress. Chronically ill-appearing.   Pulmonary:      Effort: Pulmonary effort is normal.      Breath sounds: Normal breath sounds.   Cardiovascular:      Normal rate. Regular rhythm.   Skin:     General: Skin is warm.   Neurological:      Mental Status: Alert. Mental status is at baseline.           REVIEWED DATA       ECG 12 Lead    Date/Time: 12/17/2024 1:38 PM  Performed by: Jose To PA-C    Authorized by: Jose To PA-C  Comparison: compared with previous ECG   Similar to previous ECG  Rhythm: sinus rhythm  Rate: normal  BPM: 67  Conduction: 1st degree AV block  QRS axis: left    Clinical impression: non-specific ECG  Comments: Similar to previous EKG          Cardiac Procedures:    Transthoracic echo on 12/24/2022  Interpretation Summary       Left ventricular systolic function is normal. Calculated left ventricular EF = 64.2%    Left ventricular diastolic function was normal.    Saline test results are negative.    There is calcification of the aortic valve.    Aortic valve area is 2.1 cm2.    Aortic valve maximum pressure gradient is 13 mmHg. Aortic valve mean pressure gradient is 8 mmHg.       Lab Results   Component Value Date     (H) 02/29/2024     08/03/2023    K 4.5 02/29/2024    K 3.8 08/03/2023     (H) 02/29/2024     08/03/2023    CO2 29.0 02/29/2024    CO2 26.8 08/03/2023    BUN 47 (H) 02/29/2024    BUN 34 (H) 08/03/2023    CREATININE 1.35 (H) 02/29/2024    CREATININE 1.45 (H) 08/03/2023    EGFRIFNONA 42 (L) 02/11/2022    EGFRIFNONA 32 (L) 02/10/2022    EGFRIFAFRI 47 (L) 01/24/2022    EGFRIFAFRI  09/21/2016      Comment:      <15 Indicative of kidney failure.    GLUCOSE 111 (H) 02/29/2024    GLUCOSE 98 08/03/2023    CALCIUM 9.9 02/29/2024    CALCIUM 9.9 08/03/2023    ALBUMIN 3.4 (L)  02/29/2024    ALBUMIN 3.4 (L) 06/17/2023    BILITOT 0.2 02/29/2024    BILITOT 0.3 06/17/2023    AST 35 (H) 02/29/2024    AST 18 06/17/2023    ALT 25 02/29/2024    ALT 18 06/17/2023     Lab Results   Component Value Date    WBC 8.9 09/25/2024    WBC 9.83 09/24/2024    HGB 11.2 (L) 09/25/2024    HGB 12.4 09/24/2024    HCT 35.5 (L) 09/25/2024    HCT 41.1 09/24/2024    MCV 90.8 09/25/2024    MCV 95.4 09/24/2024     09/25/2024     09/24/2024     Lab Results   Component Value Date    PROBNP 717.0 02/29/2024    PROBNP 775.8 03/05/2023    .2 09/21/2024     Lab Results   Component Value Date    CKTOTAL 138 12/23/2022    TROPONINI 0.017 09/21/2024    TROPONINT 22 (H) 02/29/2024     Lab Results   Component Value Date    TSH 1.010 01/10/2023    TSH 1.840 12/24/2022             ASSESSMENT & PLAN     Diagnoses and all orders for this visit:    1. Nonrheumatic aortic valve stenosis (Primary)    2. Essential (primary) hypertension    3. Hypercholesterolemia    4. Stage 3 chronic kidney disease, unspecified whether stage 3a or 3b CKD    5. Nonverbal signs of pain    6. Developmental mental disorder      History of hypoxemic respiratory failure without any recurrence and does not require supplemental oxygen  Dementia/nonverbal patient  Essential hypertension on amlodipine  Chronic pain from hip joint problems-follows up with pain medicine-better pain control  Seizure disorder  CKD stage 3 -creatinine back to normal  Hypercholesterolemia on statin  Mild aortic valve stenosis on echocardiogram done in February 2022    Sounds like patient had a seizure at last ER visit.  Patient is a DNR and is on palliative care.  I do not know how much benefit a monitor will be.  I am not sure how long she will be able to wear it if any.  She is otherwise acting her normal self per caregiver.  She is in no distress in office today.  She is not on any rate medications.  I will have her follow-up in 6 weeks with Dr. Concepcion  Anna.    No follow-ups on file.    Future Appointments         Provider Department Center    12/17/2024 1:00 PM Jose To PA-C [facility] CARDIOLOGY [facility]    4/30/2025 10:20 AM Alex Peralta II, MD [facility] NEUROLOGY [facility]                MEDICATIONS         Discharge Medications            Accurate as of December 17, 2024 12:34 PM. If you have any questions, ask your nurse or doctor.                Continue These Medications        Instructions Start Date   A&D ointment   Apply externally, 2 Times Daily      acetaminophen 325 MG tablet  Commonly known as: TYLENOL   650 mg, Oral, Every 6 Hours PRN      amLODIPine 5 MG tablet  Commonly known as: NORVASC   5 mg, Oral, Daily      AQUAPHOR LIP REPAIR EX   1 application , Apply externally, 4 Times Daily PRN      eucerin cream   1 application , Topical, As Needed      ARGINAID EXTRA PO   1 Units, Oral, Daily      Artificial Tears 0.1-0.3 % solution  Generic drug: Dextran 70-Hypromellose       atorvastatin 20 MG tablet  Commonly known as: LIPITOR   20 mg, Oral, Daily      BENEFIBER DRINK MIX PO   1 packet, Oral, Daily      Biofreeze Roll-On 4 % gel  Generic drug: Menthol (Topical Analgesic)   1 application , Apply externally, Daily, For hips      bisacodyl 5 MG EC tablet  Commonly known as: DULCOLAX   10 mg, Oral, Daily PRN      bisacodyl 10 MG suppository  Commonly known as: DULCOLAX   10 mg, Rectal, Daily PRN      carbamide peroxide 6.5 % otic solution  Commonly known as: DEBROX   4 drops, Both Ears, As Needed      cetirizine 10 MG tablet  Commonly known as: zyrTEC   10 mg, Oral, Daily      cholecalciferol 25 MCG (1000 UT) tablet  Commonly known as: VITAMIN D3   1,000 Units, Oral, Daily      dextromethorphan-guaifenesin  MG/5ML syrup  Commonly known as: ROBITUSSIN-DM   10 mL, Oral, Every 4 Hours PRN      Diazepam 10MG rectal suppository  Commonly known as: Valium   20 mg, Rectal, As Needed      diphenhydrAMINE 25 mg  capsule  Commonly known as: BENADRYL   25 mg, Oral, Every 6 Hours PRN      EPINEPHrine 0.3 MG/0.3ML solution auto-injector injection  Commonly known as: EPIPEN   Inject 0.3 mL under the skin into the appropriate area as directed 1 (One) Time As Needed.      FIBER-LAX PO   1 tablet, Oral, 2 Times Daily      fleet enema 7-19 GM/118ML enema   1 enema, Rectal, Once As Needed      gabapentin 300 MG capsule  Commonly known as: NEURONTIN   300 mg, Oral, Nightly      HYDROcodone-acetaminophen 7.5-325 MG per tablet  Commonly known as: NORCO   1 tablet, Oral, Every 8 Hours PRN      hydrocortisone 2.5 % cream   Apply 1 Application topically to the appropriate area as directed 2 (Two) Times a Day.      ipratropium-albuterol 0.5-2.5 mg/3 ml nebulizer  Commonly known as: DUO-NEB   3 mL, Nebulization, 4 Times Daily PRN      ipratropium-albuterol 0.5-2.5 mg/3 ml nebulizer  Commonly known as: DUO-NEB   3 mL, Nebulization, Every 4 Hours PRN      lamoTRIgine 200 MG tablet  Commonly known as: LaMICtal   200 mg, Oral, 2 Times Daily      levETIRAcetam 500 MG tablet  Commonly known as: KEPPRA   500 mg, Oral, 2 Times Daily      Lokelma 10 g packet  Generic drug: sodium zirconium cyclosilicate   10 g, Oral, Daily      loperamide 2 MG capsule  Commonly known as: IMODIUM   2 mg, Oral, 4 Times Daily PRN      LORazepam 0.5 MG tablet  Commonly known as: ATIVAN   0.5 mg, Oral, Every 6 Hours PRN      lubiprostone 8 MCG capsule  Commonly known as: AMITIZA   8 mcg, Oral, 2 Times Daily With Meals      Magnesium Glycinate 100 MG capsule       magnesium hydroxide 400 MG/5ML suspension  Commonly known as: MILK OF MAGNESIA   30 mL, Oral, Daily PRN      Susan's magic butt cream   1 Application, Topical, As Needed      MiraLax 17 g packet  Generic drug: polyethylene glycol   17 g, Oral, Daily      multivitamin with minerals tablet tablet   1 tablet, Oral, Daily      neomycin-bacitracin-polymyxin 5-400-5000 ointment   1 Application, Topical, Daily       nystatin 489338 UNIT/GM cream  Commonly known as: MYCOSTATIN       nystatin-triamcinolone 775047-9.1 UNIT/GM-% cream  Commonly known as: MYCOLOG II   1 application , Topical, 2 Times Daily      omeprazole 20 MG capsule  Commonly known as: priLOSEC   40 mg, Oral, Daily      oseltamivir 30 MG capsule  Commonly known as: TAMIFLU   30 mg, Oral, Every 12 Hours Scheduled, Ends 2/29/24      Polyvinyl Alcohol-Povidone PF 1.4-0.6 % ophthalmic solution  Commonly known as: ARTIFICIAL TEARS   1 drop, Both Eyes, 4 Times Daily      predniSONE 20 MG tablet  Commonly known as: DELTASONE   20 mg, Oral, 2 Times Daily      Preparation H 5-14.4 % cream  Generic drug: Lidocaine-Glycerin   1 Application, Apply externally, 2 Times Daily PRN      Prolia 60 MG/ML solution prefilled syringe syringe  Generic drug: denosumab       promethazine 25 MG suppository  Commonly known as: PHENERGAN   25 mg, Rectal, Every 6 Hours PRN      Risaquad-2 capsule capsule   2 capsules, Oral, Daily      SILVASORB ANTIMICROBIAL SHEET EX   1 application , Apply externally, As Needed      simethicone 80 MG chewable tablet  Commonly known as: MYLICON   80 mg, Oral, Every 6 Hours PRN      sodium chloride 0.65 % nasal spray   2 sprays, Nasal, As Needed      tuberculin 5 UNIT/0.1ML injection       Urea 40 % lotion   1 application , Apply externally, 2 Times Daily PRN      valACYclovir 1000 MG tablet  Commonly known as: VALTREX       white petrolatum ointment   1 Application, Topical, As Needed, Apply to Nostrils                    **Dragon Disclaimer:   Much of this encounter note is an electronic transcription/translation of spoken language to printed text. The electronic translation of spoken language may permit erroneous, or at times, nonsensical words or phrases to be inadvertently transcribed. Although I have reviewed the note for such errors, some may still exist.

## 2025-01-01 ENCOUNTER — LAB REQUISITION (OUTPATIENT)
Dept: LAB | Facility: HOSPITAL | Age: 85
End: 2025-01-01
Payer: MEDICARE

## 2025-01-01 ENCOUNTER — HOSPITAL ENCOUNTER (INPATIENT)
Facility: HOSPITAL | Age: 85
LOS: 3 days | Discharge: NURSING, REHAB OR LONG-TERM CARE | DRG: 178 | End: 2025-06-01
Attending: EMERGENCY MEDICINE | Admitting: INTERNAL MEDICINE
Payer: MEDICARE

## 2025-01-01 ENCOUNTER — APPOINTMENT (OUTPATIENT)
Dept: GENERAL RADIOLOGY | Facility: HOSPITAL | Age: 85
DRG: 178 | End: 2025-01-01
Payer: MEDICARE

## 2025-01-01 ENCOUNTER — TELEPHONE (OUTPATIENT)
Dept: CASE MANAGEMENT | Facility: OTHER | Age: 85
End: 2025-01-01
Payer: MEDICARE

## 2025-01-01 VITALS
OXYGEN SATURATION: 96 % | SYSTOLIC BLOOD PRESSURE: 157 MMHG | WEIGHT: 171.96 LBS | RESPIRATION RATE: 20 BRPM | HEIGHT: 60 IN | TEMPERATURE: 97.4 F | BODY MASS INDEX: 33.76 KG/M2 | DIASTOLIC BLOOD PRESSURE: 76 MMHG | HEART RATE: 100 BPM

## 2025-01-01 DIAGNOSIS — J18.9 PNEUMONIA, UNSPECIFIED ORGANISM: ICD-10-CM

## 2025-01-01 DIAGNOSIS — N17.9 AKI (ACUTE KIDNEY INJURY): ICD-10-CM

## 2025-01-01 DIAGNOSIS — J18.9 PNEUMONIA DUE TO INFECTIOUS ORGANISM, UNSPECIFIED LATERALITY, UNSPECIFIED PART OF LUNG: Primary | ICD-10-CM

## 2025-01-01 DIAGNOSIS — I95.9 HYPOTENSION, UNSPECIFIED HYPOTENSION TYPE: ICD-10-CM

## 2025-01-01 DIAGNOSIS — R13.12 OROPHARYNGEAL DYSPHAGIA: ICD-10-CM

## 2025-01-01 LAB
25(OH)D3 SERPL-MCNC: 45.8 NG/ML (ref 30–100)
ALBUMIN SERPL-MCNC: 3.4 G/DL (ref 3.5–5.2)
ALBUMIN SERPL-MCNC: 3.9 G/DL (ref 3.5–5.2)
ALBUMIN/GLOB SERPL: 1.1 G/DL
ALBUMIN/GLOB SERPL: 1.2 G/DL
ALP SERPL-CCNC: 111 U/L (ref 39–117)
ALP SERPL-CCNC: 138 U/L (ref 39–117)
ALT SERPL W P-5'-P-CCNC: 17 U/L (ref 1–33)
ALT SERPL W P-5'-P-CCNC: 42 U/L (ref 1–33)
ANION GAP SERPL CALCULATED.3IONS-SCNC: 10.5 MMOL/L (ref 5–15)
ANION GAP SERPL CALCULATED.3IONS-SCNC: 14.3 MMOL/L (ref 5–15)
ANION GAP SERPL CALCULATED.3IONS-SCNC: 15 MMOL/L (ref 5–15)
ANION GAP SERPL CALCULATED.3IONS-SCNC: 16.1 MMOL/L (ref 5–15)
ANION GAP SERPL CALCULATED.3IONS-SCNC: 18.9 MMOL/L (ref 5–15)
AST SERPL-CCNC: 16 U/L (ref 1–32)
AST SERPL-CCNC: 42 U/L (ref 1–32)
BACTERIA SPEC AEROBE CULT: NORMAL
BASOPHILS # BLD AUTO: 0.01 10*3/MM3 (ref 0–0.2)
BASOPHILS # BLD AUTO: 0.04 10*3/MM3 (ref 0–0.2)
BASOPHILS NFR BLD AUTO: 0.1 % (ref 0–1.5)
BASOPHILS NFR BLD AUTO: 0.4 % (ref 0–1.5)
BILIRUB SERPL-MCNC: 0.2 MG/DL (ref 0–1.2)
BILIRUB SERPL-MCNC: 0.3 MG/DL (ref 0–1.2)
BUN SERPL-MCNC: 22.9 MG/DL (ref 8–23)
BUN SERPL-MCNC: 33.7 MG/DL (ref 8–23)
BUN SERPL-MCNC: 51.2 MG/DL (ref 8–23)
BUN SERPL-MCNC: 65.6 MG/DL (ref 8–23)
BUN SERPL-MCNC: 71.2 MG/DL (ref 8–23)
BUN/CREAT SERPL: 24.6 (ref 7–25)
BUN/CREAT SERPL: 29.7 (ref 7–25)
BUN/CREAT SERPL: 29.8 (ref 7–25)
BUN/CREAT SERPL: 34.7 (ref 7–25)
BUN/CREAT SERPL: 37.1 (ref 7–25)
CALCIUM SPEC-SCNC: 9.1 MG/DL (ref 8.6–10.5)
CALCIUM SPEC-SCNC: 9.3 MG/DL (ref 8.6–10.5)
CALCIUM SPEC-SCNC: 9.8 MG/DL (ref 8.6–10.5)
CALCIUM SPEC-SCNC: 9.8 MG/DL (ref 8.6–10.5)
CALCIUM SPEC-SCNC: 9.9 MG/DL (ref 8.6–10.5)
CHLORIDE SERPL-SCNC: 105 MMOL/L (ref 98–107)
CHLORIDE SERPL-SCNC: 111 MMOL/L (ref 98–107)
CHLORIDE SERPL-SCNC: 114 MMOL/L (ref 98–107)
CHLORIDE SERPL-SCNC: 115 MMOL/L (ref 98–107)
CHLORIDE SERPL-SCNC: 124 MMOL/L (ref 98–107)
CHOLEST SERPL-MCNC: 158 MG/DL (ref 0–200)
CO2 SERPL-SCNC: 16.1 MMOL/L (ref 22–29)
CO2 SERPL-SCNC: 19.9 MMOL/L (ref 22–29)
CO2 SERPL-SCNC: 20.7 MMOL/L (ref 22–29)
CO2 SERPL-SCNC: 21 MMOL/L (ref 22–29)
CO2 SERPL-SCNC: 21.5 MMOL/L (ref 22–29)
CREAT SERPL-MCNC: 0.93 MG/DL (ref 0.57–1)
CREAT SERPL-MCNC: 1.13 MG/DL (ref 0.57–1)
CREAT SERPL-MCNC: 1.38 MG/DL (ref 0.57–1)
CREAT SERPL-MCNC: 1.89 MG/DL (ref 0.57–1)
CREAT SERPL-MCNC: 2.4 MG/DL (ref 0.57–1)
D-LACTATE SERPL-SCNC: 1.6 MMOL/L (ref 0.5–2)
DEPRECATED RDW RBC AUTO: 49.8 FL (ref 37–54)
DEPRECATED RDW RBC AUTO: 51.8 FL (ref 37–54)
DEPRECATED RDW RBC AUTO: 51.8 FL (ref 37–54)
DEPRECATED RDW RBC AUTO: 53.1 FL (ref 37–54)
DEPRECATED RDW RBC AUTO: 53.8 FL (ref 37–54)
EGFRCR SERPLBLD CKD-EPI 2021: 19.5 ML/MIN/1.73
EGFRCR SERPLBLD CKD-EPI 2021: 25.9 ML/MIN/1.73
EGFRCR SERPLBLD CKD-EPI 2021: 37.8 ML/MIN/1.73
EGFRCR SERPLBLD CKD-EPI 2021: 48.1 ML/MIN/1.73
EGFRCR SERPLBLD CKD-EPI 2021: 60.7 ML/MIN/1.73
EOSINOPHIL # BLD AUTO: 0 10*3/MM3 (ref 0–0.4)
EOSINOPHIL # BLD AUTO: 0.32 10*3/MM3 (ref 0–0.4)
EOSINOPHIL NFR BLD AUTO: 0 % (ref 0.3–6.2)
EOSINOPHIL NFR BLD AUTO: 3.2 % (ref 0.3–6.2)
ERYTHROCYTE [DISTWIDTH] IN BLOOD BY AUTOMATED COUNT: 14.5 % (ref 12.3–15.4)
ERYTHROCYTE [DISTWIDTH] IN BLOOD BY AUTOMATED COUNT: 14.6 % (ref 12.3–15.4)
ERYTHROCYTE [DISTWIDTH] IN BLOOD BY AUTOMATED COUNT: 14.8 % (ref 12.3–15.4)
ERYTHROCYTE [DISTWIDTH] IN BLOOD BY AUTOMATED COUNT: 15.2 % (ref 12.3–15.4)
ERYTHROCYTE [DISTWIDTH] IN BLOOD BY AUTOMATED COUNT: 15.3 % (ref 12.3–15.4)
FLUAV SUBTYP SPEC NAA+PROBE: NOT DETECTED
FLUBV RNA ISLT QL NAA+PROBE: NOT DETECTED
FOLATE SERPL-MCNC: 15.7 NG/ML (ref 4.78–24.2)
GLOBULIN UR ELPH-MCNC: 2.8 GM/DL
GLOBULIN UR ELPH-MCNC: 3.5 GM/DL
GLUCOSE SERPL-MCNC: 108 MG/DL (ref 65–99)
GLUCOSE SERPL-MCNC: 133 MG/DL (ref 65–99)
GLUCOSE SERPL-MCNC: 82 MG/DL (ref 65–99)
GLUCOSE SERPL-MCNC: 86 MG/DL (ref 65–99)
GLUCOSE SERPL-MCNC: 96 MG/DL (ref 65–99)
HBA1C MFR BLD: 5.69 % (ref 4.8–5.6)
HCT VFR BLD AUTO: 33.8 % (ref 34–46.6)
HCT VFR BLD AUTO: 33.9 % (ref 34–46.6)
HCT VFR BLD AUTO: 36.5 % (ref 34–46.6)
HCT VFR BLD AUTO: 39.4 % (ref 34–46.6)
HCT VFR BLD AUTO: 40.9 % (ref 34–46.6)
HDLC SERPL-MCNC: 56 MG/DL (ref 40–60)
HGB BLD-MCNC: 10.3 G/DL (ref 12–15.9)
HGB BLD-MCNC: 10.6 G/DL (ref 12–15.9)
HGB BLD-MCNC: 11.5 G/DL (ref 12–15.9)
HGB BLD-MCNC: 12 G/DL (ref 12–15.9)
HGB BLD-MCNC: 13.7 G/DL (ref 12–15.9)
HOLD SPECIMEN: NORMAL
HOLD SPECIMEN: NORMAL
IMM GRANULOCYTES # BLD AUTO: 0.01 10*3/MM3 (ref 0–0.05)
IMM GRANULOCYTES # BLD AUTO: 0.04 10*3/MM3 (ref 0–0.05)
IMM GRANULOCYTES NFR BLD AUTO: 0.1 % (ref 0–0.5)
IMM GRANULOCYTES NFR BLD AUTO: 0.4 % (ref 0–0.5)
LDLC SERPL CALC-MCNC: 83 MG/DL (ref 0–100)
LDLC/HDLC SERPL: 1.46 {RATIO}
LEVETIRACETAM SERPL-MCNC: 62.8 UG/ML (ref 10–40)
LYMPHOCYTES # BLD AUTO: 0.88 10*3/MM3 (ref 0.7–3.1)
LYMPHOCYTES # BLD AUTO: 3.19 10*3/MM3 (ref 0.7–3.1)
LYMPHOCYTES NFR BLD AUTO: 32.3 % (ref 19.6–45.3)
LYMPHOCYTES NFR BLD AUTO: 8.8 % (ref 19.6–45.3)
MAGNESIUM SERPL-MCNC: 2.6 MG/DL (ref 1.6–2.4)
MCH RBC QN AUTO: 28.8 PG (ref 26.6–33)
MCH RBC QN AUTO: 29.2 PG (ref 26.6–33)
MCH RBC QN AUTO: 29.5 PG (ref 26.6–33)
MCH RBC QN AUTO: 29.9 PG (ref 26.6–33)
MCH RBC QN AUTO: 32.1 PG (ref 26.6–33)
MCHC RBC AUTO-ENTMCNC: 29.3 G/DL (ref 31.5–35.7)
MCHC RBC AUTO-ENTMCNC: 30.5 G/DL (ref 31.5–35.7)
MCHC RBC AUTO-ENTMCNC: 31.3 G/DL (ref 31.5–35.7)
MCHC RBC AUTO-ENTMCNC: 31.5 G/DL (ref 31.5–35.7)
MCHC RBC AUTO-ENTMCNC: 34.8 G/DL (ref 31.5–35.7)
MCV RBC AUTO: 92.3 FL (ref 79–97)
MCV RBC AUTO: 93.6 FL (ref 79–97)
MCV RBC AUTO: 95.5 FL (ref 79–97)
MCV RBC AUTO: 95.8 FL (ref 79–97)
MCV RBC AUTO: 98.3 FL (ref 79–97)
MONOCYTES # BLD AUTO: 0.33 10*3/MM3 (ref 0.1–0.9)
MONOCYTES # BLD AUTO: 0.51 10*3/MM3 (ref 0.1–0.9)
MONOCYTES NFR BLD AUTO: 3.3 % (ref 5–12)
MONOCYTES NFR BLD AUTO: 5.2 % (ref 5–12)
NEUTROPHILS NFR BLD AUTO: 5.79 10*3/MM3 (ref 1.7–7)
NEUTROPHILS NFR BLD AUTO: 58.5 % (ref 42.7–76)
NEUTROPHILS NFR BLD AUTO: 8.81 10*3/MM3 (ref 1.7–7)
NEUTROPHILS NFR BLD AUTO: 87.7 % (ref 42.7–76)
NRBC BLD AUTO-RTO: 0 /100 WBC (ref 0–0.2)
NT-PROBNP SERPL-MCNC: 807 PG/ML (ref 0–1800)
PHOSPHATE SERPL-MCNC: 6.6 MG/DL (ref 2.5–4.5)
PLATELET # BLD AUTO: 142 10*3/MM3 (ref 140–450)
PLATELET # BLD AUTO: 155 10*3/MM3 (ref 140–450)
PLATELET # BLD AUTO: 182 10*3/MM3 (ref 140–450)
PLATELET # BLD AUTO: 193 10*3/MM3 (ref 140–450)
PLATELET # BLD AUTO: 359 10*3/MM3 (ref 140–450)
PMV BLD AUTO: 11.1 FL (ref 6–12)
PMV BLD AUTO: 11.8 FL (ref 6–12)
PMV BLD AUTO: 12 FL (ref 6–12)
PMV BLD AUTO: 12 FL (ref 6–12)
PMV BLD AUTO: 12.1 FL (ref 6–12)
POTASSIUM SERPL-SCNC: 3.7 MMOL/L (ref 3.5–5.2)
POTASSIUM SERPL-SCNC: 4.3 MMOL/L (ref 3.5–5.2)
POTASSIUM SERPL-SCNC: 4.5 MMOL/L (ref 3.5–5.2)
POTASSIUM SERPL-SCNC: 4.6 MMOL/L (ref 3.5–5.2)
POTASSIUM SERPL-SCNC: 4.6 MMOL/L (ref 3.5–5.2)
PROT SERPL-MCNC: 6.2 G/DL (ref 6–8.5)
PROT SERPL-MCNC: 7.4 G/DL (ref 6–8.5)
QT INTERVAL: 400 MS
QTC INTERVAL: 468 MS
RBC # BLD AUTO: 3.53 10*6/MM3 (ref 3.77–5.28)
RBC # BLD AUTO: 3.55 10*6/MM3 (ref 3.77–5.28)
RBC # BLD AUTO: 3.9 10*6/MM3 (ref 3.77–5.28)
RBC # BLD AUTO: 4.16 10*6/MM3 (ref 3.77–5.28)
RBC # BLD AUTO: 4.27 10*6/MM3 (ref 3.77–5.28)
SARS-COV-2 RNA RESP QL NAA+PROBE: NOT DETECTED
SODIUM SERPL-SCNC: 141 MMOL/L (ref 136–145)
SODIUM SERPL-SCNC: 146 MMOL/L (ref 136–145)
SODIUM SERPL-SCNC: 146 MMOL/L (ref 136–145)
SODIUM SERPL-SCNC: 151 MMOL/L (ref 136–145)
SODIUM SERPL-SCNC: 159 MMOL/L (ref 136–145)
T4 FREE SERPL-MCNC: 1.44 NG/DL (ref 0.92–1.68)
TRIGL SERPL-MCNC: 102 MG/DL (ref 0–150)
TSH SERPL DL<=0.05 MIU/L-ACNC: 2.07 UIU/ML (ref 0.27–4.2)
TSH SERPL DL<=0.05 MIU/L-ACNC: 2.5 UIU/ML (ref 0.27–4.2)
VIT B12 BLD-MCNC: 953 PG/ML (ref 211–946)
VLDLC SERPL-MCNC: 19 MG/DL (ref 5–40)
WBC NRBC COR # BLD AUTO: 10.04 10*3/MM3 (ref 3.4–10.8)
WBC NRBC COR # BLD AUTO: 17.81 10*3/MM3 (ref 3.4–10.8)
WBC NRBC COR # BLD AUTO: 21.86 10*3/MM3 (ref 3.4–10.8)
WBC NRBC COR # BLD AUTO: 23.51 10*3/MM3 (ref 3.4–10.8)
WBC NRBC COR # BLD AUTO: 9.89 10*3/MM3 (ref 3.4–10.8)
WHOLE BLOOD HOLD COAG: NORMAL
WHOLE BLOOD HOLD SPECIMEN: NORMAL

## 2025-01-01 PROCEDURE — 93010 ELECTROCARDIOGRAM REPORT: CPT | Performed by: INTERNAL MEDICINE

## 2025-01-01 PROCEDURE — 25010000002 CEFTRIAXONE PER 250 MG: Performed by: INTERNAL MEDICINE

## 2025-01-01 PROCEDURE — 25010000002 LEVETRIRACETAM PER 10 MG: Performed by: STUDENT IN AN ORGANIZED HEALTH CARE EDUCATION/TRAINING PROGRAM

## 2025-01-01 PROCEDURE — 71045 X-RAY EXAM CHEST 1 VIEW: CPT

## 2025-01-01 PROCEDURE — 94640 AIRWAY INHALATION TREATMENT: CPT

## 2025-01-01 PROCEDURE — 82607 VITAMIN B-12: CPT | Performed by: FAMILY MEDICINE

## 2025-01-01 PROCEDURE — 80177 DRUG SCRN QUAN LEVETIRACETAM: CPT | Performed by: FAMILY MEDICINE

## 2025-01-01 PROCEDURE — 80048 BASIC METABOLIC PNL TOTAL CA: CPT | Performed by: INTERNAL MEDICINE

## 2025-01-01 PROCEDURE — 85027 COMPLETE CBC AUTOMATED: CPT | Performed by: INTERNAL MEDICINE

## 2025-01-01 PROCEDURE — 82306 VITAMIN D 25 HYDROXY: CPT | Performed by: FAMILY MEDICINE

## 2025-01-01 PROCEDURE — 94799 UNLISTED PULMONARY SVC/PX: CPT

## 2025-01-01 PROCEDURE — G0378 HOSPITAL OBSERVATION PER HR: HCPCS

## 2025-01-01 PROCEDURE — 84443 ASSAY THYROID STIM HORMONE: CPT | Performed by: INTERNAL MEDICINE

## 2025-01-01 PROCEDURE — 82746 ASSAY OF FOLIC ACID SERUM: CPT | Performed by: FAMILY MEDICINE

## 2025-01-01 PROCEDURE — 80061 LIPID PANEL: CPT | Performed by: FAMILY MEDICINE

## 2025-01-01 PROCEDURE — 80175 DRUG SCREEN QUAN LAMOTRIGINE: CPT | Performed by: FAMILY MEDICINE

## 2025-01-01 PROCEDURE — 92610 EVALUATE SWALLOWING FUNCTION: CPT

## 2025-01-01 PROCEDURE — 99239 HOSP IP/OBS DSCHRG MGMT >30: CPT | Performed by: INTERNAL MEDICINE

## 2025-01-01 PROCEDURE — 83880 ASSAY OF NATRIURETIC PEPTIDE: CPT | Performed by: INTERNAL MEDICINE

## 2025-01-01 PROCEDURE — 83605 ASSAY OF LACTIC ACID: CPT | Performed by: EMERGENCY MEDICINE

## 2025-01-01 PROCEDURE — 80053 COMPREHEN METABOLIC PANEL: CPT | Performed by: EMERGENCY MEDICINE

## 2025-01-01 PROCEDURE — 87636 SARSCOV2 & INF A&B AMP PRB: CPT | Performed by: EMERGENCY MEDICINE

## 2025-01-01 PROCEDURE — 84443 ASSAY THYROID STIM HORMONE: CPT | Performed by: FAMILY MEDICINE

## 2025-01-01 PROCEDURE — 84100 ASSAY OF PHOSPHORUS: CPT | Performed by: FAMILY MEDICINE

## 2025-01-01 PROCEDURE — 99232 SBSQ HOSP IP/OBS MODERATE 35: CPT | Performed by: INTERNAL MEDICINE

## 2025-01-01 PROCEDURE — 85025 COMPLETE CBC W/AUTO DIFF WBC: CPT | Performed by: FAMILY MEDICINE

## 2025-01-01 PROCEDURE — 87040 BLOOD CULTURE FOR BACTERIA: CPT | Performed by: EMERGENCY MEDICINE

## 2025-01-01 PROCEDURE — 84439 ASSAY OF FREE THYROXINE: CPT | Performed by: FAMILY MEDICINE

## 2025-01-01 PROCEDURE — 25010000002 PHENYLEPHRINE 10 MG/ML SOLUTION 5 ML VIAL: Performed by: EMERGENCY MEDICINE

## 2025-01-01 PROCEDURE — 25010000003 DEXTROSE 5 % SOLUTION: Performed by: INTERNAL MEDICINE

## 2025-01-01 PROCEDURE — 25810000003 SODIUM CHLORIDE 0.9 % SOLUTION 250 ML FLEX CONT: Performed by: EMERGENCY MEDICINE

## 2025-01-01 PROCEDURE — 83036 HEMOGLOBIN GLYCOSYLATED A1C: CPT | Performed by: FAMILY MEDICINE

## 2025-01-01 PROCEDURE — 93005 ELECTROCARDIOGRAM TRACING: CPT | Performed by: INTERNAL MEDICINE

## 2025-01-01 PROCEDURE — 80053 COMPREHEN METABOLIC PANEL: CPT | Performed by: FAMILY MEDICINE

## 2025-01-01 PROCEDURE — 92526 ORAL FUNCTION THERAPY: CPT

## 2025-01-01 PROCEDURE — 85025 COMPLETE CBC W/AUTO DIFF WBC: CPT | Performed by: EMERGENCY MEDICINE

## 2025-01-01 PROCEDURE — 25010000002 CEFTRIAXONE PER 250 MG: Performed by: EMERGENCY MEDICINE

## 2025-01-01 PROCEDURE — 99285 EMERGENCY DEPT VISIT HI MDM: CPT | Performed by: EMERGENCY MEDICINE

## 2025-01-01 PROCEDURE — 99223 1ST HOSP IP/OBS HIGH 75: CPT | Performed by: INTERNAL MEDICINE

## 2025-01-01 PROCEDURE — 25810000003 LACTATED RINGERS SOLUTION: Performed by: EMERGENCY MEDICINE

## 2025-01-01 PROCEDURE — 83735 ASSAY OF MAGNESIUM: CPT | Performed by: FAMILY MEDICINE

## 2025-01-01 RX ORDER — PANTOPRAZOLE SODIUM 40 MG/1
40 TABLET, DELAYED RELEASE ORAL
Status: DISCONTINUED | OUTPATIENT
Start: 2025-01-01 | End: 2025-01-01

## 2025-01-01 RX ORDER — BISACODYL 5 MG/1
5 TABLET, DELAYED RELEASE ORAL DAILY PRN
Status: DISCONTINUED | OUTPATIENT
Start: 2025-01-01 | End: 2025-01-01 | Stop reason: HOSPADM

## 2025-01-01 RX ORDER — LEVOTHYROXINE SODIUM 25 UG/1
25 TABLET ORAL DAILY
Status: DISCONTINUED | OUTPATIENT
Start: 2025-01-01 | End: 2025-01-01 | Stop reason: HOSPADM

## 2025-01-01 RX ORDER — SACCHAROMYCES BOULARDII 250 MG
250 CAPSULE ORAL 2 TIMES DAILY
Status: DISCONTINUED | OUTPATIENT
Start: 2025-01-01 | End: 2025-01-01 | Stop reason: HOSPADM

## 2025-01-01 RX ORDER — POLYETHYLENE GLYCOL 3350 17 G/17G
17 POWDER, FOR SOLUTION ORAL DAILY
Status: DISCONTINUED | OUTPATIENT
Start: 2025-01-01 | End: 2025-01-01 | Stop reason: HOSPADM

## 2025-01-01 RX ORDER — LEVETIRACETAM 500 MG/1
750 TABLET ORAL EVERY 12 HOURS SCHEDULED
Status: DISCONTINUED | OUTPATIENT
Start: 2025-01-01 | End: 2025-01-01

## 2025-01-01 RX ORDER — ACETAMINOPHEN 650 MG/1
650 SUPPOSITORY RECTAL EVERY 4 HOURS PRN
Status: DISCONTINUED | OUTPATIENT
Start: 2025-01-01 | End: 2025-01-01 | Stop reason: HOSPADM

## 2025-01-01 RX ORDER — SODIUM CHLORIDE 450 MG/100ML
100 INJECTION, SOLUTION INTRAVENOUS CONTINUOUS
Status: DISCONTINUED | OUTPATIENT
Start: 2025-01-01 | End: 2025-01-01

## 2025-01-01 RX ORDER — IPRATROPIUM BROMIDE AND ALBUTEROL SULFATE 2.5; .5 MG/3ML; MG/3ML
3 SOLUTION RESPIRATORY (INHALATION) EVERY 6 HOURS PRN
Status: DISCONTINUED | OUTPATIENT
Start: 2025-01-01 | End: 2025-01-01 | Stop reason: HOSPADM

## 2025-01-01 RX ORDER — FERROUS SULFATE 325(65) MG
325 TABLET ORAL
Status: DISCONTINUED | OUTPATIENT
Start: 2025-01-01 | End: 2025-01-01 | Stop reason: HOSPADM

## 2025-01-01 RX ORDER — LEVETIRACETAM 500 MG/1
500 TABLET ORAL 2 TIMES DAILY
Status: DISCONTINUED | OUTPATIENT
Start: 2025-01-01 | End: 2025-01-01

## 2025-01-01 RX ORDER — LORAZEPAM 0.5 MG/1
0.5 TABLET ORAL EVERY 6 HOURS PRN
Status: DISCONTINUED | OUTPATIENT
Start: 2025-01-01 | End: 2025-01-01

## 2025-01-01 RX ORDER — ACETAMINOPHEN 325 MG/1
650 TABLET ORAL EVERY 4 HOURS PRN
Status: DISCONTINUED | OUTPATIENT
Start: 2025-01-01 | End: 2025-01-01 | Stop reason: HOSPADM

## 2025-01-01 RX ORDER — DEXTROSE MONOHYDRATE 50 MG/ML
75 INJECTION, SOLUTION INTRAVENOUS CONTINUOUS
Status: DISPENSED | OUTPATIENT
Start: 2025-01-01 | End: 2025-01-01

## 2025-01-01 RX ORDER — NITROGLYCERIN 0.4 MG/1
0.4 TABLET SUBLINGUAL
Status: DISCONTINUED | OUTPATIENT
Start: 2025-01-01 | End: 2025-01-01

## 2025-01-01 RX ORDER — SODIUM CHLORIDE 0.9 % (FLUSH) 0.9 %
10 SYRINGE (ML) INJECTION AS NEEDED
Status: DISCONTINUED | OUTPATIENT
Start: 2025-01-01 | End: 2025-01-01 | Stop reason: HOSPADM

## 2025-01-01 RX ORDER — LEVETIRACETAM 500 MG/5ML
750 INJECTION, SOLUTION, CONCENTRATE INTRAVENOUS EVERY 12 HOURS SCHEDULED
Status: DISCONTINUED | OUTPATIENT
Start: 2025-01-01 | End: 2025-01-01 | Stop reason: HOSPADM

## 2025-01-01 RX ORDER — AMOXICILLIN 250 MG
2 CAPSULE ORAL 2 TIMES DAILY PRN
Status: DISCONTINUED | OUTPATIENT
Start: 2025-01-01 | End: 2025-01-01 | Stop reason: HOSPADM

## 2025-01-01 RX ORDER — LORAZEPAM 0.5 MG/1
0.5 TABLET ORAL EVERY 6 HOURS PRN
Status: DISCONTINUED | OUTPATIENT
Start: 2025-01-01 | End: 2025-01-01 | Stop reason: HOSPADM

## 2025-01-01 RX ORDER — SODIUM CHLORIDE 9 MG/ML
40 INJECTION, SOLUTION INTRAVENOUS AS NEEDED
Status: DISCONTINUED | OUTPATIENT
Start: 2025-01-01 | End: 2025-01-01 | Stop reason: HOSPADM

## 2025-01-01 RX ORDER — ONDANSETRON 4 MG/1
4 TABLET, ORALLY DISINTEGRATING ORAL EVERY 6 HOURS PRN
Status: DISCONTINUED | OUTPATIENT
Start: 2025-01-01 | End: 2025-01-01 | Stop reason: HOSPADM

## 2025-01-01 RX ORDER — GUAIFENESIN 600 MG/1
600 TABLET, EXTENDED RELEASE ORAL EVERY 12 HOURS SCHEDULED
Status: DISCONTINUED | OUTPATIENT
Start: 2025-01-01 | End: 2025-01-01 | Stop reason: HOSPADM

## 2025-01-01 RX ORDER — SODIUM CHLORIDE 0.9 % (FLUSH) 0.9 %
10 SYRINGE (ML) INJECTION EVERY 12 HOURS SCHEDULED
Status: DISCONTINUED | OUTPATIENT
Start: 2025-01-01 | End: 2025-01-01 | Stop reason: HOSPADM

## 2025-01-01 RX ORDER — PANTOPRAZOLE SODIUM 40 MG/1
40 TABLET, DELAYED RELEASE ORAL
Status: DISCONTINUED | OUTPATIENT
Start: 2025-01-01 | End: 2025-01-01 | Stop reason: HOSPADM

## 2025-01-01 RX ORDER — ACETAMINOPHEN 160 MG/5ML
650 SOLUTION ORAL EVERY 4 HOURS PRN
Status: DISCONTINUED | OUTPATIENT
Start: 2025-01-01 | End: 2025-01-01 | Stop reason: HOSPADM

## 2025-01-01 RX ORDER — LAMOTRIGINE 100 MG/1
200 TABLET ORAL 2 TIMES DAILY
Status: DISCONTINUED | OUTPATIENT
Start: 2025-01-01 | End: 2025-01-01 | Stop reason: HOSPADM

## 2025-01-01 RX ORDER — GABAPENTIN 300 MG/1
300 CAPSULE ORAL NIGHTLY
Status: DISCONTINUED | OUTPATIENT
Start: 2025-01-01 | End: 2025-01-01 | Stop reason: HOSPADM

## 2025-01-01 RX ORDER — HYDROCODONE BITARTRATE AND ACETAMINOPHEN 7.5; 325 MG/1; MG/1
1 TABLET ORAL EVERY 8 HOURS PRN
Refills: 0 | Status: DISCONTINUED | OUTPATIENT
Start: 2025-01-01 | End: 2025-01-01 | Stop reason: HOSPADM

## 2025-01-01 RX ORDER — BISACODYL 10 MG
10 SUPPOSITORY, RECTAL RECTAL DAILY PRN
Status: DISCONTINUED | OUTPATIENT
Start: 2025-01-01 | End: 2025-01-01 | Stop reason: HOSPADM

## 2025-01-01 RX ORDER — CETIRIZINE HYDROCHLORIDE 10 MG/1
10 TABLET ORAL EVERY EVENING
Status: DISCONTINUED | OUTPATIENT
Start: 2025-01-01 | End: 2025-01-01 | Stop reason: HOSPADM

## 2025-01-01 RX ORDER — LUBIPROSTONE 8 UG/1
8 CAPSULE ORAL 2 TIMES DAILY WITH MEALS
Status: DISCONTINUED | OUTPATIENT
Start: 2025-01-01 | End: 2025-01-01 | Stop reason: HOSPADM

## 2025-01-01 RX ORDER — ONDANSETRON 2 MG/ML
4 INJECTION INTRAMUSCULAR; INTRAVENOUS EVERY 6 HOURS PRN
Status: DISCONTINUED | OUTPATIENT
Start: 2025-01-01 | End: 2025-01-01 | Stop reason: HOSPADM

## 2025-01-01 RX ORDER — PANTOPRAZOLE SODIUM 40 MG/10ML
40 INJECTION, POWDER, LYOPHILIZED, FOR SOLUTION INTRAVENOUS
Status: DISCONTINUED | OUTPATIENT
Start: 2025-01-01 | End: 2025-01-01

## 2025-01-01 RX ORDER — POLYETHYLENE GLYCOL 3350 17 G/17G
17 POWDER, FOR SOLUTION ORAL DAILY PRN
Status: DISCONTINUED | OUTPATIENT
Start: 2025-01-01 | End: 2025-01-01 | Stop reason: HOSPADM

## 2025-01-01 RX ORDER — ATORVASTATIN CALCIUM 20 MG/1
20 TABLET, FILM COATED ORAL DAILY
Status: DISCONTINUED | OUTPATIENT
Start: 2025-01-01 | End: 2025-01-01 | Stop reason: HOSPADM

## 2025-01-01 RX ORDER — SODIUM CHLORIDE 9 MG/ML
100 INJECTION, SOLUTION INTRAVENOUS CONTINUOUS
Status: DISCONTINUED | OUTPATIENT
Start: 2025-01-01 | End: 2025-01-01

## 2025-01-01 RX ORDER — SODIUM CHLORIDE 450 MG/100ML
100 INJECTION, SOLUTION INTRAVENOUS CONTINUOUS
Status: DISCONTINUED | OUTPATIENT
Start: 2025-01-01 | End: 2025-01-01 | Stop reason: SDUPTHER

## 2025-01-01 RX ORDER — DIAZEPAM 10 MG/2ML
2.5 INJECTION, SOLUTION INTRAMUSCULAR; INTRAVENOUS EVERY 6 HOURS PRN
Status: DISCONTINUED | OUTPATIENT
Start: 2025-01-01 | End: 2025-01-01 | Stop reason: HOSPADM

## 2025-01-01 RX ADMIN — CEFTRIAXONE SODIUM 2000 MG: 2 INJECTION, POWDER, FOR SOLUTION INTRAMUSCULAR; INTRAVENOUS at 09:49

## 2025-01-01 RX ADMIN — CEFTRIAXONE SODIUM 2000 MG: 2 INJECTION, POWDER, FOR SOLUTION INTRAMUSCULAR; INTRAVENOUS at 09:23

## 2025-01-01 RX ADMIN — SODIUM CHLORIDE 100 ML/HR: 450 INJECTION, SOLUTION INTRAVENOUS at 02:45

## 2025-01-01 RX ADMIN — Medication 10 ML: at 21:12

## 2025-01-01 RX ADMIN — CEFTRIAXONE SODIUM 2000 MG: 2 INJECTION, POWDER, FOR SOLUTION INTRAMUSCULAR; INTRAVENOUS at 08:39

## 2025-01-01 RX ADMIN — DEXTROSE MONOHYDRATE 75 ML/HR: 50 INJECTION, SOLUTION INTRAVENOUS at 12:25

## 2025-01-01 RX ADMIN — PANTOPRAZOLE SODIUM 40 MG: 40 INJECTION, POWDER, FOR SOLUTION INTRAVENOUS at 06:27

## 2025-01-01 RX ADMIN — LEVETIRACETAM 750 MG: 500 INJECTION, SOLUTION, CONCENTRATE INTRAVENOUS at 21:12

## 2025-01-01 RX ADMIN — DEXTROSE MONOHYDRATE 75 ML/HR: 50 INJECTION, SOLUTION INTRAVENOUS at 02:14

## 2025-01-01 RX ADMIN — LEVETIRACETAM 750 MG: 500 INJECTION, SOLUTION, CONCENTRATE INTRAVENOUS at 09:23

## 2025-01-01 RX ADMIN — SODIUM CHLORIDE, POTASSIUM CHLORIDE, SODIUM LACTATE AND CALCIUM CHLORIDE 1000 ML: 600; 310; 30; 20 INJECTION, SOLUTION INTRAVENOUS at 10:32

## 2025-01-01 RX ADMIN — LEVETIRACETAM 750 MG: 500 INJECTION, SOLUTION, CONCENTRATE INTRAVENOUS at 09:50

## 2025-01-01 RX ADMIN — Medication 250 MG: at 20:25

## 2025-01-01 RX ADMIN — LEVETIRACETAM 750 MG: 500 INJECTION, SOLUTION, CONCENTRATE INTRAVENOUS at 20:33

## 2025-01-01 RX ADMIN — Medication 10 ML: at 09:59

## 2025-01-01 RX ADMIN — SODIUM CHLORIDE, POTASSIUM CHLORIDE, SODIUM LACTATE AND CALCIUM CHLORIDE 1839 ML: 600; 310; 30; 20 INJECTION, SOLUTION INTRAVENOUS at 11:58

## 2025-01-01 RX ADMIN — LEVETIRACETAM 750 MG: 500 INJECTION, SOLUTION, CONCENTRATE INTRAVENOUS at 22:59

## 2025-01-01 RX ADMIN — Medication 10 ML: at 08:40

## 2025-01-01 RX ADMIN — IPRATROPIUM BROMIDE AND ALBUTEROL SULFATE 3 ML: .5; 3 SOLUTION RESPIRATORY (INHALATION) at 16:52

## 2025-01-01 RX ADMIN — Medication 10 ML: at 20:33

## 2025-01-01 RX ADMIN — LEVETIRACETAM 750 MG: 500 INJECTION, SOLUTION, CONCENTRATE INTRAVENOUS at 09:55

## 2025-01-01 RX ADMIN — SODIUM CHLORIDE 100 ML/HR: 450 INJECTION, SOLUTION INTRAVENOUS at 01:07

## 2025-01-01 RX ADMIN — LAMOTRIGINE 200 MG: 100 TABLET ORAL at 20:25

## 2025-01-01 RX ADMIN — LEVETIRACETAM 750 MG: 500 INJECTION, SOLUTION, CONCENTRATE INTRAVENOUS at 08:39

## 2025-01-01 RX ADMIN — GUAIFENESIN 600 MG: 600 TABLET, EXTENDED RELEASE ORAL at 20:25

## 2025-01-01 RX ADMIN — LEVETIRACETAM 750 MG: 500 INJECTION, SOLUTION, CONCENTRATE INTRAVENOUS at 20:20

## 2025-01-01 RX ADMIN — CEFTRIAXONE SODIUM 2000 MG: 2 INJECTION, POWDER, FOR SOLUTION INTRAMUSCULAR; INTRAVENOUS at 10:52

## 2025-01-01 RX ADMIN — Medication 10 ML: at 09:23

## 2025-01-01 RX ADMIN — Medication 10 ML: at 21:38

## 2025-01-01 RX ADMIN — SODIUM CHLORIDE 100 ML/HR: 450 INJECTION, SOLUTION INTRAVENOUS at 16:05

## 2025-01-01 RX ADMIN — Medication 10 ML: at 20:26

## 2025-01-01 RX ADMIN — PHENYLEPHRINE HYDROCHLORIDE 0.5 MCG/KG/MIN: 10 INJECTION INTRAVENOUS at 12:04

## 2025-01-01 RX ADMIN — GABAPENTIN 300 MG: 300 CAPSULE ORAL at 20:25

## 2025-01-01 RX ADMIN — CEFTRIAXONE SODIUM 2000 MG: 2 INJECTION, POWDER, FOR SOLUTION INTRAMUSCULAR; INTRAVENOUS at 09:55

## 2025-01-01 RX ADMIN — SODIUM CHLORIDE 100 ML/HR: 450 INJECTION, SOLUTION INTRAVENOUS at 13:13

## 2025-01-02 LAB
CV ZIO BASELINE AVG BPM: 74 BPM
CV ZIO BASELINE BPM HIGH: 108 BPM
CV ZIO BASELINE BPM LOW: 48 BPM
CV ZIO DEVICE ANALYSIS TIME: NORMAL
CV ZIO ECT SVE COUNT: 1502 EPISODES
CV ZIO ECT SVE CPLT COUNT: 0 EPISODES
CV ZIO ECT SVE CPLT FREQ: 0
CV ZIO ECT SVE FREQ: NORMAL
CV ZIO ECT SVE TPLT COUNT: 0 EPISODES
CV ZIO ECT SVE TPLT FREQ: 0
CV ZIO ECT VE COUNT: 146 EPISODES
CV ZIO ECT VE CPLT COUNT: 0 EPISODES
CV ZIO ECT VE CPLT FREQ: 0
CV ZIO ECT VE FREQ: NORMAL
CV ZIO ECT VE TPLT COUNT: 0 EPISODES
CV ZIO ECT VE TPLT FREQ: 0
CV ZIO ECTOPIC SVE COUPLET RAW PERCENT: 0 %
CV ZIO ECTOPIC SVE ISOLATED PERCENT: 0.34 %
CV ZIO ECTOPIC SVE TRIPLET RAW PERCENT: 0 %
CV ZIO ECTOPIC VE COUPLET RAW PERCENT: 0 %
CV ZIO ECTOPIC VE ISOLATED PERCENT: 0.03 %
CV ZIO ECTOPIC VE TRIPLET RAW PERCENT: 0 %
CV ZIO ENROLLMENT END: NORMAL
CV ZIO ENROLLMENT START: NORMAL
CV ZIO PATIENT EVENTS DIARIES: 0
CV ZIO PATIENT EVENTS TRIGGERS: 0
CV ZIO PAUSE COUNT: 0
CV ZIO PRESCRIPTION STATUS: NORMAL
CV ZIO SVT COUNT: 0
CV ZIO TOTAL  ENROLLMENT PERIOD: NORMAL
CV ZIO VT COUNT: 0

## 2025-01-30 ENCOUNTER — OFFICE VISIT (OUTPATIENT)
Dept: CARDIOLOGY | Facility: CLINIC | Age: 85
End: 2025-01-30
Payer: MEDICARE

## 2025-01-30 VITALS
SYSTOLIC BLOOD PRESSURE: 112 MMHG | HEIGHT: 60 IN | OXYGEN SATURATION: 94 % | HEART RATE: 52 BPM | DIASTOLIC BLOOD PRESSURE: 64 MMHG | WEIGHT: 153.9 LBS | BODY MASS INDEX: 30.22 KG/M2

## 2025-01-30 DIAGNOSIS — E87.5 HYPERKALEMIA: ICD-10-CM

## 2025-01-30 DIAGNOSIS — I35.0 NONRHEUMATIC AORTIC VALVE STENOSIS: ICD-10-CM

## 2025-01-30 DIAGNOSIS — E78.00 HYPERCHOLESTEROLEMIA: ICD-10-CM

## 2025-01-30 DIAGNOSIS — I10 ESSENTIAL (PRIMARY) HYPERTENSION: ICD-10-CM

## 2025-01-30 DIAGNOSIS — F88 DEVELOPMENTAL MENTAL DISORDER: Primary | ICD-10-CM

## 2025-01-30 RX ORDER — LEVOTHYROXINE SODIUM 25 UG/1
25 TABLET ORAL DAILY
COMMUNITY
Start: 2025-01-29

## 2025-01-30 RX ORDER — AMLODIPINE BESYLATE 5 MG/1
5 TABLET ORAL EVERY EVENING
Qty: 90 TABLET | Refills: 3 | Status: SHIPPED | OUTPATIENT
Start: 2025-01-30

## 2025-01-30 RX ORDER — SACCHAROMYCES BOULARDII 250 MG
250 CAPSULE ORAL 2 TIMES DAILY
COMMUNITY

## 2025-01-30 RX ORDER — ALUMINUM HYDROXIDE, MAGNESIUM HYDROXIDE, DIMETHICONE 400; 400; 40 MG/10ML; MG/10ML; MG/10ML
SUSPENSION ORAL
COMMUNITY
Start: 2025-01-15

## 2025-01-30 RX ORDER — HYDROCHLOROTHIAZIDE 25 MG/1
25 TABLET ORAL DAILY
Qty: 90 TABLET | Refills: 3 | Status: SHIPPED | OUTPATIENT
Start: 2025-01-30

## 2025-01-30 RX ORDER — ONDANSETRON 4 MG/1
4 TABLET, FILM COATED ORAL EVERY 8 HOURS PRN
COMMUNITY

## 2025-01-30 NOTE — PROGRESS NOTES
PATIENTINFORMATION    Date of Office Visit: 2025  Encounter Provider: Bob Cortez MD  Place of Service: [facility] CARDIOLOGY  Patient Name: Erica Crockett  : 1940    Subjective:     Encounter Date:2025      Patient ID: Erica Crockett is a 84 y.o. female.    Chief Complaint   Patient presents with    Follow-up       HPI  Ms. Crockett is an 83 yo resident of nursing home brought to cardiac clinic for follow-up visit.  Blood pressure runs normal for the most part during daily monitoring.  No significant new complaints per her nurse.  No recent ER visit or hospitalization.  She has been having issues with intermittent hyperkalemia.      ROS  All systems reviewed and negative except as noted in HPI.    Past Medical History:   Diagnosis Date    Acquired cyst of kidney     Acute upper respiratory infection     Age-related nuclear cataract of both eyes     Age-related osteoporosis without current pathological fracture     Allergic rhinitis     Anemia     Anxiety     Atopic dermatitis     Bursitis     Bursitis     Candidal stomatitis     Carotid artery stenosis     Chronic kidney disease, stage III (moderate)     Chronic pain syndrome     Chronic sinusitis     Conjunctivitis     Constipation     Corns and callosities     Diverticulosis of intestine w/o perforation or abscess w/o bleeding     DJD (degenerative joint disease), lumbar     Dry eye syndrome     Hammer toe     Hypercalcemia     Hyperkalemia     Hypertension     Hypoglycemia     Intellectual disability     Iron deficiency anemia, unspecified     Low back pain     Osteoarthritis     Osteopenia     Other forms of scoliosis, lumbar region     Other specified deforming dorsopathies, site unspecified     Pain     Pain in toe of left foot     Pneumonia     Pneumonitis due to inhalation of food or vomitus     Pressure ulcer     Profound intellectual disabilities     Pruritus vulvae     Renal disorder     Renal  "insufficiency     Scoliosis     Scoliosis     Seizure disorder     UTI (urinary tract infection)        History reviewed. No pertinent surgical history.    Social History     Socioeconomic History    Marital status: Single   Tobacco Use    Smoking status: Never    Smokeless tobacco: Never   Vaping Use    Vaping status: Never Used   Substance and Sexual Activity    Alcohol use: No    Drug use: No    Sexual activity: Defer       Family History   Family history unknown: Yes         Procedures       Objective:     /64   Pulse 52   Ht 152.4 cm (60\")   Wt 69.8 kg (153 lb 14.4 oz)   SpO2 94%   BMI 30.06 kg/m²  Body mass index is 30.06 kg/m².     Constitutional:       General: Not in acute distress.     Appearance: Not diaphoretic.      Comments: Sitting on a wheelchair.   Eyes:      Pupils: Pupils are equal, round, and reactive to light.   HENT:      Head: Normocephalic and atraumatic.   Neck:      Thyroid: No thyromegaly.   Pulmonary:      Effort: Pulmonary effort is normal. No respiratory distress.      Breath sounds: Normal breath sounds. No wheezing. No rales.   Chest:      Chest wall: Not tender to palpatation.   Cardiovascular:      Normal rate. Regular rhythm.      Murmurs: There is a harsh midsystolic murmur at the URSB, radiating to the neck.      No gallop.    Pulses:     Intact distal pulses.   Edema:     Peripheral edema absent.   Abdominal:      General: Bowel sounds are normal. There is no distension.      Palpations: Abdomen is soft.      Tenderness: There is no guarding.   Musculoskeletal: Normal range of motion.         General: No deformity.      Cervical back: Normal range of motion and neck supple. Skin:     General: Skin is warm and dry.      Findings: No rash.   Neurological:      Mental Status: Alert.      Cranial Nerves: No cranial nerve deficit.      Deep Tendon Reflexes: Reflexes are normal and symmetric.         Review Of Data: I have reviewed pertinent recent labs, images and " documents and pertinent findings included in HPI or assessment below.          Assessment/Plan:     History of hypoxemic respiratory failure without any recurrence and does not require supplemental oxygen  Dementia/nonverbal patient  Essential hypertension on amlodipine  Chronic pain from hip joint problems-follows up with pain medicine-better pain control  Seizure disorder  Hypercholesterolemia on statin  Mild aortic valve stenosis on echocardiogram done in February 2022.    Doing fairly well from cardiac standpoint.  She is having issues with hypokalemia-I have added hydrochlorothiazide to current treatment of blood pressure while decreasing amlodipine to avoid hypotension.  FU in cardiology clinic in 1 year sooner with concerns   Diagnosis and plan of care discussed with patient and verbalized understanding.            Your medication list            Accurate as of January 30, 2025  5:17 PM. If you have any questions, ask your nurse or doctor.                START taking these medications        Instructions Last Dose Given Next Dose Due   hydroCHLOROthiazide 25 MG tablet  Started by: Bob Cortez      Take 1 tablet by mouth Daily.              CHANGE how you take these medications        Instructions Last Dose Given Next Dose Due   amLODIPine 5 MG tablet  Commonly known as: NORVASC  What changed:   medication strength  how much to take  when to take this  Changed by: Bob Cortez      Take 1 tablet by mouth Every Evening.              CONTINUE taking these medications        Instructions Last Dose Given Next Dose Due   A&D ointment      Apply  topically 2 (Two) Times a Day.       acetaminophen 325 MG tablet  Commonly known as: TYLENOL      Take 2 tablets by mouth Every 6 (Six) Hours As Needed for Mild Pain.       Antacid/Antigas 400-400-40 MG/10ML suspension  Generic drug: aluminum-magnesium hydroxide-simethicone           AQUAPHOR LIP REPAIR EX      Apply 1 application  topically 4 (Four) Times a Day  As Needed (to lips).       eucerin cream      Apply 1 Application topically to the appropriate area as directed As Needed for Dry Skin.       ARGINAID EXTRA PO      Take 1 Units by mouth Daily.       atorvastatin 20 MG tablet  Commonly known as: LIPITOR      Take 1 tablet by mouth Daily.       BENEFIBER DRINK MIX PO      Take 1 packet by mouth Daily.       Biofreeze Roll-On 4 % gel  Generic drug: Menthol (Topical Analgesic)      Apply 1 Application topically Daily. For hips       bisacodyl 5 MG EC tablet  Commonly known as: DULCOLAX      Take 2 tablets by mouth Daily As Needed for Constipation.       bisacodyl 10 MG suppository  Commonly known as: DULCOLAX      Insert 1 suppository into the rectum Daily As Needed for Constipation (if po medications ineffective).       carbamide peroxide 6.5 % otic solution  Commonly known as: DEBROX      Administer 4 drops into both ears As Needed for Ear Pain.       cetirizine 10 MG tablet  Commonly known as: zyrTEC      Take 1 tablet by mouth Daily.       cholecalciferol 25 MCG (1000 UT) tablet  Commonly known as: VITAMIN D3      Take 1 tablet by mouth Daily.       dextromethorphan-guaifenesin  MG/5ML syrup  Commonly known as: ROBITUSSIN-DM      Take 10 mL by mouth Every 4 (Four) Hours As Needed.       Diazepam 10MG rectal suppository  Commonly known as: Valium      Insert 2 suppositories into the rectum As Needed (for seizures).       diphenhydrAMINE 25 mg capsule  Commonly known as: BENADRYL      Take 1 capsule by mouth Every 6 (Six) Hours As Needed for Itching or Allergies.       EPINEPHrine 0.3 MG/0.3ML solution auto-injector injection  Commonly known as: EPIPEN      Inject 0.3 mL under the skin into the appropriate area as directed 1 (One) Time As Needed.       FIBER-LAX PO      Take 1 tablet by mouth 2 (Two) Times a Day.       fleet enema 7-19 GM/118ML enema      Insert 1 enema into the rectum 1 (One) Time As Needed.       gabapentin 300 MG capsule  Commonly known as:  NEURONTIN      Take 1 capsule by mouth Every Night.       HYDROcodone-acetaminophen 7.5-325 MG per tablet  Commonly known as: NORCO      Take 1 tablet by mouth Every 8 (Eight) Hours As Needed for Severe Pain.       hydrocortisone 2.5 % cream      Apply 1 Application topically to the appropriate area as directed 2 (Two) Times a Day.       ipratropium-albuterol 0.5-2.5 mg/3 ml nebulizer  Commonly known as: DUO-NEB      Take 3 mL by nebulization 4 (Four) Times a Day As Needed for Wheezing or Shortness of Air.       ipratropium-albuterol 0.5-2.5 mg/3 ml nebulizer  Commonly known as: DUO-NEB      Take 3 mL by nebulization Every 4 (Four) Hours As Needed for Wheezing.       lamoTRIgine 200 MG tablet  Commonly known as: LaMICtal      Take 1 tablet by mouth 2 (Two) Times a Day.       levETIRAcetam 500 MG tablet  Commonly known as: KEPPRA      Take 1 tablet by mouth 2 (Two) Times a Day.       levothyroxine 25 MCG tablet  Commonly known as: SYNTHROID, LEVOTHROID      Take 1 tablet by mouth Daily.       Lokelma 10 g packet  Generic drug: sodium zirconium cyclosilicate      Take 10 g by mouth Daily.       loperamide 2 MG capsule  Commonly known as: IMODIUM      Take 1 capsule by mouth 4 (Four) Times a Day As Needed for Diarrhea.       LORazepam 0.5 MG tablet  Commonly known as: ATIVAN      Take 1 tablet by mouth Every 6 (Six) Hours As Needed for Anxiety.       lubiprostone 8 MCG capsule  Commonly known as: AMITIZA      Take 1 capsule by mouth 2 (Two) Times a Day With Meals.       magnesium hydroxide 400 MG/5ML suspension  Commonly known as: MILK OF MAGNESIA      Take 30 mL by mouth Daily As Needed for Constipation.       Susan's magic butt cream      Apply 1 Application topically to the appropriate area as directed As Needed.       MiraLax 17 g packet  Generic drug: polyethylene glycol      Take 17 g by mouth Daily.       multivitamin with minerals tablet tablet      Take 1 tablet by mouth Daily.        neomycin-bacitracin-polymyxin 5-400-5000 ointment      Apply 1 Application topically to the appropriate area as directed Daily.       nystatin 619360 UNIT/GM cream  Commonly known as: MYCOSTATIN           nystatin-triamcinolone 909983-8.1 UNIT/GM-% cream  Commonly known as: MYCOLOG II      Apply 1 Application topically to the appropriate area as directed 2 (Two) Times a Day.       omeprazole 20 MG capsule  Commonly known as: priLOSEC      Take 2 capsules by mouth Daily.       ondansetron 4 MG tablet  Commonly known as: ZOFRAN      Take 1 tablet by mouth Every 8 (Eight) Hours As Needed for Nausea or Vomiting.       oseltamivir 30 MG capsule  Commonly known as: TAMIFLU      Take 1 capsule by mouth Every 12 (Twelve) Hours. Ends 2/29/24       Polyvinyl Alcohol-Povidone PF 1.4-0.6 % ophthalmic solution  Commonly known as: ARTIFICIAL TEARS      Administer 1 drop to both eyes 4 (Four) Times a Day.       predniSONE 20 MG tablet  Commonly known as: DELTASONE      Take 1 tablet by mouth 2 (Two) Times a Day.       Preparation H 5-14.4 % cream  Generic drug: Lidocaine-Glycerin      Apply 1 Application topically 2 (Two) Times a Day As Needed.       Prolia 60 MG/ML solution prefilled syringe syringe  Generic drug: denosumab           promethazine 25 MG suppository  Commonly known as: PHENERGAN      Insert 1 suppository into the rectum Every 6 (Six) Hours As Needed for Nausea or Vomiting.       Risaquad-2 capsule capsule      Take 2 capsules by mouth Daily.       saccharomyces boulardii 250 MG capsule  Commonly known as: FLORASTOR      Take 1 capsule by mouth 2 (Two) Times a Day.       SILVASORB ANTIMICROBIAL SHEET EX      Apply 1 application  topically As Needed (TO LEFT BUTTOCKS AS NEEDED).       simethicone 80 MG chewable tablet  Commonly known as: MYLICON      Chew 1 tablet Every 6 (Six) Hours As Needed for Flatulence (GI discomfort).       sodium chloride 0.65 % nasal spray      Administer 2 sprays into the nostril(s) as  directed by provider As Needed for Congestion.       TRIAMCINOLONE ACETONIDE EX      Apply  topically.       tuberculin 5 UNIT/0.1ML injection           Urea 40 % lotion      Apply 1 application  topically 2 (Two) Times a Day As Needed (apply to left 4th toe bunion as needed).       valACYclovir 1000 MG tablet  Commonly known as: VALTREX           white petrolatum ointment      Apply 1 Application topically to the appropriate area as directed As Needed for Dry Skin. Apply to Nostrils              STOP taking these medications      Magnesium Glycinate 100 MG capsule  Stopped by: Bob Cortez                  Where to Get Your Medications        These medications were sent to [facility] - [place], [place] - [address] [place] [address] - [phone]  - [phone] [place] [id number]      Phone: [phone]   amLODIPine 5 MG tablet  hydroCHLOROthiazide 25 MG tablet             Bob Cortez MD  01/30/25  17:17 EST

## 2025-02-03 ENCOUNTER — HOSPITAL ENCOUNTER (OUTPATIENT)
Dept: GENERAL RADIOLOGY | Facility: HOSPITAL | Age: 85
Discharge: HOME | End: 2025-02-03
Admitting: FAMILY MEDICINE
Payer: MEDICARE

## 2025-02-03 ENCOUNTER — TRANSCRIBE ORDERS (OUTPATIENT)
Dept: ADMINISTRATIVE | Facility: HOSPITAL | Age: 85
End: 2025-02-03
Payer: MEDICARE

## 2025-02-03 DIAGNOSIS — R09.02 HYPOXEMIA: ICD-10-CM

## 2025-02-03 DIAGNOSIS — R09.02 HYPOXEMIA: Primary | ICD-10-CM

## 2025-02-03 PROCEDURE — 71046 X-RAY EXAM CHEST 2 VIEWS: CPT

## 2025-02-10 ENCOUNTER — HOSPITAL ENCOUNTER (OUTPATIENT)
Dept: GENERAL RADIOLOGY | Facility: HOSPITAL | Age: 85
Discharge: HOME | End: 2025-02-10
Admitting: FAMILY MEDICINE
Payer: MEDICARE

## 2025-02-10 ENCOUNTER — TRANSCRIBE ORDERS (OUTPATIENT)
Dept: ADMINISTRATIVE | Facility: HOSPITAL | Age: 85
End: 2025-02-10
Payer: MEDICARE

## 2025-02-10 DIAGNOSIS — R79.81 LOW O2 SATURATION: ICD-10-CM

## 2025-02-10 DIAGNOSIS — R63.0 DECREASED APPETITE: ICD-10-CM

## 2025-02-10 DIAGNOSIS — J18.9 PNEUMONIA DUE TO INFECTIOUS ORGANISM, UNSPECIFIED LATERALITY, UNSPECIFIED PART OF LUNG: ICD-10-CM

## 2025-02-10 DIAGNOSIS — J18.9 PNEUMONIA DUE TO INFECTIOUS ORGANISM, UNSPECIFIED LATERALITY, UNSPECIFIED PART OF LUNG: Primary | ICD-10-CM

## 2025-02-10 PROCEDURE — 71046 X-RAY EXAM CHEST 2 VIEWS: CPT

## 2025-02-11 RX ORDER — AMLODIPINE BESYLATE 5 MG/1
5 TABLET ORAL 2 TIMES DAILY
Qty: 180 TABLET | Refills: 3 | Status: SHIPPED | OUTPATIENT
Start: 2025-02-11

## 2025-02-12 ENCOUNTER — TELEPHONE (OUTPATIENT)
Dept: CARDIOLOGY | Facility: CLINIC | Age: 85
End: 2025-02-12
Payer: MEDICARE

## 2025-02-12 NOTE — TELEPHONE ENCOUNTER
----- Message from Bob Cortez sent at 2/11/2025  5:10 PM EST -----  Please notify [facility] load I have reviewed blood pressure logs of this patient.  Looks like it is consistently elevated evening measurements.  I have changed amlodipine from 5 mg at p.m. to 5 mg p.o. twice daily.  Call back with blood pressure logs in 2 weeks. If low BP need to notify facility provider. Thank you

## 2025-02-12 NOTE — TELEPHONE ENCOUNTER
Notified patient's RN, Rajwinder, of results/recommendations. She verbalized understanding.    Marietta Turner RN  Triage MG

## 2025-02-20 ENCOUNTER — TELEPHONE (OUTPATIENT)
Dept: ONCOLOGY | Facility: CLINIC | Age: 85
End: 2025-02-20
Payer: MEDICARE

## 2025-02-20 NOTE — TELEPHONE ENCOUNTER
Caller: YURIDIA    Relationship to patient: DENICE MARIN     Best call back number: [phone]    Chief complaint: NOT ABLE TO GET PATIENT TO APPT 02/27 DUE TO SHORT STAFFING NEEDING TO RESCHEDULE    Type of visit: LAB AND NEW PT APPT HEMATOLOGY     Requested date: 02/28 WOULD NEED IN AFTERNOON          If rescheduling, when is the original appointment: 02/27     Additional notes:HUB UNABLE TO SCHEDULE FOR APRNS

## 2025-02-27 ENCOUNTER — CONSULT (OUTPATIENT)
Dept: ONCOLOGY | Facility: CLINIC | Age: 85
End: 2025-02-27
Payer: MEDICARE

## 2025-02-27 ENCOUNTER — APPOINTMENT (OUTPATIENT)
Dept: LAB | Facility: HOSPITAL | Age: 85
End: 2025-02-27
Payer: MEDICARE

## 2025-02-27 VITALS
SYSTOLIC BLOOD PRESSURE: 97 MMHG | OXYGEN SATURATION: 90 % | DIASTOLIC BLOOD PRESSURE: 59 MMHG | HEART RATE: 55 BPM | RESPIRATION RATE: 16 BRPM | HEIGHT: 60 IN | WEIGHT: 153.3 LBS | BODY MASS INDEX: 30.1 KG/M2 | TEMPERATURE: 96.8 F

## 2025-02-27 DIAGNOSIS — D64.9 ANEMIA, UNSPECIFIED TYPE: Primary | ICD-10-CM

## 2025-02-27 DIAGNOSIS — N18.30 STAGE 3 CHRONIC KIDNEY DISEASE, UNSPECIFIED WHETHER STAGE 3A OR 3B CKD: ICD-10-CM

## 2025-02-27 LAB
BASOPHILS # BLD AUTO: 0.04 10*3/MM3 (ref 0–0.2)
BASOPHILS NFR BLD AUTO: 0.6 % (ref 0–1.5)
DEPRECATED RDW RBC AUTO: 45.5 FL (ref 37–54)
EOSINOPHIL # BLD AUTO: 0.21 10*3/MM3 (ref 0–0.4)
EOSINOPHIL NFR BLD AUTO: 3 % (ref 0.3–6.2)
ERYTHROCYTE [DISTWIDTH] IN BLOOD BY AUTOMATED COUNT: 13.2 % (ref 12.3–15.4)
FERRITIN SERPL-MCNC: 58.2 NG/ML (ref 13–150)
FOLATE SERPL-MCNC: 10.5 NG/ML (ref 4.78–24.2)
HCT VFR BLD AUTO: 35.3 % (ref 34–46.6)
HGB BLD-MCNC: 11 G/DL (ref 12–15.9)
IMM GRANULOCYTES # BLD AUTO: 0.01 10*3/MM3 (ref 0–0.05)
IMM GRANULOCYTES NFR BLD AUTO: 0.1 % (ref 0–0.5)
IRON 24H UR-MRATE: 62 MCG/DL (ref 37–145)
IRON SATN MFR SERPL: 18 % (ref 20–50)
LYMPHOCYTES # BLD AUTO: 4.22 10*3/MM3 (ref 0.7–3.1)
LYMPHOCYTES NFR BLD AUTO: 59.5 % (ref 19.6–45.3)
MCH RBC QN AUTO: 29.6 PG (ref 26.6–33)
MCHC RBC AUTO-ENTMCNC: 31.2 G/DL (ref 31.5–35.7)
MCV RBC AUTO: 94.9 FL (ref 79–97)
MONOCYTES # BLD AUTO: 0.49 10*3/MM3 (ref 0.1–0.9)
MONOCYTES NFR BLD AUTO: 6.9 % (ref 5–12)
NEUTROPHILS NFR BLD AUTO: 2.12 10*3/MM3 (ref 1.7–7)
NEUTROPHILS NFR BLD AUTO: 29.9 % (ref 42.7–76)
NRBC BLD AUTO-RTO: 0 /100 WBC (ref 0–0.2)
PLATELET # BLD AUTO: 313 10*3/MM3 (ref 140–450)
PMV BLD AUTO: 11 FL (ref 6–12)
RBC # BLD AUTO: 3.72 10*6/MM3 (ref 3.77–5.28)
TIBC SERPL-MCNC: 350 MCG/DL (ref 298–536)
TRANSFERRIN SERPL-MCNC: 235 MG/DL (ref 200–360)
VIT B12 BLD-MCNC: 504 PG/ML (ref 211–946)
WBC NRBC COR # BLD AUTO: 7.09 10*3/MM3 (ref 3.4–10.8)

## 2025-02-27 PROCEDURE — 86334 IMMUNOFIX E-PHORESIS SERUM: CPT | Performed by: NURSE PRACTITIONER

## 2025-02-27 PROCEDURE — 83521 IG LIGHT CHAINS FREE EACH: CPT | Performed by: NURSE PRACTITIONER

## 2025-02-27 PROCEDURE — 82746 ASSAY OF FOLIC ACID SERUM: CPT | Performed by: NURSE PRACTITIONER

## 2025-02-27 PROCEDURE — 82784 ASSAY IGA/IGD/IGG/IGM EACH: CPT | Performed by: NURSE PRACTITIONER

## 2025-02-27 PROCEDURE — 84155 ASSAY OF PROTEIN SERUM: CPT | Performed by: NURSE PRACTITIONER

## 2025-02-27 PROCEDURE — 83540 ASSAY OF IRON: CPT | Performed by: NURSE PRACTITIONER

## 2025-02-27 PROCEDURE — 36415 COLL VENOUS BLD VENIPUNCTURE: CPT | Performed by: NURSE PRACTITIONER

## 2025-02-27 PROCEDURE — 82607 VITAMIN B-12: CPT | Performed by: NURSE PRACTITIONER

## 2025-02-27 PROCEDURE — 84165 PROTEIN E-PHORESIS SERUM: CPT | Performed by: NURSE PRACTITIONER

## 2025-02-27 PROCEDURE — 82728 ASSAY OF FERRITIN: CPT | Performed by: NURSE PRACTITIONER

## 2025-02-27 PROCEDURE — 84466 ASSAY OF TRANSFERRIN: CPT | Performed by: NURSE PRACTITIONER

## 2025-02-27 PROCEDURE — 85025 COMPLETE CBC W/AUTO DIFF WBC: CPT | Performed by: NURSE PRACTITIONER

## 2025-02-27 NOTE — PROGRESS NOTES
REFERRING PROVIDER:    Tulio Carlson MD  [id number] [facility]  [address]  [place] [address]    REASON FOR CONSULTATION:    Anemia    HISTORY OF PRESENT ILLNESS:  Erica Crockett is a 84 y.o. female who is referred today for further evaluation of anemia.  Patient does have history of CKD and is followed by nephrology,, mild aortic valve stenosis and hypertension followed by cardiology, seizure disorder, dementia, and is a resident of a [facility].    Labs reviewed reveal hemoglobin typically in 11-12 range though 2/13/2025 lab resulted with hemoglobin 9.9 and she was referred to our office for further evaluation.  Recent creatinine has been stable around 1.3.    A staff member who accompanies her today states that she discussed the case with nursing prior to the visit today and they deny any known changes in the patient's status.  Her energy level seems consistent.  She has had no signs of dark tarry or blood in her stool.  She has had no new cough, recent infection.  Her weight has been stable.    Past Medical History:   Diagnosis Date    Acquired cyst of kidney     Acute upper respiratory infection     Age-related nuclear cataract of both eyes     Age-related osteoporosis without current pathological fracture     Allergic rhinitis     Anemia     Anxiety     Atopic dermatitis     Bursitis     Bursitis     Candidal stomatitis     Carotid artery stenosis     Chronic kidney disease, stage III (moderate)     Chronic pain syndrome     Chronic sinusitis     Conjunctivitis     Constipation     Corns and callosities     Diverticulosis of intestine w/o perforation or abscess w/o bleeding     DJD (degenerative joint disease), lumbar     Dry eye syndrome     Hammer toe     Hypercalcemia     Hyperkalemia     Hypertension     Hypoglycemia     Intellectual disability     Iron deficiency anemia, unspecified     Low back pain     Multinodular goiter     Osteoarthritis     Osteopenia     Other forms of scoliosis,  "lumbar region     Other specified deforming dorsopathies, site unspecified     Pain     Pain in toe of left foot     Pneumonia     Pneumonitis due to inhalation of food or vomitus     Pressure ulcer     Profound intellectual disabilities     Pruritus vulvae     Renal disorder     Renal insufficiency     Scoliosis     Scoliosis     Seizure disorder     UTI (urinary tract infection)     Vitamin D deficiency        Past Surgical History:   Procedure Laterality Date    CATARACT EXTRACTION         SOCIAL HISTORY:   reports that she has never smoked. She has never used smokeless tobacco. She reports that she does not drink alcohol and does not use drugs.    FAMILY HISTORY:  family history includes Kidney disease in her sister.    ALLERGIES:  Allergies   Allergen Reactions    Bactrim [Sulfamethoxazole-Trimethoprim] Unknown - High Severity    Bee Venom     Iodinated Contrast Media     Nsaids        MEDICATIONS:  The medication list has been reviewed with the patient by the medical assistant, and the list has been updated in the electronic medical record, which I reviewed.  Medication dosages and frequencies were confirmed to be accurate.    Review of Systems    PHYSICAL EXAMINATION:  Vitals:    02/27/25 0826   BP: 97/59   Pulse: 55   Resp: 16   Temp: 96.8 °F (36 °C)   TempSrc: Infrared   SpO2: 90%   Height: 152.4 cm (60\")   PainSc: 0-No pain       Physical Exam  Constitutional:       Comments: Seated in wheelchair   Eyes:      Extraocular Movements: Extraocular movements intact.      Conjunctiva/sclera: Conjunctivae normal.   Cardiovascular:      Rate and Rhythm: Normal rate and regular rhythm.   Pulmonary:      Effort: Pulmonary effort is normal. No respiratory distress.      Breath sounds: No wheezing or rales.   Abdominal:      General: Bowel sounds are normal. There is no distension.      Palpations: Abdomen is soft.      Tenderness: There is no abdominal tenderness.   Musculoskeletal:      Right lower leg: No edema.    "   Left lower leg: No edema.   Lymphadenopathy:      Cervical: No cervical adenopathy.   Skin:     General: Skin is warm and dry.   Neurological:      Mental Status: She is alert.      Comments: nonverbal   Psychiatric:         Cognition and Memory: Cognition is impaired.         DIAGNOSTIC DATA:  Lab Results   Component Value Date    WBC 7.09 02/27/2025    HGB 11.0 (L) 02/27/2025    HCT 35.3 02/27/2025    MCV 94.9 02/27/2025     02/27/2025       Anemia labs:      Lab 02/27/25  0830   IRON 62   IRON SATURATION (TSAT) 18*   TIBC 350   TRANSFERRIN 235   FERRITIN 58.20   FOLATE 10.50   VITAMIN B 12 504         ASSESSMENT:  This is a 84 y.o. female with:    *Anemia secondary from her CKD.  She did have a recent hemoglobin of 9.9 on 2/13/2024.  Hemoglobin today back to patient's baseline and 11 as she typically is running between 11 and 12 on most recent labs reviewed.  Recent creatinine was stable for patient at 1.3.  Folate unremarkable.  Ferritin and iron studies performed today show ferritin 58, iron saturation 18%, TIBC 350.  CLARK/SPEP/FLC pending today.  If this returns as normal patient will be released back to primary care to follow we will be happy to see her again in the future if hemoglobin declines outside of typical range.      *CKD 3 followed by nephrology      PLAN:   Await results of CLARK/SPEP/FLC  Follow-up will be pending these results    Thank you for referral of this kind patient, look forward to following her along with you.    ORDERS PLACED DURING THIS ENCOUNTER  Orders Placed This Encounter   Procedures    Ferritin     Order Specific Question:   Release to patient     Answer:   Routine Release [[id number]]    Iron Profile     Order Specific Question:   Release to patient     Answer:   Routine Release [[id number]]    Vitamin B12     Order Specific Question:   Release to patient     Answer:   Routine Release [[id number]]    Folate     Order Specific Question:   Release to patient     Answer:    Routine Release [[id number]]    Immunofixation (CLARK), Protein Electrophoresis (PE), and Quantitative Free Kappa and Lambda Light Chains (FLC), Serum     Order Specific Question:   Release to patient     Answer:   Routine Release [[id number]]    CBC Auto Differential     Order Specific Question:   Release to patient     Answer:   Routine Release [[id number]]    CBC & Differential     Order Specific Question:   Manual Differential     Answer:   No     Order Specific Question:   Release to patient     Answer:   Routine Release [[id number]]     Records reviewed from outside facility.  Staff members from [facility] accompanied patient to visit and relayed history for patient as she is nonverbal.

## 2025-03-03 LAB
ALBUMIN SERPL ELPH-MCNC: 3.5 G/DL (ref 2.9–4.4)
ALBUMIN/GLOB SERPL: 1.2 {RATIO} (ref 0.7–1.7)
ALPHA1 GLOB SERPL ELPH-MCNC: 0.3 G/DL (ref 0–0.4)
ALPHA2 GLOB SERPL ELPH-MCNC: 0.9 G/DL (ref 0.4–1)
B-GLOBULIN SERPL ELPH-MCNC: 0.9 G/DL (ref 0.7–1.3)
GAMMA GLOB SERPL ELPH-MCNC: 1.1 G/DL (ref 0.4–1.8)
GLOBULIN SER-MCNC: 3.1 G/DL (ref 2.2–3.9)
IGA SERPL-MCNC: 351 MG/DL (ref 64–422)
IGG SERPL-MCNC: 1083 MG/DL (ref 586–1602)
IGM SERPL-MCNC: 86 MG/DL (ref 26–217)
INTERPRETATION SERPL IEP-IMP: ABNORMAL
KAPPA LC FREE SER-MCNC: 96.6 MG/L (ref 3.3–19.4)
KAPPA LC FREE/LAMBDA FREE SER: 2.23 {RATIO} (ref 0.26–1.65)
LABORATORY COMMENT REPORT: ABNORMAL
LAMBDA LC FREE SERPL-MCNC: 43.4 MG/L (ref 5.7–26.3)
M PROTEIN SERPL ELPH-MCNC: ABNORMAL G/DL
PROT SERPL-MCNC: 6.6 G/DL (ref 6–8.5)

## 2025-03-04 ENCOUNTER — TELEPHONE (OUTPATIENT)
Dept: ONCOLOGY | Facility: CLINIC | Age: 85
End: 2025-03-04
Payer: MEDICARE

## 2025-03-04 NOTE — TELEPHONE ENCOUNTER
----- Message from Tasneem Draper sent at 3/4/2025  3:21 PM EST -----  Regarding: Please call  Please call [facility] on this patient and let them know that she needs to begin oral ferrous sulfate 325 mg every other day for mild iron deficiency.  Otherwise labs were unremarkable.  They can continue to follow her there and reach out to us if her hemoglobin is less than 10 at which time we can see her again.  Thank you.

## 2025-03-06 ENCOUNTER — TRANSCRIBE ORDERS (OUTPATIENT)
Dept: ADMINISTRATIVE | Facility: HOSPITAL | Age: 85
End: 2025-03-06
Payer: MEDICARE

## 2025-03-06 ENCOUNTER — HOSPITAL ENCOUNTER (OUTPATIENT)
Dept: GENERAL RADIOLOGY | Facility: HOSPITAL | Age: 85
Discharge: HOME | End: 2025-03-06
Admitting: FAMILY MEDICINE
Payer: MEDICARE

## 2025-03-06 DIAGNOSIS — Z87.01 PERSONAL HISTORY OF PNEUMONIA (RECURRENT): ICD-10-CM

## 2025-03-06 DIAGNOSIS — Z87.01 PERSONAL HISTORY OF PNEUMONIA (RECURRENT): Primary | ICD-10-CM

## 2025-03-06 PROCEDURE — 71046 X-RAY EXAM CHEST 2 VIEWS: CPT

## 2025-03-26 ENCOUNTER — OFFICE (AMBULATORY)
Dept: URBAN - METROPOLITAN AREA CLINIC 76 | Facility: CLINIC | Age: 85
End: 2025-03-26
Payer: MEDICAID

## 2025-03-26 VITALS
SYSTOLIC BLOOD PRESSURE: 168 MMHG | HEART RATE: 70 BPM | SYSTOLIC BLOOD PRESSURE: 160 MMHG | DIASTOLIC BLOOD PRESSURE: 72 MMHG | OXYGEN SATURATION: 97 % | WEIGHT: 156 LBS | HEIGHT: 65 IN

## 2025-03-26 DIAGNOSIS — K59.00 CONSTIPATION, UNSPECIFIED: ICD-10-CM

## 2025-03-26 DIAGNOSIS — D50.9 IRON DEFICIENCY ANEMIA, UNSPECIFIED: ICD-10-CM

## 2025-03-26 PROCEDURE — 99203 OFFICE O/P NEW LOW 30 MIN: CPT

## 2025-03-30 ENCOUNTER — HOSPITAL ENCOUNTER (EMERGENCY)
Facility: HOSPITAL | Age: 85
Discharge: HOME | End: 2025-03-30
Attending: EMERGENCY MEDICINE | Admitting: EMERGENCY MEDICINE
Payer: MEDICARE

## 2025-03-30 ENCOUNTER — APPOINTMENT (OUTPATIENT)
Dept: GENERAL RADIOLOGY | Facility: HOSPITAL | Age: 85
End: 2025-03-30
Payer: MEDICARE

## 2025-03-30 VITALS
HEART RATE: 73 BPM | WEIGHT: 160.05 LBS | TEMPERATURE: 98.4 F | RESPIRATION RATE: 17 BRPM | BODY MASS INDEX: 31.42 KG/M2 | DIASTOLIC BLOOD PRESSURE: 87 MMHG | HEIGHT: 60 IN | SYSTOLIC BLOOD PRESSURE: 161 MMHG | OXYGEN SATURATION: 93 %

## 2025-03-30 DIAGNOSIS — R53.1 WEAKNESS GENERALIZED: Primary | ICD-10-CM

## 2025-03-30 DIAGNOSIS — A49.9 UTI (URINARY TRACT INFECTION), BACTERIAL: ICD-10-CM

## 2025-03-30 DIAGNOSIS — N39.0 UTI (URINARY TRACT INFECTION), BACTERIAL: ICD-10-CM

## 2025-03-30 LAB
ALBUMIN SERPL-MCNC: 3.9 G/DL (ref 3.5–5.2)
ALBUMIN/GLOB SERPL: 1.2 G/DL
ALP SERPL-CCNC: 119 U/L (ref 39–117)
ALT SERPL W P-5'-P-CCNC: 20 U/L (ref 1–33)
ANION GAP SERPL CALCULATED.3IONS-SCNC: 11.4 MMOL/L (ref 5–15)
AST SERPL-CCNC: 32 U/L (ref 1–32)
BACTERIA UR QL AUTO: ABNORMAL /HPF
BASOPHILS # BLD AUTO: 0.03 10*3/MM3 (ref 0–0.2)
BASOPHILS NFR BLD AUTO: 0.4 % (ref 0–1.5)
BILIRUB SERPL-MCNC: 0.2 MG/DL (ref 0–1.2)
BILIRUB UR QL STRIP: NEGATIVE
BUN SERPL-MCNC: 39 MG/DL (ref 8–23)
BUN/CREAT SERPL: 30 (ref 7–25)
CALCIUM SPEC-SCNC: 10.3 MG/DL (ref 8.6–10.5)
CHLORIDE SERPL-SCNC: 104 MMOL/L (ref 98–107)
CLARITY UR: ABNORMAL
CO2 SERPL-SCNC: 26.6 MMOL/L (ref 22–29)
COLOR UR: YELLOW
CREAT SERPL-MCNC: 1.3 MG/DL (ref 0.57–1)
D-LACTATE SERPL-SCNC: 1 MMOL/L (ref 0.5–2)
DEPRECATED RDW RBC AUTO: 46 FL (ref 37–54)
EGFRCR SERPLBLD CKD-EPI 2021: 40.6 ML/MIN/1.73
EOSINOPHIL # BLD AUTO: 0.12 10*3/MM3 (ref 0–0.4)
EOSINOPHIL NFR BLD AUTO: 1.7 % (ref 0.3–6.2)
ERYTHROCYTE [DISTWIDTH] IN BLOOD BY AUTOMATED COUNT: 13.5 % (ref 12.3–15.4)
FLUAV RNA RESP QL NAA+PROBE: NOT DETECTED
FLUBV RNA RESP QL NAA+PROBE: NOT DETECTED
GLOBULIN UR ELPH-MCNC: 3.2 GM/DL
GLUCOSE SERPL-MCNC: 103 MG/DL (ref 65–99)
GLUCOSE UR STRIP-MCNC: NEGATIVE MG/DL
HCT VFR BLD AUTO: 33.6 % (ref 34–46.6)
HGB BLD-MCNC: 10.5 G/DL (ref 12–15.9)
HGB UR QL STRIP.AUTO: ABNORMAL
HOLD SPECIMEN: NORMAL
HYALINE CASTS UR QL AUTO: ABNORMAL /LPF
IMM GRANULOCYTES # BLD AUTO: 0.02 10*3/MM3 (ref 0–0.05)
IMM GRANULOCYTES NFR BLD AUTO: 0.3 % (ref 0–0.5)
KETONES UR QL STRIP: NEGATIVE
LEUKOCYTE ESTERASE UR QL STRIP.AUTO: ABNORMAL
LYMPHOCYTES # BLD AUTO: 3.1 10*3/MM3 (ref 0.7–3.1)
LYMPHOCYTES NFR BLD AUTO: 44.8 % (ref 19.6–45.3)
MCH RBC QN AUTO: 29.1 PG (ref 26.6–33)
MCHC RBC AUTO-ENTMCNC: 31.3 G/DL (ref 31.5–35.7)
MCV RBC AUTO: 93.1 FL (ref 79–97)
MONOCYTES # BLD AUTO: 0.66 10*3/MM3 (ref 0.1–0.9)
MONOCYTES NFR BLD AUTO: 9.5 % (ref 5–12)
NEUTROPHILS NFR BLD AUTO: 2.99 10*3/MM3 (ref 1.7–7)
NEUTROPHILS NFR BLD AUTO: 43.3 % (ref 42.7–76)
NITRITE UR QL STRIP: NEGATIVE
NRBC BLD AUTO-RTO: 0 /100 WBC (ref 0–0.2)
PH UR STRIP.AUTO: 7 [PH] (ref 4.5–8)
PLATELET # BLD AUTO: 294 10*3/MM3 (ref 140–450)
PMV BLD AUTO: 10.8 FL (ref 6–12)
POTASSIUM SERPL-SCNC: 4.3 MMOL/L (ref 3.5–5.2)
PROT SERPL-MCNC: 7.1 G/DL (ref 6–8.5)
PROT UR QL STRIP: ABNORMAL
RBC # BLD AUTO: 3.61 10*6/MM3 (ref 3.77–5.28)
RBC # UR STRIP: ABNORMAL /HPF
REF LAB TEST METHOD: ABNORMAL
RSV RNA RESP QL NAA+PROBE: NOT DETECTED
SARS-COV-2 RNA RESP QL NAA+PROBE: NOT DETECTED
SODIUM SERPL-SCNC: 142 MMOL/L (ref 136–145)
SP GR UR STRIP: 1.01 (ref 1–1.03)
SQUAMOUS #/AREA URNS HPF: ABNORMAL /HPF
TROPONIN T SERPL HS-MCNC: 18 NG/L
UROBILINOGEN UR QL STRIP: ABNORMAL
WBC # UR STRIP: ABNORMAL /HPF
WBC NRBC COR # BLD AUTO: 6.92 10*3/MM3 (ref 3.4–10.8)
WHOLE BLOOD HOLD COAG: NORMAL
WHOLE BLOOD HOLD SPECIMEN: NORMAL

## 2025-03-30 PROCEDURE — 84484 ASSAY OF TROPONIN QUANT: CPT | Performed by: EMERGENCY MEDICINE

## 2025-03-30 PROCEDURE — 25010000002 CEFTRIAXONE PER 250 MG: Performed by: EMERGENCY MEDICINE

## 2025-03-30 PROCEDURE — 85025 COMPLETE CBC W/AUTO DIFF WBC: CPT | Performed by: EMERGENCY MEDICINE

## 2025-03-30 PROCEDURE — 87637 SARSCOV2&INF A&B&RSV AMP PRB: CPT | Performed by: EMERGENCY MEDICINE

## 2025-03-30 PROCEDURE — 87077 CULTURE AEROBIC IDENTIFY: CPT | Performed by: EMERGENCY MEDICINE

## 2025-03-30 PROCEDURE — 87040 BLOOD CULTURE FOR BACTERIA: CPT | Performed by: EMERGENCY MEDICINE

## 2025-03-30 PROCEDURE — 99283 EMERGENCY DEPT VISIT LOW MDM: CPT | Performed by: EMERGENCY MEDICINE

## 2025-03-30 PROCEDURE — 87086 URINE CULTURE/COLONY COUNT: CPT | Performed by: EMERGENCY MEDICINE

## 2025-03-30 PROCEDURE — 80053 COMPREHEN METABOLIC PANEL: CPT | Performed by: EMERGENCY MEDICINE

## 2025-03-30 PROCEDURE — 36415 COLL VENOUS BLD VENIPUNCTURE: CPT

## 2025-03-30 PROCEDURE — 81001 URINALYSIS AUTO W/SCOPE: CPT | Performed by: EMERGENCY MEDICINE

## 2025-03-30 PROCEDURE — 71045 X-RAY EXAM CHEST 1 VIEW: CPT

## 2025-03-30 PROCEDURE — P9612 CATHETERIZE FOR URINE SPEC: HCPCS

## 2025-03-30 PROCEDURE — 83605 ASSAY OF LACTIC ACID: CPT | Performed by: EMERGENCY MEDICINE

## 2025-03-30 PROCEDURE — 96365 THER/PROPH/DIAG IV INF INIT: CPT

## 2025-03-30 PROCEDURE — 87186 SC STD MICRODIL/AGAR DIL: CPT | Performed by: EMERGENCY MEDICINE

## 2025-03-30 RX ORDER — CEFDINIR 300 MG/1
300 CAPSULE ORAL 2 TIMES DAILY
Qty: 7 CAPSULE | Refills: 0 | Status: SHIPPED | OUTPATIENT
Start: 2025-03-30 | End: 2025-04-03

## 2025-03-30 RX ORDER — SODIUM CHLORIDE 0.9 % (FLUSH) 0.9 %
10 SYRINGE (ML) INJECTION AS NEEDED
Status: DISCONTINUED | OUTPATIENT
Start: 2025-03-30 | End: 2025-03-30 | Stop reason: HOSPADM

## 2025-03-30 RX ORDER — CEFDINIR 300 MG/1
300 CAPSULE ORAL DAILY
Qty: 7 CAPSULE | Refills: 0 | Status: SHIPPED | OUTPATIENT
Start: 2025-03-30 | End: 2025-03-30

## 2025-03-30 RX ADMIN — CEFTRIAXONE SODIUM 2000 MG: 2 INJECTION, POWDER, FOR SOLUTION INTRAMUSCULAR; INTRAVENOUS at 05:56

## 2025-03-30 NOTE — ED NOTES
Tolerated IV antibiotic infusion well without signs of reaction. [facility] dispatch called at this time.

## 2025-03-30 NOTE — ED PROVIDER NOTES
"Subjective   History of Present Illness  This is am 83 yo female w hx of seizure disorder and developmental delay sent in for evaluation of \"generalized weakness, and appearing lethargic\" according to caregiver at nursing home facility. Reported she was tachycardic and had low oxygen sats. On arrival she is normal sinus, w regular bp. She is on 3L NC and she sometimes wears 4L at night. No audible wheezing or respiratory distress. She is awake and alert, seems to be exhibiting chills though this could be part of her baseline, as she is also nonverbal but able to move all extremities and follow commands to a basic extent. Staffmembers note that she was not her typical self today.         Review of Systems   Unable to perform ROS: Patient nonverbal       Past Medical History:   Diagnosis Date    Acquired cyst of kidney     Acute upper respiratory infection     Age-related nuclear cataract of both eyes     Age-related osteoporosis without current pathological fracture     Allergic rhinitis     Anemia     Anxiety     Atopic dermatitis     Bursitis     Bursitis     Candidal stomatitis     Carotid artery stenosis     Chronic kidney disease, stage III (moderate)     Chronic pain syndrome     Chronic sinusitis     Conjunctivitis     Constipation     Corns and callosities     Diverticulosis of intestine w/o perforation or abscess w/o bleeding     DJD (degenerative joint disease), lumbar     Dry eye syndrome     Hammer toe     Hypercalcemia     Hyperkalemia     Hypertension     Hypoglycemia     Intellectual disability     Iron deficiency anemia, unspecified     Low back pain     Multinodular goiter     Osteoarthritis     Osteopenia     Other forms of scoliosis, lumbar region     Other specified deforming dorsopathies, site unspecified     Pain     Pain in toe of left foot     Pneumonia     Pneumonitis due to inhalation of food or vomitus     Pressure ulcer     Profound intellectual disabilities     Pruritus vulvae     Renal " disorder     Renal insufficiency     Scoliosis     Scoliosis     Seizure disorder     UTI (urinary tract infection)     Vitamin D deficiency        Allergies   Allergen Reactions    Bactrim [Sulfamethoxazole-Trimethoprim] Unknown - High Severity    Bee Venom     Cephalexin Unknown - Low Severity    Iodinated Contrast Media     Nsaids        Past Surgical History:   Procedure Laterality Date    CATARACT EXTRACTION         Family History   Problem Relation Age of Onset    Kidney disease Sister        Social History     Socioeconomic History    Marital status: Single   Tobacco Use    Smoking status: Never    Smokeless tobacco: Never   Vaping Use    Vaping status: Never Used   Substance and Sexual Activity    Alcohol use: No    Drug use: No    Sexual activity: Defer           Objective   Physical Exam  Vitals and nursing note reviewed.   Constitutional:       General: She is not in acute distress.     Appearance: Normal appearance. She is not ill-appearing or toxic-appearing.   HENT:      Head: Normocephalic and atraumatic.      Nose: Nose normal.      Mouth/Throat:      Mouth: Mucous membranes are moist.   Eyes:      Conjunctiva/sclera: Conjunctivae normal.      Pupils: Pupils are equal, round, and reactive to light.   Cardiovascular:      Rate and Rhythm: Normal rate and regular rhythm.      Pulses: Normal pulses.   Pulmonary:      Effort: Pulmonary effort is normal. No respiratory distress.      Breath sounds: No stridor. No wheezing, rhonchi or rales.   Chest:      Chest wall: No tenderness.   Abdominal:      General: Bowel sounds are normal. There is no distension.      Tenderness: There is no abdominal tenderness. There is no guarding or rebound.   Musculoskeletal:         General: No swelling, tenderness, deformity or signs of injury. Normal range of motion.      Cervical back: Normal range of motion.   Skin:     Coloration: Skin is not jaundiced.   Neurological:      Mental Status: She is alert. Mental status is  at baseline.         Procedures           ED Course  ED Course as of 03/30/25 0552   Sun Mar 30, 2025   0431 Hemoglobin(!): 10.5  Kidney function at baseline   [CF]   0431 Hemoglobin(!): 10.5 [CF]      ED Course User Index  [CF] Reg Kingsley MD                                                       Medical Decision Making  85 yo female presents w weakness from nursing facility.   -vitals stable on arrival, she is on her oxygen she typically wears at night. No retractions, clear equal lung sounds and CXR shows no infiltrates.   -Pt renal fx at baseline  -potassium and other electrolytes wnl.   -she is overall well appearing, nonverbal at baseline  -urine cath shows UTI, d/w pharmacist given pt creatine clearance as well as allergies. Suggested rocephin IV given here, and cefdinir for discharge.     Problems Addressed:  UTI (urinary tract infection), bacterial: complicated acute illness or injury  Weakness generalized: complicated acute illness or injury    Amount and/or Complexity of Data Reviewed  Labs: ordered. Decision-making details documented in ED Course.     Details: Labs Reviewed  COMPREHENSIVE METABOLIC PANEL - Abnormal; Notable for the following components:     Glucose                       103 (*)                BUN                           39 (*)                 Creatinine                    1.30 (*)               Alkaline Phosphatase          119 (*)                BUN/Creatinine Ratio          30.0 (*)               eGFR                          40.6 (*)            All other components within normal limits         Narrative: GFR Categories in Chronic Kidney Disease (CKD)                                      GFR Category          GFR (mL/min/1.73)    Interpretation                  G1                     90 or greater         Normal or high (1)                  G2                      60-89                Mild decrease (1)                  G3a                   45-59                Mild to  moderate decrease                  G3b                   30-44                Moderate to severe decrease                  G4                    15-29                Severe decrease                  G5                    14 or less           Kidney failure                                          (1)In the absence of evidence of kidney disease, neither GFR category G1 or G2 fulfill the criteria for CKD.                                    eGFR calculation 2021 CKD-EPI creatinine equation, which does not include race as a factor  TROPONIN - Abnormal; Notable for the following components:     HS Troponin T                 18 (*)              All other components within normal limits         Narrative: High Sensitive Troponin T Reference Range:                  <14.0 ng/L- Negative Female for AMI                  <22.0 ng/L- Negative Male for AMI                  >=14 - Abnormal Female indicating possible myocardial injury.                  >=22 - Abnormal Male indicating possible myocardial injury.                   Clinicians would have to utilize clinical acumen, EKG, Troponin, and serial changes to determine if it is an Acute Myocardial Infarction or myocardial injury due to an underlying chronic condition.                                       CBC WITH AUTO DIFFERENTIAL - Abnormal; Notable for the following components:     RBC                           3.61 (*)               Hemoglobin                    10.5 (*)               Hematocrit                    33.6 (*)               MCHC                          31.3 (*)            All other components within normal limits  URINALYSIS W/ MICROSCOPIC IF INDICATED (NO CULTURE) - Abnormal; Notable for the following components:     Appearance, UA                  (*)                  Blood, UA                     Trace (*)               Protein, UA                   Trace (*)               Leuk Esterase, UA             Large (3+) (*)            All other components within  normal limits  URINALYSIS, MICROSCOPIC ONLY - Abnormal; Notable for the following components:     RBC, UA                       6-10 (*)               WBC, UA                         (*)                  Bacteria, UA                  Trace (*)            All other components within normal limits  COVID-19/FLUA&B/RSV, NP SWAB IN TRANSPORT MEDIA 1 HR TAT - Normal         Narrative: Fact sheet for providers: https://www.fda.gov/media/722123/download                    Fact sheet for patients: https://www.fda.gov/media/415067/download                                    Test performed by PCR.  LACTIC ACID, PLASMA - Normal  BLOOD CULTURE  BLOOD CULTURE  URINE CULTURE  RAINBOW DRAW         Narrative: The following orders were created for panel order [facility] Draw.                  Procedure                               Abnormality         Status                                     ---------                               -----------         ------                                     Green Top (Gel)[[id number]]                                  Final result                               Lavender Top[[id number]]                                     Final result                               Gold Top - SST[[id number]]                                   Final result                               Light Blue Top[[id number]]                                   Final result                                                 Please view results for these tests on the individual orders.  RAINBOW URINE CULTURE TUBE  CBC AND DIFFERENTIAL         Narrative: The following orders were created for panel order CBC & Differential.                  Procedure                               Abnormality         Status                                     ---------                               -----------         ------                                     CBC Auto Differential[[id number]]        Abnormal            Final result                                                  Please view results for these tests on the individual orders.  GREEN TOP  LAVENDER TOP  GOLD TOP - SST  LIGHT BLUE TOP    Radiology: ordered.     Details: XR Chest 1 View   ED Interpretation    Negative when compared to prior       Final Result    Impression:    No active disease.                    Electronically Signed: Jono Medellin MD      3/30/2025 4:03 AM EDT      Workstation ID: [id number]         Risk  Prescription drug management.        Final diagnoses:   Weakness generalized   UTI (urinary tract infection), bacterial       ED Disposition  ED Disposition       ED Disposition   Discharge    Condition   Stable    Comment   --               Tulio Carlson MD  [address] [id number]  [phone]    Schedule an appointment as soon as possible for a visit in 2 days           Medication List        New Prescriptions      cefdinir 300 MG capsule  Commonly known as: OMNICEF  Take 1 capsule by mouth 2 (Two) Times a Day for 4 days.               Where to Get Your Medications        You can get these medications from any pharmacy    Bring a paper prescription for each of these medications  cefdinir 300 MG capsule            Reg Kingsley MD  03/30/25 4373       Reg Kingsley MD  03/30/25 6932

## 2025-03-30 NOTE — ED NOTES
Discharge instructions, ED summary of care, and 1 physical prescription sent in folder back with [facility] to give to [facility] staff.

## 2025-04-01 LAB — BACTERIA SPEC AEROBE CULT: ABNORMAL

## 2025-04-04 LAB
BACTERIA SPEC AEROBE CULT: NORMAL
BACTERIA SPEC AEROBE CULT: NORMAL

## 2025-04-20 ENCOUNTER — HOSPITAL ENCOUNTER (OUTPATIENT)
Dept: GENERAL RADIOLOGY | Facility: HOSPITAL | Age: 85
Discharge: HOME | End: 2025-04-20
Admitting: FAMILY MEDICINE
Payer: MEDICARE

## 2025-04-20 ENCOUNTER — TRANSCRIBE ORDERS (OUTPATIENT)
Dept: ADMINISTRATIVE | Facility: HOSPITAL | Age: 85
End: 2025-04-20
Payer: MEDICARE

## 2025-04-20 DIAGNOSIS — J18.9 UNRESOLVED PNEUMONIA: Primary | ICD-10-CM

## 2025-04-20 DIAGNOSIS — J69.0 PNEUMONITIS DUE TO INHALATION OF FOOD OR VOMITUS: ICD-10-CM

## 2025-04-20 DIAGNOSIS — R06.02 SHORTNESS OF BREATH: ICD-10-CM

## 2025-04-20 DIAGNOSIS — J18.9 UNRESOLVED PNEUMONIA: ICD-10-CM

## 2025-04-20 PROCEDURE — 71046 X-RAY EXAM CHEST 2 VIEWS: CPT

## 2025-04-30 ENCOUNTER — TELEMEDICINE (OUTPATIENT)
Dept: NEUROLOGY | Facility: CLINIC | Age: 85
End: 2025-04-30
Payer: MEDICARE

## 2025-04-30 ENCOUNTER — TELEPHONE (OUTPATIENT)
Dept: NEUROLOGY | Facility: CLINIC | Age: 85
End: 2025-04-30

## 2025-04-30 VITALS — WEIGHT: 154 LBS | BODY MASS INDEX: 30.23 KG/M2 | HEIGHT: 60 IN

## 2025-04-30 DIAGNOSIS — G40.909 EPILEPSY, NOT REFRACTORY: Primary | ICD-10-CM

## 2025-04-30 PROCEDURE — 1159F MED LIST DOCD IN RCRD: CPT | Performed by: PSYCHIATRY & NEUROLOGY

## 2025-04-30 PROCEDURE — 1160F RVW MEDS BY RX/DR IN RCRD: CPT | Performed by: PSYCHIATRY & NEUROLOGY

## 2025-04-30 PROCEDURE — 99213 OFFICE O/P EST LOW 20 MIN: CPT | Performed by: PSYCHIATRY & NEUROLOGY

## 2025-04-30 RX ORDER — FERROUS SULFATE 325(65) MG
TABLET ORAL
COMMUNITY
Start: 2025-04-26

## 2025-04-30 RX ORDER — ZINC OXIDE 20 %
OINTMENT (GRAM) TOPICAL
COMMUNITY
Start: 2025-02-21

## 2025-04-30 NOTE — TELEPHONE ENCOUNTER
Caller: YURIDIA ALCANTARA/ DENICE Ramirse call back number: [phone]     Type of visit: MYCHART VIDEO FOLLOW UP    Requested date: 6 MONTH FOLLOW UP      Additional notes:PT NEEDS A MYCHART VIDEO VISIT

## 2025-04-30 NOTE — PROGRESS NOTES
Chief complaint: Seizure disorder      Patient ID: Erica Crockett is a 84 y.o. female.    HPI:  Unable to complete visit using a video connection to the patient. A phone visit was used to complete this visits. Total time of discussion was 15 minutes.  The patient did consent to this type of encounter.  I am here in the neurology clinic.  The patient is at their place of residence.  I had the pleasure of speaking with the caregiver for Erica.  As you may know she is an 84-year-old female I am seeing for a history of seizures.  She apparently had a breakthrough seizure in January.      The following portions of the patient's history were reviewed and updated as appropriate: allergies, current medications, past family history, past medical history, past social history, past surgical history and problem list.    Review of Systems   Neurological:  Negative for dizziness, tremors, seizures, syncope, facial asymmetry, speech difficulty, weakness, light-headedness, numbness and headaches.   Psychiatric/Behavioral:  Negative for agitation, behavioral problems, confusion, decreased concentration, dysphoric mood, hallucinations, self-injury, sleep disturbance and suicidal ideas. The patient is not nervous/anxious and is not hyperactive.       I have reviewed the review of systems above performed by my medical assistant.      There were no vitals filed for this visit.    Neurological Exam     Physical Exam    Procedures    Assessment/Plan: We are going to prescribe Valtoco as rescue therapy.  She will discontinue the prescription for the Diastat rectal kit.  We will also increase her Keppra to 750 mg twice a day.  Lamictal level.  Return to clinic in 6 months or sooner if needed.  A total of 15 minutes was spent face-to-face with the patient today.  Of that greater than 50% of this time was spent discussing signs and symptoms of seizures, breakthrough seizure, patient education, plan of care and prognosis.          Diagnoses and all orders for this visit:    1. Epilepsy, not refractory (Primary)  -     diazePAM, 20 MG Dose, (VALTOCO) 2 x 10 MG/0.1ML liquid therapy pack; Instill 1 spray (per device) into one nostril once as needed for seizure. If second dose is required, it may be administered 4 hours after initial dose. Do not exceed 2 doses per episode.  Dispense: 1 each; Refill: 0           Alex Peralta II, MD

## 2025-05-01 ENCOUNTER — TELEPHONE (OUTPATIENT)
Dept: NEUROLOGY | Facility: CLINIC | Age: 85
End: 2025-05-01

## 2025-05-01 ENCOUNTER — SPECIALTY PHARMACY (OUTPATIENT)
Dept: NEUROLOGY | Facility: CLINIC | Age: 85
End: 2025-05-01
Payer: MEDICARE

## 2025-05-01 NOTE — TELEPHONE ENCOUNTER
Provider: DR MAGUIRE    [facility]    Phone Number: [phone]    Reason for Call: STATES THE VALTOCO IS REQUIRING A PRIOR AUTH. FAXING FORM [phone]. PLEASE REVIEW, THANK YOU.

## 2025-05-01 NOTE — TELEPHONE ENCOUNTER
Starting prior authorization for intranasal diazepam (Valtoco), will follow-up and call [facility] with any PA updates. Thank you.

## 2025-05-02 ENCOUNTER — SPECIALTY PHARMACY (OUTPATIENT)
Dept: NEUROLOGY | Facility: CLINIC | Age: 85
End: 2025-05-02
Payer: MEDICARE

## 2025-05-02 NOTE — PROGRESS NOTES
Specialty Pharmacy Patient Management Program  Refill Outreach     Valtoco approved until 5/2/2026 key [id number]      Elle Jhaveri  5/2/2025  09:36 EDT

## 2025-05-02 NOTE — TELEPHONE ENCOUNTER
Called number provided for [facility] with extension [phone] which went to [employer]. Main number with option for provider office calling also went to voicemail. Left number to call back regarding patient's prior authorization for Valtoco which is approved for one year until 5/1/2026.

## 2025-05-02 NOTE — TELEPHONE ENCOUNTER
"Relay     \"m for Daina, let her know that pt has been scheduled for 11/5 at 12:20 for a video visit\"                 "

## 2025-05-15 ENCOUNTER — HOSPITAL ENCOUNTER (OUTPATIENT)
Dept: GENERAL RADIOLOGY | Facility: HOSPITAL | Age: 85
Discharge: HOME | End: 2025-05-15
Admitting: FAMILY MEDICINE
Payer: MEDICARE

## 2025-05-15 ENCOUNTER — TRANSCRIBE ORDERS (OUTPATIENT)
Dept: ADMINISTRATIVE | Facility: HOSPITAL | Age: 85
End: 2025-05-15
Payer: MEDICARE

## 2025-05-15 DIAGNOSIS — Z87.01 PERSONAL HISTORY OF PNEUMONIA (RECURRENT): ICD-10-CM

## 2025-05-15 DIAGNOSIS — Z87.01 PERSONAL HISTORY OF PNEUMONIA (RECURRENT): Primary | ICD-10-CM

## 2025-05-15 PROCEDURE — 71046 X-RAY EXAM CHEST 2 VIEWS: CPT

## 2025-05-21 ENCOUNTER — APPOINTMENT (OUTPATIENT)
Dept: ULTRASOUND IMAGING | Facility: HOSPITAL | Age: 85
End: 2025-05-21
Payer: MEDICARE

## 2025-05-21 ENCOUNTER — HOSPITAL ENCOUNTER (EMERGENCY)
Facility: HOSPITAL | Age: 85
Discharge: HOME | End: 2025-05-21
Attending: EMERGENCY MEDICINE | Admitting: EMERGENCY MEDICINE
Payer: MEDICARE

## 2025-05-21 VITALS
BODY MASS INDEX: 33.58 KG/M2 | OXYGEN SATURATION: 90 % | SYSTOLIC BLOOD PRESSURE: 119 MMHG | WEIGHT: 171.96 LBS | HEART RATE: 52 BPM | DIASTOLIC BLOOD PRESSURE: 52 MMHG | TEMPERATURE: 97.8 F | RESPIRATION RATE: 26 BRPM

## 2025-05-21 DIAGNOSIS — M79.89 LEG SWELLING: Primary | ICD-10-CM

## 2025-05-21 LAB
QT INTERVAL: 488 MS
QTC INTERVAL: 504 MS

## 2025-05-21 PROCEDURE — 99284 EMERGENCY DEPT VISIT MOD MDM: CPT | Performed by: EMERGENCY MEDICINE

## 2025-05-21 PROCEDURE — 93010 ELECTROCARDIOGRAM REPORT: CPT | Performed by: INTERNAL MEDICINE

## 2025-05-21 PROCEDURE — 93005 ELECTROCARDIOGRAM TRACING: CPT | Performed by: EMERGENCY MEDICINE

## 2025-05-21 PROCEDURE — 93970 EXTREMITY STUDY: CPT

## 2025-05-21 RX ORDER — SODIUM CHLORIDE 0.9 % (FLUSH) 0.9 %
10 SYRINGE (ML) INJECTION AS NEEDED
Status: DISCONTINUED | OUTPATIENT
Start: 2025-05-21 | End: 2025-05-21

## 2025-05-21 NOTE — ED PROVIDER NOTES
Subjective   History of Present Illness    Review of Systems    Past Medical History:   Diagnosis Date    Acquired cyst of kidney     Acute upper respiratory infection     Age-related nuclear cataract of both eyes     Age-related osteoporosis without current pathological fracture     Allergic rhinitis     Anemia     Anxiety     Atopic dermatitis     Bursitis     Bursitis     Candidal stomatitis     Carotid artery stenosis     Chronic kidney disease, stage III (moderate)     Chronic pain syndrome     Chronic sinusitis     Conjunctivitis     Constipation     Corns and callosities     Diverticulosis of intestine w/o perforation or abscess w/o bleeding     DJD (degenerative joint disease), lumbar     Dry eye syndrome     Hammer toe     Hypercalcemia     Hyperkalemia     Hypertension     Hypoglycemia     Intellectual disability     Iron deficiency anemia, unspecified     Low back pain     Multinodular goiter     Osteoarthritis     Osteopenia     Other forms of scoliosis, lumbar region     Other specified deforming dorsopathies, site unspecified     Pain     Pain in toe of left foot     Pneumonia     Pneumonitis due to inhalation of food or vomitus     Pressure ulcer     Profound intellectual disabilities     Pruritus vulvae     Renal disorder     Renal insufficiency     Scoliosis     Scoliosis     Seizure disorder     UTI (urinary tract infection)     Vitamin D deficiency        Allergies   Allergen Reactions    Bactrim [Sulfamethoxazole-Trimethoprim] Unknown - High Severity    Bee Venom     Cephalexin Unknown - Low Severity    Iodinated Contrast Media     Nsaids        Past Surgical History:   Procedure Laterality Date    CATARACT EXTRACTION         Family History   Problem Relation Age of Onset    Kidney disease Sister        Social History     Socioeconomic History    Marital status: Single   Tobacco Use    Smoking status: Never    Smokeless tobacco: Never   Vaping Use    Vaping status: Never Used   Substance and  Sexual Activity    Alcohol use: No    Drug use: No    Sexual activity: Defer           Objective   Physical Exam    Procedures           ED Course  ED Course as of 05/21/25 1549   Wed May 21, 2025   1337 EKG-rate of 64, sinus rhythm, normal axis, no acute ST elevation or depression.  When compared to EKG from 12/17/2024 it is generally unchanged. [AW]      ED Course User Index  [AW] Karthikeyan German MD    15:50 EDT, 05/21/25:  The chart was reviewed and patient reassessed.  The diagnosis of normal venous Doppler was discussed with the patient.  The patient will be discharged back to her care facility with follow-up with Dr. Carlson as arranged by Dr. German and Dr. Carlson.  The patient was maintained in normal sinus rhythm while here in the emergency department                                                   Medical Decision Making  Amount and/or Complexity of Data Reviewed  ECG/medicine tests: ordered.        Final diagnoses:   Leg swelling       ED Disposition  ED Disposition       None            No follow-up provider specified.       Medication List      No changes were made to your prescriptions during this visit.            Mikey Humphreys MD  05/21/25 6479

## 2025-05-21 NOTE — ED NOTES
Dillon Hernandez updated on reynaldo [facility] instructions reviewed they are sending transport to  pt

## 2025-05-21 NOTE — DISCHARGE INSTRUCTIONS
Follow-up with Dr. Carlson as arranged by Dr. Carlson.  Return to the emergency department if worse anyway at all

## 2025-05-21 NOTE — ED PROVIDER NOTES
Subjective   History of Present Illness  Patient presents from care facility with concern about left lower leg swelling.  Staff is noticed that patient has had some edema in her left lower leg and actually had some in her right lower leg today.  They also said that she had been breathing funny.  Patient recently was diagnosed with pneumonia but she had a follow-up x-ray 6 days ago that look like it had cleared.  Staff also mentioned that patient was having some bradycardic episodes.  Patient is otherwise been at baseline health with no recent changes in medication or fevers.  No previous history of DVT/PE in the past.  Discussed patient with Dr. Carlson and he just wanted patient to have ultrasound of the legs and he will see the patient this week if she is discharged back to the care facility.      Review of Systems   All other systems reviewed and are negative.      Past Medical History:   Diagnosis Date    Acquired cyst of kidney     Acute upper respiratory infection     Age-related nuclear cataract of both eyes     Age-related osteoporosis without current pathological fracture     Allergic rhinitis     Anemia     Anxiety     Atopic dermatitis     Bursitis     Bursitis     Candidal stomatitis     Carotid artery stenosis     Chronic kidney disease, stage III (moderate)     Chronic pain syndrome     Chronic sinusitis     Conjunctivitis     Constipation     Corns and callosities     Diverticulosis of intestine w/o perforation or abscess w/o bleeding     DJD (degenerative joint disease), lumbar     Dry eye syndrome     Hammer toe     Hypercalcemia     Hyperkalemia     Hypertension     Hypoglycemia     Intellectual disability     Iron deficiency anemia, unspecified     Low back pain     Multinodular goiter     Osteoarthritis     Osteopenia     Other forms of scoliosis, lumbar region     Other specified deforming dorsopathies, site unspecified     Pain     Pain in toe of left foot     Pneumonia     Pneumonitis due to  inhalation of food or vomitus     Pressure ulcer     Profound intellectual disabilities     Pruritus vulvae     Renal disorder     Renal insufficiency     Scoliosis     Scoliosis     Seizure disorder     UTI (urinary tract infection)     Vitamin D deficiency        Allergies   Allergen Reactions    Bactrim [Sulfamethoxazole-Trimethoprim] Unknown - High Severity    Bee Venom     Cephalexin Unknown - Low Severity    Iodinated Contrast Media     Nsaids        Past Surgical History:   Procedure Laterality Date    CATARACT EXTRACTION         Family History   Problem Relation Age of Onset    Kidney disease Sister        Social History     Socioeconomic History    Marital status: Single   Tobacco Use    Smoking status: Never    Smokeless tobacco: Never   Vaping Use    Vaping status: Never Used   Substance and Sexual Activity    Alcohol use: No    Drug use: No    Sexual activity: Defer           Objective   Physical Exam  Vitals and nursing note reviewed.   Constitutional:       Comments: Patient laying in bed with arms tensely flex against chest and legs are flexed with knees against chest.   HENT:      Head: Normocephalic.      Nose: Nose normal.      Mouth/Throat:      Pharynx: Oropharynx is clear.   Eyes:      Conjunctiva/sclera: Conjunctivae normal.   Cardiovascular:      Rate and Rhythm: Normal rate and regular rhythm.      Heart sounds: Normal heart sounds.   Pulmonary:      Effort: Pulmonary effort is normal.      Breath sounds: Normal breath sounds. No stridor. No wheezing, rhonchi or rales.   Abdominal:      General: Abdomen is flat.      Palpations: Abdomen is soft.   Musculoskeletal:         General: No swelling or tenderness.      Cervical back: Neck supple.   Skin:     General: Skin is warm and dry.      Capillary Refill: Capillary refill takes 2 to 3 seconds.   Neurological:      Mental Status: She is alert. Mental status is at baseline.         Procedures           ED Course  ED Course as of 05/21/25 0431    Wed May 21, 2025   1337 EKG-rate of 64, sinus rhythm, normal axis, no acute ST elevation or depression.  When compared to EKG from 12/17/2024 it is generally unchanged. [AW]      ED Course User Index  [AW] Karthikeyan German MD                                                       Medical Decision Making  Ddx peripheral edema, lymphedema, DVT, stasis dermatitis, dependent edema    Amount and/or Complexity of Data Reviewed  ECG/medicine tests: ordered.        Final diagnoses:   None       ED Disposition  ED Disposition       None            No follow-up provider specified.       Medication List      No changes were made to your prescriptions during this visit.            Karthikeyan German MD  05/21/25 6814

## 2025-05-27 ENCOUNTER — TELEPHONE (OUTPATIENT)
Dept: CARDIOLOGY | Age: 85
End: 2025-05-27
Payer: MEDICARE

## 2025-05-27 NOTE — TELEPHONE ENCOUNTER
"Naomi, scheduling at [facility] ([phone], ext [phone] for nurse, ext [phone] for ), called to request an appointment for Erica Crockett.  Naomi transferred call to patient's nurse to provide additional information.    Patient was seen in ED recently for leg swelling: ED with Mikey Humphreys MD (05/21/2025).  She also had a CXR on 5/15: XR lag chest pa and lateral with Tulio Carlson MD (05/15/2025)      Carol reports patient is non-verbal but has not been her usual self over the last week.  Patient has had occasional HR readings in the \"40's to 50's\", has been more lethargic, with swelling in her feet.  Carol reports swelling has improved some over the last couple of days.  Patient does have shortness of breath when laying flat and Carol states they try to keep her head elevated.  She also wears oxygen to sleep at night (not new for patient) and PRN during the day.  Carol notes that patient has not been needing daytime oxygen.  Patient also has decreased appetite.  Last documented seizure for patient was January 2025.    Cardiac Meds  Amlodipine 5 mg, daily  HCTZ 25 mg, daily        Patient does take Norco and lorazepam three times daily.  Carol states these medications were held and there was no real change in her symptoms/HR readings.  She states patient is seen by palliative care and that the provider said they should continue to administer these medications for patient comfort as there was no real change in her symptoms/HR.    Carol stated their facility provider recommended patient f/u with cardiology.  There are some openings with [facility] Garcia on Thurs/Fri this week if appropriate.    Please let me know how you would like to proceed.    Thank you,  Elle HUTCHINSON RN  Triage Nurse [facility]  05/27/25  14:10 EDT        "

## 2025-05-28 PROBLEM — J18.9 PNEUMONIA: Status: ACTIVE | Noted: 2025-05-28

## 2025-05-28 NOTE — H&P
[facility]   HISTORY AND PHYSICAL    Tulio Carlson MD    Subjective       CHIEF COMPLAINT:     Lethargy, shortness of breath    HISTORY OF PRESENT ILLNESS:    84-year-old female history of chronic intellectual disability resident of [facility] presented to the ER after being more lethargic than normal.  Patient is nonverbal at baseline so history taken from caregiver at bedside and chart.  Apparently over the past week patient has been more lethargic not as interactive.  She had not been progressing so they decided to bring her to the emergency room for evaluation.  There she was having soft blood pressure and requiring oxygen supplementation.  After workup there admission was requested for all these issues.      REVIEW OF SYSTEMS:  Please see the above history of present illness for pertinent positives and negatives.      Personal History       Past Medical History:   Diagnosis Date    Acquired cyst of kidney     Acute upper respiratory infection     Age-related nuclear cataract of both eyes     Age-related osteoporosis without current pathological fracture     Allergic rhinitis     Anemia     Anxiety     Atopic dermatitis     Bursitis     Bursitis     Candidal stomatitis     Carotid artery stenosis     Chronic kidney disease, stage III (moderate)     Chronic pain syndrome     Chronic sinusitis     Conjunctivitis     Constipation     Corns and callosities     Diverticulosis of intestine w/o perforation or abscess w/o bleeding     DJD (degenerative joint disease), lumbar     Dry eye syndrome     Hammer toe     Hypercalcemia     Hyperkalemia     Hypertension     Hypoglycemia     Intellectual disability     Iron deficiency anemia, unspecified     Low back pain     Multinodular goiter     Osteoarthritis     Osteopenia     Other forms of scoliosis, lumbar region     Other specified deforming dorsopathies, site unspecified     Pain     Pain in toe of left foot     Pneumonia      Pneumonitis due to inhalation of food or vomitus     Pressure ulcer     Profound intellectual disabilities     Pruritus vulvae     Renal disorder     Renal insufficiency     Scoliosis     Scoliosis     Seizure disorder     UTI (urinary tract infection)     Vitamin D deficiency      Past Surgical History:   Procedure Laterality Date    CATARACT EXTRACTION       Family History   Problem Relation Age of Onset    Kidney disease Sister      Social History     Tobacco Use    Smoking status: Never    Smokeless tobacco: Never   Vaping Use    Vaping status: Never Used   Substance Use Topics    Alcohol use: No    Drug use: No     Medications Prior to Admission   Medication Sig Dispense Refill Last Dose/Taking    acetaminophen (TYLENOL) 325 MG tablet Take 2 tablets by mouth Every 6 (Six) Hours As Needed for Mild Pain.   5/22/2025 at  5:30 PM    amLODIPine (NORVASC) 5 MG tablet Take 1 tablet by mouth 2 (Two) Times a Day. 180 tablet 3 5/27/2025 at  8:00 PM    atorvastatin (LIPITOR) 20 MG tablet Take 1 tablet by mouth Daily.   5/28/2025 Morning    cetirizine (zyrTEC) 10 MG tablet Take 1 tablet by mouth Daily.   5/27/2025 at  8:00 PM    FeroSul 325 (65 Fe) MG tablet    5/28/2025 at  8:00 AM    gabapentin (NEURONTIN) 300 MG capsule Take 1 capsule by mouth Every Night. 3 capsule 0 5/27/2025 Bedtime    hydroCHLOROthiazide 25 MG tablet Take 1 tablet by mouth Daily. 90 tablet 3 5/27/2025 at  8:00 AM    HYDROcodone-acetaminophen (NORCO) 7.5-325 MG per tablet Take 1 tablet by mouth Every 8 (Eight) Hours As Needed for Severe Pain. 6 tablet 0 5/28/2025 Morning    lamoTRIgine (LaMICtal) 200 MG tablet Take 1 tablet by mouth 2 (Two) Times a Day.   5/28/2025 Morning    levETIRAcetam (KEPPRA) 500 MG tablet Take 1 tablet by mouth 2 (Two) Times a Day.   5/28/2025 at  8:00 AM    levothyroxine (SYNTHROID, LEVOTHROID) 25 MCG tablet Take 1 tablet by mouth Daily.   5/28/2025 at  6:00 AM    LORazepam (ATIVAN) 0.5 MG tablet Take 1 tablet by mouth Every  6 (Six) Hours As Needed for Anxiety. 8 tablet 0 5/28/2025 Morning    lubiprostone (AMITIZA) 8 MCG capsule Take 1 capsule by mouth 2 (Two) Times a Day With Meals.   5/28/2025 at  8:00 AM    omeprazole (priLOSEC) 20 MG capsule Take 2 capsules by mouth Daily.   5/28/2025 at  8:00 AM    polyethylene glycol (MiraLax) 17 g packet Take 17 g by mouth Daily.   5/28/2025 at  8:00 AM    Probiotic Product (Risaquad-2) capsule capsule Take 2 capsules by mouth Daily.   5/27/2025 Bedtime    saccharomyces boulardii (FLORASTOR) 250 MG capsule Take 1 capsule by mouth 2 (Two) Times a Day.   5/28/2025 at  8:00 AM    sodium zirconium cyclosilicate (Lokelma) 10 g pack Take 10 g by mouth Daily.   5/28/2025    Wheat Dextrin (BENEFIBER DRINK MIX PO) Take 1 packet by mouth Daily.   5/28/2025 at  8:00 AM    zinc oxide 20 % ointment    5/27/2025 at  9:00 AM    A&D ointment Apply  topically 2 (Two) Times a Day.   Unknown    Antacid/Antigas 400-400-40 MG/10ML suspension        bisacodyl (DULCOLAX) 10 MG suppository Insert 1 suppository into the rectum Daily As Needed for Constipation (if po medications ineffective).   7/23/2023    bisacodyl (DULCOLAX) 5 MG EC tablet Take 2 tablets by mouth Daily As Needed for Constipation.   8/17/2023    carbamide peroxide (DEBROX) 6.5 % otic solution Administer 4 drops into both ears As Needed for Ear Pain.   Unknown    diazePAM, 20 MG Dose, (VALTOCO) 2 x 10 MG/0.1ML liquid therapy pack Instill 1 spray (per device) into one nostril once as needed for seizure. If second dose is required, it may be administered 4 hours after initial dose. Do not exceed 2 doses per episode. 1 each 0 Unknown    diphenhydrAMINE (BENADRYL) 25 mg capsule Take 1 capsule by mouth Every 6 (Six) Hours As Needed for Itching or Allergies.   4/13/2024    EPINEPHrine (EPIPEN) 0.3 MG/0.3ML solution auto-injector injection Inject 0.3 mL under the skin into the appropriate area as directed 1 (One) Time As Needed.   Unknown    Hydrocortisone  (Susan's magic butt cream) Apply 1 Application topically to the appropriate area as directed As Needed.   Unknown    hydrocortisone 2.5 % cream Apply 1 Application topically to the appropriate area as directed 4 (Four) Times a Day As Needed.       ipratropium-albuterol (DUO-NEB) 0.5-2.5 mg/3 ml nebulizer Take 3 mL by nebulization 4 (Four) Times a Day As Needed for Wheezing or Shortness of Air.       ipratropium-albuterol (DUO-NEB) 0.5-2.5 mg/3 ml nebulizer Take 3 mL by nebulization Every 4 (Four) Hours As Needed for Wheezing. 360 mL 0 4/18/2025    loperamide (IMODIUM) 2 MG capsule Take 1 capsule by mouth 4 (Four) Times a Day As Needed for Diarrhea.   1/10/2025    magnesium hydroxide (MILK OF MAGNESIA) 400 MG/5ML suspension Take 30 mL by mouth Daily As Needed for Constipation.       Menthol, Topical Analgesic, (Biofreeze Roll-On) 4 % gel Apply 1 Application topically Daily. For hips       neomycin-bacitracin-polymyxin (NEOSPORIN) 5-400-5000 ointment Apply 1 Application topically to the appropriate area as directed Daily.       nystatin (MYCOSTATIN) 962662 UNIT/GM cream    3/4/2024    nystatin-triamcinolone (MYCOLOG II) 807785-4.1 UNIT/GM-% cream Apply 1 Application topically to the appropriate area as directed 2 (Two) Times a Day.       ondansetron (ZOFRAN) 4 MG tablet Take 1 tablet by mouth Every 8 (Eight) Hours As Needed for Nausea or Vomiting.   2/3/2025 at  8:00 AM    Prolia 60 MG/ML solution prefilled syringe syringe    5/9/2025 at  2:00 PM    Skin Protectants, Misc. (AQUAPHOR LIP REPAIR EX) Apply 1 application  topically 4 (Four) Times a Day As Needed (to lips).   Unknown    Skin Protectants, Misc. (eucerin) cream Apply 1 Application topically to the appropriate area as directed As Needed for Dry Skin.   Unknown    sodium chloride 0.65 % nasal spray Administer 2 sprays into the nostril(s) as directed by provider As Needed for Congestion.   Unknown    Sodium Phosphates (fleet enema) 7-19 GM/118ML enema Insert 1  "enema into the rectum 1 (One) Time As Needed.   Unknown    TRIAMCINOLONE ACETONIDE EX Apply  topically.       tuberculin 5 UNIT/0.1ML injection    2/15/2025    Wound Dressings (SILVASORB ANTIMICROBIAL SHEET EX) Apply 1 application  topically As Needed (TO LEFT BUTTOCKS AS NEEDED).   Unknown     Allergies:  Bactrim [sulfamethoxazole-trimethoprim], Bee venom, Cephalexin, Iodinated contrast media, and Nsaids    Immunization History   Administered Date(s) Administered    COVID-19 (PFIZER) Purple Cap Monovalent 01/08/2021, 01/31/2021    Td (TDVAX) 06/17/2008       Objective       Objective     Vital Signs  Temp:  [96 °F (35.6 °C)-97.2 °F (36.2 °C)] 96 °F (35.6 °C)  Heart Rate:  [61-91] 83  Resp:  [20-24] 20  BP: ()/(38-62) 141/62    Flowsheet Rows      Flowsheet Row First Filed Value   Admission Height 152 cm (59.84\") Documented at 05/28/2025 1015   Admission Weight 78 kg (171 lb 15.3 oz) Documented at 05/28/2025 1015             Physical Exam:  Physical Exam    Emotional Behavior:      Mood and Affect:         Depression  none               Anxiety  none    Debilities:     Handicaps  at baseline   Disabilities  chronic intellectual disability    Agitation  none    Results Review:   I have reviewed these results below from the time of this admission to 5/28/2025 14:38 EDT :  [x]  Laboratory  [x]  Microbiology  []  Radiology  []  EKG/Telemetry   []  Cardiology/Vascular   []  Pathology  [x]  Old records  []  Other:  Most notable findings include: CXR Reviewed with RLL infiltrate        Assessment / Plan     Assessment & Plan     Active Hospital Problems:  Active Hospital Problems    Diagnosis     **Pneumonia      Plan:     RLL Pneumonia suspect aspiration  Acute hypoxia d/t the same   -CXR reviewed  -on 4L NC now  - continue IV rocephin  - continue pulmonary toilet    KEILY on CKDIIIb  -Cr 1.8, baseline 1.1-1.3  -IVF as below  -follow up bmp in am     Hypernatremia  -will add IV 1/2 Ns and follow up bmp in am " "    Chronic issues :   Seizure disorder  Essential Hypertension  Chronic Pain Syndrome w/ Narcotic Dependence  Developmental Mental Disorder/Intellectual Disability  Generalized Anxiety Disorder  -will continue home meds   -follow routine vital signs      High risk   Code status: DNR/DNI per chart and living will advance directive. Also caregiver reports that she is on \"palliative care\" at [facility] will have  investigate the facility  DVT ppx-SCDs    I discussed the patient's findings and my recommendations with caregiver. Also d/w ER provider and made decision for Observation.      Electronically signed by Hector Cardenas DO, 05/28/25, 14:38 EDT.    Note disclaimer: At [facility], we believe that sharing information builds trust and better relationships. You are receiving this note because you recently visited [facility]. It is possible you will see health information before a provider has talked with you about it. This kind of information can be easy to misunderstand. To help you fully understand what it means for your health, we urge you to discuss this note with your provider.       "

## 2025-05-28 NOTE — PLAN OF CARE
Goal Outcome Evaluation:  Plan of Care Reviewed With: patient        Progress: improving  Outcome Evaluation: Admit from the ER r/t pneumonia requiring oxygen. Nonverbal at baseline. L buttock pressure injury. Patient is more alert this even compared to when first admitted. IV fluids. IV abx. Meds crushed with pudding per facility.

## 2025-05-28 NOTE — NURSING NOTE
[facility] consult received. Wound admission photos reviewed. Left upper buttocks what appears as a resolving skin injury. Dry, flaky and red. Gluteal cleft with mild redness, 2° to moisture.  Will recommend using zinc ointment for moisture and barrier protection.  I will add wound care and prevention orders in Epic.  Please call with any questions.

## 2025-05-28 NOTE — TELEPHONE ENCOUNTER
Chart review shows patient is being admitted.    It appears patient was scheduled with [facility]ERRY Garcia for f/u on 6/2.      Please let me know if there is anything else you would like me to do for this patient.    Thank you,  Elle HUTCHINSON RN  Triage Nurse [facility]  05/28/25   13:01 EDT

## 2025-05-28 NOTE — TELEPHONE ENCOUNTER
Discussed with Dr. Cortez: patient currently in ED.  Will await outcome from ED visit.    Thank you,  Elle HUTCHINSON RN  Triage Nurse [facility]  05/28/25 09:49 EDT

## 2025-05-28 NOTE — ED NOTES
Multiple attempts by two RN and lab phlebotomy w/o success for second set of cultures, provider aware and not delay start of abx for patient.

## 2025-05-29 NOTE — PLAN OF CARE
Goal Outcome Evaluation:  Plan of Care Reviewed With: patient           Outcome Evaluation: SLP: Attempted clinical swallow eval. Pt is alert and generally cooperative. Oral care with toothbrush and swab provided with clearance of debris on palate and teeth. Attempted small trials of water by spoon (no greater than 10 ccc) w/no attempt to swallow and anterior loss out of the right side of the mouth (pt dependently leaning to right side despited repositioning). Also attempted two trials of applesauce. One was manually removed due to no attempt to manipulate and swallow. The second trial was lost out of the oral cavity on the right side. SLP again manually removed the remaining bolus from the oral cavity. Strong cough noted one time after second trial of applesauce but felt to be related to prior oral care. No further trials attempted and oral care completed again. Recommend to continue NPO status for now. Discussed with SLP at [facility] and this is similar to patient's swallowing just prior to admission. Unable to evaluate there as well due to letting food/liquids fall out of her oral cavity. Will f/u for re-evaluation tomorrow.                              independent

## 2025-05-29 NOTE — THERAPY EVALUATION
Acute Care - Speech Language Pathology   Swallow Initial Evaluation  [facility]     Patient Name: Erica Crockett  : 1940  MRN: [id number]  Today's Date: 2025               Admit Date: 2025    Visit Dx:     ICD-10-CM ICD-9-CM   1. Pneumonia due to infectious organism, unspecified laterality, unspecified part of lung  J18.9 486   2. Hypotension, unspecified hypotension type  I95.9 458.9   3. KEILY (acute kidney injury)  N17.9 584.9     Patient Active Problem List   Diagnosis    Degeneration of intervertebral disc of lumbar region    Developmental mental disorder    Osteoarthritis of hip    Scoliosis    Chronic pain    Nonverbal signs of pain    Bursitis of left hip    Encounter for monitoring opioid maintenance therapy    Anxiety    Constipation    Hypercalcemia    Epilepsy, not refractory    Osteoporosis    Impulse control disorder    Vitamin D deficiency    Pneumonia of both lower lobes due to infectious organism    KEILY (acute kidney injury)    Sepsis-associated organ dysfunction    Stage 3 chronic kidney disease    Hypercholesterolemia    Acute kidney failure    Essential (primary) hypertension    Generalized anxiety disorder    Urinary tract infection    Dental pain    Aspiration pneumonia of right lung    Pneumonia of right middle lobe due to infectious organism    Aspiration pneumonia of right lung, unspecified aspiration pneumonia type, unspecified part of lung    Nonrheumatic aortic valve stenosis    Pneumonia     Past Medical History:   Diagnosis Date    Acquired cyst of kidney     Acute upper respiratory infection     Age-related nuclear cataract of both eyes     Age-related osteoporosis without current pathological fracture     Allergic rhinitis     Anemia     Anxiety     Atopic dermatitis     Bursitis     Bursitis     Candidal stomatitis     Carotid artery stenosis     Chronic kidney disease, stage III (moderate)     Chronic pain syndrome     Chronic sinusitis     Conjunctivitis      Constipation     Corns and callosities     Diverticulosis of intestine w/o perforation or abscess w/o bleeding     DJD (degenerative joint disease), lumbar     Dry eye syndrome     Hammer toe     Hypercalcemia     Hyperkalemia     Hypertension     Hypoglycemia     Intellectual disability     Iron deficiency anemia, unspecified     Low back pain     Multinodular goiter     Osteoarthritis     Osteopenia     Other forms of scoliosis, lumbar region     Other specified deforming dorsopathies, site unspecified     Pain     Pain in toe of left foot     Pneumonia     Pneumonitis due to inhalation of food or vomitus     Pressure ulcer     Profound intellectual disabilities     Pruritus vulvae     Renal disorder     Renal insufficiency     Scoliosis     Scoliosis     Seizure disorder     UTI (urinary tract infection)     Vitamin D deficiency      Past Surgical History:   Procedure Laterality Date    CATARACT EXTRACTION         SLP Recommendation and Plan  SLP Swallowing Diagnosis: severe, profound, oral dysphagia, suspected pharyngeal dysphagia, other (see comments) (hx of pharyngeal dysphagia) (05/29/25 1115)  SLP Diet Recommendation: NPO (05/29/25 1115)     SLP Rec. for Method of Medication Administration: meds via alternate route (05/29/25 1115)     Monitor for Signs of Aspiration: yes, gurgly voice, cough, pneumonia (05/29/25 1115)  Recommended Diagnostics: reassess via clinical swallow evaluation (05/29/25 1115)     Anticipated Discharge Disposition (SLP): extended care facility (05/29/25 1115)           Oral Care Recommendations: Oral Care BID/PRN, Suction toothbrush (05/29/25 1115)                  Patient/Family Concerns, Anticipated Discharge Disposition (SLP): Pt is unable to state. (05/29/25 1115)                     Outcome Evaluation: SLP: Attempted clinical swallow eval. Pt is alert and generally cooperative. Oral care with toothbrush and swab provided with clearance of debris on palate and teeth. Attempted  small trials of water by spoon (no greater than 10 ccc) w/no attempt to swallow and anterior loss out of the right side of the mouth (pt dependently leaning to right side despited repositioning). Also attempted two trials of applesauce. One was manually removed due to no attempt to manipulate and swallow. The second trial was lost out of the oral cavity on the right side. SLP again manually removed the remaining bolus from the oral cavity. Strong cough noted one time after second trial of applesauce but felt to be related to prior oral care. No further trials attempted and oral care completed again. Recommend to continue NPO status for now. Discussed with SLP at [place] and this is similar to patient's swallowing just prior to admission. Unable to evaluate there as well due to letting food/liquids fall out of her oral cavity. Will f/u for re-evaluation tomorrow.      SWALLOW EVALUATION (Last 72 Hours)       SLP Adult Swallow Evaluation       Row Name 05/29/25 1115                   Rehab Evaluation    Document Type evaluation  -AD        Subjective Information --  Pt nonverbal; severe intellectual disability.  -AD        Patient Observations alert;cooperative;unable to respond  -AD        Patient/Family/Caregiver Comments/Observations Pt was seen at bedside. Assisted by staff in repositioning for upright position in bed. Pt leans to the right side. Pillow placed under right shoulder and head, but pt still with lean to right. Pt alert and generally cooperative. Unable to follow commands at this time.  -AD        Patient Effort fair  -AD        Comment Limitations due to intellectual disability and current medical status.  -AD        Symptoms Noted During/After Treatment none  -AD        Oral Care teeth brushed - regular toothbrush;other (see comments)  swabbed with water as well and to remove debris on hard/soft palate  -AD           General Information    Patient Profile Reviewed yes  -AD        Pertinent History  Of Current Problem Pt is an 83 y/o female resident of [facility] admitted to the hospital with a right sided pneumonia w/suspected aspiration. Hx of recurrent pneumonia and scarring in right lung base. Currently with acute hypoxia r/t pneumonia, acute on chronic kidney disease, hypernatremia. Chronic conditions of seizure d/o, essential HTN, chronic pain syndrome w/narcotic dependence, intellectual disability and Generalized Anxiety Disorder. Called to [place] [place] SLP and noted pt with recent decline in the last week and has been tolerating her modified diet with swallowing strategies w/o need for hospitalization in the last 2 years. Diet is minced and moist with only allowance of no greater than 10 cc of thin liquids at a time. She does report recent pneumonia but appears to correlate wtih regurgitation or vomiting of food/liquids. Recent concerns with allowing foods/drink to spill out of the mouth per SLP. Pt has participated in multiple instrumental and clinical swallow evaluations while in and as outpt hospital from Dec 2019 to June 2023. MBS w/greatest deficit in oral control with severe spill of all consistencies and delay of swallow. Last MBS completed in March 2023 with brief summary of results:' Pt with no aspiration or significant penetration of thins, nectar, honey or puree. Continues with severe delay/spill of all consistencies to the valleculae and thins and nectar thick and honey thick liquids to the level of the pyriforms. Impression: moderate oropharyngeal dysphagia. Recommend: advance diet to puree with thin liquids, no straws and drinks from cup no greater than spoon size drinks. Supervision with meals. 'Pt is currently strictly NPO and swallow eval ordered to assess safety of po intake at this time. Reported palliative care at [facility].  -AD        Current Method of Nutrition NPO  -AD        Precautions/Limitations, Vision difficult to assess  will look at speaker  -AD         Precautions/Limitations, Hearing difficult to assess  -AD        Prior Level of Function-Communication cognitive-linguistic impairment;motor speech impairment  -AD        Prior Level of Function-Swallowing thin liquids;other (see comments)  minced and moist  -AD        Plans/Goals Discussed with other (see comments)  discussed with RNPhilip and SLP at [facility]  -AD        Barriers to Rehab cognitive status;previous functional deficit  -AD        Patient's Goals for Discharge patient could not state  -AD           Pain    Pre/Posttreatment Pain Comment Pt unable to state. No pain indicated.  -AD           Oral Motor Structure and Function    Oral Lesions or Structural Abnormalities and/or variants none  -AD        Dentition Assessment missing teeth;other (see comments)  teeth are in fair condition  -AD        Mucosal Quality dry  -AD        Gag Response absent or diminished  -AD        Volitional Swallow unable to elicit  -AD        Volitional Cough unable to elicit  -AD           Oral Musculature and Cranial Nerve Assessment    Oral Motor General Assessment unable to assess  -AD           General Eating/Swallowing Observations    Respiratory Support Currently in Use nasal cannula  nasal cannula placed in pt's mouth  -AD        O2 Liters 3L  -AD        Eating/Swallowing Skills fed by SLP;unaware of safety concerns  -AD        Positioning During Eating upright in bed;needs frequent re-positioning;other (see comments)  near 90 degrees with significant right sided lean despite repositioning with pillows  -AD        Utensils Used spoon  -AD        Consistencies Trialed thin liquids;pureed  -AD           Respiratory    Respiratory Status WFL;during swallowing/eating  -AD           Clinical Swallow Eval    Oral Prep Phase impaired  -AD        Oral Transit impaired  -AD        Oral Residue impaired  -AD        Pharyngeal Phase suspected pharyngeal impairment  -AD        Clinical Swallow Evaluation Summary Pt  presents with a severe/profound oral dysphagia and suspected pharyngeal dysphagia per history and current status. Pt with anterior loss of liquids given in less than 10 cc amount out of the right side of the mouth. Pt is accepting and does attempt to take off spoon, but cannot maintain hold on liquid x3. She also attempted 2 trials of small amount of applesauce w/SLP having to remove both trials due to no oral manipulation of bolus. Choking/coughing noted on second trial of applesauce, but felt to be due to prior thin trials and/or water used during oral care. No further attempts at po at this time. Signs and symptoms of pharyngeal involvement include intermittent wet vocal quality during oral care, absent pharyngeal swallow and coughing after oral care/trials of liquids.  -AD           Oral Prep Concerns    Oral Prep Concerns oral holding;reduced lip opening;incomplete or weak lip closure around spoon;anterior loss;bolus removed from mouth manually  -AD        Oral Holding thin;pudding  -AD        Reduced Lip Opening thin;pudding  -AD        Incomplete or Weak Lip Closure Around Spoon thin;pudding  -AD        Anterior Loss thin;pudding  out of right side/dependent side of mouth  -AD        Bolus Removed from Mouth Manually thin;pudding  -AD           Oral Transit Concerns    Oral Transit Concerns unable to initiate oral transit  -AD           Oral Residue Concerns    Oral Residue Concerns lateral sulcus residue, right;other (see comments)  tongue surface, teeth  -AD        Lateral Sulcus Residue, Right pudding;thin  -AD        Oral Residue Concerns, Comment Cleared with use of oral swab on all trials.  -AD           Pharyngeal Phase Concerns    Pharyngeal Phase Concerns wet vocal quality;cough;other (see comments)  absent swallow  -AD        Wet Vocal Quality thin  -AD        Cough thin  -AD           SLP Evaluation Clinical Impression    SLP Swallowing Diagnosis severe;profound;oral dysphagia;suspected pharyngeal  dysphagia;other (see comments)  hx of pharyngeal dysphagia  -AD        Functional Impact risk of aspiration/pneumonia;risk of malnutrition;risk of dehydration  -AD           Recommendations    SLP Diet Recommendation NPO  -AD        Recommended Diagnostics reassess via clinical swallow evaluation  -AD        Oral Care Recommendations Oral Care BID/PRN;Suction toothbrush  -AD        SLP Rec. for Method of Medication Administration meds via alternate route  -AD        Monitor for Signs of Aspiration yes;gurgly voice;cough;pneumonia  -AD        Anticipated Discharge Disposition (SLP) extended care facility  -AD        Patient/Family Concerns, Anticipated Discharge Disposition (SLP) Pt is unable to state.  -AD                  User Key  (r) = Recorded By, (t) = Taken By, (c) = Cosigned By      Initials Name Effective Dates    Nelsy Marques MS CCC-SLP 06/16/21 -                     EDUCATION  The patient has been educated in the following areas:   Dysphagia (Swallowing Impairment) Oral Care/Hydration NPO rationale. RNPhilip verbalizes understanding. Pt unable to demonstrate understanding of education at this time.        Time Calculation:    Time Calculation- SLP       Row Name 05/29/25 1516             Time Calculation- SLP    SLP Start Time 1115  -AD      SLP Stop Time 1155  -AD      SLP Time Calculation (min) 40 min  -AD      Total Timed Code Minutes- SLP 0 minute(s)  -AD      SLP Non-Billable Time (min) 0 min  -AD      SLP Received On 05/29/25  -AD      SLP - Next Appointment 05/30/25  -AD         Untimed Charges    SLP Eval/Re-eval  ST Eval Oral Pharyng Swallow - 42583  -AD      [id number] Eval Oral Pharyng Swallow Minutes 40  -AD         Total Minutes    Untimed Charges Total Minutes 40  -AD       Total Minutes 40  -AD                User Key  (r) = Recorded By, (t) = Taken By, (c) = Cosigned By      Initials Name Provider Type    Nelsy Marques MS CCC-SLP Speech and Language Pathologist                     Therapy Charges for Today       Code Description Service Date Service Provider Modifiers Qty    [id number]  ST EVAL ORAL PHARYNG SWALLOW 3 5/29/2025 Nelsy Morrell, MS CCC-SLP GN 1                 Nelsy Morrell, MS CCC-SLP  5/29/2025

## 2025-05-29 NOTE — PLAN OF CARE
Goal Outcome Evaluation:           Progress: no change  Outcome Evaluation: VSS, Pt rested well today, turned, Swallow eval provided, still NPO (see  note). Contacted [place] regarding palliative status. Referred to Ansley Pimentel NP palliative care [phone]. will continue to monitor.

## 2025-05-29 NOTE — PROGRESS NOTES
Adult Nutrition  Assessment/PES    Patient Name:  Erica Crockett  YOB: 1940  MRN: [id number]  Admit Date:  5/28/2025    Assessment Date:  5/29/2025    Comments:  MST, PI    Pt NPO, non-verbal from [facility]    Recommend: clarify goals of care re: diet/nutrition    Will cont to follow and monitor.      Reason for Assessment       Row Name 05/29/25 1038          Reason for Assessment    Reason For Assessment identified at risk by screening criteria  Pressure Injury, MST     Diagnosis pulmonary disease;renal disease  Pnemonia ? aspiration, A/CKD, DD, Sz, Non-verbal baseline     Identified At Risk by Screening Criteria MST SCORE 2+                    Nutrition/Diet History       Row Name 05/29/25 1039          Nutrition/Diet History    Typical Intake (Food/Fluid/EN/PN) Pt laying in bed, Rn present on visit. From [place].                    Labs/Tests/Procedures/Meds       Row Name 05/29/25 1040          Labs/Procedures/Meds    Lab Results Reviewed reviewed     Lab Results Comments Na 146 H, Bun 51/creat 1.3 H        Diagnostic Tests/Procedures    Diagnostic Test/Procedure Reviewed reviewed        Medications    Pertinent Medications Reviewed reviewed     Pertinent Medications Comments florastor, miralax                    Physical Findings       Row Name 05/29/25 1040          Physical Findings    Overall Physical Appearance some temporal loss, + upper arm stores, legs appear likely not mobile                    Estimated/Assessed Needs - Anthropometrics       Row Name 05/29/25 1041          Anthropometrics    Calculation Weight 78 kg (171 lb 15.3 oz)     Additional Documentation --  wt gain over 3 months based on BSW ? accurate        Estimated/Assessed Needs    Additional Documentation Estimated Calorie Needs (Group);Fluid Requirements (Group);Protein Requirements (Group)        Estimated Calorie Needs    Estimated Calorie Require (kcal/day) 1780-0848 kcal ( 20-25 kcal/kg )     Estimated  Calorie Need Method kcal/kg        Protein Requirements    Est Protein Requirement Amount (gms/kg) 1.0 gm protein  78-94 gm pro (1-1.2 gm/kg pro)        Fluid Requirements    Estimated Fluid Requirement Method RDA Method  3048-1363 ml                    Nutrition Prescription Ordered       Row Name 05/29/25 1042          Nutrition Prescription PO    Current PO Diet NPO                    Evaluation of Received Nutrient/Fluid Intake       Row Name 05/29/25 1042          Fluid Intake Evaluation    Parenteral Fluid (mL) 1270  IVF        PO Evaluation    Number of Days PO Intake Evaluated Insufficient Data                       Problem/Interventions:   Problem 1       Row Name 05/29/25 1042          Nutrition Diagnoses Problem 1    Problem 1 Other (comment)  Inadqeuate energy intake related to Pnemonia, Acute on CKD as evidenced by NPO                          Intervention Goal       Row Name 05/29/25 1043          Intervention Goal    General Meet nutritional needs for age/condition     PO Establish PO;PO intake (%)     PO Intake % 50 %                    Nutrition Intervention       Row Name 05/29/25 1043          Nutrition Intervention    RD/Tech Action Follow Tx progress                    Nutrition Prescription       Row Name 05/29/25 1044          Other Orders    Other clarify goals of care                    Education/Evaluation       Row Name 05/29/25 1044          Education    Education Education not appropriate at this time     Please explain Patient nonverbal        Monitor/Evaluation    Monitor Per protocol;I&O;Pertinent labs;Weight                     Electronically signed by:  Danita Sung RD  05/29/25 10:44 EDT

## 2025-05-29 NOTE — CASE MANAGEMENT/SOCIAL WORK
Continued Stay Note  [facility]     Patient Name: Erica Crockett  MRN: [id number]  Today's Date: 5/29/2025    Admit Date: 5/28/2025    Plan: Plan return to [facility]   Discharge Plan       Row Name 05/29/25 1242       Plan    Plan Plan return to [facility]    Patient/Family in Agreement with Plan yes    Plan Comments LVM for patients [place], Nicola Jimenez. Request non-emergent return call to discuss dc planning. Spoke with  Rajwinder/[facility]. She states patient lives at [facility]. Patient is non-verbal at baseline. She is a 1-2 person assist to transfer and uses  for mobility. She needs assist with meal set up, but can usually feed herself. She is typically on 02 2L@hs and prn during the day - normally she does not need 02 during the day. Rajwinder states she has her own concentrator and everything she will need when she returns to the facility. She asks that she be notified when patient is ready for dc so they can make sure they have her medications, etc that she will need. CM will continue to follow and assist with dc planning.                   Discharge Codes    No documentation.                 Expected Discharge Date and Time       Expected Discharge Date Expected Discharge Time    May 30, 2025               Lee Us RN

## 2025-05-29 NOTE — PROGRESS NOTES
"Hospital Medicine Team    LOS 0 days      Patient Care Team:  Tulio Carlson MD as PCP - General (Family Medicine)  Aleksandra Kent APRN as Nurse Practitioner (Pain Medicine)  Ja Harris MD as Consulting Physician (Neurology)  Magen Farmer MD as Consulting Physician (Nephrology)  Mikey Sanchez MD (Psychiatry)  Nelsy Butcher DPM as Consulting Physician (Podiatry)  Amy Bryan DO as Consulting Physician (Obstetrics and Gynecology)  Oksana Jacobsen APRN as Nurse Practitioner (Obstetrics and Gynecology)  Tulio Carlson MD as Referring Physician (Family Medicine)  Tasneem Draper APRN as Nurse Practitioner (Nurse Practitioner)  Sonia Back RN as Ambulatory  ([facility])      Subjective       Chief Complaint:  f/u lethargy    Subjective    Unobtainable from patient.  Discussed with nursing no caregiver at bedside today    Objective       Vital Signs  Temp:  [96 °F (35.6 °C)-98.9 °F (37.2 °C)] 98.1 °F (36.7 °C)  Heart Rate:  [] 103  Resp:  [12-20] 20  BP: ()/(43-84) 156/70  Oxygen Therapy  SpO2: 90 %  Pulse Oximetry Type: Continuous  Device (Oxygen Therapy): nasal cannula  Flow (L/min) (Oxygen Therapy): 3  Flowsheet Rows      Flowsheet Row First Filed Value   Admission Height 152 cm (59.84\") Documented at 05/28/2025 1015   Admission Weight 78 kg (171 lb 15.3 oz) Documented at 05/28/2025 1015                Physical Exam:  Physical Exam  Vitals reviewed.   Constitutional:       Appearance: She is ill-appearing.   Cardiovascular:      Rate and Rhythm: Normal rate.   Pulmonary:      Effort: No respiratory distress.   Abdominal:      General: There is no distension.           Results Review:    I reviewed the patient's new clinical results.    [x]  Laboratory  []  Microbiology  []  Radiology  []  EKG/Telemetry   []  Cardiology/Vascular   []  Pathology  []  Old records  []  Other:      X-rays, labs reviewed personally by physician.    Medication Review:   I " have reviewed the patient's current medication list    Scheduled Meds  atorvastatin, 20 mg, Oral, Daily  cefTRIAXone, 2,000 mg, Intravenous, Q24H  cetirizine, 10 mg, Oral, Q PM  ferrous sulfate, 325 mg, Oral, Daily With Breakfast  gabapentin, 300 mg, Oral, Nightly  guaiFENesin, 600 mg, Oral, Q12H  lamoTRIgine, 200 mg, Oral, BID  levETIRAcetam, 750 mg, Intravenous, Q12H  levothyroxine, 25 mcg, Oral, Daily  lubiprostone, 8 mcg, Oral, BID With Meals  [START ON 5/30/2025] pantoprazole, 40 mg, Oral, Q AM  polyethylene glycol, 17 g, Oral, Daily  saccharomyces boulardii, 250 mg, Oral, BID  sodium chloride, 10 mL, Intravenous, Q12H        Meds Infusions  sodium chloride, 100 mL/hr, Last Rate: 100 mL/hr (05/29/25 0956)        Meds PRN    acetaminophen **OR** acetaminophen **OR** acetaminophen    senna-docusate sodium **AND** polyethylene glycol **AND** bisacodyl **AND** bisacodyl    Calcium Replacement - Follow Nurse / BPA Driven Protocol    HYDROcodone-acetaminophen    hydrocortisone-bacitracin-zinc oxide-nystatin    ipratropium-albuterol    LORazepam    Magnesium Low Dose Replacement - Follow Nurse / BPA Driven Protocol    nitroglycerin    ondansetron ODT **OR** ondansetron    Phosphorus Replacement - Follow Nurse / BPA Driven Protocol    Potassium Replacement - Follow Nurse / BPA Driven Protocol    sodium chloride    sodium chloride    sodium chloride        Assessment / Plan       Active Hospital Problems:  Active Hospital Problems    Diagnosis  POA    **Pneumonia [J18.9]  Yes      Resolved Hospital Problems   No resolved problems to display.       RLL Pneumonia suspect aspiration  Acute hypoxia d/t the same   -CXR reviewed  -on 4L NC now  - continue IV rocephin  - continue pulmonary toilet  - ST following, hopefully pt more alert to attempt more PO intake tomorrow     KEILY on CKDIIIb  -Cr 1.3, baseline 1.1-1.3  -IVF to continue   -follow up bmp in am      Hypernatremia  -improving continue IV 1/2 NS    Chronic issues :    "Seizure disorder  Essential Hypertension  Chronic Pain Syndrome w/ Narcotic Dependence  Developmental Mental Disorder/Intellectual Disability  Generalized Anxiety Disorder  -will continue home meds as able  -follow routine vital signs       High risk   Code status: DNR/DNI per chart and living will advance directive.   DVT ppx-SCDs    Plan for disposition:from [facility], reportedly on \"palliative care\" there.  to determine what agency and to see if they know about long term goals of care    Electronically signed by Hector Cardenas DO, 05/29/25, 12:26 EDT.      Note disclaimer: At [facility], we believe that sharing information builds trust and better relationships. You are receiving this note because you recently visited [facility]. It is possible you will see health information before a provider has talked with you about it. This kind of information can be easy to misunderstand. To help you fully understand what it means for your health, we urge you to discuss this note with your provider.       "

## 2025-05-30 NOTE — PLAN OF CARE
Goal Outcome Evaluation:  Plan of Care Reviewed With: other (see comments) (Discussed results w/nursing and hospitalist during rounds.)           Outcome Evaluation: Clinical Swallow re-evaluation: No significant change in status for tolerance of po. Continues with oral holding and no attempt to orally transit. Pooling in right lateral sulcus and on right lateral margin of the tongue. Bolus had to be manually removed. Oral care provided and suctioning of thick, tan secretions from palate and back of throat. Clear secretions also suctioned from left side of mouth. Impressions: Severe oropharyngeal dysphagia. Recommend: Continues NPO with meds via alternate route, oral care via suction toothbrush system or with regular toothbrush and oral suction simultaneously. SLP will follow for continued attempts at po intake/tolerance. Await status per [place] for palliative/possible care and comfort.    Anticipated Discharge Disposition (SLP): extended care facility          SLP Swallowing Diagnosis: severe, profound, oral dysphagia, suspected pharyngeal dysphagia, other (see comments) (hx of oropharyngeal dysphagia) (05/30/25 7653)

## 2025-05-30 NOTE — CASE MANAGEMENT/SOCIAL WORK
Continued Stay Note  [facility]     Patient Name: Erica Crockett  MRN: [id number]  Today's Date: 5/30/2025    Admit Date: 5/28/2025    Plan: Plan return to [facility]   Discharge Plan       Row Name 05/30/25 1214       Plan    Plan Plan return to [facility]    Patient/Family in Agreement with Plan yes    Plan Comments Tara AVILA/Mgr has spoken with patients nephew/legal guardian, Nicola Kapadia. Who is agreeable for patient to return to [facility] at dc - she is current with [facility] and those services will continue. When ready for dc, Call report to  [facility] [phone] ext [phone].  Patient has oxygen at the facility and all needed DME. Facility will provide transport if staffing available. CM will continue to follow.                   Discharge Codes    No documentation.                 Expected Discharge Date and Time       Expected Discharge Date Expected Discharge Time    May 30, 2025               Lee Us RN

## 2025-05-30 NOTE — PLAN OF CARE
Goal Outcome Evaluation:  Plan of Care Reviewed With: patient        Progress: no change  Outcome Evaluation: VSS, PT RESTING, HAD A LOT OF VISITORS TODAY, SEEMED HAPPY, SLP JOSIAH ATTEMPTED AGAIN TODAY, FAILED. SEE SLP NOTES. PT TURNED Q2. IVF D5W AT 75. INCONT. OF BOWEL AND BLADDER. PURWICK IN PLACE. PLAN: MONITOR UNTIL SUNDAY FOR DC. CEDAR CARUSO TO HAVE A MEETING WITH FAMILY FOR FUTURE DECISIONS.

## 2025-05-30 NOTE — PROGRESS NOTES
"Hospital Medicine Team    LOS 1 days      Patient Care Team:  Tulio Carlson MD as PCP - General (Family Medicine)  Aleksandra Kent APRN as Nurse Practitioner (Pain Medicine)  Ja Harris MD as Consulting Physician (Neurology)  Magen Farmer MD as Consulting Physician (Nephrology)  Mikey Sanchez MD (Psychiatry)  Nelsy Butcher DPM as Consulting Physician (Podiatry)  Amy Bryan DO as Consulting Physician (Obstetrics and Gynecology)  Oksana Jacobsen APRN as Nurse Practitioner (Obstetrics and Gynecology)  Tulio Carlson MD as Referring Physician (Family Medicine)  Tasneem Draper APRN as Nurse Practitioner (Nurse Practitioner)  Sonia Back RN as Ambulatory  ([facility])      Subjective       Chief Complaint:  f/u lethargy    Subjective    Unobtainable from patient as she is nonverbal at baseline. Did seem more interactive today     Objective       Vital Signs  Temp:  [97.6 °F (36.4 °C)-98.9 °F (37.2 °C)] 98.5 °F (36.9 °C)  Heart Rate:  [101-109] 107  Resp:  [18-20] 20  BP: (124-156)/(63-82) 143/75  Oxygen Therapy  SpO2: 93 %  Pulse Oximetry Type: Continuous  Device (Oxygen Therapy): nasal cannula  Flow (L/min) (Oxygen Therapy): 4  Flowsheet Rows      Flowsheet Row First Filed Value   Admission Height 152 cm (59.84\") Documented at 05/28/2025 1015   Admission Weight 78 kg (171 lb 15.3 oz) Documented at 05/28/2025 1015                Physical Exam:  Physical Exam  Vitals reviewed.   Constitutional:       Appearance: She is ill-appearing.   Cardiovascular:      Rate and Rhythm: Normal rate.   Pulmonary:      Effort: No respiratory distress.   Abdominal:      General: There is no distension.   Neurological:      Comments: Nonverbal at baseline           Results Review:    I reviewed the patient's new clinical results.    [x]  Laboratory  []  Microbiology  []  Radiology  []  EKG/Telemetry   []  Cardiology/Vascular   []  Pathology  []  Old records  []  " Other:      X-rays, labs reviewed personally by physician.    Medication Review:   I have reviewed the patient's current medication list    Scheduled Meds  atorvastatin, 20 mg, Oral, Daily  cefTRIAXone, 2,000 mg, Intravenous, Q24H  cetirizine, 10 mg, Oral, Q PM  ferrous sulfate, 325 mg, Oral, Daily With Breakfast  gabapentin, 300 mg, Oral, Nightly  guaiFENesin, 600 mg, Oral, Q12H  lamoTRIgine, 200 mg, Oral, BID  levETIRAcetam, 750 mg, Intravenous, Q12H  levothyroxine, 25 mcg, Oral, Daily  lubiprostone, 8 mcg, Oral, BID With Meals  pantoprazole, 40 mg, Oral, Q AM  polyethylene glycol, 17 g, Oral, Daily  saccharomyces boulardii, 250 mg, Oral, BID  sodium chloride, 10 mL, Intravenous, Q12H        Meds Infusions  sodium chloride, 100 mL/hr, Last Rate: 100 mL/hr (05/30/25 0107)        Meds PRN    acetaminophen **OR** acetaminophen **OR** acetaminophen    senna-docusate sodium **AND** polyethylene glycol **AND** bisacodyl **AND** bisacodyl    Calcium Replacement - Follow Nurse / BPA Driven Protocol    HYDROcodone-acetaminophen    hydrocortisone-bacitracin-zinc oxide-nystatin    ipratropium-albuterol    LORazepam    Magnesium Low Dose Replacement - Follow Nurse / BPA Driven Protocol    nitroglycerin    ondansetron ODT **OR** ondansetron    Phosphorus Replacement - Follow Nurse / BPA Driven Protocol    Potassium Replacement - Follow Nurse / BPA Driven Protocol    sodium chloride    sodium chloride    sodium chloride        Assessment / Plan       Active Hospital Problems:  Active Hospital Problems    Diagnosis  POA    **Pneumonia [J18.9]  Yes      Resolved Hospital Problems   No resolved problems to display.       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   DVT ppx-SCDs    Plan for disposition:from [facility], is on palliative program there but not full comfort measures. Will try to d/w POA about potentially doing full hospice/comfort care jadon if she continues to be unable to have PO intake as her long term quality of life/prognosis seems poor.     Electronically signed by Hector Cardenas DO, 05/30/25, 10:27 EDT.      Note disclaimer: At [facility], we believe that sharing information builds trust and better relationships. You are receiving this note because you recently visited [facility]. It is possible you will see health information before a provider has talked with you about it. This kind of information can be easy to misunderstand. To help you fully understand what it means for your health, we urge you to discuss this note with your provider.

## 2025-05-30 NOTE — THERAPY TREATMENT NOTE
Acute Care - Speech Language Pathology   Swallow Treatment Note MILANA Martínez     Patient Name: Erica Crockett  : 1940  MRN: [id number]  Today's Date: 2025               Admit Date: 2025    Visit Dx:     ICD-10-CM ICD-9-CM   1. Pneumonia due to infectious organism, unspecified laterality, unspecified part of lung  J18.9 486   2. Hypotension, unspecified hypotension type  I95.9 458.9   3. KEILY (acute kidney injury)  N17.9 584.9   4. Oropharyngeal dysphagia  R13.12 787.22     Patient Active Problem List   Diagnosis    Degeneration of intervertebral disc of lumbar region    Developmental mental disorder    Osteoarthritis of hip    Scoliosis    Chronic pain    Nonverbal signs of pain    Bursitis of left hip    Encounter for monitoring opioid maintenance therapy    Anxiety    Constipation    Hypercalcemia    Epilepsy, not refractory    Osteoporosis    Impulse control disorder    Vitamin D deficiency    Pneumonia of both lower lobes due to infectious organism    KEILY (acute kidney injury)    Sepsis-associated organ dysfunction    Stage 3 chronic kidney disease    Hypercholesterolemia    Acute kidney failure    Essential (primary) hypertension    Generalized anxiety disorder    Urinary tract infection    Dental pain    Aspiration pneumonia of right lung    Pneumonia of right middle lobe due to infectious organism    Aspiration pneumonia of right lung, unspecified aspiration pneumonia type, unspecified part of lung    Nonrheumatic aortic valve stenosis    Pneumonia     Past Medical History:   Diagnosis Date    Acquired cyst of kidney     Acute upper respiratory infection     Age-related nuclear cataract of both eyes     Age-related osteoporosis without current pathological fracture     Allergic rhinitis     Anemia     Anxiety     Atopic dermatitis     Bursitis     Bursitis     Candidal stomatitis     Carotid artery stenosis     Chronic kidney disease, stage III (moderate)     Chronic pain syndrome      Chronic sinusitis     Conjunctivitis     Constipation     Corns and callosities     Diverticulosis of intestine w/o perforation or abscess w/o bleeding     DJD (degenerative joint disease), lumbar     Dry eye syndrome     Hammer toe     Hypercalcemia     Hyperkalemia     Hypertension     Hypoglycemia     Intellectual disability     Iron deficiency anemia, unspecified     Low back pain     Multinodular goiter     Osteoarthritis     Osteopenia     Other forms of scoliosis, lumbar region     Other specified deforming dorsopathies, site unspecified     Pain     Pain in toe of left foot     Pneumonia     Pneumonitis due to inhalation of food or vomitus     Pressure ulcer     Profound intellectual disabilities     Pruritus vulvae     Renal disorder     Renal insufficiency     Scoliosis     Scoliosis     Seizure disorder     UTI (urinary tract infection)     Vitamin D deficiency      Past Surgical History:   Procedure Laterality Date    CATARACT EXTRACTION         SLP Recommendation and Plan  SLP Swallowing Diagnosis: severe, profound, oral dysphagia, suspected pharyngeal dysphagia, other (see comments) (hx of oropharyngeal dysphagia) (05/30/25 0940)  SLP Diet Recommendation: NPO, other (see comments) (versus consideration for comfort diet; case management/hospitalist awaiting discussion with palliative care manager at [place]) (05/30/25 1500)  Recommended Precautions and Strategies: general aspiration precautions (05/30/25 1500)  SLP Rec. for Method of Medication Administration: meds via alternate route (05/30/25 1500)     Monitor for Signs of Aspiration: yes, gurgly voice, cough, pneumonia (05/30/25 1500)     Swallow Criteria for Skilled Therapeutic Interventions Met: demonstrates skilled criteria (short term for possible tolerance of po) (05/30/25 0940)  Anticipated Discharge Disposition (SLP): extended care facility (05/30/25 1500)  Rehab Potential/Prognosis, Swallowing: re-evaluate goals as necessary (05/30/25  0964)  Therapy Frequency (Swallow): PRN (05/30/25 1500)  Predicted Duration Therapy Intervention (Days): 1 week (05/30/25 1500)  Oral Care Recommendations: Oral Care BID/PRN, Suction toothbrush (05/30/25 1500)        Daily Summary of Progress (SLP): unable to show any progress toward functional goals (05/30/25 1500)         Patient/Family Concerns, Anticipated Discharge Disposition (SLP): Pt unable to state any concerns due to cognitive status and nonverbal. (05/30/25 1500)     Treatment Assessment (SLP): continued, severe, oral dysphagia, pharyngeal dysphagia (05/30/25 1500)  Treatment Assessment Comments (SLP): No significant changes in status from am. Attempted po intake of very small bites of applesauce. Pt holding in mouth and pooling in right lateral sulcus and front floor of mouth. Attempts at empty spoon and tactile cues to throat not effective in eliciting swallow. Pt did swallow one time, but felt to be reflexive due to loss of secretions into the pharynx when vocalizing. No other swallow attempts elicited. Applesauce was suctioned from the oral cavity x3. Secretions also suctioned from oral cavity. Poor PO tolerance at this time with continued high risk of aspiration. (05/30/25 1500)  Plan for Continued Treatment (SLP): continue treatment per plan of care (05/30/25 1500)               SWALLOW EVALUATION (Last 72 Hours)       SLP Adult Swallow Evaluation       Row Name 05/30/25 1500 05/30/25 0997 05/29/25 1116             Rehab Evaluation    Document Type therapy note (daily note)  -AD re-evaluation  -AD evaluation  -AD      Subjective Information --  nonverbal; pt brightens up more with [facility] staff; vocalizing and smiling  -AD --  Non-verbal  -AD --  Pt nonverbal; severe intellectual disability.  -AD      Patient Observations alert;cooperative  -AD alert  improved alertness and ability to hold head up  -AD alert;cooperative;unable to respond  -AD      Patient/Family/Caregiver Comments/Observations Pt seen  at bedside, initially w/SLP only. Staff OT- [place] from [place] present.  -AD Pt seen at bedside with RN present in the room. Pt more alert and willing to take po. Still with mild lean to the right, but improved torso positioning as compared to yesterday. Pt more accepting of po today.  -AD Pt was seen at bedside. Assisted by staff in repositioning for upright position in bed. Pt leans to the right side. Pillow placed under right shoulder and head, but pt still with lean to right. Pt alert and generally cooperative. Unable to follow commands at this time.  -AD      Patient Effort good  -AD good  -AD fair  -AD      Comment -- Limitations due to intellectual disability.  -AD Limitations due to intellectual disability and current medical status.  -AD      Symptoms Noted During/After Treatment other (see comments)  -AD none  -AD none  -AD      Symptoms Noted, Comment coughing after attempts at po trials.  -AD -- --      Oral Care suction provided;other (see comments)  tongue and oral cavity swabbed.  -AD teeth brushed - regular toothbrush;other (see comments)  limited due to pt chewing on toothbrush; able to swab mouth with water and suction also used; debris/thick secretions removed from palate  -AD teeth brushed - regular toothbrush;other (see comments)  swabbed with water as well and to remove debris on hard/soft palate  -AD         General Information    Patient Profile Reviewed yes  -AD yes  -AD yes  -AD      Pertinent History Of Current Problem No changes in history noted. Plan is to return to [facility] with continued palliative care. May need further evaluation after return to [place] to determine least restrictive diet.  -AD No current changes in pertinent history. Pt w/improved alertness today.  -AD Pt is an 83 y/o female resident of [facility] admitted to the hospital with a right sided pneumonia w/suspected aspiration. Hx of recurrent pneumonia and scarring in right lung base.  Currently with acute hypoxia r/t pneumonia, acute on chronic kidney disease, hypernatremia. Chronic conditions of seizure d/o, essential HTN, chronic pain syndrome w/narcotic dependence, intellectual disability and Generalized Anxiety Disorder. Called to [facility] SLP and noted pt with recent decline in the last week and has been tolerating her modified diet with swallowing strategies w/o need for hospitalization in the last 2 years. Diet is minced and moist with only allowance of no greater than 10 cc of thin liquids at a time. She does report recent pneumonia but appears to correlate wtih regurgitation or vomiting of food/liquids. Recent concerns with allowing foods/drink to spill out of the mouth per SLP. Pt has participated in multiple instrumental and clinical swallow evaluations while in and as outpt hospital from Dec 2019 to June 2023. MBS w/greatest deficit in oral control with severe spill of all consistencies and delay of swallow. Last MBS completed in March 2023 with brief summary of results:' Pt with no aspiration or significant penetration of thins, nectar, honey or puree. Continues with severe delay/spill of all consistencies to the valleculae and thins and nectar thick and honey thick liquids to the level of the pyriforms. Impression: moderate oropharyngeal dysphagia. Recommend: advance diet to puree with thin liquids, no straws and drinks from cup no greater than spoon size drinks. Supervision with meals. 'Pt is currently strictly NPO and swallow eval ordered to assess safety of po intake at this time. Reported palliative care at [facility].  -AD      Current Method of Nutrition NPO  -AD NPO  -AD NPO  -AD      Precautions/Limitations, Vision -- other (see comments)  able to track both right and left; localizes movement of people and voices.  -AD difficult to assess  will look at speaker  -AD      Precautions/Limitations, Hearing -- other (see comments)  able to localize to voice intermittently   -AD difficult to assess  -AD      Prior Level of Function-Communication -- cognitive-linguistic impairment  -AD cognitive-linguistic impairment;motor speech impairment  -AD      Prior Level of Function-Swallowing -- thin liquids;other (see comments)  minced and moist  -AD thin liquids;other (see comments)  minced and moist  -AD      Plans/Goals Discussed with other (see comments)  Philip AVILA  -AD other (see comments)  Pt unable to understand; discussed  -AD other (see comments)  discussed with Philip AVILA and SLP at [facility]  -AD      Barriers to Rehab -- cognitive status;previous functional deficit  -AD cognitive status;previous functional deficit  -AD      Patient's Goals for Discharge -- patient could not state  -AD patient could not state  -AD         Pain    Pre/Posttreatment Pain Comment No pain indicated at this time.  -AD Pt unable to state. No pain indicated.  -AD Pt unable to state. No pain indicated.  -AD         Oral Motor Structure and Function    Oral Lesions or Structural Abnormalities and/or variants -- none  -AD none  -AD      Dentition Assessment -- missing teeth;other (see comments)  teeth in fair condition  -AD missing teeth;other (see comments)  teeth are in fair condition  -AD      Secretion Management -- problems swallowing secretions;requires suctioning to control secretions  thick, tan secretions suctioned from the palate, back of mouth  -AD --      Mucosal Quality -- moist, healthy  -AD dry  -AD      Gag Response -- absent or diminished  -AD absent or diminished  -AD      Volitional Swallow -- unable to elicit  -AD unable to elicit  -AD      Volitional Cough -- unable to elicit  -AD unable to elicit  -AD         Oral Musculature and Cranial Nerve Assessment    Oral Motor General Assessment -- unable to assess  -AD unable to assess  -AD         General Eating/Swallowing Observations    Respiratory Support Currently in Use nasal cannula  currently wearing in her nose  -AD nasal  cannula;other (see comments)  Placed in pt's mouth. Able to remove and place in nose and pt tolerated for the evaluation. Replaced in the mouth as RN states she is a mouth breather.  -AD nasal cannula  nasal cannula placed in pt's mouth  -AD      O2 Liters 4L  -AD 4L  -AD 3L  -AD      Eating/Swallowing Skills fed by SLP;fed by staff/caregiver  Choco Hernandez STEPHANY Cinthya  -AD fed by SLP  -AD fed by SLP;unaware of safety concerns  -AD      Positioning During Eating upright in bed  near 90 degrees  -AD upright in bed  near 90 degrees w/only slight lean of the head to the right  -AD upright in bed;needs frequent re-positioning;other (see comments)  near 90 degrees with significant right sided lean despite repositioning with pillows  -AD      Utensils Used spoon  -AD spoon  -AD spoon  -AD      Consistencies Trialed pureed  -AD pureed  -AD thin liquids;pureed  -AD         Respiratory    Respiratory Status WFL;during swallowing/eating  mild cough after po trials; suction provided and only clear secretions suctioned  -AD WFL;during swallowing/eating  -AD WFL;during swallowing/eating  -AD         Clinical Swallow Eval    Oral Prep Phase -- impaired  -AD impaired  -AD      Oral Transit -- impaired  -AD impaired  -AD      Oral Residue -- impaired  -AD impaired  -AD      Pharyngeal Phase -- --  unable to assess as food was removed from the oral cavity due to no attempt to transition  -AD suspected pharyngeal impairment  -AD      Clinical Swallow Evaluation Summary -- Pt continues with severe oral dysphagia and consistent oral holding, lack of oral manipulation of puree bolus. She allows material to pool in the right lower sulcus. Attempts at an empty spoon to aid in triggering of oral transit and swallow attempted x3. Attempted to add another small bolus to aid in sensory input and transition x1 w/o success. Material mixes with secretions and anterior loss of applesauce and secretions out of the right side of the mouth. Applesauce  cleared and oral cavity suctioned and cleaned. No further attempts at po at this time.  -AD Pt presents with a severe/profound oral dysphagia and suspected pharyngeal dysphagia per history and current status. Pt with anterior loss of liquids given in less than 10 cc amount out of the right side of the mouth. Pt is accepting and does attempt to take off spoon, but cannot maintain hold on liquid x3. She also attempted 2 trials of small amount of applesauce w/SLP having to remove both trials due to no oral manipulation of bolus. Choking/coughing noted on second trial of applesauce, but felt to be due to prior thin trials and/or water used during oral care. No further attempts at po at this time. Signs and symptoms of pharyngeal involvement include intermittent wet vocal quality during oral care, absent pharyngeal swallow and coughing after oral care/trials of liquids.  -AD         Oral Prep Concerns    Oral Prep Concerns -- oral holding;reduced lip opening;incomplete or weak lip closure around spoon;anterior loss;bolus removed from mouth manually  -AD oral holding;reduced lip opening;incomplete or weak lip closure around spoon;anterior loss;bolus removed from mouth manually  -AD      Oral Holding -- pudding  -AD thin;pudding  -AD      Reduced Lip Opening -- pudding  -AD thin;pudding  -AD      Incomplete or Weak Lip Closure Around Spoon -- pudding  -AD thin;pudding  -AD      Anterior Loss -- pudding  -AD thin;pudding  out of right side/dependent side of mouth  -AD      Bolus Removed from Mouth Manually -- pudding  -AD thin;pudding  -AD      Oral Prep Concerns, Comment -- Pt with no attempt to manipulate or move bolus in the oral cavity. Attempts at empty spoon to clear were not effective. Attempt at increasing bolus size to improve sensory awareness also not effective. Bolus was manually removed and suctioning utilized to fully clear the bolus. Secretions also cleared with suctioning.  -AD --         Oral Transit  Concerns    Oral Transit Concerns -- unable to initiate oral transit  -AD unable to initiate oral transit  -AD         Oral Residue Concerns    Oral Residue Concerns -- lateral sulcus residue, right;other (see comments)  also noted on the right lateral tongue  -AD lateral sulcus residue, right;other (see comments)  tongue surface, teeth  -AD      Lateral Sulcus Residue, Right -- pudding  -AD pudding;thin  -AD      Oral Residue Concerns, Comment -- Cleared with oral suctioning.  -AD Cleared with use of oral swab on all trials.  -AD         Pharyngeal Phase Concerns    Pharyngeal Phase Concerns -- -- wet vocal quality;cough;other (see comments)  absent swallow  -AD      Wet Vocal Quality -- -- thin  -AD      Cough -- -- thin  -AD      Pharyngeal Phase Concerns, Comment -- No changes in vocal quality today. May be due to no attempt at thins during this session and all material suctioned including secretions.  -AD --         SLP Evaluation Clinical Impression    SLP Swallowing Diagnosis -- severe;profound;oral dysphagia;suspected pharyngeal dysphagia;other (see comments)  hx of oropharyngeal dysphagia  -AD severe;profound;oral dysphagia;suspected pharyngeal dysphagia;other (see comments)  hx of pharyngeal dysphagia  -AD      Functional Impact -- risk of aspiration/pneumonia;risk of malnutrition;risk of dehydration  -AD risk of aspiration/pneumonia;risk of malnutrition;risk of dehydration  -AD      Rehab Potential/Prognosis, Swallowing -- re-evaluate goals as necessary  -AD --      Swallow Criteria for Skilled Therapeutic Interventions Met -- demonstrates skilled criteria  short term for possible tolerance of po  -AD --         SLP Treatment Clinical Impressions    Treatment Assessment (SLP) continued;severe;oral dysphagia;pharyngeal dysphagia  -AD -- --      Treatment Assessment Comments (SLP) No significant changes in status from am. Attempted po intake of very small bites of applesauce. Pt holding in mouth and pooling  in right lateral sulcus and front floor of mouth. Attempts at empty spoon and tactile cues to throat not effective in eliciting swallow. Pt did swallow one time, but felt to be reflexive due to loss of secretions into the pharynx when vocalizing. No other swallow attempts elicited. Applesauce was suctioned from the oral cavity x3. Secretions also suctioned from oral cavity. Poor PO tolerance at this time with continued high risk of aspiration.  -AD -- --      Daily Summary of Progress (SLP) unable to show any progress toward functional goals  -AD -- --      Barriers to Overall Progress (SLP) Cognitive status;Baseline deficits  -AD -- --      Plan for Continued Treatment (SLP) continue treatment per plan of care  -AD -- --      Care Plan Review evaluation/treatment results reviewed;care plan/treatment goals reviewed;risks/benefits reviewed;current/potential barriers reviewed  -AD -- --      Care Plan Review, Other Participant(s) caregiver;other (see comments)  RN and nursing students  -AD -- --         Recommendations    Therapy Frequency (Swallow) PRN  -AD PRN  -AD --      Predicted Duration Therapy Intervention (Days) 1 week  -AD 1 week  -AD --      SLP Diet Recommendation NPO;other (see comments)  versus consideration for comfort diet; case management/hospitalist awaiting discussion with palliative care manager at [place]  - NPO;other (see comments)  versus consideration for comfort diet; case management/hospitalist awaiting discussion with palliative care manager at [place]  - NPO  -AD      Recommended Diagnostics -- -- reassess via clinical swallow evaluation  -AD      Recommended Precautions and Strategies general aspiration precautions  -AD general aspiration precautions  -AD --      Oral Care Recommendations Oral Care BID/PRN;Suction toothbrush  -AD Oral Care BID/PRN;Suction toothbrush  -AD Oral Care BID/PRN;Suction toothbrush  -AD      SLP Rec. for Method of Medication Administration meds via  alternate route  -AD meds via alternate route  -AD meds via alternate route  -AD      Monitor for Signs of Aspiration yes;gurgly voice;cough;pneumonia  -AD yes;gurgly voice;cough;pneumonia  -AD yes;gurgly voice;cough;pneumonia  -AD      Anticipated Discharge Disposition (SLP) extended care facility  -AD extended care facility  -AD extended care facility  -AD      Patient/Family Concerns, Anticipated Discharge Disposition (SLP) Pt unable to state any concerns due to cognitive status and nonverbal.  -AD Pt unable to state due to being nonverbal and severe intellectual disability.  -AD Pt is unable to state.  -AD         Swallow Goals (SLP)    Swallow LTGs -- Patient will demonstrate progress toward functional swallow for  -AD --      Swallow STGs -- diet tolerance goal selection (SLP)  -AD --      Diet Tolerance Goal Selection (SLP) -- Patient will tolerate therapeutic trials of  -AD --         (LTG) Patient will demonstrate progress toward functional swallow for    Diet Texture (Demonstrate progress toward functional swallow) pureed textures  -AD pureed textures  -AD --      Liquid viscosity (Demonstrate progress toward functional swallow) nectar/ mildly thick liquids  -AD nectar/ mildly thick liquids  -AD --      [place] (Demonstrate progress towards functional swallow) with 1:1 assist/ supervision  -AD with 1:1 assist/ supervision  -AD --      Time Frame (Demonstrate progress toward functional swallow) 1 week  -AD 1 week  -AD --      Barriers (Demonstrate progress toward functional swallow) prior functional deficit in swallowing; cognitive status  -AD prior functional deficit in swallowing; cognitive status  -AD --      Progress/Outcomes (Demonstrate progress toward functional swallow) unable to make needed progress;goal ongoing  -AD new goal  -AD --      Comment (Demonstrate progress toward functional swallow) Continues with NPO status due to high risk of aspiration.  -AD -- --         (STG) Patient will  tolerate therapeutic trials of    Consistencies Trialed (Tolerate therapeutic trials) pureed textures;nectar/ mildly thick liquids  -AD pureed textures;nectar/ mildly thick liquids  -AD --      Desired Outcome (Tolerate therapeutic trials) without signs/symptoms of aspiration;without signs of distress;with adequate oral prep/transit/clearance  -AD without signs/symptoms of aspiration;without signs of distress;with adequate oral prep/transit/clearance  -AD --      [place] (Tolerate therapeutic trials) with 1:1 assist/ supervision  -AD with 1:1 assist/ supervision  -AD --      Time Frame (Tolerate therapeutic trials) 1 week  -AD 1 week  -AD --      Progress/Outcomes (Tolerate therapeutic trials) unable to make needed progress;goal ongoing  -AD new goal  -AD --      Comment (Tolerate therapeutic trials) Oral pocketing and right sulcus and front floor of mouth residue. No significant attempt at oral transit. Required suctioning to clear. Did swallow one time, but felt to be reflexive on secretions.  -AD -- --                User Key  (r) = Recorded By, (t) = Taken By, (c) = Cosigned By      Initials Name Effective Dates    AD Nelsy Morrell MS CCC-SLP 06/16/21 -                     EDUCATION  The patient has been educated in the following areas:   Dysphagia (Swallowing Impairment) Oral Care/Hydration NPO rationale. Nursing students verbalize understanding.       SLP GOALS       Row Name 05/30/25 1500 05/30/25 0940          (LTG) Patient will demonstrate progress toward functional swallow for    Diet Texture (Demonstrate progress toward functional swallow) pureed textures  -AD pureed textures  -AD     Liquid viscosity (Demonstrate progress toward functional swallow) nectar/ mildly thick liquids  -AD nectar/ mildly thick liquids  -AD     [place] (Demonstrate progress towards functional swallow) with 1:1 assist/ supervision  -AD with 1:1 assist/ supervision  -AD     Time Frame (Demonstrate progress toward  functional swallow) 1 week  -AD 1 week  -AD     Barriers (Demonstrate progress toward functional swallow) prior functional deficit in swallowing; cognitive status  -AD prior functional deficit in swallowing; cognitive status  -AD     Progress/Outcomes (Demonstrate progress toward functional swallow) unable to make needed progress;goal ongoing  -AD new goal  -AD     Comment (Demonstrate progress toward functional swallow) Continues with NPO status due to high risk of aspiration.  -AD --        (STG) Patient will tolerate therapeutic trials of    Consistencies Trialed (Tolerate therapeutic trials) pureed textures;nectar/ mildly thick liquids  -AD pureed textures;nectar/ mildly thick liquids  -AD     Desired Outcome (Tolerate therapeutic trials) without signs/symptoms of aspiration;without signs of distress;with adequate oral prep/transit/clearance  -AD without signs/symptoms of aspiration;without signs of distress;with adequate oral prep/transit/clearance  -AD     Jones (Tolerate therapeutic trials) with 1:1 assist/ supervision  -AD with 1:1 assist/ supervision  -AD     Time Frame (Tolerate therapeutic trials) 1 week  -AD 1 week  -AD     Progress/Outcomes (Tolerate therapeutic trials) unable to make needed progress;goal ongoing  -AD new goal  -AD     Comment (Tolerate therapeutic trials) Oral pocketing and right sulcus and front floor of mouth residue. No significant attempt at oral transit. Required suctioning to clear. Did swallow one time, but felt to be reflexive on secretions.  -AD --               User Key  (r) = Recorded By, (t) = Taken By, (c) = Cosigned By      Initials Name Provider Type    AD Nelsy Morrell MS CCC-SLP Speech and Language Pathologist                         Time Calculation:    Time Calculation- SLP       Row Name 05/30/25 1607 05/30/25 1107          Time Calculation- SLP    SLP Start Time 1430  -AD 0940  -AD     SLP Stop Time 1507  -AD 1005  -AD     SLP Time Calculation (min) 37  min  -AD 25 min  -AD     Total Timed Code Minutes- SLP 0 minute(s)  -AD 0 minute(s)  -AD     SLP Non-Billable Time (min) 0 min  -AD 0 min  -AD     SLP Received On 05/30/25  -AD 05/30/25  -AD        Untimed Charges    SLP Eval/Re-eval  -- ST Eval Oral Pharyng Swallow - 17546  -AD     04217-EL Eval Oral Pharyng Swallow Minutes -- 25  -AD     13372-YL Treatment Swallow Minutes 37  -AD --        Total Minutes    Untimed Charges Total Minutes 37  -AD 25  -AD      Total Minutes 37  -AD 25  -AD               User Key  (r) = Recorded By, (t) = Taken By, (c) = Cosigned By      Initials Name Provider Type    Nelsy Marques, MS CCC-SLP Speech and Language Pathologist                    Therapy Charges for Today       Code Description Service Date Service Provider Modifiers Qty    13448719812 HC ST EVAL ORAL PHARYNG SWALLOW 3 5/29/2025 Nelsy Morrell MS CCC-SLP GN 1    [id number] HC ST EVAL ORAL PHARYNG SWALLOW 2 5/30/2025 Nelsy Morrell, MS CCC-SLP GN 1    [id number] HC ST TREATMENT SWALLOW 2 5/30/2025 Nelsy Morrell, MS CCC-SLP GN 1                 Nelsy Morrell MS TAMELA-SLP  5/30/2025

## 2025-05-30 NOTE — THERAPY RE-EVALUATION
Acute Care - Speech Language Pathology   Swallow Re-Evaluation  [facility]     Patient Name: Erica Crockett  : 1940  MRN: [id number]  Today's Date: 2025               Admit Date: 2025    Visit Dx:     ICD-10-CM ICD-9-CM   1. Pneumonia due to infectious organism, unspecified laterality, unspecified part of lung  J18.9 486   2. Hypotension, unspecified hypotension type  I95.9 458.9   3. KEILY (acute kidney injury)  N17.9 584.9   4. Oropharyngeal dysphagia  R13.12 787.22     Patient Active Problem List   Diagnosis    Degeneration of intervertebral disc of lumbar region    Developmental mental disorder    Osteoarthritis of hip    Scoliosis    Chronic pain    Nonverbal signs of pain    Bursitis of left hip    Encounter for monitoring opioid maintenance therapy    Anxiety    Constipation    Hypercalcemia    Epilepsy, not refractory    Osteoporosis    Impulse control disorder    Vitamin D deficiency    Pneumonia of both lower lobes due to infectious organism    KEILY (acute kidney injury)    Sepsis-associated organ dysfunction    Stage 3 chronic kidney disease    Hypercholesterolemia    Acute kidney failure    Essential (primary) hypertension    Generalized anxiety disorder    Urinary tract infection    Dental pain    Aspiration pneumonia of right lung    Pneumonia of right middle lobe due to infectious organism    Aspiration pneumonia of right lung, unspecified aspiration pneumonia type, unspecified part of lung    Nonrheumatic aortic valve stenosis    Pneumonia     Past Medical History:   Diagnosis Date    Acquired cyst of kidney     Acute upper respiratory infection     Age-related nuclear cataract of both eyes     Age-related osteoporosis without current pathological fracture     Allergic rhinitis     Anemia     Anxiety     Atopic dermatitis     Bursitis     Bursitis     Candidal stomatitis     Carotid artery stenosis     Chronic kidney disease, stage III (moderate)     Chronic pain syndrome      Chronic sinusitis     Conjunctivitis     Constipation     Corns and callosities     Diverticulosis of intestine w/o perforation or abscess w/o bleeding     DJD (degenerative joint disease), lumbar     Dry eye syndrome     Hammer toe     Hypercalcemia     Hyperkalemia     Hypertension     Hypoglycemia     Intellectual disability     Iron deficiency anemia, unspecified     Low back pain     Multinodular goiter     Osteoarthritis     Osteopenia     Other forms of scoliosis, lumbar region     Other specified deforming dorsopathies, site unspecified     Pain     Pain in toe of left foot     Pneumonia     Pneumonitis due to inhalation of food or vomitus     Pressure ulcer     Profound intellectual disabilities     Pruritus vulvae     Renal disorder     Renal insufficiency     Scoliosis     Scoliosis     Seizure disorder     UTI (urinary tract infection)     Vitamin D deficiency      Past Surgical History:   Procedure Laterality Date    CATARACT EXTRACTION         SLP Recommendation and Plan  SLP Swallowing Diagnosis: severe, profound, oral dysphagia, suspected pharyngeal dysphagia, other (see comments) (hx of oropharyngeal dysphagia) (05/30/25 0940)  SLP Diet Recommendation: NPO, other (see comments) (versus consideration for comfort diet; case management/hospitalist awaiting discussion with palliative care manager at [facility]) (05/30/25 0940)  Recommended Precautions and Strategies: general aspiration precautions (05/30/25 0940)  SLP Rec. for Method of Medication Administration: meds via alternate route (05/30/25 0940)     Monitor for Signs of Aspiration: yes, gurgly voice, cough, pneumonia (05/30/25 0940)  Recommended Diagnostics: reassess via clinical swallow evaluation (05/29/25 1115)  Swallow Criteria for Skilled Therapeutic Interventions Met: demonstrates skilled criteria (short term for possible tolerance of po) (05/30/25 0940)  Anticipated Discharge Disposition (SLP): extended care facility (05/30/25  0940)  Rehab Potential/Prognosis, Swallowing: re-evaluate goals as necessary (05/30/25 0940)  Therapy Frequency (Swallow): PRN (05/30/25 0940)  Predicted Duration Therapy Intervention (Days): 1 week (05/30/25 0940)  Oral Care Recommendations: Oral Care BID/PRN, Suction toothbrush (05/30/25 0940)                  Patient/Family Concerns, Anticipated Discharge Disposition (SLP): Pt unable to state due to being nonverbal and severe intellectual disability. (05/30/25 0940)                     Outcome Evaluation: Clinical Swallow re-evaluation: No significant change in status for tolerance of po. Continues with oral holding and no attempt to orally transit. Pooling in right lateral sulcus and on right lateral margin of the tongue. Bolus had to be manually removed. Oral care provided and suctioning of thick, tan secretions from palate and back of throat. Clear secretions also suctioned from left side of mouth. Impressions: Severe oropharyngeal dysphagia. Recommend: Continues NPO with meds via alternate route, oral care via suction toothbrush system or with regular toothbrush and oral suction simultaneously. SLP will follow for continued attempts at po intake/tolerance. Await status per [place] for palliative/possible care and comfort.      SWALLOW EVALUATION (Last 72 Hours)       SLP Adult Swallow Evaluation       Row Name 05/30/25 0940 05/29/25 1110                Rehab Evaluation    Document Type re-evaluation  -AD evaluation  -AD       Subjective Information --  Non-verbal  -AD --  Pt nonverbal; severe intellectual disability.  -AD       Patient Observations alert  improved alertness and ability to hold head up  -AD alert;cooperative;unable to respond  -AD       Patient/Family/Caregiver Comments/Observations Pt seen at bedside with RN present in the room. Pt more alert and willing to take po. Still with mild lean to the right, but improved torso positioning as compared to yesterday. Pt more accepting of po today.   -AD Pt was seen at bedside. Assisted by staff in repositioning for upright position in bed. Pt leans to the right side. Pillow placed under right shoulder and head, but pt still with lean to right. Pt alert and generally cooperative. Unable to follow commands at this time.  -AD       Patient Effort good  -AD fair  -AD       Comment Limitations due to intellectual disability.  -AD Limitations due to intellectual disability and current medical status.  -AD       Symptoms Noted During/After Treatment none  -AD none  -AD       Oral Care teeth brushed - regular toothbrush;other (see comments)  limited due to pt chewing on toothbrush; able to swab mouth with water and suction also used; debris/thick secretions removed from palate  -AD teeth brushed - regular toothbrush;other (see comments)  swabbed with water as well and to remove debris on hard/soft palate  -AD          General Information    Patient Profile Reviewed yes  -AD yes  -AD       Pertinent History Of Current Problem No current changes in pertinent history. Pt w/improved alertness today.  -AD Pt is an 85 y/o female resident of [facility] admitted to the hospital with a right sided pneumonia w/suspected aspiration. Hx of recurrent pneumonia and scarring in right lung base. Currently with acute hypoxia r/t pneumonia, acute on chronic kidney disease, hypernatremia. Chronic conditions of seizure d/o, essential HTN, chronic pain syndrome w/narcotic dependence, intellectual disability and Generalized Anxiety Disorder. Called to [facility] SLP and noted pt with recent decline in the last week and has been tolerating her modified diet with swallowing strategies w/o need for hospitalization in the last 2 years. Diet is minced and moist with only allowance of no greater than 10 cc of thin liquids at a time. She does report recent pneumonia but appears to correlate wtih regurgitation or vomiting of food/liquids. Recent concerns with allowing foods/drink to spill out  of the mouth per SLP. Pt has participated in multiple instrumental and clinical swallow evaluations while in and as outpt hospital from Dec 2019 to June 2023. MBS w/greatest deficit in oral control with severe spill of all consistencies and delay of swallow. Last MBS completed in March 2023 with brief summary of results:' Pt with no aspiration or significant penetration of thins, nectar, honey or puree. Continues with severe delay/spill of all consistencies to the valleculae and thins and nectar thick and honey thick liquids to the level of the pyriforms. Impression: moderate oropharyngeal dysphagia. Recommend: advance diet to puree with thin liquids, no straws and drinks from cup no greater than spoon size drinks. Supervision with meals. 'Pt is currently strictly NPO and swallow eval ordered to assess safety of po intake at this time. Reported palliative care at [facility].  -AD       Current Method of Nutrition NPO  -AD NPO  -AD       Precautions/Limitations, Vision other (see comments)  able to track both right and left; localizes movement of people and voices.  -AD difficult to assess  will look at speaker  -AD       Precautions/Limitations, Hearing other (see comments)  able to localize to voice intermittently  -AD difficult to assess  -AD       Prior Level of Function-Communication cognitive-linguistic impairment  -AD cognitive-linguistic impairment;motor speech impairment  -AD       Prior Level of Function-Swallowing thin liquids;other (see comments)  minced and moist  -AD thin liquids;other (see comments)  minced and moist  -AD       Plans/Goals Discussed with other (see comments)  Pt unable to understand; discussed  -AD other (see comments)  discussed with Philip AVILA and SLP at [facility]  -AD       Barriers to Rehab cognitive status;previous functional deficit  -AD cognitive status;previous functional deficit  -AD       Patient's Goals for Discharge patient could not state  -AD patient could  not state  -AD          Pain    Pre/Posttreatment Pain Comment Pt unable to state. No pain indicated.  -AD Pt unable to state. No pain indicated.  -AD          Oral Motor Structure and Function    Oral Lesions or Structural Abnormalities and/or variants none  -AD none  -AD       Dentition Assessment missing teeth;other (see comments)  teeth in fair condition  -AD missing teeth;other (see comments)  teeth are in fair condition  -AD       Secretion Management problems swallowing secretions;requires suctioning to control secretions  thick, tan secretions suctioned from the palate, back of mouth  -AD --       Mucosal Quality moist, healthy  -AD dry  -AD       Gag Response absent or diminished  -AD absent or diminished  -AD       Volitional Swallow unable to elicit  -AD unable to elicit  -AD       Volitional Cough unable to elicit  -AD unable to elicit  -AD          Oral Musculature and Cranial Nerve Assessment    Oral Motor General Assessment unable to assess  -AD unable to assess  -AD          General Eating/Swallowing Observations    Respiratory Support Currently in Use nasal cannula;other (see comments)  Placed in pt's mouth. Able to remove and place in nose and pt tolerated for the evaluation. Replaced in the mouth as RN states she is a mouth breather.  -AD nasal cannula  nasal cannula placed in pt's mouth  -AD       O2 Liters 4L  -AD 3L  -AD       Eating/Swallowing Skills fed by SLP  -AD fed by SLP;unaware of safety concerns  -AD       Positioning During Eating upright in bed  near 90 degrees w/only slight lean of the head to the right  -AD upright in bed;needs frequent re-positioning;other (see comments)  near 90 degrees with significant right sided lean despite repositioning with pillows  -AD       Utensils Used spoon  -AD spoon  -AD       Consistencies Trialed pureed  -AD thin liquids;pureed  -AD          Respiratory    Respiratory Status WFL;during swallowing/eating  -AD WFL;during swallowing/eating  -AD           Clinical Swallow Eval    Oral Prep Phase impaired  -AD impaired  -AD       Oral Transit impaired  -AD impaired  -AD       Oral Residue impaired  -AD impaired  -AD       Pharyngeal Phase --  unable to assess as food was removed from the oral cavity due to no attempt to transition  -AD suspected pharyngeal impairment  -AD       Clinical Swallow Evaluation Summary Pt continues with severe oral dysphagia and consistent oral holding, lack of oral manipulation of puree bolus. She allows material to pool in the right lower sulcus. Attempts at an empty spoon to aid in triggering of oral transit and swallow attempted x3. Attempted to add another small bolus to aid in sensory input and transition x1 w/o success. Material mixes with secretions and anterior loss of applesauce and secretions out of the right side of the mouth. Applesauce cleared and oral cavity suctioned and cleaned. No further attempts at po at this time.  -AD Pt presents with a severe/profound oral dysphagia and suspected pharyngeal dysphagia per history and current status. Pt with anterior loss of liquids given in less than 10 cc amount out of the right side of the mouth. Pt is accepting and does attempt to take off spoon, but cannot maintain hold on liquid x3. She also attempted 2 trials of small amount of applesauce w/SLP having to remove both trials due to no oral manipulation of bolus. Choking/coughing noted on second trial of applesauce, but felt to be due to prior thin trials and/or water used during oral care. No further attempts at po at this time. Signs and symptoms of pharyngeal involvement include intermittent wet vocal quality during oral care, absent pharyngeal swallow and coughing after oral care/trials of liquids.  -AD          Oral Prep Concerns    Oral Prep Concerns oral holding;reduced lip opening;incomplete or weak lip closure around spoon;anterior loss;bolus removed from mouth manually  -AD oral holding;reduced lip opening;incomplete or  weak lip closure around spoon;anterior loss;bolus removed from mouth manually  -AD       Oral Holding pudding  -AD thin;pudding  -AD       Reduced Lip Opening pudding  -AD thin;pudding  -AD       Incomplete or Weak Lip Closure Around Spoon pudding  -AD thin;pudding  -AD       Anterior Loss pudding  -AD thin;pudding  out of right side/dependent side of mouth  -AD       Bolus Removed from Mouth Manually pudding  -AD thin;pudding  -AD       Oral Prep Concerns, Comment Pt with no attempt to manipulate or move bolus in the oral cavity. Attempts at empty spoon to clear were not effective. Attempt at increasing bolus size to improve sensory awareness also not effective. Bolus was manually removed and suctioning utilized to fully clear the bolus. Secretions also cleared with suctioning.  -AD --          Oral Transit Concerns    Oral Transit Concerns unable to initiate oral transit  -AD unable to initiate oral transit  -AD          Oral Residue Concerns    Oral Residue Concerns lateral sulcus residue, right;other (see comments)  also noted on the right lateral tongue  -AD lateral sulcus residue, right;other (see comments)  tongue surface, teeth  -AD       Lateral Sulcus Residue, Right pudding  -AD pudding;thin  -AD       Oral Residue Concerns, Comment Cleared with oral suctioning.  -AD Cleared with use of oral swab on all trials.  -AD          Pharyngeal Phase Concerns    Pharyngeal Phase Concerns -- wet vocal quality;cough;other (see comments)  absent swallow  -AD       Wet Vocal Quality -- thin  -AD       Cough -- thin  -AD       Pharyngeal Phase Concerns, Comment No changes in vocal quality today. May be due to no attempt at thins during this session and all material suctioned including secretions.  -AD --          SLP Evaluation Clinical Impression    SLP Swallowing Diagnosis severe;profound;oral dysphagia;suspected pharyngeal dysphagia;other (see comments)  hx of oropharyngeal dysphagia  -AD severe;profound;oral  dysphagia;suspected pharyngeal dysphagia;other (see comments)  hx of pharyngeal dysphagia  -AD       Functional Impact risk of aspiration/pneumonia;risk of malnutrition;risk of dehydration  -AD risk of aspiration/pneumonia;risk of malnutrition;risk of dehydration  -AD       Rehab Potential/Prognosis, Swallowing re-evaluate goals as necessary  -AD --       Swallow Criteria for Skilled Therapeutic Interventions Met demonstrates skilled criteria  short term for possible tolerance of po  -AD --          Recommendations    Therapy Frequency (Swallow) PRN  -AD --       Predicted Duration Therapy Intervention (Days) 1 week  -AD --       SLP Diet Recommendation NPO;other (see comments)  versus consideration for comfort diet; case management/hospitalist awaiting discussion with palliative care manager at [facility]  -AD NPO  -AD       Recommended Diagnostics -- reassess via clinical swallow evaluation  -AD       Recommended Precautions and Strategies general aspiration precautions  -AD --       Oral Care Recommendations Oral Care BID/PRN;Suction toothbrush  -AD Oral Care BID/PRN;Suction toothbrush  -AD       SLP Rec. for Method of Medication Administration meds via alternate route  -AD meds via alternate route  -AD       Monitor for Signs of Aspiration yes;gurgly voice;cough;pneumonia  -AD yes;gurgly voice;cough;pneumonia  -AD       Anticipated Discharge Disposition (SLP) extended care facility  -AD extended care facility  -AD       Patient/Family Concerns, Anticipated Discharge Disposition (SLP) Pt unable to state due to being nonverbal and severe intellectual disability.  -AD Pt is unable to state.  -AD          Swallow Goals (SLP)    Swallow LTGs Patient will demonstrate progress toward functional swallow for  -AD --       Swallow STGs diet tolerance goal selection (SLP)  -AD --       Diet Tolerance Goal Selection (SLP) Patient will tolerate therapeutic trials of  -AD --          (LTG) Patient will demonstrate progress  toward functional swallow for    Diet Texture (Demonstrate progress toward functional swallow) pureed textures  -AD --       Liquid viscosity (Demonstrate progress toward functional swallow) nectar/ mildly thick liquids  -AD --       [place] (Demonstrate progress towards functional swallow) with 1:1 assist/ supervision  -AD --       Time Frame (Demonstrate progress toward functional swallow) 1 week  -AD --       Barriers (Demonstrate progress toward functional swallow) prior functional deficit in swallowing; cognitive status  -AD --       Progress/Outcomes (Demonstrate progress toward functional swallow) new goal  -AD --          (STG) Patient will tolerate therapeutic trials of    Consistencies Trialed (Tolerate therapeutic trials) pureed textures;nectar/ mildly thick liquids  -AD --       Desired Outcome (Tolerate therapeutic trials) without signs/symptoms of aspiration;without signs of distress;with adequate oral prep/transit/clearance  -AD --       [place] (Tolerate therapeutic trials) with 1:1 assist/ supervision  -AD --       Time Frame (Tolerate therapeutic trials) 1 week  -AD --       Progress/Outcomes (Tolerate therapeutic trials) new goal  -AD --                 User Key  (r) = Recorded By, (t) = Taken By, (c) = Cosigned By      Initials Name Effective Dates    AD Nelsy Morrell MS CCC-SLP 06/16/21 -                     EDUCATION  The patient has been educated in the following areas:   Dysphagia (Swallowing Impairment) Oral Care/Hydration NPO rationale. Philip AVILA verbalizes understanding. Pt unable to demonstrate understanding of teaching or new learning due to severe intellectual disability.       SLP GOALS       Row Name 05/30/25 0940             (LTG) Patient will demonstrate progress toward functional swallow for    Diet Texture (Demonstrate progress toward functional swallow) pureed textures  -AD      Liquid viscosity (Demonstrate progress toward functional swallow) nectar/ mildly  thick liquids  -AD      [place] (Demonstrate progress towards functional swallow) with 1:1 assist/ supervision  -AD      Time Frame (Demonstrate progress toward functional swallow) 1 week  -AD      Barriers (Demonstrate progress toward functional swallow) prior functional deficit in swallowing; cognitive status  -AD      Progress/Outcomes (Demonstrate progress toward functional swallow) new goal  -AD         (STG) Patient will tolerate therapeutic trials of    Consistencies Trialed (Tolerate therapeutic trials) pureed textures;nectar/ mildly thick liquids  -AD      Desired Outcome (Tolerate therapeutic trials) without signs/symptoms of aspiration;without signs of distress;with adequate oral prep/transit/clearance  -AD      [place] (Tolerate therapeutic trials) with 1:1 assist/ supervision  -AD      Time Frame (Tolerate therapeutic trials) 1 week  -AD      Progress/Outcomes (Tolerate therapeutic trials) new goal  -AD                User Key  (r) = Recorded By, (t) = Taken By, (c) = Cosigned By      Initials Name Provider Type    Nelsy Marques MS CCC-SLP Speech and Language Pathologist                         Time Calculation:    Time Calculation- SLP       Row Name 05/30/25 1107             Time Calculation- SLP    SLP Start Time 0940  -AD      SLP Stop Time 1005  -AD      SLP Time Calculation (min) 25 min  -AD      Total Timed Code Minutes- SLP 0 minute(s)  -AD      SLP Non-Billable Time (min) 0 min  -AD      SLP Received On 05/30/25  -AD         Untimed Charges    SLP Eval/Re-eval  ST Eval Oral Pharyng Swallow - 27657  -AD      09200-EZ Eval Oral Pharyng Swallow Minutes 25  -AD         Total Minutes    Untimed Charges Total Minutes 25  -AD       Total Minutes 25  -AD                User Key  (r) = Recorded By, (t) = Taken By, (c) = Cosigned By      Initials Name Provider Type    Nelsy Marques MS CCC-SLP Speech and Language Pathologist                    Therapy Charges for Today        Code Description Service Date Service Provider Modifiers Qty    96639953213 HC ST EVAL ORAL PHARYNG SWALLOW 3 5/29/2025 Nelsy Morrell, MS CCC-SLP GN 1    [id number] HC ST EVAL ORAL PHARYNG SWALLOW 2 5/30/2025 Nelsy Morrell, MS CCC-SLP GN 1                 Nelsy Morrell, MS CCC-SLP  5/30/2025

## 2025-05-30 NOTE — NURSING NOTE
Cwon note: Bilateral heel assessment performed. Heels are negative for skin breakdown, clear and intact. Heels should be continuously floated with pillows and turn protocol in place. I will add prevention measures to Epic.

## 2025-05-30 NOTE — PLAN OF CARE
Goal Outcome Evaluation:  Plan of Care Reviewed With: patient, durable power of         Progress: no change  Outcome Evaluation: VSS, pt sleeping majority of the evening, turned Q2, 2 bowel movements, oral suction at bedside as patient cannot expel secretions independently, nonverbal and NPO as failed swallow study. guardian called this evening and updated on patient's status, waiting to hear back from pallative care regarding POC

## 2025-05-31 NOTE — PLAN OF CARE
Goal Outcome Evaluation:  Plan of Care Reviewed With: caregiver, durable power of            Outcome Evaluation: vss. 4L O2 in place. q2 turns. purewick in place. yaunker for prn suctioning. diet advanced to pureed, nectar thick. pt only taking in ''drinks'; pockets any food items. dressing replaced as needed to buttocks. pt is non-verbal; awake throughout day.  anticipated dc to [place] tomorrow.

## 2025-05-31 NOTE — PLAN OF CARE
Goal Outcome Evaluation:  Plan of Care Reviewed With: patient        Progress: no change  Outcome Evaluation: Rested well during the night. No noted signs or symptoms of discomfort. Tolerating q2 hour turns. Purewick in place. No noted signs of shortness of breath or difficulty breathing. VSS. Remains NPO at this time. Remains sinus rhythm on telemetry.

## 2025-05-31 NOTE — PROGRESS NOTES
"Hospital Medicine Team    LOS 2 days      Patient Care Team:  Tulio Carlson MD as PCP - General (Family Medicine)  Aleksandra Kent APRN as Nurse Practitioner (Pain Medicine)  Ja Harris MD as Consulting Physician (Neurology)  Magen Farmer MD as Consulting Physician (Nephrology)  Mikey Sanchez MD (Psychiatry)  Nelsy Butcher DPM as Consulting Physician (Podiatry)  Amy Bryan DO as Consulting Physician (Obstetrics and Gynecology)  Oksana Jacobsen APRN as Nurse Practitioner (Obstetrics and Gynecology)  Tulio Carlson MD as Referring Physician (Family Medicine)  Tasneem Draper APRN as Nurse Practitioner (Nurse Practitioner)  Sonia Back RN as Ambulatory  ([facility])      Subjective       Chief Complaint:  f/u lethargy    Subjective    Unobtainable from patient as she is nonverbal at baseline.  Still is awake.  Discussed with nephew POA and another family member at bedside at length    Objective       Vital Signs  Temp:  [98 °F (36.7 °C)-98.5 °F (36.9 °C)] 98.5 °F (36.9 °C)  Heart Rate:  [] 101  Resp:  [20] 20  BP: (162-177)/(78-88) 166/83  Oxygen Therapy  SpO2: 93 %  Pulse Oximetry Type: Intermittent  Device (Oxygen Therapy): nasal cannula  Flow (L/min) (Oxygen Therapy): 4  Flowsheet Rows      Flowsheet Row First Filed Value   Admission Height 152 cm (59.84\") Documented at 05/28/2025 1015   Admission Weight 78 kg (171 lb 15.3 oz) Documented at 05/28/2025 1015                Physical Exam:  Physical Exam  Vitals reviewed.   Constitutional:       General: She is not in acute distress.     Comments: Chronically ill-appearing   Cardiovascular:      Rate and Rhythm: Normal rate.   Pulmonary:      Effort: No respiratory distress.   Abdominal:      General: There is no distension.   Neurological:      Comments: Nonverbal at baseline           Results Review:    I reviewed the patient's new clinical results.    [x]  Laboratory  []  Microbiology  []  " Radiology  []  EKG/Telemetry   []  Cardiology/Vascular   []  Pathology  []  Old records  []  Other:      X-rays, labs reviewed personally by physician.    Medication Review:   I have reviewed the patient's current medication list    Scheduled Meds  atorvastatin, 20 mg, Oral, Daily  cefTRIAXone, 2,000 mg, Intravenous, Q24H  cetirizine, 10 mg, Oral, Q PM  ferrous sulfate, 325 mg, Oral, Daily With Breakfast  gabapentin, 300 mg, Oral, Nightly  guaiFENesin, 600 mg, Oral, Q12H  lamoTRIgine, 200 mg, Oral, BID  levETIRAcetam, 750 mg, Intravenous, Q12H  levothyroxine, 25 mcg, Oral, Daily  lubiprostone, 8 mcg, Oral, BID With Meals  pantoprazole, 40 mg, Oral, Q AM  polyethylene glycol, 17 g, Oral, Daily  saccharomyces boulardii, 250 mg, Oral, BID  sodium chloride, 10 mL, Intravenous, Q12H        Meds Infusions         Meds PRN    acetaminophen **OR** acetaminophen **OR** acetaminophen    senna-docusate sodium **AND** polyethylene glycol **AND** bisacodyl **AND** bisacodyl    Calcium Replacement - Follow Nurse / BPA Driven Protocol    diazePAM **OR** LORazepam    HYDROcodone-acetaminophen    hydrocortisone-bacitracin-zinc oxide-nystatin    ipratropium-albuterol    Magnesium Low Dose Replacement - Follow Nurse / BPA Driven Protocol    ondansetron ODT **OR** ondansetron    Phosphorus Replacement - Follow Nurse / BPA Driven Protocol    Potassium Replacement - Follow Nurse / BPA Driven Protocol    sodium chloride    sodium chloride    sodium chloride        Assessment / Plan       Active Hospital Problems:  Active Hospital Problems    Diagnosis  POA    **Pneumonia [J18.9]  Yes      Resolved Hospital Problems   No resolved problems to display.       RLL Pneumonia suspect aspiration  Acute hypoxia d/t the same   -CXR reviewed  - Wean oxygen as able  - continue IV rocephin  - continue pulmonary toilet  - ST following, difficult to tell about aspiration status given the patient's inability to follow commands.  Discussed with the nephew  at length and will attempt modified diet with frequent suctioning they are aware of the risks.     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   DVT ppx-SCDs    Plan for disposition:from [facility], is on palliative program there .  If remains the same potential transferring back there tomorrow to resume palliative care services nephew POA was in agreement    Electronically signed by Hector Cardenas DO, 05/31/25, 13:44 EDT.      Note disclaimer: At [facility], we believe that sharing information builds trust and better relationships. You are receiving this note because you recently visited [facility]. It is possible you will see health information before a provider has talked with you about it. This kind of information can be easy to misunderstand. To help you fully understand what it means for your health, we urge you to discuss this note with your provider.

## 2025-05-31 NOTE — ED PROVIDER NOTES
Subjective   History of Present Illness  Pt sent in d/t soa and episode of hypotension.  Unable to give history but report given from nurse on site.  No recent changes in meds and no fever/vomiting/diarrhea; pt has been more lethargic and decresed po intake      Review of Systems   All other systems reviewed and are negative.      Past Medical History:   Diagnosis Date    Acquired cyst of kidney     Acute upper respiratory infection     Age-related nuclear cataract of both eyes     Age-related osteoporosis without current pathological fracture     Allergic rhinitis     Anemia     Anxiety     Atopic dermatitis     Bursitis     Bursitis     Candidal stomatitis     Carotid artery stenosis     Chronic kidney disease, stage III (moderate)     Chronic pain syndrome     Chronic sinusitis     Conjunctivitis     Constipation     Corns and callosities     Diverticulosis of intestine w/o perforation or abscess w/o bleeding     DJD (degenerative joint disease), lumbar     Dry eye syndrome     Hammer toe     Hypercalcemia     Hyperkalemia     Hypertension     Hypoglycemia     Intellectual disability     Iron deficiency anemia, unspecified     Low back pain     Multinodular goiter     Osteoarthritis     Osteopenia     Other forms of scoliosis, lumbar region     Other specified deforming dorsopathies, site unspecified     Pain     Pain in toe of left foot     Pneumonia     Pneumonitis due to inhalation of food or vomitus     Pressure ulcer     Profound intellectual disabilities     Pruritus vulvae     Renal disorder     Renal insufficiency     Scoliosis     Scoliosis     Seizure disorder     UTI (urinary tract infection)     Vitamin D deficiency        Allergies   Allergen Reactions    Bactrim [Sulfamethoxazole-Trimethoprim] Unknown - High Severity    Bee Venom     Cephalexin Unknown - Low Severity    Iodinated Contrast Media     Nsaids        Past Surgical History:   Procedure Laterality Date    CATARACT EXTRACTION         Family  History   Problem Relation Age of Onset    Kidney disease Sister        Social History     Socioeconomic History    Marital status: Single   Tobacco Use    Smoking status: Never    Smokeless tobacco: Never   Vaping Use    Vaping status: Never Used   Substance and Sexual Activity    Alcohol use: No    Drug use: No    Sexual activity: Defer           Objective   Physical Exam  Vitals and nursing note reviewed.   Constitutional:       Comments: Drowsy but arousable   HENT:      Head: Normocephalic.      Right Ear: External ear normal.      Left Ear: External ear normal.      Nose: Nose normal.      Mouth/Throat:      Mouth: Mucous membranes are dry.      Pharynx: Oropharynx is clear.   Eyes:      Conjunctiva/sclera: Conjunctivae normal.   Cardiovascular:      Rate and Rhythm: Regular rhythm.      Heart sounds: Normal heart sounds.   Pulmonary:      Effort: Pulmonary effort is normal.      Breath sounds: Normal breath sounds.   Abdominal:      General: There is no distension.      Palpations: Abdomen is soft.      Tenderness: There is no abdominal tenderness.   Musculoskeletal:         General: No tenderness or deformity.      Cervical back: Neck supple.   Skin:     General: Skin is warm and dry.      Capillary Refill: Capillary refill takes 2 to 3 seconds.   Neurological:      Mental Status: Mental status is at baseline.         Procedures           ED Course  ED Course as of 05/31/25 1007   Wed May 28, 2025   1028 EKG-rate of 82, sinus rhythm, left axis deviation, no acute ST elevation or depression.  When compared to EKG from 5/21/2025 it is generally unchanged. [AW]      ED Course User Index  [AW] Karthikeyan German MD                                                       Medical Decision Making  Ddx infection, malnut., dehydration, ghulam abn, pna, uti    Labs Reviewed  COMPREHENSIVE METABOLIC PANEL - Abnormal; Notable for the following components:     BUN                           65.6 (*)               Creatinine                     1.89 (*)               Sodium                        151 (*)                Chloride                      115 (*)                CO2                           21.0 (*)               ALT (SGPT)                    42 (*)                 AST (SGOT)                    42 (*)                 Alkaline Phosphatase          138 (*)                BUN/Creatinine Ratio          34.7 (*)               eGFR                          25.9 (*)            All other components within normal limits         Narrative: GFR Categories in Chronic Kidney Disease (CKD)                                              GFR Category          GFR (mL/min/1.73)    Interpretation                  G1                    90 or greater        Normal or high (1)                  G2                    60-89                Mild decrease (1)                  G3a                   45-59                Mild to moderate decrease                  G3b                   30-44                Moderate to severe decrease                  G4                    15-29                Severe decrease                  G5                    14 or less           Kidney failure                                    (1)In the absence of evidence of kidney disease, neither GFR category G1 or G2 fulfill the criteria for CKD.                                    eGFR calculation 2021 CKD-EPI creatinine equation, which does not include race as a factor  CBC WITH AUTO DIFFERENTIAL - Abnormal; Notable for the following components:     MCV                           98.3 (*)               MCHC                          29.3 (*)               Neutrophil %                  87.7 (*)               Lymphocyte %                  8.8 (*)                Monocyte %                    3.3 (*)                Eosinophil %                  0.0 (*)                Neutrophils, Absolute         8.81 (*)            All other components within normal limits  CBC (NO DIFF) - Abnormal; Notable  for the following components:     WBC                           21.86 (*)               RBC                           3.53 (*)               Hemoglobin                    10.3 (*)               Hematocrit                    33.8 (*)               MCHC                          30.5 (*)            All other components within normal limits  BASIC METABOLIC PANEL - Abnormal; Notable for the following components:     BUN                           51.2 (*)               Creatinine                    1.38 (*)               Sodium                        146 (*)                Chloride                      114 (*)                CO2                           21.5 (*)               BUN/Creatinine Ratio          37.1 (*)               eGFR                          37.8 (*)            All other components within normal limits         Narrative: GFR Categories in Chronic Kidney Disease (CKD)                                              GFR Category          GFR (mL/min/1.73)    Interpretation                  G1                    90 or greater        Normal or high (1)                  G2                    60-89                Mild decrease (1)                  G3a                   45-59                Mild to moderate decrease                  G3b                   30-44                Moderate to severe decrease                  G4                    15-29                Severe decrease                  G5                    14 or less           Kidney failure                                    (1)In the absence of evidence of kidney disease, neither GFR category G1 or G2 fulfill the criteria for CKD.                                    eGFR calculation 2021 CKD-EPI creatinine equation, which does not include race as a factor  BASIC METABOLIC PANEL - Abnormal; Notable for the following components:     BUN                           33.7 (*)               Creatinine                    1.13 (*)               Sodium                         146 (*)                Chloride                      111 (*)                CO2                           16.1 (*)               BUN/Creatinine Ratio          29.8 (*)               Anion Gap                     18.9 (*)               eGFR                          48.1 (*)            All other components within normal limits         Narrative: GFR Categories in Chronic Kidney Disease (CKD)                                              GFR Category          GFR (mL/min/1.73)    Interpretation                  G1                    90 or greater        Normal or high (1)                  G2                    60-89                Mild decrease (1)                  G3a                   45-59                Mild to moderate decrease                  G3b                   30-44                Moderate to severe decrease                  G4                    15-29                Severe decrease                  G5                    14 or less           Kidney failure                                    (1)In the absence of evidence of kidney disease, neither GFR category G1 or G2 fulfill the criteria for CKD.                                    eGFR calculation 2021 CKD-EPI creatinine equation, which does not include race as a factor  CBC (NO DIFF) - Abnormal; Notable for the following components:     WBC                           23.51 (*)               Hemoglobin                    11.5 (*)               MPV                           12.1 (*)            All other components within normal limits  BASIC METABOLIC PANEL - Abnormal; Notable for the following components:     Glucose                       133 (*)                CO2                           19.9 (*)               Anion Gap                     16.1 (*)            All other components within normal limits         Narrative: GFR Categories in Chronic Kidney Disease (CKD)                                              GFR Category          GFR  (mL/min/1.73)    Interpretation                  G1                    90 or greater        Normal or high (1)                  G2                    60-89                Mild decrease (1)                  G3a                   45-59                Mild to moderate decrease                  G3b                   30-44                Moderate to severe decrease                  G4                    15-29                Severe decrease                  G5                    14 or less           Kidney failure                                    (1)In the absence of evidence of kidney disease, neither GFR category G1 or G2 fulfill the criteria for CKD.                                    eGFR calculation 2021 CKD-EPI creatinine equation, which does not include race as a factor  CBC (NO DIFF) - Abnormal; Notable for the following components:     WBC                           17.81 (*)            All other components within normal limits  BLOOD CULTURE - Normal  COVID-19 AND FLU A/B, NP SWAB IN TRANSPORT MEDIA 1 HR TAT - Normal         Narrative: Fact sheet for providers: https://www.fda.gov/media/912040/download                                    Fact sheet for patients: https://www.fda.gov/media/666345/download                                    Test performed by PCR.  LACTIC ACID, PLASMA - Normal  BNP (IN-HOUSE) - Normal         Narrative: This assay is used as an aid in the diagnosis of individuals suspected of having heart failure. It can be used as an aid in the diagnosis of acute decompensated heart failure (ADHF) in patients presenting with signs and symptoms of ADHF to the emergency department (ED). In addition, NT-proBNP of <300 pg/mL indicates ADHF is not likely.                                    Age Range Result Interpretation  NT-proBNP Concentration (pg/mL:                                                      <50             Positive            >450                                   [place]                  300-450                                    Negative             <300                                    50-75           Positive            >900                                  [place]                300-900                                  Negative            <300                                                      >75             Positive            >1800                                  [place]                300-1800                                  Negative            <300  TSH - Normal  COVID PRE-OP / PRE-PROCEDURE SCREENING ORDER (NO ISOLATION)         Narrative: The following orders were created for panel order COVID PRE-OP / PRE-PROCEDURE SCREENING ORDER (NO ISOLATION) - Swab, Nasopharynx.                  Procedure                               Abnormality         Status                                     ---------                               -----------         ------                                     COVID-19 and FLU A/B PCR...[[id number]]  Normal              Final result                                                 Please view results for these tests on the individual orders.  RAINBOW DRAW         Narrative: The following orders were created for panel order [place] Draw.                  Procedure                               Abnormality         Status                                     ---------                               -----------         ------                                     Green Top (Gel)[[id number]]                                  Final result                               Lavender Top[[id number]]                                     Final result                               Gold Top - SST[[id number]]                                   Final result                               Light Blue Top[[id number]]                                   Final result                                                 Please view results for these tests on the individual orders.  CBC AND DIFFERENTIAL          Narrative: The following orders were created for panel order CBC & Differential.                  Procedure                               Abnormality         Status                                     ---------                               -----------         ------                                     CBC Auto Differential[[id number]]        Abnormal            Final result                                                 Please view results for these tests on the individual orders.  GREEN TOP  LAVENDER TOP  GOLD TOP - SST  LIGHT BLUE TOP    XR Chest 1 View  Result Date: 5/28/2025  Evidence for developing right lung pneumonia. Recommend correlation for signs or symptoms of acute infection and follow-up to ensure resolution. Electronically Signed: Yakov Robertson MD  5/28/2025 11:29 AM EDT  Workstation ID: [id number]        Problems Addressed:  KEILY (acute kidney injury): complicated acute illness or injury  Hypotension, unspecified hypotension type: complicated acute illness or injury  Pneumonia due to infectious organism, unspecified laterality, unspecified part of lung: complicated acute illness or injury    Amount and/or Complexity of Data Reviewed  Labs: ordered.  Radiology: ordered.    Risk  Decision regarding hospitalization.        Final diagnoses:   Pneumonia due to infectious organism, unspecified laterality, unspecified part of lung   Hypotension, unspecified hypotension type   KEILY (acute kidney injury)       ED Disposition  ED Disposition       ED Disposition   Decision to Admit    Condition   --    Comment   Level of Care: Telemetry [5]   Diagnosis: Pneumonia [746147]   Admitting Physician: CHARLINE MONROE [[id number]]   Attending Physician: CHARLINE MONROE [[id number]]                 No follow-up provider specified.       Medication List      No changes were made to your prescriptions during this visit.            Karthikeyan German MD  05/31/25 1007

## 2025-06-01 ENCOUNTER — READMISSION MANAGEMENT (OUTPATIENT)
Dept: CALL CENTER | Facility: HOSPITAL | Age: 85
End: 2025-06-01
Payer: MEDICARE

## 2025-06-01 PROBLEM — J18.9 PNEUMONIA: Status: RESOLVED | Noted: 2025-05-28 | Resolved: 2025-06-01

## 2025-06-01 NOTE — OUTREACH NOTE
Prep Survey      Flowsheet Row Responses   [facility] facility patient discharged from? [place]   Is LACE score < 7 ? No   Eligibility Not Eligible   What are the reasons patient is not eligible? [facility]   Does the patient have one of the following disease processes/diagnoses(primary or secondary)? Other   Prep survey completed? Yes            MIKE HUTCHINS - Registered Nurse

## 2025-06-01 NOTE — DISCHARGE SUMMARY
[facility] Medicine Services  DISCHARGE SUMMARY        Date of Admission: 5/28/2025    Date of Discharge:  6/1/2025    Length of stay:  LOS: 3 days     Presenting Problem:   Pneumonia [J18.9]  KEILY (acute kidney injury) [N17.9]  Hypotension, unspecified hypotension type [I95.9]  Pneumonia due to infectious organism, unspecified laterality, unspecified part of lung [J18.9]    Hospital Course     Active Diagnosis During Hospital Stay/Discharge Diagnoses/Course by Diagnoses:       Active Hospital Problems   No active problems to display.      Resolved Hospital Problems    Diagnosis Date Resolved POA    **Pneumonia [J18.9] 06/01/2025 Yes     RLL Pneumonia suspect aspiration  Acute hypoxia d/t the same   -CXR reviewed  - Wean oxygen as able  - continue IV rocephin  - continue pulmonary toilet  - ST following, difficult to tell about aspiration status given the patient's inability to follow commands.  Discussed with the nephew at length and will continue modified diet with frequent suctioningat d/c  they are aware of the risks.     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 female presented with issues as noted above mainly pneumonia.  After getting treatment the patient was more interactive and seem to be nearing her baseline.  Was felt stable to return to long-term care facility.  Note she is on a palliative program there and attempt to be made to discussed with the POA about potential hospice in the future as has been noted that given the patient's chronic illness may be in her best interest.  Family was in agreement with transferring back to [facility].      Procedures Performed:as  noted          Consults:   Consults       No orders found from 4/29/2025 to 5/29/2025.              Pertinent  and/or Most Recent Results       Pertinent Test Results:     Results from last 7 days   Lab Units 05/31/25 0644 05/30/25 0331 05/29/25 0351   WBC 10*3/mm3 17.81* 23.51* 21.86*   HEMOGLOBIN g/dL 13.7 11.5* 10.3*   HEMATOCRIT % 39.4 36.5 33.8*   PLATELETS 10*3/mm3 193 155 142     Results from last 7 days   Lab Units 05/31/25  0644 05/30/25 0331 05/29/25 0351 05/28/25  0937   SODIUM mmol/L 141 146* 146* 151*   POTASSIUM mmol/L 3.7 4.3 4.6 4.6   CHLORIDE mmol/L 105 111* 114* 115*   CO2 mmol/L 19.9* 16.1* 21.5* 21.0*   BUN mg/dL 22.9 33.7* 51.2* 65.6*   CREATININE mg/dL 0.93 1.13* 1.38* 1.89*   CALCIUM mg/dL 9.8 9.8 9.1 9.9   BILIRUBIN mg/dL  --   --   --  0.3   ALK PHOS U/L  --   --   --  138*   ALT (SGPT) U/L  --   --   --  42*   AST (SGOT) U/L  --   --   --  42*   GLUCOSE mg/dL 133* 82 86 96       Microbiology Results (last 10 days)       Procedure Component Value - Date/Time    COVID PRE-OP / PRE-PROCEDURE SCREENING ORDER (NO ISOLATION) - Swab, Nasopharynx [[id number]]  (Normal) Collected: 05/28/25 1047    Lab Status: Final result Specimen: Swab from Nasopharynx Updated: 05/28/25 1122    Narrative:      The following orders were created for panel order COVID PRE-OP / PRE-PROCEDURE SCREENING ORDER (NO ISOLATION) - Swab, Nasopharynx.  Procedure                               Abnormality         Status                     ---------                               -----------         ------                     COVID-19 and FLU A/B PCR...[[id number]]  Normal              Final result                 Please view results for these tests on the individual orders.    COVID-19 and FLU A/B PCR, 1 HR TAT - Swab, Nasopharynx [[id number]]  (Normal) Collected: 05/28/25 1047    Lab Status: Final result Specimen: Swab from Nasopharynx Updated: 05/28/25 1122     COVID19 Not Detected     Influenza A PCR Not Detected      Influenza B PCR Not Detected    Narrative:      Fact sheet for providers: https://www.fda.gov/media/832215/download    Fact sheet for patients: https://www.fda.gov/media/039777/download    Test performed by PCR.    Blood Culture - Blood, Arm, Right [[id number]]  (Normal) Collected: 05/28/25 0937    Lab Status: Preliminary result Specimen: Blood from Arm, Right Updated: 06/01/25 0945     Blood Culture No growth at 4 days            Results for orders placed during the hospital encounter of 12/23/22    Adult transthoracic echo complete    Interpretation Summary    Left ventricular systolic function is normal. Calculated left ventricular EF = 64.2%    Left ventricular diastolic function was normal.    Saline test results are negative.    There is calcification of the aortic valve.    Aortic valve area is 2.1 cm2.    Aortic valve maximum pressure gradient is 13 mmHg. Aortic valve mean pressure gradient is 8 mmHg.      Imaging Results (All)       Procedure Component Value Units Date/Time    XR Chest 1 View [[id number]] Collected: 05/28/25 1128     Updated: 05/28/25 1132    Narrative:      XR CHEST 1 VW    Date of Exam: 5/28/2025 11:19 AM EDT    Indication: cough    Comparison: 5/15/2025    FINDINGS:  Infiltrates are noted throughout the right lung. No definitive pleural effusions are identified. The cardiac silhouette and mediastinum are stable. Aortic atherosclerosis is noted. No acute osseous abnormalities are seen.      Impression:      Evidence for developing right lung pneumonia. Recommend correlation for signs or symptoms of acute infection and follow-up to ensure resolution.      Electronically Signed: Yakov Robertson MD    5/28/2025 11:29 AM EDT    Workstation ID: [id number]              Day of Discharge       Condition on Discharge:  stable back to baseline but poor long term prognosis    Vital Signs  Temp:  [97.1 °F (36.2 °C)-98.6 °F (37 °C)] 98.6 °F (37 °C)  Heart Rate:  [] 108  Resp:  [18-20] 18  BP:  (128-168)/(63-86) 168/86    Physical Exam:  Physical Exam  Vitals reviewed.   Cardiovascular:      Rate and Rhythm: Normal rate.   Pulmonary:      Effort: No respiratory distress.   Abdominal:      General: There is no distension.   Neurological:      Mental Status: Mental status is at baseline.   Psychiatric:         Behavior: Behavior normal.           Discharge Disposition  Home or Self Care    Discharge Medications     Discharge Medications        Continue These Medications        Instructions Start Date   acetaminophen 325 MG tablet  Commonly known as: TYLENOL   650 mg, Every 6 Hours PRN      amLODIPine 5 MG tablet  Commonly known as: NORVASC   5 mg, Oral, 2 Times Daily      Antacid/[place] 400-400-40 MG/10ML suspension  Generic drug: aluminum-magnesium hydroxide-simethicone       AQUAPHOR LIP REPAIR EX   4 Times Daily PRN      eucerin cream   As Needed      atorvastatin 20 MG tablet  Commonly known as: LIPITOR   20 mg, Daily      BENEFIBER DRINK MIX PO   1 packet, Daily      Biofreeze Roll-On 4 % gel  Generic drug: Menthol (Topical Analgesic)   Daily      bisacodyl 5 MG EC tablet  Commonly known as: DULCOLAX   10 mg, Daily PRN      bisacodyl 10 MG suppository  Commonly known as: DULCOLAX   10 mg, Daily PRN      carbamide peroxide 6.5 % otic solution  Commonly known as: DEBROX   4 drops, As Needed      cetirizine 10 MG tablet  Commonly known as: zyrTEC   10 mg, Daily      diazePAM (20 MG Dose) 2 x 10 MG/0.1ML liquid therapy pack  Commonly known as: VALTOCO   Instill 1 spray (per device) into one nostril once as needed for seizure. If second dose is required, it may be administered 4 hours after initial dose. Do not exceed 2 doses per episode.      diphenhydrAMINE 25 mg capsule  Commonly known as: BENADRYL   25 mg, Every 6 Hours PRN      EPINEPHrine 0.3 MG/0.3ML solution auto-injector injection  Commonly known as: EPIPEN   Inject 0.3 mL under the skin into the appropriate area as directed 1 (One) Time As  Needed.      FeroSul 325 (65 Fe) MG tablet  Generic drug: ferrous sulfate       fleet enema 7-19 GM/118ML enema   1 enema, Once As Needed      gabapentin 300 MG capsule  Commonly known as: NEURONTIN   300 mg, Oral, Nightly      hydroCHLOROthiazide 25 MG tablet   25 mg, Oral, Daily      HYDROcodone-acetaminophen 7.5-325 MG per tablet  Commonly known as: NORCO   1 tablet, Oral, Every 8 Hours PRN      hydrocortisone 2.5 % cream   Apply 1 Application topically to the appropriate area as directed 4 (Four) Times a Day As Needed.      ipratropium-albuterol 0.5-2.5 mg/3 ml nebulizer  Commonly known as: DUO-NEB   3 mL, Nebulization, 4 Times Daily PRN      ipratropium-albuterol 0.5-2.5 mg/3 ml nebulizer  Commonly known as: DUO-NEB   3 mL, Nebulization, Every 4 Hours PRN      lamoTRIgine 200 MG tablet  Commonly known as: LaMICtal   200 mg, 2 Times Daily      levETIRAcetam 500 MG tablet  Commonly known as: KEPPRA   750 mg, Oral, 2 Times Daily      levothyroxine 25 MCG tablet  Commonly known as: SYNTHROID, LEVOTHROID   25 mcg, Daily      Lokelma 10 g packet  Generic drug: sodium zirconium cyclosilicate   10 g, Daily      loperamide 2 MG capsule  Commonly known as: IMODIUM   2 mg, 4 Times Daily PRN      LORazepam 0.5 MG tablet  Commonly known as: ATIVAN   0.5 mg, Oral, Every 6 Hours PRN      lubiprostone 8 MCG capsule  Commonly known as: AMITIZA   8 mcg, 2 Times Daily With Meals      magnesium hydroxide 400 MG/5ML suspension  Commonly known as: MILK OF MAGNESIA   30 mL, Daily PRN      Susan's magic butt cream   1 Application, As Needed      MiraLax 17 g packet  Generic drug: polyethylene glycol   17 g, Daily      neomycin-bacitracin-polymyxin 5-400-5000 ointment   1 Application, Daily      nystatin 805817 UNIT/GM cream  Commonly known as: MYCOSTATIN       nystatin-triamcinolone 446191-4.1 UNIT/GM-% cream  Commonly known as: MYCOLOG II   2 Times Daily      omeprazole 20 MG capsule  Commonly known as: priLOSEC   40 mg, Daily       ondansetron 4 MG tablet  Commonly known as: ZOFRAN   4 mg, Every 8 Hours PRN      Prolia 60 MG/ML solution prefilled syringe syringe  Generic drug: denosumab       Risaquad-2 capsule capsule   2 capsules, Daily      saccharomyces boulardii 250 MG capsule  Commonly known as: FLORASTOR   250 mg, 2 Times Daily      SILVASORB ANTIMICROBIAL SHEET EX   As Needed      sodium chloride 0.65 % nasal spray   2 sprays, As Needed      TRIAMCINOLONE ACETONIDE EX   Apply  topically.      tuberculin 5 UNIT/0.1ML injection       zinc oxide 20 % ointment                Discharge Diet:   Diet Instructions       Diet: Regular/House Diet; Pureed (NDD 1); Vivian Thick      Discharge Diet: Regular/House Diet    Texture: Pureed (NDD 1)    Fluid Consistency: Nectar Thick            Activity at Discharge:   Activity Instructions       Activity as Tolerated              Follow-up Appointments  Future Appointments   Date Time Provider Department Center   6/2/2025  1:00 PM Jose To PA-C [facility]  [facility]   11/5/2025 12:20 PM Alex Peralta II, MD [facility]     Additional Instructions for the Follow-ups that You Need to Schedule       Discharge Follow-up with PCP   As directed       Currently Documented PCP:    Tulio Carlson MD    PCP Phone Number:    [phone]     Follow Up Details: one week                Test Results Pending at Discharge  Pending Labs       Order Current Status    Blood Culture - Blood, Arm, Right Preliminary result             Risk for Readmission (LACE) Score: 11 (6/1/2025  6:00 AM)        Time: Discharge 32 min with face-to-face history exam, writing of prescriptions, and documenting discharge data including care coordination with the nursing staff.      Electronically signed by Hector Cardenas DO, 06/01/25, 10:49 EDT.      Note disclaimer: At [facility], we believe that sharing information builds trust and better relationships. You are receiving this note because you recently visited [facility]. It is possible you will see health information before a provider has talked with you about it. This kind of information can be easy to misunderstand. To help you fully understand what it means for your health, we urge you to discuss this note with your provider.

## 2025-06-01 NOTE — PLAN OF CARE
Goal Outcome Evaluation:              Outcome Evaluation: VSS. O2 at 4L/NC in place. Attempted to give patient her 2100 meds but she just kept the meds I crushed in applesauce in her mouth. Nonverbal. Turned q2h. Dressing to right buttocks C/D/I.

## 2025-06-01 NOTE — NURSING NOTE
"Nursing IP/EMS Transfer  Erica Crockett  84 y.o.  female    HPI :   Chief Complaint   Patient presents with    Fatigue     EMS called for AMS-patient is baseline non verbal. Making \"grunting sounds\". [place] reports soa and hypotension.        Admitting doctor:   Hector Cardenas DO    Admitting diagnosis:   The primary encounter diagnosis was Pneumonia due to infectious organism, unspecified laterality, unspecified part of lung. Diagnoses of Hypotension, unspecified hypotension type, KEILY (acute kidney injury), and Oropharyngeal dysphagia were also pertinent to this visit.    Code status:   Current Code Status       Date Active Code Status Order ID Comments User Context       5/28/2025 1427 No CPR (Do Not Attempt to Resuscitate) [id number]  Hector Cardenas DO Inpatient        Question Answer    Code Status (Patient has no pulse and is not breathing) No CPR (Do Not Attempt to Resuscitate)    Medical Interventions (Patient has pulse or is breathing) Limited Support    Medical Intervention Limits: No intubation (DNI)    Comments as per living will and advanced directive on file                    Allergies:   Bactrim [sulfamethoxazole-trimethoprim], Bee venom, Cephalexin, Iodinated contrast media, and Nsaids    Isolation:   No active isolations    Intake and Output    Intake/Output Summary (Last 24 hours) at 6/1/2025 1138  Last data filed at 5/31/2025 1715  Gross per 24 hour   Intake 20 ml   Output --   Net 20 ml       Weight:       05/28/25  1015   Weight: 78 kg (171 lb 15.3 oz)       Most recent vitals:   Vitals:    05/31/25 2348 06/01/25 0554 06/01/25 0744 06/01/25 1116   BP: 135/63 140/63 168/86 157/76   BP Location: Left arm Left arm     Patient Position: Lying Lying Lying Lying   Pulse: 89 81 108 100   Resp: 18 18 18 20   Temp: 97.1 °F (36.2 °C) 97.9 °F (36.6 °C) 98.6 °F (37 °C) 97.4 °F (36.3 °C)   TempSrc: Temporal Temporal Axillary Temporal   SpO2: 94% 93% 91% 96%   Weight:       Height:           Active " LDAs/IV Access:   Lines, Drains & Airways       Active LDAs       None                    Labs (abnormal labs have a star):   Lab Results (last 24 hours)       Procedure Component Value Units Date/Time    Blood Culture - Blood, Arm, Right [[id number]]  (Normal) Collected: 05/28/25 0937    Specimen: Blood from Arm, Right Updated: 06/01/25 0945     Blood Culture No growth at 4 days             EKG:   ECG 12 Lead Rhythm Change   Final Result   HEART RATE=82  bpm   RR Uggwrnbi=386  ms   NV Rrrrrznf=497  ms   P Horizontal Axis=12  deg   P Front Axis=43  deg   QRSD Interval=87  ms   QT Lqiujzgt=879  ms   SFgM=582  ms   QRS Axis=-37  deg   T Wave Axis=31  deg   - ABNORMAL ECG -   Sinus rhythm   Inferior  infarct, old   Consider  anterior infarct   No change from prior tracing   Electronically Signed By: Deanna Davila ([facility]) 2025-05-28 14:58:19   Date and Time of Study:2025-05-28 10:28:21          Current Medications:     Current Facility-Administered Medications:     acetaminophen (TYLENOL) tablet 650 mg, 650 mg, Oral, Q4H PRN **OR** acetaminophen (TYLENOL) 160 MG/5ML oral solution 650 mg, 650 mg, Oral, Q4H PRN **OR** acetaminophen (TYLENOL) suppository 650 mg, 650 mg, Rectal, Q4H PRN, Hector Cardenas DO    atorvastatin (LIPITOR) tablet 20 mg, 20 mg, Oral, Daily, Hector Cardenas DO    sennosides-docusate (PERICOLACE) 8.6-50 MG per tablet 2 tablet, 2 tablet, Oral, BID PRN **AND** polyethylene glycol (MIRALAX) packet 17 g, 17 g, Oral, Daily PRN **AND** bisacodyl (DULCOLAX) EC tablet 5 mg, 5 mg, Oral, Daily PRN **AND** bisacodyl (DULCOLAX) suppository 10 mg, 10 mg, Rectal, Daily PRN, Hector Cardenas,     Calcium Replacement - Follow Nurse / BPA Driven Protocol, , Not Applicable, PRN, Hector Cardenas,     cetirizine (zyrTEC) tablet 10 mg, 10 mg, Oral, Q PM, Hector Cardenas DO    diazePAM (VALIUM) injection 2.5 mg, 2.5 mg, Intravenous, Q6H PRN **OR** LORazepam (ATIVAN) tablet 0.5 mg, 0.5 mg, Oral, Q6H PRN, Lamont Devries,  DO    ferrous sulfate tablet 325 mg, 325 mg, Oral, Daily With Breakfast, Hector Cardenas,     gabapentin (NEURONTIN) capsule 300 mg, 300 mg, Oral, Nightly, Hector Cardenas, , 300 mg at 05/31/25 2025    guaiFENesin (MUCINEX) 12 hr tablet 600 mg, 600 mg, Oral, Q12H, Hector Cardenas, , 600 mg at 05/31/25 2025    HYDROcodone-acetaminophen (NORCO) 7.5-325 MG per tablet 1 tablet, 1 tablet, Oral, Q8H PRN, Hector Cardenas DO    hydrocortisone-bacitracin-zinc oxide-nystatin (MAGIC BARRIER) ointment 1 Application, 1 Application, Topical, PRN, Hector Cardenas DO    ipratropium-albuterol (DUO-NEB) nebulizer solution 3 mL, 3 mL, Nebulization, Q6H PRN, Hector Cardenas, , 3 mL at 05/28/25 1652    lamoTRIgine (LaMICtal) tablet 200 mg, 200 mg, Oral, BID, Hector Cardenas, , 200 mg at 05/31/25 2025    levETIRAcetam (KEPPRA) injection 750 mg, 750 mg, Intravenous, Q12H, Av Roa MD, 750 mg at 06/01/25 0839    levothyroxine (SYNTHROID, LEVOTHROID) tablet 25 mcg, 25 mcg, Oral, Daily, Hector Cardenas DO    lubiprostone (AMITIZA) capsule 8 mcg, 8 mcg, Oral, BID With Meals, Hector Cardenas,     Magnesium Low Dose Replacement - Follow Nurse / BPA Driven Protocol, , Not Applicable, PRN, Hector Cardenas,     ondansetron ODT (ZOFRAN-ODT) disintegrating tablet 4 mg, 4 mg, Oral, Q6H PRN **OR** ondansetron (ZOFRAN) injection 4 mg, 4 mg, Intravenous, Q6H PRN, Hector Cardenas DO    pantoprazole (PROTONIX) EC tablet 40 mg, 40 mg, Oral, Q AM, Hector Cardenas DO    Phosphorus Replacement - Follow Nurse / BPA Driven Protocol, , Not Applicable, PRN, Hector Cardenas,     polyethylene glycol (MIRALAX) packet 17 g, 17 g, Oral, Daily, Hector Cardenas DO    Potassium Replacement - Follow Nurse / BPA Driven Protocol, , Not Applicable, PRN, Hector Cardenas,     saccharomyces boulardii (FLORASTOR) capsule 250 mg, 250 mg, Oral, BID, Hector Cardenas DO, 250 mg at 05/31/25 2025    sodium chloride 0.9 % flush 10 mL, 10 mL, Intravenous, PRN, Hector Cardenas DO    sodium  chloride 0.9 % flush 10 mL, 10 mL, Intravenous, Q12H, Hector Cardenas, , 10 mL at 06/01/25 0840    sodium chloride 0.9 % flush 10 mL, 10 mL, Intravenous, PRN, Hector Cardenas,     sodium chloride 0.9 % infusion 40 mL, 40 mL, Intravenous, PRN, Hector Cardenas,      Imaging results:  Imaging Results (Last 72 Hours)       ** No results found for the last 72 hours. **             Ambulatory status:   - non ambulatory    Social issues:   Social History     Socioeconomic History    Marital status: Single   Tobacco Use    Smoking status: Never    Smokeless tobacco: Never   Vaping Use    Vaping status: Never Used   Substance and Sexual Activity    Alcohol use: No    Drug use: No    Sexual activity: Defer       NIH Stroke Scale:         Sho Michelle RN  06/01/25 11:38 EDT

## 2025-06-02 NOTE — TELEPHONE ENCOUNTER
Patient was admitted to [facility] via ER for pneumonia.  Patient was stabilized and monitored under observation.  Patient was stable enough to return to long-term care facility.  It was also noted that she is on a palliative program there and attempts to discuss with the POA about potential hospice in the future as has been noted due to patient's chronic illness may be in her best interest, per hospital providers.  Family was in agreement with transferring back to [facility].     Since patient has been transferred back to subacute nursing care as well as participating in a palliative plan of care and possibly discussing a move to Hospice care. We will close at this time.

## 2025-06-09 ENCOUNTER — TELEPHONE (OUTPATIENT)
Dept: FAMILY MEDICINE CLINIC | Facility: CLINIC | Age: 85
End: 2025-06-09

## 2025-06-09 LAB — LAMOTRIGINE SERPL-MCNC: 3.2 UG/ML (ref 2–20)

## 2025-06-09 NOTE — TELEPHONE ENCOUNTER
"    “Please be informed that patient has passed. Patient has been marked  in the system. The date of death is: 25\".    Caller: [facility]    Relationship: Other    Best call back number: [phone]     Did the patient have surgery within 30 days of their passing (Y/N): [place] STATES SHE DOES NOT HAVE THIS INFORMATION  "